# Patient Record
Sex: MALE | Race: WHITE | NOT HISPANIC OR LATINO | Employment: OTHER | URBAN - METROPOLITAN AREA
[De-identification: names, ages, dates, MRNs, and addresses within clinical notes are randomized per-mention and may not be internally consistent; named-entity substitution may affect disease eponyms.]

---

## 2019-09-22 ENCOUNTER — APPOINTMENT (OUTPATIENT)
Dept: RADIOLOGY | Facility: MEDICAL CENTER | Age: 79
DRG: 070 | End: 2019-09-22
Attending: EMERGENCY MEDICINE
Payer: COMMERCIAL

## 2019-09-22 ENCOUNTER — HOSPITAL ENCOUNTER (INPATIENT)
Facility: MEDICAL CENTER | Age: 79
LOS: 36 days | DRG: 070 | End: 2019-10-29
Attending: EMERGENCY MEDICINE | Admitting: STUDENT IN AN ORGANIZED HEALTH CARE EDUCATION/TRAINING PROGRAM
Payer: COMMERCIAL

## 2019-09-22 DIAGNOSIS — J81.0 ACUTE PULMONARY EDEMA (HCC): ICD-10-CM

## 2019-09-22 DIAGNOSIS — E87.20 LACTIC ACIDOSIS: ICD-10-CM

## 2019-09-22 DIAGNOSIS — F02.818 LATE ONSET ALZHEIMER'S DISEASE WITH BEHAVIORAL DISTURBANCE (HCC): ICD-10-CM

## 2019-09-22 DIAGNOSIS — G93.41 ACUTE METABOLIC ENCEPHALOPATHY: ICD-10-CM

## 2019-09-22 DIAGNOSIS — G30.1 LATE ONSET ALZHEIMER'S DISEASE WITH BEHAVIORAL DISTURBANCE (HCC): ICD-10-CM

## 2019-09-22 DIAGNOSIS — R41.82 ALTERED MENTAL STATUS, UNSPECIFIED ALTERED MENTAL STATUS TYPE: ICD-10-CM

## 2019-09-22 PROBLEM — E11.9 DIABETES (HCC): Status: ACTIVE | Noted: 2019-09-22

## 2019-09-22 PROBLEM — R79.89 ELEVATED LACTIC ACID LEVEL: Status: ACTIVE | Noted: 2019-09-22

## 2019-09-22 PROBLEM — E11.65 DIABETES MELLITUS WITH HYPERGLYCEMIA (HCC): Status: ACTIVE | Noted: 2019-09-22

## 2019-09-22 PROBLEM — I10 HYPERTENSION: Status: ACTIVE | Noted: 2019-09-22

## 2019-09-22 PROBLEM — E78.5 HYPERLIPEMIA: Status: ACTIVE | Noted: 2019-09-22

## 2019-09-22 PROBLEM — F32.A DEPRESSION: Status: ACTIVE | Noted: 2019-09-22

## 2019-09-22 LAB
ALBUMIN SERPL BCP-MCNC: 4.4 G/DL (ref 3.2–4.9)
ALBUMIN/GLOB SERPL: 1.5 G/DL
ALP SERPL-CCNC: 73 U/L (ref 30–99)
ALT SERPL-CCNC: 7 U/L (ref 2–50)
ANION GAP SERPL CALC-SCNC: 18 MMOL/L (ref 0–11.9)
APPEARANCE UR: CLEAR
APTT PPP: 27.1 SEC (ref 24.7–36)
AST SERPL-CCNC: 19 U/L (ref 12–45)
BACTERIA #/AREA URNS HPF: NEGATIVE /HPF
BASOPHILS # BLD AUTO: 0.6 % (ref 0–1.8)
BASOPHILS # BLD: 0.06 K/UL (ref 0–0.12)
BILIRUB SERPL-MCNC: 1.2 MG/DL (ref 0.1–1.5)
BILIRUB UR QL STRIP.AUTO: NEGATIVE
BUN SERPL-MCNC: 20 MG/DL (ref 8–22)
CALCIUM SERPL-MCNC: 9.9 MG/DL (ref 8.5–10.5)
CHLORIDE SERPL-SCNC: 103 MMOL/L (ref 96–112)
CO2 SERPL-SCNC: 21 MMOL/L (ref 20–33)
COLOR UR: YELLOW
CREAT SERPL-MCNC: 1.02 MG/DL (ref 0.5–1.4)
EKG IMPRESSION: NORMAL
EOSINOPHIL # BLD AUTO: 0.28 K/UL (ref 0–0.51)
EOSINOPHIL NFR BLD: 2.6 % (ref 0–6.9)
EPI CELLS #/AREA URNS HPF: NEGATIVE /HPF
ERYTHROCYTE [DISTWIDTH] IN BLOOD BY AUTOMATED COUNT: 42 FL (ref 35.9–50)
GLOBULIN SER CALC-MCNC: 2.9 G/DL (ref 1.9–3.5)
GLUCOSE BLD-MCNC: 144 MG/DL (ref 65–99)
GLUCOSE SERPL-MCNC: 121 MG/DL (ref 65–99)
GLUCOSE UR STRIP.AUTO-MCNC: NEGATIVE MG/DL
HCT VFR BLD AUTO: 48.1 % (ref 42–52)
HGB BLD-MCNC: 16.3 G/DL (ref 14–18)
HYALINE CASTS #/AREA URNS LPF: ABNORMAL /LPF
IMM GRANULOCYTES # BLD AUTO: 0.05 K/UL (ref 0–0.11)
IMM GRANULOCYTES NFR BLD AUTO: 0.5 % (ref 0–0.9)
INR PPP: 1.02 (ref 0.87–1.13)
KETONES UR STRIP.AUTO-MCNC: ABNORMAL MG/DL
LACTATE BLD-SCNC: 2.9 MMOL/L (ref 0.5–2)
LACTATE BLD-SCNC: 6.1 MMOL/L (ref 0.5–2)
LEUKOCYTE ESTERASE UR QL STRIP.AUTO: NEGATIVE
LYMPHOCYTES # BLD AUTO: 3.1 K/UL (ref 1–4.8)
LYMPHOCYTES NFR BLD: 28.9 % (ref 22–41)
MCH RBC QN AUTO: 29.6 PG (ref 27–33)
MCHC RBC AUTO-ENTMCNC: 33.9 G/DL (ref 33.7–35.3)
MCV RBC AUTO: 87.5 FL (ref 81.4–97.8)
MICRO URNS: ABNORMAL
MONOCYTES # BLD AUTO: 0.87 K/UL (ref 0–0.85)
MONOCYTES NFR BLD AUTO: 8.1 % (ref 0–13.4)
NEUTROPHILS # BLD AUTO: 6.35 K/UL (ref 1.82–7.42)
NEUTROPHILS NFR BLD: 59.3 % (ref 44–72)
NITRITE UR QL STRIP.AUTO: NEGATIVE
NRBC # BLD AUTO: 0 K/UL
NRBC BLD-RTO: 0 /100 WBC
NT-PROBNP SERPL IA-MCNC: 51 PG/ML (ref 0–125)
PH UR STRIP.AUTO: 5 [PH] (ref 5–8)
PLATELET # BLD AUTO: 253 K/UL (ref 164–446)
PMV BLD AUTO: 9.6 FL (ref 9–12.9)
POTASSIUM SERPL-SCNC: 3.9 MMOL/L (ref 3.6–5.5)
PROT SERPL-MCNC: 7.3 G/DL (ref 6–8.2)
PROT UR QL STRIP: 30 MG/DL
PROTHROMBIN TIME: 13.6 SEC (ref 12–14.6)
RBC # BLD AUTO: 5.5 M/UL (ref 4.7–6.1)
RBC # URNS HPF: ABNORMAL /HPF
RBC UR QL AUTO: NEGATIVE
SODIUM SERPL-SCNC: 142 MMOL/L (ref 135–145)
SP GR UR STRIP.AUTO: 1.02
TROPONIN T SERPL-MCNC: 16 NG/L (ref 6–19)
UROBILINOGEN UR STRIP.AUTO-MCNC: 0.2 MG/DL
WBC # BLD AUTO: 10.7 K/UL (ref 4.8–10.8)
WBC #/AREA URNS HPF: ABNORMAL /HPF

## 2019-09-22 PROCEDURE — 81001 URINALYSIS AUTO W/SCOPE: CPT

## 2019-09-22 PROCEDURE — 83880 ASSAY OF NATRIURETIC PEPTIDE: CPT

## 2019-09-22 PROCEDURE — 700111 HCHG RX REV CODE 636 W/ 250 OVERRIDE (IP): Performed by: EMERGENCY MEDICINE

## 2019-09-22 PROCEDURE — 80053 COMPREHEN METABOLIC PANEL: CPT

## 2019-09-22 PROCEDURE — 99497 ADVNCD CARE PLAN 30 MIN: CPT | Performed by: INTERNAL MEDICINE

## 2019-09-22 PROCEDURE — 99220 PR INITIAL OBSERVATION CARE,LEVL III: CPT | Mod: 25 | Performed by: INTERNAL MEDICINE

## 2019-09-22 PROCEDURE — 96376 TX/PRO/DX INJ SAME DRUG ADON: CPT

## 2019-09-22 PROCEDURE — 96375 TX/PRO/DX INJ NEW DRUG ADDON: CPT

## 2019-09-22 PROCEDURE — 82962 GLUCOSE BLOOD TEST: CPT

## 2019-09-22 PROCEDURE — 700105 HCHG RX REV CODE 258: Performed by: EMERGENCY MEDICINE

## 2019-09-22 PROCEDURE — G0378 HOSPITAL OBSERVATION PER HR: HCPCS

## 2019-09-22 PROCEDURE — 700111 HCHG RX REV CODE 636 W/ 250 OVERRIDE (IP): Performed by: INTERNAL MEDICINE

## 2019-09-22 PROCEDURE — 70450 CT HEAD/BRAIN W/O DYE: CPT

## 2019-09-22 PROCEDURE — 85610 PROTHROMBIN TIME: CPT

## 2019-09-22 PROCEDURE — 85025 COMPLETE CBC W/AUTO DIFF WBC: CPT

## 2019-09-22 PROCEDURE — 96365 THER/PROPH/DIAG IV INF INIT: CPT

## 2019-09-22 PROCEDURE — 83605 ASSAY OF LACTIC ACID: CPT | Mod: 91

## 2019-09-22 PROCEDURE — 94760 N-INVAS EAR/PLS OXIMETRY 1: CPT

## 2019-09-22 PROCEDURE — 96366 THER/PROPH/DIAG IV INF ADDON: CPT

## 2019-09-22 PROCEDURE — 99285 EMERGENCY DEPT VISIT HI MDM: CPT

## 2019-09-22 PROCEDURE — 84484 ASSAY OF TROPONIN QUANT: CPT

## 2019-09-22 PROCEDURE — 85730 THROMBOPLASTIN TIME PARTIAL: CPT

## 2019-09-22 PROCEDURE — 71045 X-RAY EXAM CHEST 1 VIEW: CPT

## 2019-09-22 PROCEDURE — 700101 HCHG RX REV CODE 250: Performed by: INTERNAL MEDICINE

## 2019-09-22 PROCEDURE — 93005 ELECTROCARDIOGRAM TRACING: CPT | Performed by: EMERGENCY MEDICINE

## 2019-09-22 RX ORDER — ATORVASTATIN CALCIUM 20 MG/1
40 TABLET, FILM COATED ORAL EVERY EVENING
Status: ON HOLD | COMMUNITY
End: 2022-07-25

## 2019-09-22 RX ORDER — BISACODYL 10 MG
10 SUPPOSITORY, RECTAL RECTAL
Status: DISCONTINUED | OUTPATIENT
Start: 2019-09-22 | End: 2019-10-29 | Stop reason: HOSPADM

## 2019-09-22 RX ORDER — LORAZEPAM 2 MG/ML
1 INJECTION INTRAMUSCULAR ONCE
Status: COMPLETED | OUTPATIENT
Start: 2019-09-22 | End: 2019-09-22

## 2019-09-22 RX ORDER — MAGNESIUM SULFATE HEPTAHYDRATE 40 MG/ML
2 INJECTION, SOLUTION INTRAVENOUS ONCE
Status: COMPLETED | OUTPATIENT
Start: 2019-09-22 | End: 2019-09-23

## 2019-09-22 RX ORDER — HYDROMORPHONE HYDROCHLORIDE 1 MG/ML
0.5 INJECTION, SOLUTION INTRAMUSCULAR; INTRAVENOUS; SUBCUTANEOUS
Status: DISCONTINUED | OUTPATIENT
Start: 2019-09-22 | End: 2019-09-23

## 2019-09-22 RX ORDER — HALOPERIDOL 5 MG/ML
2-5 INJECTION INTRAMUSCULAR
Status: DISCONTINUED | OUTPATIENT
Start: 2019-09-22 | End: 2019-10-22

## 2019-09-22 RX ORDER — ONDANSETRON 4 MG/1
4 TABLET, ORALLY DISINTEGRATING ORAL EVERY 4 HOURS PRN
Status: DISCONTINUED | OUTPATIENT
Start: 2019-09-22 | End: 2019-09-22

## 2019-09-22 RX ORDER — AMOXICILLIN 250 MG
2 CAPSULE ORAL 2 TIMES DAILY
Status: DISCONTINUED | OUTPATIENT
Start: 2019-09-23 | End: 2019-10-29 | Stop reason: HOSPADM

## 2019-09-22 RX ORDER — SODIUM CHLORIDE 9 MG/ML
INJECTION, SOLUTION INTRAVENOUS CONTINUOUS
Status: DISCONTINUED | OUTPATIENT
Start: 2019-09-22 | End: 2019-09-23

## 2019-09-22 RX ORDER — POTASSIUM CHLORIDE 20 MEQ/1
40 TABLET, EXTENDED RELEASE ORAL ONCE
Status: ACTIVE | OUTPATIENT
Start: 2019-09-22 | End: 2019-09-23

## 2019-09-22 RX ORDER — DULOXETIN HYDROCHLORIDE 20 MG/1
20 CAPSULE, DELAYED RELEASE ORAL EVERY MORNING
COMMUNITY
End: 2022-06-10

## 2019-09-22 RX ORDER — KETAMINE HYDROCHLORIDE 50 MG/ML
1 INJECTION, SOLUTION INTRAMUSCULAR; INTRAVENOUS ONCE
Status: DISCONTINUED | OUTPATIENT
Start: 2019-09-22 | End: 2019-09-22

## 2019-09-22 RX ORDER — POLYETHYLENE GLYCOL 3350 17 G/17G
1 POWDER, FOR SOLUTION ORAL
Status: DISCONTINUED | OUTPATIENT
Start: 2019-09-22 | End: 2019-10-29 | Stop reason: HOSPADM

## 2019-09-22 RX ORDER — ACETAMINOPHEN 325 MG/1
650 TABLET ORAL EVERY 6 HOURS PRN
Status: DISCONTINUED | OUTPATIENT
Start: 2019-09-22 | End: 2019-10-29 | Stop reason: HOSPADM

## 2019-09-22 RX ORDER — LISINOPRIL 5 MG/1
5 TABLET ORAL EVERY MORNING
COMMUNITY
End: 2022-06-10

## 2019-09-22 RX ORDER — SODIUM CHLORIDE 9 MG/ML
INJECTION, SOLUTION INTRAVENOUS CONTINUOUS
Status: DISCONTINUED | OUTPATIENT
Start: 2019-09-22 | End: 2019-09-22

## 2019-09-22 RX ORDER — LABETALOL HYDROCHLORIDE 5 MG/ML
10 INJECTION, SOLUTION INTRAVENOUS EVERY 4 HOURS PRN
Status: DISCONTINUED | OUTPATIENT
Start: 2019-09-22 | End: 2019-10-22

## 2019-09-22 RX ORDER — ERGOCALCIFEROL 1.25 MG/1
50000 CAPSULE ORAL
COMMUNITY
End: 2022-06-10

## 2019-09-22 RX ORDER — ONDANSETRON 2 MG/ML
4 INJECTION INTRAMUSCULAR; INTRAVENOUS EVERY 4 HOURS PRN
Status: DISCONTINUED | OUTPATIENT
Start: 2019-09-22 | End: 2019-09-22

## 2019-09-22 RX ADMIN — LORAZEPAM 1 MG: 2 INJECTION INTRAMUSCULAR; INTRAVENOUS at 15:50

## 2019-09-22 RX ADMIN — HYDROMORPHONE HYDROCHLORIDE 0.5 MG: 1 INJECTION, SOLUTION INTRAMUSCULAR; INTRAVENOUS; SUBCUTANEOUS at 23:13

## 2019-09-22 RX ADMIN — LORAZEPAM 1 MG: 2 INJECTION INTRAMUSCULAR; INTRAVENOUS at 16:17

## 2019-09-22 RX ADMIN — MAGNESIUM SULFATE IN WATER 2 G: 40 INJECTION, SOLUTION INTRAVENOUS at 22:11

## 2019-09-22 RX ADMIN — LORAZEPAM 1 MG: 2 INJECTION INTRAMUSCULAR; INTRAVENOUS at 17:56

## 2019-09-22 RX ADMIN — LORAZEPAM 1 MG: 2 INJECTION INTRAMUSCULAR; INTRAVENOUS at 18:23

## 2019-09-22 RX ADMIN — LABETALOL HYDROCHLORIDE 10 MG: 5 INJECTION, SOLUTION INTRAVENOUS at 21:15

## 2019-09-22 RX ADMIN — SODIUM CHLORIDE: 9 INJECTION, SOLUTION INTRAVENOUS at 16:00

## 2019-09-22 SDOH — HEALTH STABILITY: MENTAL HEALTH: HOW OFTEN DO YOU HAVE A DRINK CONTAINING ALCOHOL?: NEVER

## 2019-09-22 NOTE — ED NOTES
Pt BIBA from home with altered mental status. Per EMS and family- pt found down at home around noon. Pt was aphasic at that time and combative/flailing limbs. Pt now A&Ox1 to self. Continues to flail limbs. Per family, pt was baseline A&Ox4 this morning. No Hx of CVA. [facility] confusion [facility] months ago. CT scan at that time was negative

## 2019-09-22 NOTE — ED TRIAGE NOTES
"Pt BIBA from home- granddaughter found pt sitting on the floor around 1200 today- pt was aphasic. Per EMS, pt \"came too\" en route however is still confused. A&Ox1 to self. Pt follows commands but is combative  "

## 2019-09-22 NOTE — ED NOTES
Isamar from Lab called with critical result of Lactic Acid at 6.1. Critical lab result read back to Isamar.   Dr. Wislon notified of critical lab result at 1604.  Critical lab result read back by Dr. Wilson.

## 2019-09-22 NOTE — ED NOTES
Med Rec complete per Pt's family at bedside   Pt unable to participate in interview  Allergies Reviewed  No ABX in the last 14 days

## 2019-09-22 NOTE — ED PROVIDER NOTES
ED Provider Note    CHIEF COMPLAINT  Chief Complaint   Patient presents with   • Altered Mental Status       [facility]  Edharper Garay is a 79 y.o. male who presents for evaluation of altered mental status.  Patient was brought in by EMS from home.  The patient's daughter indicates that this morning he got up and seemed to be acting fine.  He then apparently fell.  When family members evaluate him he was nonverbal with a flat affect.  Patient was moving all extremities at that time.  EMS was called and the patient had improvement in his mental status upon their arrival.  Patient was brought here for evaluation.  Patient is agitated and oriented x1.  Patient is denying any symptoms.  He currently denies: Headache, chest pain, shortness of breath, abdominal pain, vomiting, hematemesis, melena hematochezia, hematuria, dysuria.    REVIEW OF SYSTEMS  See HPI for further details.  Reliable review of systems is unobtainable due to his altered mental status and confusion and agitation;    PAST MEDICAL HISTORY  Past Medical History:   Diagnosis Date   • Diabetes (HCC)    • Hyperlipemia    • Hypertension    • MI (myocardial infarction) (HCC)        FAMILY HISTORY  No family history on file.    SOCIAL HISTORY  Resides locally; former smoker; no history of alcohol abuse;    SURGICAL HISTORY  Past Surgical History:   Procedure Laterality Date   • APPENDECTOMY     • STENT PLACEMENT      cardiac       CURRENT MEDICATIONS  Home Medications     Reviewed by Bonifacio Espinoza (Pharmacy Tech) on 09/22/19 at 1538  Med List Status: Complete   Medication Last Dose Status   atorvastatin (LIPITOR) 20 MG Tab 9/22/2019 Active   DULoxetine (CYMBALTA) 20 MG Cap DR Particles 9/22/2019 Active   insulin 70/30 (HUMULIN,NOVOLIN) (70-30) 100 UNIT/ML Suspension 9/22/2019 Active   lisinopril (PRINIVIL) 5 MG Tab 9/22/2019 Active   metformin (GLUCOPHAGE) 1000 MG tablet 9/22/2019 Active   metoprolol (LOPRESSOR) 25 MG Tab 9/22/2019 Active   vitamin D,  "Ergocalciferol, (DRISDOL) 51032 units Cap capsule 9/16/2019 Active                ALLERGIES  No Known Allergies    PHYSICAL EXAM  VITAL SIGNS: /79   Pulse (!) 152   Temp 36.6 °C (97.8 °F) (Temporal)   Resp 20   Ht 1.778 m (5' 10\")   Wt 98 kg (216 lb)   SpO2 96%   BMI 30.99 kg/m²    Constitutional: 79-year-old male, agitated, awake, oriented x1  HENT: ,Atraumatic, Bilateral external ears normal, tympanic membranes clear, Oropharynx : Dry, No oral exudates, Nose normal.   Eyes: PERRL, EOMI, Conjunctiva normal, No discharge.   Neck: Normal range of motion, No tenderness, Supple, No stridor.   Lymphatic: No lymphadenopathy noted.   Cardiovascular: Normal heart rate, Normal rhythm, No murmurs, No rubs, No gallops.   Thorax & Lungs: Decreased breath sounds with bilateral rhonchi, No respiratory distress, No wheezing, no stridor, no rales. No chest tenderness.   Abdomen: Soft, nontender, nondistended, no organomegaly, positive bowel sounds normal in quality. No guarding or rebound.  Skin: Decreased skin turgor, pink, warm, dry. No rashes, petechiae, purpura. Normal capillary refill.   Back: No tenderness, No CVA tenderness.   Extremities: Intact distal pulses, No edema, No tenderness, No cyanosis, No clubbing. Vascular: Pulses are 2+, symmetric in the upper and lower extremities.  Musculoskeletal: Diffuse arthritic changes. No tenderness to palpation or major deformities noted.   Neurologic: Awake, agitated, oriented x 1, Normal motor function, Normal sensory function, No gross focal deficits noted.     EKG  I have interpreted: Rate 140, rhythm sinus tachycardia, left axis deviation, right bundle branch block pattern, diffuse nonspecific ST-T wave changes, twelve-lead EKG, no old tracing for comparison;    RADIOLOGY/PROCEDURES  CT-HEAD W/O   Final Result      No CT evidence of acute infarct, hemorrhage or mass.   Mild-to-moderate global parenchymal atrophy. Chronic small vessel ischemic changes.    "   DX-CHEST-PORTABLE (1 VIEW)   Final Result      Mild interstitial edema. No focal consolidation or pleural effusions.            COURSE & MEDICAL DECISION MAKING  Pertinent Labs & Imaging studies reviewed. (See chart for details)  1.  Monitor  2.  IV normal saline; IV fluids administered for clinical dehydration; patient requires n.p.o. Status; reevaluation revealed stable status;  3.  Ativan 1 mg IV, titrated    Laboratory studies: CBC shows white count of 10.7, 59% neutrophils, 20% lymphocytes, 8% monocytes, hemoglobin 16.3, crit 48.1; lactic acid 6.1; troponin 16; coags within normal; CMP shows an anion gap of 18, random glucose 121 otherwise within normal;    Discussion/consultation: At this time, the patient presents for evaluation of altered mental status.  Patient CBC and differential are normal.  The patient does have lactic acidosis.  No clear evidence of infection is identified at this time.  Patient is sedated with altered mental status now but he did receive a total of 4 mg of Ativan for his agitation to be able to obtain a CT scan.  At this time, I spoke with the hospitalist on-call.  The patient will be admitted for further monitoring, treatment, and care.    FINAL IMPRESSION  1. Altered mental status, unspecified altered mental status type    2. Lactic acidosis    3. Acute pulmonary edema (HCC)           PLAN  1.  Patient will be admitted for further monitoring, treatment, and care.    Electronically signed by: Guy G Gansert, 9/22/2019 3:39 PM

## 2019-09-23 LAB
ALBUMIN SERPL BCP-MCNC: 4.2 G/DL (ref 3.2–4.9)
ALBUMIN/GLOB SERPL: 1.8 G/DL
ALP SERPL-CCNC: 65 U/L (ref 30–99)
ALT SERPL-CCNC: 7 U/L (ref 2–50)
ANION GAP SERPL CALC-SCNC: 13 MMOL/L (ref 0–11.9)
AST SERPL-CCNC: 15 U/L (ref 12–45)
BASOPHILS # BLD AUTO: 0.4 % (ref 0–1.8)
BASOPHILS # BLD: 0.05 K/UL (ref 0–0.12)
BILIRUB SERPL-MCNC: 1.3 MG/DL (ref 0.1–1.5)
BUN SERPL-MCNC: 20 MG/DL (ref 8–22)
CALCIUM SERPL-MCNC: 9.2 MG/DL (ref 8.5–10.5)
CHLORIDE SERPL-SCNC: 105 MMOL/L (ref 96–112)
CO2 SERPL-SCNC: 22 MMOL/L (ref 20–33)
CREAT SERPL-MCNC: 0.88 MG/DL (ref 0.5–1.4)
EKG IMPRESSION: NORMAL
EOSINOPHIL # BLD AUTO: 0.02 K/UL (ref 0–0.51)
EOSINOPHIL NFR BLD: 0.2 % (ref 0–6.9)
ERYTHROCYTE [DISTWIDTH] IN BLOOD BY AUTOMATED COUNT: 42.4 FL (ref 35.9–50)
GLOBULIN SER CALC-MCNC: 2.4 G/DL (ref 1.9–3.5)
GLUCOSE BLD-MCNC: 178 MG/DL (ref 65–99)
GLUCOSE BLD-MCNC: 180 MG/DL (ref 65–99)
GLUCOSE SERPL-MCNC: 167 MG/DL (ref 65–99)
HCT VFR BLD AUTO: 45.4 % (ref 42–52)
HGB BLD-MCNC: 15.3 G/DL (ref 14–18)
IMM GRANULOCYTES # BLD AUTO: 0.06 K/UL (ref 0–0.11)
IMM GRANULOCYTES NFR BLD AUTO: 0.5 % (ref 0–0.9)
LACTATE BLD-SCNC: 2 MMOL/L (ref 0.5–2)
LYMPHOCYTES # BLD AUTO: 2.46 K/UL (ref 1–4.8)
LYMPHOCYTES NFR BLD: 19.2 % (ref 22–41)
MCH RBC QN AUTO: 29.7 PG (ref 27–33)
MCHC RBC AUTO-ENTMCNC: 33.7 G/DL (ref 33.7–35.3)
MCV RBC AUTO: 88 FL (ref 81.4–97.8)
MONOCYTES # BLD AUTO: 1.19 K/UL (ref 0–0.85)
MONOCYTES NFR BLD AUTO: 9.3 % (ref 0–13.4)
NEUTROPHILS # BLD AUTO: 9.03 K/UL (ref 1.82–7.42)
NEUTROPHILS NFR BLD: 70.4 % (ref 44–72)
NRBC # BLD AUTO: 0 K/UL
NRBC BLD-RTO: 0 /100 WBC
PLATELET # BLD AUTO: 212 K/UL (ref 164–446)
PMV BLD AUTO: 9.2 FL (ref 9–12.9)
POTASSIUM SERPL-SCNC: 3.9 MMOL/L (ref 3.6–5.5)
PROT SERPL-MCNC: 6.6 G/DL (ref 6–8.2)
RBC # BLD AUTO: 5.16 M/UL (ref 4.7–6.1)
SODIUM SERPL-SCNC: 140 MMOL/L (ref 135–145)
WBC # BLD AUTO: 12.8 K/UL (ref 4.8–10.8)

## 2019-09-23 PROCEDURE — 36415 COLL VENOUS BLD VENIPUNCTURE: CPT

## 2019-09-23 PROCEDURE — 700105 HCHG RX REV CODE 258: Performed by: INTERNAL MEDICINE

## 2019-09-23 PROCEDURE — 96372 THER/PROPH/DIAG INJ SC/IM: CPT

## 2019-09-23 PROCEDURE — 93005 ELECTROCARDIOGRAM TRACING: CPT | Performed by: INTERNAL MEDICINE

## 2019-09-23 PROCEDURE — 96375 TX/PRO/DX INJ NEW DRUG ADDON: CPT

## 2019-09-23 PROCEDURE — 700111 HCHG RX REV CODE 636 W/ 250 OVERRIDE (IP): Performed by: NURSE PRACTITIONER

## 2019-09-23 PROCEDURE — 83605 ASSAY OF LACTIC ACID: CPT

## 2019-09-23 PROCEDURE — 700111 HCHG RX REV CODE 636 W/ 250 OVERRIDE (IP): Performed by: INTERNAL MEDICINE

## 2019-09-23 PROCEDURE — 700111 HCHG RX REV CODE 636 W/ 250 OVERRIDE (IP): Performed by: HOSPITALIST

## 2019-09-23 PROCEDURE — 80053 COMPREHEN METABOLIC PANEL: CPT

## 2019-09-23 PROCEDURE — 96376 TX/PRO/DX INJ SAME DRUG ADON: CPT

## 2019-09-23 PROCEDURE — 85025 COMPLETE CBC W/AUTO DIFF WBC: CPT

## 2019-09-23 PROCEDURE — 93010 ELECTROCARDIOGRAM REPORT: CPT | Performed by: INTERNAL MEDICINE

## 2019-09-23 PROCEDURE — 770020 HCHG ROOM/CARE - TELE (206)

## 2019-09-23 PROCEDURE — 99233 SBSQ HOSP IP/OBS HIGH 50: CPT | Performed by: STUDENT IN AN ORGANIZED HEALTH CARE EDUCATION/TRAINING PROGRAM

## 2019-09-23 PROCEDURE — 82962 GLUCOSE BLOOD TEST: CPT | Mod: 91

## 2019-09-23 RX ORDER — ZIPRASIDONE MESYLATE 20 MG/ML
10 INJECTION, POWDER, LYOPHILIZED, FOR SOLUTION INTRAMUSCULAR ONCE
Status: ACTIVE | OUTPATIENT
Start: 2019-09-23 | End: 2019-09-24

## 2019-09-23 RX ADMIN — HALOPERIDOL LACTATE 5 MG: 5 INJECTION, SOLUTION INTRAMUSCULAR at 14:19

## 2019-09-23 RX ADMIN — SODIUM CHLORIDE: 9 INJECTION, SOLUTION INTRAVENOUS at 12:33

## 2019-09-23 RX ADMIN — HALOPERIDOL LACTATE 5 MG: 5 INJECTION, SOLUTION INTRAMUSCULAR at 09:25

## 2019-09-23 RX ADMIN — HALOPERIDOL LACTATE 5 MG: 5 INJECTION, SOLUTION INTRAMUSCULAR at 06:26

## 2019-09-23 RX ADMIN — SODIUM CHLORIDE: 9 INJECTION, SOLUTION INTRAVENOUS at 00:02

## 2019-09-23 RX ADMIN — HALOPERIDOL LACTATE 5 MG: 5 INJECTION, SOLUTION INTRAMUSCULAR at 18:17

## 2019-09-23 RX ADMIN — ENOXAPARIN SODIUM 40 MG: 100 INJECTION SUBCUTANEOUS at 05:09

## 2019-09-23 NOTE — ED NOTES
Report to T7 RN. Pt transported to T703-01 in good condition with all belongings in possession of family.

## 2019-09-23 NOTE — ASSESSMENT & PLAN NOTE
-SBP teens-130s. Continue lisinopril & metoprolol.   -Monitor BP per unit protocol  -Stable at this time

## 2019-09-23 NOTE — ED NOTES
Pt continues to pull off EKG leads, at IV, and pulse ox. Will continue to try and orient patient and reapply VS

## 2019-09-23 NOTE — THERAPY
PT orders received. Hold per RN, pt not appropriate for PT eval at this time. Will reattempt as able    Laura Hawkins, PT, DPT Pager: [phone]

## 2019-09-23 NOTE — ASSESSMENT & PLAN NOTE
6.1 on admission--> 2.3  Resolved with IVF   No signs of infection   No clear etiology. No seizure, no hypoglycemia, no infection, not likely arhythmia (discuss with cardiology 09/26)

## 2019-09-23 NOTE — PROGRESS NOTES
2 RN skin check complete with FLORES Espinal  Devices in place: telemetry box and nonviolent soft restraints.  Skin assessed under devices Yes, intact.  Confirmed pressure ulcers found on: NA.  New potential pressure ulcers noted on: NA. Wound consult placed: NA.  The following interventions in place: waffle mattress applied, moisturizer at bedside, A2acpql initiated.    Sacrum: red, blanches  Generalized dry, flaky skin  Bilateral wrists - pink, blanchable

## 2019-09-23 NOTE — ED NOTES
Pt given ativan prior to CT and while in CT (okayed by MD). Pt still unable to stay still for scan. MD made aware. VS stable

## 2019-09-23 NOTE — PROGRESS NOTES
Received Bedside report. Assumed care at 2335. Unable to assess orientation but responds to painful stimuli. Patient picked up from ER in soft non-violent restraints for pulling at lines/heart monitor, which will be continued. Patient not ambulatory at this time, condom catheter applied. No signs that patient is experiencing pain. Patient and RN discussed plan of care: questions answered. Labs noted, assessment complete, patient strict NPO. Tele box in place. Pt is on 1L of O2 via NC. Call light in place, fall precautions in place, patient educated on importance of calling for assistance. No additional needs at this time. VSS

## 2019-09-23 NOTE — PROGRESS NOTES
Morning report received at bedside. Care assumed. Pt asleep. No complaints of pain or discomfort. Patient confused and AOx4 and on RA. Tele monitor on. Bed in lowest position and locked. Call light and all belongings within reach. No further needs at this time. 2 point soft restraints in place. Skin is clean dry and intact.

## 2019-09-23 NOTE — ASSESSMENT & PLAN NOTE
-Was maintained on NPH 70/30 26 units BID at home, in addition to metformin.  -A1c 7.3 on admit   -Regimen changed to metformin, januvia, & glimepride. BS over 24h are better controlled, 149-175.  -Continue accuchecks ACHS with SSI as required.  -Hypoglycemia protocol in place if needed.   -Continue DM diet

## 2019-09-23 NOTE — ED NOTES
Took pt to CT with pts daughter. Pt became very agitated once we attempted to move him. Pt would not hold still. Pt returned to room with both rails up, padding provided for pts head and bed in lowest position. RN was immediately notified of pts current condition .
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
## 2019-09-23 NOTE — THERAPY
Occupational Therapy Contact Note  OT consult rec'd. Per RN, pt currently inappropriate as is not following commands and continues to be in restraints; unclear etiology for encephalopathy. Will re-attempt as appropriate/able.  Lois JACK, OTR/L    Pager #[phone]

## 2019-09-23 NOTE — H&P
Hospital Medicine History & Physical Note    Date of Service  9/22/2019    Primary Care Physician  None     Consultants  None    Code Status  DNR, I okay, I did have a long discussion with the family, explained all aspects of CODE STATUS, they did decide he would want to be DNR with intubation okay, daughter is power of .  Time spent on CODE STATUS was [id number].    Chief Complaint  Altered mental status    History of Presenting Illness  79 y.o. male who presented 9/22/2019 with altered mental status.  [facility], a lot of the information for him is not present at this time.  Family members however are present, patient is nonverbal, all information was obtained from the family.  Apparently he was fine this morning, had no complaints.  He was heard by a family member to have a fall, they went and found him more or less aphasic.  He did not improve and became somewhat verbal but he was still very confused.  He then became agitated.  He did continue to move all 4 extremities, had no focal changes.  Upon arrival, patient was noted to have profound increase in his lactic acid and was tachycardic.  Patient did get a CT head but it required 4 mg of Ativan to calm him down enough to get the imaging.  Family states he did have a similar episode approximately 2-1/2 months ago but they state that that episode was much shorter.  He did not receive a diagnosis as to what caused it at that time.  I did discuss the case including labs and imaging with the ER physician.    Review of Systems  Review of Systems   Unable to perform ROS: Acuity of condition       Past Medical History   has a past medical history of Diabetes (HCC), Hyperlipemia, Hypertension, and MI (myocardial infarction) ([facility]).    Surgical History   has a past surgical history that includes stent placement and appendectomy.     Family History  Reviewed, nonpertinent    Social History   reports that he quit smoking about 19  years ago. He has never used smokeless tobacco. He reports that he does not drink alcohol or use drugs.    Allergies  No Known Allergies    Medications  Prior to Admission Medications   Prescriptions Last Dose Informant Patient Reported? Taking?   DULoxetine (CYMBALTA) 20 MG Cap DR Particles 9/22/2019 at AM Family Member Yes Yes   Sig: Take 20 mg by mouth every morning.   atorvastatin (LIPITOR) 20 MG Tab 9/22/2019 at AM Family Member Yes Yes   Sig: Take 20 mg by mouth every morning.   insulin 70/30 (HUMULIN,NOVOLIN) (70-30) 100 UNIT/ML Suspension 9/22/2019 at AM Family Member Yes Yes   Sig: Inject 26 Units as instructed 2 Times a Day.   lisinopril (PRINIVIL) 5 MG Tab 9/22/2019 at AM Family Member Yes Yes   Sig: Take 5 mg by mouth every morning.   metformin (GLUCOPHAGE) 1000 MG tablet 9/22/2019 at AM Family Member Yes Yes   Sig: Take 1,000 mg by mouth 2 times a day, with meals.   metoprolol (LOPRESSOR) 25 MG Tab 9/22/2019 at AM Family Member Yes Yes   Sig: Take 25 mg by mouth 2 times a day.   vitamin D, Ergocalciferol, (DRISDOL) 33118 units Cap capsule 9/16/2019 at Q7D Family Member Yes Yes   Sig: Take 50,000 Units by mouth every Monday.      Facility-Administered Medications: None       Physical Exam  Temp:  [36.6 °C (97.8 °F)] 36.6 °C (97.8 °F)  Pulse:  [] 85  Resp:  [20] 20  BP: (102-152)/() 134/79  SpO2:  [94 %-96 %] 94 %    Physical Exam   Constitutional: He appears well-developed and well-nourished.  Non-toxic appearance. No distress.   HENT:   Head: Normocephalic and atraumatic. Not macrocephalic and not microcephalic. Head is without raccoon's eyes and without Douglas's sign.   Right Ear: External ear normal.   Left Ear: External ear normal.   Mouth/Throat: Mucous membranes are dry. No oropharyngeal exudate.   Eyes: Conjunctivae are normal. Right eye exhibits no discharge. Left eye exhibits no discharge. No scleral icterus.   Neck: Normal range of motion. Neck supple. No tracheal deviation, no  edema and no erythema present.   Cardiovascular: Regular rhythm, normal heart sounds and intact distal pulses. Tachycardia present. Exam reveals no gallop, no friction rub and no decreased pulses.   No murmur heard.  Pulmonary/Chest: Effort normal and breath sounds normal. No stridor. No respiratory distress. He has no decreased breath sounds. He has no wheezes. He has no rhonchi. He has no rales.   Abdominal: Soft. Bowel sounds are normal. He exhibits no distension. There is no splenomegaly or hepatomegaly.   Musculoskeletal: Normal range of motion. He exhibits no edema or deformity.   Lymphadenopathy:     He has no cervical adenopathy.   Neurological:   Moves all extremities but isn't verbal nor does he follow   Skin: Skin is warm and dry. No rash noted. He is not diaphoretic. No cyanosis or erythema. No pallor. Nails show no clubbing.   Psychiatric: He is noncommunicative.   Nursing note and vitals reviewed.      Laboratory:  Recent Labs     09/22/19  1500   WBC 10.7   RBC 5.50   HEMOGLOBIN 16.3   HEMATOCRIT 48.1   MCV 87.5   MCH 29.6   MCHC 33.9   RDW 42.0   PLATELETCT 253   MPV 9.6     Recent Labs     09/22/19  1500   SODIUM 142   POTASSIUM 3.9   CHLORIDE 103   CO2 21   GLUCOSE 121*   BUN 20   CREATININE 1.02   CALCIUM 9.9     Recent Labs     09/22/19  1500   ALTSGPT 7   ASTSGOT 19   ALKPHOSPHAT 73   TBILIRUBIN 1.2   GLUCOSE 121*     Recent Labs     09/22/19  1500   APTT 27.1   INR 1.02     Recent Labs     09/22/19  1500   NTPROBNP 51         Recent Labs     09/22/19  1500   TROPONINT 16       Urinalysis:    No results found     Imaging:  CT-HEAD W/O   Final Result      No CT evidence of acute infarct, hemorrhage or mass.   Mild-to-moderate global parenchymal atrophy. Chronic small vessel ischemic changes.      DX-CHEST-PORTABLE (1 VIEW)   Final Result      Mild interstitial edema. No focal consolidation or pleural effusions.            Assessment/Plan:  I anticipate this patient is appropriate for observation  status at this time.    * Acute metabolic encephalopathy  Assessment & Plan  -Possibly due to dehydration however it was a sudden onset  -Possible infection but urinalysis does not support UTI, I did personally review his chest x-ray, there is a possible right middle lobe consolidation, could be aspiration which could have happened after his mentation worsened, regardless we will start Unasyn  -Patient has become very agitated, Haldol as needed  -Patient is moving all 4 extremities during my exam, I do not feel he had a stroke but he would not be able to tolerate an MRI at this time regardless  -Patient will be n.p.o., will not be taken his antihypertensives  -Family states he is stoic and did not have any complaints prior to his mentation change  -I did personally review his CT head, noted no acute intracranial abnormalities    Elevated lactic acid level  Assessment & Plan  -I feel this is likely due to significant dehydration  -Family states patient is stoic and does not really complain of much nor do they know how much she has been eating or drinking  -Start IV fluids  -Trend lactic acid    Depression  Assessment & Plan  -Continue home Cymbalta when able    Hypertension  Assessment & Plan  -At this time I am going to hold his metoprolol and lisinopril  -Place PRN labetalol  -Adjust as needed    Hyperlipemia  Assessment & Plan  -Continue home statin when able    Diabetes mellitus with hyperglycemia (HCC)  Assessment & Plan  -Mild hyperglycemia, no need for coverage at this time  -Obtain Accu-Cheks  -I do not think his sugar is not causing his altered mentation  -Hold home metformin and 70/30      VTE prophylaxis: Lovenox

## 2019-09-23 NOTE — CARE PLAN
Problem: Safety  Goal: Will remain free from injury  Outcome: PROGRESSING AS EXPECTED  Note:   Patient confused and in 2 point soft restraints. No signs or injuries. Will continue to monitor.      Problem: Knowledge Deficit  Goal: Knowledge of disease process/condition, treatment plan, diagnostic tests, and medications will improve  Outcome: PROGRESSING AS EXPECTED  Note:   Patient's family educated on POC, reasoning behind restraints, and medications. Will wait for MD and educate as needed.

## 2019-09-24 ENCOUNTER — APPOINTMENT (OUTPATIENT)
Dept: RADIOLOGY | Facility: MEDICAL CENTER | Age: 79
DRG: 070 | End: 2019-09-24
Attending: INTERNAL MEDICINE
Payer: COMMERCIAL

## 2019-09-24 PROBLEM — F03.918 DEMENTIA WITH BEHAVIORAL DISTURBANCE (HCC): Status: ACTIVE | Noted: 2019-09-24

## 2019-09-24 LAB
EKG IMPRESSION: NORMAL
GLUCOSE BLD-MCNC: 218 MG/DL (ref 65–99)
GLUCOSE BLD-MCNC: 227 MG/DL (ref 65–99)

## 2019-09-24 PROCEDURE — 70551 MRI BRAIN STEM W/O DYE: CPT

## 2019-09-24 PROCEDURE — 700102 HCHG RX REV CODE 250 W/ 637 OVERRIDE(OP): Performed by: INTERNAL MEDICINE

## 2019-09-24 PROCEDURE — 93005 ELECTROCARDIOGRAM TRACING: CPT | Performed by: STUDENT IN AN ORGANIZED HEALTH CARE EDUCATION/TRAINING PROGRAM

## 2019-09-24 PROCEDURE — 99222 1ST HOSP IP/OBS MODERATE 55: CPT | Performed by: PSYCHIATRY & NEUROLOGY

## 2019-09-24 PROCEDURE — 700111 HCHG RX REV CODE 636 W/ 250 OVERRIDE (IP): Performed by: INTERNAL MEDICINE

## 2019-09-24 PROCEDURE — A9270 NON-COVERED ITEM OR SERVICE: HCPCS | Performed by: INTERNAL MEDICINE

## 2019-09-24 PROCEDURE — 82962 GLUCOSE BLOOD TEST: CPT

## 2019-09-24 PROCEDURE — 92610 EVALUATE SWALLOWING FUNCTION: CPT

## 2019-09-24 PROCEDURE — 93010 ELECTROCARDIOGRAM REPORT: CPT | Performed by: INTERNAL MEDICINE

## 2019-09-24 PROCEDURE — 770020 HCHG ROOM/CARE - TELE (206)

## 2019-09-24 PROCEDURE — 99233 SBSQ HOSP IP/OBS HIGH 50: CPT | Performed by: INTERNAL MEDICINE

## 2019-09-24 PROCEDURE — 93005 ELECTROCARDIOGRAM TRACING: CPT | Performed by: INTERNAL MEDICINE

## 2019-09-24 RX ORDER — LORAZEPAM 2 MG/ML
1 INJECTION INTRAMUSCULAR ONCE
Status: COMPLETED | OUTPATIENT
Start: 2019-09-24 | End: 2019-09-24

## 2019-09-24 RX ORDER — DULOXETIN HYDROCHLORIDE 20 MG/1
20 CAPSULE, DELAYED RELEASE ORAL EVERY MORNING
Status: DISCONTINUED | OUTPATIENT
Start: 2019-09-24 | End: 2019-10-29 | Stop reason: HOSPADM

## 2019-09-24 RX ORDER — ATORVASTATIN CALCIUM 20 MG/1
20 TABLET, FILM COATED ORAL EVERY MORNING
Status: DISCONTINUED | OUTPATIENT
Start: 2019-09-24 | End: 2019-10-29 | Stop reason: HOSPADM

## 2019-09-24 RX ORDER — LISINOPRIL 5 MG/1
5 TABLET ORAL EVERY MORNING
Status: DISCONTINUED | OUTPATIENT
Start: 2019-09-24 | End: 2019-10-29 | Stop reason: HOSPADM

## 2019-09-24 RX ORDER — HALOPERIDOL 1 MG/1
1 TABLET ORAL EVERY 6 HOURS PRN
Status: DISCONTINUED | OUTPATIENT
Start: 2019-09-24 | End: 2019-10-29 | Stop reason: HOSPADM

## 2019-09-24 RX ADMIN — METOPROLOL TARTRATE 25 MG: 25 TABLET, FILM COATED ORAL at 16:47

## 2019-09-24 RX ADMIN — ACETAMINOPHEN 650 MG: 325 TABLET, FILM COATED ORAL at 23:06

## 2019-09-24 RX ADMIN — DULOXETINE HYDROCHLORIDE 20 MG: 20 CAPSULE, DELAYED RELEASE ORAL at 16:47

## 2019-09-24 RX ADMIN — INSULIN HUMAN 2 UNITS: 100 INJECTION, SOLUTION PARENTERAL at 20:51

## 2019-09-24 RX ADMIN — ENOXAPARIN SODIUM 40 MG: 100 INJECTION SUBCUTANEOUS at 05:51

## 2019-09-24 RX ADMIN — LISINOPRIL 5 MG: 5 TABLET ORAL at 16:47

## 2019-09-24 RX ADMIN — ATORVASTATIN CALCIUM 20 MG: 20 TABLET, FILM COATED ORAL at 16:47

## 2019-09-24 RX ADMIN — INSULIN HUMAN 2 UNITS: 100 INJECTION, SOLUTION PARENTERAL at 16:32

## 2019-09-24 ASSESSMENT — COGNITIVE AND FUNCTIONAL STATUS - GENERAL
TOILETING: A LITTLE
STANDING UP FROM CHAIR USING ARMS: A LITTLE
SUGGESTED CMS G CODE MODIFIER DAILY ACTIVITY: CJ
WALKING IN HOSPITAL ROOM: A LITTLE
DAILY ACTIVITIY SCORE: 22
MOBILITY SCORE: 20
CLIMB 3 TO 5 STEPS WITH RAILING: A LITTLE
SUGGESTED CMS G CODE MODIFIER MOBILITY: CJ
DRESSING REGULAR LOWER BODY CLOTHING: A LITTLE
MOVING FROM LYING ON BACK TO SITTING ON SIDE OF FLAT BED: A LITTLE

## 2019-09-24 ASSESSMENT — ENCOUNTER SYMPTOMS
HEADACHES: 0
DIZZINESS: 0
MYALGIAS: 0
SHORTNESS OF BREATH: 0
NAUSEA: 0
HEARTBURN: 0
BLURRED VISION: 0
FEVER: 0
DEPRESSION: 0
VOMITING: 0
COUGH: 0
DIARRHEA: 0
ABDOMINAL PAIN: 0
CHILLS: 0
MEMORY LOSS: 1
DOUBLE VISION: 0

## 2019-09-24 ASSESSMENT — PATIENT HEALTH QUESTIONNAIRE - PHQ9
2. FEELING DOWN, DEPRESSED, IRRITABLE, OR HOPELESS: NOT AT ALL
SUM OF ALL RESPONSES TO PHQ9 QUESTIONS 1 AND 2: 0
1. LITTLE INTEREST OR PLEASURE IN DOING THINGS: NOT AT ALL
1. LITTLE INTEREST OR PLEASURE IN DOING THINGS: NOT AT ALL
SUM OF ALL RESPONSES TO PHQ9 QUESTIONS 1 AND 2: 0
2. FEELING DOWN, DEPRESSED, IRRITABLE, OR HOPELESS: NOT AT ALL

## 2019-09-24 ASSESSMENT — LIFESTYLE VARIABLES
EVER HAD A DRINK FIRST THING IN THE MORNING TO STEADY YOUR NERVES TO GET RID OF A HANGOVER: NO
HAVE PEOPLE ANNOYED YOU BY CRITICIZING YOUR DRINKING: NO
ON A TYPICAL DAY WHEN YOU DRINK ALCOHOL HOW MANY DRINKS DO YOU HAVE: 0
HOW MANY TIMES IN THE PAST YEAR HAVE YOU HAD 5 OR MORE DRINKS IN A DAY: 0
DOES PATIENT WANT TO STOP DRINKING: NO
AVERAGE NUMBER OF DAYS PER WEEK YOU HAVE A DRINK CONTAINING ALCOHOL: 0
CONSUMPTION TOTAL: NEGATIVE
EVER_SMOKED: YES
TOTAL SCORE: 0
HAVE YOU EVER FELT YOU SHOULD CUT DOWN ON YOUR DRINKING: NO
EVER FELT BAD OR GUILTY ABOUT YOUR DRINKING: NO
TOTAL SCORE: 0
TOTAL SCORE: 0

## 2019-09-24 NOTE — THERAPY
"Speech Language Therapy Clinical Swallow Evaluation completed.  Functional Status: Patient awake and sitting at EOB. His speech is occasionally confused but not aphasic or dysarthric. He had three moments of forgetfulness during this session and required verbal cues. No facial asymmetry noted. Laryngeal elevation palpated as complete. He consumed thin liquids, puree, soft solid, dry solids without signs of aspiration. However, he was unable to masticate soft solids with fiber and it he had prolonged mastication with crackers. He reports he can chew anything but then he spit out a large mouthful of pineapple and stated \"I just don't have time for that.\"   Recommendations - Diet: Diet / Liquid Recommendation: Dysphagia II, Thin Liquid                          Strategies: Monitor during meals, Assistance needed for meal tray set-up and Head of Bed at 90 Degrees                          Medication Administration: Medication Administration : Whole with Liquid Wash  Plan of Care: Will benefit from Speech Therapy 3 times per week  Post-Acute Therapy: Anticipate that the patient will have no further speech therapy needs after discharge from the hospital.      See \"Rehab Therapy-Acute\" Patient Summary Report for complete documentation.   "

## 2019-09-24 NOTE — PROGRESS NOTES
Monitor Summary:   sr 72-92 (Up to 170)   .20/.12/.40  R PVC/PAC/couplet  Occasional 1.3 second pauses - asymptomatic      12 hr cc

## 2019-09-24 NOTE — PROGRESS NOTES
Hospital Medicine Daily Progress Note    Date of Service  9/24/2019    Chief Complaint  79 y.o. male admitted 9/22/2019 with alter mental status     Hospital Course    79-year-old male past medical history of some degree of dementia, type 2 diabetes, hyperlipidemia, hypertension, history of MI, [facility].  On arrival patient was nonverbal very agitated.  He did require 4 mg of Ativan to get head CT done.  He was placed on restraint.  Head CT negative for acute bleeding or mass-effect.  Lactic acid elevated, tachycardic on admission.  No signs of infection.  Glucose normal.  Blood pressure stable.  Electrolytes stable.  His mentation improved in 2 days.  Daughter reports that he has had similar episode in the past but did not last this long.       Interval Problem Update  Mentation improving. Still not able to remember date, even after telling him the date. Otherwise joking, eating well. No complains. Will try to take him restrain. RN will call if he needs restrain again     Consultants/Specialty  Psychiatry     Code Status  DNAR. DNI ok     Disposition  Inpatient     Review of Systems  Review of Systems   Constitutional: Negative for chills and fever.   Eyes: Negative for blurred vision and double vision.   Respiratory: Negative for cough and shortness of breath.    Cardiovascular: Negative for chest pain and leg swelling.   Gastrointestinal: Negative for abdominal pain, diarrhea, heartburn, nausea and vomiting.   Genitourinary: Negative for dysuria, frequency and urgency.   Musculoskeletal: Negative for myalgias.   Neurological: Negative for dizziness and headaches.   Psychiatric/Behavioral: Positive for memory loss. Negative for depression.        Physical Exam  Temp:  [36.1 °C (97 °F)-37.5 °C (99.5 °F)] 37.4 °C (99.3 °F)  Pulse:  [] 95  Resp:  [16-18] 17  BP: (131-165)/(71-89) 131/89  SpO2:  [93 %-100 %] 93 %    Physical Exam   Constitutional: He appears well-developed  and well-nourished. No distress.   HENT:   Head: Normocephalic and atraumatic.   Eyes: Pupils are equal, round, and reactive to light. Conjunctivae and EOM are normal. Right eye exhibits no discharge. Left eye exhibits no discharge.   Neck: Normal range of motion. No thyromegaly present.   Cardiovascular: Normal rate, regular rhythm and normal heart sounds.   No murmur heard.  Pulmonary/Chest: Effort normal and breath sounds normal. No respiratory distress.   Abdominal: Soft. Bowel sounds are normal. He exhibits no distension. There is no tenderness.   Musculoskeletal: Normal range of motion. He exhibits no edema.   Neurological: He is alert. No cranial nerve deficit. Coordination normal.   Alert and oriented x2   Skin: Skin is warm. No erythema.   Psychiatric: He has a normal mood and affect. His behavior is normal.   Nursing note and vitals reviewed.      Fluids    Intake/Output Summary (Last 24 hours) at 9/24/2019 1518  Last data filed at 9/24/2019 1400  Gross per 24 hour   Intake 240 ml   Output 1075 ml   Net -835 ml       Laboratory  Recent Labs     09/22/19  1500 09/23/19  0322   WBC 10.7 12.8*   RBC 5.50 5.16   HEMOGLOBIN 16.3 15.3   HEMATOCRIT 48.1 45.4   MCV 87.5 88.0   MCH 29.6 29.7   MCHC 33.9 33.7   RDW 42.0 42.4   PLATELETCT 253 212   MPV 9.6 9.2     Recent Labs     09/22/19  1500 09/23/19  0322   SODIUM 142 140   POTASSIUM 3.9 3.9   CHLORIDE 103 105   CO2 21 22   GLUCOSE 121* 167*   BUN 20 20   CREATININE 1.02 0.88   CALCIUM 9.9 9.2     Recent Labs     09/22/19  1500   APTT 27.1   INR 1.02               Imaging  CT-HEAD W/O   Final Result      No CT evidence of acute infarct, hemorrhage or mass.   Mild-to-moderate global parenchymal atrophy. Chronic small vessel ischemic changes.      DX-CHEST-PORTABLE (1 VIEW)   Final Result      Mild interstitial edema. No focal consolidation or pleural effusions.      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 However cannot live alone.   Daughter helping with medications   Pt still prep meals for himself. Plays with grandsons   Two episodes of flares while pt does not remember anything and very aggressive     Elevated lactic acid level- (present on admission)  Assessment & Plan  6.1 on admission--> 2.3  Resolved with IVF   No signs of infection   No clear etiology     Depression- (present on admission)  Assessment & Plan  Resume home meds   Continue to monitor   Doing well at home per his daughter     Hypertension- (present on admission)  Assessment & Plan  BP stable  He is at home lisinopril 5 mg and metoprolol 25 mg BID   Tachycardic sinus, sometimes.   Resume home meds at this time   Continue to monitor     Hyperlipemia- (present on admission)  Assessment & Plan  Resume statin   Follow up with PCP     Diabetes mellitus with hyperglycemia (HCC)- (present on admission)  Assessment & Plan  NPH 70/30 26 U BID at home and metformin   Off medications while he has been here  A1c to follow   Started ISSS   Need to be transition to oral. Some difficulties with insulin administration per his daughter              VTE prophylaxis: lovenox

## 2019-09-24 NOTE — PROGRESS NOTES
Monitor Summary:   Sinus Rhythm 72-87  Tach up to 167  Frequent PVCs, Rare Triplet, Occasional Couplets and Bigem  .18/.12/.36

## 2019-09-24 NOTE — PSYCHIATRY
"PSYCHIATRIC CONSULTATION:  Reason for admission:   AMS  Reason for consult: agitation  Requesting Physician: Miguel A    Legal status:  No hold     Chief Complaint:    HPI:   Andrei Garay is a 79 y.o. year old male with an PMH of DM, HTN, MI  who presented to [facility] after being found down after a fall by granddaughter. Brought to [facility] by EMS and became more alert en route. Baseline is A&oX4.  He was \"non verbal with a flat affect\". He was agitated and oriented only to himself. Notable that he had an anion gap and mildly elevated white count. Troponin wnl. Lactic acid elevated. He remained agitated and placed in restraints.   Family states he has had some memory issues over the last six months.  Due to agitation, MRI was delayed.     9/22 he received 1mg ativan at around 3:30pm, 4pm, 5m, and 6pm, received dilaudid in the evening. Yesterday he received total of 20mg of haldol in split doses at 6am, 9:30am, 2pm, 6pm.  Geodon wasn't dispensed, although ordered.   QTc elevated, but has improved since admission despite receiving meds that prolong QT.      He did very well with the haldol, actually. He is clear, oriented, with his family, smiling. Looks quite good today. He hasn't been confused or agitated today. Discussed with RN this is an incredible improvement. He was quite violent yesterday, and the nursing staff was concerned about being hurt.     The onset of this episode was acute. He had a previous episode that was similar to this, and it was also acute. That one resolved gradually. This one seems to be resolving more acutely, as he is improving quite quickly. However, the severity is concerning.     Review of Systems:  Psychiatric:  Depression:      Denies depressed mood, anhedonia. Energy okay. No si.   Kirsten:No signs or symptoms   Anxiety/Panic Attacks No signs or symptoms   PTSD symptom: No signs or symptoms   Psychosis: has been psychotic with delirium, resolving   Other:  Constitutional: AMS, " "resolving. No major weight gain or loss lately.   Neurologic:  AMS. No focal weakness. Denies HA.   ENT:  No nosebleed, no sore throat, no blurry vision   Skin:  No rash, no itchy   Musculoskeletal:  Psychomotor retardation.   CV:  No palpitations, no cp   Lungs:  No sob, no cough   GI:  Denies n/v. Denies abd pain   :    Retention.   All other systems reviewed and are negative.        Psychiatric Examination: observed phenomenon:  Vitals: /73   Pulse 77   Temp 37.5 °C (99.5 °F) (Temporal)   Resp 17   Ht 1.778 m (5' 10\")   Wt 100.6 kg (221 lb 12.5 oz)   SpO2 100%   BMI 31.82 kg/m²  Body mass index is 31.82 kg/m².      Appearance: grooming fair    Muscle Strength/Tone: psychomotor retardation. Smiling   Gait/Station: unable to evaluate gait as he is in bed, not currently walking   Speech: Nl tone, rate, and volume. Not pressured. Understandable.   Thought Process:  Logical and sequential, goal-directed   Associations:no loose associations   Abnormal/Psychotic Thoughts (ex): No a/vh, no evidence of delusions, no ideas of reference, no internal stimulation noted   Insight/Judgement: fair   Orientation:x4  Memory: improving  Attention/Concentration: much improved   Language:fluent   Fund of Knowledge:adequate   Mood:          \"good\"   Affect:         Full, not quite congruent. Slightly overly happy for situation   SI/HI:   Denies   Neurological Testing:( ie clock, cube drawing, MMSE, MOCA,etc.)       Past Psychiatric Hx:   No suicide attempts.   No hospitalizations.   Mild depression in past, not that notable.   No history of psychosis     Family Psychiatric Hx:  Denies     Social Hx:  Social History     Socioeconomic History   • Marital status: Unknown     Spouse name: Not on file   • Number of children: Not on file   • Years of education: Not on file   • Highest education level: Not on file   Occupational History   • Not on file   Social Needs   • Financial resource strain: Not on file   • Food " insecurity:     Worry: Not on file     Inability: Not on file   • Transportation needs:     Medical: Not on file     Non-medical: Not on file   Tobacco Use   • Smoking status: Former Smoker     Last attempt to quit: 2000     Years since quittin.0   • Smokeless tobacco: Never Used   Substance and Sexual Activity   • Alcohol use: Never     Frequency: Never   • Drug use: Never   • Sexual activity: Not on file   Lifestyle   • Physical activity:     Days per week: Not on file     Minutes per session: Not on file   • Stress: Not on file   Relationships   • Social connections:     Talks on phone: Not on file     Gets together: Not on file     Attends Gnosticism service: Not on file     Active member of club or organization: Not on file     Attends meetings of clubs or organizations: Not on file     Relationship status: Not on file   • Intimate partner violence:     Fear of current or ex partner: Not on file     Emotionally abused: Not on file     Physically abused: Not on file     Forced sexual activity: Not on file   Other Topics Concern   • Not on file   Social History Narrative   • Not on file     Daughter is power of .   Very supportive family.     Drug/Alcohol/Tobacco Hx:   Drugs:denies    Alcohol:denies   Tobacco: quit smoking 19 years ago.     Medical Hx: labs, MARS, medications, etc were reviewed. Only those findings of potential interest to psychiatry are noted below:    Past Medical History:   Diagnosis Date   • Diabetes (HCC)    • Hyperlipemia    • Hypertension    • MI (myocardial infarction) (HCC)      Past Surgical History:   Procedure Laterality Date   • APPENDECTOMY     • STENT PLACEMENT      cardiac       Allergies:   No Known Allergies    Medications:  Current Facility-Administered Medications   Medication Dose Route Frequency Provider Last Rate Last Dose   • LORazepam (ATIVAN) injection 1 mg  1 mg Intravenous Once Que Dooley M.D.       • senna-docusate (PERICOLACE or SENOKOT S) 8.6-50 MG  per tablet 2 Tab  2 Tab Oral BID Leodan Ahuja D.O.   Stopped at 09/23/19 0600    And   • polyethylene glycol/lytes (MIRALAX) PACKET 1 Packet  1 Packet Oral QDAY PRN Leodan Ahuja D.O.        And   • magnesium hydroxide (MILK OF MAGNESIA) suspension 30 mL  30 mL Oral QDAY PRN Leodan Ahuja D.O.        And   • bisacodyl (DULCOLAX) suppository 10 mg  10 mg Rectal QDAY PRN Leodan Ahuja D.O.       • enoxaparin (LOVENOX) inj 40 mg  40 mg Subcutaneous DAILY Leodan Ahuja D.O.   40 mg at 09/24/19 0551   • acetaminophen (TYLENOL) tablet 650 mg  650 mg Oral Q6HRS PRN Leodan Ahuja D.O.       • labetalol (NORMODYNE,TRANDATE) injection 10 mg  10 mg Intravenous Q4HRS PRN YOGI MilanOMariusz   10 mg at 09/22/19 2115   • haloperidol lactate (HALDOL) injection 2-5 mg  2-5 mg Intravenous Q3HRS PRN YOGI MilanO.   5 mg at 09/23/19 1817       Labs/ Testing:  Recent Results (from the past 48 hour(s))   CBC WITH DIFFERENTIAL    Collection Time: 09/22/19  3:00 PM   Result Value Ref Range    WBC 10.7 4.8 - 10.8 K/uL    RBC 5.50 4.70 - 6.10 M/uL    Hemoglobin 16.3 14.0 - 18.0 g/dL    Hematocrit 48.1 42.0 - 52.0 %    MCV 87.5 81.4 - 97.8 fL    MCH 29.6 27.0 - 33.0 pg    MCHC 33.9 33.7 - 35.3 g/dL    RDW 42.0 35.9 - 50.0 fL    Platelet Count 253 164 - 446 K/uL    MPV 9.6 9.0 - 12.9 fL    Neutrophils-Polys 59.30 44.00 - 72.00 %    Lymphocytes 28.90 22.00 - 41.00 %    Monocytes 8.10 0.00 - 13.40 %    Eosinophils 2.60 0.00 - 6.90 %    Basophils 0.60 0.00 - 1.80 %    Immature Granulocytes 0.50 0.00 - 0.90 %    Nucleated RBC 0.00 /100 WBC    Neutrophils (Absolute) 6.35 1.82 - 7.42 K/uL    Lymphs (Absolute) 3.10 1.00 - 4.80 K/uL    Monos (Absolute) 0.87 (H) 0.00 - 0.85 K/uL    Eos (Absolute) 0.28 0.00 - 0.51 K/uL    Baso (Absolute) 0.06 0.00 - 0.12 K/uL    Immature Granulocytes (abs) 0.05 0.00 - 0.11 K/uL    NRBC (Absolute) 0.00 K/uL   COMP METABOLIC PANEL    Collection Time: 09/22/19  3:00 PM   Result Value Ref Range     Sodium 142 135 - 145 mmol/L    Potassium 3.9 3.6 - 5.5 mmol/L    Chloride 103 96 - 112 mmol/L    Co2 21 20 - 33 mmol/L    Anion Gap 18.0 (H) 0.0 - 11.9    Glucose 121 (H) 65 - 99 mg/dL    Bun 20 8 - 22 mg/dL    Creatinine 1.02 0.50 - 1.40 mg/dL    Calcium 9.9 8.5 - 10.5 mg/dL    AST(SGOT) 19 12 - 45 U/L    ALT(SGPT) 7 2 - 50 U/L    Alkaline Phosphatase 73 30 - 99 U/L    Total Bilirubin 1.2 0.1 - 1.5 mg/dL    Albumin 4.4 3.2 - 4.9 g/dL    Total Protein 7.3 6.0 - 8.2 g/dL    Globulin 2.9 1.9 - 3.5 g/dL    A-G Ratio 1.5 g/dL   TROPONIN    Collection Time: 19  3:00 PM   Result Value Ref Range    Troponin T 16 6 - 19 ng/L   PROTHROMBIN TIME    Collection Time: 19  3:00 PM   Result Value Ref Range    PT 13.6 12.0 - 14.6 sec    INR 1.02 0.87 - 1.13   APTT    Collection Time: 19  3:00 PM   Result Value Ref Range    APTT 27.1 24.7 - 36.0 sec   LACTIC ACID    Collection Time: 19  3:00 PM   Result Value Ref Range    Lactic Acid 6.1 (HH) 0.5 - 2.0 mmol/L   ESTIMATED GFR    Collection Time: 19  3:00 PM   Result Value Ref Range    GFR If African American >60 >60 mL/min/1.73 m 2    GFR If Non African American >60 >60 mL/min/1.73 m 2   proBrain Natriuretic Peptide, NT    Collection Time: 19  3:00 PM   Result Value Ref Range    NT-proBNP 51 0 - 125 pg/mL   EKG (NOW)    Collection Time: 19  4:08 PM   Result Value Ref Range    Report       [facility] Emergency Dept.    Test Date:  2019  Pt Name:    COOKIE VELEZ                Department: ER  MRN:        [id number]                      Room:        12  Gender:     Male                         Technician: [id number]  :        1940                   Requested By:GUY G GANSERT  Order #:    [id number]                    Reading MD:    Measurements  Intervals                                Axis  Rate:       144                          P:          0  NV:                                      QRS:        -72  QRSD:        126                          T:          22  QT:         336  QTc:        520    Interpretive Statements  SINUS TACHYCARDIA  MULTIPLE VENTRICULAR PREMATURE COMPLEXES  RIGHT BUNDLE BRANCH BLOCK  CONSIDER INFERIOR INFARCT  BASELINE WANDER IN LEAD(S) III,aVL,aVF,V3  No previous ECG available for comparison     ACCU-CHEK GLUCOSE    Collection Time: 09/22/19  6:10 PM   Result Value Ref Range    Glucose - Accu-Ck 144 (H) 65 - 99 mg/dL   LACTIC ACID    Collection Time: 09/22/19  7:28 PM   Result Value Ref Range    Lactic Acid 2.9 (H) 0.5 - 2.0 mmol/L   URINALYSIS CULTURE, IF INDICATED    Collection Time: 09/22/19  7:50 PM   Result Value Ref Range    Color Yellow     Character Clear     Specific Gravity 1.016 <1.035    Ph 5.0 5.0 - 8.0    Glucose Negative Negative mg/dL    Ketones Trace (A) Negative mg/dL    Protein 30 (A) Negative mg/dL    Bilirubin Negative Negative    Urobilinogen, Urine 0.2 Negative    Nitrite Negative Negative    Leukocyte Esterase Negative Negative    Occult Blood Negative Negative    Micro Urine Req Microscopic    URINE MICROSCOPIC (W/UA)    Collection Time: 09/22/19  7:50 PM   Result Value Ref Range    WBC 0-2 (A) /hpf    RBC 2-5 (A) /hpf    Bacteria Negative None /hpf    Epithelial Cells Negative /hpf    Hyaline Cast 0-2 /lpf   ACCU-CHEK GLUCOSE    Collection Time: 09/23/19 12:03 AM   Result Value Ref Range    Glucose - Accu-Ck 178 (H) 65 - 99 mg/dL   LACTIC ACID    Collection Time: 09/23/19  3:22 AM   Result Value Ref Range    Lactic Acid 2.0 0.5 - 2.0 mmol/L   Comp Metabolic Panel (CMP)    Collection Time: 09/23/19  3:22 AM   Result Value Ref Range    Sodium 140 135 - 145 mmol/L    Potassium 3.9 3.6 - 5.5 mmol/L    Chloride 105 96 - 112 mmol/L    Co2 22 20 - 33 mmol/L    Anion Gap 13.0 (H) 0.0 - 11.9    Glucose 167 (H) 65 - 99 mg/dL    Bun 20 8 - 22 mg/dL    Creatinine 0.88 0.50 - 1.40 mg/dL    Calcium 9.2 8.5 - 10.5 mg/dL    AST(SGOT) 15 12 - 45 U/L    ALT(SGPT) 7 2 - 50 U/L    Alkaline  Phosphatase 65 30 - 99 U/L    Total Bilirubin 1.3 0.1 - 1.5 mg/dL    Albumin 4.2 3.2 - 4.9 g/dL    Total Protein 6.6 6.0 - 8.2 g/dL    Globulin 2.4 1.9 - 3.5 g/dL    A-G Ratio 1.8 g/dL   CBC with Differential    Collection Time: 19  3:22 AM   Result Value Ref Range    WBC 12.8 (H) 4.8 - 10.8 K/uL    RBC 5.16 4.70 - 6.10 M/uL    Hemoglobin 15.3 14.0 - 18.0 g/dL    Hematocrit 45.4 42.0 - 52.0 %    MCV 88.0 81.4 - 97.8 fL    MCH 29.7 27.0 - 33.0 pg    MCHC 33.7 33.7 - 35.3 g/dL    RDW 42.4 35.9 - 50.0 fL    Platelet Count 212 164 - 446 K/uL    MPV 9.2 9.0 - 12.9 fL    Neutrophils-Polys 70.40 44.00 - 72.00 %    Lymphocytes 19.20 (L) 22.00 - 41.00 %    Monocytes 9.30 0.00 - 13.40 %    Eosinophils 0.20 0.00 - 6.90 %    Basophils 0.40 0.00 - 1.80 %    Immature Granulocytes 0.50 0.00 - 0.90 %    Nucleated RBC 0.00 /100 WBC    Neutrophils (Absolute) 9.03 (H) 1.82 - 7.42 K/uL    Lymphs (Absolute) 2.46 1.00 - 4.80 K/uL    Monos (Absolute) 1.19 (H) 0.00 - 0.85 K/uL    Eos (Absolute) 0.02 0.00 - 0.51 K/uL    Baso (Absolute) 0.05 0.00 - 0.12 K/uL    Immature Granulocytes (abs) 0.06 0.00 - 0.11 K/uL    NRBC (Absolute) 0.00 K/uL   ESTIMATED GFR    Collection Time: 19  3:22 AM   Result Value Ref Range    GFR If African American >60 >60 mL/min/1.73 m 2    GFR If Non African American >60 >60 mL/min/1.73 m 2   EKG    Collection Time: 19  5:20 AM   Result Value Ref Range    Report       [facility]    Test Date:  2019  Pt Name:    COOKIE VELEZ                Department: 171  MRN:        [id number]                      Room:       T703  Gender:     Male                         Technician: MARICHUY  :        1940                   Requested By:ZEINAB RUFF  Order #:    [id number]                    Reading MD: Nj Vitale MD    Measurements  Intervals                                Axis  Rate:       87                           P:          15  OH:         222                          QRS:         -43  QRSD:       139                          T:          15  QT:         399  QTc:        480    Interpretive Statements  SINUS RHYTHM  PROLONGED [place] INTERVAL  RBBB AND LAFB  Compared to ECG 2019 16:08:06  First degree AV block now present  Left anterior fascicular block now present  Sinus tachycardia no longer present  Ventricular premature complex(es) no longer present      Electronically Signed On 2019 6:32:44 PDT by Nj Vitale MD     ACCU-CHEK GLUCOSE    Collection Time: 19  2:25 PM   Result Value Ref Range    Glucose - Accu-Ck 180 (H) 65 - 99 mg/dL   EKG    Collection Time: 19 12:47 AM   Result Value Ref Range    Report       [facility]    Test Date:  2019  Pt Name:    COOKIE VELEZ                Department: 171  MRN:        [id number]                      Room:       [facility]  Gender:     Male                         Technician: TIANA  :        1940                   Requested By:DENIS VELASCO  Order #:    [id number]                    Reading MD:    Measurements  Intervals                                Axis  Rate:       90                           P:          24  KY:         205                          QRS:        -50  QRSD:       125                          T:          -12  QT:         377  QTc:        462    Interpretive Statements  SINUS RHYTHM  ATRIAL PREMATURE COMPLEXES  RIGHT BUNDLE BRANCH BLOCK  INFERIOR INFARCT, AGE INDETERMINATE  Compared to ECG 2019 05:20:07  Atrial premature complex(es) now present  Myocardial infarct finding now present  First degree AV block no longer present  Left anterior fascicular block no longer present         CT-HEAD W/O   Final Result      No CT evidence of acute infarct, hemorrhage or mass.   Mild-to-moderate global parenchymal atrophy. Chronic small vessel ischemic changes.      DX-CHEST-PORTABLE (1 VIEW)   Final Result      Mild interstitial edema. No focal consolidation or pleural effusions.      MR-BRAIN-W/O     (Results Pending)       ECG: QTc      ASSESSMENT/PLAN:   Acute metabolic encephalopathy   HTN  DM with hyperglycemia   Elevated lactic acid level, resolved   Aspiration pneumonia.     There is a question of this being a rapidly progressing dementia. Neuro is typically the appropriate consult for this, but unlikely this is a rapidly progressing dementia as it was so episodic.     Recommend MRI with contrast  Recommend EEG      Currently tolerating haldol very welll, without s/e. Adding prn of po haldol at lower dose since the higher dose makes him sleep. Family declined scheduled med at this time     Signing off, thank you for the consult.   Please feel free to reconsult if needed if pt becomes agitated again.

## 2019-09-24 NOTE — ASSESSMENT & PLAN NOTE
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 Has positive family history  -No behavioral disturbances.   -Pursuing SNF placement, as family feels he is too much to care for   -Continue BID seroquel & daily cymbalta. Prn haldol available  -Has been calm & cooperative since 10/1/19. Family likely needs to reassess the situation given his improvement.

## 2019-09-24 NOTE — CARE PLAN
Patient's risk for injury and falls assessed. Appropriate safety precautions in place. Patient educated to utilize call light for needs. Patient verbalizes understanding.    Patient WBC within normal limits. No open wounds noted on patient. Patient does not complain of SOB. Patient vital signs are stable.

## 2019-09-24 NOTE — PSYCHIATRY
BRIEF PSYCHIATRIC CONSULT NOTE: patient seen, full note to follow.  -Legal Hold:not indicated     Improved significantly, talking fluently, calm, A&OX4 currently. Almost back to regular self per his daughter. This makes it unlikely there is a rapidly progressing dementia as this was so episodic.     Recommend MRI with contrast  Recommend EEG     Currently tolerating haldol very welll, without s/e. Adding prn of po haldol at lower dose since the higher dose makes him sleep. Family declined scheduled med at this time    Signing off, thank you for the consult.   Please feel free to reconsult if needed if pt becomes agitated again.

## 2019-09-24 NOTE — CARE PLAN
Problem: Safety  Goal: Will remain free from injury  Outcome: PROGRESSING AS EXPECTED  Note:   Restraints still in place. No signs of injury. Precautions in place. Will continue to monitor.      Problem: Infection  Goal: Will remain free from infection  Outcome: PROGRESSING AS EXPECTED  Note:   No signs or symptoms of infection. Will continue to monitor.

## 2019-09-24 NOTE — PROGRESS NOTES
Patient picked up by transport and taken to MRI. Nursing communication in, okay to be off tele for MRI. Monitor room notified.

## 2019-09-24 NOTE — DISCHARGE PLANNING
CM met with pt's dtr Marah([phone]) at bedside. She states that pt lives with her and her children in a 2 story home in [place], and he has a bedroom and bathroom on the first floor. Pt is home alone during the day while his dtr is at work.   Prior to admission[facility] but he had been discharged from HH services.   Pt has a cane and a walker.  Marah would like to see how pt is doing and does not want to rule out pt coming home at this time. CM discussed attendant care, HH, and SNF, and waiting to see what PT/OT says.   Care Transition Team Assessment    Information Source  Orientation : Disoriented to Time  Information Given By: Relative  Informant's Name: Marah  Who is responsible for making decisions for patient? : Adult child    Readmission Evaluation  Is this a readmission?: No    Elopement Risk  Legal Hold: No  Ambulatory or Self Mobile in Wheelchair: No-Not an Elopement Risk  Elopement Risk: Not at Risk for Elopement    Interdisciplinary Discharge Planning  [facility]  Lives with - Patient's Self Care Capacity: Adult Children  Patient or legal guardian wants to designate a caregiver (see row info): Yes  Caregiver name: (listed as emergency contact; daughter POA)  Caregiver relationship to patient: daughter  Caregiver contact info: (in chart)  Support Systems: Children, Family Member(s)  Housing / Facility: 2 Story House  Do You Take your Prescribed Medications Regularly: Yes  Able to Return to Previous ADL's: Other  Mobility Issues: Yes  Prior Services: Skilled Home Health Services  Patient Expects to be Discharged to:: Undetermined  Assistance Needed: Yes  Durable Medical Equipment: Walker, Other - Specify(Cane)    Discharge Preparedness  What is your plan after discharge?: Uncertain - pending medical team collaboration  What are your discharge supports?: Child  Prior Functional Level: Ambulatory,  Needs Assist with Activities of Daily Living, Needs Assist with Medication Management, Uses Cane, Uses Walker    Functional Assesment  Prior Functional Level: Ambulatory, Needs Assist with Activities of Daily Living, Needs Assist with Medication Management, Uses Cane, Uses Walker    Finances  Financial Barriers to Discharge: No  Prescription Coverage: Yes         Values / Beliefs / Concerns  Values / Beliefs Concerns : No         Domestic Abuse  Have you ever been the victim of abuse or violence?: No              Anticipated Discharge Information  Anticipated discharge disposition: HHC, Home, SNF

## 2019-09-24 NOTE — PROGRESS NOTES
Morning report received at bedside. Care assumed. Pt alert and awake sitting up in bed. No complaints of pain or discomfort. AOx3 and on RA. Tele monitor on. Bed in lowest position and locked. Call light and all belongings within reach. No further needs at this time. Restraints in place with active order. No skin breakdown or damage. Will continue to monitor.

## 2019-09-25 PROBLEM — I49.3 PVC (PREMATURE VENTRICULAR CONTRACTION): Status: ACTIVE | Noted: 2019-09-25

## 2019-09-25 LAB
ALBUMIN SERPL BCP-MCNC: 4.1 G/DL (ref 3.2–4.9)
ALBUMIN/GLOB SERPL: 1.5 G/DL
ALP SERPL-CCNC: 68 U/L (ref 30–99)
ALT SERPL-CCNC: 5 U/L (ref 2–50)
ANION GAP SERPL CALC-SCNC: 10 MMOL/L (ref 0–11.9)
AST SERPL-CCNC: 21 U/L (ref 12–45)
BASOPHILS # BLD AUTO: 0.5 % (ref 0–1.8)
BASOPHILS # BLD: 0.06 K/UL (ref 0–0.12)
BILIRUB SERPL-MCNC: 1.6 MG/DL (ref 0.1–1.5)
BUN SERPL-MCNC: 15 MG/DL (ref 8–22)
CALCIUM SERPL-MCNC: 9.5 MG/DL (ref 8.5–10.5)
CHLORIDE SERPL-SCNC: 103 MMOL/L (ref 96–112)
CO2 SERPL-SCNC: 22 MMOL/L (ref 20–33)
CREAT SERPL-MCNC: 0.82 MG/DL (ref 0.5–1.4)
EKG IMPRESSION: NORMAL
EOSINOPHIL # BLD AUTO: 0.29 K/UL (ref 0–0.51)
EOSINOPHIL NFR BLD: 2.5 % (ref 0–6.9)
ERYTHROCYTE [DISTWIDTH] IN BLOOD BY AUTOMATED COUNT: 39.8 FL (ref 35.9–50)
EST. AVERAGE GLUCOSE BLD GHB EST-MCNC: 163 MG/DL
FOLATE SERPL-MCNC: 8.9 NG/ML
GLOBULIN SER CALC-MCNC: 2.7 G/DL (ref 1.9–3.5)
GLUCOSE BLD-MCNC: 221 MG/DL (ref 65–99)
GLUCOSE BLD-MCNC: 277 MG/DL (ref 65–99)
GLUCOSE BLD-MCNC: 294 MG/DL (ref 65–99)
GLUCOSE SERPL-MCNC: 217 MG/DL (ref 65–99)
HBA1C MFR BLD: 7.3 % (ref 0–5.6)
HCT VFR BLD AUTO: 46 % (ref 42–52)
HGB BLD-MCNC: 16 G/DL (ref 14–18)
IMM GRANULOCYTES # BLD AUTO: 0.06 K/UL (ref 0–0.11)
IMM GRANULOCYTES NFR BLD AUTO: 0.5 % (ref 0–0.9)
LYMPHOCYTES # BLD AUTO: 3.16 K/UL (ref 1–4.8)
LYMPHOCYTES NFR BLD: 27.1 % (ref 22–41)
MAGNESIUM SERPL-MCNC: 1.9 MG/DL (ref 1.5–2.5)
MCH RBC QN AUTO: 29.7 PG (ref 27–33)
MCHC RBC AUTO-ENTMCNC: 34.8 G/DL (ref 33.7–35.3)
MCV RBC AUTO: 85.3 FL (ref 81.4–97.8)
MONOCYTES # BLD AUTO: 1.24 K/UL (ref 0–0.85)
MONOCYTES NFR BLD AUTO: 10.7 % (ref 0–13.4)
NEUTROPHILS # BLD AUTO: 6.83 K/UL (ref 1.82–7.42)
NEUTROPHILS NFR BLD: 58.7 % (ref 44–72)
NRBC # BLD AUTO: 0 K/UL
NRBC BLD-RTO: 0 /100 WBC
PLATELET # BLD AUTO: 221 K/UL (ref 164–446)
PMV BLD AUTO: 9.4 FL (ref 9–12.9)
POTASSIUM SERPL-SCNC: 3.5 MMOL/L (ref 3.6–5.5)
PROT SERPL-MCNC: 6.8 G/DL (ref 6–8.2)
RBC # BLD AUTO: 5.39 M/UL (ref 4.7–6.1)
SODIUM SERPL-SCNC: 135 MMOL/L (ref 135–145)
TSH SERPL DL<=0.005 MIU/L-ACNC: 2.9 UIU/ML (ref 0.38–5.33)
VIT B12 SERPL-MCNC: 530 PG/ML (ref 211–911)
WBC # BLD AUTO: 11.6 K/UL (ref 4.8–10.8)

## 2019-09-25 PROCEDURE — 36415 COLL VENOUS BLD VENIPUNCTURE: CPT

## 2019-09-25 PROCEDURE — 700102 HCHG RX REV CODE 250 W/ 637 OVERRIDE(OP): Performed by: INTERNAL MEDICINE

## 2019-09-25 PROCEDURE — 97162 PT EVAL MOD COMPLEX 30 MIN: CPT

## 2019-09-25 PROCEDURE — A9270 NON-COVERED ITEM OR SERVICE: HCPCS | Performed by: INTERNAL MEDICINE

## 2019-09-25 PROCEDURE — 83735 ASSAY OF MAGNESIUM: CPT

## 2019-09-25 PROCEDURE — 99232 SBSQ HOSP IP/OBS MODERATE 35: CPT | Performed by: INTERNAL MEDICINE

## 2019-09-25 PROCEDURE — 82962 GLUCOSE BLOOD TEST: CPT | Mod: 91

## 2019-09-25 PROCEDURE — 84443 ASSAY THYROID STIM HORMONE: CPT

## 2019-09-25 PROCEDURE — 700111 HCHG RX REV CODE 636 W/ 250 OVERRIDE (IP): Performed by: INTERNAL MEDICINE

## 2019-09-25 PROCEDURE — 82746 ASSAY OF FOLIC ACID SERUM: CPT

## 2019-09-25 PROCEDURE — 83036 HEMOGLOBIN GLYCOSYLATED A1C: CPT

## 2019-09-25 PROCEDURE — 80053 COMPREHEN METABOLIC PANEL: CPT

## 2019-09-25 PROCEDURE — 85025 COMPLETE CBC W/AUTO DIFF WBC: CPT

## 2019-09-25 PROCEDURE — 97166 OT EVAL MOD COMPLEX 45 MIN: CPT

## 2019-09-25 PROCEDURE — 82607 VITAMIN B-12: CPT

## 2019-09-25 PROCEDURE — 770020 HCHG ROOM/CARE - TELE (206)

## 2019-09-25 RX ORDER — METOPROLOL TARTRATE 50 MG/1
50 TABLET, FILM COATED ORAL 2 TIMES DAILY
Status: DISCONTINUED | OUTPATIENT
Start: 2019-09-25 | End: 2019-09-25

## 2019-09-25 RX ADMIN — ACETAMINOPHEN 650 MG: 325 TABLET, FILM COATED ORAL at 08:28

## 2019-09-25 RX ADMIN — LISINOPRIL 5 MG: 5 TABLET ORAL at 05:23

## 2019-09-25 RX ADMIN — SITAGLIPTIN 100 MG: 100 TABLET, FILM COATED ORAL at 17:59

## 2019-09-25 RX ADMIN — METOPROLOL TARTRATE 25 MG: 25 TABLET, FILM COATED ORAL at 05:23

## 2019-09-25 RX ADMIN — METFORMIN HYDROCHLORIDE 1000 MG: 500 TABLET, FILM COATED ORAL at 17:46

## 2019-09-25 RX ADMIN — INSULIN HUMAN 3 UNITS: 100 INJECTION, SOLUTION PARENTERAL at 12:48

## 2019-09-25 RX ADMIN — ENOXAPARIN SODIUM 40 MG: 100 INJECTION SUBCUTANEOUS at 05:23

## 2019-09-25 RX ADMIN — ATORVASTATIN CALCIUM 20 MG: 20 TABLET, FILM COATED ORAL at 05:23

## 2019-09-25 RX ADMIN — INSULIN HUMAN 2 UNITS: 100 INJECTION, SOLUTION PARENTERAL at 23:46

## 2019-09-25 RX ADMIN — INSULIN HUMAN 3 UNITS: 100 INJECTION, SOLUTION PARENTERAL at 17:47

## 2019-09-25 RX ADMIN — DULOXETINE HYDROCHLORIDE 20 MG: 20 CAPSULE, DELAYED RELEASE ORAL at 05:23

## 2019-09-25 RX ADMIN — INSULIN HUMAN 2 UNITS: 100 INJECTION, SOLUTION PARENTERAL at 08:23

## 2019-09-25 RX ADMIN — METOPROLOL TARTRATE 25 MG: 25 TABLET, FILM COATED ORAL at 17:28

## 2019-09-25 ASSESSMENT — COGNITIVE AND FUNCTIONAL STATUS - GENERAL
DAILY ACTIVITIY SCORE: 18
MOBILITY SCORE: 16
DRESSING REGULAR UPPER BODY CLOTHING: A LITTLE
SUGGESTED CMS G CODE MODIFIER DAILY ACTIVITY: CK
STANDING UP FROM CHAIR USING ARMS: A LITTLE
DRESSING REGULAR LOWER BODY CLOTHING: A LITTLE
CLIMB 3 TO 5 STEPS WITH RAILING: A LOT
HELP NEEDED FOR BATHING: A LITTLE
PERSONAL GROOMING: A LITTLE
TOILETING: A LITTLE
WALKING IN HOSPITAL ROOM: A LITTLE
EATING MEALS: A LITTLE
MOVING FROM LYING ON BACK TO SITTING ON SIDE OF FLAT BED: UNABLE
MOVING TO AND FROM BED TO CHAIR: A LITTLE
SUGGESTED CMS G CODE MODIFIER MOBILITY: CK

## 2019-09-25 ASSESSMENT — ENCOUNTER SYMPTOMS
NAUSEA: 0
FEVER: 0
HEADACHES: 0
CHILLS: 0
COUGH: 0
MYALGIAS: 0
VOMITING: 0
SHORTNESS OF BREATH: 0
HEARTBURN: 0
DIARRHEA: 0
MEMORY LOSS: 1
DOUBLE VISION: 0
DEPRESSION: 0
ABDOMINAL PAIN: 0
BLURRED VISION: 0
DIZZINESS: 0

## 2019-09-25 ASSESSMENT — GAIT ASSESSMENTS
DEVIATION: INCREASED BASE OF SUPPORT
DISTANCE (FEET): 80
GAIT LEVEL OF ASSIST: MINIMAL ASSIST
ASSISTIVE DEVICE: 4 WHEEL WALKER

## 2019-09-25 ASSESSMENT — ACTIVITIES OF DAILY LIVING (ADL): TOILETING: REQUIRES ASSIST

## 2019-09-25 NOTE — PROGRESS NOTES
Monitor Summary:  Sinus Rhythm-Sinus Tachycardia with a BBB   Tach into 160s   Frequent PVCs and Rare Couplet  .00/.12/.00

## 2019-09-25 NOTE — DISCHARGE PLANNING
Anticipated Discharge Disposition: SNF    Action: JIMMIE spoke with Dr. Dooley in rounds and she states that she feels pt may need SNF at d/c and per her conversation with pt's dtr Marah today, she is in agreement for SNF.   CM reviewed PT/OT notes which recommend post acute placement.   CM called pt's dtr Marah. She would like to make a referral to the [facility] first, and also to [facility] and [facility] as she lives in [facility]. She states that she will go and take tours of them soon and would like to speak with the reps from [facility] and [place]. KEENAN Cooper will make a note of this in the referral so that the reps know to contact her.   CM completed SNF choice form and sent to [facility].       Barriers to Discharge:     Plan: Monitor for accepting SNF, check for PASRR.

## 2019-09-25 NOTE — THERAPY
"Pt is a 78 y/o male admitted for AMS with hx of dementia, DM II, HTN and MI. Pt presents to acute physical therapy with impairments in functional mobility, balance, gait, strength, attention to task/safety and activity tolerance which impact his ability to DC home safely. Pt will benefit from acute PT interventions to address present impairments.    Physical Therapy Evaluation completed.   Bed Mobility:  Supine to Sit: Supervised(HOB elevated 30*)  Transfers: Sit to Stand: Minimal Assist  Gait: Level Of Assist: Minimal Assist with 4-Wheel Walker       Plan of Care: Will benefit from Physical Therapy 3 times per week  Discharge Recommendations: Equipment: Will Continue to Assess for Equipment Needs.   Post-acute therapy: Recommend post-acute placement for continued physical therapy services prior to discharge home. Patient can tolerate post-acute therapies at a 5x/week frequency.         See \"Rehab Therapy-Acute\" Patient Summary Report for complete documentation.     "

## 2019-09-25 NOTE — PROGRESS NOTES
Hospital Medicine Daily Progress Note    Date of Service  9/25/2019    Chief Complaint  79 y.o. male admitted 9/22/2019 with alter mental status     Hospital Course    79-year-old male past medical history of some degree of dementia, type 2 diabetes, hyperlipidemia, hypertension, history of [facility], [facility] with [facility] status.  On arrival patient was nonverbal very agitated.  He did require 4 mg of Ativan to get head CT done.  He was placed on restraint.  Head CT negative for acute bleeding or mass-effect.  Lactic acid elevated, tachycardic on admission.  No signs of infection.  Glucose normal.  Blood pressure stable.  Electrolytes stable.  His mentation improved in 2 days.  Daughter reports that he has had similar episode in the past but did not last this long. Brain unremarkable. pt was monitored on tele and noticed multiple PVC, pauses, RBBB. Not symptomatic at this time       Interval Problem Update  Mentation improving. Still not able to remember date, even after telling him the date. Otherwise joking, eating well. No complains. Will try to take him restrain. RN will call if he needs restrain again   09/25-off restraints.  Feeling well.  He has no complaints.  Some pauses, multiple PVCs from telemetry otherwise uneventful night    Consultants/Specialty  Psychiatry     Code Status  DNAR. DNI ok     Disposition  Inpatient     Review of Systems  Review of Systems   Constitutional: Negative for chills and fever.   Eyes: Negative for blurred vision and double vision.   Respiratory: Negative for cough and shortness of breath.    Cardiovascular: Negative for chest pain and leg swelling.   Gastrointestinal: Negative for abdominal pain, diarrhea, heartburn, nausea and vomiting.   Genitourinary: Negative for dysuria, frequency and urgency.   Musculoskeletal: Negative for myalgias.   Neurological: Negative for dizziness and headaches.   Psychiatric/Behavioral: Positive for memory loss. Negative for  depression.        Physical Exam  Temp:  [36.4 °C (97.6 °F)-36.9 °C (98.5 °F)] 36.8 °C (98.2 °F)  Pulse:  [] 92  Resp:  [16-18] 17  BP: (104-143)/(61-96) 138/96  SpO2:  [93 %-99 %] 93 %    Physical Exam   Constitutional: He is oriented to person, place, and time. He appears well-developed and well-nourished. No distress.   HENT:   Head: Normocephalic and atraumatic.   Eyes: Pupils are equal, round, and reactive to light. Conjunctivae and EOM are normal. Right eye exhibits no discharge. Left eye exhibits no discharge.   Neck: Normal range of motion. No thyromegaly present.   Cardiovascular: Normal rate, regular rhythm and normal heart sounds.   No murmur heard.  Pulmonary/Chest: Effort normal and breath sounds normal. No respiratory distress.   Abdominal: Soft. Bowel sounds are normal. He exhibits no distension. There is no tenderness.   Musculoskeletal: Normal range of motion. He exhibits no edema.   Neurological: He is alert and oriented to person, place, and time.   Skin: Skin is warm. No erythema.   Psychiatric: He has a normal mood and affect. His behavior is normal.   Nursing note and vitals reviewed.      Fluids    Intake/Output Summary (Last 24 hours) at 9/25/2019 1502  Last data filed at 9/25/2019 0300  Gross per 24 hour   Intake 240 ml   Output 560 ml   Net -320 ml       Laboratory  Recent Labs     09/23/19  0322 09/25/19  0302   WBC 12.8* 11.6*   RBC 5.16 5.39   HEMOGLOBIN 15.3 16.0   HEMATOCRIT 45.4 46.0   MCV 88.0 85.3   MCH 29.7 29.7   MCHC 33.7 34.8   RDW 42.4 39.8   PLATELETCT 212 221   MPV 9.2 9.4     Recent Labs     09/23/19  0322 09/25/19  0302   SODIUM 140 135   POTASSIUM 3.9 3.5*   CHLORIDE 105 103   CO2 22 22   GLUCOSE 167* 217*   BUN 20 15   CREATININE 0.88 0.82   CALCIUM 9.2 9.5                   Imaging  MR-BRAIN-W/O   Final Result      1.  Motion artifact degrades the examination and limits evaluation. No evidence of acute territorial infarct, intracranial hemorrhage or mass lesion.    2.  Moderate diffuse cerebral substance loss.   3.  Mild microangiopathic ischemic change versus demyelination or gliosis.      CT-HEAD W/O   Final Result      No CT evidence of acute infarct, hemorrhage or mass.   Mild-to-moderate global parenchymal atrophy. Chronic small vessel ischemic changes.      DX-CHEST-PORTABLE (1 VIEW)   Final Result      Mild interstitial edema. No focal consolidation or pleural effusions.           Assessment/Plan  * Acute metabolic encephalopathy- (present on admission)  Assessment & Plan  Very combative on admission, not following commands  He has had similar episode in the past   Head CT no acute abnormalities   Electrolytes wnl  UA negative for infection   CXR negative for pneumonia   Lactic acid elevated on admission resolved with IVF   Not hypoglycemic   No neuro deficits  Brain MRI negative for bleeding, CVA, mass-effect  At this point cannot rule out exacerbation of dementia behavior and lactic acidosis only objective findings which can be from hypoglycemia (sugars normal per ex wife though when he was confused, she did check them at home). Also arhythmia is another concern ?? Will continue tele monitoring at this time. Consult cardiology if needed     PVC (premature ventricular contraction)  Assessment & Plan  Tele showing sinus tachy/sinus rhythm, pauses 1.3 sec. 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 However cannot live alone.   Daughter helping with medications   Pt still prep meals for himself. Plays with grandsons   Two episodes of flares while pt does not remember anything and very aggressive   SNF placement at this time     Elevated lactic acid level- (present on  admission)  Assessment & Plan  6.1 on admission--> 2.3  Resolved with IVF   No signs of infection   No clear etiology. At this time cannot rule out hypoglycemia vs cardiac arhythmia. But no seizure, no CVA.   Lactic acidosis can be the cause of alter mental status      Depression- (present on admission)  Assessment & Plan  Resume home meds   Continue to monitor   Doing well at home per his daughter     Hypertension- (present on admission)  Assessment & Plan  BP stable  He is at home lisinopril 5 mg and metoprolol 25 mg BID   Resume home meds . Increase metoprolol 50 mg BID ( 09/25)   Continue to monitor     Hyperlipemia- (present on admission)  Assessment & Plan  Resume statin   Follow up with PCP     Diabetes mellitus with hyperglycemia (HCC)- (present on admission)  Assessment & Plan  NPH 70/30 26 U BID at home and metformin   A1c 7.3   Started ISSS   Need to be transition to oral. Some difficulties with insulin administration per his daughter   Restart metformin 1000 mg BID, and januvia.   Discuss with PCP to wean off insulin. Pt has poor compliance with meds            VTE prophylaxis: lovenox

## 2019-09-25 NOTE — THERAPY
"Occupational Therapy Evaluation completed.   Functional Status:  SPV supine>sit w/HOB elevated, Santosh sit>stand, Santosh functional mobility of household spaces, Santosh toilet txf, SPV toileting pericare, Santosh LB dress (socks, pants), limited by cognition with baseline dementia  Plan of Care: Will benefit from Occupational Therapy 3 times per week  Discharge Recommendations:  Equipment: Will Continue to Assess for Equipment Needs. Post-acute therapy Recommend post-acute placement for additional occupational therapy services prior to discharge home. Patient can tolerate post-acute therapies at a 5x/week frequency.    See \"Rehab Therapy-Acute\" Patient Summary Report for complete documentation.    Pt is 80yo male with pmhx that includes dementia, HTN, MI, and DMII, admitted with altered mental status from baseline, nonverbal and very agitated. Pt family reports pt has had a few \"episodes\" of aggression in the recent past, family has concerns for continuing to provide care as pt is becoming more unpredictable and is a high fall risk. Pt presents to OT eval pleasant and cooperative, demonstrated basic ADLs with Santosh-SPV however is limited by higher level cognitive processing that impacts problem solving, safety awareness, and critical thinking. Pts functional independence varies depending on his fluctuating cognitive status 2/2 dementia with intermittent behavioral distrubance. Pt will benefit from acute OT to progress activity tolerance and independence in basic ADLs. Recommend post-acute placement for continued therapies and 24hr supervision upon d/c from inpatient services.   "

## 2019-09-25 NOTE — ASSESSMENT & PLAN NOTE
-RRR on exam    -Cardiology reviewed his cardiac monitoring on 9/26/19 and recommended increasing the metoprolol dose to 50 mg BID. Tolerating well

## 2019-09-25 NOTE — DISCHARGE PLANNING
Received Choice form at 1430  Agency/Facility Name: [facility]  Referral sent per Choice form @ 1430     @[id number]  Agency/Facility Name: [facility]  Spoke To: Salvador  Outcome: Checking insurance.

## 2019-09-26 LAB
ALBUMIN SERPL BCP-MCNC: 4.5 G/DL (ref 3.2–4.9)
ALBUMIN/GLOB SERPL: 1.7 G/DL
ALP SERPL-CCNC: 81 U/L (ref 30–99)
ALT SERPL-CCNC: 7 U/L (ref 2–50)
ANION GAP SERPL CALC-SCNC: 13 MMOL/L (ref 0–11.9)
AST SERPL-CCNC: 21 U/L (ref 12–45)
BASOPHILS # BLD AUTO: 0.6 % (ref 0–1.8)
BASOPHILS # BLD: 0.08 K/UL (ref 0–0.12)
BILIRUB SERPL-MCNC: 1 MG/DL (ref 0.1–1.5)
BUN SERPL-MCNC: 27 MG/DL (ref 8–22)
CALCIUM SERPL-MCNC: 9.8 MG/DL (ref 8.5–10.5)
CHLORIDE SERPL-SCNC: 102 MMOL/L (ref 96–112)
CO2 SERPL-SCNC: 20 MMOL/L (ref 20–33)
CREAT SERPL-MCNC: 1.07 MG/DL (ref 0.5–1.4)
EOSINOPHIL # BLD AUTO: 0.3 K/UL (ref 0–0.51)
EOSINOPHIL NFR BLD: 2.2 % (ref 0–6.9)
ERYTHROCYTE [DISTWIDTH] IN BLOOD BY AUTOMATED COUNT: 41 FL (ref 35.9–50)
GLOBULIN SER CALC-MCNC: 2.6 G/DL (ref 1.9–3.5)
GLUCOSE BLD-MCNC: 170 MG/DL (ref 65–99)
GLUCOSE BLD-MCNC: 190 MG/DL (ref 65–99)
GLUCOSE BLD-MCNC: 216 MG/DL (ref 65–99)
GLUCOSE BLD-MCNC: 232 MG/DL (ref 65–99)
GLUCOSE SERPL-MCNC: 277 MG/DL (ref 65–99)
HCT VFR BLD AUTO: 46.1 % (ref 42–52)
HGB BLD-MCNC: 16 G/DL (ref 14–18)
IMM GRANULOCYTES # BLD AUTO: 0.08 K/UL (ref 0–0.11)
IMM GRANULOCYTES NFR BLD AUTO: 0.6 % (ref 0–0.9)
LYMPHOCYTES # BLD AUTO: 3.12 K/UL (ref 1–4.8)
LYMPHOCYTES NFR BLD: 22.7 % (ref 22–41)
MCH RBC QN AUTO: 29.7 PG (ref 27–33)
MCHC RBC AUTO-ENTMCNC: 34.7 G/DL (ref 33.7–35.3)
MCV RBC AUTO: 85.7 FL (ref 81.4–97.8)
MONOCYTES # BLD AUTO: 1.25 K/UL (ref 0–0.85)
MONOCYTES NFR BLD AUTO: 9.1 % (ref 0–13.4)
NEUTROPHILS # BLD AUTO: 8.9 K/UL (ref 1.82–7.42)
NEUTROPHILS NFR BLD: 64.8 % (ref 44–72)
NRBC # BLD AUTO: 0 K/UL
NRBC BLD-RTO: 0 /100 WBC
PLATELET # BLD AUTO: 246 K/UL (ref 164–446)
PMV BLD AUTO: 9.8 FL (ref 9–12.9)
POTASSIUM SERPL-SCNC: 3.8 MMOL/L (ref 3.6–5.5)
PROT SERPL-MCNC: 7.1 G/DL (ref 6–8.2)
RBC # BLD AUTO: 5.38 M/UL (ref 4.7–6.1)
SODIUM SERPL-SCNC: 135 MMOL/L (ref 135–145)
WBC # BLD AUTO: 13.7 K/UL (ref 4.8–10.8)

## 2019-09-26 PROCEDURE — A9270 NON-COVERED ITEM OR SERVICE: HCPCS | Performed by: INTERNAL MEDICINE

## 2019-09-26 PROCEDURE — 85025 COMPLETE CBC W/AUTO DIFF WBC: CPT

## 2019-09-26 PROCEDURE — 80053 COMPREHEN METABOLIC PANEL: CPT

## 2019-09-26 PROCEDURE — 82962 GLUCOSE BLOOD TEST: CPT | Mod: 91

## 2019-09-26 PROCEDURE — 700102 HCHG RX REV CODE 250 W/ 637 OVERRIDE(OP): Performed by: INTERNAL MEDICINE

## 2019-09-26 PROCEDURE — 36415 COLL VENOUS BLD VENIPUNCTURE: CPT

## 2019-09-26 PROCEDURE — 700111 HCHG RX REV CODE 636 W/ 250 OVERRIDE (IP): Performed by: INTERNAL MEDICINE

## 2019-09-26 PROCEDURE — 99233 SBSQ HOSP IP/OBS HIGH 50: CPT | Performed by: INTERNAL MEDICINE

## 2019-09-26 PROCEDURE — 770006 HCHG ROOM/CARE - MED/SURG/GYN SEMI*

## 2019-09-26 RX ORDER — METOPROLOL TARTRATE 50 MG/1
50 TABLET, FILM COATED ORAL 2 TIMES DAILY
Status: DISCONTINUED | OUTPATIENT
Start: 2019-09-26 | End: 2019-10-29 | Stop reason: HOSPADM

## 2019-09-26 RX ORDER — GLIMEPIRIDE 4 MG/1
4 TABLET ORAL
Status: DISCONTINUED | OUTPATIENT
Start: 2019-09-26 | End: 2019-09-28

## 2019-09-26 RX ADMIN — INSULIN HUMAN 1 UNITS: 100 INJECTION, SOLUTION PARENTERAL at 17:44

## 2019-09-26 RX ADMIN — GLIMEPIRIDE 4 MG: 4 TABLET ORAL at 08:28

## 2019-09-26 RX ADMIN — METOPROLOL TARTRATE 50 MG: 50 TABLET, FILM COATED ORAL at 17:46

## 2019-09-26 RX ADMIN — ENOXAPARIN SODIUM 40 MG: 100 INJECTION SUBCUTANEOUS at 05:02

## 2019-09-26 RX ADMIN — METOPROLOL TARTRATE 25 MG: 25 TABLET, FILM COATED ORAL at 05:00

## 2019-09-26 RX ADMIN — METFORMIN HYDROCHLORIDE 1000 MG: 500 TABLET, FILM COATED ORAL at 17:46

## 2019-09-26 RX ADMIN — ACETAMINOPHEN 650 MG: 325 TABLET, FILM COATED ORAL at 01:12

## 2019-09-26 RX ADMIN — DULOXETINE HYDROCHLORIDE 20 MG: 20 CAPSULE, DELAYED RELEASE ORAL at 05:01

## 2019-09-26 RX ADMIN — LISINOPRIL 5 MG: 5 TABLET ORAL at 05:00

## 2019-09-26 RX ADMIN — ATORVASTATIN CALCIUM 20 MG: 20 TABLET, FILM COATED ORAL at 05:01

## 2019-09-26 RX ADMIN — SITAGLIPTIN 100 MG: 100 TABLET, FILM COATED ORAL at 17:47

## 2019-09-26 RX ADMIN — METFORMIN HYDROCHLORIDE 1000 MG: 500 TABLET, FILM COATED ORAL at 08:28

## 2019-09-26 ASSESSMENT — ENCOUNTER SYMPTOMS
MEMORY LOSS: 1
ABDOMINAL PAIN: 0
SHORTNESS OF BREATH: 0
COUGH: 0

## 2019-09-26 NOTE — CARE PLAN
Problem: Safety  Goal: Will remain free from injury  Outcome: PROGRESSING AS EXPECTED  Goal: Will remain free from falls  Outcome: PROGRESSING AS EXPECTED     Problem: Knowledge Deficit  Goal: Knowledge of disease process/condition, treatment plan, diagnostic tests, and medications will improve  Outcome: PROGRESSING SLOWER THAN EXPECTED  Goal: Knowledge of the prescribed therapeutic regimen will improve  Outcome: PROGRESSING SLOWER THAN EXPECTED

## 2019-09-26 NOTE — PROGRESS NOTES
Dr. Dooley made aware of patient getting tachy in the 160s when getting back from bathroom. Mentioned patient VS were stable with a bp of 101/70s and patient was asymptomatic. Mentioned that pt HR is now back to normal. Dr. Dooley to consult cardiology. Will continue to monitor.

## 2019-09-26 NOTE — PROGRESS NOTES
Hospital Medicine Daily Progress Note    Date of Service  9/26/2019    Chief Complaint  79 y.o. male admitted 9/22/2019 with alter mental status     Hospital Course    79-year-old male past medical history of some degree of dementia, type 2 diabetes, hyperlipidemia, hypertension, history of [facility], [facility] with altered mental status.  On arrival patient was nonverbal very agitated.  He did require 4 mg of Ativan to get head CT done.  He was placed on restraint.  Head CT negative for acute bleeding or mass-effect.  Lactic acid elevated, tachycardic on admission.  No signs of infection.  Glucose normal.  Blood pressure stable.  Electrolytes stable.  His mentation improved in 2 days.  Daughter reports that he has had similar episode in the past but did not last this long. Brain unremarkable. pt was monitored on tele and noticed multiple PVC, pauses, RBBB. Not symptomatic at this time       Interval Problem Update  Mentation improving. Still not able to remember date, even after telling him the date. Otherwise joking, eating well. No complains. Will try to take him restrain. RN will call if he needs restrain again   09/25-off restraints.  Feeling well.  He has no complaints.  Some pauses, multiple PVCs from telemetry otherwise uneventful night  09/26-agitated overnight.  Wanted to leave the hospital.  He calmed down once family arrived., better today in the morning. Mild acidosis per lab work again. Glucose not well controled.     Consultants/Specialty  Psychiatry     Code Status  DNAR. DNI ok     Disposition  Inpatient     Review of Systems  Review of Systems   Unable to perform ROS: Dementia   Respiratory: Negative for cough and shortness of breath.    Cardiovascular: Negative for chest pain and leg swelling.   Gastrointestinal: Negative for abdominal pain.   Psychiatric/Behavioral: Positive for memory loss.   able to screen for pain, cough. See above     Physical Exam  Temp:  [35.9 °C (96.6 °F)-36.6 °C (97.9  °F)] 36.2 °C (97.1 °F)  Pulse:  [] 87  Resp:  [17-19] 17  BP: (101-110)/(61-74) 110/68  SpO2:  [94 %-96 %] 94 %    Physical Exam   Constitutional: He appears well-developed and well-nourished. No distress.   HENT:   Head: Normocephalic and atraumatic.   Eyes: Pupils are equal, round, and reactive to light. Conjunctivae and EOM are normal. Right eye exhibits no discharge. Left eye exhibits no discharge.   Neck: Normal range of motion. No thyromegaly present.   Cardiovascular: Normal rate, regular rhythm and normal heart sounds.   No murmur heard.  Pulmonary/Chest: Effort normal and breath sounds normal. No respiratory distress.   Abdominal: Soft. Bowel sounds are normal. He exhibits no distension. There is no tenderness.   Musculoskeletal: Normal range of motion. He exhibits no edema.   Neurological: No cranial nerve deficit.   A&O x2   Skin: Skin is warm. No erythema.   Psychiatric: He has a normal mood and affect. His behavior is normal.   Nursing note and vitals reviewed.      Fluids    Intake/Output Summary (Last 24 hours) at 9/26/2019 1311  Last data filed at 9/26/2019 0900  Gross per 24 hour   Intake 1200 ml   Output --   Net 1200 ml       Laboratory  Recent Labs     09/25/19  0302 09/26/19  0302   WBC 11.6* 13.7*   RBC 5.39 5.38   HEMOGLOBIN 16.0 16.0   HEMATOCRIT 46.0 46.1   MCV 85.3 85.7   MCH 29.7 29.7   MCHC 34.8 34.7   RDW 39.8 41.0   PLATELETCT 221 246   MPV 9.4 9.8     Recent Labs     09/25/19  0302 09/26/19  0302   SODIUM 135 135   POTASSIUM 3.5* 3.8   CHLORIDE 103 102   CO2 22 20   GLUCOSE 217* 277*   BUN 15 27*   CREATININE 0.82 1.07   CALCIUM 9.5 9.8                   Imaging  MR-BRAIN-W/O   Final Result      1.  Motion artifact degrades the examination and limits evaluation. No evidence of acute territorial infarct, intracranial hemorrhage or mass lesion.   2.  Moderate diffuse cerebral substance loss.   3.  Mild microangiopathic ischemic change versus demyelination or gliosis.      CT-HEAD  W/O   Final Result      No CT evidence of acute infarct, hemorrhage or mass.   Mild-to-moderate global parenchymal atrophy. Chronic small vessel ischemic changes.      DX-CHEST-PORTABLE (1 VIEW)   Final Result      Mild interstitial edema. No focal consolidation or pleural effusions.           Assessment/Plan  * Acute metabolic encephalopathy- (present on admission)  Assessment & Plan  Very combative on admission, not following commands  He has had similar episode in the past   Head CT no acute abnormalities   Electrolytes wnl  UA negative for infection   CXR negative for pneumonia   Lactic acid elevated on admission resolved with IVF   Not hypoglycemic   No neuro deficits  Brain MRI negative for bleeding, CVA, mass-effect  At this point cannot rule out exacerbation of dementia behavior and lactic acidosis only objective findings, with no good explanation, but did resolve with IVF      PVC (premature ventricular contraction)  Assessment & Plan  Tele showing sinus tachy/sinus rhythm, pauses 1.3 sec. Not symptomatic   I did discuss with cardiology, who did review tele and recommending increase metoprolol. No real pauses     increase metoprolol to 50 mg BID 09/26  Discontinue tele.       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 Family history positive   Cannot live alone. Daughter overwhelmed with care   Two episodes of flares while pt does not remember anything and very aggressive   Per family he will leave the house if not reoriented   SNF placement at this time. Family is trying long term option placement     Elevated lactic acid level- (present on admission)  Assessment & Plan  6.1 on admission--> 2.3  Resolved with IVF   No signs of infection   No clear etiology. No seizure, no hypoglycemia, no infection, not likely arhythmia (discuss with cardiology 09/26)        Depression- (present on admission)  Assessment & Plan  Resume home meds   Continue to monitor   Doing well  at home per his daughter     Hypertension- (present on admission)  Assessment & Plan  BP stable  He is at home lisinopril 5 mg and metoprolol 25 mg BID   Resume home meds . Increase metoprolol 50 mg BID ( 09/26)   Continue to monitor     Hyperlipemia- (present on admission)  Assessment & Plan  Resume statin   Follow up with PCP     Diabetes mellitus with hyperglycemia (HCC)- (present on admission)  Assessment & Plan  NPH 70/30 26 U BID at home and metformin   A1c 7.3   Started ISSS   Need to be transition to oral. Some difficulties with insulin administration per his daughter   Restart metformin 1000 mg BID, and januvia. Add glimepiride   Discuss with PCP to wean off insulin. Pt has poor compliance with meds            VTE prophylaxis: lovenox

## 2019-09-26 NOTE — DISCHARGE PLANNING
Agency[facility]Facility Name: [facility]  Spoke To: Admissions  Outcome: Reviewing referral, not service connected so will a form for financial need.

## 2019-09-26 NOTE — PROGRESS NOTES
Received Bedside report. Assumed care at 0700. This pt is AOx3, ambulatory with SBA, voiding appropriately, denies pain. Patient and RN discussed plan of care: questions answered. Labs noted, assessment complete, patient tolerating regular dysphagia 2 thin liquid diet. Tele box in place. Pt is on RA. Call light in place, fall precautions in place, patient educated on importance of calling for assistance. No additional needs at this time. VSS. Sitter in place.

## 2019-09-26 NOTE — CARE PLAN
Problem: Communication  Goal: The ability to communicate needs accurately and effectively will improve  Outcome: PROGRESSING AS EXPECTED   Patient educated to utilize call light. Patient and family oriented to hospital room. Patient encouraged to ask questions about plan of care. Patient effectively uses call light and is involved in POC.       Problem: Knowledge Deficit  Goal: Knowledge of disease process/condition, treatment plan, diagnostic tests, and medications will improve  Outcome: PROGRESSING SLOWER THAN EXPECTED   Patient is educated of disease process and condition. Treatment team has included patient in plan of care. All medications indications and side effects are explained. Patient is encouraged to ask questions. Patient verbalizes understanding but does not retain information.

## 2019-09-26 NOTE — PROGRESS NOTES
Patient very impulsive during start of shift. Patient up frequently, staff able to redirect patient at first but around 2200 patient attempting to leave hospital. Family called and to bedside. Family able to calm patient by frequent redirection and ambulated with patient around unit. Decision made to have sitter at bedside for safety for duration of night. Patient up all night with staff, sitting out in chair in hallway and frequently walking unit. Intermittently patient talks about leaving but staff able to redirect patient. Will continue sitter at bedside for safety.

## 2019-09-26 NOTE — PROGRESS NOTES
12 hour chart check and bedside report completed. Patient sitting at edge of bed with bed alarms on. Patient in no distress. Vital signs stable.

## 2019-09-26 NOTE — DISCHARGE PLANNING
Anticipated Discharge Disposition: Undetermined    Action: JIMMIE spoke with Barb from [facility]. She states that since pt is now requiring a sitter, they are unable to accept him. [facility] states that they are still looking in to insurance.   [facility] paperwork for pt and his family to complete as pt is not 100% service connected and will have a copay.   JIMMIE reviewed chart notes which indicate that overnight pt became agitated and wanted to leave the hospital and required constant monitoring.   JIMMIE spoke with pt's dtr Marah. She states that she toured SNFs and feels very concerned that none of them are locked units, and feels that pt would just leave and it would not be safe.    JIMMIE discussed what the [facility] said and Marah asked that this CM provide the paperwork to her brother Frankie who is at bedside and then they will complete it.   Marah states that pt receives $1766 per month in social security and does not have any other income. She states that they cannot afford a memory care unit and she does not feel safe with pt returning to her home at this time.     Barriers to Discharge: Placement     Plan: Pt may need a group home, however, he will most likely not be accepted with behaviors and they are not a locked unit either. Family will work on completing paperwork for the [facility]. If pts behaviors change he may qualify for SNF later.

## 2019-09-26 NOTE — PROGRESS NOTES
Monitor Summary:    SR/ST   Rare PVC  0.20/0.12/0.36    Patient has short bursts of tachycardia with agitation/ambulation up to 180, non sustained which has continued from day shift. Cardiology to consult today.

## 2019-09-26 NOTE — PROGRESS NOTES
Monitor Summary Report    SR- ST with BBB 96-80  With activity patient had 3 episodes of tachycardia ranging from 140s-160s.   Frequent PVCs noted, bigeminy and trigemny noted over course of shift.  From [id number] pt had pauses ranging from 1.3 sec-1.5 sec in length.  Dr. Dooley aware    .20/.14/.40

## 2019-09-27 LAB
ALBUMIN SERPL BCP-MCNC: 3.9 G/DL (ref 3.2–4.9)
ALBUMIN/GLOB SERPL: 1.4 G/DL
ALP SERPL-CCNC: 67 U/L (ref 30–99)
ALT SERPL-CCNC: 7 U/L (ref 2–50)
ANION GAP SERPL CALC-SCNC: 11 MMOL/L (ref 0–11.9)
AST SERPL-CCNC: 17 U/L (ref 12–45)
BASOPHILS # BLD AUTO: 0.5 % (ref 0–1.8)
BASOPHILS # BLD: 0.05 K/UL (ref 0–0.12)
BILIRUB SERPL-MCNC: 1 MG/DL (ref 0.1–1.5)
BUN SERPL-MCNC: 32 MG/DL (ref 8–22)
CALCIUM SERPL-MCNC: 9.8 MG/DL (ref 8.5–10.5)
CHLORIDE SERPL-SCNC: 104 MMOL/L (ref 96–112)
CO2 SERPL-SCNC: 21 MMOL/L (ref 20–33)
CREAT SERPL-MCNC: 0.94 MG/DL (ref 0.5–1.4)
EOSINOPHIL # BLD AUTO: 0.43 K/UL (ref 0–0.51)
EOSINOPHIL NFR BLD: 4.4 % (ref 0–6.9)
ERYTHROCYTE [DISTWIDTH] IN BLOOD BY AUTOMATED COUNT: 40.6 FL (ref 35.9–50)
GLOBULIN SER CALC-MCNC: 2.7 G/DL (ref 1.9–3.5)
GLUCOSE BLD-MCNC: 247 MG/DL (ref 65–99)
GLUCOSE SERPL-MCNC: 215 MG/DL (ref 65–99)
HCT VFR BLD AUTO: 42.7 % (ref 42–52)
HGB BLD-MCNC: 14.3 G/DL (ref 14–18)
IMM GRANULOCYTES # BLD AUTO: 0.04 K/UL (ref 0–0.11)
IMM GRANULOCYTES NFR BLD AUTO: 0.4 % (ref 0–0.9)
LYMPHOCYTES # BLD AUTO: 1.88 K/UL (ref 1–4.8)
LYMPHOCYTES NFR BLD: 19.4 % (ref 22–41)
MCH RBC QN AUTO: 28.9 PG (ref 27–33)
MCHC RBC AUTO-ENTMCNC: 33.5 G/DL (ref 33.7–35.3)
MCV RBC AUTO: 86.4 FL (ref 81.4–97.8)
MONOCYTES # BLD AUTO: 0.86 K/UL (ref 0–0.85)
MONOCYTES NFR BLD AUTO: 8.9 % (ref 0–13.4)
NEUTROPHILS # BLD AUTO: 6.44 K/UL (ref 1.82–7.42)
NEUTROPHILS NFR BLD: 66.4 % (ref 44–72)
NRBC # BLD AUTO: 0 K/UL
NRBC BLD-RTO: 0 /100 WBC
PLATELET # BLD AUTO: 245 K/UL (ref 164–446)
PMV BLD AUTO: 9.9 FL (ref 9–12.9)
POTASSIUM SERPL-SCNC: 3.8 MMOL/L (ref 3.6–5.5)
PROT SERPL-MCNC: 6.6 G/DL (ref 6–8.2)
RBC # BLD AUTO: 4.94 M/UL (ref 4.7–6.1)
SODIUM SERPL-SCNC: 136 MMOL/L (ref 135–145)
WBC # BLD AUTO: 9.7 K/UL (ref 4.8–10.8)

## 2019-09-27 PROCEDURE — 92526 ORAL FUNCTION THERAPY: CPT

## 2019-09-27 PROCEDURE — 700111 HCHG RX REV CODE 636 W/ 250 OVERRIDE (IP): Performed by: HOSPITALIST

## 2019-09-27 PROCEDURE — A9270 NON-COVERED ITEM OR SERVICE: HCPCS | Performed by: INTERNAL MEDICINE

## 2019-09-27 PROCEDURE — 700101 HCHG RX REV CODE 250: Performed by: INTERNAL MEDICINE

## 2019-09-27 PROCEDURE — 700102 HCHG RX REV CODE 250 W/ 637 OVERRIDE(OP): Performed by: INTERNAL MEDICINE

## 2019-09-27 PROCEDURE — 36415 COLL VENOUS BLD VENIPUNCTURE: CPT

## 2019-09-27 PROCEDURE — 99232 SBSQ HOSP IP/OBS MODERATE 35: CPT | Performed by: INTERNAL MEDICINE

## 2019-09-27 PROCEDURE — 85025 COMPLETE CBC W/AUTO DIFF WBC: CPT

## 2019-09-27 PROCEDURE — 770001 HCHG ROOM/CARE - MED/SURG/GYN PRIV*

## 2019-09-27 PROCEDURE — 80053 COMPREHEN METABOLIC PANEL: CPT

## 2019-09-27 PROCEDURE — 82962 GLUCOSE BLOOD TEST: CPT

## 2019-09-27 RX ORDER — ZIPRASIDONE MESYLATE 20 MG/ML
10 INJECTION, POWDER, LYOPHILIZED, FOR SOLUTION INTRAMUSCULAR ONCE
Status: COMPLETED | OUTPATIENT
Start: 2019-09-27 | End: 2019-09-27

## 2019-09-27 RX ORDER — HALOPERIDOL 5 MG/ML
5 INJECTION INTRAMUSCULAR ONCE
Status: COMPLETED | OUTPATIENT
Start: 2019-09-27 | End: 2019-09-27

## 2019-09-27 RX ORDER — QUETIAPINE FUMARATE 25 MG/1
25 TABLET, FILM COATED ORAL 2 TIMES DAILY
Status: DISCONTINUED | OUTPATIENT
Start: 2019-09-27 | End: 2019-10-29 | Stop reason: HOSPADM

## 2019-09-27 RX ADMIN — QUETIAPINE FUMARATE 25 MG: 25 TABLET ORAL at 12:03

## 2019-09-27 RX ADMIN — INSULIN HUMAN 2 UNITS: 100 INJECTION, SOLUTION PARENTERAL at 18:45

## 2019-09-27 RX ADMIN — QUETIAPINE FUMARATE 25 MG: 25 TABLET ORAL at 18:40

## 2019-09-27 RX ADMIN — LABETALOL HYDROCHLORIDE 10 MG: 5 INJECTION, SOLUTION INTRAVENOUS at 12:03

## 2019-09-27 RX ADMIN — METFORMIN HYDROCHLORIDE 1000 MG: 500 TABLET, FILM COATED ORAL at 18:40

## 2019-09-27 RX ADMIN — HALOPERIDOL LACTATE 5 MG: 5 INJECTION, SOLUTION INTRAMUSCULAR at 02:16

## 2019-09-27 RX ADMIN — METOPROLOL TARTRATE 50 MG: 50 TABLET, FILM COATED ORAL at 18:40

## 2019-09-27 RX ADMIN — INSULIN HUMAN 3 UNITS: 100 INJECTION, SOLUTION PARENTERAL at 20:59

## 2019-09-27 RX ADMIN — SITAGLIPTIN 100 MG: 100 TABLET, FILM COATED ORAL at 18:40

## 2019-09-27 RX ADMIN — ZIPRASIDONE MESYLATE 20 MG: 20 INJECTION, POWDER, LYOPHILIZED, FOR SOLUTION INTRAMUSCULAR at 02:53

## 2019-09-27 RX ADMIN — SENNOSIDES, DOCUSATE SODIUM 2 TABLET: 50; 8.6 TABLET, FILM COATED ORAL at 18:40

## 2019-09-27 ASSESSMENT — ENCOUNTER SYMPTOMS
ABDOMINAL PAIN: 0
SHORTNESS OF BREATH: 0
COUGH: 0
INSOMNIA: 1
MEMORY LOSS: 1

## 2019-09-27 NOTE — PROGRESS NOTES
Assumed care of PT A&O 2. Pt resting in bed with no signs of labored breathing. On RA. Medical pt. Call light within reach, bed in lowest position, upper bed rails up. Pt was updated on plan of care for the day. Will continue to monitor.

## 2019-09-27 NOTE — CARE PLAN
Problem: Psychosocial Needs:  Goal: Level of anxiety will decrease  Outcome: PROGRESSING SLOWER THAN EXPECTED  Note:   Patient is in 3 pt restraints, aggressive towards staff, very confused and aggitated. Will continue to monitor throughout the day     Problem: Skin Integrity  Goal: Risk for impaired skin integrity will decrease  Outcome: PROGRESSING AS EXPECTED   Q2 turns offered. Moisturizer in room. Pt offered urinal. Heel floated.

## 2019-09-27 NOTE — PROGRESS NOTES
Family has requested that all meds and interventions (labs, vitals, etc.) be held this morning because patient is finally asleep after roughly 4 days awake. MD made ware.

## 2019-09-27 NOTE — THERAPY
"Speech Language Therapy dysphagia treatment completed.   Functional Status:  Patient finally awake and wanting to eat. Lunch tray provided. He sat at the EOB and ate D2, thins without overt signs of aspiration. Family and friends at bedside as well as 1:1 sitter (patient became agitated and required 3 point restraints yesterday). He is doing quite well at the moment.     Recommendations: Dysphagia 2 solids are not baseline but consider keeping patient on D2 solids because of behavioral changes. Otherwise, there does not appear to be any neuromuscular dysfunction of the swallow.      Plan of Care: Will benefit from Speech Therapy 3 times per week  Post-Acute Therapy: Anticipate that the patient will have no further speech therapy needs after discharge from the hospital.      See \"Rehab Therapy-Acute\" Patient Summary Report for complete documentation.     "

## 2019-09-27 NOTE — PROGRESS NOTES
At 0130 pt stated he wanted to leave and attempted to walk off the floor. Pt encouraged to stay by RN and sitter. Pt started getting aggressive and threatened staff, security called. Soft restraints applied, order obtained. Orders for 5 mg IM haldol, 5 mg IV haldol given. Orders for 10 mg Geodon, 20 mg IM geodon given. MD and pharmacy notified. Pt resting in bed w/ unlabored breathing, sitter at bedside.

## 2019-09-27 NOTE — PROGRESS NOTES
Patient's restraints discontinued (charted). Patient is lucid, awake, talking normally with family at bedside, pleasant demeanor.     SLP worked with patient and is continuing dysphagia II diet through weekend.     Will continue to monitor.

## 2019-09-27 NOTE — PROGRESS NOTES
Assumed care of PT A&O 1-2, disoriented to situation and time. Pt finally sleeping with no signs of labored breathing, psychiatric episode overnight, threatened staff, now in 3pt restraints. On RA. Patient is medical. Sitter at bedside. Bed in lowest position, upper bed rails up. Pt's family was updated on plan of care for the night. Will continue to monitor.

## 2019-09-28 LAB
GLUCOSE BLD-MCNC: 177 MG/DL (ref 65–99)
GLUCOSE BLD-MCNC: 188 MG/DL (ref 65–99)
GLUCOSE BLD-MCNC: 216 MG/DL (ref 65–99)
GLUCOSE BLD-MCNC: 257 MG/DL (ref 65–99)
GLUCOSE BLD-MCNC: 294 MG/DL (ref 65–99)

## 2019-09-28 PROCEDURE — 700102 HCHG RX REV CODE 250 W/ 637 OVERRIDE(OP): Performed by: INTERNAL MEDICINE

## 2019-09-28 PROCEDURE — A9270 NON-COVERED ITEM OR SERVICE: HCPCS | Performed by: INTERNAL MEDICINE

## 2019-09-28 PROCEDURE — 99231 SBSQ HOSP IP/OBS SF/LOW 25: CPT | Performed by: INTERNAL MEDICINE

## 2019-09-28 PROCEDURE — 770001 HCHG ROOM/CARE - MED/SURG/GYN PRIV*

## 2019-09-28 PROCEDURE — 700111 HCHG RX REV CODE 636 W/ 250 OVERRIDE (IP): Performed by: INTERNAL MEDICINE

## 2019-09-28 PROCEDURE — 82962 GLUCOSE BLOOD TEST: CPT | Mod: 91

## 2019-09-28 RX ORDER — GLIMEPIRIDE 4 MG/1
4 TABLET ORAL 2 TIMES DAILY WITH MEALS
Status: DISCONTINUED | OUTPATIENT
Start: 2019-09-28 | End: 2019-09-30

## 2019-09-28 RX ADMIN — INSULIN HUMAN 3 UNITS: 100 INJECTION, SOLUTION PARENTERAL at 12:37

## 2019-09-28 RX ADMIN — SENNOSIDES, DOCUSATE SODIUM 2 TABLET: 50; 8.6 TABLET, FILM COATED ORAL at 05:50

## 2019-09-28 RX ADMIN — INSULIN HUMAN 2 UNITS: 100 INJECTION, SOLUTION PARENTERAL at 08:51

## 2019-09-28 RX ADMIN — DULOXETINE HYDROCHLORIDE 20 MG: 20 CAPSULE, DELAYED RELEASE ORAL at 05:50

## 2019-09-28 RX ADMIN — METOPROLOL TARTRATE 50 MG: 50 TABLET, FILM COATED ORAL at 17:56

## 2019-09-28 RX ADMIN — SITAGLIPTIN 100 MG: 100 TABLET, FILM COATED ORAL at 17:55

## 2019-09-28 RX ADMIN — METFORMIN HYDROCHLORIDE 1000 MG: 500 TABLET, FILM COATED ORAL at 10:07

## 2019-09-28 RX ADMIN — GLIMEPIRIDE 4 MG: 4 TABLET ORAL at 17:56

## 2019-09-28 RX ADMIN — LISINOPRIL 5 MG: 5 TABLET ORAL at 05:51

## 2019-09-28 RX ADMIN — ATORVASTATIN CALCIUM 20 MG: 20 TABLET, FILM COATED ORAL at 05:51

## 2019-09-28 RX ADMIN — QUETIAPINE FUMARATE 25 MG: 25 TABLET ORAL at 05:57

## 2019-09-28 RX ADMIN — QUETIAPINE FUMARATE 25 MG: 25 TABLET ORAL at 17:56

## 2019-09-28 RX ADMIN — ENOXAPARIN SODIUM 40 MG: 100 INJECTION SUBCUTANEOUS at 05:50

## 2019-09-28 RX ADMIN — INSULIN HUMAN 1 UNITS: 100 INJECTION, SOLUTION PARENTERAL at 17:54

## 2019-09-28 RX ADMIN — METOPROLOL TARTRATE 50 MG: 50 TABLET, FILM COATED ORAL at 05:51

## 2019-09-28 RX ADMIN — METFORMIN HYDROCHLORIDE 1000 MG: 500 TABLET, FILM COATED ORAL at 17:56

## 2019-09-28 RX ADMIN — INSULIN HUMAN 1 UNITS: 100 INJECTION, SOLUTION PARENTERAL at 20:59

## 2019-09-28 ASSESSMENT — ENCOUNTER SYMPTOMS
MEMORY LOSS: 1
INSOMNIA: 1
SHORTNESS OF BREATH: 0
COUGH: 0
ABDOMINAL PAIN: 0

## 2019-09-28 NOTE — PROGRESS NOTES
Hospital Medicine Daily Progress Note    Date of Service  9/27/2019    Chief Complaint  79 y.o. male admitted 9/22/2019 with alter mental status     Hospital Course    79-year-old male past medical history of some degree of dementia, type 2 diabetes, hyperlipidemia, hypertension, history of MI, [facility] with altered mental status.  On arrival patient was nonverbal very agitated.  He did require 4 mg of Ativan to get head CT done.  He was placed on restraint.  Head CT negative for acute bleeding or mass-effect.  Lactic acid elevated, tachycardic on admission.  No signs of infection.  Glucose normal.  Blood pressure stable.  Electrolytes stable.  His mentation improved in 2 days.  Daughter reports that he has had similar episode in the past but did not last this long. Brain unremarkable. pt was monitored on tele and noticed multiple PVC, pauses, RBBB. Not symptomatic at this time       Interval Problem Update  Mentation improving. Still not able to remember date, even after telling him the date. Otherwise joking, eating well. No complains. Will try to take him restrain. RN will call if he needs restrain again   09/25-off restraints.  Feeling well.  He has no complaints.  Some pauses, multiple PVCs from telemetry otherwise uneventful night  09/26-agitated overnight.  Wanted to leave the hospital.  He calmed down once family arrived., better today in the morning. Mild acidosis per lab work again. Glucose not well controled.   09/27- agitated overnight. Trying to leave, restrain required. Sitter present. No able to sleep     Consultants/Specialty  Psychiatry     Code Status  DNAR. DNI ok     Disposition  Inpatient     Review of Systems  Review of Systems   Unable to perform ROS: Dementia   Respiratory: Negative for cough and shortness of breath.    Cardiovascular: Negative for chest pain and leg swelling.   Gastrointestinal: Negative for abdominal pain.   Psychiatric/Behavioral: Positive for memory loss. The  patient has insomnia.    able to screen for pain, cough. See above     Physical Exam  Temp:  [36.4 °C (97.6 °F)-36.9 °C (98.5 °F)] 36.9 °C (98.5 °F)  Pulse:  [] 78  Resp:  [18-20] 20  BP: (128-172)/(61-97) 160/95  SpO2:  [93 %-94 %] 93 %    Physical Exam   Constitutional: He appears well-developed and well-nourished. No distress.   HENT:   Head: Normocephalic and atraumatic.   Eyes: Pupils are equal, round, and reactive to light. Conjunctivae and EOM are normal. Right eye exhibits no discharge. Left eye exhibits no discharge.   Neck: Normal range of motion. No thyromegaly present.   Cardiovascular: Normal rate, regular rhythm and normal heart sounds.   No murmur heard.  Pulmonary/Chest: Effort normal and breath sounds normal. No respiratory distress.   Abdominal: Soft. Bowel sounds are normal. He exhibits no distension. There is no tenderness.   Musculoskeletal: Normal range of motion. He exhibits no edema.   Neurological: No cranial nerve deficit.   A&O x2   Skin: Skin is warm. No erythema.   Psychiatric: He has a normal mood and affect. His behavior is normal.   Nursing note and vitals reviewed.      Fluids    Intake/Output Summary (Last 24 hours) at 9/27/2019 1836  Last data filed at 9/27/2019 1500  Gross per 24 hour   Intake 780 ml   Output 770 ml   Net 10 ml       Laboratory  Recent Labs     09/25/19 0302 09/26/19 0302 09/27/19  0351   WBC 11.6* 13.7* 9.7   RBC 5.39 5.38 4.94   HEMOGLOBIN 16.0 16.0 14.3   HEMATOCRIT 46.0 46.1 42.7   MCV 85.3 85.7 86.4   MCH 29.7 29.7 28.9   MCHC 34.8 34.7 33.5*   RDW 39.8 41.0 40.6   PLATELETCT 221 246 245   MPV 9.4 9.8 9.9     Recent Labs     09/25/19  0302 09/26/19  0302 09/27/19  0351   SODIUM 135 135 136   POTASSIUM 3.5* 3.8 3.8   CHLORIDE 103 102 104   CO2 22 20 21   GLUCOSE 217* 277* 215*   BUN 15 27* 32*   CREATININE 0.82 1.07 0.94   CALCIUM 9.5 9.8 9.8                   Imaging  MR-BRAIN-W/O   Final Result      1.  Motion artifact degrades the examination and  limits evaluation. No evidence of acute territorial infarct, intracranial hemorrhage or mass lesion.   2.  Moderate diffuse cerebral substance loss.   3.  Mild microangiopathic ischemic change versus demyelination or gliosis.      CT-HEAD W/O   Final Result      No CT evidence of acute infarct, hemorrhage or mass.   Mild-to-moderate global parenchymal atrophy. Chronic small vessel ischemic changes.      DX-CHEST-PORTABLE (1 VIEW)   Final Result      Mild interstitial edema. No focal consolidation or pleural effusions.           Assessment/Plan  * Acute metabolic encephalopathy- (present on admission)  Assessment & Plan  Very combative on admission, not following commands  He has had similar episode in the past   Head CT no acute abnormalities   Electrolytes wnl  UA negative for infection   CXR negative for pneumonia   Lactic acid elevated on admission resolved with IVF   Not hypoglycemic   No neuro deficits  Brain MRI negative for bleeding, CVA, mass-effect    PVC (premature ventricular contraction)  Assessment & Plan  Tele showing sinus tachy/sinus rhythm, pauses 1.3 sec. Not symptomatic   I did discuss with cardiology, who did review tele and recommending increase metoprolol. No real pauses     increase metoprolol to 50 mg BID 09/26  Discontinue tele.       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 Family history positive   Cannot live alone. Daughter overwhelmed with care   Two episodes of flares while pt does not remember anything and very aggressive   Per family he will leave the house if not reoriented   SNF placement at this time. However pt requires restrain   seroquel to help for behavioral problems     Elevated lactic acid level- (present on admission)  Assessment & Plan  6.1 on admission--> 2.3  Resolved with IVF   No signs of infection   No clear etiology. No seizure, no hypoglycemia, no infection, not likely arhythmia (discuss with  cardiology 09/26)        Depression- (present on admission)  Assessment & Plan  Resume home meds   Continue to monitor   Doing well at home per his daughter     Hypertension- (present on admission)  Assessment & Plan  BP stable  He is at home lisinopril 5 mg and metoprolol 25 mg BID   Resume home meds . Increase metoprolol 50 mg BID ( 09/26)   Continue to monitor     Hyperlipemia- (present on admission)  Assessment & Plan  Resume statin   Follow up with PCP     Diabetes mellitus with hyperglycemia (HCC)- (present on admission)  Assessment & Plan  NPH 70/30 26 U BID at home and metformin   A1c 7.3   Started ISSS   Need to be transition to oral. Some difficulties with insulin administration per his daughter   Restart metformin 1000 mg BID, and januvia. Add glimepiride   Weaning off insulin. Pt has poor compliance with meds            VTE prophylaxis: lovenox

## 2019-09-28 NOTE — CARE PLAN
Problem: Communication  Goal: The ability to communicate needs accurately and effectively will improve  Outcome: PROGRESSING AS EXPECTED  Intervention: [place] patient and significant other/support system to call light to alert staff of needs  Flowsheets (Taken 9/28/2019 0005)  Oriented to:: All of the Following : Location of Bathroom, Visiting Policy, Unit Routine, Call Light and Bedside Controls, Bedside Rail Policy, Smoking Policy, Rights and Responsibilities, Bedside Report, and Patient Education Notebook     Problem: Safety  Goal: Will remain free from falls  Outcome: PROGRESSING AS EXPECTED  Intervention: Implement fall precautions  Flowsheets (Taken 9/27/2019 2000)  Environmental Precautions: Treaded Slipper Socks on Patient;Personal Belongings, Wastebasket, Call Bell etc. in Easy Reach;Transferred to Stronger Side;Report Given to Other Health Care Providers Regarding Fall Risk;Bed in Low Position;Communication Sign for Patients & Families;Mobility Assessed & Appropriate Sign Placed

## 2019-09-28 NOTE — CARE PLAN
Problem: Safety  Goal: Will remain free from falls  Outcome: PROGRESSING AS EXPECTED  Intervention: Assess risk factors for falls  Note:   Patient reoriented and redirectable at this time. Pleasantly confused and cooperative with family at bedside.      Problem: Infection  Goal: Will remain free from infection  Outcome: PROGRESSING AS EXPECTED  Note:   Patient without any s/s infection.

## 2019-09-28 NOTE — PROGRESS NOTES
Received report on patient. Patient is sleeping in bed has no indications of pain or distress. Sitter at bedside, bed in the lowest position and call light and personal belongings within reach.

## 2019-09-29 LAB
GLUCOSE BLD-MCNC: 127 MG/DL (ref 65–99)
GLUCOSE BLD-MCNC: 136 MG/DL (ref 65–99)
GLUCOSE BLD-MCNC: 137 MG/DL (ref 65–99)
GLUCOSE BLD-MCNC: 154 MG/DL (ref 65–99)

## 2019-09-29 PROCEDURE — 770001 HCHG ROOM/CARE - MED/SURG/GYN PRIV*

## 2019-09-29 PROCEDURE — 82962 GLUCOSE BLOOD TEST: CPT | Mod: 91

## 2019-09-29 PROCEDURE — 700111 HCHG RX REV CODE 636 W/ 250 OVERRIDE (IP): Performed by: INTERNAL MEDICINE

## 2019-09-29 PROCEDURE — A9270 NON-COVERED ITEM OR SERVICE: HCPCS | Performed by: INTERNAL MEDICINE

## 2019-09-29 PROCEDURE — 700102 HCHG RX REV CODE 250 W/ 637 OVERRIDE(OP): Performed by: INTERNAL MEDICINE

## 2019-09-29 PROCEDURE — 99231 SBSQ HOSP IP/OBS SF/LOW 25: CPT | Performed by: INTERNAL MEDICINE

## 2019-09-29 PROCEDURE — 770006 HCHG ROOM/CARE - MED/SURG/GYN SEMI*

## 2019-09-29 RX ADMIN — ATORVASTATIN CALCIUM 20 MG: 20 TABLET, FILM COATED ORAL at 05:35

## 2019-09-29 RX ADMIN — DULOXETINE HYDROCHLORIDE 20 MG: 20 CAPSULE, DELAYED RELEASE ORAL at 05:35

## 2019-09-29 RX ADMIN — SITAGLIPTIN 100 MG: 100 TABLET, FILM COATED ORAL at 17:38

## 2019-09-29 RX ADMIN — QUETIAPINE FUMARATE 25 MG: 25 TABLET ORAL at 05:36

## 2019-09-29 RX ADMIN — ENOXAPARIN SODIUM 40 MG: 100 INJECTION SUBCUTANEOUS at 05:35

## 2019-09-29 RX ADMIN — QUETIAPINE FUMARATE 25 MG: 25 TABLET ORAL at 17:38

## 2019-09-29 RX ADMIN — GLIMEPIRIDE 4 MG: 4 TABLET ORAL at 17:38

## 2019-09-29 RX ADMIN — METFORMIN HYDROCHLORIDE 1000 MG: 500 TABLET, FILM COATED ORAL at 17:38

## 2019-09-29 RX ADMIN — METOPROLOL TARTRATE 50 MG: 50 TABLET, FILM COATED ORAL at 17:38

## 2019-09-29 RX ADMIN — METFORMIN HYDROCHLORIDE 1000 MG: 500 TABLET, FILM COATED ORAL at 09:02

## 2019-09-29 RX ADMIN — GLIMEPIRIDE 4 MG: 4 TABLET ORAL at 09:02

## 2019-09-29 RX ADMIN — LISINOPRIL 5 MG: 5 TABLET ORAL at 05:36

## 2019-09-29 RX ADMIN — METOPROLOL TARTRATE 50 MG: 50 TABLET, FILM COATED ORAL at 05:35

## 2019-09-29 ASSESSMENT — ENCOUNTER SYMPTOMS
SHORTNESS OF BREATH: 0
COUGH: 0
INSOMNIA: 1
ABDOMINAL PAIN: 0
MEMORY LOSS: 1

## 2019-09-29 NOTE — CARE PLAN
Problem: Communication  Goal: The ability to communicate needs accurately and effectively will improve  Outcome: PROGRESSING AS EXPECTED  RN educated Pt on use of call bell to express needs       Problem: Safety  Goal: Will remain free from injury  Outcome: PROGRESSING AS EXPECTED  Pt educated on fall precautions, indicated understanding. Bed locked, lowest position, side rails 2/4 up, non skid footwear on, and call light in reach. Hourly rounding performed, will continue to monitor and reassess.

## 2019-09-29 NOTE — PROGRESS NOTES
Hospital Medicine Daily Progress Note    Date of Service  9/28/2019    Chief Complaint  79 y.o. male admitted 9/22/2019 with alter mental status     Hospital Course    79-year-old male past medical history of some degree of dementia, type 2 diabetes, hyperlipidemia, hypertension, history of [facility], [facility] presented with altered mental status.  On arrival patient was nonverbal very agitated.  He did require 4 mg of Ativan to get head CT done.  He was placed on restraint.  Head CT negative for acute bleeding or mass-effect.  Lactic acid elevated, tachycardic on admission.  No signs of infection.  Glucose normal.  Blood pressure stable.  Electrolytes stable.  His mentation improved in 2 days.  Daughter reports that he has had similar episode in the past but did not last this long. Brain unremarkable. pt was monitored on tele and noticed multiple PVC, pauses, RBBB. Not symptomatic at this time       Interval Problem Update  Mentation improving. Still not able to remember date, even after telling him the date. Otherwise joking, eating well. No complains. Will try to take him restrain. RN will call if he needs restrain again   09/25-off restraints.  Feeling well.  He has no complaints.  Some pauses, multiple PVCs from telemetry otherwise uneventful night  09/26-agitated overnight.  Wanted to leave the hospital.  He calmed down once family arrived., better today in the morning. Mild acidosis per lab work again. Glucose not well controled.   09/27- agitated overnight. Trying to leave, restrain required. Sitter present. No able to sleep   09/28-no event overnight. Feeling better. Less agitated     Consultants/Specialty  Psychiatry     Code Status  DNAR. DNI ok     Disposition  Inpatient     Review of Systems  Review of Systems   Unable to perform ROS: Dementia   Respiratory: Negative for cough and shortness of breath.    Cardiovascular: Negative for chest pain and leg swelling.   Gastrointestinal: Negative for abdominal  pain.   Psychiatric/Behavioral: Positive for memory loss. The patient has insomnia.    able to screen for pain, cough. See above     Physical Exam  Temp:  [36.2 °C (97.1 °F)-36.7 °C (98.1 °F)] 36.2 °C (97.1 °F)  Pulse:  [60-93] 60  Resp:  [16-18] 16  BP: (107-151)/(63-83) 109/63  SpO2:  [92 %-99 %] 94 %    Physical Exam   Constitutional: He appears well-developed and well-nourished. No distress.   HENT:   Head: Normocephalic and atraumatic.   Eyes: Pupils are equal, round, and reactive to light. Conjunctivae and EOM are normal. Right eye exhibits no discharge. Left eye exhibits no discharge.   Neck: Normal range of motion. No thyromegaly present.   Cardiovascular: Normal rate, regular rhythm and normal heart sounds.   No murmur heard.  Pulmonary/Chest: Effort normal and breath sounds normal. No respiratory distress.   Abdominal: Soft. Bowel sounds are normal. He exhibits no distension. There is no tenderness.   Musculoskeletal: Normal range of motion. He exhibits no edema.   Neurological: No cranial nerve deficit.   A&O x2   Skin: Skin is warm. No erythema.   Psychiatric: He has a normal mood and affect. His behavior is normal.   Nursing note and vitals reviewed.      Fluids    Intake/Output Summary (Last 24 hours) at 9/28/2019 1918  Last data filed at 9/28/2019 1852  Gross per 24 hour   Intake 840 ml   Output --   Net 840 ml       Laboratory  Recent Labs     09/26/19  0302 09/27/19  0351   WBC 13.7* 9.7   RBC 5.38 4.94   HEMOGLOBIN 16.0 14.3   HEMATOCRIT 46.1 42.7   MCV 85.7 86.4   MCH 29.7 28.9   MCHC 34.7 33.5*   RDW 41.0 40.6   PLATELETCT 246 245   MPV 9.8 9.9     Recent Labs     09/26/19  0302 09/27/19  0351   SODIUM 135 136   POTASSIUM 3.8 3.8   CHLORIDE 102 104   CO2 20 21   GLUCOSE 277* 215*   BUN 27* 32*   CREATININE 1.07 0.94   CALCIUM 9.8 9.8                   Imaging  MR-BRAIN-W/O   Final Result      1.  Motion artifact degrades the examination and limits evaluation. No evidence of acute territorial  infarct, intracranial hemorrhage or mass lesion.   2.  Moderate diffuse cerebral substance loss.   3.  Mild microangiopathic ischemic change versus demyelination or gliosis.      CT-HEAD W/O   Final Result      No CT evidence of acute infarct, hemorrhage or mass.   Mild-to-moderate global parenchymal atrophy. Chronic small vessel ischemic changes.      DX-CHEST-PORTABLE (1 VIEW)   Final Result      Mild interstitial edema. No focal consolidation or pleural effusions.           Assessment/Plan  * Acute metabolic encephalopathy- (present on admission)  Assessment & Plan  Very combative on admission, not following commands  He has had similar episode in the past   Head CT no acute abnormalities   Electrolytes wnl  UA negative for infection   CXR negative for pneumonia   Lactic acid elevated on admission resolved with IVF   Not hypoglycemic   No neuro deficits  Brain MRI negative for bleeding, CVA, mass-effect    PVC (premature ventricular contraction)  Assessment & Plan  Tele showing sinus tachy/sinus rhythm, pauses 1.3 sec. Not symptomatic   I did discuss with cardiology, who did review tele and recommending increase metoprolol. No real pauses     increase metoprolol to 50 mg BID 09/26  Discontinue tele.       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 Family history positive   Cannot live alone. Daughter overwhelmed with care   Two episodes of flares while pt does not remember anything and very aggressive   Per family he will leave the house if not reoriented   SNF placement at this time. However pt requires restrain   seroquel to help for behavioral problems     Elevated lactic acid level- (present on admission)  Assessment & Plan  6.1 on admission--> 2.3  Resolved with IVF   No signs of infection   No clear etiology. No seizure, no hypoglycemia, no infection, not likely arhythmia (discuss with cardiology 09/26)        Depression- (present on  admission)  Assessment & Plan  Resume home meds   Continue to monitor   Doing well at home per his daughter     Hypertension- (present on admission)  Assessment & Plan  BP stable  He is at home lisinopril 5 mg and metoprolol 25 mg BID   Resume home meds . Increase metoprolol 50 mg BID ( 09/26)   Continue to monitor     Hyperlipemia- (present on admission)  Assessment & Plan  Resume statin   Follow up with PCP     Diabetes mellitus with hyperglycemia (HCC)- (present on admission)  Assessment & Plan  NPH 70/30 26 U BID at home and metformin   A1c 7.3   Started ISSS   Need to be transition to oral. Some difficulties with insulin administration per his daughter   Restart metformin 1000 mg BID, and januvia. Add glimepiride   Weaning off insulin. Pt has poor compliance with meds            VTE prophylaxis: lovenox     Plan: continue monitoring. No need for sitter today. Medical pt. Placement

## 2019-09-29 NOTE — CARE PLAN
Problem: Safety  Goal: Will remain free from injury  Outcome: PROGRESSING AS EXPECTED  Goal: Will remain free from falls  Outcome: PROGRESSING AS EXPECTED  Intervention: Implement fall precautions  Flowsheets (Taken 9/29/2019 0800)  Environmental Precautions: Treaded Slipper Socks on Patient;Personal Belongings, Wastebasket, Call Bell etc. in Easy Reach;Bed in Low Position  Note:   Pt remains free from falls at this time. Safety precautions in place. Pt educated on calling for assistance when needed.        Problem: Knowledge Deficit  Goal: Knowledge of disease process/condition, treatment plan, diagnostic tests, and medications will improve  Outcome: PROGRESSING AS EXPECTED  Note:   Pt updated on POC, tests, and medications. Pt verbalizes understanding and has no further questions at this time. Pt educated on calling for any more questions.        Problem: Psychosocial Needs:  Goal: Level of anxiety will decrease  Outcome: PROGRESSING AS EXPECTED

## 2019-09-29 NOTE — PROGRESS NOTES
Bedside report received from nurse. Assumed care of pt. Pt resting comfortably in bed. A/Ox2, VSS, s All needs met. POC reviewed and white board updated.  Call light in reach. Bed locked in lowest position with 2 upper bed rails up. No pain or complaints

## 2019-09-30 LAB
GLUCOSE BLD-MCNC: 137 MG/DL (ref 65–99)
GLUCOSE BLD-MCNC: 152 MG/DL (ref 65–99)
GLUCOSE BLD-MCNC: 152 MG/DL (ref 65–99)
GLUCOSE BLD-MCNC: 72 MG/DL (ref 65–99)

## 2019-09-30 PROCEDURE — 97535 SELF CARE MNGMENT TRAINING: CPT

## 2019-09-30 PROCEDURE — 770006 HCHG ROOM/CARE - MED/SURG/GYN SEMI*

## 2019-09-30 PROCEDURE — 82962 GLUCOSE BLOOD TEST: CPT

## 2019-09-30 PROCEDURE — 99232 SBSQ HOSP IP/OBS MODERATE 35: CPT | Performed by: HOSPITALIST

## 2019-09-30 PROCEDURE — 700111 HCHG RX REV CODE 636 W/ 250 OVERRIDE (IP): Performed by: INTERNAL MEDICINE

## 2019-09-30 PROCEDURE — 97530 THERAPEUTIC ACTIVITIES: CPT

## 2019-09-30 PROCEDURE — 97116 GAIT TRAINING THERAPY: CPT

## 2019-09-30 PROCEDURE — A9270 NON-COVERED ITEM OR SERVICE: HCPCS | Performed by: INTERNAL MEDICINE

## 2019-09-30 PROCEDURE — 700102 HCHG RX REV CODE 250 W/ 637 OVERRIDE(OP): Performed by: INTERNAL MEDICINE

## 2019-09-30 PROCEDURE — 92526 ORAL FUNCTION THERAPY: CPT

## 2019-09-30 RX ORDER — GLIMEPIRIDE 2 MG/1
2 TABLET ORAL
Status: DISCONTINUED | OUTPATIENT
Start: 2019-10-01 | End: 2019-10-01

## 2019-09-30 RX ORDER — GLIMEPIRIDE 4 MG/1
4 TABLET ORAL
Status: DISCONTINUED | OUTPATIENT
Start: 2019-10-01 | End: 2019-09-30

## 2019-09-30 RX ADMIN — METOPROLOL TARTRATE 50 MG: 50 TABLET, FILM COATED ORAL at 04:58

## 2019-09-30 RX ADMIN — ATORVASTATIN CALCIUM 20 MG: 20 TABLET, FILM COATED ORAL at 04:58

## 2019-09-30 RX ADMIN — DULOXETINE HYDROCHLORIDE 20 MG: 20 CAPSULE, DELAYED RELEASE ORAL at 04:58

## 2019-09-30 RX ADMIN — INSULIN HUMAN 1 UNITS: 100 INJECTION, SOLUTION PARENTERAL at 08:57

## 2019-09-30 RX ADMIN — QUETIAPINE FUMARATE 25 MG: 25 TABLET ORAL at 18:07

## 2019-09-30 RX ADMIN — SITAGLIPTIN 100 MG: 100 TABLET, FILM COATED ORAL at 20:35

## 2019-09-30 RX ADMIN — INSULIN HUMAN 1 UNITS: 100 INJECTION, SOLUTION PARENTERAL at 18:11

## 2019-09-30 RX ADMIN — ENOXAPARIN SODIUM 40 MG: 100 INJECTION SUBCUTANEOUS at 04:59

## 2019-09-30 RX ADMIN — GLIMEPIRIDE 4 MG: 4 TABLET ORAL at 08:57

## 2019-09-30 RX ADMIN — QUETIAPINE FUMARATE 25 MG: 25 TABLET ORAL at 04:58

## 2019-09-30 RX ADMIN — METFORMIN HYDROCHLORIDE 1000 MG: 500 TABLET, FILM COATED ORAL at 08:57

## 2019-09-30 RX ADMIN — METFORMIN HYDROCHLORIDE 1000 MG: 500 TABLET, FILM COATED ORAL at 18:07

## 2019-09-30 RX ADMIN — LISINOPRIL 5 MG: 5 TABLET ORAL at 04:58

## 2019-09-30 RX ADMIN — METOPROLOL TARTRATE 50 MG: 50 TABLET, FILM COATED ORAL at 18:07

## 2019-09-30 ASSESSMENT — ENCOUNTER SYMPTOMS
INSOMNIA: 1
COUGH: 0
MEMORY LOSS: 1
ABDOMINAL PAIN: 0
FEVER: 0
SHORTNESS OF BREATH: 0

## 2019-09-30 ASSESSMENT — COGNITIVE AND FUNCTIONAL STATUS - GENERAL
SUGGESTED CMS G CODE MODIFIER MOBILITY: CI
CLIMB 3 TO 5 STEPS WITH RAILING: A LITTLE
DAILY ACTIVITIY SCORE: 23
MOBILITY SCORE: 23
HELP NEEDED FOR BATHING: A LITTLE
SUGGESTED CMS G CODE MODIFIER DAILY ACTIVITY: CI

## 2019-09-30 ASSESSMENT — GAIT ASSESSMENTS
DISTANCE (FEET): 150
ASSISTIVE DEVICE: 4 WHEEL WALKER
DEVIATION: SHUFFLED GAIT
GAIT LEVEL OF ASSIST: SUPERVISED

## 2019-09-30 NOTE — THERAPY
"Speech Language Therapy dysphagia treatment completed.   Functional Status:  Pt seen this date for dysphagia intervention with trial tray of D3/thins. Pt was awake, alert, followed directions and participated fully in session, spouse at bedside. Pt seen sitting EOB on room air. Hyolaryngeal elevation palpated as complete, timely initiation of swallow appreciated and vocal quality remained clear throughout session. Pt demonstrated functional yet prolonged mastication 2/2 edentulous state, no oral residue appreciated. No s/sx of aspiration appreciated with any consistencies consumed.  SLP following.   Recommendations: Recommend diet upgrade to D3/thins with adherence to the following strategies: up to chair for meals, monitoring, straws okay, slow rate.   Plan of Care: Will benefit from Speech Therapy 3 times per week  Post-Acute Therapy: Recommend outpatient or home health transitional care services for continued speech therapy services      See \"Rehab Therapy-Acute\" Patient Summary Report for complete documentation.     "

## 2019-09-30 NOTE — CARE PLAN
Problem: Communication  Goal: The ability to communicate needs accurately and effectively will improve  Outcome: PROGRESSING AS EXPECTED     Problem: Safety  Goal: Will remain free from injury  Outcome: PROGRESSING AS EXPECTED     Problem: Respiratory:  Goal: Respiratory status will improve  Outcome: PROGRESSING AS EXPECTED

## 2019-09-30 NOTE — PROGRESS NOTES
Patient transported to [facility] by transport in wheelchair, accompanied by belongings.

## 2019-09-30 NOTE — PROGRESS NOTES
Bedside report received, assumed care of patient. Pt is A+O x 2-3. No acute distress noted. Rhythm is n/a (medical patient- no tele). Patient is not on oxygen. Informed of patient of safety and call bell system. Bed is low/locked, call bell within reach, bed alarm in place. Family at bedside

## 2019-09-30 NOTE — PROGRESS NOTES
Spoke with patient's daughter, Marah, and informed her of patient's transfer orders and new room number (S620-1).

## 2019-09-30 NOTE — THERAPY
"Physical Therapy Treatment completed.   Bed Mobility:  Supine to Sit: Supervised(HOB elevated )  Transfers: Sit to Stand: Supervised  Gait: Level Of Assist: Supervised with 4-Wheel Walker       Plan of Care: Patient with no further skilled PT needs in the acute care setting at this time  Discharge Recommendations: Equipment: No Equipment Needed.      See \"Rehab Therapy-Acute\" Patient Summary Report for complete documentation.     Pt currently mobilizing at what is most likely his baseline.  He continues to be a fall risk, but this is related to his impulsivity in connection with cog deficits.  pt with no further acute skilled PT needs, but will be available for DC needs only.   "

## 2019-09-30 NOTE — PROGRESS NOTES
Hospital Medicine Daily Progress Note    Date of Service  9/30/2019    Chief Complaint  79 y.o. male admitted 9/22/2019 with alter mental status     Hospital Course      79-year-old male past medical history of some degree of dementia, type 2 diabetes, hyperlipidemia, hypertension, history of MI, [facility] with [facility] mental status.  On arrival patient was nonverbal very agitated.  He did require 4 mg of Ativan to get head CT done.  He was placed on restraint.  Head CT negative for acute bleeding or mass-effect.  Lactic acid elevated, tachycardic on admission.  No signs of infection.  Glucose normal.  Blood pressure stable.  Electrolytes stable.  His mentation improved in 2 days.  Daughter reports that he has had similar episode in the past but did not last this long. Brain unremarkable. pt was monitored on tele and noticed multiple PVC, pauses, RBBB. Not symptomatic at this time.  Transferred to medical floor 9/30        Interval Problem Update  Mentation improving. Still not able to remember date, even after telling him the date. Otherwise joking, eating well. No complains. Will try to take him restrain. RN will call if he needs restrain again   09/25-off restraints.  Feeling well.  He has no complaints.  Some pauses, multiple PVCs from telemetry otherwise uneventful night  09/26-agitated overnight.  Wanted to leave the hospital.  He calmed down once family arrived., better today in the morning. Mild acidosis per lab work again. Glucose not well controled.   09/27- agitated overnight. Trying to leave, restrain required. Sitter present. No able to sleep   09/28-no event overnight. Feeling better. Less agitated   09/29- continues to improve his behavioral. However he has advance dementia. Pending placement   9/30-hemodynamically stable overnight, saturating well on room air this morning.  Alert, intermittently and variably oriented x1-x2, the patient has poor judgment, I question his capacity.  Daughter,  Marah, is working on choice with care coordination.  Pending placement. Blood sugar is low today 72, risk for hypoglycemia, I am reducing his Amaryl dose.. I discussed with patient's nurse and with multidisciplinary team during rounds including , and charge nurse.       Consultants/Specialty  Psychiatry     Code Status  DNAR. DNI ok     Disposition  Pending placement     Review of Systems  Review of Systems   Unable to perform ROS: Dementia (Limitted )   Constitutional: Negative for fever.   Respiratory: Negative for cough and shortness of breath.    Cardiovascular: Negative for chest pain and leg swelling.   Gastrointestinal: Negative for abdominal pain.   Psychiatric/Behavioral: Positive for memory loss. The patient has insomnia.      Physical Exam  Temp:  [36.3 °C (97.3 °F)-36.5 °C (97.7 °F)] 36.5 °C (97.7 °F)  Pulse:  [80-88] 84  Resp:  [16-18] 18  BP: (114-118)/(62-76) 117/67  SpO2:  [92 %-95 %] 92 %    Physical Exam   Constitutional: He appears well-developed and well-nourished. No distress.   HENT:   Head: Normocephalic and atraumatic.   Eyes: Pupils are equal, round, and reactive to light. Conjunctivae and EOM are normal. Right eye exhibits no discharge. Left eye exhibits no discharge.   Neck: Normal range of motion. No JVD present. No thyromegaly present.   Cardiovascular: Normal rate, regular rhythm and normal heart sounds. Exam reveals no friction rub.   No murmur heard.  Pulmonary/Chest: Effort normal and breath sounds normal. No stridor. No respiratory distress.   Abdominal: Soft. Bowel sounds are normal. He exhibits no distension. There is no tenderness.   Musculoskeletal: Normal range of motion. He exhibits no edema.   Neurological: He is alert. No cranial nerve deficit.   Alert, intermittently and variably oriented x1-x2.   Skin: Skin is warm. No erythema.   Psychiatric: He has a normal mood and affect.   Poor judgment   Nursing note and vitals reviewed.      Fluids    Intake/Output  Summary (Last 24 hours) at 9/30/2019 1529  Last data filed at 9/30/2019 1002  Gross per 24 hour   Intake 600 ml   Output 1160 ml   Net -560 ml       Laboratory                        Imaging  MR-BRAIN-W/O   Final Result      1.  Motion artifact degrades the examination and limits evaluation. No evidence of acute territorial infarct, intracranial hemorrhage or mass lesion.   2.  Moderate diffuse cerebral substance loss.   3.  Mild microangiopathic ischemic change versus demyelination or gliosis.      CT-HEAD W/O   Final Result      No CT evidence of acute infarct, hemorrhage or mass.   Mild-to-moderate global parenchymal atrophy. Chronic small vessel ischemic changes.      DX-CHEST-PORTABLE (1 VIEW)   Final Result      Mild interstitial edema. No focal consolidation or pleural effusions.           Assessment/Plan  * Acute metabolic encephalopathy- (present on admission)  Assessment & Plan  Very combative on admission, not following commands  He has had similar episode in the past   Head CT no acute abnormalities   Electrolytes wnl  UA negative for infection   CXR negative for pneumonia   Lactic acid elevated on admission resolved with IVF   Not hypoglycemic   No neuro deficits  Brain MRI negative for bleeding, CVA, mass-effect    PVC (premature ventricular contraction)  Assessment & Plan  Tele showing sinus tachy/sinus rhythm, pauses 1.3 sec. Not symptomatic   I did discuss with cardiology, who did review tele and recommending increase metoprolol. No real pauses     increase metoprolol to 50 mg BID 09/26  Discontinue tele.       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 Family history positive   Cannot live alone. Daughter overwhelmed with care   Two episodes of flares while pt does not remember anything and very aggressive   Per family he will leave the house if not reoriented   SNF placement at this time. However pt requires restrain   Seroquel to  help for behavioral issues   9/30/2019 calm and cooperative this morning.     Elevated lactic acid level- (present on admission)  Assessment & Plan  6.1 on admission--> 2.3  Resolved with IVF   No signs of infection   No clear etiology. No seizure, no hypoglycemia, no infection, not likely arhythmia (discuss with cardiology 09/26)        Depression- (present on admission)  Assessment & Plan  Resume home meds   Appears stable, no SI    Hypertension- (present on admission)  Assessment & Plan  BP stable  He is at home lisinopril 5 mg and metoprolol 25 mg BID   Resume home meds . Increase metoprolol 50 mg BID ( 09/26)   Continue to monitor     Hyperlipemia- (present on admission)  Assessment & Plan  Resume statin   Follow up with PCP     Diabetes mellitus with hyperglycemia (HCC)- (present on admission)  Assessment & Plan  NPH 70/30 26 U BID at home and metformin   A1c 7.3   Restart metformin 1000 mg BID, and januvia. Add glimepiride   Weaning off insulin. Pt has poor compliance with meds   09/29- diabetes perfectly controled on current regime   9/30 Blood sugar is a 72, risk for hypoglycemia, I am reducing his Amaryl dose.         VTE prophylaxis: lovenox

## 2019-09-30 NOTE — PROGRESS NOTES
Patient's belongings packed up, patient has his shoes, electric razor, nose hair jagdeep, eyeglasses x2, paper, notebook, box with pens, coloring pencils, blanket, cell phone, patient's walker, several pairs of underwear/change of clothes, and rechargable battery plug.

## 2019-09-30 NOTE — PROGRESS NOTES
Patient is disoriented to time and event, impulsive but redirectable. Bed alarm on, bed locked and in lowest position. VSS, no c/o pain. Family is at the bedside. Patient has been instructed to call before getting up and if he needs anything.

## 2019-09-30 NOTE — DISCHARGE PLANNING
Medical Social Work  Voice message forwarded to SW from patient's daughter Marah.  Wanting to have her father complete a POA paperwork. Requesting documentation that patient is competent to sign paperwork.     SW informed Dr. Matute, who stated the patient is not competent and will not sign the competency paperwork requested.

## 2019-09-30 NOTE — PROGRESS NOTES
Patient transferred from [address] [place] [address], [id number]. AAOx2. Ex-wife at bedside. Vital signs recorded by CNA. Provided with water. Patient denies further needs at this time.

## 2019-09-30 NOTE — DISCHARGE PLANNING
Agency/Facility Name: [facility]  Spoke To: Sherry  Outcome: Sherry was following up to see if patient filled out the [facility] 10-[facility]EC paperwork.  FLORES Peña provided the paperwork to patients son Frankie, who was at bedside.    This CCA called Sherry and left a voice message informing her that the [facility] 10-[facility]EC paperwork was given to patients son, Frankie.

## 2019-09-30 NOTE — PROGRESS NOTES
Hospital Medicine Daily Progress Note    Date of Service  9/29/2019    Chief Complaint  79 y.o. male admitted 9/22/2019 with alter mental status     Hospital Course    79-year-old male past medical history of some degree of dementia, type 2 diabetes, hyperlipidemia, hypertension, history of MI, [facility] presented with altered mental status.  On arrival patient was nonverbal very agitated.  He did require 4 mg of Ativan to get head CT done.  He was placed on restraint.  Head CT negative for acute bleeding or mass-effect.  Lactic acid elevated, tachycardic on admission.  No signs of infection.  Glucose normal.  Blood pressure stable.  Electrolytes stable.  His mentation improved in 2 days.  Daughter reports that he has had similar episode in the past but did not last this long. Brain unremarkable. pt was monitored on tele and noticed multiple PVC, pauses, RBBB. Not symptomatic at this time       Interval Problem Update  Mentation improving. Still not able to remember date, even after telling him the date. Otherwise joking, eating well. No complains. Will try to take him restrain. RN will call if he needs restrain again   09/25-off restraints.  Feeling well.  He has no complaints.  Some pauses, multiple PVCs from telemetry otherwise uneventful night  09/26-agitated overnight.  Wanted to leave the hospital.  He calmed down once family arrived., better today in the morning. Mild acidosis per lab work again. Glucose not well controled.   09/27- agitated overnight. Trying to leave, restrain required. Sitter present. No able to sleep   09/28-no event overnight. Feeling better. Less agitated   09/29- continues to improve his behavioral. However he has advance dementia. Pending placement     Consultants/Specialty  Psychiatry     Code Status  DNAR. DNI ok     Disposition  Inpatient     Review of Systems  Review of Systems   Unable to perform ROS: Dementia   Respiratory: Negative for cough and shortness of breath.     Cardiovascular: Negative for chest pain and leg swelling.   Gastrointestinal: Negative for abdominal pain.   Psychiatric/Behavioral: Positive for memory loss. The patient has insomnia.    able to screen for pain, cough. See above     Physical Exam  Temp:  [36.1 °C (96.9 °F)-36.4 °C (97.6 °F)] 36.1 °C (96.9 °F)  Pulse:  [70-86] 86  Resp:  [16-18] 16  BP: (111-120)/(65-75) 120/70  SpO2:  [93 %-98 %] 97 %    Physical Exam   Constitutional: He appears well-developed and well-nourished. No distress.   HENT:   Head: Normocephalic and atraumatic.   Eyes: Pupils are equal, round, and reactive to light. Conjunctivae and EOM are normal. Right eye exhibits no discharge. Left eye exhibits no discharge.   Neck: Normal range of motion. No thyromegaly present.   Cardiovascular: Normal rate, regular rhythm and normal heart sounds.   No murmur heard.  Pulmonary/Chest: Effort normal and breath sounds normal. No respiratory distress.   Abdominal: Soft. Bowel sounds are normal. He exhibits no distension. There is no tenderness.   Musculoskeletal: Normal range of motion. He exhibits no edema.   Neurological: No cranial nerve deficit.   A&O x2   Skin: Skin is warm. No erythema.   Psychiatric: He has a normal mood and affect. His behavior is normal.   Nursing note and vitals reviewed.      Fluids    Intake/Output Summary (Last 24 hours) at 9/29/2019 1906  Last data filed at 9/29/2019 1000  Gross per 24 hour   Intake 240 ml   Output --   Net 240 ml       Laboratory  Recent Labs     09/27/19  0351   WBC 9.7   RBC 4.94   HEMOGLOBIN 14.3   HEMATOCRIT 42.7   MCV 86.4   MCH 28.9   MCHC 33.5*   RDW 40.6   PLATELETCT 245   MPV 9.9     Recent Labs     09/27/19  0351   SODIUM 136   POTASSIUM 3.8   CHLORIDE 104   CO2 21   GLUCOSE 215*   BUN 32*   CREATININE 0.94   CALCIUM 9.8                   Imaging  MR-BRAIN-W/O   Final Result      1.  Motion artifact degrades the examination and limits evaluation. No evidence of acute territorial infarct,  intracranial hemorrhage or mass lesion.   2.  Moderate diffuse cerebral substance loss.   3.  Mild microangiopathic ischemic change versus demyelination or gliosis.      CT-HEAD W/O   Final Result      No CT evidence of acute infarct, hemorrhage or mass.   Mild-to-moderate global parenchymal atrophy. Chronic small vessel ischemic changes.      DX-CHEST-PORTABLE (1 VIEW)   Final Result      Mild interstitial edema. No focal consolidation or pleural effusions.           Assessment/Plan  * Acute metabolic encephalopathy- (present on admission)  Assessment & Plan  Very combative on admission, not following commands  He has had similar episode in the past   Head CT no acute abnormalities   Electrolytes wnl  UA negative for infection   CXR negative for pneumonia   Lactic acid elevated on admission resolved with IVF   Not hypoglycemic   No neuro deficits  Brain MRI negative for bleeding, CVA, mass-effect    PVC (premature ventricular contraction)  Assessment & Plan  Tele showing sinus tachy/sinus rhythm, pauses 1.3 sec. Not symptomatic   I did discuss with cardiology, who did review tele and recommending increase metoprolol. No real pauses     increase metoprolol to 50 mg BID 09/26  Discontinue tele.       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 Family history positive   Cannot live alone. Daughter overwhelmed with care   Two episodes of flares while pt does not remember anything and very aggressive   Per family he will leave the house if not reoriented   SNF placement at this time. However pt requires restrain   seroquel to help for behavioral problems     Elevated lactic acid level- (present on admission)  Assessment & Plan  6.1 on admission--> 2.3  Resolved with IVF   No signs of infection   No clear etiology. No seizure, no hypoglycemia, no infection, not likely arhythmia (discuss with cardiology 09/26)        Depression- (present on  admission)  Assessment & Plan  Resume home meds   Continue to monitor   Doing well at home per his daughter     Hypertension- (present on admission)  Assessment & Plan  BP stable  He is at home lisinopril 5 mg and metoprolol 25 mg BID   Resume home meds . Increase metoprolol 50 mg BID ( 09/26)   Continue to monitor     Hyperlipemia- (present on admission)  Assessment & Plan  Resume statin   Follow up with PCP     Diabetes mellitus with hyperglycemia (HCC)- (present on admission)  Assessment & Plan  NPH 70/30 26 U BID at home and metformin   A1c 7.3   Started ISSS   Need to be transition to oral. Some difficulties with insulin administration per his daughter   Restart metformin 1000 mg BID, and januvia. Add glimepiride   Weaning off insulin. Pt has poor compliance with meds   09/29- diabetes perfectly controled on current regime          VTE prophylaxis: lovenox     Plan: remain off sitter. Will continue same treatment, medical cleared for placement

## 2019-10-01 LAB
ANION GAP SERPL CALC-SCNC: 9 MMOL/L (ref 0–11.9)
BUN SERPL-MCNC: 20 MG/DL (ref 8–22)
CALCIUM SERPL-MCNC: 9.9 MG/DL (ref 8.5–10.5)
CHLORIDE SERPL-SCNC: 106 MMOL/L (ref 96–112)
CO2 SERPL-SCNC: 23 MMOL/L (ref 20–33)
CREAT SERPL-MCNC: 0.82 MG/DL (ref 0.5–1.4)
GLUCOSE BLD-MCNC: 105 MG/DL (ref 65–99)
GLUCOSE BLD-MCNC: 138 MG/DL (ref 65–99)
GLUCOSE BLD-MCNC: 140 MG/DL (ref 65–99)
GLUCOSE SERPL-MCNC: 126 MG/DL (ref 65–99)
POTASSIUM SERPL-SCNC: 4.1 MMOL/L (ref 3.6–5.5)
SODIUM SERPL-SCNC: 138 MMOL/L (ref 135–145)

## 2019-10-01 PROCEDURE — A9270 NON-COVERED ITEM OR SERVICE: HCPCS | Performed by: INTERNAL MEDICINE

## 2019-10-01 PROCEDURE — 700102 HCHG RX REV CODE 250 W/ 637 OVERRIDE(OP): Performed by: INTERNAL MEDICINE

## 2019-10-01 PROCEDURE — 36415 COLL VENOUS BLD VENIPUNCTURE: CPT

## 2019-10-01 PROCEDURE — 700102 HCHG RX REV CODE 250 W/ 637 OVERRIDE(OP): Performed by: HOSPITALIST

## 2019-10-01 PROCEDURE — A9270 NON-COVERED ITEM OR SERVICE: HCPCS | Performed by: HOSPITALIST

## 2019-10-01 PROCEDURE — 770006 HCHG ROOM/CARE - MED/SURG/GYN SEMI*

## 2019-10-01 PROCEDURE — 700111 HCHG RX REV CODE 636 W/ 250 OVERRIDE (IP): Performed by: INTERNAL MEDICINE

## 2019-10-01 PROCEDURE — 80048 BASIC METABOLIC PNL TOTAL CA: CPT

## 2019-10-01 PROCEDURE — 82962 GLUCOSE BLOOD TEST: CPT | Mod: 91

## 2019-10-01 RX ORDER — GLIMEPIRIDE 2 MG/1
1 TABLET ORAL
Status: DISCONTINUED | OUTPATIENT
Start: 2019-10-02 | End: 2019-10-29 | Stop reason: HOSPADM

## 2019-10-01 RX ADMIN — DULOXETINE HYDROCHLORIDE 20 MG: 20 CAPSULE, DELAYED RELEASE ORAL at 08:24

## 2019-10-01 RX ADMIN — QUETIAPINE FUMARATE 25 MG: 25 TABLET ORAL at 08:24

## 2019-10-01 RX ADMIN — LISINOPRIL 5 MG: 5 TABLET ORAL at 08:25

## 2019-10-01 RX ADMIN — ATORVASTATIN CALCIUM 20 MG: 20 TABLET, FILM COATED ORAL at 08:24

## 2019-10-01 RX ADMIN — SITAGLIPTIN 100 MG: 100 TABLET, FILM COATED ORAL at 16:50

## 2019-10-01 RX ADMIN — METFORMIN HYDROCHLORIDE 1000 MG: 500 TABLET, FILM COATED ORAL at 08:23

## 2019-10-01 RX ADMIN — METFORMIN HYDROCHLORIDE 850 MG: 850 TABLET ORAL at 16:49

## 2019-10-01 RX ADMIN — METOPROLOL TARTRATE 50 MG: 50 TABLET, FILM COATED ORAL at 21:40

## 2019-10-01 RX ADMIN — ENOXAPARIN SODIUM 40 MG: 100 INJECTION SUBCUTANEOUS at 08:25

## 2019-10-01 RX ADMIN — QUETIAPINE FUMARATE 25 MG: 25 TABLET ORAL at 21:40

## 2019-10-01 RX ADMIN — METOPROLOL TARTRATE 50 MG: 50 TABLET, FILM COATED ORAL at 08:24

## 2019-10-01 RX ADMIN — GLIMEPIRIDE 2 MG: 2 TABLET ORAL at 08:25

## 2019-10-01 ASSESSMENT — ENCOUNTER SYMPTOMS
DIARRHEA: 0
EYE REDNESS: 1
FEVER: 0
ABDOMINAL PAIN: 0
WEAKNESS: 0
DIZZINESS: 0
SORE THROAT: 0
COUGH: 0
SHORTNESS OF BREATH: 0
BACK PAIN: 0
EYE DISCHARGE: 0
CONSTIPATION: 0
MYALGIAS: 0
DEPRESSION: 0
NAUSEA: 0
BLURRED VISION: 0
HEARTBURN: 0
HEADACHES: 0
PHOTOPHOBIA: 0
EYE PAIN: 0
PALPITATIONS: 0
CHILLS: 0
NERVOUS/ANXIOUS: 0
SINUS PAIN: 0
NECK PAIN: 0
BLOOD IN STOOL: 0
FALLS: 1
VOMITING: 0
FLANK PAIN: 0
DIAPHORESIS: 0
SPUTUM PRODUCTION: 0
WHEEZING: 0

## 2019-10-01 NOTE — CARE PLAN
Problem: Safety  Goal: Will remain free from falls  Note:   Pt has a history of falls. Safety precautions in place. Bed in low position, treaded socks on, personal possessions in reach, call light in reach and bed alarm on.      Problem: Discharge Barriers/Planning  Goal: Patient's continuum of care needs will be met  Note:   Pt awaiting placement. SW involved.

## 2019-10-01 NOTE — CARE PLAN
Problem: Safety  Goal: Will remain free from injury  Outcome: PROGRESSING AS EXPECTED  Note:   Bed alarm in use. Pt educated on fall risk

## 2019-10-01 NOTE — PROGRESS NOTES
"Unable to assess orientation, pt refused to answer questions. Pt stated \"it's the same questions every time. I don't want to keep answering. It's always the same answer.\" Pt denies pain. Family at bedside. Needs met. Call light within reach, personal belongings available, bed in lowest position, treaded socks on, and bed alarm on. Hourly rounding in place.      "

## 2019-10-01 NOTE — PROGRESS NOTES
Assumed care of pt this am. Pt is A&O x3 (disoriented to date) pt agreeable for a shower today. Denies pain. Bed alarm in use.  Pt is on room air. encouraged to call for assistance. Pt verbalized understanding.

## 2019-10-01 NOTE — THERAPY
"Occupational Therapy Treatment completed with focus on ADLs and ADL transfers.   Functional Status:    Pt seen for OT treatment this afternoon, very pleasant and agreeable. He completed LB dressing with supv, STS supv, ambulates using 4WW. Patient has moments of confusion, though they seem more consistent with his baseline. He returned to his room and got all of his toiletries from his bag and organized them by the bathroom sink. Patient is likely at his functional baseline, no further acute OT needs at this time.     Plan of Care: DC needs only  Discharge Recommendations:  Equipment Will Continue to Assess for Equipment Needs. Post-acute therapy Patient will not be actively followed for occupational therapy services at this time, however may be seen if requested by physician for 1 more visit within 30 days to address any discharge or equipment needs.       See \"Rehab Therapy-Acute\" Patient Summary Report for complete documentation.   "

## 2019-10-02 LAB
GLUCOSE BLD-MCNC: 131 MG/DL (ref 65–99)
GLUCOSE BLD-MCNC: 136 MG/DL (ref 65–99)
GLUCOSE BLD-MCNC: 139 MG/DL (ref 65–99)
GLUCOSE BLD-MCNC: 153 MG/DL (ref 65–99)
GLUCOSE BLD-MCNC: 71 MG/DL (ref 65–99)

## 2019-10-02 PROCEDURE — 700102 HCHG RX REV CODE 250 W/ 637 OVERRIDE(OP): Performed by: INTERNAL MEDICINE

## 2019-10-02 PROCEDURE — A9270 NON-COVERED ITEM OR SERVICE: HCPCS | Performed by: INTERNAL MEDICINE

## 2019-10-02 PROCEDURE — 82962 GLUCOSE BLOOD TEST: CPT | Mod: 91

## 2019-10-02 PROCEDURE — 770006 HCHG ROOM/CARE - MED/SURG/GYN SEMI*

## 2019-10-02 PROCEDURE — 700111 HCHG RX REV CODE 636 W/ 250 OVERRIDE (IP): Performed by: INTERNAL MEDICINE

## 2019-10-02 RX ADMIN — GLIMEPIRIDE 1 MG: 2 TABLET ORAL at 08:38

## 2019-10-02 RX ADMIN — ENOXAPARIN SODIUM 40 MG: 100 INJECTION SUBCUTANEOUS at 08:37

## 2019-10-02 RX ADMIN — METFORMIN HYDROCHLORIDE 850 MG: 850 TABLET ORAL at 08:37

## 2019-10-02 RX ADMIN — METFORMIN HYDROCHLORIDE 850 MG: 850 TABLET ORAL at 16:55

## 2019-10-02 RX ADMIN — QUETIAPINE FUMARATE 25 MG: 25 TABLET ORAL at 08:38

## 2019-10-02 RX ADMIN — METOPROLOL TARTRATE 50 MG: 50 TABLET, FILM COATED ORAL at 22:07

## 2019-10-02 RX ADMIN — DULOXETINE HYDROCHLORIDE 20 MG: 20 CAPSULE, DELAYED RELEASE ORAL at 08:37

## 2019-10-02 RX ADMIN — METOPROLOL TARTRATE 50 MG: 50 TABLET, FILM COATED ORAL at 08:38

## 2019-10-02 RX ADMIN — LISINOPRIL 5 MG: 5 TABLET ORAL at 08:38

## 2019-10-02 RX ADMIN — INSULIN HUMAN 1 UNITS: 100 INJECTION, SOLUTION PARENTERAL at 11:49

## 2019-10-02 RX ADMIN — ATORVASTATIN CALCIUM 20 MG: 20 TABLET, FILM COATED ORAL at 08:38

## 2019-10-02 RX ADMIN — SITAGLIPTIN 100 MG: 100 TABLET, FILM COATED ORAL at 16:55

## 2019-10-02 RX ADMIN — QUETIAPINE FUMARATE 25 MG: 25 TABLET ORAL at 22:08

## 2019-10-02 NOTE — PROGRESS NOTES
Hospital Medicine Daily Progress Note    Date of Service  10/1/2019    Chief Complaint  Altered mental status    Hospital Course    79-year-old male with past medical history of unspecified dementia, type 2 diabetes, hyperlipidemia, hypertension, history of MI, [facility] presented with altered mental status.  According to chart review, patient was nonverbal and very agitated on arrival.  He required 4 mg of Ativan to complete a head CT.  He required restraints.  This CT was negative for acute bleeding or mass-effect.  Lactic acid was elevated, however patient was tachycardic on admission.  No overt signs of infection.  Blood glucose was within normal limits.  After 2 days his mentation improved.  His daughter reported the patient had a similar episode in the past, but it resolved more quickly.  On cardiac monitoring patient had PVCs, pauses, right bundle branch block but remained asymptomatic.  On 9/25 restraints were removed.  Blood glucose was labile.  Intermittent agitation and poor judgment.  Questionable capacity.  Daughter Marah coordinating with case management for placement.      Interval Problem Update  Patient is alone in room sitting upright and eating his meal, pleasant and cooperative.  He is oriented to month but not year or date.  He is oriented to situation and place.  He states he occasionally gets dizzy and sits down when this happens.  He denies any other problems today.    Consultants/Specialty  Psychiatry    Code Status  DNAR, intubation okay    Disposition  TBD    Review of Systems  Review of Systems   Unable to perform ROS: Dementia   Constitutional: Negative for chills, diaphoresis, fever and malaise/fatigue.   HENT: Negative for congestion, ear pain, hearing loss, sinus pain and sore throat.    Eyes: Positive for redness (Allergies). Negative for blurred vision, photophobia, pain and discharge.   Respiratory: Negative for cough, sputum production, shortness of breath and wheezing.     Cardiovascular: Negative for chest pain, palpitations and leg swelling.   Gastrointestinal: Negative for abdominal pain, blood in stool, constipation, diarrhea, heartburn, melena, nausea and vomiting.   Genitourinary: Negative for dysuria, flank pain, frequency, hematuria and urgency.   Musculoskeletal: Positive for falls. Negative for back pain, joint pain, myalgias and neck pain.   Skin: Negative for itching and rash.   Neurological: Negative for dizziness, weakness and headaches.   Psychiatric/Behavioral: Negative for depression. The patient is not nervous/anxious.         Physical Exam  Temp:  [36.3 °C (97.4 °F)-36.6 °C (97.8 °F)] 36.6 °C (97.8 °F)  Pulse:  [68-90] 68  Resp:  [16-20] 18  BP: (112-144)/(61-72) 144/72  SpO2:  [93 %-98 %] 93 %    Physical Exam   Constitutional: He is oriented to person, place, and time. Vital signs are normal. He appears well-developed and well-nourished. He is active and cooperative. No distress.   HENT:   Head: Normocephalic and atraumatic.   Right Ear: External ear normal.   Left Ear: External ear normal.   Nose: Nose normal.   Eyes: Pupils are equal, round, and reactive to light. EOM are normal. Right eye exhibits no discharge. Left eye exhibits no discharge. Right conjunctiva is injected. Left conjunctiva is injected. No scleral icterus.   Neck: Normal range of motion. Neck supple. No JVD present.   Cardiovascular: Normal rate, regular rhythm and normal heart sounds. Exam reveals no gallop and no friction rub.   No murmur heard.  Pulmonary/Chest: Effort normal and breath sounds normal. No respiratory distress. He has no wheezes. He has no rales. He exhibits no tenderness.   Abdominal: Soft. Normal appearance and bowel sounds are normal. He exhibits no distension and no mass. There is no tenderness. There is no rebound and no guarding.   Obese   Musculoskeletal: Normal range of motion. He exhibits no edema or tenderness.   Neurological: He is alert and oriented to person,  place, and time. No cranial nerve deficit. Coordination normal.   Skin: Skin is warm and dry. No rash noted. He is not diaphoretic. No erythema. No pallor.   Psychiatric: He has a normal mood and affect. His speech is normal and behavior is normal. Thought content normal. Cognition and memory are impaired. He exhibits abnormal recent memory.   Nursing note and vitals reviewed.      Fluids    Intake/Output Summary (Last 24 hours) at 10/1/2019 1739  Last data filed at 10/1/2019 1736  Gross per 24 hour   Intake 1460 ml   Output --   Net 1460 ml       Laboratory      Recent Labs     10/01/19  0804   SODIUM 138   POTASSIUM 4.1   CHLORIDE 106   CO2 23   GLUCOSE 126*   BUN 20   CREATININE 0.82   CALCIUM 9.9                   Imaging  MR-BRAIN-W/O   Final Result      1.  Motion artifact degrades the examination and limits evaluation. No evidence of acute territorial infarct, intracranial hemorrhage or mass lesion.   2.  Moderate diffuse cerebral substance loss.   3.  Mild microangiopathic ischemic change versus demyelination or gliosis.      CT-HEAD W/O   Final Result      No CT evidence of acute infarct, hemorrhage or mass.   Mild-to-moderate global parenchymal atrophy. Chronic small vessel ischemic changes.      DX-CHEST-PORTABLE (1 VIEW)   Final Result      Mild interstitial edema. No focal consolidation or pleural effusions.           Assessment/Plan  * Acute metabolic encephalopathy- (present on admission)  Assessment & Plan  Very combative on admission, not following commands  He has had similar episode in the past   Head CT no acute abnormalities   Electrolytes wnl  UA negative for infection   CXR negative for pneumonia   Lactic acid elevated on admission resolved with IVF   Not hypoglycemic   No neuro deficits  Brain MRI negative for bleeding, CVA, mass-effect    PVC (premature ventricular contraction)  Assessment & Plan  Tele showing sinus tachy/sinus rhythm, pauses 1.3 sec. Not symptomatic   Cardiology  reviewed  tele and recommended increase metoprolol. No real pauses     increase metoprolol to 50 mg BID 09/26  Discontinue tele.       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 Family history positive   Cannot live alone. Daughter overwhelmed with care   Two episodes of flares while pt does not remember anything and very aggressive   Per family he will leave the house if not reoriented   SNF placement at this time. However pt requires restrain   Seroquel to help for behavioral issues   10/1/2019 calm and cooperative.     Elevated lactic acid level- (present on admission)  Assessment & Plan  6.1 on admission--> 2.3  Resolved with IVF   No signs of infection   No clear etiology. No seizure, no hypoglycemia, no infection, not likely arhythmia (discuss with cardiology 09/26)        Depression- (present on admission)  Assessment & Plan  Resume home meds   Appears stable, no SI    Hypertension- (present on admission)  Assessment & Plan  BP stable  He is at home lisinopril 5 mg and metoprolol 25 mg BID   Resume home meds . Increase metoprolol 50 mg BID ( 09/26)   Continue to monitor     Hyperlipemia- (present on admission)  Assessment & Plan  Resume statin   Follow up with PCP     Diabetes mellitus with hyperglycemia (HCC)- (present on admission)  Assessment & Plan  NPH 70/30 26 U BID at home and metformin   A1c 7.3   Restart metformin 1000 mg BID, and januvia. Add glimepiride   Weaning off insulin. Pt has poor compliance with meds   09/29- diabetes perfectly controled on current regime   9/30 Blood sugar is a 72, risk for hypoglycemia, Amaryl dose reduced.    10/1 blood sugars within normal limits.       VTE prophylaxis: Lovenox

## 2019-10-02 NOTE — CARE PLAN
Problem: Safety  Goal: Will remain free from falls  Note:   Pt impulsive at times. Safety precautions in place. Bed in low position, treaded socks on, personal possessions in reach, call light in reach and bed alarm on.      Problem: Psychosocial Needs:  Goal: Level of anxiety will decrease  Note:   Pt coloring in his room with colored pencils. No behavior issues noted.

## 2019-10-02 NOTE — PROGRESS NOTES
"Pt not wanting to answer orientation questions this evening. Pt states \"I'm tired of all these questions.\" Family at bedside. No pain/discomfort reported at this time. all light within reach, personal belongings available, bed in lowest position, treaded socks on, and bed alarm on. Hourly rounding in place.      "

## 2019-10-02 NOTE — DISCHARGE PLANNING
Anticipated Discharge Disposition: Group Home/Skilled    Action: PC to patient's daughter Marah, [phone]:  Told SW that patient was living with her for several months. But due to his behaviors/agression he can no longer live with her and her children.  Marah stated she has an appointment with a [facility] SW Teresa, [phone] tomorrow to turn in forms to see if they can get financial assistance from the [facility].  SW asked if patient has any assists other than his SS income, Marah stated no.  Marah stated that last week, Thursday she went to the  Office to see if they can help out.  Marah was told that her father can apply for Medicare B from January 1 through March 31, and if eligible will have B by July 1.  Marah stated that her other sister applied for Medicaid on Thursday.   Marah expressed concern that patient is a flight risk, stating that he can not take care of himself.     PC to ROJAS, JARVIS requested an update on patient's Medicaid application.   PC from [facility], told that Medicaid does not have an application pending, this could be due to the application being pended pending supporting documentation.     JARVIS talked to patient's nurse about behaviors.  SW told that the they have not observed any behaviors. Will typically stay in his room watching tv.  Can dress himself, and uses a walker, with standby assist.  SW asked if there have been any indications of flight risk by patient.  SW told no, that he is in his room most days.     Barriers to Discharge: placement and cost of care    Plan: follow up with Marah on Medicaid application

## 2019-10-02 NOTE — PROGRESS NOTES
Assumed care of pt this am. Pt is A&O x3 (disoriented to date). Pt is laying in bed, stated he is sleepy and would like to sleep longer. Hourly rounding in place.

## 2019-10-03 LAB
GLUCOSE BLD-MCNC: 141 MG/DL (ref 65–99)
GLUCOSE BLD-MCNC: 147 MG/DL (ref 65–99)
GLUCOSE BLD-MCNC: 148 MG/DL (ref 65–99)
GLUCOSE BLD-MCNC: 153 MG/DL (ref 65–99)

## 2019-10-03 PROCEDURE — 82962 GLUCOSE BLOOD TEST: CPT | Mod: 91

## 2019-10-03 PROCEDURE — 770006 HCHG ROOM/CARE - MED/SURG/GYN SEMI*

## 2019-10-03 PROCEDURE — 700111 HCHG RX REV CODE 636 W/ 250 OVERRIDE (IP): Performed by: INTERNAL MEDICINE

## 2019-10-03 PROCEDURE — 700102 HCHG RX REV CODE 250 W/ 637 OVERRIDE(OP): Performed by: INTERNAL MEDICINE

## 2019-10-03 PROCEDURE — A9270 NON-COVERED ITEM OR SERVICE: HCPCS | Performed by: INTERNAL MEDICINE

## 2019-10-03 RX ADMIN — SENNOSIDES, DOCUSATE SODIUM 2 TABLET: 50; 8.6 TABLET, FILM COATED ORAL at 16:38

## 2019-10-03 RX ADMIN — METOPROLOL TARTRATE 50 MG: 50 TABLET, FILM COATED ORAL at 08:35

## 2019-10-03 RX ADMIN — ENOXAPARIN SODIUM 40 MG: 100 INJECTION SUBCUTANEOUS at 08:34

## 2019-10-03 RX ADMIN — QUETIAPINE FUMARATE 25 MG: 25 TABLET ORAL at 08:35

## 2019-10-03 RX ADMIN — SENNOSIDES, DOCUSATE SODIUM 2 TABLET: 50; 8.6 TABLET, FILM COATED ORAL at 08:37

## 2019-10-03 RX ADMIN — METFORMIN HYDROCHLORIDE 850 MG: 850 TABLET ORAL at 16:38

## 2019-10-03 RX ADMIN — ATORVASTATIN CALCIUM 20 MG: 20 TABLET, FILM COATED ORAL at 08:37

## 2019-10-03 RX ADMIN — GLIMEPIRIDE 1 MG: 2 TABLET ORAL at 08:35

## 2019-10-03 RX ADMIN — DULOXETINE HYDROCHLORIDE 20 MG: 20 CAPSULE, DELAYED RELEASE ORAL at 08:35

## 2019-10-03 RX ADMIN — METFORMIN HYDROCHLORIDE 850 MG: 850 TABLET ORAL at 08:35

## 2019-10-03 RX ADMIN — QUETIAPINE FUMARATE 25 MG: 25 TABLET ORAL at 16:38

## 2019-10-03 RX ADMIN — METOPROLOL TARTRATE 50 MG: 50 TABLET, FILM COATED ORAL at 16:38

## 2019-10-03 RX ADMIN — LISINOPRIL 5 MG: 5 TABLET ORAL at 08:34

## 2019-10-03 RX ADMIN — SITAGLIPTIN 100 MG: 100 TABLET, FILM COATED ORAL at 16:38

## 2019-10-03 RX ADMIN — INSULIN HUMAN 1 UNITS: 100 INJECTION, SOLUTION PARENTERAL at 06:35

## 2019-10-03 NOTE — PROGRESS NOTES
Pt offer a shower today , patient  Claimed not yet.maybe later,still cold .respect patient decision  Come back later. If he wants too have shower today.

## 2019-10-03 NOTE — PROGRESS NOTES
Pt AOx3 (disoriented to time), pleasant, denies pain. Blood sugar stable at this time, no insulin coverage given. Pt resting quietly in bed, needs met. Call light within reach, personal belongings available, bed in lowest position, treaded socks on, and bed alarm on. Hourly rounding in place.

## 2019-10-03 NOTE — CARE PLAN
Problem: Venous Thromboembolism (VTW)/Deep Vein Thrombosis (DVT) Prevention:  Goal: Patient will participate in Venous Thrombosis (VTE)/Deep Vein Thrombosis (DVT)Prevention Measures  Note:   Pt receiving subQ lovenox injection daily.     Problem: Discharge Barriers/Planning  Goal: Patient's continuum of care needs will be met  Note:   Pt awaiting placement pending insurance assistance.

## 2019-10-04 LAB
GLUCOSE BLD-MCNC: 105 MG/DL (ref 65–99)
GLUCOSE BLD-MCNC: 136 MG/DL (ref 65–99)
GLUCOSE BLD-MCNC: 176 MG/DL (ref 65–99)
GLUCOSE BLD-MCNC: 176 MG/DL (ref 65–99)

## 2019-10-04 PROCEDURE — A9270 NON-COVERED ITEM OR SERVICE: HCPCS | Performed by: INTERNAL MEDICINE

## 2019-10-04 PROCEDURE — 700102 HCHG RX REV CODE 250 W/ 637 OVERRIDE(OP): Performed by: INTERNAL MEDICINE

## 2019-10-04 PROCEDURE — 770006 HCHG ROOM/CARE - MED/SURG/GYN SEMI*

## 2019-10-04 PROCEDURE — 82962 GLUCOSE BLOOD TEST: CPT | Mod: 91

## 2019-10-04 PROCEDURE — 700111 HCHG RX REV CODE 636 W/ 250 OVERRIDE (IP): Performed by: INTERNAL MEDICINE

## 2019-10-04 RX ADMIN — METFORMIN HYDROCHLORIDE 850 MG: 850 TABLET ORAL at 08:43

## 2019-10-04 RX ADMIN — INSULIN HUMAN 1 UNITS: 100 INJECTION, SOLUTION PARENTERAL at 20:20

## 2019-10-04 RX ADMIN — METOPROLOL TARTRATE 50 MG: 50 TABLET, FILM COATED ORAL at 08:43

## 2019-10-04 RX ADMIN — QUETIAPINE FUMARATE 25 MG: 25 TABLET ORAL at 08:43

## 2019-10-04 RX ADMIN — METFORMIN HYDROCHLORIDE 850 MG: 850 TABLET ORAL at 16:33

## 2019-10-04 RX ADMIN — ENOXAPARIN SODIUM 40 MG: 100 INJECTION SUBCUTANEOUS at 08:42

## 2019-10-04 RX ADMIN — DULOXETINE HYDROCHLORIDE 20 MG: 20 CAPSULE, DELAYED RELEASE ORAL at 08:42

## 2019-10-04 RX ADMIN — SITAGLIPTIN 100 MG: 100 TABLET, FILM COATED ORAL at 16:33

## 2019-10-04 RX ADMIN — ATORVASTATIN CALCIUM 20 MG: 20 TABLET, FILM COATED ORAL at 08:43

## 2019-10-04 RX ADMIN — SENNOSIDES, DOCUSATE SODIUM 2 TABLET: 50; 8.6 TABLET, FILM COATED ORAL at 08:42

## 2019-10-04 RX ADMIN — SENNOSIDES, DOCUSATE SODIUM 2 TABLET: 50; 8.6 TABLET, FILM COATED ORAL at 16:33

## 2019-10-04 RX ADMIN — GLIMEPIRIDE 1 MG: 2 TABLET ORAL at 08:43

## 2019-10-04 RX ADMIN — QUETIAPINE FUMARATE 25 MG: 25 TABLET ORAL at 16:33

## 2019-10-04 RX ADMIN — METOPROLOL TARTRATE 50 MG: 50 TABLET, FILM COATED ORAL at 16:33

## 2019-10-04 RX ADMIN — INSULIN HUMAN 1 UNITS: 100 INJECTION, SOLUTION PARENTERAL at 12:05

## 2019-10-04 RX ADMIN — LISINOPRIL 5 MG: 5 TABLET ORAL at 08:43

## 2019-10-04 ASSESSMENT — ENCOUNTER SYMPTOMS
SINUS PAIN: 0
BLOOD IN STOOL: 0
EYE PAIN: 0
WHEEZING: 0
PALPITATIONS: 0
BACK PAIN: 0
SHORTNESS OF BREATH: 0
SORE THROAT: 0
DIARRHEA: 0
NECK PAIN: 0
NERVOUS/ANXIOUS: 0
COUGH: 0
FALLS: 1
VOMITING: 0
DIAPHORESIS: 0
PHOTOPHOBIA: 0
MYALGIAS: 0
BLURRED VISION: 0
EYE DISCHARGE: 0
SPUTUM PRODUCTION: 0
HEARTBURN: 0
NAUSEA: 0
CHILLS: 0
DEPRESSION: 0
CONSTIPATION: 0
EYE REDNESS: 1
DIZZINESS: 0
FEVER: 0
HEADACHES: 0
WEAKNESS: 0
FLANK PAIN: 0
ABDOMINAL PAIN: 0

## 2019-10-04 NOTE — PROGRESS NOTES
"Pt alert and confused. No c/o pain OMS. Pt states \"he had the best day ever\" because he was able to catch up on sleep. Dtr in to see pt early in the day. Pt mostly stays in room. See MAR for insulin coverage.   "

## 2019-10-04 NOTE — DISCHARGE PLANNING
Agency/Facility Name: [facility]  Spoke To: Sherry  Outcome: Requested  PT/OT notes, Progress notes, and medication list.    Faxed 10/4 at 0916 via manual fax.  Fax #[phone].

## 2019-10-04 NOTE — PROGRESS NOTES
Pt A&O x2. See MAR for insulin needs. Pt ringing call light appropriately for assistance. Pt accepting of safety teaching. Pt prefers to stay in room OMS. No c/o pain.

## 2019-10-04 NOTE — PROGRESS NOTES
Patient is AOX2. Reoriented patient. Patient calm and smiling. Blood sugars check and insulin notrequired per sliding scale. Snacks provided

## 2019-10-05 LAB
GLUCOSE BLD-MCNC: 139 MG/DL (ref 65–99)
GLUCOSE BLD-MCNC: 139 MG/DL (ref 65–99)
GLUCOSE BLD-MCNC: 140 MG/DL (ref 65–99)
GLUCOSE BLD-MCNC: 151 MG/DL (ref 65–99)

## 2019-10-05 PROCEDURE — A9270 NON-COVERED ITEM OR SERVICE: HCPCS | Performed by: INTERNAL MEDICINE

## 2019-10-05 PROCEDURE — 770006 HCHG ROOM/CARE - MED/SURG/GYN SEMI*

## 2019-10-05 PROCEDURE — 700102 HCHG RX REV CODE 250 W/ 637 OVERRIDE(OP): Performed by: INTERNAL MEDICINE

## 2019-10-05 PROCEDURE — 82962 GLUCOSE BLOOD TEST: CPT

## 2019-10-05 PROCEDURE — 700111 HCHG RX REV CODE 636 W/ 250 OVERRIDE (IP): Performed by: INTERNAL MEDICINE

## 2019-10-05 RX ADMIN — LISINOPRIL 5 MG: 5 TABLET ORAL at 06:15

## 2019-10-05 RX ADMIN — ENOXAPARIN SODIUM 40 MG: 100 INJECTION SUBCUTANEOUS at 06:14

## 2019-10-05 RX ADMIN — INSULIN HUMAN 1 UNITS: 100 INJECTION, SOLUTION PARENTERAL at 11:51

## 2019-10-05 RX ADMIN — SENNOSIDES, DOCUSATE SODIUM 2 TABLET: 50; 8.6 TABLET, FILM COATED ORAL at 17:26

## 2019-10-05 RX ADMIN — DULOXETINE HYDROCHLORIDE 20 MG: 20 CAPSULE, DELAYED RELEASE ORAL at 06:15

## 2019-10-05 RX ADMIN — QUETIAPINE FUMARATE 25 MG: 25 TABLET ORAL at 06:14

## 2019-10-05 RX ADMIN — SITAGLIPTIN 100 MG: 100 TABLET, FILM COATED ORAL at 17:26

## 2019-10-05 RX ADMIN — ATORVASTATIN CALCIUM 20 MG: 20 TABLET, FILM COATED ORAL at 06:15

## 2019-10-05 RX ADMIN — METOPROLOL TARTRATE 50 MG: 50 TABLET, FILM COATED ORAL at 17:26

## 2019-10-05 RX ADMIN — SENNOSIDES, DOCUSATE SODIUM 2 TABLET: 50; 8.6 TABLET, FILM COATED ORAL at 06:14

## 2019-10-05 RX ADMIN — METFORMIN HYDROCHLORIDE 850 MG: 850 TABLET ORAL at 17:26

## 2019-10-05 RX ADMIN — QUETIAPINE FUMARATE 25 MG: 25 TABLET ORAL at 17:26

## 2019-10-05 RX ADMIN — METFORMIN HYDROCHLORIDE 850 MG: 850 TABLET ORAL at 06:14

## 2019-10-05 RX ADMIN — METOPROLOL TARTRATE 50 MG: 50 TABLET, FILM COATED ORAL at 06:14

## 2019-10-05 RX ADMIN — GLIMEPIRIDE 1 MG: 2 TABLET ORAL at 06:14

## 2019-10-05 NOTE — CARE PLAN
Problem: Safety  Goal: Will remain free from injury  Outcome: PROGRESSING AS EXPECTED   Fall precautions in place. Treaded socks on pt. Bedrails up. Bed in lowest position and locked.  Call light and phone within reach. Patient educated on importance of calling nurses before getting out of bed, verbalizes understanding. Bed alarm on.    Problem: Knowledge Deficit  Goal: Knowledge of disease process/condition, treatment plan, diagnostic tests, and medications will improve  Outcome: PROGRESSING AS EXPECTED   Patient educated about POC.  All questions answered in regards to disease process, treatment, medications and diet.  Patient verbalized understanding and voiced no further questions at this time.

## 2019-10-05 NOTE — PROGRESS NOTES
Report received by ermias RN. Assumed care of pt. Assessment complete. Pt A&Ox3-4, disoriented to time, VSS. Pt in no apparent signs of distress, denies any pain at this time. FBS this evening was 176 and received one unit of insulin. Pt denies any other additional needs at this time, spent majority of time in room listening to radio talk show. Call light within reach, bed in lowest position. Hourly rounding in place.

## 2019-10-05 NOTE — PROGRESS NOTES
Hospital Medicine Daily Progress Note    Date of Service  10/4/2019    Chief Complaint  Altered mental status    Hospital Course    79-year-old male with past medical history of unspecified dementia, type 2 diabetes, hyperlipidemia, hypertension, history of MI, [facility] presented with altered mental status.  According to chart review, patient was nonverbal and very agitated on arrival.  He required 4 mg of Ativan to complete a head CT.  He required restraints.  This CT was negative for acute bleeding or mass-effect.  Lactic acid was elevated, however patient was tachycardic on admission.  No overt signs of infection.  Blood glucose was within normal limits.  After 2 days his mentation improved.  His daughter reported the patient had a similar episode in the past, but it resolved more quickly.  On cardiac monitoring patient had PVCs, pauses, right bundle branch block but remained asymptomatic.  On 9/25 restraints were removed.  Blood glucose was labile.  Intermittent agitation and poor judgment.  Questionable capacity.  Daughter Marah coordinating with case management for placement.  Blood pressure and blood glucose has since stabilized with medical management.      Interval Problem Update  Patient is alone in room sitting upright and eating his meal, pleasant and cooperative.  He is oriented to month and if the week but not year or situation.  He is oriented to place.  He denies any new problems today.  He states he is steady on his feet.  He verbalizes understanding that he cannot go home with his daughter.    Consultants/Specialty  Psychiatry    Code Status  DNAR, intubation okay    Disposition  TBD    Review of Systems  Review of Systems   Unable to perform ROS: Dementia   Constitutional: Negative for chills, diaphoresis, fever and malaise/fatigue.   HENT: Negative for congestion, ear pain, hearing loss, sinus pain and sore throat.    Eyes: Positive for redness (Allergies). Negative for blurred vision,  photophobia, pain and discharge.   Respiratory: Negative for cough, sputum production, shortness of breath and wheezing.    Cardiovascular: Negative for chest pain, palpitations and leg swelling.   Gastrointestinal: Negative for abdominal pain, blood in stool, constipation, diarrhea, heartburn, melena, nausea and vomiting.   Genitourinary: Negative for dysuria, flank pain, frequency, hematuria and urgency.   Musculoskeletal: Positive for falls. Negative for back pain, joint pain, myalgias and neck pain.   Skin: Negative for itching and rash.   Neurological: Negative for dizziness, weakness and headaches.   Psychiatric/Behavioral: Negative for depression. The patient is not nervous/anxious.         Physical Exam  Temp:  [36.5 °C (97.7 °F)-36.7 °C (98.1 °F)] 36.6 °C (97.8 °F)  Pulse:  [74-87] 74  Resp:  [18] 18  BP: (115-144)/(64-74) 117/67  SpO2:  [94 %-96 %] 95 %    Physical Exam   Constitutional: He is oriented to person, place, and time. Vital signs are normal. He appears well-developed and well-nourished. He is active and cooperative. No distress.   HENT:   Head: Normocephalic and atraumatic.   Right Ear: External ear normal.   Left Ear: External ear normal.   Nose: Nose normal.   Eyes: Pupils are equal, round, and reactive to light. EOM are normal. Right eye exhibits no discharge. Left eye exhibits no discharge. Right conjunctiva is not injected. Left conjunctiva is not injected. No scleral icterus.   Neck: Normal range of motion. Neck supple. No JVD present.   Cardiovascular: Normal rate, regular rhythm and normal heart sounds. Exam reveals no gallop and no friction rub.   No murmur heard.  Pulmonary/Chest: Effort normal and breath sounds normal. No respiratory distress. He has no wheezes. He has no rales. He exhibits no tenderness.   Abdominal: Soft. Normal appearance and bowel sounds are normal. He exhibits no distension and no mass. There is no tenderness. There is no rebound and no guarding.   Obese    Musculoskeletal: Normal range of motion. He exhibits no edema or tenderness.   Neurological: He is alert and oriented to person, place, and time. No cranial nerve deficit. Coordination normal.   Skin: Skin is warm and dry. No rash noted. He is not diaphoretic. No erythema. No pallor.   Psychiatric: He has a normal mood and affect. His speech is normal and behavior is normal. Thought content normal. Cognition and memory are impaired. He exhibits abnormal recent memory.   Nursing note and vitals reviewed.      Fluids    Intake/Output Summary (Last 24 hours) at 10/4/2019 1824  Last data filed at 10/4/2019 1400  Gross per 24 hour   Intake 980 ml   Output --   Net 980 ml       Laboratory                        Imaging  MR-BRAIN-W/O   Final Result      1.  Motion artifact degrades the examination and limits evaluation. No evidence of acute territorial infarct, intracranial hemorrhage or mass lesion.   2.  Moderate diffuse cerebral substance loss.   3.  Mild microangiopathic ischemic change versus demyelination or gliosis.      CT-HEAD W/O   Final Result      No CT evidence of acute infarct, hemorrhage or mass.   Mild-to-moderate global parenchymal atrophy. Chronic small vessel ischemic changes.      DX-CHEST-PORTABLE (1 VIEW)   Final Result      Mild interstitial edema. No focal consolidation or pleural effusions.           Assessment/Plan  * Acute metabolic encephalopathy- (present on admission)  Assessment & Plan  Very combative on admission, not following commands  He has had similar episode in the past   Head CT no acute abnormalities   Electrolytes wnl  UA negative for infection   CXR negative for pneumonia   Lactic acid elevated on admission resolved with IVF   Not hypoglycemic   No neuro deficits  Brain MRI negative for bleeding, CVA, mass-effect    PVC (premature ventricular contraction)  Assessment & Plan  Tele showing sinus tachy/sinus rhythm, pauses 1.3 sec. Not symptomatic   Cardiology  reviewed tele and  recommended increase metoprolol. No real pauses     increase metoprolol to 50 mg BID 09/26  Discontinue tele.       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 Family history positive   Cannot live alone. Daughter overwhelmed with care   Two episodes of flares while pt does not remember anything and very aggressive   Per family he will leave the house if not reoriented   SNF placement at this time.  Patient no longer requires restraints  Seroquel to help for behavioral issues   Remains calm and cooperative since 10/1    Elevated lactic acid level- (present on admission)  Assessment & Plan  6.1 on admission--> 2.3  Resolved with IVF   No signs of infection   No clear etiology. No seizure, no hypoglycemia, no infection, not likely arhythmia (discuss with cardiology 09/26)        Depression- (present on admission)  Assessment & Plan  Resume home meds   Appears stable, no SI    Hypertension- (present on admission)  Assessment & Plan  BP stable  He is at home lisinopril 5 mg and metoprolol 25 mg BID   Resume home meds . Increase metoprolol 50 mg BID ( 09/26)   Continue to monitor     Hyperlipemia- (present on admission)  Assessment & Plan  Resume statin   Follow up with PCP     Diabetes mellitus with hyperglycemia (HCC)- (present on admission)  Assessment & Plan  NPH 70/30 26 U BID at home and metformin   A1c 7.3   Restart metformin 1000 mg BID, and januvia. Add glimepiride   Weaning off insulin. Pt has poor compliance with meds   09/29- diabetes perfectly controled on current regime   9/30 Blood sugar is a 72, risk for hypoglycemia, Amaryl dose reduced.    10/1 blood sugars within normal limits.  10/4 blood sugars well controlled with metformin and glimepiride.  Consider discontinuing insulin if blood sugars remain stable.       VTE prophylaxis: Lovenox

## 2019-10-05 NOTE — PROGRESS NOTES
Pt A&Ox3. Amd with standby assist in room. Needs to be reoriented about using call bell. No c/o pain. Pt stays in room and is content doing so.

## 2019-10-06 LAB
GLUCOSE BLD-MCNC: 102 MG/DL (ref 65–99)
GLUCOSE BLD-MCNC: 115 MG/DL (ref 65–99)
GLUCOSE BLD-MCNC: 132 MG/DL (ref 65–99)
GLUCOSE BLD-MCNC: 185 MG/DL (ref 65–99)

## 2019-10-06 PROCEDURE — 82962 GLUCOSE BLOOD TEST: CPT | Mod: 91

## 2019-10-06 PROCEDURE — 700102 HCHG RX REV CODE 250 W/ 637 OVERRIDE(OP): Performed by: INTERNAL MEDICINE

## 2019-10-06 PROCEDURE — A9270 NON-COVERED ITEM OR SERVICE: HCPCS | Performed by: INTERNAL MEDICINE

## 2019-10-06 PROCEDURE — 700111 HCHG RX REV CODE 636 W/ 250 OVERRIDE (IP): Performed by: INTERNAL MEDICINE

## 2019-10-06 PROCEDURE — 770006 HCHG ROOM/CARE - MED/SURG/GYN SEMI*

## 2019-10-06 RX ADMIN — DULOXETINE HYDROCHLORIDE 20 MG: 20 CAPSULE, DELAYED RELEASE ORAL at 06:21

## 2019-10-06 RX ADMIN — SITAGLIPTIN 100 MG: 100 TABLET, FILM COATED ORAL at 17:17

## 2019-10-06 RX ADMIN — METOPROLOL TARTRATE 50 MG: 50 TABLET, FILM COATED ORAL at 17:17

## 2019-10-06 RX ADMIN — METFORMIN HYDROCHLORIDE 850 MG: 850 TABLET ORAL at 06:22

## 2019-10-06 RX ADMIN — QUETIAPINE FUMARATE 25 MG: 25 TABLET ORAL at 17:17

## 2019-10-06 RX ADMIN — ENOXAPARIN SODIUM 40 MG: 100 INJECTION SUBCUTANEOUS at 06:22

## 2019-10-06 RX ADMIN — ATORVASTATIN CALCIUM 20 MG: 20 TABLET, FILM COATED ORAL at 06:21

## 2019-10-06 RX ADMIN — METOPROLOL TARTRATE 50 MG: 50 TABLET, FILM COATED ORAL at 06:21

## 2019-10-06 RX ADMIN — GLIMEPIRIDE 1 MG: 2 TABLET ORAL at 06:21

## 2019-10-06 RX ADMIN — QUETIAPINE FUMARATE 25 MG: 25 TABLET ORAL at 06:21

## 2019-10-06 RX ADMIN — INSULIN HUMAN 1 UNITS: 100 INJECTION, SOLUTION PARENTERAL at 06:25

## 2019-10-06 RX ADMIN — LISINOPRIL 5 MG: 5 TABLET ORAL at 06:21

## 2019-10-06 RX ADMIN — METFORMIN HYDROCHLORIDE 850 MG: 850 TABLET ORAL at 17:17

## 2019-10-06 RX ADMIN — SENNOSIDES, DOCUSATE SODIUM 2 TABLET: 50; 8.6 TABLET, FILM COATED ORAL at 17:17

## 2019-10-06 NOTE — PROGRESS NOTES
1200  Patient dangle at the bedside, patient A&Ox3, bed alarm on and locked and in lowest position, patient has no complaints of pain, patient eating lunch and blood glucose 115 and did not need any coverage. Hourly rounding, all needs met.

## 2019-10-06 NOTE — CARE PLAN
Problem: Safety  Goal: Will remain free from injury  Outcome: PROGRESSING AS EXPECTED   Fall precautions in place. Treaded socks on pt. Bedrails up. Bed in lowest position and locked.  Call light and phone within reach. Patient educated on importance of calling nurses before getting out of bed, verbalizes understanding. Bed alarm on.     Problem: Venous Thromboembolism (VTW)/Deep Vein Thrombosis (DVT) Prevention:  Goal: Patient will participate in Venous Thrombosis (VTE)/Deep Vein Thrombosis (DVT)Prevention Measures  Outcome: PROGRESSING AS EXPECTED   Pt is on lovenox sub q for DVT prophylaxis.

## 2019-10-06 NOTE — PROGRESS NOTES
Pt's daughter and partner came to visit pt tonight and ambulated around the halls. MPA=[id number], no coverage needed. Pt denies any pain or discomfort at this time. Pt finished the night listening to his radio show.

## 2019-10-07 LAB
GLUCOSE BLD-MCNC: 113 MG/DL (ref 65–99)
GLUCOSE BLD-MCNC: 145 MG/DL (ref 65–99)
GLUCOSE BLD-MCNC: 176 MG/DL (ref 65–99)
GLUCOSE BLD-MCNC: 196 MG/DL (ref 65–99)

## 2019-10-07 PROCEDURE — A9270 NON-COVERED ITEM OR SERVICE: HCPCS | Performed by: INTERNAL MEDICINE

## 2019-10-07 PROCEDURE — 700102 HCHG RX REV CODE 250 W/ 637 OVERRIDE(OP): Performed by: INTERNAL MEDICINE

## 2019-10-07 PROCEDURE — 82962 GLUCOSE BLOOD TEST: CPT

## 2019-10-07 PROCEDURE — 700111 HCHG RX REV CODE 636 W/ 250 OVERRIDE (IP): Performed by: INTERNAL MEDICINE

## 2019-10-07 PROCEDURE — 770006 HCHG ROOM/CARE - MED/SURG/GYN SEMI*

## 2019-10-07 RX ADMIN — METFORMIN HYDROCHLORIDE 850 MG: 850 TABLET ORAL at 06:07

## 2019-10-07 RX ADMIN — QUETIAPINE FUMARATE 25 MG: 25 TABLET ORAL at 06:07

## 2019-10-07 RX ADMIN — METOPROLOL TARTRATE 50 MG: 50 TABLET, FILM COATED ORAL at 16:47

## 2019-10-07 RX ADMIN — SENNOSIDES, DOCUSATE SODIUM 2 TABLET: 50; 8.6 TABLET, FILM COATED ORAL at 16:47

## 2019-10-07 RX ADMIN — INSULIN HUMAN 1 UNITS: 100 INJECTION, SOLUTION PARENTERAL at 11:53

## 2019-10-07 RX ADMIN — ENOXAPARIN SODIUM 40 MG: 100 INJECTION SUBCUTANEOUS at 06:06

## 2019-10-07 RX ADMIN — INSULIN HUMAN 1 UNITS: 100 INJECTION, SOLUTION PARENTERAL at 20:16

## 2019-10-07 RX ADMIN — DULOXETINE HYDROCHLORIDE 20 MG: 20 CAPSULE, DELAYED RELEASE ORAL at 06:07

## 2019-10-07 RX ADMIN — METOPROLOL TARTRATE 50 MG: 50 TABLET, FILM COATED ORAL at 06:07

## 2019-10-07 RX ADMIN — GLIMEPIRIDE 1 MG: 2 TABLET ORAL at 06:07

## 2019-10-07 RX ADMIN — ATORVASTATIN CALCIUM 20 MG: 20 TABLET, FILM COATED ORAL at 06:07

## 2019-10-07 RX ADMIN — LISINOPRIL 5 MG: 5 TABLET ORAL at 06:07

## 2019-10-07 RX ADMIN — QUETIAPINE FUMARATE 25 MG: 25 TABLET ORAL at 16:47

## 2019-10-07 RX ADMIN — SITAGLIPTIN 100 MG: 100 TABLET, FILM COATED ORAL at 16:47

## 2019-10-07 RX ADMIN — METFORMIN HYDROCHLORIDE 850 MG: 850 TABLET ORAL at 16:47

## 2019-10-07 NOTE — PROGRESS NOTES
1150  Patient resting in bed, wife at the bedside. Blood glucose was 176, I unit of insulin given. No complaints of pain. Bed alarm on and locked and in lowest position, hourly rounding. No there needs at the time

## 2019-10-07 NOTE — CARE PLAN
Problem: Safety  Goal: Will remain free from injury  Outcome: PROGRESSING AS EXPECTED   Fall precautions in place. Pt ambulates stand-by FWW. Treaded socks on pt. Bedrails up. Bed in lowest position and locked.  Call light and phone within reach. Patient educated on importance of calling nurses before getting out of bed, verbalizes understanding. Bed alarm on.     Problem: Venous Thromboembolism (VTW)/Deep Vein Thrombosis (DVT) Prevention:  Goal: Patient will participate in Venous Thrombosis (VTE)/Deep Vein Thrombosis (DVT)Prevention Measures  Outcome: PROGRESSING AS EXPECTED   Pt is on lovenox sub Q for DVT prophylaxis.

## 2019-10-07 NOTE — DISCHARGE PLANNING
Anticipated Discharge Disposition: Skilled    Action: [facility] [place] called to say that she did not receive requested p/w.  JARVIS went over PT/OT notes and recommendations.  Both Sherry and JARVIS stated agreed that patient does not qualify for skilled.    PC from patient's daughter, Marah. Gave her an update on patient going to [facility]. Marah frustrated that she is not getting assistance in finding placement for her father.  JARVIS asked if Medicaid was applied for last week.  JARVIS told yes, applied for Community Based Waiver.  Marah stated she received a letter from them stating that a worker will be out to assess patient within 15 days.  Marah is aware that group homes/assisted living can not administer insulin, only remind patient.   JARVIS stated that if patient can't then we are looking at a skilled.  JARVIS stated that if Marah feels her father is a wonder risk then the only locked/secure facility is [facility] in [place].    Marah requested SW see if her fathers physician feels he is competent to allow her to be hiis medical power of .    PC to Aging and Disability, JARVIS spoke to Mari who verified that an application was submitted for the group home waiver.  SW went over patient's needs with her.  Mari stated that when an application is submitted to them it is for the waiver, not for a patient going to skilled.  As they would submit the application to Aging and if the patient is approved would go on a waiting list. Since patient has not been at [facility] for 30 days would not apply for Institutional, as patient makes too much to qualify for Medicaid.     Barriers to Discharge: placement/medicaid    Plan: follow up with physician to see if patient is competent to sign necessary p/w for POA

## 2019-10-07 NOTE — DISCHARGE PLANNING
Medical Social Work  Sw requested TAWANA Flores to assess patient to see if he is competent to complete POA paperwork

## 2019-10-07 NOTE — PROGRESS NOTES
Pt had a visit with his daughter and son in law. Daughter gave him a haircut in his room. FBS this evening was 132, no coverage needed. Ambulated around the halls with FWW.

## 2019-10-08 LAB
GLUCOSE BLD-MCNC: 112 MG/DL (ref 65–99)
GLUCOSE BLD-MCNC: 119 MG/DL (ref 65–99)
GLUCOSE BLD-MCNC: 144 MG/DL (ref 65–99)
GLUCOSE BLD-MCNC: 145 MG/DL (ref 65–99)

## 2019-10-08 PROCEDURE — 700102 HCHG RX REV CODE 250 W/ 637 OVERRIDE(OP): Performed by: INTERNAL MEDICINE

## 2019-10-08 PROCEDURE — 700111 HCHG RX REV CODE 636 W/ 250 OVERRIDE (IP): Performed by: INTERNAL MEDICINE

## 2019-10-08 PROCEDURE — 82962 GLUCOSE BLOOD TEST: CPT | Mod: 91

## 2019-10-08 PROCEDURE — 770006 HCHG ROOM/CARE - MED/SURG/GYN SEMI*

## 2019-10-08 PROCEDURE — A9270 NON-COVERED ITEM OR SERVICE: HCPCS | Performed by: INTERNAL MEDICINE

## 2019-10-08 RX ADMIN — METOPROLOL TARTRATE 50 MG: 50 TABLET, FILM COATED ORAL at 17:31

## 2019-10-08 RX ADMIN — SITAGLIPTIN 100 MG: 100 TABLET, FILM COATED ORAL at 17:31

## 2019-10-08 RX ADMIN — DULOXETINE HYDROCHLORIDE 20 MG: 20 CAPSULE, DELAYED RELEASE ORAL at 06:04

## 2019-10-08 RX ADMIN — GLIMEPIRIDE 1 MG: 2 TABLET ORAL at 06:04

## 2019-10-08 RX ADMIN — SENNOSIDES, DOCUSATE SODIUM 2 TABLET: 50; 8.6 TABLET, FILM COATED ORAL at 06:04

## 2019-10-08 RX ADMIN — METFORMIN HYDROCHLORIDE 850 MG: 850 TABLET ORAL at 17:31

## 2019-10-08 RX ADMIN — METOPROLOL TARTRATE 50 MG: 50 TABLET, FILM COATED ORAL at 06:04

## 2019-10-08 RX ADMIN — LISINOPRIL 5 MG: 5 TABLET ORAL at 06:04

## 2019-10-08 RX ADMIN — ATORVASTATIN CALCIUM 20 MG: 20 TABLET, FILM COATED ORAL at 06:04

## 2019-10-08 RX ADMIN — METFORMIN HYDROCHLORIDE 850 MG: 850 TABLET ORAL at 06:03

## 2019-10-08 RX ADMIN — QUETIAPINE FUMARATE 25 MG: 25 TABLET ORAL at 17:31

## 2019-10-08 RX ADMIN — QUETIAPINE FUMARATE 25 MG: 25 TABLET ORAL at 06:04

## 2019-10-08 RX ADMIN — ENOXAPARIN SODIUM 40 MG: 100 INJECTION SUBCUTANEOUS at 06:03

## 2019-10-08 NOTE — DISCHARGE PLANNING
Medical Social Work  JARVIS spoke to the Chicho Grullon,  director at [facility].  Chicho was here to assess the patient for their facility.  Chicho stated that he has an email to [facility] counter part at the [facility] to see what the patient's service connection is.  If the patient is 70 % or better then the [facility] will cover cost of care.  JARVIS asked what if patient is not 70%, JARVIS told that there would be a cost which is typically $280.00 per day.  JARVIS asked if the facility will accept the patient on institutional medicaid, JARVIS told yes.  Not pending though.  Chicho stated he will call JARVIS when he hears on patient's service connection

## 2019-10-08 NOTE — PROGRESS NOTES
Pt up at nurses station socializing with the other residents and reading the paper. FBS this evening was 196, 1 unit given. Pt is not in any distress and denies any pain at this time. Hourly rounding in place.

## 2019-10-09 LAB
GLUCOSE BLD-MCNC: 103 MG/DL (ref 65–99)
GLUCOSE BLD-MCNC: 175 MG/DL (ref 65–99)

## 2019-10-09 PROCEDURE — A9270 NON-COVERED ITEM OR SERVICE: HCPCS | Performed by: INTERNAL MEDICINE

## 2019-10-09 PROCEDURE — 770006 HCHG ROOM/CARE - MED/SURG/GYN SEMI*

## 2019-10-09 PROCEDURE — A9270 NON-COVERED ITEM OR SERVICE: HCPCS | Performed by: NURSE PRACTITIONER

## 2019-10-09 PROCEDURE — 700102 HCHG RX REV CODE 250 W/ 637 OVERRIDE(OP): Performed by: INTERNAL MEDICINE

## 2019-10-09 PROCEDURE — 82962 GLUCOSE BLOOD TEST: CPT

## 2019-10-09 PROCEDURE — 700102 HCHG RX REV CODE 250 W/ 637 OVERRIDE(OP): Performed by: NURSE PRACTITIONER

## 2019-10-09 RX ORDER — MIRTAZAPINE 15 MG/1
7.5 TABLET, FILM COATED ORAL
Status: DISCONTINUED | OUTPATIENT
Start: 2019-10-09 | End: 2019-10-29 | Stop reason: HOSPADM

## 2019-10-09 RX ADMIN — METOPROLOL TARTRATE 50 MG: 50 TABLET, FILM COATED ORAL at 09:42

## 2019-10-09 RX ADMIN — MIRTAZAPINE 7.5 MG: 15 TABLET, FILM COATED ORAL at 23:04

## 2019-10-09 RX ADMIN — ATORVASTATIN CALCIUM 20 MG: 20 TABLET, FILM COATED ORAL at 09:44

## 2019-10-09 RX ADMIN — METOPROLOL TARTRATE 50 MG: 50 TABLET, FILM COATED ORAL at 17:22

## 2019-10-09 RX ADMIN — DULOXETINE HYDROCHLORIDE 20 MG: 20 CAPSULE, DELAYED RELEASE ORAL at 09:43

## 2019-10-09 RX ADMIN — LISINOPRIL 5 MG: 5 TABLET ORAL at 09:43

## 2019-10-09 RX ADMIN — SITAGLIPTIN 100 MG: 100 TABLET, FILM COATED ORAL at 17:22

## 2019-10-09 RX ADMIN — QUETIAPINE FUMARATE 25 MG: 25 TABLET ORAL at 09:42

## 2019-10-09 RX ADMIN — METFORMIN HYDROCHLORIDE 850 MG: 850 TABLET ORAL at 09:39

## 2019-10-09 RX ADMIN — GLIMEPIRIDE 1 MG: 2 TABLET ORAL at 09:43

## 2019-10-09 RX ADMIN — QUETIAPINE FUMARATE 25 MG: 25 TABLET ORAL at 17:22

## 2019-10-09 RX ADMIN — METFORMIN HYDROCHLORIDE 850 MG: 850 TABLET ORAL at 17:22

## 2019-10-09 NOTE — PROGRESS NOTES
Assumed care of pt at 1900, no family at bedside, no c/o pain or other discomforts at that time. Meds given per MAR. Pt up with walker to common area to socialize with other pts for an hour then returned to room to sleep. Pt resting in bed now, unable to fall asleep, call light in reach, no further needs at this time

## 2019-10-09 NOTE — CARE PLAN
Problem: Bowel/Gastric:  Goal: Normal bowel function is maintained or improved  Outcome: PROGRESSING AS EXPECTED  Note:   Pt states bowel function is normal, with no irregularities noted. Continue to assess for irregularities in bowel function and apply bowel protocol as needed.       Problem: Pain Management  Goal: Pain level will decrease to patient's comfort goal  Outcome: PROGRESSING AS EXPECTED  Flowsheets (Taken 10/8/2019 3775)  Comfort Goal: Comfort at Rest;Comfort with Movement;Sleep Comfortably  Pain Rating Scale (NPRS): 0  Note:   Pt's pain level remains within tolerable levels with no pain meds administered. Continue to educate pt on non-pharmacologic methods for relieving pain and medicate as needed

## 2019-10-09 NOTE — PROGRESS NOTES
Assumed care of patient at change of shift.  During change of shift report, patient (pt) resting in bed, on room air.  During assessment, pt A&Ox4, and daughter bedside visiting with pt and having pt fill out some paperwork.  Pt pleasant and relaxed entire shift, and only infrequently came out of room.

## 2019-10-10 LAB
GLUCOSE BLD-MCNC: 135 MG/DL (ref 65–99)
GLUCOSE BLD-MCNC: 149 MG/DL (ref 65–99)
GLUCOSE BLD-MCNC: 149 MG/DL (ref 65–99)

## 2019-10-10 PROCEDURE — A9270 NON-COVERED ITEM OR SERVICE: HCPCS | Performed by: INTERNAL MEDICINE

## 2019-10-10 PROCEDURE — 92526 ORAL FUNCTION THERAPY: CPT

## 2019-10-10 PROCEDURE — 700102 HCHG RX REV CODE 250 W/ 637 OVERRIDE(OP): Performed by: INTERNAL MEDICINE

## 2019-10-10 PROCEDURE — 770006 HCHG ROOM/CARE - MED/SURG/GYN SEMI*

## 2019-10-10 PROCEDURE — 82962 GLUCOSE BLOOD TEST: CPT | Mod: 91

## 2019-10-10 PROCEDURE — 99231 SBSQ HOSP IP/OBS SF/LOW 25: CPT | Performed by: NURSE PRACTITIONER

## 2019-10-10 RX ADMIN — DULOXETINE HYDROCHLORIDE 20 MG: 20 CAPSULE, DELAYED RELEASE ORAL at 08:02

## 2019-10-10 RX ADMIN — ATORVASTATIN CALCIUM 20 MG: 20 TABLET, FILM COATED ORAL at 08:02

## 2019-10-10 RX ADMIN — METFORMIN HYDROCHLORIDE 850 MG: 850 TABLET ORAL at 17:33

## 2019-10-10 RX ADMIN — QUETIAPINE FUMARATE 25 MG: 25 TABLET ORAL at 20:49

## 2019-10-10 RX ADMIN — METOPROLOL TARTRATE 50 MG: 50 TABLET, FILM COATED ORAL at 08:02

## 2019-10-10 RX ADMIN — GLIMEPIRIDE 1 MG: 2 TABLET ORAL at 08:02

## 2019-10-10 RX ADMIN — METOPROLOL TARTRATE 50 MG: 50 TABLET, FILM COATED ORAL at 17:33

## 2019-10-10 RX ADMIN — SITAGLIPTIN 100 MG: 100 TABLET, FILM COATED ORAL at 20:49

## 2019-10-10 RX ADMIN — METFORMIN HYDROCHLORIDE 850 MG: 850 TABLET ORAL at 08:03

## 2019-10-10 RX ADMIN — QUETIAPINE FUMARATE 25 MG: 25 TABLET ORAL at 08:03

## 2019-10-10 RX ADMIN — LISINOPRIL 5 MG: 5 TABLET ORAL at 08:03

## 2019-10-10 NOTE — CARE PLAN
Problem: Communication  Goal: The ability to communicate needs accurately and effectively will improve  Outcome: PROGRESSING AS EXPECTED  Note:   Pt able to effectively communicate needs to staff members. Continue to encourage communication with staff as well as anticipate possible future needs       Problem: Safety  Goal: Will remain free from falls  Outcome: PROGRESSING AS EXPECTED  Note:   Pt remains free from injury/falls this shift. Continue to assess for risks for injury/fall and implement prevention measures as needed

## 2019-10-10 NOTE — THERAPY
"Speech Language Therapy dysphagia treatment completed.   Functional Status:  Pt seen for dysphagia f/u; Pt is AAO x 4, sitting on walker and visiting with roommate and is tolerating a slightly modified diet due to edentulous state. Voice is clear, strategies at HOB.     Recommendations: Continue D3/thin liquid diet; ok for snacks btwn meals if hungry btwn meals; supplements per RD.    Plan of Care: Decrease frequency to 1x/wk  Post-Acute Therapy: Currently anticipate no further skilled therapy needs once patient is discharged from the inpatient setting: of note, pt seen only for dysphagia only during this admit.    See \"Rehab Therapy-Acute\" Patient Summary Report for complete documentation.     "

## 2019-10-10 NOTE — PROGRESS NOTES
Assumed care of pt at 1900, family at bedside, no c/o pain or other discomforts at that time. Meds given per MAR. Pt resting in bed now, call light in reach, no further needs at this time

## 2019-10-10 NOTE — PROGRESS NOTES
Assumed care of patient at change of shift.  During change of shift report, patient (pt) dozing in bed, on room air.  During assessment, pt A&Ox4, no complaints of pain.  Daughter visited this PM, and both daughter and pt excitedly talked about [facility] being [facility].  Pt appropriately engaged in care and can recognize blood sugar reading results appropriately.

## 2019-10-11 PROBLEM — G93.41 ACUTE METABOLIC ENCEPHALOPATHY: Status: RESOLVED | Noted: 2019-09-22 | Resolved: 2019-10-11

## 2019-10-11 PROBLEM — R13.10 DYSPHAGIA: Status: ACTIVE | Noted: 2019-10-11

## 2019-10-11 PROBLEM — G47.00 INSOMNIA: Status: ACTIVE | Noted: 2019-10-11

## 2019-10-11 PROBLEM — R79.89 ELEVATED LACTIC ACID LEVEL: Status: RESOLVED | Noted: 2019-09-22 | Resolved: 2019-10-11

## 2019-10-11 LAB
GLUCOSE BLD-MCNC: 132 MG/DL (ref 65–99)
GLUCOSE BLD-MCNC: 132 MG/DL (ref 65–99)
GLUCOSE BLD-MCNC: 154 MG/DL (ref 65–99)
GLUCOSE BLD-MCNC: 178 MG/DL (ref 65–99)

## 2019-10-11 PROCEDURE — A9270 NON-COVERED ITEM OR SERVICE: HCPCS | Performed by: INTERNAL MEDICINE

## 2019-10-11 PROCEDURE — 700111 HCHG RX REV CODE 636 W/ 250 OVERRIDE (IP): Performed by: INTERNAL MEDICINE

## 2019-10-11 PROCEDURE — 770006 HCHG ROOM/CARE - MED/SURG/GYN SEMI*

## 2019-10-11 PROCEDURE — 82962 GLUCOSE BLOOD TEST: CPT | Mod: 91

## 2019-10-11 PROCEDURE — 700102 HCHG RX REV CODE 250 W/ 637 OVERRIDE(OP): Performed by: INTERNAL MEDICINE

## 2019-10-11 RX ADMIN — SITAGLIPTIN 100 MG: 100 TABLET, FILM COATED ORAL at 21:50

## 2019-10-11 RX ADMIN — ENOXAPARIN SODIUM 40 MG: 100 INJECTION SUBCUTANEOUS at 06:29

## 2019-10-11 RX ADMIN — ATORVASTATIN CALCIUM 20 MG: 20 TABLET, FILM COATED ORAL at 08:54

## 2019-10-11 RX ADMIN — DULOXETINE HYDROCHLORIDE 20 MG: 20 CAPSULE, DELAYED RELEASE ORAL at 08:54

## 2019-10-11 RX ADMIN — LISINOPRIL 5 MG: 5 TABLET ORAL at 08:54

## 2019-10-11 RX ADMIN — GLIMEPIRIDE 1 MG: 2 TABLET ORAL at 07:57

## 2019-10-11 RX ADMIN — METFORMIN HYDROCHLORIDE 850 MG: 850 TABLET ORAL at 07:57

## 2019-10-11 RX ADMIN — QUETIAPINE FUMARATE 25 MG: 25 TABLET ORAL at 08:54

## 2019-10-11 RX ADMIN — METOPROLOL TARTRATE 50 MG: 50 TABLET, FILM COATED ORAL at 07:57

## 2019-10-11 RX ADMIN — METFORMIN HYDROCHLORIDE 850 MG: 850 TABLET ORAL at 18:38

## 2019-10-11 RX ADMIN — QUETIAPINE FUMARATE 25 MG: 25 TABLET ORAL at 21:07

## 2019-10-11 RX ADMIN — METOPROLOL TARTRATE 50 MG: 50 TABLET, FILM COATED ORAL at 18:38

## 2019-10-11 RX ADMIN — INSULIN HUMAN 1 UNITS: 100 INJECTION, SOLUTION PARENTERAL at 06:28

## 2019-10-11 ASSESSMENT — ENCOUNTER SYMPTOMS
CONSTIPATION: 0
NAUSEA: 0
NERVOUS/ANXIOUS: 0
FEVER: 0
ABDOMINAL PAIN: 0
WHEEZING: 0
COUGH: 0
VOMITING: 0
WEAKNESS: 0
DIZZINESS: 0
CHILLS: 0
ORTHOPNEA: 0
SHORTNESS OF BREATH: 0
SENSORY CHANGE: 0
DIARRHEA: 0
BRUISES/BLEEDS EASILY: 0
CLAUDICATION: 0
DEPRESSION: 0
INSOMNIA: 1
SPEECH CHANGE: 0
PND: 0
HEADACHES: 0
PALPITATIONS: 0
FOCAL WEAKNESS: 0

## 2019-10-11 NOTE — ASSESSMENT & PLAN NOTE
-Mental status much improved since admit.   -Bedside RN to call SLP to see if she can re-evaluate him.   -Continue dysphagia III thin liquid diet in the meantime  -Tolerating diet

## 2019-10-11 NOTE — PROGRESS NOTES
Up ad kandi  denies no c/o pain  Stable status  A/ox4  Pleasant et cooperative resting quietly in bed  dozing at intervals vss

## 2019-10-11 NOTE — PROGRESS NOTES
Hospital Medicine Daily Progress Note    Date of Service  10/10/19    Chief Complaint  Fall at home, aphasia, confusion    Hospital Course   Mr. Garay is a 79-year-old male who was brought to the emergency department via EMS after he was found aphasic at home by his granddaughter following a fall.  At that time he was noted to be moving all extremities but was nonverbal and had a flat affect.  On arrival he was confused, tachycardic, and agitated.  He was found to have lactic acidosis and acute pulmonary edema.  There was no obvious evidence of infection. A CT of his head was also done and was negative for acute abnormalities.  Psychiatry was consulted for his agitation and recommended both an MRI brain and EEG.  The MRI showed no evidence of infarct or hemorrhage.  There were chronic changes present. Urinalysis and chest xray were normal.  He was initially admitted to telemetry where they noted on cardiac monitoring that he had multiple PVCs, pauses, and a right bundle branch block.  He was asymptomatic so cardiology was not consulted.  He eventually stabilized and was transferred to the medical/nephrology floor on 9/29/2019 pending further disposition planning.  On 9/30/2019 he was transferred to the long-term side.      Interval Problem Update  Feeling good. Denies pain. Didn't sleep well last night, will trial remeron. A&O to self, place, and date. Currently in the communial activity room watching movies with the other patients.     24h -175. Last CBC done 9/27 was stable. 10/1 BMP unremarkable.     Afebrile, HR 60s-80s, SBP teens-130s, O2 sats WNL on room air.     Consultants/Specialty  Psychiatry    Code Status  DNAR. Need to clarify DNI status since there are conflicting reports within the EMR.     Disposition  Lived with his daughter and her 2 kids PTA. Now looking for placement at [facility].   Medicaid pending?    Review of Systems  Review of Systems   Constitutional: Negative for chills,  fever and malaise/fatigue.   Respiratory: Negative for cough, shortness of breath and wheezing.    Cardiovascular: Negative for chest pain, palpitations, orthopnea, claudication, leg swelling and PND.   Gastrointestinal: Negative for abdominal pain, constipation, diarrhea, nausea and vomiting.   Genitourinary: Negative for dysuria, frequency and urgency.   Neurological: Negative for dizziness, sensory change, speech change, focal weakness, weakness and headaches.   Endo/Heme/Allergies: Does not bruise/bleed easily.   Psychiatric/Behavioral: Negative for depression. The patient has insomnia. The patient is not nervous/anxious.    All other systems reviewed and are negative.     Physical Exam  Temp:  [35.9 °C (96.6 °F)-37.2 °C (99 °F)] 37.2 °C (99 °F)  Pulse:  [60-76] 60  Resp:  [16-18] 16  BP: (100-131)/(57-73) 131/73  SpO2:  [93 %-96 %] 93 %    Physical Exam   Constitutional: He is oriented to person, place, and time. He appears well-developed and well-nourished. He is active and cooperative. He does not appear ill. No distress.   HENT:   Head: Normocephalic and atraumatic.   Mouth/Throat: Oropharynx is clear and moist.   Eyes: Pupils are equal, round, and reactive to light. Conjunctivae are normal. Right eye exhibits no discharge. Left eye exhibits no discharge. No scleral icterus.   Neck: Normal range of motion and phonation normal. Neck supple.   Cardiovascular: Normal rate, regular rhythm, normal heart sounds and intact distal pulses. Exam reveals no gallop and no friction rub.   No murmur heard.  Pulmonary/Chest: Effort normal and breath sounds normal. No accessory muscle usage or stridor. No respiratory distress. He has no decreased breath sounds. He has no wheezes. He has no rhonchi. He has no rales.   Abdominal: Soft. Bowel sounds are normal. He exhibits no distension and no abdominal bruit.   Musculoskeletal: Normal range of motion. He exhibits no edema.   Neurological: He is alert and oriented to person,  place, and time. He has normal strength. No cranial nerve deficit or sensory deficit. Coordination and gait normal. GCS eye subscore is 4. GCS verbal subscore is 5. GCS motor subscore is 6.   Skin: Skin is warm and dry. No rash noted. He is not diaphoretic. No erythema. No pallor.   Psychiatric: He has a normal mood and affect. His speech is normal and behavior is normal. Judgment and thought content normal. Cognition and memory are normal.   A&O to self, place, & date.    Nursing note and vitals reviewed.    Fluids    Intake/Output Summary (Last 24 hours) at 10/11/2019 1339  Last data filed at 10/11/2019 0750  Gross per 24 hour   Intake 740 ml   Output no documentation   Net 740 ml     Laboratory    Imaging  MR-BRAIN-W/O   Final Result      1.  Motion artifact degrades the examination and limits evaluation. No evidence of acute territorial infarct, intracranial hemorrhage or mass lesion.   2.  Moderate diffuse cerebral substance loss.   3.  Mild microangiopathic ischemic change versus demyelination or gliosis.      CT-HEAD W/O   Final Result      No CT evidence of acute infarct, hemorrhage or mass.   Mild-to-moderate global parenchymal atrophy. Chronic small vessel ischemic changes.      DX-CHEST-PORTABLE (1 VIEW)   Final Result      Mild interstitial edema. No focal consolidation or pleural effusions.         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 Has positive family history.   -No behavioral disturbances.   -Pursuing SNF placement, as family feels he is too much to care for.   -Continue BID seroquel & daily cymbalta. Prn haldol available.   -Has been calm & cooperative since 10/1/19. Family likely needs to reassess the situation given his improvement.     Dysphagia- (present on admission)  Assessment & Plan  -Mental status much improved since admit.   -Bedside RN to call SLP to see if she can re-evaluate him.    -Continue dysphagia III thin liquid diet in the meantime.     Diabetes mellitus with hyperglycemia (HCC)- (present on admission)  Assessment & Plan  -Was maintained on NPH 70/30 26 units BID at home, in addition to metformin.  -A1c 7.3 on admit.   -Regimen changed to metformin, januvia, & glimepride. BS over 24h are better controlled, 149-175.  -Continue accuchecks ACHS with SSI as required.  -Hypoglycemia protocol in place if needed.   -Continue DM diet.     Insomnia- (present on admission)  Assessment & Plan  -Trial remeron & monitor.     PVC (premature ventricular contraction)- (present on admission)  Assessment & Plan  -RRR on exam.    -Cardiology reviewed his cardiac monitoring on 9/26/19 and recommended increasing the metoprolol dose to 50 mg BID. Tolerating well.     Depression- (present on admission)  Assessment & Plan  -Chronic & stable. Continue cymbalta.     Hypertension- (present on admission)  Assessment & Plan  -SBP teens-130s. Continue lisinopril & metoprolol.   -Monitor BP per unit protocol.     Hyperlipidemia- (present on admission)  Assessment & Plan  -Continue atorvastatin. Recheck lipid profile with next lab work.      VTE prophylaxis: Lovenox    -----------------------------------------------------------------------------------------------------------------------------------------------------  Electronically signed by:  Terri Gill, MSN, RN, APRN, ACNPC-AG, CCRN  Nurse Practitioner  [facility]  10/11/2019    1:39 PM

## 2019-10-11 NOTE — PROGRESS NOTES
Received pt. In bed awake, alert and orientedx4, family present in the room on shift change. POC discussed with the pt. Answered pt. Questions regarding plan of care. Denies any complaint of pain at the time of assessment. Ambulates with his cane steadily. Due meds given and tolerated. Reinforced diet. Needs attended.

## 2019-10-11 NOTE — PROGRESS NOTES
Assumed care of patient at change of shift.  During change of shift report, patient (pt) dozing in bed, on room air.  During assessment, pt A&Ox4, no complaints of pain.  Daughter and 2 other family members visited this evening.

## 2019-10-11 NOTE — CARE PLAN
Problem: Discharge Barriers/Planning  Goal: Patient's continuum of care needs will be met  Outcome: PROGRESSING AS EXPECTED   [facility], JARVIS working on safe dc plans    Problem: Knowledge Deficit  Goal: Knowledge of disease process/condition, treatment plan, diagnostic tests, and medications will improve  Outcome: PROGRESSING AS EXPECTED  POC discussed for tonight, monitoring sugars and reinforced diabetic diet.      Problem: Safety  Goal: Will remain free from falls  Outcome: PROGRESSING AS EXPECTED   Educated about fall risks, pt. Uses his cane to ambulate. Pt. Ambulates steadily.

## 2019-10-12 LAB
GLUCOSE BLD-MCNC: 113 MG/DL (ref 65–99)
GLUCOSE BLD-MCNC: 142 MG/DL (ref 65–99)
GLUCOSE BLD-MCNC: 155 MG/DL (ref 65–99)
GLUCOSE BLD-MCNC: 162 MG/DL (ref 65–99)

## 2019-10-12 PROCEDURE — A9270 NON-COVERED ITEM OR SERVICE: HCPCS | Performed by: INTERNAL MEDICINE

## 2019-10-12 PROCEDURE — 82962 GLUCOSE BLOOD TEST: CPT

## 2019-10-12 PROCEDURE — 770006 HCHG ROOM/CARE - MED/SURG/GYN SEMI*

## 2019-10-12 PROCEDURE — 700102 HCHG RX REV CODE 250 W/ 637 OVERRIDE(OP): Performed by: INTERNAL MEDICINE

## 2019-10-12 PROCEDURE — 700111 HCHG RX REV CODE 636 W/ 250 OVERRIDE (IP): Performed by: INTERNAL MEDICINE

## 2019-10-12 RX ADMIN — ENOXAPARIN SODIUM 40 MG: 100 INJECTION SUBCUTANEOUS at 06:05

## 2019-10-12 RX ADMIN — LISINOPRIL 5 MG: 5 TABLET ORAL at 08:53

## 2019-10-12 RX ADMIN — ATORVASTATIN CALCIUM 20 MG: 20 TABLET, FILM COATED ORAL at 08:53

## 2019-10-12 RX ADMIN — METFORMIN HYDROCHLORIDE 850 MG: 850 TABLET ORAL at 06:09

## 2019-10-12 RX ADMIN — DULOXETINE HYDROCHLORIDE 20 MG: 20 CAPSULE, DELAYED RELEASE ORAL at 08:53

## 2019-10-12 RX ADMIN — QUETIAPINE FUMARATE 25 MG: 25 TABLET ORAL at 08:53

## 2019-10-12 RX ADMIN — INSULIN HUMAN 1 UNITS: 100 INJECTION, SOLUTION PARENTERAL at 20:01

## 2019-10-12 RX ADMIN — METOPROLOL TARTRATE 50 MG: 50 TABLET, FILM COATED ORAL at 19:56

## 2019-10-12 RX ADMIN — INSULIN HUMAN 1 UNITS: 100 INJECTION, SOLUTION PARENTERAL at 17:14

## 2019-10-12 RX ADMIN — QUETIAPINE FUMARATE 25 MG: 25 TABLET ORAL at 19:56

## 2019-10-12 RX ADMIN — METFORMIN HYDROCHLORIDE 850 MG: 850 TABLET ORAL at 17:17

## 2019-10-12 RX ADMIN — GLIMEPIRIDE 1 MG: 2 TABLET ORAL at 06:09

## 2019-10-12 RX ADMIN — SITAGLIPTIN 100 MG: 100 TABLET, FILM COATED ORAL at 21:00

## 2019-10-12 RX ADMIN — METOPROLOL TARTRATE 50 MG: 50 TABLET, FILM COATED ORAL at 08:53

## 2019-10-12 NOTE — CARE PLAN
Problem: Knowledge Deficit  Goal: Knowledge of disease process/condition, treatment plan, diagnostic tests, and medications will improve  Outcome: PROGRESSING AS EXPECTED  Reinforced diabetic diet to the pt.      Problem: Venous Thromboembolism (VTW)/Deep Vein Thrombosis (DVT) Prevention:  Goal: Patient will participate in Venous Thrombosis (VTE)/Deep Vein Thrombosis (DVT)Prevention Measures  Outcome: PROGRESSING AS EXPECTED  Lovenox as dvt prophylaxis    Problem: Safety  Goal: Will remain free from injury  Outcome: PROGRESSING AS EXPECTED  Goal: Will remain free from falls  Outcome: PROGRESSING AS EXPECTED   Pt. Steady with his cane, no bed alarm at this time pt. Refused.

## 2019-10-12 NOTE — PROGRESS NOTES
Assumed care of pt at shift change; Pt is AOx4 and very pleasant; He says [facility] is [facility]; He does not complain of pain or discomfort; He interacts with other patients very well and spent some time doing a puzzle with two other patients at the community table;

## 2019-10-12 NOTE — DISCHARGE PLANNING
"Medical Social Work    Discussed pt's case with APN who indicated pt has shown a lot of improvement and inquired if family would be able to take him home.     Called pt's daughter Marah to discuss. She acknowledged pt has shown significant improvement, however she states that she was notified by a previous [facility] MD that \"under no circumstance\" should they take pt back home. Marah states pt has become aggressive twice since his stay at [facility], and that if this were to happen while pt is home, pt would be a danger to himself and her family. She states pt is doing well now due to the consistent routine with medications as well as maintaining proper diabetic diet. She states she works during the day and is unable to provide 24/7 supervision. She's concerned pt will not be able to adhere to diet restrictions which will lead to uncontrolled blood sugars again. She states they can't afford in home caretakers. She has looked into potential group homes, but they have all turned her away due to insulin. She states she spoke with her ADSD worker today and requested GH waiver be cancelled, as none of the GH's are able to manage his diabetes. She states pt is also a flight risk and will leave a GH as soon as he can.   Marah states she would like to continue with current plan, which is apply for institutional medicaid and have pt go to the [facility]. She's expressed that if she were to take pt home pt would likely be readmitted and they would be in the same situation.   "

## 2019-10-12 NOTE — PROGRESS NOTES
Received pt. In bed sitting awake, alert and orientedx4. Ambulates with cane, educated about fall risks. Plan of care discussed with the pt. Due meds given, reinforced diabetic diet. Pt. Gets up on his own and comes out to the communal table at times.  Needs attended.

## 2019-10-13 LAB
GLUCOSE BLD-MCNC: 104 MG/DL (ref 65–99)
GLUCOSE BLD-MCNC: 122 MG/DL (ref 65–99)
GLUCOSE BLD-MCNC: 125 MG/DL (ref 65–99)
GLUCOSE BLD-MCNC: 152 MG/DL (ref 65–99)

## 2019-10-13 PROCEDURE — 700111 HCHG RX REV CODE 636 W/ 250 OVERRIDE (IP): Performed by: INTERNAL MEDICINE

## 2019-10-13 PROCEDURE — A9270 NON-COVERED ITEM OR SERVICE: HCPCS | Performed by: INTERNAL MEDICINE

## 2019-10-13 PROCEDURE — 700102 HCHG RX REV CODE 250 W/ 637 OVERRIDE(OP): Performed by: INTERNAL MEDICINE

## 2019-10-13 PROCEDURE — 770006 HCHG ROOM/CARE - MED/SURG/GYN SEMI*

## 2019-10-13 PROCEDURE — 82962 GLUCOSE BLOOD TEST: CPT

## 2019-10-13 RX ADMIN — METFORMIN HYDROCHLORIDE 850 MG: 850 TABLET ORAL at 06:16

## 2019-10-13 RX ADMIN — GLIMEPIRIDE 1 MG: 2 TABLET ORAL at 06:16

## 2019-10-13 RX ADMIN — METOPROLOL TARTRATE 50 MG: 50 TABLET, FILM COATED ORAL at 20:13

## 2019-10-13 RX ADMIN — DULOXETINE HYDROCHLORIDE 20 MG: 20 CAPSULE, DELAYED RELEASE ORAL at 08:41

## 2019-10-13 RX ADMIN — SENNOSIDES, DOCUSATE SODIUM 1 TABLET: 50; 8.6 TABLET, FILM COATED ORAL at 08:41

## 2019-10-13 RX ADMIN — QUETIAPINE FUMARATE 25 MG: 25 TABLET ORAL at 20:13

## 2019-10-13 RX ADMIN — SITAGLIPTIN 100 MG: 100 TABLET, FILM COATED ORAL at 20:13

## 2019-10-13 RX ADMIN — QUETIAPINE FUMARATE 25 MG: 25 TABLET ORAL at 08:40

## 2019-10-13 RX ADMIN — ENOXAPARIN SODIUM 40 MG: 100 INJECTION SUBCUTANEOUS at 08:40

## 2019-10-13 RX ADMIN — ATORVASTATIN CALCIUM 20 MG: 20 TABLET, FILM COATED ORAL at 08:40

## 2019-10-13 RX ADMIN — METFORMIN HYDROCHLORIDE 850 MG: 850 TABLET ORAL at 17:09

## 2019-10-13 NOTE — PROGRESS NOTES
Report received by ermias RN. Assumed care of pt. Assessment complete. Daughter at bedside. Pt A&Ox4, VSS. Pt has no complaints of pain. FBS this evening was 155, 1 unit given. Pt spent the rest of the night listening to evening radio station. Call light within reach, bed in lowest position, and pt has no further questions at this time. Hourly rounding in place.

## 2019-10-14 LAB
GLUCOSE BLD-MCNC: 130 MG/DL (ref 65–99)
GLUCOSE BLD-MCNC: 131 MG/DL (ref 65–99)
GLUCOSE BLD-MCNC: 142 MG/DL (ref 65–99)
GLUCOSE BLD-MCNC: 151 MG/DL (ref 65–99)

## 2019-10-14 PROCEDURE — A9270 NON-COVERED ITEM OR SERVICE: HCPCS | Performed by: INTERNAL MEDICINE

## 2019-10-14 PROCEDURE — 700111 HCHG RX REV CODE 636 W/ 250 OVERRIDE (IP): Performed by: INTERNAL MEDICINE

## 2019-10-14 PROCEDURE — 82962 GLUCOSE BLOOD TEST: CPT

## 2019-10-14 PROCEDURE — 770006 HCHG ROOM/CARE - MED/SURG/GYN SEMI*

## 2019-10-14 PROCEDURE — 700102 HCHG RX REV CODE 250 W/ 637 OVERRIDE(OP): Performed by: INTERNAL MEDICINE

## 2019-10-14 PROCEDURE — A9270 NON-COVERED ITEM OR SERVICE: HCPCS | Performed by: NURSE PRACTITIONER

## 2019-10-14 PROCEDURE — 700102 HCHG RX REV CODE 250 W/ 637 OVERRIDE(OP): Performed by: NURSE PRACTITIONER

## 2019-10-14 RX ADMIN — SENNOSIDES, DOCUSATE SODIUM 2 TABLET: 50; 8.6 TABLET, FILM COATED ORAL at 08:40

## 2019-10-14 RX ADMIN — QUETIAPINE FUMARATE 25 MG: 25 TABLET ORAL at 21:42

## 2019-10-14 RX ADMIN — METOPROLOL TARTRATE 50 MG: 50 TABLET, FILM COATED ORAL at 21:42

## 2019-10-14 RX ADMIN — QUETIAPINE FUMARATE 25 MG: 25 TABLET ORAL at 08:40

## 2019-10-14 RX ADMIN — SITAGLIPTIN 100 MG: 100 TABLET, FILM COATED ORAL at 21:41

## 2019-10-14 RX ADMIN — LISINOPRIL 5 MG: 5 TABLET ORAL at 08:40

## 2019-10-14 RX ADMIN — METFORMIN HYDROCHLORIDE 850 MG: 850 TABLET ORAL at 08:40

## 2019-10-14 RX ADMIN — DULOXETINE HYDROCHLORIDE 20 MG: 20 CAPSULE, DELAYED RELEASE ORAL at 08:40

## 2019-10-14 RX ADMIN — METOPROLOL TARTRATE 50 MG: 50 TABLET, FILM COATED ORAL at 08:40

## 2019-10-14 RX ADMIN — ENOXAPARIN SODIUM 40 MG: 100 INJECTION SUBCUTANEOUS at 08:41

## 2019-10-14 RX ADMIN — ATORVASTATIN CALCIUM 20 MG: 20 TABLET, FILM COATED ORAL at 08:40

## 2019-10-14 RX ADMIN — GLIMEPIRIDE 1 MG: 2 TABLET ORAL at 08:41

## 2019-10-14 RX ADMIN — MIRTAZAPINE 7.5 MG: 15 TABLET, FILM COATED ORAL at 21:42

## 2019-10-14 RX ADMIN — METFORMIN HYDROCHLORIDE 850 MG: 850 TABLET ORAL at 19:46

## 2019-10-14 NOTE — PROGRESS NOTES
Assumed care of pt at shift change.  discussed plan of care. Pt on room air.  Blood glucose was 131 before lunch. No coverage needed. Pt spends time with other patients at the nursing station.       All needs met at this time. Bed in lowest position, treaded socks on, personal belongings and call light within reach, instructed to call for any assistance, hourly rounding in place.

## 2019-10-14 NOTE — DISCHARGE PLANNING
Medical Social Work  PC to [facility] Grullon, Admissions Director [facility], [phone]; message left requesting an update.

## 2019-10-14 NOTE — PROGRESS NOTES
Pt received visit from daughter and son in law tonight. Pt was in good spirits to see them. FBS this evening was 152, pt refused the 1 unit of coverage tonight as blood sugars today has been WNL. Pt ambulated around the halls with family and then ended the night listening to radio talk show.

## 2019-10-15 LAB
GLUCOSE BLD-MCNC: 126 MG/DL (ref 65–99)
GLUCOSE BLD-MCNC: 133 MG/DL (ref 65–99)
GLUCOSE BLD-MCNC: 142 MG/DL (ref 65–99)
GLUCOSE BLD-MCNC: 142 MG/DL (ref 65–99)

## 2019-10-15 PROCEDURE — 700102 HCHG RX REV CODE 250 W/ 637 OVERRIDE(OP): Performed by: NURSE PRACTITIONER

## 2019-10-15 PROCEDURE — A9270 NON-COVERED ITEM OR SERVICE: HCPCS | Performed by: INTERNAL MEDICINE

## 2019-10-15 PROCEDURE — A9270 NON-COVERED ITEM OR SERVICE: HCPCS | Performed by: NURSE PRACTITIONER

## 2019-10-15 PROCEDURE — 700102 HCHG RX REV CODE 250 W/ 637 OVERRIDE(OP): Performed by: INTERNAL MEDICINE

## 2019-10-15 PROCEDURE — 82962 GLUCOSE BLOOD TEST: CPT | Mod: 91

## 2019-10-15 PROCEDURE — 700111 HCHG RX REV CODE 636 W/ 250 OVERRIDE (IP): Performed by: INTERNAL MEDICINE

## 2019-10-15 PROCEDURE — 770006 HCHG ROOM/CARE - MED/SURG/GYN SEMI*

## 2019-10-15 RX ADMIN — DULOXETINE HYDROCHLORIDE 20 MG: 20 CAPSULE, DELAYED RELEASE ORAL at 08:11

## 2019-10-15 RX ADMIN — METOPROLOL TARTRATE 50 MG: 50 TABLET, FILM COATED ORAL at 20:18

## 2019-10-15 RX ADMIN — ENOXAPARIN SODIUM 40 MG: 100 INJECTION SUBCUTANEOUS at 08:12

## 2019-10-15 RX ADMIN — GLIMEPIRIDE 1 MG: 2 TABLET ORAL at 08:13

## 2019-10-15 RX ADMIN — METFORMIN HYDROCHLORIDE 850 MG: 850 TABLET ORAL at 08:12

## 2019-10-15 RX ADMIN — METOPROLOL TARTRATE 50 MG: 50 TABLET, FILM COATED ORAL at 08:12

## 2019-10-15 RX ADMIN — QUETIAPINE FUMARATE 25 MG: 25 TABLET ORAL at 20:19

## 2019-10-15 RX ADMIN — MIRTAZAPINE 7.5 MG: 15 TABLET, FILM COATED ORAL at 20:19

## 2019-10-15 RX ADMIN — ATORVASTATIN CALCIUM 20 MG: 20 TABLET, FILM COATED ORAL at 08:11

## 2019-10-15 RX ADMIN — METFORMIN HYDROCHLORIDE 850 MG: 850 TABLET ORAL at 17:09

## 2019-10-15 RX ADMIN — LISINOPRIL 5 MG: 5 TABLET ORAL at 08:11

## 2019-10-15 RX ADMIN — QUETIAPINE FUMARATE 25 MG: 25 TABLET ORAL at 08:11

## 2019-10-15 RX ADMIN — SENNOSIDES, DOCUSATE SODIUM 2 TABLET: 50; 8.6 TABLET, FILM COATED ORAL at 08:12

## 2019-10-15 RX ADMIN — SITAGLIPTIN 100 MG: 100 TABLET, FILM COATED ORAL at 20:19

## 2019-10-15 NOTE — PROGRESS NOTES
Received report and assumed pt care at 1900 change of shift.  Pt pleasantly interacts in conversations at communal table.  Pt A&Ox4, on room air and no complaints of pain.  Pt self ambulates with single point cane for a steady gait.  Pt's daughter visited after dinner and asked questions regarding plan of care and medications; all questions answered with pt present.    Safety precautions in place; bed in lowest and locked position, pt wearing non skid socks and call light and personal items within reach.

## 2019-10-15 NOTE — PROGRESS NOTES
Assumed care of pt at shift change.  discussed plan of care. Pt on room air. Pt spends time with other patients at the nursing station. Daughter came to visit. Pt talked with daughter at the nursing station.       All needs met at this time. Bed in lowest position, treaded socks on, personal belongings and call light within reach, instructed to call for any assistance, hourly rounding in place.

## 2019-10-15 NOTE — CARE PLAN
Problem: Safety  Goal: Will remain free from falls  Intervention: Assess risk factors for falls  Note:   Reminded pt to ambulate with single point cane.  Pt self ambulates and was seen each time when ambulating in hallway that cane was being utilized.     Problem: Knowledge Deficit  Goal: Knowledge of disease process/condition, treatment plan, diagnostic tests, and medications will improve  Intervention: Explain information regarding disease process/condition, treatment plan, diagnostic tests, and medications and document in education  Note:   Discussed diabetic medications; metformin, Amaryl and Januvia.  Pt knowledgeable of insulin and declined 1 unit of insulin for evening dosage as blood sugar was 1 mg/dL over sliding scale parameters.  Pt stated that if his blood sugar was 155 then he would have the 1 unit of insulin administered.

## 2019-10-15 NOTE — PROGRESS NOTES
Patient seen and examined.  Frequently participates in group activities, including movies.  Has a particular interest in another patient, but remains appropriate.  Has no complaints, denies pain.  There have been no changes to his review of systems or physical exam.  Please see my last note on 10/10/2019 for details.    -----------------------------------------------------------------------------------------------------------------------------------------------------  Electronically signed by:  Terri Gill, WILLA, RN, APRN, ACNPC-AG, CCRN  Nurse Practitioner  [facility]  10/14/2019    8:54 PM

## 2019-10-15 NOTE — DISCHARGE PLANNING
"Medical Social Work  PC from [facility], returning SW call.  [facility] stated they can take the patient after their survey on 10/25.  SW asked if the patient is service connected, SW told no.  SW asked what the cost of care would be for the patient/family, Chicho stated $5990.00.  Chicho stated that they can not take the patient if they are pending Medicaid, would have to pay private pay.  JARVIS stated that while [facility] can apply for the patient, after 10/22 it can take up to 100 days for institutional medicaid to be approved.     SW spoke to patient and his ex wife, after SW explained above to them.  Patient's ex wife stated that they can pay the difference until the patient is approved for Medicaid.  SW explained that this process could take up to 100 days.  Telling JARVIS that between 4 family members it shouldn't be a problem. \"We want him to go to [facility] as it is such a nice place.\"  Jarvis stated he will need to talk to patients daughter to discuss this with her.   "

## 2019-10-16 LAB
GLUCOSE BLD-MCNC: 106 MG/DL (ref 65–99)
GLUCOSE BLD-MCNC: 140 MG/DL (ref 65–99)
GLUCOSE BLD-MCNC: 141 MG/DL (ref 65–99)
GLUCOSE BLD-MCNC: 168 MG/DL (ref 65–99)

## 2019-10-16 PROCEDURE — A9270 NON-COVERED ITEM OR SERVICE: HCPCS | Performed by: INTERNAL MEDICINE

## 2019-10-16 PROCEDURE — 770006 HCHG ROOM/CARE - MED/SURG/GYN SEMI*

## 2019-10-16 PROCEDURE — 82962 GLUCOSE BLOOD TEST: CPT

## 2019-10-16 PROCEDURE — 700102 HCHG RX REV CODE 250 W/ 637 OVERRIDE(OP): Performed by: INTERNAL MEDICINE

## 2019-10-16 PROCEDURE — 700111 HCHG RX REV CODE 636 W/ 250 OVERRIDE (IP): Performed by: INTERNAL MEDICINE

## 2019-10-16 PROCEDURE — 92526 ORAL FUNCTION THERAPY: CPT

## 2019-10-16 RX ADMIN — INSULIN HUMAN 1 UNITS: 100 INJECTION, SOLUTION PARENTERAL at 11:52

## 2019-10-16 RX ADMIN — GLIMEPIRIDE 1 MG: 2 TABLET ORAL at 08:04

## 2019-10-16 RX ADMIN — METOPROLOL TARTRATE 50 MG: 50 TABLET, FILM COATED ORAL at 20:55

## 2019-10-16 RX ADMIN — ENOXAPARIN SODIUM 40 MG: 100 INJECTION SUBCUTANEOUS at 08:04

## 2019-10-16 RX ADMIN — QUETIAPINE FUMARATE 25 MG: 25 TABLET ORAL at 08:04

## 2019-10-16 RX ADMIN — ATORVASTATIN CALCIUM 20 MG: 20 TABLET, FILM COATED ORAL at 08:03

## 2019-10-16 RX ADMIN — METFORMIN HYDROCHLORIDE 850 MG: 850 TABLET ORAL at 08:03

## 2019-10-16 RX ADMIN — LISINOPRIL 5 MG: 5 TABLET ORAL at 08:03

## 2019-10-16 RX ADMIN — QUETIAPINE FUMARATE 25 MG: 25 TABLET ORAL at 20:55

## 2019-10-16 RX ADMIN — DULOXETINE HYDROCHLORIDE 20 MG: 20 CAPSULE, DELAYED RELEASE ORAL at 08:04

## 2019-10-16 RX ADMIN — SITAGLIPTIN 100 MG: 100 TABLET, FILM COATED ORAL at 20:55

## 2019-10-16 RX ADMIN — METOPROLOL TARTRATE 50 MG: 50 TABLET, FILM COATED ORAL at 08:04

## 2019-10-16 RX ADMIN — METFORMIN HYDROCHLORIDE 850 MG: 850 TABLET ORAL at 17:14

## 2019-10-16 NOTE — THERAPY
"Speech Language Therapy dysphagia treatment completed.   Functional Status:  Pt tolerating diet, slightly modified due to edentulous state; no s/s of dysphagia and pt verbalizes common sense strategies for safety.  No addtl SLP required as dysphagia goals have been met.  Pt does have reported hx of dementia with behavioral disturbances, although behavior has been cooperative during therapy interactions.  Recommendations: Continue current diet, SLP to no longer actively follow secondary to dysphagia goals being met.    Plan of Care: Patient with no further skilled SLP needs in the acute care setting at this time  Post-Acute Therapy: Currently anticipate no further skilled therapy needs once patient is discharged from the inpatient setting.    See \"Rehab Therapy-Acute\" Patient Summary Report for complete documentation.     "

## 2019-10-16 NOTE — CARE PLAN
Problem: Bowel/Gastric:  Goal: Normal bowel function is maintained or improved  Note:   Assess pt's bowel movement frequency and characteristics by asking self ambulating pt daily during assessment.     Problem: Psychosocial Needs:  Goal: Level of anxiety will decrease  10/16/2019 0218 by Clarissa Beckwith R.N.  Note:   Allow pt to express frustration regarding lengthy hospitalization.  Pt stated that he wants to be discharged but gave thanks for controlling his blood sugar.  10/16/2019 0212 by Clarissa Beckwith R.N.  Note:   Allow pt to express frustration due to lengthy hospitalization.  Pt stated that he wants to be released from the hospital.

## 2019-10-16 NOTE — PROGRESS NOTES
Self ambulating pt with either single point cane or platform walker that pt started using tonight that was brought in from home.  Pt ambulates with steady gait and ambulates often in hallway back and forth from room to communal table in which the pt pleasantly interacts in conversation with other patients.  Pt stated that he is happy with his blood sugar control since being in the hospital and admitted to only checking his blood sugar at home every third day.  Education provided regarding regularly scheduled daily blood sugar checks and diet.

## 2019-10-16 NOTE — PROGRESS NOTES
Assumed care of pt at shift change.  discussed plan of care. Pt on room air. Pt spends time with other patients at the nursing station. Daughter came to visit. Pt talked with daughter at the nursing station. Pt BP was 92/48. Notified APRN. Educated pt to drink more liquid. Pt verbalized understanding.       All needs met at this time. Bed in lowest position, treaded socks on, personal belongings and call light within reach, instructed to call for any assistance, hourly rounding in place.

## 2019-10-16 NOTE — PROGRESS NOTES
Applied waffle cushion on bed. Education about waffle cushion provided. Pt verbalized understanding.

## 2019-10-17 LAB
GLUCOSE BLD-MCNC: 121 MG/DL (ref 65–99)
GLUCOSE BLD-MCNC: 123 MG/DL (ref 65–99)
GLUCOSE BLD-MCNC: 156 MG/DL (ref 65–99)
GLUCOSE BLD-MCNC: 158 MG/DL (ref 65–99)

## 2019-10-17 PROCEDURE — 700102 HCHG RX REV CODE 250 W/ 637 OVERRIDE(OP): Performed by: INTERNAL MEDICINE

## 2019-10-17 PROCEDURE — 770006 HCHG ROOM/CARE - MED/SURG/GYN SEMI*

## 2019-10-17 PROCEDURE — A9270 NON-COVERED ITEM OR SERVICE: HCPCS | Performed by: INTERNAL MEDICINE

## 2019-10-17 PROCEDURE — 700111 HCHG RX REV CODE 636 W/ 250 OVERRIDE (IP): Performed by: INTERNAL MEDICINE

## 2019-10-17 PROCEDURE — 82962 GLUCOSE BLOOD TEST: CPT

## 2019-10-17 RX ADMIN — INSULIN HUMAN 1 UNITS: 100 INJECTION, SOLUTION PARENTERAL at 16:50

## 2019-10-17 RX ADMIN — GLIMEPIRIDE 1 MG: 2 TABLET ORAL at 08:40

## 2019-10-17 RX ADMIN — DULOXETINE HYDROCHLORIDE 20 MG: 20 CAPSULE, DELAYED RELEASE ORAL at 08:40

## 2019-10-17 RX ADMIN — SITAGLIPTIN 100 MG: 100 TABLET, FILM COATED ORAL at 21:05

## 2019-10-17 RX ADMIN — SENNOSIDES, DOCUSATE SODIUM 2 TABLET: 50; 8.6 TABLET, FILM COATED ORAL at 08:40

## 2019-10-17 RX ADMIN — INSULIN HUMAN 1 UNITS: 100 INJECTION, SOLUTION PARENTERAL at 05:52

## 2019-10-17 RX ADMIN — METOPROLOL TARTRATE 50 MG: 50 TABLET, FILM COATED ORAL at 21:05

## 2019-10-17 RX ADMIN — METFORMIN HYDROCHLORIDE 850 MG: 850 TABLET ORAL at 08:40

## 2019-10-17 RX ADMIN — QUETIAPINE FUMARATE 25 MG: 25 TABLET ORAL at 08:44

## 2019-10-17 RX ADMIN — ATORVASTATIN CALCIUM 20 MG: 20 TABLET, FILM COATED ORAL at 08:40

## 2019-10-17 RX ADMIN — LISINOPRIL 5 MG: 5 TABLET ORAL at 08:40

## 2019-10-17 RX ADMIN — METFORMIN HYDROCHLORIDE 850 MG: 850 TABLET ORAL at 16:47

## 2019-10-17 RX ADMIN — ENOXAPARIN SODIUM 40 MG: 100 INJECTION SUBCUTANEOUS at 08:41

## 2019-10-17 RX ADMIN — METOPROLOL TARTRATE 50 MG: 50 TABLET, FILM COATED ORAL at 08:40

## 2019-10-17 RX ADMIN — QUETIAPINE FUMARATE 25 MG: 25 TABLET ORAL at 21:05

## 2019-10-17 ASSESSMENT — ENCOUNTER SYMPTOMS
CONSTITUTIONAL NEGATIVE: 1
MUSCULOSKELETAL NEGATIVE: 1
PSYCHIATRIC NEGATIVE: 1
CARDIOVASCULAR NEGATIVE: 1
NEUROLOGICAL NEGATIVE: 1
EYES NEGATIVE: 1
RESPIRATORY NEGATIVE: 1
GASTROINTESTINAL NEGATIVE: 1

## 2019-10-17 NOTE — PROGRESS NOTES
[facility] Medicine Daily Progress Note    Date of Service  10/17/2019    Chief Complaint  79 y.o. male admitted 9/22/2019 with aphasia and confusion    Hospital Course    Mr. Garay is a 80 yo male with a PMH of DM, hyperlipidemia, HTN, heart attack with stent placement, who presented to the ER via EMS after being found down and a phasic by granddaughter following a fall.  At that time patient was moving all extremities but was nonverbal with flat affect.  Upon arrival in the ER, patient was confused, tachycardic, and agitated.  Initial work-up showed patient and lactic acidosis and in acute pulmonary edema.  There are no obvious evidence of infection.  CT of head was obtained and negative for acute abnormalities.  Psychiatry was consulted due to agitation and recommended for an MRI brain and EEG.  MRI showed no evidence of infarct or hemorrhage.  No chronic changes present.  UA and chest x-ray were unremarkable.  Patient admitted into telemetry where they noted on cardiac monitoring multiple PVCs, pauses, and right BBB.  However, patient was asymptomatic so cardiology was not consulted.  Patient eventually stabilized and transferred to the medical floor on 9/29/2019 pending further disposition for planning.  Then transferred to the long-term side on 9/30/2019.  Pending placement at [facility].      Interval Problem Update  Patient seen and examined today, no overnight episodes.  Patient has no complaints at this time.  Patient's daughter at bedside.  Patient in communal area and interacting with staff and other patients.  Patient alert and oriented x3.  Pending family and staff conference at 14:30 placement placement at [facility].    Last blood work obtained on 9/27/2019 and are unremarkable.  Blood glucose controlled.    Afebrile, HR range 70s within 24 hours, SBP range 90-1 20s within 24 hours.      Consultants/Specialty  Psychiatry    Code Status  DNR MI: Need DNI status  clarification    Disposition  Review she lived with daughter and 2 kids  Pending placement at [facility]    Review of Systems  Review of Systems   Constitutional: Negative.    HENT: Negative.    Eyes: Negative.    Respiratory: Negative.    Cardiovascular: Negative.    Gastrointestinal: Negative.    Genitourinary: Negative.    Musculoskeletal: Negative.    Skin: Negative.    Neurological: Negative.    Endo/Heme/Allergies: Negative.    Psychiatric/Behavioral: Negative.         Physical Exam  Temp:  [36.1 °C (97 °F)-36.2 °C (97.1 °F)] 36.2 °C (97.1 °F)  Pulse:  [71-74] 71  Resp:  [16-17] 16  BP: (119-123)/(63-71) 119/71  SpO2:  [94 %-98 %] 94 %    Physical Exam   Constitutional: He is oriented to person, place, and time. He appears well-developed and well-nourished.   HENT:   Head: Normocephalic.   Eyes: Pupils are equal, round, and reactive to light.   Neck: Normal range of motion.   Cardiovascular: Normal rate, regular rhythm and normal heart sounds.   Pulmonary/Chest: Effort normal and breath sounds normal.   Abdominal: Soft. Bowel sounds are normal.   Musculoskeletal: Normal range of motion.   Neurological: He is alert and oriented to person, place, and time.   Skin: Skin is warm and dry.   Psychiatric: He has a normal mood and affect.       Fluids    Intake/Output Summary (Last 24 hours) at 10/17/2019 1456  Last data filed at 10/17/2019 1205  Gross per 24 hour   Intake 680 ml   Output --   Net 680 ml       Laboratory                        Imaging  MR-BRAIN-W/O   Final Result      1.  Motion artifact degrades the examination and limits evaluation. No evidence of acute territorial infarct, intracranial hemorrhage or mass lesion.   2.  Moderate diffuse cerebral substance loss.   3.  Mild microangiopathic ischemic change versus demyelination or gliosis.      CT-HEAD W/O   Final Result      No CT evidence of acute infarct, hemorrhage or mass.   Mild-to-moderate global parenchymal atrophy. Chronic small  vessel ischemic changes.      DX-CHEST-PORTABLE (1 VIEW)   Final Result      Mild interstitial edema. No focal consolidation or pleural effusions.           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 Has positive family history  -No behavioral disturbances.   -Pursuing SNF placement, as family feels he is too much to care for   -Continue BID seroquel & daily cymbalta. Prn haldol available  -Has been calm & cooperative since 10/1/19. Family likely needs to reassess the situation given his improvement.     Dysphagia- (present on admission)  Assessment & Plan  -Mental status much improved since admit.   -Bedside RN to call SLP to see if she can re-evaluate him.   -Continue dysphagia III thin liquid diet in the meantime  -Tolerating diet     Diabetes mellitus with hyperglycemia (HCC)- (present on admission)  Assessment & Plan  -Was maintained on NPH 70/30 26 units BID at home, in addition to metformin.  -A1c 7.3 on admit   -Regimen changed to metformin, januvia, & glimepride. BS over 24h are better controlled, 149-175.  -Continue accuchecks ACHS with SSI as required.  -Hypoglycemia protocol in place if needed.   -Continue DM diet     Insomnia- (present on admission)  Assessment & Plan  -Trial remeron & monitor  -Patient reports feeling well     PVC (premature ventricular contraction)- (present on admission)  Assessment & Plan  -RRR on exam    -Cardiology reviewed his cardiac monitoring on 9/26/19 and recommended increasing the metoprolol dose to 50 mg BID. Tolerating well       Depression- (present on admission)  Assessment & Plan  -Chronic & stable. Continue cymbalta  -patient social with other patients     Hypertension- (present on admission)  Assessment & Plan  -SBP teens-130s. Continue lisinopril & metoprolol.   -Monitor BP per unit protocol  -Stable at this time     Hyperlipidemia- (present on admission)  Assessment &  Plan  -Continue atorvastatin. Recheck lipid profile with next lab work       VTE prophylaxis: ambulatory  ----------------------------------------------------------------------------------------------------------------------------------------------------------------------    Electronically signed by:  BRITTANY Ellis, MSN, APRN, FNP-C  Hospitalist Services  [facility]  10/17/19    14:53

## 2019-10-17 NOTE — CARE PLAN
Problem: Bowel/Gastric:  Goal: Normal bowel function is maintained or improved  Note:   Assess bowel movement frequency and characteristics by asking self ambulating pt of bowel movements and description.     Problem: Pain Management  Goal: Pain level will decrease to patient's comfort goal  Note:   Offered pain relief interventions but pt declined.

## 2019-10-17 NOTE — DISCHARGE PLANNING
Anticipated Discharge Disposition: Skilled    Action: PC to [facility] at [facility], [phone], with patient's daughter Marah, patient and his ex wife, Shruthi.  [facility]o stated that they could take the patient after the 24th as the new wing is being stated certified on the 22 nd and 23rd.  Marah and Shruthi will be paying the difference of patient for cost of care, $5190 per month until Institutional Medicaid is approved.  JARVIS stated it can take up to 100 days to be approved.  Marah stated she has received notification that the patient qualified for the Community based waiver.  Chicho stated he will call JARVIS when patient is accepted.    Barriers to Discharge: acceptance    Plan: SW to follow up with [facility] to requested they contact Marah to do a Institutional Medicaid Application.

## 2019-10-17 NOTE — PROGRESS NOTES
"Pt alert and oriented,VSS, no complaints of pain, pt states \"nothing I need to worry about.\" Ambulating with personal platform walker or personal single point cane. Cooperative with care, safety maintained. Interacting with staff and other patients at table.  "

## 2019-10-17 NOTE — PROGRESS NOTES
During assessment, pt stated no pain.  Shortly after assessment, pt stated that he has chronic left knee pain for the past 15 years as he has bone rubbing on bone inside.  Pt declined all offers of interventions and declined medication available on MAR as it does not work.  Pt also stating tonight that he does have bilateral numbness in feet.  Pt self ambulates with either personal platform walker or personal single point cane.  2100 insulin dose not necessary as FSBG 142.

## 2019-10-18 LAB
GLUCOSE BLD-MCNC: 122 MG/DL (ref 65–99)
GLUCOSE BLD-MCNC: 180 MG/DL (ref 65–99)
GLUCOSE BLD-MCNC: 185 MG/DL (ref 65–99)
GLUCOSE BLD-MCNC: 197 MG/DL (ref 65–99)

## 2019-10-18 PROCEDURE — 700102 HCHG RX REV CODE 250 W/ 637 OVERRIDE(OP): Performed by: INTERNAL MEDICINE

## 2019-10-18 PROCEDURE — 82962 GLUCOSE BLOOD TEST: CPT

## 2019-10-18 PROCEDURE — 700111 HCHG RX REV CODE 636 W/ 250 OVERRIDE (IP): Performed by: INTERNAL MEDICINE

## 2019-10-18 PROCEDURE — A9270 NON-COVERED ITEM OR SERVICE: HCPCS | Performed by: INTERNAL MEDICINE

## 2019-10-18 PROCEDURE — 770006 HCHG ROOM/CARE - MED/SURG/GYN SEMI*

## 2019-10-18 RX ADMIN — SITAGLIPTIN 100 MG: 100 TABLET, FILM COATED ORAL at 20:27

## 2019-10-18 RX ADMIN — METFORMIN HYDROCHLORIDE 850 MG: 850 TABLET ORAL at 09:42

## 2019-10-18 RX ADMIN — INSULIN HUMAN 1 UNITS: 100 INJECTION, SOLUTION PARENTERAL at 20:16

## 2019-10-18 RX ADMIN — ATORVASTATIN CALCIUM 20 MG: 20 TABLET, FILM COATED ORAL at 09:42

## 2019-10-18 RX ADMIN — LISINOPRIL 5 MG: 5 TABLET ORAL at 09:42

## 2019-10-18 RX ADMIN — INSULIN HUMAN 1 UNITS: 100 INJECTION, SOLUTION PARENTERAL at 06:17

## 2019-10-18 RX ADMIN — GLIMEPIRIDE 1 MG: 2 TABLET ORAL at 09:41

## 2019-10-18 RX ADMIN — DULOXETINE HYDROCHLORIDE 20 MG: 20 CAPSULE, DELAYED RELEASE ORAL at 09:42

## 2019-10-18 RX ADMIN — SENNOSIDES, DOCUSATE SODIUM 2 TABLET: 50; 8.6 TABLET, FILM COATED ORAL at 09:41

## 2019-10-18 RX ADMIN — METFORMIN HYDROCHLORIDE 850 MG: 850 TABLET ORAL at 20:19

## 2019-10-18 RX ADMIN — QUETIAPINE FUMARATE 25 MG: 25 TABLET ORAL at 20:19

## 2019-10-18 RX ADMIN — METOPROLOL TARTRATE 50 MG: 50 TABLET, FILM COATED ORAL at 20:19

## 2019-10-18 RX ADMIN — ENOXAPARIN SODIUM 40 MG: 100 INJECTION SUBCUTANEOUS at 09:41

## 2019-10-18 RX ADMIN — INSULIN HUMAN 1 UNITS: 100 INJECTION, SOLUTION PARENTERAL at 12:01

## 2019-10-18 RX ADMIN — QUETIAPINE FUMARATE 25 MG: 25 TABLET ORAL at 09:41

## 2019-10-18 RX ADMIN — METOPROLOL TARTRATE 50 MG: 50 TABLET, FILM COATED ORAL at 09:42

## 2019-10-18 NOTE — CARE PLAN
Problem: Safety  Goal: Will remain free from injury  Outcome: PROGRESSING AS EXPECTED     Problem: Psychosocial Needs:  Goal: Level of anxiety will decrease  Outcome: PROGRESSING AS EXPECTED

## 2019-10-19 LAB
GLUCOSE BLD-MCNC: 138 MG/DL (ref 65–99)
GLUCOSE BLD-MCNC: 141 MG/DL (ref 65–99)
GLUCOSE BLD-MCNC: 180 MG/DL (ref 65–99)
GLUCOSE BLD-MCNC: 182 MG/DL (ref 65–99)

## 2019-10-19 PROCEDURE — A9270 NON-COVERED ITEM OR SERVICE: HCPCS | Performed by: INTERNAL MEDICINE

## 2019-10-19 PROCEDURE — 770006 HCHG ROOM/CARE - MED/SURG/GYN SEMI*

## 2019-10-19 PROCEDURE — 700102 HCHG RX REV CODE 250 W/ 637 OVERRIDE(OP): Performed by: INTERNAL MEDICINE

## 2019-10-19 PROCEDURE — 82962 GLUCOSE BLOOD TEST: CPT | Mod: 91

## 2019-10-19 PROCEDURE — 700111 HCHG RX REV CODE 636 W/ 250 OVERRIDE (IP): Performed by: INTERNAL MEDICINE

## 2019-10-19 RX ADMIN — QUETIAPINE FUMARATE 25 MG: 25 TABLET ORAL at 20:28

## 2019-10-19 RX ADMIN — QUETIAPINE FUMARATE 25 MG: 25 TABLET ORAL at 08:16

## 2019-10-19 RX ADMIN — METFORMIN HYDROCHLORIDE 850 MG: 850 TABLET ORAL at 17:13

## 2019-10-19 RX ADMIN — DULOXETINE HYDROCHLORIDE 20 MG: 20 CAPSULE, DELAYED RELEASE ORAL at 08:16

## 2019-10-19 RX ADMIN — SENNOSIDES, DOCUSATE SODIUM 2 TABLET: 50; 8.6 TABLET, FILM COATED ORAL at 08:16

## 2019-10-19 RX ADMIN — LISINOPRIL 5 MG: 5 TABLET ORAL at 08:16

## 2019-10-19 RX ADMIN — ATORVASTATIN CALCIUM 20 MG: 20 TABLET, FILM COATED ORAL at 08:15

## 2019-10-19 RX ADMIN — METFORMIN HYDROCHLORIDE 850 MG: 850 TABLET ORAL at 08:16

## 2019-10-19 RX ADMIN — METOPROLOL TARTRATE 50 MG: 50 TABLET, FILM COATED ORAL at 08:15

## 2019-10-19 RX ADMIN — INSULIN HUMAN 1 UNITS: 100 INJECTION, SOLUTION PARENTERAL at 12:39

## 2019-10-19 RX ADMIN — SITAGLIPTIN 100 MG: 100 TABLET, FILM COATED ORAL at 20:28

## 2019-10-19 RX ADMIN — ENOXAPARIN SODIUM 40 MG: 100 INJECTION SUBCUTANEOUS at 08:14

## 2019-10-19 RX ADMIN — METOPROLOL TARTRATE 50 MG: 50 TABLET, FILM COATED ORAL at 20:28

## 2019-10-19 RX ADMIN — INSULIN HUMAN 1 UNITS: 100 INJECTION, SOLUTION PARENTERAL at 06:08

## 2019-10-19 RX ADMIN — GLIMEPIRIDE 1 MG: 2 TABLET ORAL at 08:15

## 2019-10-19 NOTE — CARE PLAN
Problem: Safety  Goal: Will remain free from falls  Outcome: PROGRESSING AS EXPECTED     Problem: Infection  Goal: Will remain free from infection  Outcome: PROGRESSING AS EXPECTED

## 2019-10-20 LAB
GLUCOSE BLD-MCNC: 124 MG/DL (ref 65–99)
GLUCOSE BLD-MCNC: 131 MG/DL (ref 65–99)
GLUCOSE BLD-MCNC: 137 MG/DL (ref 65–99)
GLUCOSE BLD-MCNC: 178 MG/DL (ref 65–99)

## 2019-10-20 PROCEDURE — 700102 HCHG RX REV CODE 250 W/ 637 OVERRIDE(OP): Performed by: INTERNAL MEDICINE

## 2019-10-20 PROCEDURE — 700102 HCHG RX REV CODE 250 W/ 637 OVERRIDE(OP): Performed by: NURSE PRACTITIONER

## 2019-10-20 PROCEDURE — 770006 HCHG ROOM/CARE - MED/SURG/GYN SEMI*

## 2019-10-20 PROCEDURE — A9270 NON-COVERED ITEM OR SERVICE: HCPCS | Performed by: INTERNAL MEDICINE

## 2019-10-20 PROCEDURE — A9270 NON-COVERED ITEM OR SERVICE: HCPCS | Performed by: NURSE PRACTITIONER

## 2019-10-20 PROCEDURE — 82962 GLUCOSE BLOOD TEST: CPT | Mod: 91

## 2019-10-20 PROCEDURE — 700111 HCHG RX REV CODE 636 W/ 250 OVERRIDE (IP): Performed by: INTERNAL MEDICINE

## 2019-10-20 RX ADMIN — INSULIN HUMAN 1 UNITS: 100 INJECTION, SOLUTION PARENTERAL at 05:46

## 2019-10-20 RX ADMIN — QUETIAPINE FUMARATE 25 MG: 25 TABLET ORAL at 20:42

## 2019-10-20 RX ADMIN — METOPROLOL TARTRATE 50 MG: 50 TABLET, FILM COATED ORAL at 08:06

## 2019-10-20 RX ADMIN — DULOXETINE HYDROCHLORIDE 20 MG: 20 CAPSULE, DELAYED RELEASE ORAL at 08:06

## 2019-10-20 RX ADMIN — MIRTAZAPINE 7.5 MG: 15 TABLET, FILM COATED ORAL at 22:03

## 2019-10-20 RX ADMIN — QUETIAPINE FUMARATE 25 MG: 25 TABLET ORAL at 08:06

## 2019-10-20 RX ADMIN — ATORVASTATIN CALCIUM 20 MG: 20 TABLET, FILM COATED ORAL at 08:06

## 2019-10-20 RX ADMIN — ENOXAPARIN SODIUM 40 MG: 100 INJECTION SUBCUTANEOUS at 08:06

## 2019-10-20 RX ADMIN — METFORMIN HYDROCHLORIDE 850 MG: 850 TABLET ORAL at 08:06

## 2019-10-20 RX ADMIN — LISINOPRIL 5 MG: 5 TABLET ORAL at 08:06

## 2019-10-20 RX ADMIN — SITAGLIPTIN 100 MG: 100 TABLET, FILM COATED ORAL at 20:42

## 2019-10-20 RX ADMIN — METOPROLOL TARTRATE 50 MG: 50 TABLET, FILM COATED ORAL at 20:42

## 2019-10-20 RX ADMIN — GLIMEPIRIDE 1 MG: 2 TABLET ORAL at 08:06

## 2019-10-20 RX ADMIN — METFORMIN HYDROCHLORIDE 850 MG: 850 TABLET ORAL at 16:38

## 2019-10-20 NOTE — CARE PLAN
Problem: Safety  Goal: Will remain free from falls  Outcome: PROGRESSING AS EXPECTED     Problem: Psychosocial Needs:  Goal: Level of anxiety will decrease  Outcome: PROGRESSING AS EXPECTED

## 2019-10-20 NOTE — PROGRESS NOTES
Pt A&Ox4, pleasant and socializing with fellow residents. Denies pain or n/v and good appetite. Ambulating throughout day and participating in activities. VSS and no change in clinical status.

## 2019-10-21 LAB
GLUCOSE BLD-MCNC: 103 MG/DL (ref 65–99)
GLUCOSE BLD-MCNC: 132 MG/DL (ref 65–99)
GLUCOSE BLD-MCNC: 135 MG/DL (ref 65–99)
GLUCOSE BLD-MCNC: 148 MG/DL (ref 65–99)

## 2019-10-21 PROCEDURE — 82962 GLUCOSE BLOOD TEST: CPT | Mod: 91

## 2019-10-21 PROCEDURE — 700102 HCHG RX REV CODE 250 W/ 637 OVERRIDE(OP): Performed by: INTERNAL MEDICINE

## 2019-10-21 PROCEDURE — A9270 NON-COVERED ITEM OR SERVICE: HCPCS | Performed by: INTERNAL MEDICINE

## 2019-10-21 PROCEDURE — 770006 HCHG ROOM/CARE - MED/SURG/GYN SEMI*

## 2019-10-21 RX ADMIN — GLIMEPIRIDE 1 MG: 2 TABLET ORAL at 08:02

## 2019-10-21 RX ADMIN — SITAGLIPTIN 100 MG: 100 TABLET, FILM COATED ORAL at 20:18

## 2019-10-21 RX ADMIN — ATORVASTATIN CALCIUM 20 MG: 20 TABLET, FILM COATED ORAL at 08:01

## 2019-10-21 RX ADMIN — QUETIAPINE FUMARATE 25 MG: 25 TABLET ORAL at 20:18

## 2019-10-21 RX ADMIN — DULOXETINE HYDROCHLORIDE 20 MG: 20 CAPSULE, DELAYED RELEASE ORAL at 08:01

## 2019-10-21 RX ADMIN — SENNOSIDES, DOCUSATE SODIUM 2 TABLET: 50; 8.6 TABLET, FILM COATED ORAL at 08:02

## 2019-10-21 RX ADMIN — METOPROLOL TARTRATE 50 MG: 50 TABLET, FILM COATED ORAL at 20:18

## 2019-10-21 RX ADMIN — METFORMIN HYDROCHLORIDE 850 MG: 850 TABLET ORAL at 08:02

## 2019-10-21 RX ADMIN — METFORMIN HYDROCHLORIDE 850 MG: 850 TABLET ORAL at 16:53

## 2019-10-21 RX ADMIN — QUETIAPINE FUMARATE 25 MG: 25 TABLET ORAL at 08:03

## 2019-10-21 NOTE — PROGRESS NOTES
Patient seen and examined.  Frequently participates in group activities, including movies.  Has a particular interest in another patient, but remains appropriate.  Denies any pain or discomfort.  Patient excited in regards [facility] which will likely happen sometime this week. There have been no changes to his review of systems or physical exam.  Please see my last note on 10/17/2019 for details.    -------------------------------------------------------------------------------------------------------------------------------------------------------------------------------------------------------------------------    Electronically signed by:  BRITTANY Ellis, MSN, APRN, FNP-C  Hospitalist Services  [facility]  10/21/19                09:55

## 2019-10-21 NOTE — PROGRESS NOTES
0800  Patient up in chair at table eating breakfast, daughter next to him. New armband applied, no complaints of pain. Will continue to hourly round.

## 2019-10-22 LAB
GLUCOSE BLD-MCNC: 113 MG/DL (ref 65–99)
GLUCOSE BLD-MCNC: 123 MG/DL (ref 65–99)
GLUCOSE BLD-MCNC: 131 MG/DL (ref 65–99)
GLUCOSE BLD-MCNC: 148 MG/DL (ref 65–99)

## 2019-10-22 PROCEDURE — 700102 HCHG RX REV CODE 250 W/ 637 OVERRIDE(OP): Performed by: NURSE PRACTITIONER

## 2019-10-22 PROCEDURE — 700102 HCHG RX REV CODE 250 W/ 637 OVERRIDE(OP): Performed by: INTERNAL MEDICINE

## 2019-10-22 PROCEDURE — A9270 NON-COVERED ITEM OR SERVICE: HCPCS | Performed by: NURSE PRACTITIONER

## 2019-10-22 PROCEDURE — A9270 NON-COVERED ITEM OR SERVICE: HCPCS | Performed by: INTERNAL MEDICINE

## 2019-10-22 PROCEDURE — 82962 GLUCOSE BLOOD TEST: CPT

## 2019-10-22 PROCEDURE — 770006 HCHG ROOM/CARE - MED/SURG/GYN SEMI*

## 2019-10-22 PROCEDURE — 700111 HCHG RX REV CODE 636 W/ 250 OVERRIDE (IP): Performed by: INTERNAL MEDICINE

## 2019-10-22 RX ADMIN — SITAGLIPTIN 100 MG: 100 TABLET, FILM COATED ORAL at 20:55

## 2019-10-22 RX ADMIN — METFORMIN HYDROCHLORIDE 850 MG: 850 TABLET ORAL at 07:50

## 2019-10-22 RX ADMIN — ENOXAPARIN SODIUM 40 MG: 100 INJECTION SUBCUTANEOUS at 07:52

## 2019-10-22 RX ADMIN — GLIMEPIRIDE 1 MG: 2 TABLET ORAL at 07:49

## 2019-10-22 RX ADMIN — METOPROLOL TARTRATE 50 MG: 50 TABLET, FILM COATED ORAL at 20:51

## 2019-10-22 RX ADMIN — QUETIAPINE FUMARATE 25 MG: 25 TABLET ORAL at 20:51

## 2019-10-22 RX ADMIN — ATORVASTATIN CALCIUM 20 MG: 20 TABLET, FILM COATED ORAL at 07:50

## 2019-10-22 RX ADMIN — LISINOPRIL 5 MG: 5 TABLET ORAL at 07:50

## 2019-10-22 RX ADMIN — METOPROLOL TARTRATE 50 MG: 50 TABLET, FILM COATED ORAL at 07:50

## 2019-10-22 RX ADMIN — QUETIAPINE FUMARATE 25 MG: 25 TABLET ORAL at 07:50

## 2019-10-22 RX ADMIN — METFORMIN HYDROCHLORIDE 850 MG: 850 TABLET ORAL at 16:43

## 2019-10-22 RX ADMIN — DULOXETINE HYDROCHLORIDE 20 MG: 20 CAPSULE, DELAYED RELEASE ORAL at 07:50

## 2019-10-22 NOTE — PROGRESS NOTES
A&Ox4, no reports of pain, blood sugar 148 - no coverage needed.  Night time medications taken without issue.  Pt is up self.  All needs met at this time.     Bed is locked and in lowest position, call light and bedside table are within reach, treaded slipper socks are on, and hourly rounding is in place.

## 2019-10-22 NOTE — PROGRESS NOTES
7:56 AM   Patient A&O x4, eating breakfast at the table, no complaints of pain. Refused his stool softeners this morning cause he had a big BM yesterday. Will continue to hourly round

## 2019-10-22 NOTE — PROGRESS NOTES
Pharmacy Pharmacotherapy Consult for LOS >30 days    Admit Date: 9/22/2019      Medications were reviewed for appropriateness and ongoing need.     Current Facility-Administered Medications   Medication Dose Route Frequency Provider Last Rate Last Dose   • mirtazapine (REMERON) tablet 7.5 mg  7.5 mg Oral QHS PRN MERY King   7.5 mg at 10/20/19 2203   • glimepiride (AMARYL) tablet 1 mg  1 mg Oral QDAY with Breakfast Lo Montalvo M.D.   1 mg at 10/22/19 0749   • metFORMIN (GLUCOPHAGE) tablet 850 mg  850 mg Oral BID WITH MEALS Lo Montalvo M.D.   850 mg at 10/22/19 0750   • QUEtiapine (SEROQUEL) tablet 25 mg  25 mg Oral BID Que Dooley M.D.   25 mg at 10/22/19 0750   • metoprolol (LOPRESSOR) tablet 50 mg  50 mg Oral BID Que Dooley M.D.   50 mg at 10/22/19 0750   • SITagliptin (JANUVIA) tablet 100 mg  100 mg Oral DAILY Que Dooley M.D.   100 mg at 10/21/19 2018   • haloperidol (HALDOL) tablet 1 mg  1 mg Oral Q6HRS PRN Marie Meyers M.D.       • insulin regular (HUMULIN R) injection 1-6 Units  1-6 Units Subcutaneous 4X/DAY LIGIA Dooley M.D.   Stopped at 10/20/19 1100    And   • glucose 4 g chewable tablet 16 g  16 g Oral Q15 MIN PRN Que Dooley M.D.        And   • DEXTROSE 10% BOLUS 250 mL  250 mL Intravenous Q15 MIN PRN Que Dooley M.D.       • atorvastatin (LIPITOR) tablet 20 mg  20 mg Oral TOMMY oDoley M.D.   20 mg at 10/22/19 0750   • DULoxetine (CYMBALTA) capsule 20 mg  20 mg Oral TOMMY Dooley M.D.   20 mg at 10/22/19 0750   • lisinopril (PRINIVIL) tablet 5 mg  5 mg Oral TOMMY Dooley M.D.   5 mg at 10/22/19 0750   • senna-docusate (PERICOLACE or SENOKOT S) 8.6-50 MG per tablet 2 Tab  2 Tab Oral BID YOGI MilanO.   2 Tab at 10/21/19 0802    And   • polyethylene glycol/lytes (MIRALAX) PACKET 1 Packet  1 Packet Oral QDAY PRN Leodan Ahuja D.O.        And   • magnesium hydroxide (MILK OF MAGNESIA) suspension 30 mL  30 mL Oral QDAY PRN Leodan Ahuja D.O.        And   •  bisacodyl (DULCOLAX) suppository 10 mg  10 mg Rectal QDAY PRN CHAITANYA Milan.O.       • enoxaparin (LOVENOX) inj 40 mg  40 mg Subcutaneous DAILY CHAITANYA Milan.O.   40 mg at 10/22/19 0752   • acetaminophen (TYLENOL) tablet 650 mg  650 mg Oral Q6HRS PRN CHAITANYA Milan.O.   650 mg at 09/26/19 0112   • labetalol (NORMODYNE,TRANDATE) injection 10 mg  10 mg Intravenous Q4HRS PRN CHAITANYA Milan.O.   10 mg at 09/27/19 1203   • haloperidol lactate (HALDOL) injection 2-5 mg  2-5 mg Intravenous Q3HRS PRN YOGI MilanO.   5 mg at 09/23/19 1817       Recommendations:  1. Patient has not utilized haldol PO (since ordered), haldol IV (9/23), and labetalol (9/27). Consider d/c medication orders.   2. Patient is eligible for influenza vaccination. Consider administering vaccine.     Nadine Cardoso, Pharmacy Intern     D/w above pharmacy intern, as well as, King VERDUGO. Patient has no PIV, stopped Haldol IV and Labetolol IV. Flu vaccine entered.    Mark Matias, PharmD, BCPS

## 2019-10-22 NOTE — CARE PLAN
Problem: Communication  Goal: The ability to communicate needs accurately and effectively will improve  Outcome: PROGRESSING AS EXPECTED     Problem: Safety  Goal: Will remain free from falls  Outcome: PROGRESSING AS EXPECTED    Bed is locked and in lowest position, call light and bedside table are within reach, treaded slipper socks are on, and hourly rounding is in place.       Problem: Infection  Goal: Will remain free from infection  Outcome: PROGRESSING AS EXPECTED    Performing hand hygiene every time patient care is provided.

## 2019-10-23 LAB
GLUCOSE BLD-MCNC: 117 MG/DL (ref 65–99)
GLUCOSE BLD-MCNC: 156 MG/DL (ref 65–99)
GLUCOSE BLD-MCNC: 174 MG/DL (ref 65–99)
GLUCOSE BLD-MCNC: 182 MG/DL (ref 65–99)

## 2019-10-23 PROCEDURE — 770006 HCHG ROOM/CARE - MED/SURG/GYN SEMI*

## 2019-10-23 PROCEDURE — 700111 HCHG RX REV CODE 636 W/ 250 OVERRIDE (IP): Performed by: NURSE PRACTITIONER

## 2019-10-23 PROCEDURE — 82962 GLUCOSE BLOOD TEST: CPT | Mod: 91

## 2019-10-23 PROCEDURE — A9270 NON-COVERED ITEM OR SERVICE: HCPCS | Performed by: NURSE PRACTITIONER

## 2019-10-23 PROCEDURE — 700102 HCHG RX REV CODE 250 W/ 637 OVERRIDE(OP): Performed by: NURSE PRACTITIONER

## 2019-10-23 RX ADMIN — METFORMIN HYDROCHLORIDE 850 MG: 850 TABLET ORAL at 08:04

## 2019-10-23 RX ADMIN — GLIMEPIRIDE 1 MG: 2 TABLET ORAL at 08:03

## 2019-10-23 RX ADMIN — QUETIAPINE FUMARATE 25 MG: 25 TABLET ORAL at 20:30

## 2019-10-23 RX ADMIN — ENOXAPARIN SODIUM 40 MG: 100 INJECTION SUBCUTANEOUS at 08:08

## 2019-10-23 RX ADMIN — METOPROLOL TARTRATE 50 MG: 50 TABLET, FILM COATED ORAL at 08:06

## 2019-10-23 RX ADMIN — LISINOPRIL 5 MG: 5 TABLET ORAL at 08:04

## 2019-10-23 RX ADMIN — SITAGLIPTIN 100 MG: 100 TABLET, FILM COATED ORAL at 20:30

## 2019-10-23 RX ADMIN — INSULIN HUMAN 1 UNITS: 100 INJECTION, SOLUTION PARENTERAL at 11:38

## 2019-10-23 RX ADMIN — METFORMIN HYDROCHLORIDE 850 MG: 850 TABLET ORAL at 16:24

## 2019-10-23 RX ADMIN — DULOXETINE HYDROCHLORIDE 20 MG: 20 CAPSULE, DELAYED RELEASE ORAL at 08:04

## 2019-10-23 RX ADMIN — INSULIN HUMAN 1 UNITS: 100 INJECTION, SOLUTION PARENTERAL at 20:29

## 2019-10-23 RX ADMIN — INSULIN HUMAN 1 UNITS: 100 INJECTION, SOLUTION PARENTERAL at 06:28

## 2019-10-23 RX ADMIN — ATORVASTATIN CALCIUM 20 MG: 20 TABLET, FILM COATED ORAL at 08:07

## 2019-10-23 RX ADMIN — QUETIAPINE FUMARATE 25 MG: 25 TABLET ORAL at 08:07

## 2019-10-23 RX ADMIN — METOPROLOL TARTRATE 50 MG: 50 TABLET, FILM COATED ORAL at 20:30

## 2019-10-23 NOTE — PROGRESS NOTES
0800  Patient up in chair eating breakfast. Pt A&O x 4, no complaints of pain, refused stool softener today he said he doesn't need it. Bed locked and in lowest position. Patient ambulates with cane. Will continue to hourly round.

## 2019-10-23 NOTE — CARE PLAN
Problem: Communication  Goal: The ability to communicate needs accurately and effectively will improve  Outcome: PROGRESSING AS EXPECTED     Problem: Safety  Goal: Will remain free from falls  Outcome: PROGRESSING AS EXPECTED    Bed alarm is on, bed is locked and in lowest position, call light and bedside table are within reach, treaded slipper socks are on, and hourly rounding is in place.

## 2019-10-23 NOTE — PROGRESS NOTES
Family at bedside. A&Ox4, no reports of pain, blood sugar 123 - no coverage needed.  Night time medications taken without issue.  Pt is up self.  All needs met at this time.      Bed is locked and in lowest position, call light and bedside table are within reach, treaded slipper socks are on, and hourly rounding is in place.

## 2019-10-23 NOTE — DISCHARGE PLANNING
Medical Social Work  PC to [facility], JARVIS requested that they contact the patient's daughter to screen for Institutional Medicaid.

## 2019-10-24 LAB
GLUCOSE BLD-MCNC: 129 MG/DL (ref 65–99)
GLUCOSE BLD-MCNC: 151 MG/DL (ref 65–99)
GLUCOSE BLD-MCNC: 180 MG/DL (ref 65–99)
GLUCOSE BLD-MCNC: 210 MG/DL (ref 65–99)

## 2019-10-24 PROCEDURE — 700102 HCHG RX REV CODE 250 W/ 637 OVERRIDE(OP): Performed by: NURSE PRACTITIONER

## 2019-10-24 PROCEDURE — 770006 HCHG ROOM/CARE - MED/SURG/GYN SEMI*

## 2019-10-24 PROCEDURE — A9270 NON-COVERED ITEM OR SERVICE: HCPCS | Performed by: NURSE PRACTITIONER

## 2019-10-24 PROCEDURE — 82962 GLUCOSE BLOOD TEST: CPT | Mod: 91

## 2019-10-24 PROCEDURE — 700111 HCHG RX REV CODE 636 W/ 250 OVERRIDE (IP): Performed by: NURSE PRACTITIONER

## 2019-10-24 RX ADMIN — QUETIAPINE FUMARATE 25 MG: 25 TABLET ORAL at 07:42

## 2019-10-24 RX ADMIN — ATORVASTATIN CALCIUM 20 MG: 20 TABLET, FILM COATED ORAL at 07:42

## 2019-10-24 RX ADMIN — INSULIN HUMAN 2 UNITS: 100 INJECTION, SOLUTION PARENTERAL at 11:37

## 2019-10-24 RX ADMIN — QUETIAPINE FUMARATE 25 MG: 25 TABLET ORAL at 20:36

## 2019-10-24 RX ADMIN — LISINOPRIL 5 MG: 5 TABLET ORAL at 07:42

## 2019-10-24 RX ADMIN — METOPROLOL TARTRATE 50 MG: 50 TABLET, FILM COATED ORAL at 07:42

## 2019-10-24 RX ADMIN — SITAGLIPTIN 100 MG: 100 TABLET, FILM COATED ORAL at 20:36

## 2019-10-24 RX ADMIN — METOPROLOL TARTRATE 50 MG: 50 TABLET, FILM COATED ORAL at 20:35

## 2019-10-24 RX ADMIN — DULOXETINE HYDROCHLORIDE 20 MG: 20 CAPSULE, DELAYED RELEASE ORAL at 09:00

## 2019-10-24 RX ADMIN — INSULIN HUMAN 1 UNITS: 100 INJECTION, SOLUTION PARENTERAL at 20:35

## 2019-10-24 RX ADMIN — ENOXAPARIN SODIUM 40 MG: 100 INJECTION SUBCUTANEOUS at 07:45

## 2019-10-24 RX ADMIN — METFORMIN HYDROCHLORIDE 850 MG: 850 TABLET ORAL at 17:36

## 2019-10-24 RX ADMIN — GLIMEPIRIDE 1 MG: 2 TABLET ORAL at 08:45

## 2019-10-24 RX ADMIN — METFORMIN HYDROCHLORIDE 850 MG: 850 TABLET ORAL at 07:45

## 2019-10-24 NOTE — CARE PLAN
Problem: Safety  Goal: Will remain free from falls  Outcome: PROGRESSING AS EXPECTED    Bed is locked and in lowest position, call light and bedside table are within reach, treaded slipper socks are on, and hourly rounding is in place.       Problem: Infection  Goal: Will remain free from infection  Outcome: PROGRESSING AS EXPECTED

## 2019-10-24 NOTE — PROGRESS NOTES
A&Ox4, no reports of pain,  - 1 unit provided.  Pt in good spirits. All needs met at this time.    Bed is locked and in lowest position, call light and bedside table are within reach, treaded slipper socks are on, and hourly rounding is in place.

## 2019-10-24 NOTE — DISCHARGE PLANNING
Medical Social Work  PC [facility] at [facility].  Called to say that they have passed state and can start taking patients.  Asked if the Medicaid application has been submitted.  JARVIS stated that the patients daughter is going in on the 28th.  Chicho stated that he will need updated clinical notes faxed to him and will have them reviewed again.  Chicho stated there shouldn't be any problems and they may take the patient on Tuesday the 29th.  Chicho will contact JARVIS either Friday or Monday with a definitive answer and with a time of .    Faxed requested information to Chicho

## 2019-10-24 NOTE — DISCHARGE PLANNING
Medical Social Work  Patient's daughter Marah in to see him.  Marah stated she has an appointment with hospitals on the 28th to complete the Medicaid application.

## 2019-10-24 NOTE — PROGRESS NOTES
Patient is a pleasant 79-year-old male with history of dementia with behavioral disturbance with self-care deficit and inability to make medical decision making/lacks capacity.  He is currently hemodynamically stable without active medical problem.    I certify that the patient requires continued medically necessary hospital services for the treatment of dementia with behavioral disturbance,/lack capacity and self-care neglect. The patient will remain in the hospital for the foreseeable future.  Discharge may or may not occur in the next 20 days due to ongoing discharge delays due to no medically acceptable discharge option.

## 2019-10-25 LAB
GLUCOSE BLD-MCNC: 121 MG/DL (ref 65–99)
GLUCOSE BLD-MCNC: 168 MG/DL (ref 65–99)
GLUCOSE BLD-MCNC: 180 MG/DL (ref 65–99)
GLUCOSE BLD-MCNC: 199 MG/DL (ref 65–99)

## 2019-10-25 PROCEDURE — 770006 HCHG ROOM/CARE - MED/SURG/GYN SEMI*

## 2019-10-25 PROCEDURE — 700102 HCHG RX REV CODE 250 W/ 637 OVERRIDE(OP): Performed by: NURSE PRACTITIONER

## 2019-10-25 PROCEDURE — A9270 NON-COVERED ITEM OR SERVICE: HCPCS | Performed by: NURSE PRACTITIONER

## 2019-10-25 PROCEDURE — 82962 GLUCOSE BLOOD TEST: CPT | Mod: 91

## 2019-10-25 RX ADMIN — LISINOPRIL 5 MG: 5 TABLET ORAL at 09:00

## 2019-10-25 RX ADMIN — ATORVASTATIN CALCIUM 20 MG: 20 TABLET, FILM COATED ORAL at 09:11

## 2019-10-25 RX ADMIN — SENNOSIDES, DOCUSATE SODIUM 2 TABLET: 50; 8.6 TABLET, FILM COATED ORAL at 09:00

## 2019-10-25 RX ADMIN — METFORMIN HYDROCHLORIDE 850 MG: 850 TABLET ORAL at 09:21

## 2019-10-25 RX ADMIN — METOPROLOL TARTRATE 50 MG: 50 TABLET, FILM COATED ORAL at 21:15

## 2019-10-25 RX ADMIN — QUETIAPINE FUMARATE 25 MG: 25 TABLET ORAL at 21:15

## 2019-10-25 RX ADMIN — QUETIAPINE FUMARATE 25 MG: 25 TABLET ORAL at 09:00

## 2019-10-25 RX ADMIN — METOPROLOL TARTRATE 50 MG: 50 TABLET, FILM COATED ORAL at 09:09

## 2019-10-25 RX ADMIN — INSULIN HUMAN 1 UNITS: 100 INJECTION, SOLUTION PARENTERAL at 21:19

## 2019-10-25 RX ADMIN — INSULIN HUMAN 1 UNITS: 100 INJECTION, SOLUTION PARENTERAL at 06:32

## 2019-10-25 RX ADMIN — GLIMEPIRIDE 1 MG: 2 TABLET ORAL at 09:17

## 2019-10-25 RX ADMIN — METFORMIN HYDROCHLORIDE 850 MG: 850 TABLET ORAL at 21:16

## 2019-10-25 RX ADMIN — SITAGLIPTIN 100 MG: 100 TABLET, FILM COATED ORAL at 21:15

## 2019-10-25 RX ADMIN — DULOXETINE HYDROCHLORIDE 20 MG: 20 CAPSULE, DELAYED RELEASE ORAL at 09:18

## 2019-10-25 NOTE — PROGRESS NOTES
DENIES ANY C/O PAIN . SITTING UP AT COMMUNITY TABLE CONVERSING WITH RESIDENTS.   BLOOD SUGAR AT 1600 124  HAVING SNACK HAS HAD A GOOD DAY

## 2019-10-25 NOTE — PROGRESS NOTES
0705  Patient dangle on the side of the bed eating breakfast, daughter at the bedside. No complaints of pain, patient A&O x 4. Patients bed locked and in lowest position. All needs met at the time

## 2019-10-25 NOTE — PROGRESS NOTES
A&Ox4, no reports of pain,  - 1 unit provided.  Pt is excited that he will hopefully be leaving for his new home soon.  All needs met at this time.    Bed is locked and in lowest position, call light and bedside table are within reach, treaded slipper socks are on, and hourly rounding is in place.

## 2019-10-25 NOTE — CARE PLAN
Progressing towards goals  [place] [facility] NEXT WEEK. VERY EXCITED ABOUT THIS.FCO A/O X4  STAYS IN ROOM

## 2019-10-25 NOTE — CARE PLAN
Problem: Communication  Goal: The ability to communicate needs accurately and effectively will improve  Outcome: PROGRESSING AS EXPECTED     Problem: Safety  Goal: Will remain free from injury  Outcome: PROGRESSING AS EXPECTED    Bed is locked and in lowest position, call light and bedside table are within reach, treaded slipper socks are on, and hourly rounding is in place.

## 2019-10-26 LAB
GLUCOSE BLD-MCNC: 161 MG/DL (ref 65–99)
GLUCOSE BLD-MCNC: 166 MG/DL (ref 65–99)
GLUCOSE BLD-MCNC: 170 MG/DL (ref 65–99)
GLUCOSE BLD-MCNC: 197 MG/DL (ref 65–99)

## 2019-10-26 PROCEDURE — 770006 HCHG ROOM/CARE - MED/SURG/GYN SEMI*

## 2019-10-26 PROCEDURE — A9270 NON-COVERED ITEM OR SERVICE: HCPCS | Performed by: NURSE PRACTITIONER

## 2019-10-26 PROCEDURE — 700102 HCHG RX REV CODE 250 W/ 637 OVERRIDE(OP): Performed by: NURSE PRACTITIONER

## 2019-10-26 PROCEDURE — 700111 HCHG RX REV CODE 636 W/ 250 OVERRIDE (IP): Performed by: NURSE PRACTITIONER

## 2019-10-26 PROCEDURE — 82962 GLUCOSE BLOOD TEST: CPT

## 2019-10-26 RX ADMIN — SITAGLIPTIN 100 MG: 100 TABLET, FILM COATED ORAL at 20:48

## 2019-10-26 RX ADMIN — INSULIN HUMAN 1 UNITS: 100 INJECTION, SOLUTION PARENTERAL at 06:12

## 2019-10-26 RX ADMIN — METFORMIN HYDROCHLORIDE 850 MG: 850 TABLET ORAL at 16:53

## 2019-10-26 RX ADMIN — INSULIN HUMAN 1 UNITS: 100 INJECTION, SOLUTION PARENTERAL at 16:54

## 2019-10-26 RX ADMIN — ENOXAPARIN SODIUM 40 MG: 100 INJECTION SUBCUTANEOUS at 08:15

## 2019-10-26 RX ADMIN — ATORVASTATIN CALCIUM 20 MG: 20 TABLET, FILM COATED ORAL at 08:10

## 2019-10-26 RX ADMIN — GLIMEPIRIDE 1 MG: 2 TABLET ORAL at 06:11

## 2019-10-26 RX ADMIN — INSULIN HUMAN 1 UNITS: 100 INJECTION, SOLUTION PARENTERAL at 11:46

## 2019-10-26 RX ADMIN — INSULIN HUMAN 1 UNITS: 100 INJECTION, SOLUTION PARENTERAL at 20:43

## 2019-10-26 RX ADMIN — METOPROLOL TARTRATE 50 MG: 50 TABLET, FILM COATED ORAL at 08:11

## 2019-10-26 RX ADMIN — METFORMIN HYDROCHLORIDE 850 MG: 850 TABLET ORAL at 06:11

## 2019-10-26 RX ADMIN — METOPROLOL TARTRATE 50 MG: 50 TABLET, FILM COATED ORAL at 20:47

## 2019-10-26 RX ADMIN — QUETIAPINE FUMARATE 25 MG: 25 TABLET ORAL at 20:47

## 2019-10-26 RX ADMIN — DULOXETINE HYDROCHLORIDE 20 MG: 20 CAPSULE, DELAYED RELEASE ORAL at 08:11

## 2019-10-26 RX ADMIN — LISINOPRIL 5 MG: 5 TABLET ORAL at 08:11

## 2019-10-26 RX ADMIN — QUETIAPINE FUMARATE 25 MG: 25 TABLET ORAL at 08:11

## 2019-10-26 NOTE — CARE PLAN
Problem: Safety  Goal: Will remain free from injury  Outcome: PROGRESSING AS EXPECTED   Fall precautions in place. Treaded socks on pt. Bed in lowest position and locked.  Call light and phone within reach. Pt able to make needs known.     Problem: Knowledge Deficit  Goal: Knowledge of disease process/condition, treatment plan, diagnostic tests, and medications will improve  Outcome: PROGRESSING AS EXPECTED   Patient educated about POC.  All questions answered in regards to disease process, treatment, medications and diet.  Patient verbalized understanding and voiced no further questions at this time.

## 2019-10-26 NOTE — PROGRESS NOTES
Report received by ermias RN. Assumed care of pt. Assessment complete. Family at bedside. Pt A&Ox4, VSS. Pt in no apparent signs of distress, denies any pain. FBS= 199, 1 unit given. Pt is upself with single point cane and has steady gait. No other needs at this time. Call light within reach, bed in lowest position, hourly rounding in place.

## 2019-10-27 LAB
GLUCOSE BLD-MCNC: 132 MG/DL (ref 65–99)
GLUCOSE BLD-MCNC: 133 MG/DL (ref 65–99)
GLUCOSE BLD-MCNC: 147 MG/DL (ref 65–99)
GLUCOSE BLD-MCNC: 149 MG/DL (ref 65–99)

## 2019-10-27 PROCEDURE — 700111 HCHG RX REV CODE 636 W/ 250 OVERRIDE (IP): Performed by: NURSE PRACTITIONER

## 2019-10-27 PROCEDURE — A9270 NON-COVERED ITEM OR SERVICE: HCPCS | Performed by: NURSE PRACTITIONER

## 2019-10-27 PROCEDURE — 700102 HCHG RX REV CODE 250 W/ 637 OVERRIDE(OP): Performed by: NURSE PRACTITIONER

## 2019-10-27 PROCEDURE — 770006 HCHG ROOM/CARE - MED/SURG/GYN SEMI*

## 2019-10-27 PROCEDURE — 82962 GLUCOSE BLOOD TEST: CPT | Mod: 91

## 2019-10-27 RX ADMIN — DULOXETINE HYDROCHLORIDE 20 MG: 20 CAPSULE, DELAYED RELEASE ORAL at 07:40

## 2019-10-27 RX ADMIN — GLIMEPIRIDE 1 MG: 2 TABLET ORAL at 07:40

## 2019-10-27 RX ADMIN — SENNOSIDES, DOCUSATE SODIUM 2 TABLET: 50; 8.6 TABLET, FILM COATED ORAL at 07:40

## 2019-10-27 RX ADMIN — ENOXAPARIN SODIUM 40 MG: 100 INJECTION SUBCUTANEOUS at 07:45

## 2019-10-27 RX ADMIN — SITAGLIPTIN 100 MG: 100 TABLET, FILM COATED ORAL at 20:51

## 2019-10-27 RX ADMIN — METOPROLOL TARTRATE 50 MG: 50 TABLET, FILM COATED ORAL at 07:40

## 2019-10-27 RX ADMIN — QUETIAPINE FUMARATE 25 MG: 25 TABLET ORAL at 07:40

## 2019-10-27 RX ADMIN — QUETIAPINE FUMARATE 25 MG: 25 TABLET ORAL at 20:51

## 2019-10-27 RX ADMIN — METFORMIN HYDROCHLORIDE 850 MG: 850 TABLET ORAL at 17:06

## 2019-10-27 RX ADMIN — LISINOPRIL 5 MG: 5 TABLET ORAL at 07:40

## 2019-10-27 RX ADMIN — ATORVASTATIN CALCIUM 20 MG: 20 TABLET, FILM COATED ORAL at 07:40

## 2019-10-27 RX ADMIN — METFORMIN HYDROCHLORIDE 850 MG: 850 TABLET ORAL at 07:39

## 2019-10-27 RX ADMIN — METOPROLOL TARTRATE 50 MG: 50 TABLET, FILM COATED ORAL at 20:51

## 2019-10-27 NOTE — PROGRESS NOTES
Patient alert and oriented, up self using four wheeled walker. Evening BG was 161, 1 unit given. No complaints of pain nor SOB. Patient is pleasant and cooperative with staff. No negative behavior issues noted. Patient has difficulty sleeping at night. He has 7.5 mg of mirtazapine available PRN but refuses it stating that it does nothing anyway. Hourly rounding done. Bed in low and locked position. No signs of distress.

## 2019-10-27 NOTE — PROGRESS NOTES
No new complaints from patient, states he feels great. Family has been in and out throughout the day. Spent majority of his day at the communal table interacting with other patients.

## 2019-10-27 NOTE — PROGRESS NOTES
"Patient is alert and oriented. VSS on RA, no c/o pain. Up self with the front wheel walker. Patient napped intermittently throughout the day states \"I havent slept this well in years\".   "

## 2019-10-27 NOTE — CARE PLAN
Problem: Communication  Goal: The ability to communicate needs accurately and effectively will improve  Outcome: PROGRESSING AS EXPECTED  Intervention: Educate patient and significant other/support system about the plan of care, procedures, treatments, medications and allow for questions  Note:   Discussed plan of care for duration of shift, pt acknowledges understanding. All pt questions answered satisfactorily.     Problem: Bowel/Gastric:  Goal: Normal bowel function is maintained or improved  Outcome: PROGRESSING AS EXPECTED  Intervention: Educate patient and significant other/support system about diet, fluid intake, medications and activity to promote bowel function  Note:   Patient refused evening stool softeners citing normal bowel movements. Pt educated on maintaining good elimination.

## 2019-10-28 LAB
GLUCOSE BLD-MCNC: 130 MG/DL (ref 65–99)
GLUCOSE BLD-MCNC: 152 MG/DL (ref 65–99)
GLUCOSE BLD-MCNC: 164 MG/DL (ref 65–99)
GLUCOSE BLD-MCNC: 213 MG/DL (ref 65–99)

## 2019-10-28 PROCEDURE — 700111 HCHG RX REV CODE 636 W/ 250 OVERRIDE (IP): Performed by: NURSE PRACTITIONER

## 2019-10-28 PROCEDURE — 770006 HCHG ROOM/CARE - MED/SURG/GYN SEMI*

## 2019-10-28 PROCEDURE — 82962 GLUCOSE BLOOD TEST: CPT | Mod: 91

## 2019-10-28 PROCEDURE — A9270 NON-COVERED ITEM OR SERVICE: HCPCS | Performed by: NURSE PRACTITIONER

## 2019-10-28 PROCEDURE — 700102 HCHG RX REV CODE 250 W/ 637 OVERRIDE(OP): Performed by: NURSE PRACTITIONER

## 2019-10-28 RX ADMIN — ATORVASTATIN CALCIUM 20 MG: 20 TABLET, FILM COATED ORAL at 08:31

## 2019-10-28 RX ADMIN — QUETIAPINE FUMARATE 25 MG: 25 TABLET ORAL at 20:18

## 2019-10-28 RX ADMIN — INSULIN HUMAN 2 UNITS: 100 INJECTION, SOLUTION PARENTERAL at 06:14

## 2019-10-28 RX ADMIN — METFORMIN HYDROCHLORIDE 850 MG: 850 TABLET ORAL at 08:29

## 2019-10-28 RX ADMIN — DULOXETINE HYDROCHLORIDE 20 MG: 20 CAPSULE, DELAYED RELEASE ORAL at 08:30

## 2019-10-28 RX ADMIN — SITAGLIPTIN 100 MG: 100 TABLET, FILM COATED ORAL at 20:18

## 2019-10-28 RX ADMIN — QUETIAPINE FUMARATE 25 MG: 25 TABLET ORAL at 08:30

## 2019-10-28 RX ADMIN — INSULIN HUMAN 1 UNITS: 100 INJECTION, SOLUTION PARENTERAL at 16:33

## 2019-10-28 RX ADMIN — METFORMIN HYDROCHLORIDE 850 MG: 850 TABLET ORAL at 16:34

## 2019-10-28 RX ADMIN — ENOXAPARIN SODIUM 40 MG: 100 INJECTION SUBCUTANEOUS at 08:30

## 2019-10-28 RX ADMIN — METOPROLOL TARTRATE 50 MG: 50 TABLET, FILM COATED ORAL at 20:18

## 2019-10-28 RX ADMIN — LISINOPRIL 5 MG: 5 TABLET ORAL at 08:30

## 2019-10-28 RX ADMIN — METOPROLOL TARTRATE 50 MG: 50 TABLET, FILM COATED ORAL at 08:30

## 2019-10-28 RX ADMIN — GLIMEPIRIDE 1 MG: 2 TABLET ORAL at 08:30

## 2019-10-28 NOTE — DISCHARGE PLANNING
Received Transport Form @ 8304  Spoke to Anderson @ [facility]    Transport is scheduled for 10/29 @1400 going to [facility].    JOSH Wong notified.

## 2019-10-28 NOTE — PROGRESS NOTES
Patient A/Ox4, up using four wheeled walker. Family came to visit pt for a while. Pt is in good spirits. Evening BG was 133, no action needed. Pt has needed no coverage all day. No complaints at this time. Hourly rounding done. Bed in low and locked position. No signs of acute distress.

## 2019-10-28 NOTE — PROGRESS NOTES
Patient is a pleasant 79-year-old male with history of dementia with behavioral disturbance with self-care deficit and inability to make medical decision making/lacks capacity.  He is currently hemodynamically stable without active medical problem.     I have performed a physical exam and reviewed and updated ROS and Plan today (10/28/2019). In review of the previous note there are no changes.    I certify that the patient requires continued medically necessary hospital services for the treatment of dementia with behavioral disturbance,/lack capacity and self-care neglect. The patient will remain in the hospital for the foreseeable future.  Discharge may or may not occur in the next 20 days due to ongoing discharge delays due to no medically acceptable discharge option.

## 2019-10-28 NOTE — PROGRESS NOTES
"Received report from night shift and assumed care. Pt is A&OX4. Denies pain or discomfort. States he is \"trying to get his blood sugars under control.\" Medications given per MAR. Pt is ambulatory with cane, occasionally comes out of room to nurse's station. All needs met. Safety precautions and hourly rounding in place.   "

## 2019-10-28 NOTE — DISCHARGE PLANNING
Medical Social Work  PC to Darren, [phone], SW called to get an update on possible d/c to them tomorrow.  Darren stated everything is good to accept.  Requested Darren call me with a  time when arranged

## 2019-10-28 NOTE — DISCHARGE PLANNING
Medical Social Work  PC from [place] at [facility], they can accept patient.  Requested Sw set up transport between 12 and 3:00    Faxed transport communication form to [facility] for a tentative transport time of 2:00.    PC to patients daughter, Marah [phone]; SW gave Marah an update on d/c of patient to facility and the time.    Patient/nurse informed

## 2019-10-28 NOTE — CARE PLAN
Problem: Communication  Goal: The ability to communicate needs accurately and effectively will improve  Outcome: PROGRESSING AS EXPECTED  Intervention: Educate patient and significant other/support system about the plan of care, procedures, treatments, medications and allow for questions  Note:   Discussed plan of care for duration of shift, pt acknowledges understanding. Patient is A/Ox4.     Problem: Safety  Goal: Will remain free from falls  Outcome: PROGRESSING AS EXPECTED  Note:   Patient will remain free from falls during shift. Patient is able to ambulate by himself using four wheel walker.

## 2019-10-29 VITALS
HEIGHT: 70 IN | DIASTOLIC BLOOD PRESSURE: 67 MMHG | BODY MASS INDEX: 28.75 KG/M2 | RESPIRATION RATE: 20 BRPM | SYSTOLIC BLOOD PRESSURE: 112 MMHG | HEART RATE: 78 BPM | OXYGEN SATURATION: 93 % | WEIGHT: 200.84 LBS | TEMPERATURE: 97.1 F

## 2019-10-29 PROBLEM — R13.10 DYSPHAGIA: Status: RESOLVED | Noted: 2019-10-11 | Resolved: 2019-10-29

## 2019-10-29 LAB
GLUCOSE BLD-MCNC: 138 MG/DL (ref 65–99)
GLUCOSE BLD-MCNC: 173 MG/DL (ref 65–99)

## 2019-10-29 PROCEDURE — 700102 HCHG RX REV CODE 250 W/ 637 OVERRIDE(OP): Performed by: NURSE PRACTITIONER

## 2019-10-29 PROCEDURE — A9270 NON-COVERED ITEM OR SERVICE: HCPCS | Performed by: NURSE PRACTITIONER

## 2019-10-29 PROCEDURE — 82962 GLUCOSE BLOOD TEST: CPT | Mod: 91

## 2019-10-29 PROCEDURE — 99239 HOSP IP/OBS DSCHRG MGMT >30: CPT | Performed by: INTERNAL MEDICINE

## 2019-10-29 RX ORDER — MIRTAZAPINE 7.5 MG/1
7.5 TABLET, FILM COATED ORAL
Qty: 30 TAB | Refills: 0 | Status: SHIPPED | OUTPATIENT
Start: 2019-10-29 | End: 2022-06-10

## 2019-10-29 RX ORDER — QUETIAPINE FUMARATE 25 MG/1
25 TABLET, FILM COATED ORAL 2 TIMES DAILY
Qty: 60 TAB | Refills: 3 | Status: SHIPPED | OUTPATIENT
Start: 2019-10-29 | End: 2022-06-10

## 2019-10-29 RX ORDER — POLYETHYLENE GLYCOL 3350 17 G/17G
17 POWDER, FOR SOLUTION ORAL DAILY
Qty: 15 EACH | Refills: 0 | Status: SHIPPED | OUTPATIENT
Start: 2019-10-29 | End: 2022-06-10

## 2019-10-29 RX ORDER — AMOXICILLIN 250 MG
2 CAPSULE ORAL 2 TIMES DAILY
Qty: 30 TAB | Refills: 0 | Status: SHIPPED | OUTPATIENT
Start: 2019-10-29 | End: 2022-06-10

## 2019-10-29 RX ADMIN — DULOXETINE HYDROCHLORIDE 20 MG: 20 CAPSULE, DELAYED RELEASE ORAL at 07:57

## 2019-10-29 RX ADMIN — METOPROLOL TARTRATE 50 MG: 50 TABLET, FILM COATED ORAL at 07:56

## 2019-10-29 RX ADMIN — ATORVASTATIN CALCIUM 20 MG: 20 TABLET, FILM COATED ORAL at 07:57

## 2019-10-29 RX ADMIN — LISINOPRIL 5 MG: 5 TABLET ORAL at 07:57

## 2019-10-29 RX ADMIN — INSULIN HUMAN 1 UNITS: 100 INJECTION, SOLUTION PARENTERAL at 05:49

## 2019-10-29 RX ADMIN — QUETIAPINE FUMARATE 25 MG: 25 TABLET ORAL at 07:56

## 2019-10-29 RX ADMIN — GLIMEPIRIDE 1 MG: 2 TABLET ORAL at 07:57

## 2019-10-29 RX ADMIN — METFORMIN HYDROCHLORIDE 850 MG: 850 TABLET ORAL at 07:57

## 2019-10-29 NOTE — PROGRESS NOTES
Patient A/Ox4, up using four wheeled walker unable to sleep. Pt expresses excitement about discharge in afternoon. Patient discharging to [facility] 10/29/30. Transportation already set-up with [facility] for 1400 hrs. Evening BG was 152, pt refused 1 unit of coverage, education provided. No complaints at this time. Hourly rounding done. Bed in low and locked position. No signs of acute distress.

## 2019-10-29 NOTE — DISCHARGE INSTRUCTIONS
Discharge Instructions    Discharged to other by medical transportation with escort. Discharged via wheelchair, hospital escort: Yes.  Special equipment needed: Cane    Be sure to schedule a follow-up appointment with your primary care doctor or any specialists as instructed.     Discharge Plan:   Diet Plan: Discussed  Activity Level: Discussed  Confirmed Follow up Appointment: Appointment Scheduled  Confirmed Symptoms Management: Discussed  Medication Reconciliation Updated: Yes  Influenza Vaccine Indication: Patient Refuses    I understand that a diet low in cholesterol, fat, and sodium is recommended for good health. Unless I have been given specific instructions below for another diet, I accept this instruction as my diet prescription.   Other diet: diabetic dysphagia 3 thin liquids    Special Instructions:   To [facility]     Please call [phone] to schedule PCP appointment for patient.          Discharge Instructions per MERY Villar        DIET: Diabetic, cardiac diet     ACTIVITY: As tolerated     DIAGNOSIS: Aphasia, confusion     Return to ER if symptoms worsens, chest pain, palpitations, shortness of breath, weakness, numbness and tingling.    · Is patient discharged on Warfarin / Coumadin?   No     Altered Mental Status  Altered mental status most often refers to an abnormal change in your responsiveness and awareness. It can affect your speech, thought, mobility, memory, attention span, or alertness. It can range from slight confusion to complete unresponsiveness (coma). Altered mental status can be a sign of a serious underlying medical condition. Rapid evaluation and medical treatment is necessary for patients having an altered mental status.  CAUSES   · Low blood sugar (hypoglycemia) or diabetes.  · Severe loss of body fluids (dehydration) or a body salt (electrolyte) imbalance.  · A stroke or other neurologic problem, such as dementia or delirium.  · A  head injury or tumor.  · A drug or alcohol overdose.  · Exposure to toxins or poisons.  · Depression, anxiety, and stress.  · A low oxygen level (hypoxia).  · An infection.  · Blood loss.  · Twitching or shaking (seizure).  · Heart problems, such as heart attack or heart rhythm problems (arrhythmias).  · A body temperature that is too low or too high (hypothermia or hyperthermia).  DIAGNOSIS   A diagnosis is based on your history, symptoms, physical and neurologic examinations, and diagnostic tests. Diagnostic tests may include:  · Measurement of your blood pressure, pulse, breathing, and oxygen levels (vital signs).  · Blood tests.  · Urine tests.  · X-ray exams.  · A computerized magnetic scan (magnetic resonance imaging, MRI).  · A computerized X-ray scan (computed tomography, CT scan).  TREATMENT   Treatment will depend on the cause. Treatment may include:  · Management of an underlying medical or mental health condition.  · Critical care or support in the hospital.  HOME CARE INSTRUCTIONS   · Only take over-the-counter or prescription medicines for pain, discomfort, or fever as directed by your caregiver.  · Manage underlying conditions as directed by your caregiver.  · Eat a healthy, well-balanced diet to maintain strength.  · Join a support group or prevention program to cope with the condition or trauma that caused the altered mental status. Ask your caregiver to help choose a program that works for you.  · Follow up with your caregiver for further examination, therapy, or testing as directed.  SEEK MEDICAL CARE IF:   · You feel unwell or have chills.  · You or your family notice a change in your behavior or your alertness.  · You have trouble following your caregiver's treatment plan.  · You have questions or concerns.  SEEK IMMEDIATE MEDICAL CARE IF:   · You have a rapid heartbeat or have chest pain.  · You have difficulty breathing.  · You have a fever.  · You have a headache with a stiff neck.  · You  cough up blood.  · You have blood in your urine or stool.  · You have severe agitation or confusion.  MAKE SURE YOU:   · Understand these instructions.  · Will watch your condition.  · Will get help right away if you are not doing well or get worse.     This information is not intended to replace advice given to you by your health care provider. Make sure you discuss any questions you have with your health care provider.     Document Released: 06/07/2011 Document Revised: 03/11/2013 Document Reviewed: 02/11/2016  [employer] Patient Education ©2016 [employer]        Depression / Suicide Risk    As you are discharged from this [facility] facility, it is important to learn how to keep safe from harming yourself.    Recognize the warning signs:  · Abrupt changes in personality, positive or negative- including increase in energy   · Giving away possessions  · Change in eating patterns- significant weight changes-  positive or negative  · Change in sleeping patterns- unable to sleep or sleeping all the time   · Unwillingness or inability to communicate  · Depression  · Unusual sadness, discouragement and loneliness  · Talk of wanting to die  · Neglect of personal appearance   · Rebelliousness- reckless behavior  · Withdrawal from people/activities they love  · Confusion- inability to concentrate     If you or a loved one observes any of these behaviors or has concerns about self-harm, here's what you can do:  · Talk about it- your feelings and reasons for harming yourself  · Remove any means that you might use to hurt yourself (examples: pills, rope, extension cords, firearm)  · Get professional help from the community (Mental Health, Substance Abuse, psychological counseling)  · Do not be alone:Call your Safe Contact- someone whom you trust who will be there for you.  · Call your local CRISIS HOTLINE 916-1595 or 100-107-1295  · Call your local Children's Mobile Crisis Response Team [facility] (562)  383-8819 or www.[employer] Language.BuyerCurious  · Call the toll free National Suicide Prevention Hotlines   · National Suicide Prevention Lifeline 769-820-SAJC (0326)  · National [employer] Line Network 800-SUICIDE (370-9551)

## 2019-10-29 NOTE — PROGRESS NOTES
Pt discharged off unit via wheelchair with medical transport. Discharge instructions provided. All questions answered.

## 2019-10-29 NOTE — DISCHARGE SUMMARY
Discharge Summary    CHIEF COMPLAINT ON ADMISSION  Chief Complaint   Patient presents with   • Altered Mental Status       Reason for Admission  EMS R-12     CODE STATUS  DNAR/DNI    HPI & HOSPITAL COURSE  Mr. Garay is a 79 y.o. male with a PMH of DM, hyperlipidemia, HTN, heart attack with stent placement, who presented to the ER via EMS on 9/22/2019 after being found down and aphasic by granddaughter following a fall.  In ER patient was moving all extremity but was not verbal with flat affect.  While in ER, patient was confused, tachycardic, and agitated.  Initial work-up showed patient and in lactic acidosis, and in acute pulmonary edema.  There were no obvious evidence of infection.  CT of the head was obtained and negative for any acute abnormalities.  Psychiatry was also consulted due to agitation and recommendation for an MRI brain and EEG.  MRI showed no evidence of infarct or hemorrhage.  No chronic changes were present.  UA and chest x-ray were unremarkable during initial work-up.  Patient admitted into telemetry unit where they noted on cardiac monitor multiple PVCs, pauses, and right BBB.  Patient was asymptomatic so cardiology was not consulted.  Patient was stabilized and transferred to the medical floor on 9/29/2019 pending further disposition for planning.  Patient transferred to the long-term side on 9/30/2019 due to placement with the [facility].    Patient seen and examined today with no overnight episodes.  Patient have no complaints at this time.  Patient know of being discharged to [facility] today.  Patient seemed very excited and anxious.  Patient has no complaints at this time.  Patient aware of transport time at 1400.  Last lab work obtained on 9/27/2019 and were unremarkable.  Afebrile, HR range in the 70s within 24 hours, SBP range low 100s within 24 hours, patient on RA and O2 saturation in the high 90s.         Therefore, he is discharged in good  and stable condition to home with organized home healthcare and close outpatient follow-up.    The patient met 2-midnight criteria for an inpatient stay at the time of discharge.      FOLLOW UP ITEMS POST DISCHARGE  To [facility]    Please call [phone] to schedule PCP appointment for patient.        Discharge Instructions per MERY Villar      DIET: Diabetic, cardiac diet    ACTIVITY: As tolerated    DIAGNOSIS: Aphasia, confusion    Return to ER if symptoms worsens, chest pain, palpitations, shortness of breath, weakness, numbness and tingling.      DISCHARGE DIAGNOSES  Principal Problem (Resolved):    Acute metabolic encephalopathy POA: Yes  Active Problems:    Dementia with behavioral disturbance (HCC) POA: Yes    Diabetes mellitus with hyperglycemia (HCC) POA: Yes    Dysphagia POA: Yes    Hyperlipidemia POA: Yes    Hypertension POA: Yes    Depression POA: Yes    PVC (premature ventricular contraction) POA: Yes    Insomnia POA: Yes  Resolved Problems:    Elevated lactic acid level POA: Yes      FOLLOW UP  No future appointments.  No follow-up provider specified.    MEDICATIONS ON DISCHARGE     Medication List      ASK your doctor about these medications      Instructions   atorvastatin 20 MG Tabs  Commonly known as:  LIPITOR   Take 20 mg by mouth every morning.  Dose:  20 mg     DULoxetine 20 MG Cpep  Commonly known as:  CYMBALTA   Take 20 mg by mouth every morning.  Dose:  20 mg     insulin 70/30 (70-30) 100 UNIT/ML Susp  Commonly known as:  HUMULIN,NOVOLIN   Inject 26 Units as instructed 2 Times a Day.  Dose:  26 Units     lisinopril 5 MG Tabs  Commonly known as:  PRINIVIL   Take 5 mg by mouth every morning.  Dose:  5 mg     metformin 1000 MG tablet  Commonly known as:  GLUCOPHAGE   Take 1,000 mg by mouth 2 times a day, with meals.  Dose:  1,000 mg     metoprolol 25 MG Tabs  Commonly known as:  LOPRESSOR   Take 25 mg by mouth 2 times a day.  Dose:  25 mg      vitamin D (Ergocalciferol) 49204 units Caps capsule  Commonly known as:  DRISDOL   Take 50,000 Units by mouth every Monday.  Dose:  50,000 Units            Allergies  No Known Allergies    DIET  Orders Placed This Encounter   Procedures   • Diet Order Diabetic     Standing Status:   Standing     Number of Occurrences:   1     Order Specific Question:   Diet:     Answer:   Diabetic [3]     Order Specific Question:   Texture/Fiber modifications:     Answer:   Dysphagia 3(Mechanical Soft)specify fluid consistency(question 6) [3]     Comments:   no pears per pt preference pls     Order Specific Question:   Consistency/Fluid modifications:     Answer:   Thin Liquids [3]     Comments:   straws okay     Order Specific Question:   Miscellaneous modifications:     Answer:   SLP - Deliver to Nursing Station [22]     Comments:   pt needs assistance with HOB and meal tray set-up       ACTIVITY  As tolerated.  Weight bearing as tolerated    LINES, DRAINS, AND WOUNDS  This is an automated list. Peripheral IVs will be removed prior to discharge.                     MENTAL STATUS ON TRANSFER  Level of Consciousness: Alert  Orientation : Oriented x 4  Speech: Speech Clear      LABORATORY  Lab Results   Component Value Date    SODIUM 138 10/01/2019    POTASSIUM 4.1 10/01/2019    CHLORIDE 106 10/01/2019    CO2 23 10/01/2019    GLUCOSE 126 (H) 10/01/2019    BUN 20 10/01/2019    CREATININE 0.82 10/01/2019        Lab Results   Component Value Date    WBC 9.7 09/27/2019    HEMOGLOBIN 14.3 09/27/2019    HEMATOCRIT 42.7 09/27/2019    PLATELETCT 245 09/27/2019        Electronically signed by:  BRITTANY Ellis, MSN, APRN, FNP-C  Hospitalist Services  [facility]  10/29/19      12:17

## 2019-10-29 NOTE — PROGRESS NOTES
Received report from night shift and assumed care. Pt is A&OX4. Denies pain or discomfort. [facility]. Medications given per MAR. All needs met. Safety precautions and hourly rounding in place.

## 2019-11-12 NOTE — DOCUMENTATION QUERY
[facility]                                                                       Query Response Note      PATIENT:               COOKIE VELEZ  ACCT #:                  [id number]  MRN:                     [id number]  :                      1940  ADMIT DATE:       2019 2:49 PM  DISCH DATE:        10/29/2019 1:46 PM  RESPONDING  PROVIDER #:        [id number]           QUERY TEXT:    Please clarify in documentation the relationship, if any, between metabolic encephalopathy and dementia    The patient's Clinical Indicators include:  Patient is a 79 year old male admitted for metabolic encephalopathy.  Per family patient had a previous episode but it resolved quickly.  Patient combative, aggressive.  Significantly acidoic on admission but determined due to dehydration and not infection.  Neurological work-up was negative for acute stroke; head CT and MRI normal. Patient has a history of dementia currently cared for by daughter and ex-wife.  There was a question of whether the patient's encephalopathy was due to rapidly progressive dementia.    Patient was treated with Haldol, Geodon, ativan and Seroquel  Options provided:   -- Metabolic encephalopathy is due to or associated with dementia   -- Unrelated to each other   -- Unable to determine      Query created by: Alee Liu on 2019 7:11 PM    RESPONSE TEXT:    Unable to determine          Electronically signed by:  ELIZABETH ARMANDO 2019 3:05 PM

## 2021-01-14 DIAGNOSIS — Z23 NEED FOR VACCINATION: ICD-10-CM

## 2022-04-12 ENCOUNTER — HOSPITAL ENCOUNTER (INPATIENT)
Facility: MEDICAL CENTER | Age: 82
LOS: 2 days | DRG: 872 | End: 2022-04-14
Attending: EMERGENCY MEDICINE | Admitting: STUDENT IN AN ORGANIZED HEALTH CARE EDUCATION/TRAINING PROGRAM
Payer: COMMERCIAL

## 2022-04-12 ENCOUNTER — APPOINTMENT (OUTPATIENT)
Dept: RADIOLOGY | Facility: MEDICAL CENTER | Age: 82
DRG: 872 | End: 2022-04-12
Attending: EMERGENCY MEDICINE
Payer: COMMERCIAL

## 2022-04-12 ENCOUNTER — HOSPITAL ENCOUNTER (EMERGENCY)
Facility: MEDICAL CENTER | Age: 82
End: 2022-04-12
Payer: COMMERCIAL

## 2022-04-12 DIAGNOSIS — L03.116 CELLULITIS OF LEFT LOWER EXTREMITY: ICD-10-CM

## 2022-04-12 DIAGNOSIS — L03.90 CELLULITIS, UNSPECIFIED CELLULITIS SITE: ICD-10-CM

## 2022-04-12 DIAGNOSIS — L97.309 DIABETIC ULCER OF ANKLE (HCC): ICD-10-CM

## 2022-04-12 DIAGNOSIS — E11.622 DIABETIC ULCER OF ANKLE (HCC): ICD-10-CM

## 2022-04-12 DIAGNOSIS — A41.9 SEPSIS, DUE TO UNSPECIFIED ORGANISM, UNSPECIFIED WHETHER ACUTE ORGAN DYSFUNCTION PRESENT (HCC): ICD-10-CM

## 2022-04-12 PROBLEM — I10 HTN (HYPERTENSION): Status: ACTIVE | Noted: 2022-04-12

## 2022-04-12 PROBLEM — N17.9 ACUTE KIDNEY INJURY (HCC): Status: ACTIVE | Noted: 2022-04-12

## 2022-04-12 PROBLEM — Z71.89 ADVANCE CARE PLANNING: Status: ACTIVE | Noted: 2022-04-12

## 2022-04-12 PROBLEM — E11.9 TYPE 2 DIABETES MELLITUS (HCC): Status: ACTIVE | Noted: 2022-04-12

## 2022-04-12 PROBLEM — E66.9 OBESITY: Status: ACTIVE | Noted: 2022-04-12

## 2022-04-12 LAB
ALBUMIN SERPL BCP-MCNC: 3.9 G/DL (ref 3.2–4.9)
ALBUMIN/GLOB SERPL: 1.1 G/DL
ALP SERPL-CCNC: 61 U/L (ref 30–99)
ALT SERPL-CCNC: 8 U/L (ref 2–50)
ANION GAP SERPL CALC-SCNC: 14 MMOL/L (ref 7–16)
AST SERPL-CCNC: 17 U/L (ref 12–45)
BASOPHILS # BLD AUTO: 0.7 % (ref 0–1.8)
BASOPHILS # BLD: 0.13 K/UL (ref 0–0.12)
BILIRUB SERPL-MCNC: 1.4 MG/DL (ref 0.1–1.5)
BUN SERPL-MCNC: 27 MG/DL (ref 8–22)
CALCIUM SERPL-MCNC: 8.9 MG/DL (ref 8.5–10.5)
CHLORIDE SERPL-SCNC: 99 MMOL/L (ref 96–112)
CO2 SERPL-SCNC: 20 MMOL/L (ref 20–33)
CREAT SERPL-MCNC: 1.48 MG/DL (ref 0.5–1.4)
CRP SERPL HS-MCNC: 22.27 MG/DL (ref 0–0.75)
EOSINOPHIL # BLD AUTO: 0.14 K/UL (ref 0–0.51)
EOSINOPHIL NFR BLD: 0.7 % (ref 0–6.9)
ERYTHROCYTE [DISTWIDTH] IN BLOOD BY AUTOMATED COUNT: 44.3 FL (ref 35.9–50)
EST. AVERAGE GLUCOSE BLD GHB EST-MCNC: 146 MG/DL
GFR SERPLBLD CREATININE-BSD FMLA CKD-EPI: 47 ML/MIN/1.73 M 2
GLOBULIN SER CALC-MCNC: 3.4 G/DL (ref 1.9–3.5)
GLUCOSE BLD STRIP.AUTO-MCNC: 171 MG/DL (ref 65–99)
GLUCOSE SERPL-MCNC: 138 MG/DL (ref 65–99)
GRAM STN SPEC: NORMAL
HBA1C MFR BLD: 6.7 % (ref 4–5.6)
HCT VFR BLD AUTO: 43.2 % (ref 42–52)
HGB BLD-MCNC: 14.5 G/DL (ref 14–18)
IMM GRANULOCYTES # BLD AUTO: 0.2 K/UL (ref 0–0.11)
IMM GRANULOCYTES NFR BLD AUTO: 1.1 % (ref 0–0.9)
LACTATE BLD-SCNC: 2 MMOL/L (ref 0.5–2)
LACTATE BLD-SCNC: 2.6 MMOL/L (ref 0.5–2)
LACTATE BLD-SCNC: 3.4 MMOL/L (ref 0.5–2)
LYMPHOCYTES # BLD AUTO: 2.33 K/UL (ref 1–4.8)
LYMPHOCYTES NFR BLD: 12.3 % (ref 22–41)
MCH RBC QN AUTO: 29.7 PG (ref 27–33)
MCHC RBC AUTO-ENTMCNC: 33.6 G/DL (ref 33.7–35.3)
MCV RBC AUTO: 88.5 FL (ref 81.4–97.8)
MONOCYTES # BLD AUTO: 1.77 K/UL (ref 0–0.85)
MONOCYTES NFR BLD AUTO: 9.4 % (ref 0–13.4)
NEUTROPHILS # BLD AUTO: 14.34 K/UL (ref 1.82–7.42)
NEUTROPHILS NFR BLD: 75.8 % (ref 44–72)
NRBC # BLD AUTO: 0 K/UL
NRBC BLD-RTO: 0 /100 WBC
PLATELET # BLD AUTO: 231 K/UL (ref 164–446)
PMV BLD AUTO: 9.8 FL (ref 9–12.9)
POTASSIUM SERPL-SCNC: 4 MMOL/L (ref 3.6–5.5)
PROT SERPL-MCNC: 7.3 G/DL (ref 6–8.2)
RBC # BLD AUTO: 4.88 M/UL (ref 4.7–6.1)
SIGNIFICANT IND 70042: NORMAL
SITE SITE: NORMAL
SODIUM SERPL-SCNC: 133 MMOL/L (ref 135–145)
SOURCE SOURCE: NORMAL
WBC # BLD AUTO: 18.9 K/UL (ref 4.8–10.8)

## 2022-04-12 PROCEDURE — 83605 ASSAY OF LACTIC ACID: CPT

## 2022-04-12 PROCEDURE — A9270 NON-COVERED ITEM OR SERVICE: HCPCS | Performed by: HOSPITALIST

## 2022-04-12 PROCEDURE — 96365 THER/PROPH/DIAG IV INF INIT: CPT

## 2022-04-12 PROCEDURE — 80053 COMPREHEN METABOLIC PANEL: CPT

## 2022-04-12 PROCEDURE — 700105 HCHG RX REV CODE 258: Performed by: STUDENT IN AN ORGANIZED HEALTH CARE EDUCATION/TRAINING PROGRAM

## 2022-04-12 PROCEDURE — 86140 C-REACTIVE PROTEIN: CPT

## 2022-04-12 PROCEDURE — 700105 HCHG RX REV CODE 258: Performed by: EMERGENCY MEDICINE

## 2022-04-12 PROCEDURE — 83036 HEMOGLOBIN GLYCOSYLATED A1C: CPT

## 2022-04-12 PROCEDURE — 36415 COLL VENOUS BLD VENIPUNCTURE: CPT

## 2022-04-12 PROCEDURE — 770001 HCHG ROOM/CARE - MED/SURG/GYN PRIV*

## 2022-04-12 PROCEDURE — 51798 US URINE CAPACITY MEASURE: CPT

## 2022-04-12 PROCEDURE — 700105 HCHG RX REV CODE 258: Performed by: HOSPITALIST

## 2022-04-12 PROCEDURE — 87147 CULTURE TYPE IMMUNOLOGIC: CPT

## 2022-04-12 PROCEDURE — 96366 THER/PROPH/DIAG IV INF ADDON: CPT

## 2022-04-12 PROCEDURE — 700111 HCHG RX REV CODE 636 W/ 250 OVERRIDE (IP): Performed by: EMERGENCY MEDICINE

## 2022-04-12 PROCEDURE — 87070 CULTURE OTHR SPECIMN AEROBIC: CPT

## 2022-04-12 PROCEDURE — 73610 X-RAY EXAM OF ANKLE: CPT | Mod: LT

## 2022-04-12 PROCEDURE — 85025 COMPLETE CBC W/AUTO DIFF WBC: CPT

## 2022-04-12 PROCEDURE — 87205 SMEAR GRAM STAIN: CPT

## 2022-04-12 PROCEDURE — A9270 NON-COVERED ITEM OR SERVICE: HCPCS | Performed by: STUDENT IN AN ORGANIZED HEALTH CARE EDUCATION/TRAINING PROGRAM

## 2022-04-12 PROCEDURE — 99223 1ST HOSP IP/OBS HIGH 75: CPT | Performed by: STUDENT IN AN ORGANIZED HEALTH CARE EDUCATION/TRAINING PROGRAM

## 2022-04-12 PROCEDURE — 87040 BLOOD CULTURE FOR BACTERIA: CPT

## 2022-04-12 PROCEDURE — 700102 HCHG RX REV CODE 250 W/ 637 OVERRIDE(OP): Performed by: STUDENT IN AN ORGANIZED HEALTH CARE EDUCATION/TRAINING PROGRAM

## 2022-04-12 PROCEDURE — 87186 SC STD MICRODIL/AGAR DIL: CPT

## 2022-04-12 PROCEDURE — 99285 EMERGENCY DEPT VISIT HI MDM: CPT

## 2022-04-12 PROCEDURE — 700102 HCHG RX REV CODE 250 W/ 637 OVERRIDE(OP): Performed by: HOSPITALIST

## 2022-04-12 PROCEDURE — 700111 HCHG RX REV CODE 636 W/ 250 OVERRIDE (IP): Performed by: STUDENT IN AN ORGANIZED HEALTH CARE EDUCATION/TRAINING PROGRAM

## 2022-04-12 PROCEDURE — 82962 GLUCOSE BLOOD TEST: CPT | Mod: 91

## 2022-04-12 PROCEDURE — 87077 CULTURE AEROBIC IDENTIFY: CPT

## 2022-04-12 PROCEDURE — 96367 TX/PROPH/DG ADDL SEQ IV INF: CPT

## 2022-04-12 RX ORDER — ASCORBIC ACID 500 MG
1000 TABLET ORAL DAILY
Status: ON HOLD | COMMUNITY
End: 2022-07-25

## 2022-04-12 RX ORDER — ERGOCALCIFEROL 1.25 MG/1
50000 CAPSULE ORAL
Status: DISCONTINUED | OUTPATIENT
Start: 2022-04-12 | End: 2022-04-12

## 2022-04-12 RX ORDER — HEPARIN SODIUM 5000 [USP'U]/ML
5000 INJECTION, SOLUTION INTRAVENOUS; SUBCUTANEOUS EVERY 8 HOURS
Status: DISCONTINUED | OUTPATIENT
Start: 2022-04-13 | End: 2022-04-13

## 2022-04-12 RX ORDER — AMOXICILLIN AND CLAVULANATE POTASSIUM 875; 125 MG/1; MG/1
1 TABLET, FILM COATED ORAL 2 TIMES DAILY
Status: ON HOLD | COMMUNITY
Start: 2022-04-11 | End: 2022-04-14

## 2022-04-12 RX ORDER — NEOMYCIN SULFATE, POLYMYXIN B SULFATE AND BACITRACIN ZINC 3.5; 10000; 4 MG/G; [USP'U]/G; [USP'U]/G
1 OINTMENT OPHTHALMIC 2 TIMES DAILY
COMMUNITY
End: 2022-06-10

## 2022-04-12 RX ORDER — LISINOPRIL 5 MG/1
2.5 TABLET ORAL DAILY
Status: DISCONTINUED | OUTPATIENT
Start: 2022-04-12 | End: 2022-04-14 | Stop reason: HOSPADM

## 2022-04-12 RX ORDER — DEXTROSE MONOHYDRATE 25 G/50ML
25 INJECTION, SOLUTION INTRAVENOUS
Status: DISCONTINUED | OUTPATIENT
Start: 2022-04-12 | End: 2022-04-14 | Stop reason: HOSPADM

## 2022-04-12 RX ORDER — GABAPENTIN 300 MG/1
300 CAPSULE ORAL 2 TIMES DAILY
Status: DISCONTINUED | OUTPATIENT
Start: 2022-04-12 | End: 2022-04-14 | Stop reason: HOSPADM

## 2022-04-12 RX ORDER — NYSTATIN 100000 [USP'U]/G
POWDER TOPICAL 2 TIMES DAILY
Status: DISCONTINUED | OUTPATIENT
Start: 2022-04-12 | End: 2022-04-12

## 2022-04-12 RX ORDER — LABETALOL HYDROCHLORIDE 5 MG/ML
10 INJECTION, SOLUTION INTRAVENOUS EVERY 4 HOURS PRN
Status: DISCONTINUED | OUTPATIENT
Start: 2022-04-12 | End: 2022-04-14 | Stop reason: HOSPADM

## 2022-04-12 RX ORDER — SODIUM CHLORIDE 9 MG/ML
INJECTION, SOLUTION INTRAVENOUS CONTINUOUS
Status: DISCONTINUED | OUTPATIENT
Start: 2022-04-12 | End: 2022-04-13

## 2022-04-12 RX ORDER — LISINOPRIL 10 MG/1
10 TABLET ORAL DAILY
Status: ON HOLD | COMMUNITY
End: 2022-07-25

## 2022-04-12 RX ORDER — ERGOCALCIFEROL 1.25 MG/1
50000 CAPSULE ORAL
Status: ON HOLD | COMMUNITY
End: 2022-07-25

## 2022-04-12 RX ORDER — ATORVASTATIN CALCIUM 20 MG/1
20 TABLET, FILM COATED ORAL NIGHTLY
Status: DISCONTINUED | OUTPATIENT
Start: 2022-04-12 | End: 2022-04-14 | Stop reason: HOSPADM

## 2022-04-12 RX ORDER — ASCORBIC ACID 500 MG
1000 TABLET ORAL DAILY
Status: DISCONTINUED | OUTPATIENT
Start: 2022-04-13 | End: 2022-04-14 | Stop reason: HOSPADM

## 2022-04-12 RX ORDER — NYSTATIN 100000 [USP'U]/G
POWDER TOPICAL 2 TIMES DAILY
Status: DISCONTINUED | OUTPATIENT
Start: 2022-04-12 | End: 2022-04-14 | Stop reason: HOSPADM

## 2022-04-12 RX ORDER — POLYETHYLENE GLYCOL 3350 17 G/17G
1 POWDER, FOR SOLUTION ORAL
Status: DISCONTINUED | OUTPATIENT
Start: 2022-04-12 | End: 2022-04-14

## 2022-04-12 RX ORDER — HYDROCODONE BITARTRATE AND ACETAMINOPHEN 5; 325 MG/1; MG/1
1 TABLET ORAL EVERY 8 HOURS PRN
Status: DISCONTINUED | OUTPATIENT
Start: 2022-04-12 | End: 2022-04-14 | Stop reason: HOSPADM

## 2022-04-12 RX ORDER — AMOXICILLIN 250 MG
2 CAPSULE ORAL 2 TIMES DAILY
Status: DISCONTINUED | OUTPATIENT
Start: 2022-04-12 | End: 2022-04-14

## 2022-04-12 RX ORDER — ONDANSETRON 2 MG/ML
4 INJECTION INTRAMUSCULAR; INTRAVENOUS EVERY 4 HOURS PRN
Status: DISCONTINUED | OUTPATIENT
Start: 2022-04-12 | End: 2022-04-14 | Stop reason: HOSPADM

## 2022-04-12 RX ORDER — CHOLECALCIFEROL (VITAMIN D3) 125 MCG
10 CAPSULE ORAL
Status: DISCONTINUED | OUTPATIENT
Start: 2022-04-12 | End: 2022-04-14 | Stop reason: HOSPADM

## 2022-04-12 RX ORDER — DOXYCYCLINE 100 MG/1
100 TABLET ORAL EVERY 12 HOURS
Status: DISCONTINUED | OUTPATIENT
Start: 2022-04-13 | End: 2022-04-14

## 2022-04-12 RX ORDER — ONDANSETRON 4 MG/1
4 TABLET, ORALLY DISINTEGRATING ORAL EVERY 4 HOURS PRN
Status: DISCONTINUED | OUTPATIENT
Start: 2022-04-12 | End: 2022-04-14 | Stop reason: HOSPADM

## 2022-04-12 RX ORDER — PHENOL 1.4 %
10 AEROSOL, SPRAY (ML) MUCOUS MEMBRANE
Status: ON HOLD | COMMUNITY
End: 2022-07-25 | Stop reason: SDUPTHER

## 2022-04-12 RX ORDER — SODIUM CHLORIDE, SODIUM LACTATE, POTASSIUM CHLORIDE, AND CALCIUM CHLORIDE .6; .31; .03; .02 G/100ML; G/100ML; G/100ML; G/100ML
1000 INJECTION, SOLUTION INTRAVENOUS ONCE
Status: COMPLETED | OUTPATIENT
Start: 2022-04-12 | End: 2022-04-12

## 2022-04-12 RX ORDER — MIRTAZAPINE 15 MG/1
15 TABLET, FILM COATED ORAL NIGHTLY
Status: DISCONTINUED | OUTPATIENT
Start: 2022-04-12 | End: 2022-04-14 | Stop reason: HOSPADM

## 2022-04-12 RX ORDER — MIRTAZAPINE 15 MG/1
15 TABLET, FILM COATED ORAL
Status: ON HOLD | COMMUNITY
End: 2022-07-25 | Stop reason: SDUPTHER

## 2022-04-12 RX ORDER — SODIUM CHLORIDE, SODIUM LACTATE, POTASSIUM CHLORIDE, CALCIUM CHLORIDE 600; 310; 30; 20 MG/100ML; MG/100ML; MG/100ML; MG/100ML
INJECTION, SOLUTION INTRAVENOUS CONTINUOUS
Status: DISCONTINUED | OUTPATIENT
Start: 2022-04-12 | End: 2022-04-12 | Stop reason: ALTCHOICE

## 2022-04-12 RX ORDER — HYDROCODONE BITARTRATE AND ACETAMINOPHEN 5; 325 MG/1; MG/1
1 TABLET ORAL 2 TIMES DAILY
Status: ON HOLD | COMMUNITY
End: 2022-06-20 | Stop reason: SDUPTHER

## 2022-04-12 RX ORDER — BISACODYL 10 MG
10 SUPPOSITORY, RECTAL RECTAL
Status: DISCONTINUED | OUTPATIENT
Start: 2022-04-12 | End: 2022-04-14

## 2022-04-12 RX ORDER — ATORVASTATIN CALCIUM 20 MG/1
20 TABLET, FILM COATED ORAL NIGHTLY
COMMUNITY
End: 2022-06-10

## 2022-04-12 RX ORDER — ACETAMINOPHEN 325 MG/1
650 TABLET ORAL EVERY 6 HOURS PRN
Status: DISCONTINUED | OUTPATIENT
Start: 2022-04-12 | End: 2022-04-14 | Stop reason: HOSPADM

## 2022-04-12 RX ORDER — GABAPENTIN 300 MG/1
300 CAPSULE ORAL 2 TIMES DAILY
Status: ON HOLD | COMMUNITY
End: 2022-07-25

## 2022-04-12 RX ADMIN — PIPERACILLIN AND TAZOBACTAM 3.38 G: 3; .375 INJECTION, POWDER, LYOPHILIZED, FOR SOLUTION INTRAVENOUS; PARENTERAL at 21:08

## 2022-04-12 RX ADMIN — SERTRALINE 50 MG: 50 TABLET, FILM COATED ORAL at 17:09

## 2022-04-12 RX ADMIN — METOPROLOL TARTRATE 25 MG: 25 TABLET, FILM COATED ORAL at 17:10

## 2022-04-12 RX ADMIN — SODIUM CHLORIDE, POTASSIUM CHLORIDE, SODIUM LACTATE AND CALCIUM CHLORIDE: 600; 310; 30; 20 INJECTION, SOLUTION INTRAVENOUS at 14:57

## 2022-04-12 RX ADMIN — GABAPENTIN 300 MG: 300 CAPSULE ORAL at 17:09

## 2022-04-12 RX ADMIN — ATORVASTATIN CALCIUM 20 MG: 20 TABLET, FILM COATED ORAL at 21:08

## 2022-04-12 RX ADMIN — SODIUM CHLORIDE, POTASSIUM CHLORIDE, SODIUM LACTATE AND CALCIUM CHLORIDE 1000 ML: 600; 310; 30; 20 INJECTION, SOLUTION INTRAVENOUS at 17:12

## 2022-04-12 RX ADMIN — NYSTATIN: 100000 POWDER TOPICAL at 23:09

## 2022-04-12 RX ADMIN — INSULIN HUMAN 1 UNITS: 100 INJECTION, SOLUTION PARENTERAL at 18:46

## 2022-04-12 RX ADMIN — SODIUM CHLORIDE 3 G: 900 INJECTION INTRAVENOUS at 13:08

## 2022-04-12 RX ADMIN — MIRTAZAPINE 15 MG: 15 TABLET, FILM COATED ORAL at 21:08

## 2022-04-12 RX ADMIN — SODIUM CHLORIDE: 9 INJECTION, SOLUTION INTRAVENOUS at 23:09

## 2022-04-12 RX ADMIN — HYDROCODONE BITARTRATE AND ACETAMINOPHEN 1 TABLET: 5; 325 TABLET ORAL at 17:09

## 2022-04-12 RX ADMIN — PIPERACILLIN AND TAZOBACTAM 3.38 G: 3; .375 INJECTION, POWDER, LYOPHILIZED, FOR SOLUTION INTRAVENOUS; PARENTERAL at 18:43

## 2022-04-12 RX ADMIN — VANCOMYCIN HYDROCHLORIDE 2500 MG: 500 INJECTION, POWDER, LYOPHILIZED, FOR SOLUTION INTRAVENOUS at 13:37

## 2022-04-12 ASSESSMENT — COGNITIVE AND FUNCTIONAL STATUS - GENERAL
SUGGESTED CMS G CODE MODIFIER MOBILITY: CL
DAILY ACTIVITIY SCORE: 14
MOBILITY SCORE: 14
CLIMB 3 TO 5 STEPS WITH RAILING: A LOT
TOILETING: A LITTLE
PERSONAL GROOMING: A LOT
DRESSING REGULAR UPPER BODY CLOTHING: A LOT
DRESSING REGULAR LOWER BODY CLOTHING: A LOT
MOVING TO AND FROM BED TO CHAIR: A LITTLE
SUGGESTED CMS G CODE MODIFIER DAILY ACTIVITY: CK
MOVING FROM LYING ON BACK TO SITTING ON SIDE OF FLAT BED: A LITTLE
WALKING IN HOSPITAL ROOM: A LOT
EATING MEALS: A LITTLE
HELP NEEDED FOR BATHING: A LOT
TURNING FROM BACK TO SIDE WHILE IN FLAT BAD: A LOT
STANDING UP FROM CHAIR USING ARMS: A LOT

## 2022-04-12 ASSESSMENT — ENCOUNTER SYMPTOMS
CHILLS: 1
VOMITING: 0
NAUSEA: 0
ABDOMINAL PAIN: 0
PALPITATIONS: 0
FEVER: 0

## 2022-04-12 ASSESSMENT — LIFESTYLE VARIABLES
HAVE PEOPLE ANNOYED YOU BY CRITICIZING YOUR DRINKING: NO
TOTAL SCORE: 0
AVERAGE NUMBER OF DAYS PER WEEK YOU HAVE A DRINK CONTAINING ALCOHOL: 0
TOTAL SCORE: 0
ON A TYPICAL DAY WHEN YOU DRINK ALCOHOL HOW MANY DRINKS DO YOU HAVE: 0
EVER HAD A DRINK FIRST THING IN THE MORNING TO STEADY YOUR NERVES TO GET RID OF A HANGOVER: NO
EVER FELT BAD OR GUILTY ABOUT YOUR DRINKING: NO
HAVE YOU EVER FELT YOU SHOULD CUT DOWN ON YOUR DRINKING: NO
TOTAL SCORE: 0
CONSUMPTION TOTAL: NEGATIVE
DO YOU DRINK ALCOHOL: NO
ALCOHOL_USE: NO
HOW MANY TIMES IN THE PAST YEAR HAVE YOU HAD 5 OR MORE DRINKS IN A DAY: 0

## 2022-04-12 ASSESSMENT — PATIENT HEALTH QUESTIONNAIRE - PHQ9
SUM OF ALL RESPONSES TO PHQ9 QUESTIONS 1 AND 2: 0
1. LITTLE INTEREST OR PLEASURE IN DOING THINGS: NOT AT ALL
2. FEELING DOWN, DEPRESSED, IRRITABLE, OR HOPELESS: NOT AT ALL

## 2022-04-12 NOTE — H&P
Hospital Medicine History & Physical Note    Date of Service  4/12/2022    Primary Care Physician  Stephanie Diaz M.D.    Consultants  LPS    Code Status  Full Code    Chief Complaint  Chief Complaint   Patient presents with   • Wound Check     Left ankle   • Sent by MD     For elevated wbc=16       History of Presenting Illness  Andrei Garay is a 82 y.o. male with history of DM2, HTN, depression, who presented 4/12/2022 with swelling and redness of L lateral ankle wound. The redness has been extending to the L foot and mild calf region. Onset a few weeks ago. Associated with chills and drainage. Patient lives at [facility] and was noted elevated wbc of 18 and sent patient for further evaluation.   Here labs remarkable for wbc 18.9, Na 133, Cr 1.48, lactic acid 3.4 and .   L ankle x ray shows Lateral ankle soft tissue defect with associated soft tissue swelling. No definite soft tissue gas or radiographic evidence of osteomyelitis.   Patient was started with IVF and iv abx and was admitted for further evaluation.   Of note, patient has POLST indicating DNR. Per daughter it was made a few weeks ago, patient would like to be full code today. Will keep code.     I discussed the plan of care with patient, family and ER md.    Review of Systems  Review of Systems   Constitutional: Positive for chills. Negative for fever.   Cardiovascular: Negative for chest pain and palpitations.   Gastrointestinal: Negative for abdominal pain, nausea and vomiting.   Genitourinary: Negative for dysuria.   Musculoskeletal:        L ankle wound with drainage   All other systems reviewed and are negative.      Past Medical History  DM2, HTN, depression    Surgical History  none    Family History  family history is not on file.   Family history reviewed with patient. There is no family history that is pertinent to the chief complaint.     Social History   Denies smoking, drinking alcohol or active rerecreational  drug use     Allergies  No Known Allergies    Medications  Prior to Admission Medications   Prescriptions Last Dose Informant Patient Reported? Taking?   HYDROcodone-acetaminophen (NORCO) 5-325 MG Tab per tablet 4/12/2022 at AM MAR from Other Facility Yes Yes   Sig: Take 1 Tablet by mouth 2 times a day.   Melatonin 10 MG Tab 4/11/2022 at PM MAR from Other Facility Yes Yes   Sig: Take 10 mg by mouth at bedtime.   amoxicillin-clavulanate (AUGMENTIN) 875-125 MG Tab 4/12/2022 at AM MAR from Other Facility Yes Yes   Sig: Take 1 Tablet by mouth 2 times a day. 10 day course   ascorbic acid (VITAMIN C) 500 MG tablet 4/12/2022 at AM MAR from Other Facility Yes Yes   Sig: Take 1,000 mg by mouth every day.   atorvastatin (LIPITOR) 20 MG Tab 4/11/2022 at PM MAR from Other Facility Yes Yes   Sig: Take 20 mg by mouth every evening.   diclofenac sodium (VOLTAREN) 1 % Gel 4/11/2022 at PM MAR from Other Facility Yes Yes   Sig: Apply 4 g topically every day.   gabapentin (NEURONTIN) 300 MG Cap 4/12/2022 at AM MAR from Other Facility Yes Yes   Sig: Take 300 mg by mouth 2 times a day.   lisinopril (PRINIVIL) 2.5 MG Tab 4/12/2022 at AM MAR from Other Facility Yes Yes   Sig: Take 2.5 mg by mouth every day.   metformin (GLUCOPHAGE) 1000 MG tablet 4/12/2022 at AM MAR from Other Facility Yes Yes   Sig: Take 1,000 mg by mouth 2 times a day with meals.   metoprolol tartrate (LOPRESSOR) 25 MG Tab 4/12/2022 at AM MAR from Other Facility Yes Yes   Sig: Take 25 mg by mouth 2 times a day.   mirtazapine (REMERON) 15 MG Tab 4/11/2022 at PM MAR from Other Facility Yes Yes   Sig: Take 15 mg by mouth every evening.   neomycin-bacitracin-polymixin (NEOSPORIN) [id number] Ointment 4/12/2022 at AM MAR from Other Facility Yes Yes   Sig: Apply 1 Application to both eyes 2 times a day. Apply to right middle finger   sertraline (ZOLOFT) 50 MG Tab 4/11/2022 at PM MAR from Other Facility Yes Yes   Sig: Take 50 mg by mouth every day.   vitamin D2,  Ergocalciferol, (DRISDOL) 1.25 MG (57096 UT) Cap capsule UNK at UNK MAR from Other Facility Yes Yes   Sig: Take 50,000 Units by mouth every 30 days.      Facility-Administered Medications: None       Physical Exam  Temp:  [36.6 °C (97.9 °F)] 36.6 °C (97.9 °F)  Pulse:  [] 76  Resp:  [18-21] 18  BP: (110-130)/(56-72) 130/72  SpO2:  [91 %-93 %] 91 %  Blood Pressure : 130/72   Temperature: 36.6 °C (97.9 °F)   Pulse: 76   Respiration: 18   Pulse Oximetry: 91 %       Physical Exam  Vitals and nursing note reviewed.   Constitutional:       General: He is not in acute distress.     Appearance: Normal appearance. He is obese.   HENT:      Head: Normocephalic and atraumatic.      Mouth/Throat:      Mouth: Mucous membranes are dry.      Pharynx: Oropharynx is clear.   Eyes:      Pupils: Pupils are equal, round, and reactive to light.   Neck:      Vascular: No carotid bruit.   Cardiovascular:      Rate and Rhythm: Normal rate and regular rhythm.   Pulmonary:      Effort: Pulmonary effort is normal. No respiratory distress.      Breath sounds: Normal breath sounds. No wheezing.   Abdominal:      General: Abdomen is flat. Bowel sounds are normal.      Palpations: Abdomen is soft. There is no mass.      Tenderness: There is no abdominal tenderness.   Musculoskeletal:         General: Swelling present. Normal range of motion.      Cervical back: Neck supple.      Comments: RLE: wound seen on the L lateral ankle side, with active draining. Redness and swelling extending to L foot and up to middle of calf. Warm to touch   Skin:     General: Skin is warm and dry.   Neurological:      General: No focal deficit present.      Mental Status: He is alert and oriented to person, place, and time.   Psychiatric:         Mood and Affect: Mood normal.         Behavior: Behavior normal.         Laboratory:  Recent Labs     04/12/22  1155   WBC 18.9*   RBC 4.88   HEMOGLOBIN 14.5   HEMATOCRIT 43.2   MCV 88.5   MCH 29.7   MCHC 33.6*   RDW  44.3   PLATELETCT 231   MPV 9.8     Recent Labs     04/12/22  1155   SODIUM 133*   POTASSIUM 4.0   CHLORIDE 99   CO2 20   GLUCOSE 138*   BUN 27*   CREATININE 1.48*   CALCIUM 8.9     Recent Labs     04/12/22  1155   ALTSGPT 8   ASTSGOT 17   ALKPHOSPHAT 61   TBILIRUBIN 1.4   GLUCOSE 138*         No results for input(s): NTPROBNP in the last 72 hours.      No results for input(s): TROPONINT in the last 72 hours.    Imaging:  DX-ANKLE 3+ VIEWS LEFT   Final Result      Lateral ankle soft tissue defect with associated soft tissue swelling. No definite soft tissue gas or radiographic evidence of osteomyelitis.          X-Ray:  I have personally reviewed the images and compared with prior images.    Assessment/Plan:  I anticipate this patient will require at least two midnights for appropriate medical management, necessitating inpatient admission.    * Cellulitis- (present on admission)  Assessment & Plan  L ankle x ray shows Lateral ankle soft tissue defect with associated soft tissue swelling. No definite soft tissue gas or radiographic evidence of osteomyelitis.     Will check ESR, CRP  Blood culture, wound culture  Zosyn and doxycycline. MRSA swab pending  Follow up lactic acid  LPS consulted    Sepsis (HCC)  Assessment & Plan  This is Sepsis Present on admission  SIRS criteria identified on my evaluation include: Tachypnea, with respirations greater than 20 per minute and Leukocytosis, with WBC greater than 12,000  Source is cellulitis  Sepsis protocol initiated  Fluid resuscitation ordered per protocol  Crystalloid Fluid Administration: Fluid resuscitation ordered per standard protocol - 30 mL/kg per current or ideal body weight  IV antibiotics as appropriate for source of sepsis  Reassessment: I have reassessed the patient's hemodynamic status        Advance care planning  Assessment & Plan  Patient has POLST indicating DNR. Per daughter this was made a few weeks ago. Patient would like to be full code today. Will  keep full code now.   Continue to discuss GOC with patient and daughter.     Obesity  Assessment & Plan  BMI 31  Life style modifications including healthy plant based diet and regular exercise recommended       Acute kidney injury (HCC)  Assessment & Plan  Unknown baseline Cr, unknown it is SADIE or underlying CKD  IVF  Avoid nephrotoxins and renal dosing meds. On hold home med lisinopril  Cont monitoring    HTN (hypertension)  Assessment & Plan  Will resume metoprolol and lisinopril  HTN prn meds    Type 2 diabetes mellitus (HCC)  Assessment & Plan  Check A1c  Hold home med Metformin  SSI with hypoglycemic protocol      VTE prophylaxis: heparin ppx

## 2022-04-12 NOTE — ED TRIAGE NOTES
Chief Complaint   Patient presents with   • Wound Check     Left ankle   • Sent by MD     For elevated wbc=16     Pt bib ems from [facility] for further evaluation of pt's left ankle wound , noted increase redness and swelling

## 2022-04-12 NOTE — ED NOTES
Med rec completed per pt's MAR   Allergies reviewed    Pt started a 10 day course of Augmentin on 4/11/2022

## 2022-04-12 NOTE — DISCHARGE PLANNING
note:  Received a call from Lindy, nursing supervisor at [facility] who confirmed that pt is their permanent resident.     Lindy said she would like to be contacted once pt is ready to return. Her contact number is [phone].

## 2022-04-12 NOTE — ASSESSMENT & PLAN NOTE
BMI 31  Life style modifications including healthy plant based diet and regular exercise recommended

## 2022-04-12 NOTE — ASSESSMENT & PLAN NOTE
Left lower extremity swelling improving  Blood culture and wound culture with no growth to date  Continue IV antibiotics  LPS consulted

## 2022-04-12 NOTE — ED PROVIDER NOTES
ED Provider Note    CHIEF COMPLAINT  Chief Complaint   Patient presents with   • Wound Check     Left ankle   • Sent by MD     For elevated wbc=16       HPI  Andrei Garay is a 82 y.o. male who presents with swelling and redness to the left lateral ankle extending to the foot and mid calf region.  Onset of redness and swelling over the past week.  Blood testing at his care home yesterday showed white blood cell count of 16.  Patient has had some chills.  No measured fever.  He is diabetic.  His daughter states there is been a small scab or wound on this area for at least 6 months, she states some days better than others.  With progressive redness and swelling, history of diabetes, who presents emerged from today for evaluation of infection.  These were started on likely Augmentin yesterday according to the daughter, unable to confirm, he is worsening despite this.  No abdominal pain, no vomiting.  No chest pain, no shortness of breath.  No cough, he denies sore throat.  With history of dementia, much of the history is given to me by his family member at bedside.    REVIEW OF SYSTEMS    Constitutional: Subjective fever  Respiratory: No cough  Cardiac: No chest pain or syncope  Gastrointestinal: No vomiting or diarrhea  Musculoskeletal: No flank pain, denies ankle pain  Neurologic: Poor sensation in the lower extremity secondary to diabetic neuropathy  Endocrine: Diabetes  Skin: Redness, wound left ankle       All other systems are negative.       PAST MEDICAL HISTORY  No past medical history on file.    FAMILY HISTORY  No family history on file.    SOCIAL HISTORY  Social History     Socioeconomic History   • Marital status: Legally        SURGICAL HISTORY  No past surgical history on file.    CURRENT MEDICATIONS  Home Medications    **Home medications have not yet been reviewed for this encounter**         ALLERGIES  No Known Allergies    PHYSICAL EXAM  VITAL SIGNS: /56   Pulse 80   Temp 36.6 °C  (97.9 °F) (Temporal)   Resp 18   Ht 1.829 m (6')   Wt 104 kg (230 lb)   SpO2 93%   BMI 31.19 kg/m²   Constitutional:  Non-toxic appearance.   HENT: No facial swelling  Eyes: Anicteric, no conjunctivitis.     Cardiovascular: Normal heart rate, Normal rhythm  Pulmonary:  No wheezing, No rales.   Gastrointestinal: Soft, No tenderness, No masses  Skin: Warm, Dry, No cyanosis.  Nickel sized wound left lateral ankle, no subcutaneous emphysema.  There is surrounding cellulitic change to the mid shin and left midfoot.  No purulent discharge  Neurologic: Speech is clear, follows commands, facial expression is symmetrical.  Diminished sensation in both feet  Psychiatric: Affect normal,  Mood normal.  Patient is calm and cooperative  Musculoskeletal: Minimal tenderness left lateral ankle.  Range of motion of the ankle is normal, no increased pain    EKG/Labs  Results for orders placed or performed during the hospital encounter of 04/12/22   Comp Metabolic Panel   Result Value Ref Range    Sodium 133 (L) 135 - 145 mmol/L    Potassium 4.0 3.6 - 5.5 mmol/L    Chloride 99 96 - 112 mmol/L    Co2 20 20 - 33 mmol/L    Anion Gap 14.0 7.0 - 16.0    Glucose 138 (H) 65 - 99 mg/dL    Bun 27 (H) 8 - 22 mg/dL    Creatinine 1.48 (H) 0.50 - 1.40 mg/dL    Calcium 8.9 8.5 - 10.5 mg/dL    AST(SGOT) 17 12 - 45 U/L    ALT(SGPT) 8 2 - 50 U/L    Alkaline Phosphatase 61 30 - 99 U/L    Total Bilirubin 1.4 0.1 - 1.5 mg/dL    Albumin 3.9 3.2 - 4.9 g/dL    Total Protein 7.3 6.0 - 8.2 g/dL    Globulin 3.4 1.9 - 3.5 g/dL    A-G Ratio 1.1 g/dL   Lactic Acid   Result Value Ref Range    Lactic Acid 3.4 (H) 0.5 - 2.0 mmol/L   Lactic Acid   Result Value Ref Range    Lactic Acid 2.6 (H) 0.5 - 2.0 mmol/L   ESTIMATED GFR   Result Value Ref Range    GFR (CKD-EPI) 47 (A) >60 mL/min/1.73 m 2         RADIOLOGY/PROCEDURES  DX-ANKLE 3+ VIEWS LEFT   Final Result      Lateral ankle soft tissue defect with associated soft tissue swelling. No definite soft tissue gas  or radiographic evidence of osteomyelitis.            COURSE & MEDICAL DECISION MAKING  Pertinent Labs & Imaging studies reviewed. (See chart for details)  Patient started on Augmentin yesterday, not improving.  Concern over worsening cellulitis in the setting of diabetes, he is sent to the emergency department.  Here vital signs look well however elevated lactic acid is concerning for sepsis.  He did have reported white blood cell count of 16 yesterday.  X-ray negative for evidence of soft tissue gas or osteomyelitis.  He is hospitalized for IV antibiotics and ongoing treatment.    FINAL IMPRESSION     1. Sepsis, due to unspecified organism, unspecified whether acute organ dysfunction present (HCC)     2. Cellulitis of left lower extremity     3. Diabetic ulcer of ankle (HCC)                     Electronically signed by: Otis Bean M.D., 4/12/2022 11:56 AM

## 2022-04-13 LAB
ANION GAP SERPL CALC-SCNC: 14 MMOL/L (ref 7–16)
BASOPHILS # BLD AUTO: 0.3 % (ref 0–1.8)
BASOPHILS # BLD: 0.04 K/UL (ref 0–0.12)
BUN SERPL-MCNC: 24 MG/DL (ref 8–22)
CALCIUM SERPL-MCNC: 8.6 MG/DL (ref 8.5–10.5)
CHLORIDE SERPL-SCNC: 100 MMOL/L (ref 96–112)
CO2 SERPL-SCNC: 21 MMOL/L (ref 20–33)
CREAT SERPL-MCNC: 0.92 MG/DL (ref 0.5–1.4)
EOSINOPHIL # BLD AUTO: 0.31 K/UL (ref 0–0.51)
EOSINOPHIL NFR BLD: 2.4 % (ref 0–6.9)
ERYTHROCYTE [DISTWIDTH] IN BLOOD BY AUTOMATED COUNT: 44.3 FL (ref 35.9–50)
ERYTHROCYTE [SEDIMENTATION RATE] IN BLOOD BY WESTERGREN METHOD: 40 MM/HOUR (ref 0–20)
GFR SERPLBLD CREATININE-BSD FMLA CKD-EPI: 83 ML/MIN/1.73 M 2
GLUCOSE BLD STRIP.AUTO-MCNC: 148 MG/DL (ref 65–99)
GLUCOSE BLD STRIP.AUTO-MCNC: 192 MG/DL (ref 65–99)
GLUCOSE BLD STRIP.AUTO-MCNC: 208 MG/DL (ref 65–99)
GLUCOSE BLD STRIP.AUTO-MCNC: 216 MG/DL (ref 65–99)
GLUCOSE BLD STRIP.AUTO-MCNC: 243 MG/DL (ref 65–99)
GLUCOSE SERPL-MCNC: 162 MG/DL (ref 65–99)
HCT VFR BLD AUTO: 38.4 % (ref 42–52)
HGB BLD-MCNC: 13.1 G/DL (ref 14–18)
IMM GRANULOCYTES # BLD AUTO: 0.06 K/UL (ref 0–0.11)
IMM GRANULOCYTES NFR BLD AUTO: 0.5 % (ref 0–0.9)
LYMPHOCYTES # BLD AUTO: 1.52 K/UL (ref 1–4.8)
LYMPHOCYTES NFR BLD: 11.6 % (ref 22–41)
MCH RBC QN AUTO: 29.8 PG (ref 27–33)
MCHC RBC AUTO-ENTMCNC: 34.1 G/DL (ref 33.7–35.3)
MCV RBC AUTO: 87.3 FL (ref 81.4–97.8)
MONOCYTES # BLD AUTO: 1.17 K/UL (ref 0–0.85)
MONOCYTES NFR BLD AUTO: 8.9 % (ref 0–13.4)
NEUTROPHILS # BLD AUTO: 10.02 K/UL (ref 1.82–7.42)
NEUTROPHILS NFR BLD: 76.3 % (ref 44–72)
NRBC # BLD AUTO: 0 K/UL
NRBC BLD-RTO: 0 /100 WBC
PLATELET # BLD AUTO: 214 K/UL (ref 164–446)
PMV BLD AUTO: 9.6 FL (ref 9–12.9)
POTASSIUM SERPL-SCNC: 3.8 MMOL/L (ref 3.6–5.5)
RBC # BLD AUTO: 4.4 M/UL (ref 4.7–6.1)
SCCMEC + MECA PNL NOSE NAA+PROBE: NEGATIVE
SODIUM SERPL-SCNC: 135 MMOL/L (ref 135–145)
WBC # BLD AUTO: 13.1 K/UL (ref 4.8–10.8)

## 2022-04-13 PROCEDURE — 97602 WOUND(S) CARE NON-SELECTIVE: CPT

## 2022-04-13 PROCEDURE — A9270 NON-COVERED ITEM OR SERVICE: HCPCS | Performed by: STUDENT IN AN ORGANIZED HEALTH CARE EDUCATION/TRAINING PROGRAM

## 2022-04-13 PROCEDURE — 770001 HCHG ROOM/CARE - MED/SURG/GYN PRIV*

## 2022-04-13 PROCEDURE — 87641 MR-STAPH DNA AMP PROBE: CPT

## 2022-04-13 PROCEDURE — 700105 HCHG RX REV CODE 258: Performed by: STUDENT IN AN ORGANIZED HEALTH CARE EDUCATION/TRAINING PROGRAM

## 2022-04-13 PROCEDURE — 700102 HCHG RX REV CODE 250 W/ 637 OVERRIDE(OP): Performed by: STUDENT IN AN ORGANIZED HEALTH CARE EDUCATION/TRAINING PROGRAM

## 2022-04-13 PROCEDURE — 80048 BASIC METABOLIC PNL TOTAL CA: CPT

## 2022-04-13 PROCEDURE — 700111 HCHG RX REV CODE 636 W/ 250 OVERRIDE (IP): Performed by: STUDENT IN AN ORGANIZED HEALTH CARE EDUCATION/TRAINING PROGRAM

## 2022-04-13 PROCEDURE — 82962 GLUCOSE BLOOD TEST: CPT | Mod: 91

## 2022-04-13 PROCEDURE — 36415 COLL VENOUS BLD VENIPUNCTURE: CPT

## 2022-04-13 PROCEDURE — 700105 HCHG RX REV CODE 258: Performed by: HOSPITALIST

## 2022-04-13 PROCEDURE — 99221 1ST HOSP IP/OBS SF/LOW 40: CPT

## 2022-04-13 PROCEDURE — 99232 SBSQ HOSP IP/OBS MODERATE 35: CPT | Performed by: STUDENT IN AN ORGANIZED HEALTH CARE EDUCATION/TRAINING PROGRAM

## 2022-04-13 PROCEDURE — 85025 COMPLETE CBC W/AUTO DIFF WBC: CPT

## 2022-04-13 PROCEDURE — 85652 RBC SED RATE AUTOMATED: CPT

## 2022-04-13 RX ADMIN — SODIUM CHLORIDE: 9 INJECTION, SOLUTION INTRAVENOUS at 11:03

## 2022-04-13 RX ADMIN — PIPERACILLIN AND TAZOBACTAM 3.38 G: 3; .375 INJECTION, POWDER, LYOPHILIZED, FOR SOLUTION INTRAVENOUS; PARENTERAL at 12:38

## 2022-04-13 RX ADMIN — HYDROCODONE BITARTRATE AND ACETAMINOPHEN 1 TABLET: 5; 325 TABLET ORAL at 03:39

## 2022-04-13 RX ADMIN — PIPERACILLIN AND TAZOBACTAM 3.38 G: 3; .375 INJECTION, POWDER, LYOPHILIZED, FOR SOLUTION INTRAVENOUS; PARENTERAL at 06:36

## 2022-04-13 RX ADMIN — Medication 10 MG: at 20:53

## 2022-04-13 RX ADMIN — GABAPENTIN 300 MG: 300 CAPSULE ORAL at 18:01

## 2022-04-13 RX ADMIN — NYSTATIN: 100000 POWDER TOPICAL at 06:32

## 2022-04-13 RX ADMIN — RIVAROXABAN 10 MG: 10 TABLET, FILM COATED ORAL at 18:01

## 2022-04-13 RX ADMIN — ATORVASTATIN CALCIUM 20 MG: 20 TABLET, FILM COATED ORAL at 20:53

## 2022-04-13 RX ADMIN — OXYCODONE HYDROCHLORIDE AND ACETAMINOPHEN 1000 MG: 500 TABLET ORAL at 06:31

## 2022-04-13 RX ADMIN — DOXYCYCLINE 100 MG: 100 TABLET, FILM COATED ORAL at 18:01

## 2022-04-13 RX ADMIN — INSULIN HUMAN 2 UNITS: 100 INJECTION, SOLUTION PARENTERAL at 20:53

## 2022-04-13 RX ADMIN — METOPROLOL TARTRATE 25 MG: 25 TABLET, FILM COATED ORAL at 06:31

## 2022-04-13 RX ADMIN — SERTRALINE 50 MG: 50 TABLET, FILM COATED ORAL at 07:19

## 2022-04-13 RX ADMIN — METOPROLOL TARTRATE 25 MG: 25 TABLET, FILM COATED ORAL at 18:01

## 2022-04-13 RX ADMIN — INSULIN HUMAN 1 UNITS: 100 INJECTION, SOLUTION PARENTERAL at 06:33

## 2022-04-13 RX ADMIN — HEPARIN SODIUM 5000 UNITS: 5000 INJECTION, SOLUTION INTRAVENOUS; SUBCUTANEOUS at 06:32

## 2022-04-13 RX ADMIN — INSULIN HUMAN 2 UNITS: 100 INJECTION, SOLUTION PARENTERAL at 18:00

## 2022-04-13 RX ADMIN — MIRTAZAPINE 15 MG: 15 TABLET, FILM COATED ORAL at 20:53

## 2022-04-13 RX ADMIN — DOXYCYCLINE 100 MG: 100 TABLET, FILM COATED ORAL at 06:32

## 2022-04-13 RX ADMIN — GABAPENTIN 300 MG: 300 CAPSULE ORAL at 06:31

## 2022-04-13 RX ADMIN — INSULIN HUMAN 2 UNITS: 100 INJECTION, SOLUTION PARENTERAL at 11:02

## 2022-04-13 ASSESSMENT — ENCOUNTER SYMPTOMS
NEUROLOGICAL NEGATIVE: 1
PSYCHIATRIC NEGATIVE: 1
MUSCULOSKELETAL NEGATIVE: 1
CARDIOVASCULAR NEGATIVE: 1
GASTROINTESTINAL NEGATIVE: 1
EYES NEGATIVE: 1
RESPIRATORY NEGATIVE: 1
CONSTITUTIONAL NEGATIVE: 1

## 2022-04-13 NOTE — CONSULTS
"LIMB PRESERVATION SERVICE CONSULT      REFERRED BY: Dr Sylvester     DATE OF CONSULTATION: 4/13/2022    REASON FOR CONSULT: left lateral ankle wound      HISTORY OF PRESENT ILLNESS: Andrei Garay is a 82 y.o.  with a past medical history that includes HTN, depression, and type 2 diabetes. Admitted 4/12/2022 for Cellulitis [L03.90].     LPS has been consulted for left lateral ankle wound. The ulcer started about 6 months ago, in the fall of 2021.    Patients ex-wife at the bedside and assisted with history as patient does have some mild dementia. He says that he used to have a FWW that he continued to keep \"hitting\" his ankle on. He says it would heal and then open up again when he would hit it on the FFW walker.  He states that it recently began to be more red and painful to ambulate for the past week.   He lives at the [facility] and has been receiving wound care there for this.   Patient denied fevers, chills, nausea, vomiting.      IV antibiotics were started on this admission.  Infectious diseases has not been consulted.  Xray completed and is negative for osteomyelitis. Ortho has not been consulted yet.    Diagnosed with diabetes 10 years ago years ago, and is currently managing with insulin.  His blood sugars are checked 2 times per day at his facility and he says that he is unsure what they run.  Does have numbness to feet.  Checks feet routinely. Usually wears OTC . Does not have diabetic shoes and inserts. Has not had previous foot surgeries.  Current occupation: retired       Smoking:   reports that he has quit smoking. He does not have any smokeless tobacco history on file.    Alcohol:   has no history on file for alcohol use.    Drug:   has no history on file for drug use.      PAST MEDICAL HISTORY: No past medical history on file.     PAST SURGICAL HISTORY: No past surgical history on file.    MEDICATIONS:   Current Facility-Administered Medications   Medication Dose   • rivaroxaban " (XARELTO) tablet 10 mg  10 mg   • ascorbic acid (Vitamin C) tablet 1,000 mg  1,000 mg   • atorvastatin (LIPITOR) tablet 20 mg  20 mg   • [Held by provider] lisinopril (PRINIVIL) tablet 2.5 mg  2.5 mg   • gabapentin (NEURONTIN) capsule 300 mg  300 mg   • melatonin tablet 10 mg  10 mg   • metoprolol tartrate (LOPRESSOR) tablet 25 mg  25 mg   • mirtazapine (Remeron) tablet 15 mg  15 mg   • sertraline (Zoloft) tablet 50 mg  50 mg   • senna-docusate (PERICOLACE or SENOKOT S) 8.6-50 MG per tablet 2 Tablet  2 Tablet    And   • polyethylene glycol/lytes (MIRALAX) PACKET 1 Packet  1 Packet    And   • magnesium hydroxide (MILK OF MAGNESIA) suspension 30 mL  30 mL    And   • bisacodyl (DULCOLAX) suppository 10 mg  10 mg   • acetaminophen (Tylenol) tablet 650 mg  650 mg   • labetalol (NORMODYNE/TRANDATE) injection 10 mg  10 mg   • ondansetron (ZOFRAN) syringe/vial injection 4 mg  4 mg   • ondansetron (ZOFRAN ODT) dispertab 4 mg  4 mg   • insulin regular (HumuLIN R,NovoLIN R) injection  1-6 Units    And   • dextrose 50% (D50W) injection 25 g  25 g   • HYDROcodone-acetaminophen (NORCO) 5-325 MG per tablet 1 Tablet  1 Tablet   • doxycycline monohydrate (ADOXA) tablet 100 mg  100 mg   • nystatin (MYCOSTATIN) powder         ALLERGIES:  No Known Allergies     FAMILY HISTORY: No family history on file.      REVIEW OF SYSTEMS:   Constitutional: Negative for chills, fever   Respiratory: Negative for cough and shortness of breath.    Cardiovascular:Negative for chest pain, and claudication.   Gastrointestinal: Negative for constipation, diarrhea, nausea and vomiting.   Lower extremities: positive for swelling and redness to left LE  Neurological: positive for numbness to feet and lower legs  All other systems reviewed and are negative     RESULTS:     Recent Labs     04/12/22  1155 04/13/22  0219   WBC 18.9* 13.1*   RBC 4.88 4.40*   HEMOGLOBIN 14.5 13.1*   HEMATOCRIT 43.2 38.4*   MCV 88.5 87.3   MCH 29.7 29.8   MCHC 33.6* 34.1   RDW 44.3  44.3   PLATELETCT 231 214   MPV 9.8 9.6     Recent Labs     22  1155 22  0219   SODIUM 133* 135   POTASSIUM 4.0 3.8   CHLORIDE 99 100   CO2 20 21   GLUCOSE 138* 162*   BUN 27* 24*         ESR:     Results from last 7 days   Lab Units 22  0219   SED RATE WESTERGREN 1526 mm/hour 40*       CRP:       Results from last 7 days   Lab Units 22  1503   C REACTIVE PROTEIN 4596 mg/dL 22.27*         COVID-19: Not completed this admission      Imagin22  DX-ANKLE 3+ VIEWS LEFT   Final Result      Lateral ankle soft tissue defect with associated soft tissue swelling. No definite soft tissue gas or radiographic evidence of osteomyelitis.          CT/MRI: none     Arterial studies: none    A1c:  Lab Results   Component Value Date/Time    HBA1C 6.7 (H) 2022 05:59 PM            Microbiology:  Results     Procedure Component Value Units Date/Time    MRSA By PCR (Amp) [[id number]] Collected: 22 0640    Order Status: Completed Specimen: Respirate from Nares Updated: 22 1605     MRSA by PCR Negative    CULTURE WOUND W/ GRAM STAIN [[id number]]  (Abnormal) Collected: 22 115    Order Status: Completed Specimen: Wound from Left Foot Updated: 22 1220     Significant Indicator POS     Source WND     Site LEFT FOOT     Culture Result -     Gram Stain Result Rare WBCs.  Rare Gram positive cocci.       Culture Result Staphylococcus aureus  Light growth  Methicillin resistant be screening method.      Blood Culture [[id number]] Collected: 22 115    Order Status: Completed Specimen: Blood from Peripheral Updated: 22 0826     Significant Indicator NEG     Source BLD     Site PERIPHERAL     Culture Result No Growth  Note: Blood cultures are incubated for 5 days and  are monitored continuously.Positive blood cultures  are called to the RN and reported as soon as  they are identified.      Narrative:      2 of 2 blood culture x2  Sites order. Per Hospital Policy:  Only change  "Specimen Src: to \"Line\" if specified by physician  order.  Right AC    Blood Culture [[id number]] Collected: 04/12/22 1223    Order Status: Completed Specimen: Blood from Peripheral Updated: 04/13/22 0826     Significant Indicator NEG     Source BLD     Site PERIPHERAL     Culture Result No Growth  Note: Blood cultures are incubated for 5 days and  are monitored continuously.Positive blood cultures  are called to the RN and reported as soon as  they are identified.      Narrative:      1 of 2 for Blood Culture x 2 sites order. Per Hospital  Policy: Only change Specimen Src: to \"Line\" if specified by  physician order.  Right AC    GRAM STAIN [[id number]] Collected: 04/12/22 1155    Order Status: Completed Specimen: Wound Updated: 04/12/22 1435     Significant Indicator .     Source WND     Site LEFT FOOT     Gram Stain Result Rare WBCs.  Rare Gram positive cocci.             PHYSICAL EXAMINATION:     VITAL SIGNS: /55   Pulse 77   Temp 36.8 °C (98.2 °F) (Temporal)   Resp 18   Ht 1.829 m (6')   Wt 104 kg (230 lb)   SpO2 93%   BMI 31.19 kg/m²       General Appearance:  Well developed, well nourished, in no acute distress. Sitting up in the chair visiting with his ex-wife.     Lower Extremity Assessment:    Edema:   Moderate pitting edema     ROM dorsi/plantarflexion:  Dorsiflexion limited    Structural /mechanical changes:   mycotic toenails    Sensory Assessment:  Monofilament testing with a 10 gram force: sensation: decreased bilaterally  Visual Inspection: Feet without maceration, ulcers, fissures.  Pedal pulses: decreased bilaterally    Feet Insensate to light touch    R foot: sensed 02/10 points  L foot: sensed 01/10 points    Pulses:  R foot: diminished but palpable DP, diminished but palpable PT  L foot: diminished but palpable DP, diminished but palpable PT    R foot:  With doppler brisk tones noted to PT/DP  L foot:  With doppler dimished tones noted to PT/DP      Wound Assessment:    Wound(s) "   location: left lateral ankle   Full thickness, does not probe to bone  Wound characteristics:  red tissue with macerated periwound   Erythema: mild   Drainage: serosanguinous   Callus: none   Odor: none     Wound care completed by wound care RN/PT. See note and flowsheets.     Wound photo:                 ASSESSMENT AND PLAN:   82 y.o. admitted for Cellulitis [L03.90]. Presents with lateral ankle wound. No purulence noted, erythema decreased. Continue with IV abx and wound care at this time.     Wound care:   -Wound care orders placed for nursing by  wound team   -Left lateral ankle: hydrofera blue ready over open wounds and secure in place with adhesive foam or heel mepilex. Tubigrip over bilateral legs to assist with LE edema     Imaging/Labs:  -COVID-19: not completed this admission.   Vascular status:   -  Palpable pedal pulses bilaterally    Surgery:   -  no plans for surgery at this time    Antibiotics:   -currently on antibiotics managed by hospitalist   - recommend ID consult     Weight Bearing Status:   -Left foot: Weight bearing as tolerated    Offloading:   -None;   -Orthotic company: none     PT/OT:   -no consult placed at this time       Diabetes Education:   -no consult placed for CDE and RD .   -   Lab Results   Component Value Date/Time    HBA1C 6.7 (H) 04/12/2022 05:59 PM        - Implications of loss of protective sensation (LOPS) discussed with patient- including increased risk for amputation.  Advised to check feet at least daily, moisturize feet, and to always wear protective foot wear.   -avoid trimming own nails. See podiatrist or certified foot and nail RN  -keep blood sugars <150 for improved wound healing        Discharge Plan:  TBD, pending response to IV abx.   Patient lives at [facility].     Follow up: no wound care clinic referral placed at this time.   Follow up: no LPS rounds at [facility] wound care clinic scheduled       D/W: pt, RN, Dr. Newton     Please note  that this dictation was created using voice recognition software. I have  worked with technical experts from [employer] to optimize the interface.  I have made every reasonable attempt to correct obvious errors, but there may be errors of grammar and possibly content that I did not discover before finalizing the note.    Please contact LPS through Voalte.

## 2022-04-13 NOTE — PROGRESS NOTES
This RN discussed code status with patient and his daughter, Marah at the bedside. The patient wants to remain full code; however, he has a POLST that states DNR. Pt currently wants to be full code and daughter Marah supports patient's decision. Dr. Sylvester updated.

## 2022-04-13 NOTE — DISCHARGE PLANNING
@[id number]  Agency/Facility Name: [facility]  Outcome: Left message, awaiting call back.    Received Choice form at 1230  Agency/Facility Name: [facility]  Referral sent per Choice form @ 123

## 2022-04-13 NOTE — PROGRESS NOTES
Hospital Medicine Daily Progress Note    Date of Service  4/13/2022    Chief Complaint  Andrei Garay is a 82 y.o. male admitted 4/12/2022 with left lower extremity swelling    Hospital Course  82 y.o. male with history of DM2, HTN, depression, who presented 4/12/2022 with swelling and redness of L lateral ankle wound. The redness has been extending to the L foot and mild calf region. Onset a few weeks ago. Associated with chills and drainage. Patient lives at [facility] and was noted elevated wbc of 18 and sent patient for further evaluation.     Here labs remarkable for wbc 18.9, Na 133, Cr 1.48, lactic acid 3.4 and .   L ankle x ray shows Lateral ankle soft tissue defect with associated soft tissue swelling. No definite soft tissue gas or radiographic evidence of osteomyelitis.   Patient was started with IVF and iv abx and was admitted for further evaluation.   Of note, patient has POLST indicating DNR. Per daughter it was made a few weeks ago, patient would like to be full code today. Will keep code.     Interval Problem Update  Patient was evaluated at bedside  Left lower extremity feeling better and the splint today  Less erythema around margins this AM  Stable vitals  Leukocytosis trending down  SADIE resolved  Blood cultures with no growth to date  1/2 wound culture growing MSSA  Discontinue Zosyn  Continue doxycycline  Labs on a.m.    I have personally seen and examined the patient at bedside. I discussed the plan of care with patient, family, bedside RN, charge RN,  and pharmacy.    Consultants/Specialty  None    Code Status  Full Code    Disposition  Patient is not medically cleared for discharge.   Anticipate discharge to to skilled nursing facility.  I have placed the appropriate orders for post-discharge needs.    Review of Systems  Review of Systems   Constitutional: Negative.    HENT: Negative.    Eyes: Negative.    Respiratory: Negative.    Cardiovascular:  Negative.    Gastrointestinal: Negative.    Genitourinary: Negative.    Musculoskeletal: Negative.    Skin: Negative.    Neurological: Negative.    Endo/Heme/Allergies: Negative.    Psychiatric/Behavioral: Negative.         Physical Exam  Temp:  [36.3 °C (97.3 °F)-37.2 °C (98.9 °F)] 36.8 °C (98.2 °F)  Pulse:  [] 77  Resp:  [18-19] 18  BP: (100-159)/(53-98) 116/55  SpO2:  [92 %-95 %] 93 %    Physical Exam  Constitutional:       General: He is not in acute distress.     Appearance: Normal appearance. He is obese. He is not ill-appearing.   HENT:      Head: Normocephalic and atraumatic.      Nose: Nose normal. No congestion.      Mouth/Throat:      Mouth: Mucous membranes are moist.   Eyes:      Extraocular Movements: Extraocular movements intact.      Pupils: Pupils are equal, round, and reactive to light.   Cardiovascular:      Rate and Rhythm: Normal rate and regular rhythm.      Pulses: Normal pulses.      Heart sounds: Normal heart sounds.   Pulmonary:      Effort: Pulmonary effort is normal.      Breath sounds: Normal breath sounds.   Abdominal:      General: Bowel sounds are normal.      Palpations: Abdomen is soft.      Tenderness: There is no abdominal tenderness.   Musculoskeletal:         General: Swelling and tenderness present. Normal range of motion.      Cervical back: Normal range of motion and neck supple.      Left lower leg: Edema present.   Skin:     General: Skin is warm.      Coloration: Skin is not jaundiced.   Neurological:      General: No focal deficit present.      Mental Status: He is alert and oriented to person, place, and time. Mental status is at baseline.      Cranial Nerves: No cranial nerve deficit.   Psychiatric:         Mood and Affect: Mood normal.         Behavior: Behavior normal.         Thought Content: Thought content normal.         Judgment: Judgment normal.         Fluids    Intake/Output Summary (Last 24 hours) at 4/13/2022 1524  Last data filed at 4/13/2022  1000  Gross per 24 hour   Intake 340 ml   Output --   Net 340 ml       Laboratory  Recent Labs     04/12/22  1155 04/13/22  0219   WBC 18.9* 13.1*   RBC 4.88 4.40*   HEMOGLOBIN 14.5 13.1*   HEMATOCRIT 43.2 38.4*   MCV 88.5 87.3   MCH 29.7 29.8   MCHC 33.6* 34.1   RDW 44.3 44.3   PLATELETCT 231 214   MPV 9.8 9.6     Recent Labs     04/12/22  1155 04/13/22  0219   SODIUM 133* 135   POTASSIUM 4.0 3.8   CHLORIDE 99 100   CO2 20 21   GLUCOSE 138* 162*   BUN 27* 24*   CREATININE 1.48* 0.92   CALCIUM 8.9 8.6                   Imaging  DX-ANKLE 3+ VIEWS LEFT   Final Result      Lateral ankle soft tissue defect with associated soft tissue swelling. No definite soft tissue gas or radiographic evidence of osteomyelitis.           Assessment/Plan  * Cellulitis- (present on admission)  Assessment & Plan  Left lower extremity swelling improving  Blood culture and wound culture with no growth to date  Continue IV antibiotics  LPS consulted    Advance care planning- (present on admission)  Assessment & Plan  Patient has POLST indicating DNR. Per daughter this was made a few weeks ago. Patient would like to be full code today. Will keep full code now.   Continue to discuss GOC with patient and daughter.     Acute kidney injury (HCC)- (present on admission)  Assessment & Plan  Resolved    HTN (hypertension)- (present on admission)  Assessment & Plan  Will resume metoprolol and lisinopril  HTN prn meds    Type 2 diabetes mellitus (HCC)- (present on admission)  Assessment & Plan  Check A1c  Hold home med Metformin  SSI with hypoglycemic protocol    Obesity- (present on admission)  Assessment & Plan  BMI 31  Life style modifications including healthy plant based diet and regular exercise recommended       Sepsis (HCC)- (present on admission)  Assessment & Plan  Resolved       VTE prophylaxis: Xarelto prophylaxis    I have performed a physical exam and reviewed and updated ROS and Plan today (4/13/2022). In review of yesterday's note  (4/12/2022), there are no changes except as documented above.

## 2022-04-13 NOTE — DOCUMENTATION QUERY
"                                                                         [facility]                                                                       Query Response Note      PATIENT:               COOKIE VELEZ  ACCT #:                  [id number]  MRN:                     [id number]  :                      1940  ADMIT DATE:       2022 11:10 AM  DISCH DATE:          RESPONDING  PROVIDER #:        [id number]           QUERY TEXT:    The diagnosis of sepsis has been documented in the ED and H&P.  (Customize how many SIRS criteria are present)  Please provide additional clinical indicators/SIRS criteria supportive of the documented diagnosis of sepsis.     Note: If an appropriate response is not listed below, please respond with a new note.    Sepsis - real or suspected infection plus 2 or more SIRS criteria  Temp <96.8 or >101  HR >90  RR >20  WBC <4,000 or > 12,000  >10% bandemia    If you have any questions please contact:  Batool Mckeon RN CDI [facility]  [email or web address]  Batool Mckeon Via Voal[employer]           The patient's Clinical Indicators include:  82 year old male with DM admitted with cellulitis .     ED note Bean  \"Sepsis, due to unspecified organism\"  \" Non-toxic appearance.\"  /56   Pulse 80   Temp 36.6 °C (97.9 °F) (Temporal)   Resp 18       H&P Sylvester \"This is Sepsis Present on admission. SIRS criteria identified on my evaluation include: Tachypnea, with respirations greater than 20 per minute and Leukocytosis, with WBC greater than 12,000\"  \"He is not in acute distress.\"    WBC 18.9, 13.1  Lac 3.4    Risk factors: cellulitis'  Treatment: labs, ABX  Options provided:   -- Sepsis exists, (please document additional + SIRS criteria and clinical indicators)   -- Sepsis does not exist - amended documentation in the medical record and updated problem list   -- Unable to provide additional clarity regarding the diagnosis   -- Unable to determine      Query created " by: Batool Mckeon on 4/13/2022 8:45 AM    RESPONSE TEXT:    sepsis exists - cellulitis, tachypenic with RR>20 and leukocytosis with wbc 18.9 on 4/12/2022          Electronically signed by:  JOSETTE ARMENDARIZ MD 4/13/2022 9:14 AM

## 2022-04-13 NOTE — DISCHARGE PLANNING
Anticipated Discharge Disposition: Return to [facility]        Action: CM was contacted by Chicho @ [facility] liaison, to inform CM that patient is a long term resident and to provide his contact information. Phone #  [phone], Fax # [phone]. Chicho also requested referral/clincials be sent if possible today once new notes are in, so they may follow his progression.      Update:9822: CM spoke with daughter, Marah to update her on her fathers case, in which she was grateful for the call and update. JIMMIE also spoke with [facility] liaison, Chicho to confirm they have a wound care nurse on staff who will be able to do the wound care when the patient returns, in which Chicho confirmed they do and it was the wound care nurse who alerted them of the patients wound progression. Chicho also stated that he would notify transport of patient potential return so they may transport on alert for the patient return to [facility].     Barriers to Discharge: medical clearance        Plan: Hospital Care Management will continue to follow and assist with discharge planning needs.

## 2022-04-13 NOTE — PROGRESS NOTES
Dr. Sylvester notified that pt voided and pt's . Will continue to monitor and straight cath if >350 ml PVR.

## 2022-04-13 NOTE — ASSESSMENT & PLAN NOTE
Patient has POLST indicating DNR. Per daughter this was made a few weeks ago. Patient would like to be full code today. Will keep full code now.   Continue to discuss GOC with patient and daughter.

## 2022-04-13 NOTE — PROGRESS NOTES
4 Eyes Skin Assessment Completed by FLORES Mehta and FLORES Woo.    Head WDL  Ears WDL  Nose WDL  Mouth Blood blister to lower lip  Neck WDL  Breast/Chest WDL  Shoulder Blades WDL  Spine WDL  (R) Arm/Elbow/Hand scab to middle finger  (L) Arm/Elbow/Hand WDL  Abdomen WDL  Groin Redness- R groin- nystatin ordered and applied  Scrotum/Coccyx/Buttocks WDL  (R) Leg WDL  (L) Leg Redness and wound to ankle  (R) Heel/Foot/Toe WDL  (L) Heel/Foot/Toe WDL          Devices In Places Pulse Ox      Interventions In Place N/A    Possible Skin Injury No    Pictures Uploaded Into Epic yes  Wound Consult Placed yes  RN Wound Prevention Protocol Ordered No

## 2022-04-14 VITALS
RESPIRATION RATE: 18 BRPM | OXYGEN SATURATION: 91 % | BODY MASS INDEX: 31.15 KG/M2 | TEMPERATURE: 97.6 F | WEIGHT: 230 LBS | HEART RATE: 70 BPM | HEIGHT: 72 IN | DIASTOLIC BLOOD PRESSURE: 64 MMHG | SYSTOLIC BLOOD PRESSURE: 152 MMHG

## 2022-04-14 LAB
BASOPHILS # BLD AUTO: 0.5 % (ref 0–1.8)
BASOPHILS # BLD: 0.05 K/UL (ref 0–0.12)
EOSINOPHIL # BLD AUTO: 0.3 K/UL (ref 0–0.51)
EOSINOPHIL NFR BLD: 2.7 % (ref 0–6.9)
ERYTHROCYTE [DISTWIDTH] IN BLOOD BY AUTOMATED COUNT: 41.8 FL (ref 35.9–50)
GLUCOSE BLD STRIP.AUTO-MCNC: 169 MG/DL (ref 65–99)
HCT VFR BLD AUTO: 37.8 % (ref 42–52)
HGB BLD-MCNC: 12.6 G/DL (ref 14–18)
IMM GRANULOCYTES # BLD AUTO: 0.08 K/UL (ref 0–0.11)
IMM GRANULOCYTES NFR BLD AUTO: 0.7 % (ref 0–0.9)
LYMPHOCYTES # BLD AUTO: 2.09 K/UL (ref 1–4.8)
LYMPHOCYTES NFR BLD: 18.9 % (ref 22–41)
MCH RBC QN AUTO: 29.2 PG (ref 27–33)
MCHC RBC AUTO-ENTMCNC: 33.3 G/DL (ref 33.7–35.3)
MCV RBC AUTO: 87.7 FL (ref 81.4–97.8)
MONOCYTES # BLD AUTO: 0.96 K/UL (ref 0–0.85)
MONOCYTES NFR BLD AUTO: 8.7 % (ref 0–13.4)
NEUTROPHILS # BLD AUTO: 7.59 K/UL (ref 1.82–7.42)
NEUTROPHILS NFR BLD: 68.5 % (ref 44–72)
NRBC # BLD AUTO: 0 K/UL
NRBC BLD-RTO: 0 /100 WBC
PLATELET # BLD AUTO: 251 K/UL (ref 164–446)
PMV BLD AUTO: 9.8 FL (ref 9–12.9)
RBC # BLD AUTO: 4.31 M/UL (ref 4.7–6.1)
WBC # BLD AUTO: 11.1 K/UL (ref 4.8–10.8)

## 2022-04-14 PROCEDURE — A9270 NON-COVERED ITEM OR SERVICE: HCPCS | Performed by: STUDENT IN AN ORGANIZED HEALTH CARE EDUCATION/TRAINING PROGRAM

## 2022-04-14 PROCEDURE — 700102 HCHG RX REV CODE 250 W/ 637 OVERRIDE(OP): Performed by: STUDENT IN AN ORGANIZED HEALTH CARE EDUCATION/TRAINING PROGRAM

## 2022-04-14 PROCEDURE — 99239 HOSP IP/OBS DSCHRG MGMT >30: CPT | Performed by: STUDENT IN AN ORGANIZED HEALTH CARE EDUCATION/TRAINING PROGRAM

## 2022-04-14 PROCEDURE — 85025 COMPLETE CBC W/AUTO DIFF WBC: CPT

## 2022-04-14 PROCEDURE — 82962 GLUCOSE BLOOD TEST: CPT

## 2022-04-14 PROCEDURE — 36415 COLL VENOUS BLD VENIPUNCTURE: CPT

## 2022-04-14 RX ORDER — AMOXICILLIN 250 MG
2 CAPSULE ORAL 2 TIMES DAILY
Status: DISCONTINUED | OUTPATIENT
Start: 2022-04-14 | End: 2022-04-14 | Stop reason: HOSPADM

## 2022-04-14 RX ORDER — POLYETHYLENE GLYCOL 3350 17 G/17G
1 POWDER, FOR SOLUTION ORAL
Status: DISCONTINUED | OUTPATIENT
Start: 2022-04-14 | End: 2022-04-14 | Stop reason: HOSPADM

## 2022-04-14 RX ORDER — DOXYCYCLINE 100 MG/1
100 TABLET ORAL EVERY 12 HOURS
Status: DISCONTINUED | OUTPATIENT
Start: 2022-04-14 | End: 2022-04-14 | Stop reason: HOSPADM

## 2022-04-14 RX ORDER — ACETAMINOPHEN 325 MG/1
650 TABLET ORAL EVERY 6 HOURS PRN
Qty: 30 TABLET | Refills: 0 | Status: SHIPPED | OUTPATIENT
Start: 2022-04-14 | End: 2022-06-29

## 2022-04-14 RX ORDER — DOXYCYCLINE 100 MG/1
100 TABLET ORAL EVERY 12 HOURS
Qty: 14 TABLET | Refills: 0 | Status: SHIPPED | OUTPATIENT
Start: 2022-04-14 | End: 2022-04-21

## 2022-04-14 RX ORDER — BISACODYL 10 MG
10 SUPPOSITORY, RECTAL RECTAL
Status: DISCONTINUED | OUTPATIENT
Start: 2022-04-14 | End: 2022-04-14 | Stop reason: HOSPADM

## 2022-04-14 RX ORDER — DOXYCYCLINE 100 MG/1
100 TABLET ORAL EVERY 12 HOURS
Status: DISCONTINUED | OUTPATIENT
Start: 2022-04-21 | End: 2022-04-14

## 2022-04-14 RX ADMIN — DOXYCYCLINE 100 MG: 100 TABLET, FILM COATED ORAL at 05:54

## 2022-04-14 RX ADMIN — SERTRALINE 50 MG: 50 TABLET, FILM COATED ORAL at 05:54

## 2022-04-14 RX ADMIN — METOPROLOL TARTRATE 25 MG: 25 TABLET, FILM COATED ORAL at 05:54

## 2022-04-14 RX ADMIN — OXYCODONE HYDROCHLORIDE AND ACETAMINOPHEN 1000 MG: 500 TABLET ORAL at 05:53

## 2022-04-14 RX ADMIN — SENNOSIDES AND DOCUSATE SODIUM 2 TABLET: 50; 8.6 TABLET ORAL at 05:54

## 2022-04-14 RX ADMIN — GABAPENTIN 300 MG: 300 CAPSULE ORAL at 05:54

## 2022-04-14 RX ADMIN — NYSTATIN: 100000 POWDER TOPICAL at 05:54

## 2022-04-14 RX ADMIN — INSULIN HUMAN 1 UNITS: 100 INJECTION, SOLUTION PARENTERAL at 05:54

## 2022-04-14 RX ADMIN — DOXYCYCLINE 100 MG: 100 TABLET, FILM COATED ORAL at 09:03

## 2022-04-14 NOTE — DISCHARGE PLANNING
DC Transport Scheduled    Received request at: 0959    Transport Company Scheduled:  [employer]  Spoke with Chalino at [facility]  to schedule transport. No transport benefits with [facility] being used.      Scheduled Date: 04/14/2022  Scheduled Time: 1300    Destination: [address] [place]      Notified care team of scheduled transport via Voalte.     If there are any changes needed to the DC transportation scheduled, please contact [facility] at ext. [phone] between the hours of 0280-6926 Mon-Fri. If outside those hours, contact the ED Case Manager at ext. [phone].

## 2022-04-14 NOTE — DISCHARGE INSTRUCTIONS
Discharge Instructions    Discharged to [facility] home by medical transportation with escort. Discharged via ambulance, hospital escort: Yes.  Special equipment needed:    Be sure to schedule a follow-up appointment with your primary care doctor or any specialists as instructed.     Discharge Plan:   Diet Plan: Discussed  Activity Level: Discussed  Confirmed Follow up Appointment: Patient to Call and Schedule Appointment  Confirmed Symptoms Management: Discussed  Medication Reconciliation Updated: Yes    I understand that a diet low in cholesterol, fat, and sodium is recommended for good health. Unless I have been given specific instructions below for another diet, I accept this instruction as my diet prescription.   Other diet:     Special Instructions: None    · Is patient discharged on Warfarin / Coumadin?   No     Depression / Suicide Risk    As you are discharged from this [facility] facility, it is important to learn how to keep safe from harming yourself.    Recognize the warning signs:  · Abrupt changes in personality, positive or negative- including increase in energy   · Giving away possessions  · Change in eating patterns- significant weight changes-  positive or negative  · Change in sleeping patterns- unable to sleep or sleeping all the time   · Unwillingness or inability to communicate  · Depression  · Unusual sadness, discouragement and loneliness  · Talk of wanting to die  · Neglect of personal appearance   · Rebelliousness- reckless behavior  · Withdrawal from people/activities they love  · Confusion- inability to concentrate     If you or a loved one observes any of these behaviors or has concerns about self-harm, here's what you can do:  · Talk about it- your feelings and reasons for harming yourself  · Remove any means that you might use to hurt yourself (examples: pills, rope, extension cords, firearm)  · Get professional help from the community (Mental Health, Substance Abuse, psychological  counseling)  · Do not be alone:Call your Safe Contact- someone whom you trust who will be there for you.  · Call your local CRISIS HOTLINE 882-0890 or 501-372-7065  · Call your local Children's Mobile Crisis Response Team [place] (054) 780-6705 or www.Dovo  · Call the toll free National Suicide Prevention Hotlines   · National Suicide Prevention Lifeline 427-127-ELMN (5363)  · National Hope Line Network 800-SUICIDE (172-8629)

## 2022-04-14 NOTE — DISCHARGE PLANNING
Anticipated Discharge Disposition: Return to [facility]        Action: CM was informed by Chicho at [facility] that patient is accepted and has a bed today. CM was asked to arranged transport for patient to SNF if medically cleared, in which CM confirmed with provider that patient is medically cleared. CM arranged transport for 1300 via wheelchair with [facility]. CM informed patient/fmaily, nurse and provider of update.CM also informed patients nurse to call report to [phone] prior to transport.        Barriers to Discharge: Pending transport pickup        Plan: Hospital Care Management will continue to follow and assist with discharge planning needs.

## 2022-04-14 NOTE — PROGRESS NOTES
[facility] by [facility]. IV removed and discharge paperwork reviewed with daughter and pt, signed and put copy in chart.

## 2022-04-14 NOTE — DISCHARGE SUMMARY
Discharge Summary    CHIEF COMPLAINT ON ADMISSION  Chief Complaint   Patient presents with   • Wound Check     Left ankle   • Sent by MD     For elevated wbc=16       Reason for Admission  Cellulitis     CODE STATUS  Full Code    HPI & HOSPITAL COURSE  82-year-old male with a past medical history of type 2 diabetes, hypertension and depression was admitted on 4/12/2022 for left lateral ankle cellulitis.  Patient started on a Zosyn/doxycycline antibiotic regimen.  Leukocytosis resolved with IV antibiotics and wound care following.  SADIE resolved with IV hydration blood cultures with no growth to date but 1 out of 2 wound cultures growing MRSA for which patient was transitioned to a doxycycline antibiotic regimen which she is to complete as an outpatient.  Stable patient was in chronic condition was discharged back to skilled nursing facility for continuity of care.    Therefore, he is discharged in good and stable condition to home with close outpatient follow-up.    The patient met 2-midnight criteria for an inpatient stay at the time of discharge.    FOLLOW UP ITEMS POST DISCHARGE  Skilled nursing facility to follow antibiotic completion and therapy regimen    DISCHARGE DIAGNOSES  Principal Problem:    Cellulitis POA: Yes  Active Problems:    Type 2 diabetes mellitus (HCC) POA: Yes    HTN (hypertension) POA: Yes    Acute kidney injury (HCC) POA: Yes    Advance care planning POA: Yes    Sepsis (HCC) POA: Yes    Obesity POA: Yes  Resolved Problems:    * No resolved hospital problems. *      FOLLOW UP  No future appointments.  No follow-up provider specified.    MEDICATIONS ON DISCHARGE     Medication List      START taking these medications      Instructions   acetaminophen 325 MG Tabs  Commonly known as: Tylenol   Take 2 Tablets by mouth every 6 hours as needed.  Dose: 650 mg     doxycycline monohydrate 100 MG tablet  Commonly known as: ADOXA   Take 1 Tablet by mouth every 12 hours for 7 days.  Dose: 100 mg         CONTINUE taking these medications      Instructions   ascorbic acid 500 MG tablet  Commonly known as: Vitamin C   Take 1,000 mg by mouth every day.  Dose: 1,000 mg     atorvastatin 20 MG Tabs  Commonly known as: LIPITOR   Take 20 mg by mouth every evening.  Dose: 20 mg     diclofenac sodium 1 % Gel  Commonly known as: Voltaren   Apply 4 g topically every day.  Dose: 4 g     gabapentin 300 MG Caps  Commonly known as: NEURONTIN   Take 300 mg by mouth 2 times a day.  Dose: 300 mg     HYDROcodone-acetaminophen 5-325 MG Tabs per tablet  Commonly known as: NORCO   Take 1 Tablet by mouth 2 times a day.  Dose: 1 Tablet     lisinopril 2.5 MG Tabs  Commonly known as: PRINIVIL   Take 2.5 mg by mouth every day.  Dose: 2.5 mg     Melatonin 10 MG Tabs   Take 10 mg by mouth at bedtime.  Dose: 10 mg     metformin 1000 MG tablet  Commonly known as: GLUCOPHAGE   Take 1,000 mg by mouth 2 times a day with meals.  Dose: 1,000 mg     metoprolol tartrate 25 MG Tabs  Commonly known as: LOPRESSOR   Take 25 mg by mouth 2 times a day.  Dose: 25 mg     mirtazapine 15 MG Tabs  Commonly known as: Remeron   Take 15 mg by mouth every evening.  Dose: 15 mg     neomycin-bacitracin-polymixin 5-400-23165 Oint  Commonly known as: NEOSPORIN   Apply 1 Application to both eyes 2 times a day. Apply to right middle finger  Dose: 1 Application     sertraline 50 MG Tabs  Commonly known as: Zoloft   Take 50 mg by mouth every day.  Dose: 50 mg     vitamin D2 (Ergocalciferol) 1.25 MG (34959 UT) Caps capsule  Commonly known as: Drisdol   Take 50,000 Units by mouth every 30 days.  Dose: 50,000 Units        STOP taking these medications    amoxicillin-clavulanate 875-125 MG Tabs  Commonly known as: AUGMENTIN            Allergies  No Known Allergies    DIET  Orders Placed This Encounter   Procedures   • Diet Order Diet: Consistent CHO (Diabetic)     Standing Status:   Standing     Number of Occurrences:   1     Order Specific Question:   Diet:     Answer:    Consistent CHO (Diabetic) [4]       ACTIVITY  As tolerated.  Weight bearing as tolerated    LINES, DRAINS, AND WOUNDS  This is an automated list. Peripheral IVs will be removed prior to discharge.       Wound 04/12/22 Ankle Lateral Left (Active)   Wound Image    04/13/22 1200   Site Assessment MONAE 04/13/22 2100   Periwound Assessment Maceration 04/13/22 2100   Margins Defined edges;Unattached edges 04/13/22 1200   Closure Secondary intention 04/13/22 1200   Drainage Amount Moderate 04/13/22 1200   Drainage Description Serous 04/13/22 1200   Treatments Cleansed;Site care;Offloading;Compression 04/13/22 1200   Wound Cleansing Normal Saline Irrigation 04/13/22 1200   Periwound Protectant Skin Protectant Wipes to Periwound 04/13/22 1200   Dressing Cleansing/Solutions Not Applicable 04/13/22 1200   Dressing Options Hydrofera Blue Ready;Hypafix Tape;Mepilex Heel;Tubigrip 04/13/22 2100   Dressing Changed New 04/13/22 1200   Dressing Status Clean;Dry;Intact 04/13/22 2100   Dressing Change/Treatment Frequency Every 48 hrs, and As Needed 04/13/22 2100   NEXT Dressing Change/Treatment Date 04/15/22 04/13/22 1200   NEXT Weekly Photo (Inpatient Only) 04/20/22 04/13/22 1200   Non-staged Wound Description Full thickness 04/13/22 1200   Wound Length (cm) 0.5 cm 04/13/22 1200   Wound Width (cm) 0.5 cm 04/13/22 1200   Wound Depth (cm) 0.3 cm 04/13/22 1200   Wound Surface Area (cm^2) 0.25 cm^2 04/13/22 1200   Wound Volume (cm^3) 0.075 cm^3 04/13/22 1200   Shape [place] 04/13/22 1200   Wound Odor None 04/13/22 1200   Pulses 1+;Left;DP;PT 04/13/22 1200   Exposed Structures MONAE 04/13/22 1200   WOUND NURSE ONLY - Time Spent with Patient (mins) 60 04/13/22 1200                  MENTAL STATUS ON TRANSFER  At baseline     CONSULTATIONS  None    PROCEDURES  None    LABORATORY  Lab Results   Component Value Date    SODIUM 135 04/13/2022    POTASSIUM 3.8 04/13/2022    CHLORIDE 100 04/13/2022    CO2 21 04/13/2022    GLUCOSE 162 (H) 04/13/2022     BUN 24 (H) 04/13/2022    CREATININE 0.92 04/13/2022        Lab Results   Component Value Date    WBC 11.1 (H) 04/14/2022    HEMOGLOBIN 12.6 (L) 04/14/2022    HEMATOCRIT 37.8 (L) 04/14/2022    PLATELETCT 251 04/14/2022        Total time of the discharge process exceeds 36 minutes.

## 2022-04-14 NOTE — CARE PLAN
The patient is Stable - Low risk of patient condition declining or worsening    Shift Goals  Clinical Goals: vitals signs wnl  Patient Goals: rest    Progress made toward(s) clinical / shift goals:  patient's vital signs remained stable overnight and rested     Problem: Knowledge Deficit - Standard  Goal: Patient and family/care givers will demonstrate understanding of plan of care, disease process/condition, diagnostic tests and medications  Outcome: Progressing     Problem: Skin Integrity  Goal: Skin integrity is maintained or improved  Outcome: Progressing     Problem: Fall Risk  Goal: Patient will remain free from falls  Outcome: Progressing     Problem: Pain - Standard  Goal: Alleviation of pain or a reduction in pain to the patient’s comfort goal  Outcome: Progressing

## 2022-04-15 LAB
BACTERIA WND AEROBE CULT: ABNORMAL
BACTERIA WND AEROBE CULT: ABNORMAL
GRAM STN SPEC: ABNORMAL
SIGNIFICANT IND 70042: ABNORMAL
SITE SITE: ABNORMAL
SOURCE SOURCE: ABNORMAL

## 2022-04-17 LAB
BACTERIA BLD CULT: NORMAL
BACTERIA BLD CULT: NORMAL
SIGNIFICANT IND 70042: NORMAL
SIGNIFICANT IND 70042: NORMAL
SITE SITE: NORMAL
SITE SITE: NORMAL
SOURCE SOURCE: NORMAL
SOURCE SOURCE: NORMAL

## 2022-05-11 ENCOUNTER — HOSPITAL ENCOUNTER (EMERGENCY)
Facility: MEDICAL CENTER | Age: 82
End: 2022-05-11
Attending: STUDENT IN AN ORGANIZED HEALTH CARE EDUCATION/TRAINING PROGRAM
Payer: COMMERCIAL

## 2022-05-11 ENCOUNTER — APPOINTMENT (OUTPATIENT)
Dept: RADIOLOGY | Facility: MEDICAL CENTER | Age: 82
End: 2022-05-11
Attending: STUDENT IN AN ORGANIZED HEALTH CARE EDUCATION/TRAINING PROGRAM
Payer: COMMERCIAL

## 2022-05-11 VITALS
BODY MASS INDEX: 26.95 KG/M2 | HEIGHT: 74 IN | SYSTOLIC BLOOD PRESSURE: 112 MMHG | WEIGHT: 210 LBS | DIASTOLIC BLOOD PRESSURE: 62 MMHG | RESPIRATION RATE: 17 BRPM | OXYGEN SATURATION: 92 % | TEMPERATURE: 97.5 F | HEART RATE: 74 BPM

## 2022-05-11 DIAGNOSIS — S09.90XA CLOSED HEAD INJURY, INITIAL ENCOUNTER: ICD-10-CM
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
## 2022-05-11 PROCEDURE — 700101 HCHG RX REV CODE 250

## 2022-05-11 PROCEDURE — 99285 EMERGENCY DEPT VISIT HI MDM: CPT

## 2022-05-11 PROCEDURE — 303747 HCHG EXTRA SUTURE

## 2022-05-11 PROCEDURE — 70450 CT HEAD/BRAIN W/O DYE: CPT | Mod: MG

## 2022-05-11 PROCEDURE — 304999 HCHG REPAIR-SIMPLE/INTERMED LEVEL 1

## 2022-05-11 PROCEDURE — 304217 HCHG IRRIGATION SYSTEM

## 2022-05-11 RX ORDER — LIDOCAINE HCL/EPINEPHRINE/PF 2%-1:200K
10 VIAL (ML) INJECTION ONCE
Status: COMPLETED | OUTPATIENT
Start: 2022-05-11 | End: 2022-05-11

## 2022-05-11 RX ADMIN — LIDOCAINE HYDROCHLORIDE AND EPINEPHRINE 10 ML: 20; 5 INJECTION, SOLUTION EPIDURAL; INFILTRATION; INTRACAUDAL; PERINEURAL at 20:00

## 2022-05-11 ASSESSMENT — FIBROSIS 4 INDEX: FIB4 SCORE: 1.94

## 2022-05-12 NOTE — DISCHARGE INSTRUCTIONS
Return in 5 to 7 days to have the sutures removed keep the wound clean and dry do not scrub the Steri-Strips they will fall off on their own if they happen to fall off before the wounds are closed covered with a Band-Aid otherwise keep the wounds clean and dry

## 2022-05-12 NOTE — ED TRIAGE NOTES
"Chief Complaint   Patient presents with   • GLF     [facility]. Pt slipped and fell forward hitting his face on a table. Approx 1.5 inch lac to pt's chin [facility]. -LOC, -thinners. Pt A&Ox4, GCS 15.       Ht 1.88 m (6' 2\")   Wt 95.3 kg (210 lb)   BMI 26.96 kg/m²     "

## 2022-05-12 NOTE — ED NOTES
(Break RN) Pt's daughter came to  pt - no cab voucher required. Dermabond applied to a small area that was still slightly open on pt's chin per family request by Dr. Jamil prior to discharge. Discharged out of the ED by wheelchair with daughter. Discharge teaching and paperwork provided regarding laceration and all questions/concerns answered. VSS, neuro assessment stable. Given information regarding home care and reasons to follow up with ED or primary MD.

## 2022-05-12 NOTE — ED PROVIDER NOTES
"CHIEF COMPLAINT  Chief Complaint   Patient presents with   • GLF     Pt BIBA from [facility] s/p MGLF. Pt slipped and fell forward hitting his face on a table. Approx 1.5 inch lac to pt's chin covered with steri strips from [employer]. -LOC, -thinners. Pt A&Ox4, GCS 15.       [facility]  Andrei Garay is a 82 y.o. male who presents for evaluation after a mechanical fall.  Patient was walking when he tripped and fell striking his face on the ground.  No LOC, not on any blood thinners or antiplatelets.  Is complaining of pain around the laceration on his left chin.  No midline neck back pain numbness tingling weakness in extremities.  There was no antecedent dizziness lightheadedness or syncopal episodes.  Last tetanus was 1 year ago.  REVIEW OF SYSTEMS  See HPI for further details. All other systems are negative.     PAST MEDICAL HISTORY   has a past medical history of Diabetes (HCC), Hyperlipemia, Hypertension, and MI (myocardial infarction) ([facility]).    SOCIAL HISTORY  Social History     Tobacco Use   • Smoking status: Former Smoker     Quit date: 2000     Years since quittin.6   • Smokeless tobacco: Never Used   Substance and Sexual Activity   • Alcohol use: Never   • Drug use: Never   • Sexual activity: Not on file       SURGICAL HISTORY   has a past surgical history that includes stent placement and appendectomy.    CURRENT MEDICATIONS  Home Medications    **Home medications have not yet been reviewed for this encounter**         ALLERGIES  No Known Allergies    FAMILY HISTORY  No pertinent family history    PHYSICAL EXAM  VITAL SIGNS: BP (!) 147/77   Pulse 82   Temp 36.1 °C (97 °F) (Temporal)   Resp 18   Ht 1.88 m (6' 2\")   Wt 95.3 kg (210 lb)   SpO2 92%   BMI 26.96 kg/m²  @TA[[id number]::@   Pulse ox interpretation: I interpret this pulse ox as normal.  VITALS - vital signs documented prior to this note have been reviewed and noted,  GENERAL - awake, alert, oriented, GCS 15, no apparent distress, " non-toxic  appearing  HEENT - normocephalic, he has a 3 cm laceration underneath his left chin, pupils equal, sclera anicteric, mucus  membranes moist, no moon sign, raccoon eyes, or hemotympanum  NECK- trachea midline, no bruising, or abrasions  CHEST- normal rise and fall, non tender, no flail chest, no bruising, or abrasions  CARDIOVASCULAR - regular rate/rhythm, no murmurs/gallops/rubs  PULMONARY - no respiratory distress, speaking in full sentences, clear to  auscultation bilaterally, no wheezing/ronchi/rales, no accessory muscle use  GASTROINTESTINAL - soft, non-tender, non-distended, no rebound, guarding,  or peritonitis, no bruising or abrasions  PELVIS non tender, stable to anterior posterior rocking,  GENITOURINARY - Deferred  BACK - C/T/L Spine demonstrate normal curvature; No external signs of trauma  on exam, no crepitus, Spinous process non tender to palpation, no step offs or  obvious deformities  NEUROLOGIC - Awake alert, GSC 15 normal mental status, speech fluid,  cognition normal, moves all extremities  MUSCULOSKELETAL - no obvious asymmetry or deformities present  EXTREMITIES - small 1 cm laceration on his left knuckle approximated with Steri-Strips warm, well-perfused, no cyanosis or significant edema  DERMATOLOGIC - warm, dry, no rashes, no jaundice  PSYCHIATRIC - normal affect, normal insight, normal concentration            LABS  Labs Reviewed - No data to display  Pertinent Labs & Imaging studies reviewed. (See chart for details)  RADIOLOGY  CT-HEAD W/O   Final Result         No acute intracranial abnormality.               ED COURSE/          Medications   lidocaine-EPINEPHrine 2%-1:937638 injection 10 mL (has no administration in time range)     PERFORMED BY - Rk Romero DO  PROCEDURE - Laceration repair    CONSENT - The procedure risks, benefits and alternatives were explained to the  patient in detail. Risks included those of pain, injury to adjacent structures  including  nerve/vessel/tendon, anesthesia, allergic reaction, scarring, infection and  need for additional procedures. Alternatives included not performing the  procedure. The patient gave permission to proceed.  PROCEDURE IN DETAIL - The skin was prepped and draped.  5 mL of 1%  lidocaine with epinephrine was used in local infiltration with good  anesthetic result. The wound was copiously irrigated with normal saline under  pressure. The wound was inspected for foreign body and for injury to deep  structures. No foreign body and no additional injuries were noted. The wound was 3 cm  closed with one 5-0 running suture with good hemostasis and good approximation.  COMPLICATIONS - There were no complications with the procedure. The  procedure was well-tolerated. A clean dressing was applied to the wound.  ESTIMATED BLOOD LOSS -5 mL  PLAN - The patient was instructed to return immediately for any evidence of  bleeding or wound infection. The patient was instructed to return for removal of  the sutures/staples.    Additional 1 cm laceration on his left index finger approximated with a Steri-Strip initial less than 1 cm laceration and he has left chin it approximated with Steri-Strip            MEDICAL DECISION MAKING          Presented for evaluation of mechanical fall.  CT head was obtained due to extreme of age.  CT head was negative for acute fracture.  Patient has a no other complaints he is otherwise awake alert acting appropriately.  He has a laceration was repaired with a running suture see separate procedure note for complete details instructed to return in 5 to 7 days for suture removal was also instructed on wound care.  All pertinent return precautions were discussed with the patient, and they expressed understanding.  Patient was discharged in a stable condition                FINAL IMPRESSION  1.  Fall  2. Closed Head injury  3.  laceration         Electronically signed by: Rk Jamil D.O., 5/11/2022 7:37  PM      Dictation Disclaimer  Please note this report has been produced using speech recognition software and  may contain errors related to that system, including errors seen in grammar,  punctuation and spelling, as well as words and phrases that may be inappropriate.  If there are any questions or concerns, please feel free to contact the dictating  physician for clarification.

## 2022-06-10 ENCOUNTER — APPOINTMENT (OUTPATIENT)
Dept: RADIOLOGY | Facility: MEDICAL CENTER | Age: 82
DRG: 854 | End: 2022-06-10
Attending: EMERGENCY MEDICINE
Payer: COMMERCIAL

## 2022-06-10 ENCOUNTER — HOSPITAL ENCOUNTER (INPATIENT)
Facility: MEDICAL CENTER | Age: 82
LOS: 10 days | DRG: 854 | End: 2022-06-20
Attending: EMERGENCY MEDICINE | Admitting: STUDENT IN AN ORGANIZED HEALTH CARE EDUCATION/TRAINING PROGRAM
Payer: COMMERCIAL

## 2022-06-10 DIAGNOSIS — L08.9 WOUND INFECTION: ICD-10-CM

## 2022-06-10 DIAGNOSIS — T14.8XXA WOUND INFECTION: ICD-10-CM

## 2022-06-10 DIAGNOSIS — L08.9 DIABETIC FOOT INFECTION (HCC): ICD-10-CM

## 2022-06-10 DIAGNOSIS — L03.116 CELLULITIS OF LEFT LOWER EXTREMITY: ICD-10-CM

## 2022-06-10 DIAGNOSIS — B95.62 MRSA BACTEREMIA: ICD-10-CM

## 2022-06-10 DIAGNOSIS — N39.0 URINARY TRACT INFECTION WITHOUT HEMATURIA, SITE UNSPECIFIED: ICD-10-CM

## 2022-06-10 DIAGNOSIS — R41.0 CONFUSION: ICD-10-CM

## 2022-06-10 DIAGNOSIS — E11.628 DIABETIC FOOT INFECTION (HCC): ICD-10-CM

## 2022-06-10 DIAGNOSIS — S91.002S ANKLE WOUND, LEFT, SEQUELA: ICD-10-CM

## 2022-06-10 DIAGNOSIS — A41.9 SEPSIS, DUE TO UNSPECIFIED ORGANISM, UNSPECIFIED WHETHER ACUTE ORGAN DYSFUNCTION PRESENT (HCC): ICD-10-CM

## 2022-06-10 DIAGNOSIS — R78.81 MRSA BACTEREMIA: ICD-10-CM

## 2022-06-10 LAB
ALBUMIN SERPL BCP-MCNC: 4 G/DL (ref 3.2–4.9)
ALBUMIN/GLOB SERPL: 1.1 G/DL
ALP SERPL-CCNC: 62 U/L (ref 30–99)
ALT SERPL-CCNC: 8 U/L (ref 2–50)
AMORPH CRY #/AREA URNS HPF: PRESENT /HPF
ANION GAP SERPL CALC-SCNC: 15 MMOL/L (ref 7–16)
APPEARANCE UR: CLEAR
AST SERPL-CCNC: 18 U/L (ref 12–45)
BACTERIA #/AREA URNS HPF: ABNORMAL /HPF
BASOPHILS # BLD AUTO: 0.4 % (ref 0–1.8)
BASOPHILS # BLD: 0.07 K/UL (ref 0–0.12)
BILIRUB SERPL-MCNC: 1.2 MG/DL (ref 0.1–1.5)
BILIRUB UR QL STRIP.AUTO: NEGATIVE
BUN SERPL-MCNC: 20 MG/DL (ref 8–22)
CALCIUM SERPL-MCNC: 9 MG/DL (ref 8.5–10.5)
CHLORIDE SERPL-SCNC: 100 MMOL/L (ref 96–112)
CO2 SERPL-SCNC: 21 MMOL/L (ref 20–33)
COLOR UR: YELLOW
CREAT SERPL-MCNC: 1.03 MG/DL (ref 0.5–1.4)
EOSINOPHIL # BLD AUTO: 0.01 K/UL (ref 0–0.51)
EOSINOPHIL NFR BLD: 0.1 % (ref 0–6.9)
EPI CELLS #/AREA URNS HPF: NEGATIVE /HPF
ERYTHROCYTE [DISTWIDTH] IN BLOOD BY AUTOMATED COUNT: 40 FL (ref 35.9–50)
GFR SERPLBLD CREATININE-BSD FMLA CKD-EPI: 73 ML/MIN/1.73 M 2
GLOBULIN SER CALC-MCNC: 3.6 G/DL (ref 1.9–3.5)
GLUCOSE BLD STRIP.AUTO-MCNC: 205 MG/DL (ref 65–99)
GLUCOSE BLD STRIP.AUTO-MCNC: 216 MG/DL (ref 65–99)
GLUCOSE SERPL-MCNC: 131 MG/DL (ref 65–99)
GLUCOSE UR STRIP.AUTO-MCNC: NEGATIVE MG/DL
HCT VFR BLD AUTO: 37.8 % (ref 42–52)
HGB BLD-MCNC: 13 G/DL (ref 14–18)
HYALINE CASTS #/AREA URNS LPF: ABNORMAL /LPF
IMM GRANULOCYTES # BLD AUTO: 0.16 K/UL (ref 0–0.11)
IMM GRANULOCYTES NFR BLD AUTO: 0.9 % (ref 0–0.9)
KETONES UR STRIP.AUTO-MCNC: ABNORMAL MG/DL
LACTATE BLD-SCNC: 3 MMOL/L (ref 0.5–2)
LACTATE BLD-SCNC: 3.5 MMOL/L (ref 0.5–2)
LEUKOCYTE ESTERASE UR QL STRIP.AUTO: ABNORMAL
LYMPHOCYTES # BLD AUTO: 0.78 K/UL (ref 1–4.8)
LYMPHOCYTES NFR BLD: 4.2 % (ref 22–41)
MCH RBC QN AUTO: 29.5 PG (ref 27–33)
MCHC RBC AUTO-ENTMCNC: 34.4 G/DL (ref 33.7–35.3)
MCV RBC AUTO: 85.7 FL (ref 81.4–97.8)
MICRO URNS: ABNORMAL
MONOCYTES # BLD AUTO: 1.83 K/UL (ref 0–0.85)
MONOCYTES NFR BLD AUTO: 9.9 % (ref 0–13.4)
NEUTROPHILS # BLD AUTO: 15.62 K/UL (ref 1.82–7.42)
NEUTROPHILS NFR BLD: 84.5 % (ref 44–72)
NITRITE UR QL STRIP.AUTO: POSITIVE
NRBC # BLD AUTO: 0 K/UL
NRBC BLD-RTO: 0 /100 WBC
PH UR STRIP.AUTO: 6.5 [PH] (ref 5–8)
PLATELET # BLD AUTO: 244 K/UL (ref 164–446)
PMV BLD AUTO: 8.9 FL (ref 9–12.9)
POTASSIUM SERPL-SCNC: 4.1 MMOL/L (ref 3.6–5.5)
PROT SERPL-MCNC: 7.6 G/DL (ref 6–8.2)
PROT UR QL STRIP: 100 MG/DL
RBC # BLD AUTO: 4.41 M/UL (ref 4.7–6.1)
RBC # URNS HPF: ABNORMAL /HPF
RBC UR QL AUTO: NEGATIVE
SODIUM SERPL-SCNC: 136 MMOL/L (ref 135–145)
SP GR UR STRIP.AUTO: 1.02
UROBILINOGEN UR STRIP.AUTO-MCNC: 0.2 MG/DL
WBC # BLD AUTO: 18.5 K/UL (ref 4.8–10.8)
WBC #/AREA URNS HPF: ABNORMAL /HPF

## 2022-06-10 PROCEDURE — 96366 THER/PROPH/DIAG IV INF ADDON: CPT

## 2022-06-10 PROCEDURE — 87150 DNA/RNA AMPLIFIED PROBE: CPT

## 2022-06-10 PROCEDURE — 87181 SC STD AGAR DILUTION PER AGT: CPT | Mod: 91

## 2022-06-10 PROCEDURE — 87086 URINE CULTURE/COLONY COUNT: CPT

## 2022-06-10 PROCEDURE — 700111 HCHG RX REV CODE 636 W/ 250 OVERRIDE (IP): Performed by: EMERGENCY MEDICINE

## 2022-06-10 PROCEDURE — 36415 COLL VENOUS BLD VENIPUNCTURE: CPT

## 2022-06-10 PROCEDURE — 700102 HCHG RX REV CODE 250 W/ 637 OVERRIDE(OP): Performed by: STUDENT IN AN ORGANIZED HEALTH CARE EDUCATION/TRAINING PROGRAM

## 2022-06-10 PROCEDURE — 99285 EMERGENCY DEPT VISIT HI MDM: CPT

## 2022-06-10 PROCEDURE — 700105 HCHG RX REV CODE 258: Performed by: STUDENT IN AN ORGANIZED HEALTH CARE EDUCATION/TRAINING PROGRAM

## 2022-06-10 PROCEDURE — 85025 COMPLETE CBC W/AUTO DIFF WBC: CPT

## 2022-06-10 PROCEDURE — 96365 THER/PROPH/DIAG IV INF INIT: CPT

## 2022-06-10 PROCEDURE — 81001 URINALYSIS AUTO W/SCOPE: CPT

## 2022-06-10 PROCEDURE — 700105 HCHG RX REV CODE 258: Performed by: EMERGENCY MEDICINE

## 2022-06-10 PROCEDURE — 87186 SC STD MICRODIL/AGAR DIL: CPT | Mod: 91

## 2022-06-10 PROCEDURE — 71045 X-RAY EXAM CHEST 1 VIEW: CPT

## 2022-06-10 PROCEDURE — A9270 NON-COVERED ITEM OR SERVICE: HCPCS | Performed by: STUDENT IN AN ORGANIZED HEALTH CARE EDUCATION/TRAINING PROGRAM

## 2022-06-10 PROCEDURE — 87077 CULTURE AEROBIC IDENTIFY: CPT

## 2022-06-10 PROCEDURE — 99223 1ST HOSP IP/OBS HIGH 75: CPT | Mod: AI | Performed by: STUDENT IN AN ORGANIZED HEALTH CARE EDUCATION/TRAINING PROGRAM

## 2022-06-10 PROCEDURE — 87040 BLOOD CULTURE FOR BACTERIA: CPT

## 2022-06-10 PROCEDURE — 99221 1ST HOSP IP/OBS SF/LOW 40: CPT

## 2022-06-10 PROCEDURE — 700111 HCHG RX REV CODE 636 W/ 250 OVERRIDE (IP): Performed by: STUDENT IN AN ORGANIZED HEALTH CARE EDUCATION/TRAINING PROGRAM

## 2022-06-10 PROCEDURE — 96367 TX/PROPH/DG ADDL SEQ IV INF: CPT

## 2022-06-10 PROCEDURE — 80053 COMPREHEN METABOLIC PANEL: CPT

## 2022-06-10 PROCEDURE — 82962 GLUCOSE BLOOD TEST: CPT | Mod: 91

## 2022-06-10 PROCEDURE — 700111 HCHG RX REV CODE 636 W/ 250 OVERRIDE (IP)

## 2022-06-10 PROCEDURE — 83605 ASSAY OF LACTIC ACID: CPT | Mod: 91

## 2022-06-10 PROCEDURE — 770001 HCHG ROOM/CARE - MED/SURG/GYN PRIV*

## 2022-06-10 PROCEDURE — 73610 X-RAY EXAM OF ANKLE: CPT | Mod: LT

## 2022-06-10 RX ORDER — MIRTAZAPINE 15 MG/1
15 TABLET, FILM COATED ORAL NIGHTLY
Status: DISCONTINUED | OUTPATIENT
Start: 2022-06-10 | End: 2022-06-20 | Stop reason: HOSPADM

## 2022-06-10 RX ORDER — HYDROCODONE BITARTRATE AND ACETAMINOPHEN 5; 325 MG/1; MG/1
1 TABLET ORAL 2 TIMES DAILY
Status: DISCONTINUED | OUTPATIENT
Start: 2022-06-10 | End: 2022-06-20 | Stop reason: HOSPADM

## 2022-06-10 RX ORDER — ENOXAPARIN SODIUM 100 MG/ML
40 INJECTION SUBCUTANEOUS DAILY
Status: DISCONTINUED | OUTPATIENT
Start: 2022-06-10 | End: 2022-06-20 | Stop reason: HOSPADM

## 2022-06-10 RX ORDER — ATORVASTATIN CALCIUM 40 MG/1
40 TABLET, FILM COATED ORAL EVERY EVENING
Status: DISCONTINUED | OUTPATIENT
Start: 2022-06-10 | End: 2022-06-20 | Stop reason: HOSPADM

## 2022-06-10 RX ORDER — DEXTROSE MONOHYDRATE 25 G/50ML
25 INJECTION, SOLUTION INTRAVENOUS
Status: DISCONTINUED | OUTPATIENT
Start: 2022-06-10 | End: 2022-06-16

## 2022-06-10 RX ORDER — SODIUM CHLORIDE 9 MG/ML
INJECTION, SOLUTION INTRAVENOUS CONTINUOUS
Status: DISCONTINUED | OUTPATIENT
Start: 2022-06-10 | End: 2022-06-13

## 2022-06-10 RX ORDER — GABAPENTIN 300 MG/1
300 CAPSULE ORAL 2 TIMES DAILY
Status: DISCONTINUED | OUTPATIENT
Start: 2022-06-10 | End: 2022-06-20 | Stop reason: HOSPADM

## 2022-06-10 RX ORDER — SODIUM CHLORIDE, SODIUM LACTATE, POTASSIUM CHLORIDE, AND CALCIUM CHLORIDE .6; .31; .03; .02 G/100ML; G/100ML; G/100ML; G/100ML
1000 INJECTION, SOLUTION INTRAVENOUS ONCE
Status: COMPLETED | OUTPATIENT
Start: 2022-06-10 | End: 2022-06-10

## 2022-06-10 RX ORDER — HYDROCODONE BITARTRATE AND ACETAMINOPHEN 5; 325 MG/1; MG/1
1 TABLET ORAL 2 TIMES DAILY
Status: DISCONTINUED | OUTPATIENT
Start: 2022-06-10 | End: 2022-06-10

## 2022-06-10 RX ADMIN — MIRTAZAPINE 15 MG: 15 TABLET, FILM COATED ORAL at 21:51

## 2022-06-10 RX ADMIN — ATORVASTATIN CALCIUM 40 MG: 40 TABLET, FILM COATED ORAL at 18:07

## 2022-06-10 RX ADMIN — GABAPENTIN 300 MG: 300 CAPSULE ORAL at 18:07

## 2022-06-10 RX ADMIN — ENOXAPARIN SODIUM 40 MG: 40 INJECTION SUBCUTANEOUS at 18:07

## 2022-06-10 RX ADMIN — SERTRALINE 50 MG: 50 TABLET, FILM COATED ORAL at 18:07

## 2022-06-10 RX ADMIN — INSULIN HUMAN 2 UNITS: 100 INJECTION, SOLUTION PARENTERAL at 21:50

## 2022-06-10 RX ADMIN — SODIUM CHLORIDE, POTASSIUM CHLORIDE, SODIUM LACTATE AND CALCIUM CHLORIDE 1000 ML: 600; 310; 30; 20 INJECTION, SOLUTION INTRAVENOUS at 11:01

## 2022-06-10 RX ADMIN — SODIUM CHLORIDE 3 G: 900 INJECTION INTRAVENOUS at 10:26

## 2022-06-10 RX ADMIN — SODIUM CHLORIDE: 9 INJECTION, SOLUTION INTRAVENOUS at 15:28

## 2022-06-10 RX ADMIN — INSULIN HUMAN 2 UNITS: 100 INJECTION, SOLUTION PARENTERAL at 18:05

## 2022-06-10 RX ADMIN — VANCOMYCIN HYDROCHLORIDE 2500 MG: 500 INJECTION, POWDER, LYOPHILIZED, FOR SOLUTION INTRAVENOUS at 10:56

## 2022-06-10 RX ADMIN — HYDROCODONE BITARTRATE AND ACETAMINOPHEN 1 TABLET: 5; 325 TABLET ORAL at 14:30

## 2022-06-10 ASSESSMENT — ENCOUNTER SYMPTOMS
NECK PAIN: 0
FEVER: 0
BRUISES/BLEEDS EASILY: 0
HEADACHES: 0
DOUBLE VISION: 0
HEMOPTYSIS: 0
WEAKNESS: 1
BLURRED VISION: 0
CHILLS: 1
PALPITATIONS: 0
DIZZINESS: 0
NAUSEA: 0
HEARTBURN: 0
COUGH: 0
MYALGIAS: 0
DEPRESSION: 0

## 2022-06-10 ASSESSMENT — LIFESTYLE VARIABLES
EVER FELT BAD OR GUILTY ABOUT YOUR DRINKING: NO
HOW MANY TIMES IN THE PAST YEAR HAVE YOU HAD 5 OR MORE DRINKS IN A DAY: 0
CONSUMPTION TOTAL: NEGATIVE
TOTAL SCORE: 0
TOTAL SCORE: 0
AVERAGE NUMBER OF DAYS PER WEEK YOU HAVE A DRINK CONTAINING ALCOHOL: 0
DOES PATIENT WANT TO STOP DRINKING: NO
HAVE YOU EVER FELT YOU SHOULD CUT DOWN ON YOUR DRINKING: NO
ALCOHOL_USE: NO
HAVE PEOPLE ANNOYED YOU BY CRITICIZING YOUR DRINKING: NO
EVER HAD A DRINK FIRST THING IN THE MORNING TO STEADY YOUR NERVES TO GET RID OF A HANGOVER: NO
TOTAL SCORE: 0
ON A TYPICAL DAY WHEN YOU DRINK ALCOHOL HOW MANY DRINKS DO YOU HAVE: 0

## 2022-06-10 ASSESSMENT — FIBROSIS 4 INDEX
FIB4 SCORE: 2.11
FIB4 SCORE: 1.94

## 2022-06-10 ASSESSMENT — PATIENT HEALTH QUESTIONNAIRE - PHQ9
3. TROUBLE FALLING OR STAYING ASLEEP OR SLEEPING TOO MUCH: NEARLY EVERY DAY
9. THOUGHTS THAT YOU WOULD BE BETTER OFF DEAD, OR OF HURTING YOURSELF: NOT AT ALL
1. LITTLE INTEREST OR PLEASURE IN DOING THINGS: NEARLY EVERY DAY
7. TROUBLE CONCENTRATING ON THINGS, SUCH AS READING THE NEWSPAPER OR WATCHING TELEVISION: SEVERAL DAYS
SUM OF ALL RESPONSES TO PHQ9 QUESTIONS 1 AND 2: 6
SUM OF ALL RESPONSES TO PHQ QUESTIONS 1-9: 16
2. FEELING DOWN, DEPRESSED, IRRITABLE, OR HOPELESS: NEARLY EVERY DAY
6. FEELING BAD ABOUT YOURSELF - OR THAT YOU ARE A FAILURE OR HAVE LET YOURSELF OR YOUR FAMILY DOWN: NOT AL ALL
5. POOR APPETITE OR OVEREATING: NEARLY EVERY DAY
8. MOVING OR SPEAKING SO SLOWLY THAT OTHER PEOPLE COULD HAVE NOTICED. OR THE OPPOSITE, BEING SO FIGETY OR RESTLESS THAT YOU HAVE BEEN MOVING AROUND A LOT MORE THAN USUAL: NOT AT ALL
4. FEELING TIRED OR HAVING LITTLE ENERGY: NEARLY EVERY DAY

## 2022-06-10 ASSESSMENT — COGNITIVE AND FUNCTIONAL STATUS - GENERAL
DRESSING REGULAR LOWER BODY CLOTHING: A LITTLE
SUGGESTED CMS G CODE MODIFIER MOBILITY: CJ
DAILY ACTIVITIY SCORE: 20
HELP NEEDED FOR BATHING: A LOT
MOBILITY SCORE: 22
CLIMB 3 TO 5 STEPS WITH RAILING: A LOT
SUGGESTED CMS G CODE MODIFIER DAILY ACTIVITY: CJ
TOILETING: A LITTLE

## 2022-06-10 NOTE — ED NOTES
Pt complaining of pain. Pt daughter states pt takes pain medication regularly at his facility. Dr. Garner notified.

## 2022-06-10 NOTE — ASSESSMENT & PLAN NOTE
On metformin at home  Continue with sliding scale and Accu-Cheks with hypoglycemia protocol   Persistent mild hyperglycemia with BS's>200  Start lantus 7 units nightly  Adjust PRN

## 2022-06-10 NOTE — ED PROVIDER NOTES
ED Provider Note    Scribed for Ariel Amaro M.D. by Yousif Harding. 6/10/2022  9:50 AM    Primary care provider: Stephanie Diaz M.D.  Means of arrival: EMS   History obtained from: Patient   History limited by: None     CHIEF COMPLAINT  Chief Complaint   Patient presents with   • Wound Check   • Altered Mental Status       [facility]  Andrei Garay is a 82 y.o. male who presents to the Emergency Department by EMS for evaluation of increased confusion onset 3 days ago. Per EMS, [facility]. Family member states that patient also has an infection to his left ankle and has not taken any antibiotics. Patient has been admitted to the ED before for infection of the left ankle.  He presents associated symptoms of subjective fever. Denies dysuria. No alleviating factors noted at this time. Patient is vaccinated for COVID x2. Patient has additional history of Diabetes, Hypertension, and MI.        REVIEW OF SYSTEMS  Pertinent positives include confusion, fever, left ankle pain.   Pertinent negatives include no dysuria.    All other systems reviewed and negative. See HPI for further details.       PAST MEDICAL HISTORY   has a past medical history of Diabetes (HCC), Hyperlipemia, Hypertension, and MI (myocardial infarction) (HCC).    SURGICAL HISTORY   has a past surgical history that includes stent placement and appendectomy.    SOCIAL HISTORY  Social History     Tobacco Use   • Smoking status: Former Smoker     Quit date: 2000     Years since quittin.7   • Smokeless tobacco: Never Used   Substance Use Topics   • Alcohol use: Never   • Drug use: Never      Social History     Substance and Sexual Activity   Drug Use Never       FAMILY HISTORY  None reported     CURRENT MEDICATIONS  Current Outpatient Medications   Medication Instructions   • acetaminophen (TYLENOL) 650 mg, Oral, EVERY 6 HOURS PRN   • ascorbic acid (VITAMIN C) 1,000 mg, Oral, DAILY   •  atorvastatin (LIPITOR) 20 mg, Oral, EVERY MORNING   • atorvastatin (LIPITOR) 20 mg, Oral, NIGHTLY   • diclofenac sodium (VOLTAREN) 4 g, Topical, DAILY   • DULoxetine (CYMBALTA) 20 mg, Oral, EVERY MORNING   • gabapentin (NEURONTIN) 300 mg, Oral, 2 TIMES DAILY   • HYDROcodone-acetaminophen (NORCO) 5-325 MG Tab per tablet 1 Tablet, Oral, 2 TIMES DAILY   • insulin 70/30 (HUMULIN,NOVOLIN) (70-30) 100 UNIT/ML Suspension 26 Units, Subcutaneous, 2 TIMES DAILY   • lisinopril (PRINIVIL) 5 mg, Oral, EVERY MORNING   • lisinopril (PRINIVIL) 2.5 mg, Oral, DAILY   • Melatonin 10 mg, Oral, EVERY BEDTIME   • metformin (GLUCOPHAGE) 1,000 mg, Oral, 2 TIMES DAILY WITH MEALS   • metformin (GLUCOPHAGE) 1,000 mg, Oral, 2 TIMES DAILY WITH MEALS   • metoprolol tartrate (LOPRESSOR) 25 mg, Oral, 2 TIMES DAILY   • metoprolol tartrate (LOPRESSOR) 25 mg, Oral, 2 TIMES DAILY   • mirtazapine (REMERON) 7.5 mg, Oral, EVERY BEDTIME PRN   • mirtazapine (REMERON) 15 mg, Oral, NIGHTLY   • neomycin-bacitracin-polymixin (NEOSPORIN) [id number] Ointment 1 Application, Both Eyes, 2 TIMES DAILY, Apply to right middle finger   • polyethylene glycol/lytes (MIRALAX) Pack 1 Packet, Oral, DAILY   • QUEtiapine (SEROQUEL) 25 mg, Oral, 2 TIMES DAILY   • senna-docusate (PERICOLACE OR SENOKOT S) 8.6-50 MG Tab 2 Tablets, Oral, 2 TIMES DAILY   • sertraline (ZOLOFT) 50 mg, Oral, DAILY   • SITagliptin (JANUVIA) 100 mg, Oral, DAILY   • vitamin D2 (Ergocalciferol) (DRISDOL) 50,000 Units, Oral, EVERY MON   • vitamin D2 (Ergocalciferol) (DRISDOL) 50,000 Units, Oral, EVERY 30 DAYS        ALLERGIES  No Known Allergies    PHYSICAL EXAM  VITAL SIGNS: /52   Pulse 87   Temp 37.6 °C (99.6 °F) (Temporal)   Resp 14   Ht 1.829 m (6')   Wt 101 kg (222 lb)   SpO2 91%   BMI 30.11 kg/m²     Nursing note and vitals reviewed.  Constitutional: Well-developed and well-nourished. No distress.   HENT: Head is normocephalic and atraumatic. Oropharynx is clear and moist without  exudate or erythema.   Eyes: Pupils are equal, round, and reactive to light. Conjunctiva are normal.   Cardiovascular: Normal rate and regular rhythm. No murmur heard. Normal radial pulses.  Pulmonary/Chest: Breath sounds normal. No wheezes or rales.   Abdominal: Soft and non-tender. No distention    Musculoskeletal: Extremities exhibit normal range of motion without edema or tenderness.   Neurological: Awake, alert and oriented to person, place, and time. No focal deficits noted.  Skin: Skin is warm and dry. No rash.   Psychiatric: Normal mood and affect. Appropriate for clinical situation.    DIAGNOSTIC STUDIES / PROCEDURES    EKG Interpretation  Interpreted by me as below    LABS  Results for orders placed or performed during the hospital encounter of 06/10/22   CBC WITH DIFFERENTIAL   Result Value Ref Range    WBC 18.5 (H) 4.8 - 10.8 K/uL    RBC 4.41 (L) 4.70 - 6.10 M/uL    Hemoglobin 13.0 (L) 14.0 - 18.0 g/dL    Hematocrit 37.8 (L) 42.0 - 52.0 %    MCV 85.7 81.4 - 97.8 fL    MCH 29.5 27.0 - 33.0 pg    MCHC 34.4 33.7 - 35.3 g/dL    RDW 40.0 35.9 - 50.0 fL    Platelet Count 244 164 - 446 K/uL    MPV 8.9 (L) 9.0 - 12.9 fL    Neutrophils-Polys 84.50 (H) 44.00 - 72.00 %    Lymphocytes 4.20 (L) 22.00 - 41.00 %    Monocytes 9.90 0.00 - 13.40 %    Eosinophils 0.10 0.00 - 6.90 %    Basophils 0.40 0.00 - 1.80 %    Immature Granulocytes 0.90 0.00 - 0.90 %    Nucleated RBC 0.00 /100 WBC    Neutrophils (Absolute) 15.62 (H) 1.82 - 7.42 K/uL    Lymphs (Absolute) 0.78 (L) 1.00 - 4.80 K/uL    Monos (Absolute) 1.83 (H) 0.00 - 0.85 K/uL    Eos (Absolute) 0.01 0.00 - 0.51 K/uL    Baso (Absolute) 0.07 0.00 - 0.12 K/uL    Immature Granulocytes (abs) 0.16 (H) 0.00 - 0.11 K/uL    NRBC (Absolute) 0.00 K/uL      All labs reviewed by me.    RADIOLOGY  DX-ANKLE 3+ VIEWS LEFT   Final Result      1. Lateral left ankle soft tissue swelling with soft tissue gas extending from the skin surface to the lateral aspect of the distal left fibula,  with cortical disruption concerning for osteomyelitis. MRI of the left ankle without and with IV contrast is    recommended.   2. Decreased bone mineralization.   3. The remainder of the left ankle radiography is within normal limits.      DX-CHEST-PORTABLE (1 VIEW)   Final Result      No acute cardiopulmonary abnormality.        The radiologist's interpretation of all radiological studies have been reviewed by me.    COURSE & MEDICAL DECISION MAKING  Nursing notes, VS, PMSFHx reviewed in chart.     Review of past medical records shows the patient was discharged on April 14th 2022 after being admitted for left ankle wound infection     9:50 AM - Patient evaluated at bedside. Ordered DX-ankle, DX-chest, Lactic acid, CBC with diff., CMP, UA, Urine Culture, and Blood culture for further evaluation. Patient will be treated with Unasyn and Vanco. Differential diagnoses include but not limited to: UTI, Sepsis. Discussed with patient about administering basic labs and imaging for further evaluation. Informed patient about medication administration for pain control. Patient verbalizes understanding and agreement to this plan of care.     HYDRATION: Based on the patient's presentation of Sepsis the patient was given IV fluids. IV Hydration was used because oral hydration was not adequate alone. Upon recheck following hydration, the patient was stable.    X-ray demonstrates findings likely consistent with osteomyelitis.  Laboratory studies show an elevated lactic acid level.  Elevated white blood cell count.  Findings consistent with urinary tract infection.  Patient has been treated with antibiotics that are appropriate for sepsis related to UTI and possible osteomyelitis/wound infection.  He will be admitted to the hospital for further evaluation and treatment.        FINAL IMPRESSION  1. Confusion    2. Sepsis, due to unspecified organism, unspecified whether acute organ dysfunction present (HCC)    3. Ankle wound, left,  sequela    4. Wound infection    5. Cellulitis of left lower extremity    6. Urinary tract infection without hematuria, site unspecified          IYousif (Scribe), am scribing for, and in the presence of, Ariel Aamro M.D..    Electronically signed by: Yousif Harding (Scribe), 6/10/2022    IAriel M.D. personally performed the services described in this documentation, as scribed by Yousif Harding in my presence, and it is both accurate and complete.    C    The note accurately reflects work and decisions made by me.  Ariel Amaro M.D.  6/10/2022  11:18 AM

## 2022-06-10 NOTE — ED NOTES
Pharmacy Medication Reconciliation    ~Med rec updated and complete per [facility]  ~Allergies have been verified per [facility]  ~No oral ABX within the last 30 days

## 2022-06-10 NOTE — H&P
Hospital Medicine History & Physical Note    Date of Service  6/10/2022    Primary Care Physician  Stephanie Diaz M.D.    Consultants  none    Specialist Names: none     Code Status  DNAR/DNI    Chief Complaint  Chief Complaint   Patient presents with   • Wound Check   • Altered Mental Status       History of Presenting Illness  Andrei Garay is a 82 y.o. male past medical history of diabetes mellitus, hypertension who presented 6/10/2022 with altered mental status for last 3 days and left ankle pain.  History is primarily provided by daughter.  No relieving or exacerbating factors were noted.  He denies any fever does report some chills.  In the ER, x-ray of the ankle showed signs concerning for osteomyelitis.  UA grossly positive for urinary tract infection.  Patient met criteria for sepsis started on IV broad-spectrum antibiotics and will be admitted for further work-up and limb preservation consult.    I discussed the plan of care with patient.    Review of Systems  Review of Systems   Constitutional: Positive for chills. Negative for fever.   HENT: Negative for hearing loss and tinnitus.    Eyes: Negative for blurred vision and double vision.   Respiratory: Negative for cough and hemoptysis.    Cardiovascular: Negative for chest pain and palpitations.   Gastrointestinal: Negative for heartburn and nausea.   Genitourinary: Negative for dysuria and urgency.   Musculoskeletal: Negative for myalgias and neck pain.   Skin: Negative for itching and rash.   Neurological: Positive for weakness. Negative for dizziness and headaches.   Endo/Heme/Allergies: Does not bruise/bleed easily.   Psychiatric/Behavioral: Negative for depression and suicidal ideas.       Past Medical History   has a past medical history of Diabetes ([facility]), Hyperlipemia, Hypertension, and MI (myocardial infarction) ([facility]).    Surgical History   has a past surgical history that includes stent placement and appendectomy.     Family  History  family history is not on file.   Family history reviewed with patient. There is no family history that is pertinent to the chief complaint.     Social History   reports that he quit smoking about 21 years ago. He has never used smokeless tobacco. He reports that he does not drink alcohol and does not use drugs.    Allergies  No Known Allergies    Medications  Prior to Admission Medications   Prescriptions Last Dose Informant Patient Reported? Taking?   HYDROcodone-acetaminophen (NORCO) 5-325 MG Tab per tablet 6/10/2022 at 0512 MAR from Other Facility Yes No   Sig: Take 1 Tablet by mouth 2 times a day.   Melatonin 10 MG Tab 6/9/2022 at HS MAR from Other Facility Yes No   Sig: Take 10 mg by mouth at bedtime.   Probiotic Product (PROBIOTIC PO) 6/9/2022 at AM MAR from Other Facility Yes Yes   Sig: Take 1 Capsule by mouth every morning.   acetaminophen (TYLENOL) 325 MG Tab PRN at PRN MAR from Other Facility No No   Sig: Take 2 Tablets by mouth every 6 hours as needed.   ascorbic acid (VITAMIN C) 500 MG tablet 6/9/2022 at AM MAR from Other Facility Yes No   Sig: Take 1,000 mg by mouth every day.   atorvastatin (LIPITOR) 20 MG Tab 6/9/2022 at PM MAR from Other Facility Yes No   Sig: Take 40 mg by mouth every evening.   cadexomer iodine (IODOFLEX) 0.9 % pad 6/9/2022 at AM MAR from Other Facility Yes Yes   Sig: Apply 1 Application topically every morning. APPLY TO L LATERAL MALEOLUS WOUND CARE   diclofenac sodium (VOLTAREN) 1 % Gel 6/9/2022 at PM MAR from Other Facility Yes No   Sig: Apply 4 g topically every evening.   gabapentin (NEURONTIN) 300 MG Cap 6/9/2022 at AM MAR from Other Facility Yes No   Sig: Take 300 mg by mouth 2 times a day.   lisinopril (PRINIVIL) 2.5 MG Tab 6/9/2022 at AM MAR from Other Facility Yes No   Sig: Take 2.5 mg by mouth every day.   metformin (GLUCOPHAGE) 1000 MG tablet 6/10/2022 at AM MAR from Other Facility Yes No   Sig: Take 1,000 mg by mouth 2 times a day, with meals.   metoprolol  (LOPRESSOR) 25 MG Tab 6/10/2022 at AM MAR from Other Facility Yes No   Sig: Take 25 mg by mouth 2 times a day.   mirtazapine (REMERON) 15 MG Tab 6/9/2022 at PM MAR from Other Facility Yes No   Sig: Take 15 mg by mouth every evening.   sertraline (ZOLOFT) 50 MG Tab 6/9/2022 at PM MAR from Other Facility Yes No   Sig: Take 50 mg by mouth every evening.   vitamin D2, Ergocalciferol, (DRISDOL) 1.25 MG (69823 UT) Cap capsule 5/12/2022 at UNK MAR from Other Facility Yes No   Sig: Take 50,000 Units by mouth every 30 days.      Facility-Administered Medications: None       Physical Exam  Temp:  [37.6 °C (99.6 °F)] 37.6 °C (99.6 °F)  Pulse:  [80-93] 83  Resp:  [14-21] 21  BP: (102-111)/(52-56) 104/56  SpO2:  [88 %-93 %] 93 %  Blood Pressure : 104/56   Temperature: 37.6 °C (99.6 °F)   Pulse: 83   Respiration: (!) 21   Pulse Oximetry: 93 %       Physical Exam  Vitals and nursing note reviewed.   Constitutional:       Appearance: He is obese.   HENT:      Head: Normocephalic and atraumatic.      Right Ear: Tympanic membrane normal.      Left Ear: Tympanic membrane normal.      Nose: Nose normal.      Mouth/Throat:      Mouth: Mucous membranes are moist.      Pharynx: Oropharynx is clear.   Eyes:      Extraocular Movements: Extraocular movements intact.      Pupils: Pupils are equal, round, and reactive to light.   Cardiovascular:      Rate and Rhythm: Normal rate and regular rhythm.      Pulses: Normal pulses.      Heart sounds: Normal heart sounds.   Pulmonary:      Effort: Pulmonary effort is normal.      Breath sounds: Normal breath sounds.   Abdominal:      General: Bowel sounds are normal. There is no distension.      Palpations: Abdomen is soft. There is no mass.   Musculoskeletal:      Cervical back: Neck supple.      Comments: Left ankle warm, tender and red   Skin:     General: Skin is warm.      Capillary Refill: Capillary refill takes less than 2 seconds.   Neurological:      General: No focal deficit present.       Mental Status: He is alert. Mental status is at baseline.   Psychiatric:         Mood and Affect: Mood normal.         Behavior: Behavior normal.         Laboratory:  Recent Labs     06/10/22  0937   WBC 18.5*   RBC 4.41*   HEMOGLOBIN 13.0*   HEMATOCRIT 37.8*   MCV 85.7   MCH 29.5   MCHC 34.4   RDW 40.0   PLATELETCT 244   MPV 8.9*     Recent Labs     06/10/22  0937   SODIUM 136   POTASSIUM 4.1   CHLORIDE 100   CO2 21   GLUCOSE 131*   BUN 20   CREATININE 1.03   CALCIUM 9.0     Recent Labs     06/10/22  0937   ALTSGPT 8   ASTSGOT 18   ALKPHOSPHAT 62   TBILIRUBIN 1.2   GLUCOSE 131*         No results for input(s): NTPROBNP in the last 72 hours.      No results for input(s): TROPONINT in the last 72 hours.    Imaging:  DX-ANKLE 3+ VIEWS LEFT   Final Result      1. Lateral left ankle soft tissue swelling with soft tissue gas extending from the skin surface to the lateral aspect of the distal left fibula, with cortical disruption concerning for osteomyelitis. MRI of the left ankle without and with IV contrast is    recommended.   2. Decreased bone mineralization.   3. The remainder of the left ankle radiography is within normal limits.      DX-CHEST-PORTABLE (1 VIEW)   Final Result      No acute cardiopulmonary abnormality.          X-Ray:  I have personally reviewed the images and compared with prior images.    Assessment/Plan:  Justification for Admission Status  I anticipate this patient will require at least two midnights for appropriate medical management, necessitating inpatient admission because Sepsis secondary to UTI.  Patient also has signs of osteomyelitis on left ankle x-ray will require limb preservation work-up and further care.    * Sepsis (HCC)- (present on admission)  Assessment & Plan  This is Sepsis Present on admission  SIRS criteria identified on my evaluation include: Tachycardia, with heart rate greater than 90 BPM and Leukocytosis, with WBC greater than 12,000  Source is UTI/Left ankle  infection  Sepsis protocol initiated  IV antibiotics as appropriate for source of sepsis  Reassessment: I have reassessed the patient's hemodynamic status    Broad-spectrum IV vancomycin/rocephin  Follow cultures  LPS      UTI (urinary tract infection)  Assessment & Plan  Rocephin  F/u cultures    Advance care planning- (present on admission)  Assessment & Plan  Patient has POLST form stating DNR/DNI.  Confirmed with daughter bedside face-to-face.  Time spent 8 minutes.    Obesity- (present on admission)  Assessment & Plan  Counseled on dietary modification once stable    Type 2 diabetes mellitus (HCC)- (present on admission)  Assessment & Plan  On metformin at home  Continue with sliding scale and Accu-Cheks with hypoglycemia protocol     Depression- (present on admission)  Assessment & Plan  Continue with Zoloft and Remeron    Hypertension- (present on admission)  Assessment & Plan  Hold antihypertensive patient is normotensive and have sepsis.    Hyperlipidemia- (present on admission)  Assessment & Plan  Continue statin      VTE prophylaxis: enoxaparin ppx

## 2022-06-10 NOTE — PROGRESS NOTES
Pharmacy Vancomycin Kinetics Note for 6/10/2022     82 y.o. male on Vancomycin day # 1     Vancomycin Indication (AUC Dosing): Skin/skin structure infection    Provider specified end date: 06/20/22    Active Antibiotics (From admission, onward)    Ordered     Ordering Provider       Fri Raz 10, 2022 12:54 PM    06/10/22 1254  cefTRIAXone (Rocephin) syringe 1 g  DAILY AT 1400         Pato Garner M.D.       Fri Raz 10, 2022 12:28 PM    06/10/22 1228  vancomycin (VANCOCIN) 1,500 mg in  mL IVPB  (vancomycin (VANCOCIN) IV (LD + Maintenance))  EVERY 24 HOURS         Pato Garner M.D.       Fri Raz 10, 2022 12:12 PM    06/10/22 1212  MD Alert...Vancomycin per Pharmacy  (Abscess/Cellulitis )  PHARMACY TO DOSE        Question:  Indication(s) for vancomycin?  Answer:  Skin and soft tissue infection    Pato Garner M.D.       Fri Raz 10, 2022 10:21 AM    06/10/22 1021  vancomycin (VANCOCIN) 2,500 mg in  mL IVPB  (vancomycin (VANCOCIN) IV (LD + Maintenance))  ONCE         Ariel Amaro M.D.          Dosing Weight: 101 kg (222 lb 10.6 oz)      Admission History: Admitted on 6/10/2022 for Sepsis (HCC) [A41.9]  Pertinent history: [facility] and AMS. PMH of T2DM. Imaging of ankle concerning for osteomyelitis. Wound culture from wound on left foot on 4/12/22 did grow MRSA. UA also showing UTI.    Allergies:     Patient has no known allergies.     Pertinent cultures to date:     Results     Procedure Component Value Units Date/Time    URINALYSIS [[id number]]  (Abnormal) Collected: 06/10/22 0957    Order Status: Completed Specimen: Urine Updated: 06/10/22 1059     Color Yellow     Character Clear     Specific Gravity 1.021     Ph 6.5     Glucose Negative mg/dL      Ketones Trace mg/dL      Protein 100 mg/dL      Bilirubin Negative     Urobilinogen, Urine 0.2     Nitrite Positive     Leukocyte Esterase Moderate     Occult Blood Negative     Micro Urine Req Microscopic     "Narrative:      Indication for culture:->Evaluation for sepsis without a  clear source of infection    BLOOD CULTURE [[id number]] Collected: 06/10/22 1003    Order Status: Sent Specimen: Blood from Peripheral Updated: 06/10/22 1059    Narrative:      Per Hospital Policy: Only change Specimen Src: to \"Line\" if  specified by physician order.    URINE CULTURE(NEW) [[id number]] Collected: 06/10/22 0957    Order Status: Sent Specimen: Urine Updated: 06/10/22 1031    Narrative:      Indication for culture:->Evaluation for sepsis without a  clear source of infection    BLOOD CULTURE [[id number]] Collected: 06/10/22 0937    Order Status: Sent Specimen: Blood from Peripheral Updated: 06/10/22 1026    Narrative:      Per Hospital Policy: Only change Specimen Src: to \"Line\" if  specified by physician order.    MRSA By PCR (Amp) [[id number]]     Order Status: Sent Specimen: Respirate from Nares           Labs:     Estimated Creatinine Clearance: 69.2 mL/min (by C-G formula based on SCr of 1.03 mg/dL).  Recent Labs     06/10/22  0937   WBC 18.5*   NEUTSPOLYS 84.50*     Recent Labs     06/10/22  0937   BUN 20   CREATININE 1.03   ALBUMIN 4.0     No intake or output data in the 24 hours ending 06/10/22 1327   /55   Pulse 77   Temp 37.6 °C (99.6 °F) (Temporal)   Resp (!) 22   Ht 1.829 m (6')   Wt 101 kg (222 lb)   SpO2 91%  Temp (24hrs), Av.6 °C (99.6 °F), Min:37.6 °C (99.6 °F), Max:37.6 °C (99.6 °F)      List concerns for Vancomycin clearance:     Obesity;Age    Pharmacokinetics:     AUC kinetics:   Ke (hr ^-1): 0.0618 hr^-1  Half life: 11.22 hr  Clearance: 3.633  Estimated TDD: 1816.5  Estimated Dose: 999  Estimated interval: 13.2      A/P:     -  Vancomycin dose: 1500 mg q24h (1100)    -  Next vancomycin level(s):    - Will be ordered by floor pharmacist if necessary    -  Predicted vancomycin AUC from initial AUC test calculator: 413 mg·hr/L      -  Comments: vancomycin and ceftriaxone initiated for left ankle wound " concerning for OM, pt also with UTI. Dosing conservatively with q24h interval given body habitus. MRSA PCR pending, de-escalation recommended as indicated. Pharmacy will continue to follow.    Kurt Perez, PharmD

## 2022-06-10 NOTE — ED TRIAGE NOTES
Chief Complaint   Patient presents with   • Wound Check   • Altered Mental Status     [facility] staff reports increased confusion low grade fever for the last few days. Pt has wound to L ankle denies pain     /52   Pulse 87   Temp 37.6 °C (99.6 °F) (Temporal)   Resp 14   Ht 1.829 m (6')   Wt 101 kg (222 lb)   SpO2 91%   BMI 30.11 kg/m²

## 2022-06-10 NOTE — ASSESSMENT & PLAN NOTE
Patient has POLST form stating DNR/DNI.  Confirmed with daughter bedside face-to-face.  Time spent 8 minutes.

## 2022-06-11 PROBLEM — E11.628 DIABETIC FOOT INFECTION (HCC): Status: ACTIVE | Noted: 2022-06-11

## 2022-06-11 PROBLEM — R78.81 MRSA BACTEREMIA: Status: ACTIVE | Noted: 2022-06-11

## 2022-06-11 PROBLEM — L08.9 DIABETIC FOOT INFECTION (HCC): Status: ACTIVE | Noted: 2022-06-11

## 2022-06-11 PROBLEM — B95.62 MRSA BACTEREMIA: Status: ACTIVE | Noted: 2022-06-11

## 2022-06-11 LAB
ERYTHROCYTE [DISTWIDTH] IN BLOOD BY AUTOMATED COUNT: 41 FL (ref 35.9–50)
EST. AVERAGE GLUCOSE BLD GHB EST-MCNC: 134 MG/DL
GLUCOSE BLD STRIP.AUTO-MCNC: 202 MG/DL (ref 65–99)
GLUCOSE BLD STRIP.AUTO-MCNC: 203 MG/DL (ref 65–99)
GLUCOSE BLD STRIP.AUTO-MCNC: 220 MG/DL (ref 65–99)
GLUCOSE BLD STRIP.AUTO-MCNC: 231 MG/DL (ref 65–99)
HBA1C MFR BLD: 6.3 % (ref 4–5.6)
HCT VFR BLD AUTO: 36.9 % (ref 42–52)
HGB BLD-MCNC: 12.6 G/DL (ref 14–18)
LACTATE BLD-SCNC: 1.6 MMOL/L (ref 0.5–2)
LACTATE BLD-SCNC: 1.7 MMOL/L (ref 0.5–2)
LACTATE BLD-SCNC: 2.1 MMOL/L (ref 0.5–2)
LACTATE BLD-SCNC: 2.3 MMOL/L (ref 0.5–2)
MCH RBC QN AUTO: 29.8 PG (ref 27–33)
MCHC RBC AUTO-ENTMCNC: 34.1 G/DL (ref 33.7–35.3)
MCV RBC AUTO: 87.2 FL (ref 81.4–97.8)
PLATELET # BLD AUTO: 203 K/UL (ref 164–446)
PMV BLD AUTO: 8.8 FL (ref 9–12.9)
RBC # BLD AUTO: 4.23 M/UL (ref 4.7–6.1)
WBC # BLD AUTO: 12.5 K/UL (ref 4.8–10.8)

## 2022-06-11 PROCEDURE — 700105 HCHG RX REV CODE 258: Performed by: STUDENT IN AN ORGANIZED HEALTH CARE EDUCATION/TRAINING PROGRAM

## 2022-06-11 PROCEDURE — 36415 COLL VENOUS BLD VENIPUNCTURE: CPT

## 2022-06-11 PROCEDURE — 700111 HCHG RX REV CODE 636 W/ 250 OVERRIDE (IP): Performed by: STUDENT IN AN ORGANIZED HEALTH CARE EDUCATION/TRAINING PROGRAM

## 2022-06-11 PROCEDURE — 82962 GLUCOSE BLOOD TEST: CPT | Mod: 91

## 2022-06-11 PROCEDURE — 99233 SBSQ HOSP IP/OBS HIGH 50: CPT | Performed by: STUDENT IN AN ORGANIZED HEALTH CARE EDUCATION/TRAINING PROGRAM

## 2022-06-11 PROCEDURE — 83605 ASSAY OF LACTIC ACID: CPT | Mod: 91

## 2022-06-11 PROCEDURE — 700102 HCHG RX REV CODE 250 W/ 637 OVERRIDE(OP): Performed by: STUDENT IN AN ORGANIZED HEALTH CARE EDUCATION/TRAINING PROGRAM

## 2022-06-11 PROCEDURE — 770001 HCHG ROOM/CARE - MED/SURG/GYN PRIV*

## 2022-06-11 PROCEDURE — 99222 1ST HOSP IP/OBS MODERATE 55: CPT | Mod: 57 | Performed by: ORTHOPAEDIC SURGERY

## 2022-06-11 PROCEDURE — A9270 NON-COVERED ITEM OR SERVICE: HCPCS | Performed by: STUDENT IN AN ORGANIZED HEALTH CARE EDUCATION/TRAINING PROGRAM

## 2022-06-11 PROCEDURE — 85027 COMPLETE CBC AUTOMATED: CPT

## 2022-06-11 PROCEDURE — 83036 HEMOGLOBIN GLYCOSYLATED A1C: CPT

## 2022-06-11 PROCEDURE — 99223 1ST HOSP IP/OBS HIGH 75: CPT | Performed by: INTERNAL MEDICINE

## 2022-06-11 RX ORDER — DIPHENHYDRAMINE HYDROCHLORIDE 50 MG/ML
25 INJECTION INTRAMUSCULAR; INTRAVENOUS
Status: DISCONTINUED | OUTPATIENT
Start: 2022-06-11 | End: 2022-06-15

## 2022-06-11 RX ORDER — HALOPERIDOL 5 MG/ML
2 INJECTION INTRAMUSCULAR
Status: DISCONTINUED | OUTPATIENT
Start: 2022-06-11 | End: 2022-06-11

## 2022-06-11 RX ADMIN — GABAPENTIN 300 MG: 300 CAPSULE ORAL at 05:26

## 2022-06-11 RX ADMIN — ENOXAPARIN SODIUM 40 MG: 40 INJECTION SUBCUTANEOUS at 17:51

## 2022-06-11 RX ADMIN — INSULIN HUMAN 2 UNITS: 100 INJECTION, SOLUTION PARENTERAL at 08:26

## 2022-06-11 RX ADMIN — SODIUM CHLORIDE: 9 INJECTION, SOLUTION INTRAVENOUS at 20:57

## 2022-06-11 RX ADMIN — HYDROCODONE BITARTRATE AND ACETAMINOPHEN 1 TABLET: 5; 325 TABLET ORAL at 05:25

## 2022-06-11 RX ADMIN — ATORVASTATIN CALCIUM 40 MG: 40 TABLET, FILM COATED ORAL at 17:51

## 2022-06-11 RX ADMIN — INSULIN HUMAN 2 UNITS: 100 INJECTION, SOLUTION PARENTERAL at 12:59

## 2022-06-11 RX ADMIN — HYDROCODONE BITARTRATE AND ACETAMINOPHEN 1 TABLET: 5; 325 TABLET ORAL at 17:50

## 2022-06-11 RX ADMIN — MIRTAZAPINE 15 MG: 15 TABLET, FILM COATED ORAL at 20:55

## 2022-06-11 RX ADMIN — VANCOMYCIN HYDROCHLORIDE 1500 MG: 500 INJECTION, POWDER, LYOPHILIZED, FOR SOLUTION INTRAVENOUS at 12:06

## 2022-06-11 RX ADMIN — GABAPENTIN 300 MG: 300 CAPSULE ORAL at 17:51

## 2022-06-11 RX ADMIN — PIPERACILLIN AND TAZOBACTAM 3.38 G: 3; .375 INJECTION, POWDER, LYOPHILIZED, FOR SOLUTION INTRAVENOUS; PARENTERAL at 11:02

## 2022-06-11 RX ADMIN — SODIUM CHLORIDE: 9 INJECTION, SOLUTION INTRAVENOUS at 00:39

## 2022-06-11 RX ADMIN — INSULIN HUMAN 2 UNITS: 100 INJECTION, SOLUTION PARENTERAL at 17:48

## 2022-06-11 RX ADMIN — SERTRALINE 50 MG: 50 TABLET, FILM COATED ORAL at 17:50

## 2022-06-11 RX ADMIN — INSULIN HUMAN 2 UNITS: 100 INJECTION, SOLUTION PARENTERAL at 20:54

## 2022-06-11 ASSESSMENT — ENCOUNTER SYMPTOMS
FEVER: 0
BLURRED VISION: 0
BACK PAIN: 0
FALLS: 0
HEADACHES: 0
COUGH: 0
INSOMNIA: 0
PALPITATIONS: 0
EYE PAIN: 0
ABDOMINAL PAIN: 0
VOMITING: 0
SHORTNESS OF BREATH: 0
CHILLS: 0
DIZZINESS: 0
SENSORY CHANGE: 0
NAUSEA: 0
FOCAL WEAKNESS: 0

## 2022-06-11 ASSESSMENT — FIBROSIS 4 INDEX: FIB4 SCORE: 2.11

## 2022-06-11 ASSESSMENT — LIFESTYLE VARIABLES: SUBSTANCE_ABUSE: 0

## 2022-06-11 NOTE — ASSESSMENT & PLAN NOTE
Now status post left lateral ankle incision, draining and excisional debridement with wound VAC placement by orthopedic surgery on 6/14/2022  6/10 BCX w/MRSA  6/12 BCX w/NGTD   OR/TISS Clx w/NGTD  Continue IV vancomycin while inpatient, switched to daptomycin on discharge as per ID  Wound care following for wound VAC management  [facility]

## 2022-06-11 NOTE — CONSULTS
LIMB PRESERVATION SERVICE CONSULT      REFERRED BY: Dr Garner     DATE OF CONSULTATION: 6/10/2022    REASON FOR CONSULT: Left lateral ankle wound     HISTORY OF PRESENT ILLNESS: Andrei Garay is a 82 y.o.  with a past medical history that includes type 2 diabetes, hyperlipidemia, hypertension, MI.  Admitted 6/10/2022 for Sepsis (HCC) [A41.9].     LPS has been consulted for left lateral ankle wound.  Patient has dementia, patient's ex-wife and daughter at the bedside to provide history.  The ulcer started fall 2021.  Patient had a protuberance on his FWW that he was hitting his ankle on and compulsively rubbing his ankle on.  Per family it would heal an open and open up again repeatedly.  Patient lives at [facility] and has been receiving wound care there.  Patient was seen by LPS for this wound 4/13/2022, at that time wound was 0.5 x 0.5 x 0.3 and did not probe to bone.  Family states that the wound appeared to be healing but then suddenly became very erythematous over the last 2 days.  Patient's daughter states he was initiated on a course of antibiotics at the [facility] and cultures were taken.  She states 3 days into this course of antibiotics antibiotics were switched due to culture results, she is unsure when these antibiotic should be completed or what they are at this time.  Patient denied fevers, chills, nausea, vomiting.      IV antibiotics were started on this admission.  Infectious diseases has not been consulted.  Xray completed and is concerning for osteomyelitis. Ortho has not been consulted yet.    Diagnosed with diabetes 10 years ago years ago, and is currently managing with insulin.  His blood sugars are checked 2 times per day at his facility and he says that he is unsure what they run.  Does have numbness to feet.  Checks feet routinely. Usually wears OTC . Does not have diabetic shoes and inserts. Has not had previous foot surgeries.  Current  occupation: retired          Smoking:   reports that he quit smoking about 21 years ago. He has never used smokeless tobacco.    Alcohol:   reports no history of alcohol use.    Drug:   reports no history of drug use.      PAST MEDICAL HISTORY:   Past Medical History:   Diagnosis Date   • Diabetes (HCC)    • Hyperlipemia    • Hypertension    • MI (myocardial infarction) (HCC)         PAST SURGICAL HISTORY:   Past Surgical History:   Procedure Laterality Date   • APPENDECTOMY     • STENT PLACEMENT      cardiac       MEDICATIONS:   Current Facility-Administered Medications   Medication Dose   • NS infusion     • enoxaparin (Lovenox) inj 40 mg  40 mg   • MD Alert...Vancomycin per Pharmacy     • insulin regular (HumuLIN R,NovoLIN R) injection  1-6 Units    And   • dextrose 50% (D50W) injection 25 g  25 g   • [START ON 6/11/2022] vancomycin (VANCOCIN) 1,500 mg in  mL IVPB  1,500 mg   • cefTRIAXone (Rocephin) syringe 1 g  1 g   • atorvastatin (LIPITOR) tablet 40 mg  40 mg   • gabapentin (NEURONTIN) capsule 300 mg  300 mg   • mirtazapine (Remeron) tablet 15 mg  15 mg   • sertraline (Zoloft) tablet 50 mg  50 mg   • HYDROcodone-acetaminophen (NORCO) 5-325 MG per tablet 1 Tablet  1 Tablet       ALLERGIES:  No Known Allergies     FAMILY HISTORY: No family history on file.      REVIEW OF SYSTEMS:   Constitutional: Negative for chills, fever   Respiratory: Negative for cough and shortness of breath.    Cardiovascular:Negative for chest pain, and claudication.   Gastrointestinal: Negative for constipation, diarrhea, nausea and vomiting.   Lower extremities: positive for swelling and redness  Neurological: positive for numbness to feet and lower legs  All other systems reviewed and are negative     RESULTS:     Recent Labs     06/10/22  0937   WBC 18.5*   RBC 4.41*   HEMOGLOBIN 13.0*   HEMATOCRIT 37.8*   MCV 85.7   MCH 29.5   MCHC 34.4   RDW 40.0   PLATELETCT 244   MPV 8.9*     Recent Labs     06/10/22  0937   SODIUM 136  "  POTASSIUM 4.1   CHLORIDE 100   CO2 21   GLUCOSE 131*   BUN 20         ESR:     None    CRP:       None    COVID-19: Not completed this admission .       Imaging: 3/10/2022  DX-ANKLE 3+ VIEWS LEFT   Final Result      1. Lateral left ankle soft tissue swelling with soft tissue gas extending from the skin surface to the lateral aspect of the distal left fibula, with cortical disruption concerning for osteomyelitis. MRI of the left ankle without and with IV contrast is    recommended.   2. Decreased bone mineralization.   3. The remainder of the left ankle radiography is within normal limits.      DX-CHEST-PORTABLE (1 VIEW)   Final Result      No acute cardiopulmonary abnormality.            CT/MRI: None    Arterial studies: None    A1c:  Lab Results   Component Value Date/Time    HBA1C 6.7 (H) 04/12/2022 05:59 PM            Microbiology:  Results     Procedure Component Value Units Date/Time    URINALYSIS [[id number]]  (Abnormal) Collected: 06/10/22 0957    Order Status: Completed Specimen: Urine Updated: 06/10/22 1059     Color Yellow     Character Clear     Specific Gravity 1.021     Ph 6.5     Glucose Negative mg/dL      Ketones Trace mg/dL      Protein 100 mg/dL      Bilirubin Negative     Urobilinogen, Urine 0.2     Nitrite Positive     Leukocyte Esterase Moderate     Occult Blood Negative     Micro Urine Req Microscopic    Narrative:      Indication for culture:->Evaluation for sepsis without a  clear source of infection    BLOOD CULTURE [[id number]] Collected: 06/10/22 1003    Order Status: Sent Specimen: Blood from Peripheral Updated: 06/10/22 1059    Narrative:      Per Hospital Policy: Only change Specimen Src: to \"Line\" if  specified by physician order.    URINE CULTURE(NEW) [[id number]] Collected: 06/10/22 0957    Order Status: Sent Specimen: Urine Updated: 06/10/22 1031    Narrative:      Indication for culture:->Evaluation for sepsis without a  clear source of infection    BLOOD CULTURE [[id number]] " "Collected: 06/10/22 0937    Order Status: Sent Specimen: Blood from Peripheral Updated: 06/10/22 1026    Narrative:      Per Hospital Policy: Only change Specimen Src: to \"Line\" if  specified by physician order.    MRSA By PCR (Amp) [[id number]]     Order Status: Sent Specimen: Respirate from Nares            PHYSICAL EXAMINATION:     VITAL SIGNS: BP (!) 146/66 Comment: nurse aware  Pulse 90   Temp 37.3 °C (99.2 °F) (Temporal)   Resp 19   Ht 1.829 m (6')   Wt 102 kg (224 lb 3.3 oz)   SpO2 93%   BMI 30.41 kg/m²       General Appearance:  Well developed, well nourished, in no acute distress    Lower Extremity Assessment:    Edema:   Severe    ROM dorsi/plantarflexion:   Dorsiflexion limited    Structural /mechanical changes:    mycotic toenails    Sensory Assessment:  Monofilament testing completed on 4/13/2022  Visual Inspection: Feet without maceration, ulcers, fissures.  Pedal pulses: intact bilaterally    Feet Insensate to light touch    Pulses:  R foot: Diminished but palpable DP, palpable PT  L foot: Diminished but palpable DP, palpable PT    R foot:  With doppler brisk tones noted to PT/DP  L foot:  With doppler dimished tones noted to PT/DP    Wound Assessment:    Wound(s)   location: Left lateral ankle  Full thickness, does  probe to bone  Wound characteristics: red tissue,  slough  Erythema: Severe  Edema: Severe  Drainage: Small, sanguinous  Callus: None  Odor: None      Wound care completed by LPS APRN.  Wound irrigated with NS, patted dry.  Wound packed with iodoform strip packing, Aquacel Ag cut to fit over wound, secured with gauze, roll gauze, tape.  RN to apply sock over dressing in order to prevent removal of dressing as patient habitually rubs his leg against the bed.    Wound photo:           ASSESSMENT AND PLAN:   82 y.o. admitted for Sepsis (HCC) [A41.9]. Presents with left lateral ankle wound.  Wound probes to bone, x-ray concerning for osteomyelitis.   Discussion had with patient's " daughter Marah Rowland at the bedside ([phone] for consents) who is in agreement for n.p.o. tonight at midnight with potential for I&D of left lateral ankle tomorrow.  -Will consult orthopedic surgeon, Dr. Wagner, in the morning  Dressing orders placed for nursing.      Wound care:   -Wound care orders placed for nursing by LPS   -Left lateral ankle: Irrigate wound with NS, vivi dry.  Pack wound with iodoform strip packing, Aquacel Ag cut to fit over wound, secured with gauze, roll gauze, tape.  Apply sock over dressing, in order to prevent dislodgment.    Imaging/Labs:  -COVID-19: not completed this admission    Vascular status:   -  Palpable pedal pulses bilaterally    Surgery:   -We will consult Dr. Wagner in the morning for left lateral ankle I&D    Antibiotics:   -currently on antibiotics managed by hospitalist   -Consult to ID placed        Weight Bearing Status:   -Left foot: Weight bearing as tolerated    Offloading:   -None;    -Orthotic company: None    PT/OT:   -No consult placed at this time      Diabetes Education:   -No consult placed at this time due to dementia  -Patient lives at [facility] who provide his diabetes management  -   Lab Results   Component Value Date/Time    HBA1C 6.7 (H) 04/12/2022 05:59 PM          - Implications of loss of protective sensation (LOPS) discussed with patient- including increased risk for amputation.  Advised to check feet at least daily, moisturize feet, and to always wear protective foot wear.   -avoid trimming own nails. See podiatrist or certified foot and nail RN  -keep blood sugars <150 for improved wound healing          Discharge Plan:  TBD      D/W: pt, RN, Dr. Osborn    Please note that this dictation was created using voice recognition software. I have  worked with technical experts from [facility] to optimize the interface.  I have made every reasonable attempt to correct obvious errors, but there may be errors of grammar and  possibly content that I did not discover before finalizing the note.    Please contact LPS through [employer].

## 2022-06-11 NOTE — PROGRESS NOTES
Pt is confused.  Daughter at bedside. Positive blood cultures noted.  MD aware.  Call light within reach.

## 2022-06-11 NOTE — CONSULTS
INFECTIOUS DISEASES INPATIENT CONSULT NOTE     Date of Service: 6/11/2022    Consult Requested By: Leodan Osborn M.D.    Reason for Consultation: MRSA bacteremia, left foot osteomyelitis    History of Present Illness:   Andrei Garay is a 82 y.o. elderly man with a history of underlying dementia, type 2 diabetes mellitus, coronary artery disease, hyperlipidemia and hypertension admitted 6/10/2022 secondary to worsening altered mental status from baseline and increased left ankle pain.  History is obtained from patient's daughter at bedside as the patient is a very poor historian given his underlying dementia.  He is however alert to name and year at this time.  Daughter states that he resides at the [facility] and was recently hospitalized back in April secondary to left ankle cellulitis.  Wound culture at that time was positive for MRSA.  He was discharged on see cyclin for 7 days.  Daughter states that he has had a diabetic foot ulcer for approximately 2 months and has also been seen in the wound care at the [facility].  Recently, the patient has been experiencing worsening left ankle and heel pain in addition to increased urinary frequency but no dysuria.  There is been no report of fevers nor chills.  No cough, shortness of breath, nausea, nor vomiting.  He was however been having some diarrhea prior to admission.  Given his persistent symptoms, he presented to the ED for further evaluation and management.  On presentation, he was afebrile with a leukocytosis of 18.5.  Lactic acid was elevated.  Urinalysis was positive for nitrites.  Stray of the left ankle reveals lateral left ankle soft tissue swelling with soft tissue gas extending from the surface to the lateral aspect of the distal left fibula concerning for osteomyelitis.  An MRI is pending.  1 out of 2 blood culture sets is growing MRSA.  He is currently on an Comycin and Zosyn.  Infectious disease service consulted for  antibiotic recommendations.      Unable to obtain a full review of systems given underlying dementia    PMH:   Past Medical History:   Diagnosis Date   • Diabetes (HCC)    • Hyperlipemia    • Hypertension    • MI (myocardial infarction) (HCC)        PSH:  Past Surgical History:   Procedure Laterality Date   • APPENDECTOMY     • STENT PLACEMENT      cardiac       FAMILY HX:  Reviewed and not pertinent to the patient's clinical presentation    SOCIAL HX:  Social History     Socioeconomic History   • Marital status: Legally      Spouse name: Not on file   • Number of children: Not on file   • Years of education: Not on file   • Highest education level: Not on file   Occupational History   • Not on file   Tobacco Use   • Smoking status: Former Smoker     Quit date: 2000     Years since quittin.7   • Smokeless tobacco: Never Used   Substance and Sexual Activity   • Alcohol use: Never   • Drug use: Never   • Sexual activity: Not on file   Other Topics Concern   • Not on file   Social History Narrative    ** Merged History Encounter **          Social Determinants of Health     Financial Resource Strain: Not on file   Food Insecurity: Not on file   Transportation Needs: Not on file   Physical Activity: Not on file   Stress: Not on file   Social Connections: Not on file   Intimate Partner Violence: Not on file   Housing Stability: Not on file     Social History     Tobacco Use   Smoking Status Former Smoker   • Quit date: 2000   • Years since quittin.7   Smokeless Tobacco Never Used     Social History     Substance and Sexual Activity   Alcohol Use Never       Allergies/Intolerances:  No Known Allergies    History reviewed with the patient's daughter at bedside in the medical records  Other Current Medications:    Current Facility-Administered Medications:   •  piperacillin-tazobactam (ZOSYN) 3.375 g in  mL IVPB, 3.375 g, Intravenous, Once **AND** piperacillin-tazobactam (ZOSYN) 3.375 g in   mL IVPB, 3.375 g, Intravenous, Q8HRS, Leodan Osborn M.D.  •  NS infusion, , Intravenous, Continuous, Pato Garner M.D., Last Rate: 83 mL/hr at 22 0039, New Bag at 22 003  •  enoxaparin (Lovenox) inj 40 mg, 40 mg, Subcutaneous, DAILY AT 1800, Pato Garner M.D., 40 mg at 06/10/22 1807  •  MD Alert...Vancomycin per Pharmacy, , Other, PHARMACY TO DOSE, Pato Garner M.D.  •  insulin regular (HumuLIN R,NovoLIN R) injection, 1-6 Units, Subcutaneous, 4X/DAY ACHS, 2 Units at 22 08 **AND** POC blood glucose manual result, , , Q AC AND BEDTIME(S) **AND** NOTIFY MD and PharmD, , , Once **AND** Administer 20 grams of glucose (approximately 8 ounces of fruit juice) every 15 minutes PRN FSBG less than 70 mg/dL, , , PRN **AND** dextrose 50% (D50W) injection 25 g, 25 g, Intravenous, Q15 MIN PRN, Pato Garner M.D.  •  vancomycin (VANCOCIN) 1,500 mg in  mL IVPB, 1,500 mg, Intravenous, Q24HR, Pato Garner M.D.  •  atorvastatin (LIPITOR) tablet 40 mg, 40 mg, Oral, Q EVENING, Pato Garner M.D., 40 mg at 06/10/22 1807  •  gabapentin (NEURONTIN) capsule 300 mg, 300 mg, Oral, BID, Pato Garner M.D., 300 mg at 22 05  •  mirtazapine (Remeron) tablet 15 mg, 15 mg, Oral, Nightly, Pato Garner M.D., 15 mg at 06/10/22 2151  •  sertraline (Zoloft) tablet 50 mg, 50 mg, Oral, Q EVENING, Pato Garner M.D., 50 mg at 06/10/22 1807  •  HYDROcodone-acetaminophen (NORCO) 5-325 MG per tablet 1 Tablet, 1 Tablet, Oral, BID, Pato Garner M.D., 1 Tablet at 22 0525  [unfilled]    Most Recent Vital Signs:  /60   Pulse 99   Temp 37.3 °C (99.2 °F) (Temporal)   Resp 18   Ht 1.829 m (6')   Wt 97.3 kg (214 lb 8.1 oz)   SpO2 95%   BMI 29.09 kg/m²   Temp  Av.9 °C (98.5 °F)  Min: 36.3 °C (97.3 °F)  Max: 37.6 °C (99.6 °F)    Physical Exam:  General: well nourished, no diaphoresis, well-appearing, no acute distress  HEENT: sclera anicteric, PERRL, extraocular muscles  "intact, mucous membranes moist, oropharynx clear and moist, no oral lesions or exudate  Neck: supple, no lymphadenopathy  Chest: CTAB, no rales, rhonchi or wheezes, normal work of breathing.  Cardiac: regular rate and rhythm, normal S1 S2, no murmurs, rubs or gallops  Abdomen: + bowel sounds, soft, non-tender, non-distended, no hepatosplenomegaly  Extremities: WWP, left ankle dressed.  Photos of the left ankle reveal a open lesion on the lateral malleolus region with surrounding edema and erythema  Skin: warm and dry, see above  Neuro: Alert to name and place, non-focal exam, speech fluent, full range of motion to bilateral upper and lower extremities  Psych: normal mood and behavior, pleasant    Pertinent Lab Results:  Recent Labs     06/10/22  0937   WBC 18.5*      Recent Labs     06/10/22  0937   HEMOGLOBIN 13.0*   HEMATOCRIT 37.8*   MCV 85.7   MCH 29.5   PLATELETCT 244         Recent Labs     06/10/22  0937   SODIUM 136   POTASSIUM 4.1   CHLORIDE 100   CO2 21   CREATININE 1.03        Recent Labs     06/10/22  0937   ALBUMIN 4.0        Pertinent Micro:  Results     Procedure Component Value Units Date/Time    BLOOD CULTURE [[id number]]  (Abnormal) Collected: 06/10/22 0937    Order Status: Completed Specimen: Blood from Peripheral Updated: 06/11/22 0915     Significant Indicator POS     Source BLD     Site PERIPHERAL     Culture Result Growth detected by Bactec instrument. 06/11/2022  09:11  Gram Stain: Gram positive cocci: Possible Staphylococcus sp.  Methicillin Resistant Staphylococcus aureus (MRSA)  detected by PCR.  Susceptibility to follow.      Narrative:      CALL  [facility]  61 tel. [phone],  CALLED  61 tel. [phone] 06/11/2022, 09:15, RB PERF. RESULTS CALLED TO:  [id number] RN + Cornelio RN  Per Hospital Policy: Only change Specimen Src: to \"Line\" if  specified by physician order.  No site indicated    BLOOD CULTURE [[id number]] Collected: 06/10/22 1003    Order Status: Completed Specimen: Blood from Peripheral " "Updated: 06/11/22 0732     Significant Indicator NEG     Source BLD     Site PERIPHERAL     Culture Result No Growth  Note: Blood cultures are incubated for 5 days and  are monitored continuously.Positive blood cultures  are called to the RN and reported as soon as  they are identified.      Narrative:      Per Hospital Policy: Only change Specimen Src: to \"Line\" if  specified by physician order.  Right AC    URINALYSIS [[id number]]  (Abnormal) Collected: 06/10/22 0957    Order Status: Completed Specimen: Urine Updated: 06/10/22 1059     Color Yellow     Character Clear     Specific Gravity 1.021     Ph 6.5     Glucose Negative mg/dL      Ketones Trace mg/dL      Protein 100 mg/dL      Bilirubin Negative     Urobilinogen, Urine 0.2     Nitrite Positive     Leukocyte Esterase Moderate     Occult Blood Negative     Micro Urine Req Microscopic    Narrative:      Indication for culture:->Evaluation for sepsis without a  clear source of infection    URINE CULTURE(NEW) [[id number]] Collected: 06/10/22 0957    Order Status: Sent Specimen: Urine Updated: 06/10/22 1031    Narrative:      Indication for culture:->Evaluation for sepsis without a  clear source of infection    MRSA By PCR (Amp) [[id number]]     Order Status: Sent Specimen: Respirate from Nares         No results found for: BLOODCULTU, BLDCULT, BCHOLD     Studies:  DX-ANKLE 3+ VIEWS LEFT    Result Date: 6/10/2022  6/10/2022 9:57 AM HISTORY/REASON FOR EXAM:  Atraumatic Pain/Swelling/Deformity. Lateral left ankle wound TECHNIQUE/EXAM DESCRIPTION AND NUMBER OF VIEWS:  3 views of the LEFT ankle. COMPARISON: Left ankle radiography, 4/12/2022. FINDINGS: Bones: Normal bone mineralization. There is cortical irregularity involving the inferior lateral aspect of the lateral malleolus, at the location of distention of soft tissue gas from the skin surface to the surface of the bone. Findings are concerning for osteomyelitis. No acute fracture. No other focal bone lesion. " Joints: Intact. No subluxation. Ankle mortise is preserved. Soft tissues: Circumferential left ankle soft tissue swelling, greatest laterally, with soft tissue gas.     1. Lateral left ankle soft tissue swelling with soft tissue gas extending from the skin surface to the lateral aspect of the distal left fibula, with cortical disruption concerning for osteomyelitis. MRI of the left ankle without and with IV contrast is recommended. 2. Decreased bone mineralization. 3. The remainder of the left ankle radiography is within normal limits.    DX-CHEST-PORTABLE (1 VIEW)    Result Date: 6/10/2022  6/10/2022 9:57 AM HISTORY/REASON FOR EXAM:  possible sepsis.. TECHNIQUE/EXAM DESCRIPTION AND NUMBER OF VIEWS: Single portable view of the chest. COMPARISON: 9/22/2019 FINDINGS: Cardiac silhouette is enlarged. Aortic calcified atherosclerotic plaque. No focal consolidation, pleural effusion, pulmonary edema or pneumothorax. Generalized osteopenia.     No acute cardiopulmonary abnormality.      IMPRESSION:   1.  MRSA bacteremia   2.  Left ankle osteomyelitis  3.  Leukocytosis  4.  Type 2 diabetes mellitus  5.  Dementia    PLAN:   Andrei Garay is a 82 y.o. man with a history of underlying  Dementia, type 2 diabetes mellitus and recent hospitalization in April secondary to left ankle cellulitis with MRSA admitted on 6/10/2022 secondary to cute encephalopathy and increasing left ankle pain and swelling.  Patient found to have findings highly concerning for osteomyelitis on radiograph and MRSA bacteremia.  Source of his bacteremia is from his left foot infection.    -Discontinue Zosyn  -Continue IV vancomycin  -Monitor renal function and Vanco trough in this elderly patient  -Follow MRI  -Follow echo  -Follow arterial studies  -Repeat blood cultures x2 ordered for tomorrow morning  -Continue to monitor for any new joint and/or back pain-patient currently denies  -Blood sugar control  -Continue wound care  -Ortho  following      Plan of care discussed with IM Leodan Osborn M.D. and daughter at bedside. Will continue to follow    Kassandra Penny M.D.      Please note that this dictation was created using voice recognition software. I have worked with technical experts from [facility] to optimize the interface.  I have made every reasonable attempt to correct obvious errors, but there may be errors of grammar and possibly content that I did not discover before finalizing the note.

## 2022-06-11 NOTE — CONSULTS
DATE:  6/11/2022    REQUESTING PHYSICIAN:     1.  LUCINA Parekh (LPS service)  2.  Hospitalist service    CHIEF COMPLAINT:  Left ankle wound    HISTORY OF PRESENT ILLNESS: Andrei is 82 years old.  He has a wound on his left lateral ankle.  [facility]  He was admitted to [facility] yesterday.  Orthopedic consultation was requested for possible osteomyelitis.    ALLERGIES: None    MEDICATIONS: Acetaminophen, vitamin C, Lipitor, Voltaren gel, gabapentin, Norco, lisinopril, melatonin, metformin, metoprolol, mirtazapine, probiotic, sertraline, vitamin D    PAST MEDICAL HISTORY: Diabetes mellitus, hyperlipidemia, hypertension, coronary artery disease    PAST SURGICAL HISTORY: Appendectomy, stent placement    SOCIAL HISTORY: He does not currently smoke, use drugs or drink alcohol    FAMILY HISTORY: Negative    REVIEW OF SYSTEMS:  No headaches, nausea, vomiting, diarrhea, constipation, polyuria, dysuria, fevers, chills, weight loss, weight gain, abdominal pain, chest pain, shortness of breath.    EXAMINATION:    General: No acute distress, sleeping on his hospital bed  Vitals: Blood pressure 117/60, heart rate 99, respirations 18, temperature 99.2  HEENT: Normocephalic  Neck: Nontender  Chest: Nontender  Lungs: No labored breathing  Heart: Regular  Extremities: There is swelling at the left ankle, mostly at the lateral side overlying the lateral malleolus.  There is a 8 mm diameter wound that tracks about 1-1/2 cm deep.  No purulence, just some serous drainage.  There are some surrounding erythema.  Vascular: Toes are warm but I do not feel dorsalis pedis pulse  Neurological: Seems to have decreased sensation in the left foot    LAB: From yesterday, white blood cell count 18,500, hematocrit 37.8%, platelet count 244,000.  Sodium 136, potassium 4.1, creatinine 1.03, AST and ALT are normal, albumin 4.0.    IMAGING: Radiographs of the left ankle show soft tissue swelling along the lateral  side.    ASSESSMENT:     1.  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  Diabetes mellitus    PLAN: Recommended arterial duplex to evaluate perfusion to the foot due to chronic nonhealing wound.  He has MRI scheduled which will give additional information.  Treatment for osteomyelitis would be antibiotics and wound care.  Aggressive debridement will distally additional bone to be infected.  I do not think he needs an amputation.  Continue with LPS monitoring and will review with imaging when completed.

## 2022-06-11 NOTE — PROGRESS NOTES
[facility] Medicine Daily Progress Note    Date of Service  6/11/2022    Chief Complaint  Andrei Garay is a 82 y.o. male admitted 6/10/2022 with ankle wound.    Hospital Course  Andrei Garay is a 82 y.o. male past medical history of diabetes mellitus, hypertension who presented 6/10/2022 with altered mental status for last 3 days and left ankle pain.  History is primarily provided by daughter.  No relieving or exacerbating factors were noted.  He denies any fever does report some chills.  In the ER, x-ray of the ankle showed signs concerning for osteomyelitis.  UA grossly positive for urinary tract infection.  Patient met criteria for sepsis started on IV broad-spectrum antibiotics and will be admitted for further work-up and limb preservation consult.       Interval Problem Update  Admitted yesterday for worsening ankle wound.  Blood culture positive for MRSA.  On vancomycin, ID consulted.  MRI ankle ordered to evaluate for osteomyelitis, as x ray shows bone involvement.  US arterial LE ordered to assess vascular supply.  Echocardiogram ordered given MRSA bacteremia.  Ortho and ID following.  Patient with history dementia per daughter who is actively engaged in patient's health problems. Discussed plan of care with patient and daughter.  Supposed to be on insulin per daughter, [facility].  Continue ISS, hold metformin. Consider adding long acting insulin as needed.    I have personally seen and examined the patient at bedside. I discussed the plan of care with patient.    Consultants/Specialty  infectious disease and orthopedics    Code Status  DNAR/DNI    Disposition  Patient is not medically cleared for discharge.   Anticipate discharge to to skilled nursing facility.  I have placed the appropriate orders for post-discharge needs.    Review of Systems  Review of Systems   Constitutional: Negative for chills and fever.   Eyes: Negative for blurred vision and pain.   Respiratory:  Negative for cough and shortness of breath.    Cardiovascular: Negative for chest pain, palpitations and leg swelling.   Gastrointestinal: Negative for abdominal pain, nausea and vomiting.   Genitourinary: Negative for dysuria and urgency.   Musculoskeletal: Positive for joint pain. Negative for back pain and falls.   Skin: Negative for itching and rash.   Neurological: Negative for dizziness, sensory change, focal weakness and headaches.   Psychiatric/Behavioral: Negative for substance abuse. The patient does not have insomnia.         Physical Exam  Temp:  [37.3 °C (99.2 °F)-37.5 °C (99.5 °F)] 37.3 °C (99.2 °F)  Pulse:  [] 99  Resp:  [18-20] 18  BP: (117-146)/(60-79) 117/60  SpO2:  [92 %-95 %] 95 %    Physical Exam  Constitutional:       General: He is not in acute distress.     Appearance: He is not ill-appearing.   HENT:      Head: Normocephalic and atraumatic.      Right Ear: External ear normal.      Left Ear: External ear normal.      Mouth/Throat:      Pharynx: No oropharyngeal exudate or posterior oropharyngeal erythema.   Eyes:      Extraocular Movements: Extraocular movements intact.      Pupils: Pupils are equal, round, and reactive to light.   Cardiovascular:      Rate and Rhythm: Normal rate and regular rhythm.      Pulses: Normal pulses.      Heart sounds: Normal heart sounds. No murmur heard.  Pulmonary:      Effort: Pulmonary effort is normal. No respiratory distress.      Breath sounds: Normal breath sounds. No wheezing or rales.   Abdominal:      General: Bowel sounds are normal. There is no distension.      Palpations: Abdomen is soft.      Tenderness: There is no abdominal tenderness. There is no guarding.   Musculoskeletal:         General: No swelling or tenderness.      Cervical back: Normal range of motion and neck supple.      Right lower leg: No edema.      Left lower leg: No edema.   Skin:     General: Skin is warm and dry.      Findings: Erythema and lesion present.      Comments:  Left lateral ankle wound with ulcer, packed   Neurological:      General: No focal deficit present.      Mental Status: He is disoriented.      Sensory: No sensory deficit.      Motor: No weakness.   Psychiatric:         Mood and Affect: Mood normal.         Behavior: Behavior normal.         Fluids    Intake/Output Summary (Last 24 hours) at 6/11/2022 1317  Last data filed at 6/11/2022 0525  Gross per 24 hour   Intake 240 ml   Output 650 ml   Net -410 ml       Laboratory  Recent Labs     06/10/22  0937 06/11/22  1138   WBC 18.5* 12.5*   RBC 4.41* 4.23*   HEMOGLOBIN 13.0* 12.6*   HEMATOCRIT 37.8* 36.9*   MCV 85.7 87.2   MCH 29.5 29.8   MCHC 34.4 34.1   RDW 40.0 41.0   PLATELETCT 244 203   MPV 8.9* 8.8*     Recent Labs     06/10/22  0937   SODIUM 136   POTASSIUM 4.1   CHLORIDE 100   CO2 21   GLUCOSE 131*   BUN 20   CREATININE 1.03   CALCIUM 9.0                   Imaging  DX-ANKLE 3+ VIEWS LEFT   Final Result      1. Lateral left ankle soft tissue swelling with soft tissue gas extending from the skin surface to the lateral aspect of the distal left fibula, with cortical disruption concerning for osteomyelitis. MRI of the left ankle without and with IV contrast is    recommended.   2. Decreased bone mineralization.   3. The remainder of the left ankle radiography is within normal limits.      DX-CHEST-PORTABLE (1 VIEW)   Final Result      No acute cardiopulmonary abnormality.      MR-ANKLE W/O LEFT    (Results Pending)   US-EXTREMITY ARTERY LOWER UNILAT W/HONEY (COMBO) LEFT    (Results Pending)   EC-ECHOCARDIOGRAM COMPLETE W/O CONT    (Results Pending)        Assessment/Plan  * Sepsis (HCC)- (present on admission)  Assessment & Plan  This is Sepsis Present on admission  SIRS criteria identified on my evaluation include: Tachycardia, with heart rate greater than 90 BPM and Leukocytosis, with WBC greater than 12,000  Source is UTI/Left ankle infection  Sepsis protocol initiated  IV antibiotics as appropriate for source of  sepsis  Reassessment: I have reassessed the patient's hemodynamic status    MRSA bacteremia  Assessment & Plan  Blood culture positive for MRSA  On vancomycin  Echo ordered  Repeat blood cultures tomorrow 6/12  ID following    Diabetic foot infection (HCC)  Assessment & Plan  History of healing on and off left lateral ankle wound  Worsening last 2 weeks, completed oral antibiotics per daughter, acutely worse last few days prior to presentation  On vancomycin for MRSA positive blood culture  X ray shows bone involvement distal fibula, check MRI  Arterial US ordered  No plan for surgical intervention at this time, follow up MRI. Ortho following  ID following  Repeat blood cultures tomorrow.    UTI (urinary tract infection)  Assessment & Plan  Unclear if having symptoms, hold antibiotics for UTI  On vancomycin for MRSA ankle infection    Advance care planning- (present on admission)  Assessment & Plan  Patient has POLST form stating DNR/DNI.  Confirmed with daughter bedside face-to-face.  Time spent 8 minutes.    Obesity- (present on admission)  Assessment & Plan  Counseled on dietary modification once stable    Type 2 diabetes mellitus (HCC)- (present on admission)  Assessment & Plan  On metformin at home  Supposed to be on long acting insulin per daughter? Not on med rec  Continue with sliding scale and Accu-Cheks with hypoglycemia protocol   Consider starting long acting if needed  Check A1c    Depression- (present on admission)  Assessment & Plan  Continue with Zoloft and Remeron    Hypertension- (present on admission)  Assessment & Plan  Hold antihypertensive patient is normotensive and have sepsis.    Hyperlipidemia- (present on admission)  Assessment & Plan  Continue statin       VTE prophylaxis: enoxaparin ppx    I have performed a physical exam and reviewed and updated ROS and Plan today (6/11/2022). In review of yesterday's note (6/10/2022), there are no changes except as documented above.

## 2022-06-11 NOTE — ASSESSMENT & PLAN NOTE
Blood culture positive for MRSA  On vancomycin  Transthoracic echo without endocarditis  MRI shows osteomyelitis distal fibula and lateral malleolus  Repeat blood cultures 6/12  ID following, possible transition to IV dapto when going to [facility]  PICC line placed 6/18  Monitor  6 weeks total of therapy

## 2022-06-11 NOTE — CARE PLAN
Problem: Hemodynamics  Goal: Patient's hemodynamics, fluid balance and neurologic status will be stable or improve  Outcome: Not Progressing     Problem: Fall Risk  Goal: Patient will remain free from falls  Outcome: Progressing   The patient is Watcher - Medium risk of patient condition declining or worsening    Shift Goals  Clinical Goals: antibiotics  Patient Goals: safety  Family Goals: Wound care    Progress made toward(s) clinical / shift goals:  pt remains free from falls      Patient is not progressing towards the following goals:      Problem: Hemodynamics  Goal: Patient's hemodynamics, fluid balance and neurologic status will be stable or improve  Outcome: Not Progressing

## 2022-06-11 NOTE — CARE PLAN
The patient is Stable - Low risk of patient condition declining or worsening    Shift Goals  Clinical Goals: Free from falls  Family Goals: Wound care    Progress made toward(s) clinical / shift goals:    Problem: Knowledge Deficit - Standard  Goal: Patient and family/care givers will demonstrate understanding of plan of care, disease process/condition, diagnostic tests and medications  Outcome: Progressing     Problem: Hemodynamics  Goal: Patient's hemodynamics, fluid balance and neurologic status will be stable or improve  Outcome: Progressing     Problem: Fluid Volume  Goal: Fluid volume balance will be maintained  Outcome: Progressing       Patient is not progressing towards the following goals:

## 2022-06-11 NOTE — CARE PLAN
The patient is Stable - Low risk of patient condition declining or worsening    Shift Goals  Clinical Goals: Free from falls  Family Goals: Wound care    Progress made toward(s) clinical / shift goals: Bed alarm on, call light in reach, room near nurses station, family at bedside, reorient patient, bed locked and in lowest position    Patient is not progressing towards the following goals:      Problem: Fall Risk  Goal: Patient will remain free from falls  Outcome: Progressing

## 2022-06-11 NOTE — PROGRESS NOTES
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

## 2022-06-12 ENCOUNTER — APPOINTMENT (OUTPATIENT)
Dept: RADIOLOGY | Facility: MEDICAL CENTER | Age: 82
DRG: 854 | End: 2022-06-12
Attending: ORTHOPAEDIC SURGERY
Payer: COMMERCIAL

## 2022-06-12 ENCOUNTER — APPOINTMENT (OUTPATIENT)
Dept: CARDIOLOGY | Facility: MEDICAL CENTER | Age: 82
DRG: 854 | End: 2022-06-12
Attending: STUDENT IN AN ORGANIZED HEALTH CARE EDUCATION/TRAINING PROGRAM
Payer: COMMERCIAL

## 2022-06-12 LAB
ANION GAP SERPL CALC-SCNC: 11 MMOL/L (ref 7–16)
BACTERIA UR CULT: ABNORMAL
BACTERIA UR CULT: ABNORMAL
BUN SERPL-MCNC: 24 MG/DL (ref 8–22)
CALCIUM SERPL-MCNC: 8.4 MG/DL (ref 8.5–10.5)
CHLORIDE SERPL-SCNC: 106 MMOL/L (ref 96–112)
CO2 SERPL-SCNC: 23 MMOL/L (ref 20–33)
CREAT SERPL-MCNC: 1.12 MG/DL (ref 0.5–1.4)
ERYTHROCYTE [DISTWIDTH] IN BLOOD BY AUTOMATED COUNT: 42.5 FL (ref 35.9–50)
GFR SERPLBLD CREATININE-BSD FMLA CKD-EPI: 66 ML/MIN/1.73 M 2
GLUCOSE BLD STRIP.AUTO-MCNC: 149 MG/DL (ref 65–99)
GLUCOSE BLD STRIP.AUTO-MCNC: 170 MG/DL (ref 65–99)
GLUCOSE BLD STRIP.AUTO-MCNC: 202 MG/DL (ref 65–99)
GLUCOSE BLD STRIP.AUTO-MCNC: 209 MG/DL (ref 65–99)
GLUCOSE SERPL-MCNC: 165 MG/DL (ref 65–99)
HCT VFR BLD AUTO: 35.2 % (ref 42–52)
HGB BLD-MCNC: 11.8 G/DL (ref 14–18)
LV EJECT FRACT  99904: 55
LV EJECT FRACT MOD 2C 99903: 63.11
LV EJECT FRACT MOD 4C 99902: 68.06
LV EJECT FRACT MOD BP 99901: 64.32
MCH RBC QN AUTO: 29.9 PG (ref 27–33)
MCHC RBC AUTO-ENTMCNC: 33.5 G/DL (ref 33.7–35.3)
MCV RBC AUTO: 89.1 FL (ref 81.4–97.8)
PLATELET # BLD AUTO: 200 K/UL (ref 164–446)
PMV BLD AUTO: 8.9 FL (ref 9–12.9)
POTASSIUM SERPL-SCNC: 3.6 MMOL/L (ref 3.6–5.5)
RBC # BLD AUTO: 3.95 M/UL (ref 4.7–6.1)
SIGNIFICANT IND 70042: ABNORMAL
SITE SITE: ABNORMAL
SODIUM SERPL-SCNC: 140 MMOL/L (ref 135–145)
SOURCE SOURCE: ABNORMAL
WBC # BLD AUTO: 10.7 K/UL (ref 4.8–10.8)

## 2022-06-12 PROCEDURE — 36415 COLL VENOUS BLD VENIPUNCTURE: CPT

## 2022-06-12 PROCEDURE — 93922 UPR/L XTREMITY ART 2 LEVELS: CPT

## 2022-06-12 PROCEDURE — 700105 HCHG RX REV CODE 258: Performed by: STUDENT IN AN ORGANIZED HEALTH CARE EDUCATION/TRAINING PROGRAM

## 2022-06-12 PROCEDURE — 85027 COMPLETE CBC AUTOMATED: CPT

## 2022-06-12 PROCEDURE — 99233 SBSQ HOSP IP/OBS HIGH 50: CPT | Performed by: INTERNAL MEDICINE

## 2022-06-12 PROCEDURE — 82962 GLUCOSE BLOOD TEST: CPT | Mod: 91

## 2022-06-12 PROCEDURE — 93306 TTE W/DOPPLER COMPLETE: CPT

## 2022-06-12 PROCEDURE — 99233 SBSQ HOSP IP/OBS HIGH 50: CPT | Performed by: STUDENT IN AN ORGANIZED HEALTH CARE EDUCATION/TRAINING PROGRAM

## 2022-06-12 PROCEDURE — A9270 NON-COVERED ITEM OR SERVICE: HCPCS | Performed by: STUDENT IN AN ORGANIZED HEALTH CARE EDUCATION/TRAINING PROGRAM

## 2022-06-12 PROCEDURE — 87040 BLOOD CULTURE FOR BACTERIA: CPT

## 2022-06-12 PROCEDURE — 700102 HCHG RX REV CODE 250 W/ 637 OVERRIDE(OP): Performed by: STUDENT IN AN ORGANIZED HEALTH CARE EDUCATION/TRAINING PROGRAM

## 2022-06-12 PROCEDURE — 700111 HCHG RX REV CODE 636 W/ 250 OVERRIDE (IP): Performed by: STUDENT IN AN ORGANIZED HEALTH CARE EDUCATION/TRAINING PROGRAM

## 2022-06-12 PROCEDURE — 93306 TTE W/DOPPLER COMPLETE: CPT | Mod: 26 | Performed by: INTERNAL MEDICINE

## 2022-06-12 PROCEDURE — 80048 BASIC METABOLIC PNL TOTAL CA: CPT

## 2022-06-12 PROCEDURE — 770001 HCHG ROOM/CARE - MED/SURG/GYN PRIV*

## 2022-06-12 RX ORDER — ACETAMINOPHEN 325 MG/1
650 TABLET ORAL EVERY 4 HOURS PRN
Status: DISCONTINUED | OUTPATIENT
Start: 2022-06-12 | End: 2022-06-20 | Stop reason: HOSPADM

## 2022-06-12 RX ORDER — LACTOBACILLUS RHAMNOSUS GG 10B CELL
1 CAPSULE ORAL EVERY MORNING
Status: DISCONTINUED | OUTPATIENT
Start: 2022-06-12 | End: 2022-06-15

## 2022-06-12 RX ADMIN — GABAPENTIN 300 MG: 300 CAPSULE ORAL at 05:54

## 2022-06-12 RX ADMIN — VANCOMYCIN HYDROCHLORIDE 1500 MG: 500 INJECTION, POWDER, LYOPHILIZED, FOR SOLUTION INTRAVENOUS at 11:56

## 2022-06-12 RX ADMIN — SODIUM CHLORIDE: 9 INJECTION, SOLUTION INTRAVENOUS at 20:37

## 2022-06-12 RX ADMIN — ATORVASTATIN CALCIUM 40 MG: 40 TABLET, FILM COATED ORAL at 17:08

## 2022-06-12 RX ADMIN — Medication 1 CAPSULE: at 09:02

## 2022-06-12 RX ADMIN — ENOXAPARIN SODIUM 40 MG: 40 INJECTION SUBCUTANEOUS at 17:07

## 2022-06-12 RX ADMIN — GABAPENTIN 300 MG: 300 CAPSULE ORAL at 17:07

## 2022-06-12 RX ADMIN — INSULIN HUMAN 2 UNITS: 100 INJECTION, SOLUTION PARENTERAL at 13:28

## 2022-06-12 RX ADMIN — INSULIN HUMAN 2 UNITS: 100 INJECTION, SOLUTION PARENTERAL at 20:37

## 2022-06-12 RX ADMIN — INSULIN HUMAN 1 UNITS: 100 INJECTION, SOLUTION PARENTERAL at 17:31

## 2022-06-12 RX ADMIN — SERTRALINE 50 MG: 50 TABLET, FILM COATED ORAL at 17:08

## 2022-06-12 RX ADMIN — MIRTAZAPINE 15 MG: 15 TABLET, FILM COATED ORAL at 20:37

## 2022-06-12 RX ADMIN — HYDROCODONE BITARTRATE AND ACETAMINOPHEN 1 TABLET: 5; 325 TABLET ORAL at 05:54

## 2022-06-12 RX ADMIN — SODIUM CHLORIDE: 9 INJECTION, SOLUTION INTRAVENOUS at 09:01

## 2022-06-12 RX ADMIN — HYDROCODONE BITARTRATE AND ACETAMINOPHEN 1 TABLET: 5; 325 TABLET ORAL at 17:07

## 2022-06-12 ASSESSMENT — ENCOUNTER SYMPTOMS
NAUSEA: 0
ABDOMINAL PAIN: 0
COUGH: 0
BLURRED VISION: 0
FALLS: 0
MYALGIAS: 1
SENSORY CHANGE: 0
FOCAL WEAKNESS: 0
INSOMNIA: 0
SHORTNESS OF BREATH: 0
DIZZINESS: 0
FEVER: 0
VOMITING: 0
CHILLS: 0
HEADACHES: 0
BACK PAIN: 0
EYE PAIN: 0
PALPITATIONS: 0

## 2022-06-12 ASSESSMENT — LIFESTYLE VARIABLES: SUBSTANCE_ABUSE: 0

## 2022-06-12 NOTE — PROGRESS NOTES
Infectious Disease Progress Note    Author: Kassandra Penny M.D. Date & Time of service: 2022  8:34 AM    Chief Complaint:  Follow-up for MRSA bacteremia, left foot infection    Interval History:   afebrile, WBC 10.7.  Patient is much more alert and awake today.  He is awaiting MRI and arterial studies, urine culture with Staph epidermidis      Labs Reviewed, Medications Reviewed and Wound Reviewed.    Review of Systems:  Review of Systems   Constitutional: Negative for chills and fever.   Gastrointestinal: Negative for nausea and vomiting.   Musculoskeletal: Positive for myalgias.   Limited secondary to underlying dementia    Hemodynamics:  Temp (24hrs), Av.9 °C (98.5 °F), Min:36.5 °C (97.7 °F), Max:37.4 °C (99.3 °F)  Temperature: 36.6 °C (97.8 °F)  Pulse  Av.1  Min: 61  Max: 143   Blood Pressure : 114/62       Physical Exam:  Physical Exam  Vitals and nursing note reviewed.   Constitutional:       Appearance: Normal appearance.   HENT:      Mouth/Throat:      Mouth: Mucous membranes are moist.   Eyes:      Extraocular Movements: Extraocular movements intact.      Pupils: Pupils are equal, round, and reactive to light.   Cardiovascular:      Rate and Rhythm: Normal rate.   Pulmonary:      Effort: Pulmonary effort is normal. No respiratory distress.      Breath sounds: No wheezing.   Abdominal:      Palpations: Abdomen is soft.   Musculoskeletal:      Comments: Left foot dressed   Neurological:      Mental Status: He is alert.      Comments: Much more alert today         Meds:    Current Facility-Administered Medications:   •  acetaminophen  •  lactobacillus rhamnosus  •  diphenhydrAMINE  •  NS  •  enoxaparin (LOVENOX) injection  •  MD Alert...Vancomycin per Pharmacy  •  insulin regular **AND** POC blood glucose manual result **AND** NOTIFY MD and PharmD **AND** Administer 20 grams of glucose (approximately 8 ounces of fruit juice) every 15 minutes PRN FSBG less than 70 mg/dL **AND** dextrose  bolus  •  vancomycin  •  atorvastatin  •  gabapentin  •  mirtazapine  •  sertraline  •  HYDROcodone-acetaminophen    Labs:  Recent Labs     06/10/22  0937 06/11/22  1138 06/12/22  0230   WBC 18.5* 12.5* 10.7   RBC 4.41* 4.23* 3.95*   HEMOGLOBIN 13.0* 12.6* 11.8*   HEMATOCRIT 37.8* 36.9* 35.2*   MCV 85.7 87.2 89.1   MCH 29.5 29.8 29.9   RDW 40.0 41.0 42.5   PLATELETCT 244 203 200   MPV 8.9* 8.8* 8.9*   NEUTSPOLYS 84.50*  --   --    LYMPHOCYTES 4.20*  --   --    MONOCYTES 9.90  --   --    EOSINOPHILS 0.10  --   --    BASOPHILS 0.40  --   --      Recent Labs     06/10/22  0937 06/12/22  0230   SODIUM 136 140   POTASSIUM 4.1 3.6   CHLORIDE 100 106   CO2 21 23   GLUCOSE 131* 165*   BUN 20 24*     Recent Labs     06/10/22  0937 06/12/22  0230   ALBUMIN 4.0  --    TBILIRUBIN 1.2  --    ALKPHOSPHAT 62  --    TOTPROTEIN 7.6  --    ALTSGPT 8  --    ASTSGOT 18  --    CREATININE 1.03 1.12       Imaging:  DX-ANKLE 3+ VIEWS LEFT    Result Date: 6/10/2022  6/10/2022 9:57 AM HISTORY/REASON FOR EXAM:  Atraumatic Pain/Swelling/Deformity. Lateral left ankle wound TECHNIQUE/EXAM DESCRIPTION AND NUMBER OF VIEWS:  3 views of the LEFT ankle. COMPARISON: Left ankle radiography, 4/12/2022. FINDINGS: Bones: Normal bone mineralization. There is cortical irregularity involving the inferior lateral aspect of the lateral malleolus, at the location of distention of soft tissue gas from the skin surface to the surface of the bone. Findings are concerning for osteomyelitis. No acute fracture. No other focal bone lesion. Joints: Intact. No subluxation. Ankle mortise is preserved. Soft tissues: Circumferential left ankle soft tissue swelling, greatest laterally, with soft tissue gas.     1. Lateral left ankle soft tissue swelling with soft tissue gas extending from the skin surface to the lateral aspect of the distal left fibula, with cortical disruption concerning for osteomyelitis. MRI of the left ankle without and with IV contrast is recommended.  "2. Decreased bone mineralization. 3. The remainder of the left ankle radiography is within normal limits.    DX-CHEST-PORTABLE (1 VIEW)    Result Date: 6/10/2022  6/10/2022 9:57 AM HISTORY/REASON FOR EXAM:  possible sepsis.. TECHNIQUE/EXAM DESCRIPTION AND NUMBER OF VIEWS: Single portable view of the chest. COMPARISON: 9/22/2019 FINDINGS: Cardiac silhouette is enlarged. Aortic calcified atherosclerotic plaque. No focal consolidation, pleural effusion, pulmonary edema or pneumothorax. Generalized osteopenia.     No acute cardiopulmonary abnormality.      Micro:  Results     Procedure Component Value Units Date/Time    BLOOD CULTURE [[id number]] Collected: 06/12/22 0230    Order Status: Sent Specimen: Blood from Peripheral Updated: 06/12/22 0351    Narrative:      Contact  Per Hospital Policy: Only change Specimen Src: to \"Line\" if  specified by physician order.    BLOOD CULTURE [[id number]] Collected: 06/12/22 0230    Order Status: Sent Specimen: Blood from Peripheral Updated: 06/12/22 0351    Narrative:      Contact  Per Hospital Policy: Only change Specimen Src: to \"Line\" if  specified by physician order.    URINE CULTURE(NEW) [[id number]]  (Abnormal) Collected: 06/10/22 0957    Order Status: Completed Specimen: Urine Updated: 06/11/22 1436     Significant Indicator POS     Source UR     Site -     Culture Result -      Staphylococcus species  >100,000 cfu/mL      Narrative:      Indication for culture:->Evaluation for sepsis without a  clear source of infection  Indication for culture:->Evaluation for sepsis without a    BLOOD CULTURE [[id number]]  (Abnormal) Collected: 06/10/22 1003    Order Status: Completed Specimen: Blood from Peripheral Updated: 06/11/22 1346     Significant Indicator POS     Source BLD     Site PERIPHERAL     Culture Result Growth detected by Bactec instrument. 06/11/2022  13:45  Gram Stain: Gram positive cocci: Possible Staphylococcus sp.      Narrative:      Per Hospital Policy: Only change " "Specimen Src: to \"Line\" if  specified by physician order.  Right AC    BLOOD CULTURE [[id number]]  (Abnormal) Collected: 06/10/22 0937    Order Status: Completed Specimen: Blood from Peripheral Updated: 06/11/22 0915     Significant Indicator POS     Source BLD     Site PERIPHERAL     Culture Result Growth detected by Bactec instrument. 06/11/2022  09:11  Gram Stain: Gram positive cocci: Possible Staphylococcus sp.  Methicillin Resistant Staphylococcus aureus (MRSA)  detected by PCR.  Susceptibility to follow.      Narrative:      CALL  [facility]  61 tel. [phone],  CALLED  61 tel. [phone] 06/11/2022, 09:15, RB PERF. RESULTS CALLED TO:  [id number] RN + Cornelio RN  Per Hospital Policy: Only change Specimen Src: to \"Line\" if  specified by physician order.  No site indicated    URINALYSIS [[id number]]  (Abnormal) Collected: 06/10/22 0957    Order Status: Completed Specimen: Urine Updated: 06/10/22 1059     Color Yellow     Character Clear     Specific Gravity 1.021     Ph 6.5     Glucose Negative mg/dL      Ketones Trace mg/dL      Protein 100 mg/dL      Bilirubin Negative     Urobilinogen, Urine 0.2     Nitrite Positive     Leukocyte Esterase Moderate     Occult Blood Negative     Micro Urine Req Microscopic    Narrative:      Indication for culture:->Evaluation for sepsis without a  clear source of infection    MRSA By PCR (Amp) [[id number]]     Order Status: Canceled Specimen: Respirate from Nares           Assessment:  82 y.o. man with a history of underlying dementia, type 2 diabetes mellitus and recent hospitalization in April secondary to left ankle cellulitis with MRSA admitted on 6/10/2022 secondary to cute encephalopathy and increasing left ankle pain and swelling.  Patient found to have findings highly concerning for osteomyelitis on radiograph and MRSA bacteremia.  Source of his bacteremia is from his left foot infection.    Pertinent diagnoses:  MRSA bacteremia  Left ankle osteomyelitis  Bacteriuria, urine cx " +staph epi  Leukocytosis, resolved  Type 2 diabetes mellitus, hemoglobin A1c 6.3  Dementia    Plan:  -Continue IV vancomycin  -Monitor renal function and Vanco trough especially in this elderly patient  -Awaiting MRI of left ankle  - Follow echo  -Follow-up arterial studies  -Blood cultures on 6/10- MRSA.  Follow susceptibilities  - Repeat blood cultures x2 today-pending  -Continue to monitor for any new joint pain and/or back pain-patient currently denies  -Continue wound care  -Blood sugar control  -LPS and Ortho following      Discussed with internal medicine Dr. Osborn and daughter at bedside.  We will continue to follow

## 2022-06-12 NOTE — PROGRESS NOTES
End of shift summary:    Aox1-2. VSS on RA. Denied pain, pt is receiving mack norco. Still receiving IV vanc and IVF NS @ 83. BG . x2 assist to BSC, pt doesn't call, bed alarm on. Still waiting for US, MRI, and ECHO to be completed. L ankle dressing chagned twice d/t patient removing. Call light within reach.

## 2022-06-12 NOTE — CARE PLAN
The patient is Stable - Low risk of patient condition declining or worsening    Shift Goals  Clinical Goals: IV abx, dressing change  Patient Goals: Rest  Family Goals: Wound care    Progress made toward(s) clinical / shift goals:  VSS on RA. Denies pain at this time. Denied pain with dressing change. Bed & chair alarm on. Call light within reach.    Problem: Respiratory  Goal: Patient will achieve/maintain optimum respiratory ventilation and gas exchange  Outcome: Progressing     Problem: Fall Risk  Goal: Patient will remain free from falls  Outcome: Progressing     Problem: Pain - Standard  Goal: Alleviation of pain or a reduction in pain to the patient’s comfort goal  Outcome: Progressing       Patient is not progressing towards the following goals:

## 2022-06-12 NOTE — CARE PLAN
The patient is Stable - Low risk of patient condition declining or worsening    Shift Goals  Clinical Goals: IV abx  Patient Goals: rest  Family Goals: Wound care    Progress made toward(s) clinical / shift goals:  abx administered. Dressing changed today.     Patient is not progressing towards the following goals:      Problem: Knowledge Deficit - Standard  Goal: Patient and family/care givers will demonstrate understanding of plan of care, disease process/condition, diagnostic tests and medications  Outcome: Progressing     Problem: Hemodynamics  Goal: Patient's hemodynamics, fluid balance and neurologic status will be stable or improve  Outcome: Progressing     Problem: Respiratory  Goal: Patient will achieve/maintain optimum respiratory ventilation and gas exchange  Outcome: Progressing     Problem: Physical Regulation  Goal: Diagnostic test results will improve  Outcome: Progressing     Problem: Fall Risk  Goal: Patient will remain free from falls  Outcome: Progressing     Problem: Pain - Standard  Goal: Alleviation of pain or a reduction in pain to the patient’s comfort goal  Outcome: Progressing

## 2022-06-12 NOTE — PROGRESS NOTES
[facility] Medicine Daily Progress Note    Date of Service  6/12/2022    Chief Complaint  Andrei Garay is a 82 y.o. male admitted 6/10/2022 with ankle wound.    Hospital Course  Andrei Garay is a 82 y.o. male past medical history of diabetes mellitus, hypertension who presented 6/10/2022 with altered mental status for last 3 days and left ankle pain.  History is primarily provided by daughter.  No relieving or exacerbating factors were noted.  He denies any fever does report some chills.  In the ER, x-ray of the ankle showed signs concerning for osteomyelitis.  UA grossly positive for urinary tract infection.  Patient met criteria for sepsis started on IV broad-spectrum antibiotics and will be admitted for further work-up and limb preservation consult.       Interval Problem Update  No acute events overnight.  Patient feeling well, has no complaints.  MRI foot pending, echo pending, US arterial pending.  On vancomycin.  Repeat blood cultures today.  Ortho and ID following.  A1c 6.3. Has been needing minimal sliding scale insulin. Consider acting long acting insulin if insulin requirements increase.  Daughter reports patient was taking 70/30 insulin 26U AM and 28U PM prior.    I have personally seen and examined the patient at bedside. I discussed the plan of care with patient.    Consultants/Specialty  infectious disease and orthopedics    Code Status  DNAR/DNI    Disposition  Patient is not medically cleared for discharge.   Anticipate discharge to to skilled nursing facility.  I have placed the appropriate orders for post-discharge needs.    Review of Systems  Review of Systems   Constitutional: Negative for chills and fever.   Eyes: Negative for blurred vision and pain.   Respiratory: Negative for cough and shortness of breath.    Cardiovascular: Negative for chest pain, palpitations and leg swelling.   Gastrointestinal: Negative for abdominal pain, nausea and vomiting.   Genitourinary: Negative for  dysuria and urgency.   Musculoskeletal: Positive for joint pain. Negative for back pain and falls.   Skin: Negative for itching and rash.   Neurological: Negative for dizziness, sensory change, focal weakness and headaches.   Psychiatric/Behavioral: Negative for substance abuse. The patient does not have insomnia.         Physical Exam  Temp:  [36.5 °C (97.7 °F)-37.4 °C (99.3 °F)] 36.6 °C (97.8 °F)  Pulse:  [61-95] 78  Resp:  [18-20] 18  BP: (114-152)/(62-75) 114/62  SpO2:  [92 %-93 %] 92 %    Physical Exam  Constitutional:       General: He is not in acute distress.     Appearance: He is not ill-appearing.   HENT:      Head: Normocephalic and atraumatic.      Right Ear: External ear normal.      Left Ear: External ear normal.      Mouth/Throat:      Pharynx: No oropharyngeal exudate or posterior oropharyngeal erythema.   Eyes:      Extraocular Movements: Extraocular movements intact.      Pupils: Pupils are equal, round, and reactive to light.   Cardiovascular:      Rate and Rhythm: Normal rate and regular rhythm.      Pulses: Normal pulses.      Heart sounds: Normal heart sounds. No murmur heard.  Pulmonary:      Effort: Pulmonary effort is normal. No respiratory distress.      Breath sounds: Normal breath sounds. No wheezing or rales.   Abdominal:      General: Bowel sounds are normal. There is no distension.      Palpations: Abdomen is soft.      Tenderness: There is no abdominal tenderness. There is no guarding.   Musculoskeletal:         General: No swelling or tenderness.      Cervical back: Normal range of motion and neck supple.      Right lower leg: No edema.      Left lower leg: No edema.   Skin:     General: Skin is warm and dry.      Findings: Erythema and lesion present.      Comments: Left lateral ankle wound with ulcer, packed   Neurological:      General: No focal deficit present.      Mental Status: He is disoriented.      Sensory: No sensory deficit.      Motor: No weakness.   Psychiatric:          Mood and Affect: Mood normal.         Behavior: Behavior normal.         Fluids    Intake/Output Summary (Last 24 hours) at 6/12/2022 1113  Last data filed at 6/12/2022 1000  Gross per 24 hour   Intake 600 ml   Output --   Net 600 ml       Laboratory  Recent Labs     06/10/22  0937 06/11/22  1138 06/12/22  0230   WBC 18.5* 12.5* 10.7   RBC 4.41* 4.23* 3.95*   HEMOGLOBIN 13.0* 12.6* 11.8*   HEMATOCRIT 37.8* 36.9* 35.2*   MCV 85.7 87.2 89.1   MCH 29.5 29.8 29.9   MCHC 34.4 34.1 33.5*   RDW 40.0 41.0 42.5   PLATELETCT 244 203 200   MPV 8.9* 8.8* 8.9*     Recent Labs     06/10/22  0937 06/12/22  0230   SODIUM 136 140   POTASSIUM 4.1 3.6   CHLORIDE 100 106   CO2 21 23   GLUCOSE 131* 165*   BUN 20 24*   CREATININE 1.03 1.12   CALCIUM 9.0 8.4*                   Imaging  DX-ANKLE 3+ VIEWS LEFT   Final Result      1. Lateral left ankle soft tissue swelling with soft tissue gas extending from the skin surface to the lateral aspect of the distal left fibula, with cortical disruption concerning for osteomyelitis. MRI of the left ankle without and with IV contrast is    recommended.   2. Decreased bone mineralization.   3. The remainder of the left ankle radiography is within normal limits.      DX-CHEST-PORTABLE (1 VIEW)   Final Result      No acute cardiopulmonary abnormality.      MR-ANKLE W/O LEFT    (Results Pending)   US-EXTREMITY ARTERY LOWER UNILAT W/HONEY (COMBO) LEFT    (Results Pending)   EC-ECHOCARDIOGRAM COMPLETE W/O CONT    (Results Pending)        Assessment/Plan  * Sepsis (HCC)- (present on admission)  Assessment & Plan  This is Sepsis Present on admission  SIRS criteria identified on my evaluation include: Tachycardia, with heart rate greater than 90 BPM and Leukocytosis, with WBC greater than 12,000  Source is UTI/Left ankle infection  Sepsis protocol initiated  IV antibiotics as appropriate for source of sepsis  Reassessment: I have reassessed the patient's hemodynamic status    MRSA bacteremia  Assessment &  Plan  Blood culture positive for MRSA  On vancomycin  Echo ordered  Repeat blood cultures 6/12  ID following    Diabetic foot infection (HCC)  Assessment & Plan  History of healing on and off left lateral ankle wound  Worsening last 2 weeks, completed oral antibiotics per daughter, acutely worse last few days prior to presentation  On vancomycin for MRSA positive blood culture  X ray shows bone involvement distal fibula, check MRI  Arterial US ordered  No plan for surgical intervention at this time, follow up MRI. Ortho following  ID following  Repeat blood cultures today 6/12    UTI (urinary tract infection)  Assessment & Plan  Unclear if having symptoms, hold antibiotics for UTI  On vancomycin for MRSA ankle infection    Advance care planning- (present on admission)  Assessment & Plan  Patient has POLST form stating DNR/DNI.  Confirmed with daughter bedside face-to-face.  Time spent 8 minutes.    Obesity- (present on admission)  Assessment & Plan  Counseled on dietary modification once stable    Type 2 diabetes mellitus (HCC)- (present on admission)  Assessment & Plan  On metformin at home  Continue with sliding scale and Accu-Cheks with hypoglycemia protocol   A1c 6.3, patient needing minimal ISS, monitor need to start long acting  Daughter reports 70/30 insulin 26U AM 28 U PM prior? He is not needing this at this time    Depression- (present on admission)  Assessment & Plan  Continue with Zoloft and Remeron    Hypertension- (present on admission)  Assessment & Plan  Hold antihypertensive patient is normotensive and have sepsis.    Hyperlipidemia- (present on admission)  Assessment & Plan  Continue statin       VTE prophylaxis: enoxaparin ppx    I have performed a physical exam and reviewed and updated ROS and Plan today (6/12/2022). In review of yesterday's note (6/11/2022), there are no changes except as documented above.

## 2022-06-13 ENCOUNTER — APPOINTMENT (OUTPATIENT)
Dept: RADIOLOGY | Facility: MEDICAL CENTER | Age: 82
DRG: 854 | End: 2022-06-13
Attending: STUDENT IN AN ORGANIZED HEALTH CARE EDUCATION/TRAINING PROGRAM
Payer: COMMERCIAL

## 2022-06-13 ENCOUNTER — ANESTHESIA EVENT (OUTPATIENT)
Dept: SURGERY | Facility: MEDICAL CENTER | Age: 82
DRG: 854 | End: 2022-06-13
Payer: COMMERCIAL

## 2022-06-13 LAB
ANION GAP SERPL CALC-SCNC: 12 MMOL/L (ref 7–16)
BACTERIA BLD CULT: ABNORMAL
BACTERIA BLD CULT: ABNORMAL
BUN SERPL-MCNC: 18 MG/DL (ref 8–22)
CALCIUM SERPL-MCNC: 8.4 MG/DL (ref 8.5–10.5)
CHLORIDE SERPL-SCNC: 106 MMOL/L (ref 96–112)
CO2 SERPL-SCNC: 20 MMOL/L (ref 20–33)
CREAT SERPL-MCNC: 0.79 MG/DL (ref 0.5–1.4)
ERYTHROCYTE [DISTWIDTH] IN BLOOD BY AUTOMATED COUNT: 41.1 FL (ref 35.9–50)
GFR SERPLBLD CREATININE-BSD FMLA CKD-EPI: 89 ML/MIN/1.73 M 2
GLUCOSE BLD STRIP.AUTO-MCNC: 219 MG/DL (ref 65–99)
GLUCOSE BLD STRIP.AUTO-MCNC: 221 MG/DL (ref 65–99)
GLUCOSE BLD STRIP.AUTO-MCNC: 248 MG/DL (ref 65–99)
GLUCOSE SERPL-MCNC: 174 MG/DL (ref 65–99)
HCT VFR BLD AUTO: 34.7 % (ref 42–52)
HGB BLD-MCNC: 11.6 G/DL (ref 14–18)
MCH RBC QN AUTO: 29 PG (ref 27–33)
MCHC RBC AUTO-ENTMCNC: 33.4 G/DL (ref 33.7–35.3)
MCV RBC AUTO: 86.8 FL (ref 81.4–97.8)
PLATELET # BLD AUTO: 249 K/UL (ref 164–446)
PMV BLD AUTO: 9.4 FL (ref 9–12.9)
POTASSIUM SERPL-SCNC: 3.7 MMOL/L (ref 3.6–5.5)
RBC # BLD AUTO: 4 M/UL (ref 4.7–6.1)
SIGNIFICANT IND 70042: ABNORMAL
SITE SITE: ABNORMAL
SODIUM SERPL-SCNC: 138 MMOL/L (ref 135–145)
SOURCE SOURCE: ABNORMAL
WBC # BLD AUTO: 8.7 K/UL (ref 4.8–10.8)

## 2022-06-13 PROCEDURE — 700111 HCHG RX REV CODE 636 W/ 250 OVERRIDE (IP): Performed by: STUDENT IN AN ORGANIZED HEALTH CARE EDUCATION/TRAINING PROGRAM

## 2022-06-13 PROCEDURE — 80048 BASIC METABOLIC PNL TOTAL CA: CPT

## 2022-06-13 PROCEDURE — 700105 HCHG RX REV CODE 258: Performed by: STUDENT IN AN ORGANIZED HEALTH CARE EDUCATION/TRAINING PROGRAM

## 2022-06-13 PROCEDURE — 99233 SBSQ HOSP IP/OBS HIGH 50: CPT | Performed by: INTERNAL MEDICINE

## 2022-06-13 PROCEDURE — A9270 NON-COVERED ITEM OR SERVICE: HCPCS | Performed by: STUDENT IN AN ORGANIZED HEALTH CARE EDUCATION/TRAINING PROGRAM

## 2022-06-13 PROCEDURE — 73721 MRI JNT OF LWR EXTRE W/O DYE: CPT | Mod: LT,ME

## 2022-06-13 PROCEDURE — 82962 GLUCOSE BLOOD TEST: CPT | Mod: 91

## 2022-06-13 PROCEDURE — 306637 HCHG MISC ORTHO ITEM RC 0274

## 2022-06-13 PROCEDURE — 99233 SBSQ HOSP IP/OBS HIGH 50: CPT | Performed by: STUDENT IN AN ORGANIZED HEALTH CARE EDUCATION/TRAINING PROGRAM

## 2022-06-13 PROCEDURE — 85027 COMPLETE CBC AUTOMATED: CPT

## 2022-06-13 PROCEDURE — 700102 HCHG RX REV CODE 250 W/ 637 OVERRIDE(OP): Performed by: STUDENT IN AN ORGANIZED HEALTH CARE EDUCATION/TRAINING PROGRAM

## 2022-06-13 PROCEDURE — L4386 NON-PNEUM WALK BOOT PRE CST: HCPCS

## 2022-06-13 PROCEDURE — 36415 COLL VENOUS BLD VENIPUNCTURE: CPT

## 2022-06-13 PROCEDURE — 99232 SBSQ HOSP IP/OBS MODERATE 35: CPT | Performed by: NURSE PRACTITIONER

## 2022-06-13 PROCEDURE — 770001 HCHG ROOM/CARE - MED/SURG/GYN PRIV*

## 2022-06-13 RX ORDER — ACETAMINOPHEN 500 MG
1000 TABLET ORAL ONCE
Status: DISCONTINUED | OUTPATIENT
Start: 2022-06-13 | End: 2022-06-14 | Stop reason: HOSPADM

## 2022-06-13 RX ORDER — OXYCODONE HYDROCHLORIDE 5 MG/1
5 TABLET ORAL EVERY 6 HOURS PRN
Status: DISCONTINUED | OUTPATIENT
Start: 2022-06-13 | End: 2022-06-17

## 2022-06-13 RX ADMIN — VANCOMYCIN HYDROCHLORIDE 1500 MG: 500 INJECTION, POWDER, LYOPHILIZED, FOR SOLUTION INTRAVENOUS at 11:44

## 2022-06-13 RX ADMIN — MIRTAZAPINE 15 MG: 15 TABLET, FILM COATED ORAL at 20:23

## 2022-06-13 RX ADMIN — INSULIN HUMAN 2 UNITS: 100 INJECTION, SOLUTION PARENTERAL at 17:21

## 2022-06-13 RX ADMIN — GABAPENTIN 300 MG: 300 CAPSULE ORAL at 17:24

## 2022-06-13 RX ADMIN — ATORVASTATIN CALCIUM 40 MG: 40 TABLET, FILM COATED ORAL at 17:24

## 2022-06-13 RX ADMIN — HYDROCODONE BITARTRATE AND ACETAMINOPHEN 1 TABLET: 5; 325 TABLET ORAL at 05:48

## 2022-06-13 RX ADMIN — INSULIN HUMAN 2 UNITS: 100 INJECTION, SOLUTION PARENTERAL at 12:06

## 2022-06-13 RX ADMIN — Medication 1 CAPSULE: at 05:49

## 2022-06-13 RX ADMIN — OXYCODONE 5 MG: 5 TABLET ORAL at 15:39

## 2022-06-13 RX ADMIN — GABAPENTIN 300 MG: 300 CAPSULE ORAL at 05:49

## 2022-06-13 RX ADMIN — INSULIN HUMAN 2 UNITS: 100 INJECTION, SOLUTION PARENTERAL at 20:23

## 2022-06-13 RX ADMIN — SERTRALINE 50 MG: 50 TABLET, FILM COATED ORAL at 17:24

## 2022-06-13 RX ADMIN — INSULIN HUMAN 1 UNITS: 100 INJECTION, SOLUTION PARENTERAL at 08:15

## 2022-06-13 RX ADMIN — HYDROCODONE BITARTRATE AND ACETAMINOPHEN 1 TABLET: 5; 325 TABLET ORAL at 17:24

## 2022-06-13 RX ADMIN — SODIUM CHLORIDE: 9 INJECTION, SOLUTION INTRAVENOUS at 08:13

## 2022-06-13 ASSESSMENT — ENCOUNTER SYMPTOMS
INSOMNIA: 0
BLURRED VISION: 0
DIZZINESS: 0
FALLS: 0
EYE PAIN: 0
COUGH: 0
FEVER: 0
MYALGIAS: 1
VOMITING: 0
SENSORY CHANGE: 0
PALPITATIONS: 0
FOCAL WEAKNESS: 0
NAUSEA: 0
HEADACHES: 0
BACK PAIN: 0
CHILLS: 0
SHORTNESS OF BREATH: 0
ABDOMINAL PAIN: 0

## 2022-06-13 ASSESSMENT — PAIN SCALES - WONG BAKER: WONGBAKER_NUMERICALRESPONSE: HURTS A LITTLE MORE

## 2022-06-13 ASSESSMENT — LIFESTYLE VARIABLES: SUBSTANCE_ABUSE: 0

## 2022-06-13 NOTE — PROGRESS NOTES
Order for diabetic foot boot has been submitted to [facility]. If any questions [facility] can be reached at ext # [phone].

## 2022-06-13 NOTE — PROGRESS NOTES
MRI/vascular duplex reviewed  Treatment still is ABX/wound care, but may benefit from I&D to open wound so that it can be treated with VAC to obliterate dead space rather than ongoing packing of sinus  Possible OR tomorrow

## 2022-06-13 NOTE — PROGRESS NOTES
Infectious Disease Progress Note    Author: Jacobo Chavez M.D. Date & Time of service: 2022  8:44 AM    Chief Complaint:  Follow-up for MRSA bacteremia, left foot infection    Interval History:   afebrile, WBC 10.7.  Patient is much more alert and awake today.  He is awaiting MRI and arterial studies, urine culture with Staph epidermidis   patient remains afebrile, leukocytosis resolved, white count of 3.7 today, tolerating vancomycin.  Repeat blood cultures as below      Labs Reviewed, Medications Reviewed and Wound Reviewed.    Review of Systems:  Review of Systems   Constitutional: Negative for chills and fever.   Gastrointestinal: Negative for nausea and vomiting.   Musculoskeletal: Positive for myalgias.   Limited secondary to underlying dementia    Hemodynamics:  Temp (24hrs), Av.6 °C (97.8 °F), Min:36.4 °C (97.6 °F), Max:36.7 °C (98 °F)  Temperature: 36.6 °C (97.9 °F)  Pulse  Av  Min: 61  Max: 143   Blood Pressure : (!) 152/74       Physical Exam:  Physical Exam  Vitals and nursing note reviewed.   Constitutional:       Appearance: Normal appearance.   HENT:      Mouth/Throat:      Mouth: Mucous membranes are moist.   Eyes:      General: No scleral icterus.        Right eye: No discharge.         Left eye: No discharge.      Conjunctiva/sclera: Conjunctivae normal.   Cardiovascular:      Rate and Rhythm: Normal rate.   Pulmonary:      Effort: Pulmonary effort is normal. No respiratory distress.      Breath sounds: No wheezing.   Abdominal:      Palpations: Abdomen is soft.   Musculoskeletal:      Cervical back: No rigidity.      Comments: Left foot dressed   Skin:     Findings: No rash.   Neurological:      General: No focal deficit present.      Mental Status: He is alert. He is disoriented.         Meds:    Current Facility-Administered Medications:   •  acetaminophen  •  lactobacillus rhamnosus  •  diphenhydrAMINE  •  NS  •  enoxaparin (LOVENOX) injection  •  MD Alert...Vancomycin  per Pharmacy  •  insulin regular **AND** POC blood glucose manual result **AND** NOTIFY MD and PharmD **AND** Administer 20 grams of glucose (approximately 8 ounces of fruit juice) every 15 minutes PRN FSBG less than 70 mg/dL **AND** dextrose bolus  •  vancomycin  •  atorvastatin  •  gabapentin  •  mirtazapine  •  sertraline  •  HYDROcodone-acetaminophen    Labs:  Recent Labs     06/10/22  0937 06/11/22  1138 06/12/22  0230 06/13/22  0524   WBC 18.5* 12.5* 10.7 8.7   RBC 4.41* 4.23* 3.95* 4.00*   HEMOGLOBIN 13.0* 12.6* 11.8* 11.6*   HEMATOCRIT 37.8* 36.9* 35.2* 34.7*   MCV 85.7 87.2 89.1 86.8   MCH 29.5 29.8 29.9 29.0   RDW 40.0 41.0 42.5 41.1   PLATELETCT 244 203 200 249   MPV 8.9* 8.8* 8.9* 9.4   NEUTSPOLYS 84.50*  --   --   --    LYMPHOCYTES 4.20*  --   --   --    MONOCYTES 9.90  --   --   --    EOSINOPHILS 0.10  --   --   --    BASOPHILS 0.40  --   --   --      Recent Labs     06/10/22  0937 06/12/22  0230 06/13/22  0524   SODIUM 136 140 138   POTASSIUM 4.1 3.6 3.7   CHLORIDE 100 106 106   CO2 21 23 20   GLUCOSE 131* 165* 174*   BUN 20 24* 18     Recent Labs     06/10/22  0937 06/12/22  0230 06/13/22  0524   ALBUMIN 4.0  --   --    TBILIRUBIN 1.2  --   --    ALKPHOSPHAT 62  --   --    TOTPROTEIN 7.6  --   --    ALTSGPT 8  --   --    ASTSGOT 18  --   --    CREATININE 1.03 1.12 0.79       Imaging:  DX-ANKLE 3+ VIEWS LEFT    Result Date: 6/10/2022  6/10/2022 9:57 AM HISTORY/REASON FOR EXAM:  Atraumatic Pain/Swelling/Deformity. Lateral left ankle wound TECHNIQUE/EXAM DESCRIPTION AND NUMBER OF VIEWS:  3 views of the LEFT ankle. COMPARISON: Left ankle radiography, 4/12/2022. FINDINGS: Bones: Normal bone mineralization. There is cortical irregularity involving the inferior lateral aspect of the lateral malleolus, at the location of distention of soft tissue gas from the skin surface to the surface of the bone. Findings are concerning for osteomyelitis. No acute fracture. No other focal bone lesion. Joints: Intact. No  "subluxation. Ankle mortise is preserved. Soft tissues: Circumferential left ankle soft tissue swelling, greatest laterally, with soft tissue gas.     1. Lateral left ankle soft tissue swelling with soft tissue gas extending from the skin surface to the lateral aspect of the distal left fibula, with cortical disruption concerning for osteomyelitis. MRI of the left ankle without and with IV contrast is recommended. 2. Decreased bone mineralization. 3. The remainder of the left ankle radiography is within normal limits.    DX-CHEST-PORTABLE (1 VIEW)    Result Date: 6/10/2022  6/10/2022 9:57 AM HISTORY/REASON FOR EXAM:  possible sepsis.. TECHNIQUE/EXAM DESCRIPTION AND NUMBER OF VIEWS: Single portable view of the chest. COMPARISON: 9/22/2019 FINDINGS: Cardiac silhouette is enlarged. Aortic calcified atherosclerotic plaque. No focal consolidation, pleural effusion, pulmonary edema or pneumothorax. Generalized osteopenia.     No acute cardiopulmonary abnormality.      Micro:  Results     Procedure Component Value Units Date/Time    BLOOD CULTURE [[id number]] Collected: 06/12/22 0230    Order Status: Completed Specimen: Blood from Peripheral Updated: 06/13/22 0739     Significant Indicator NEG     Source BLD     Site PERIPHERAL     Culture Result No Growth  Note: Blood cultures are incubated for 5 days and  are monitored continuously.Positive blood cultures  are called to the RN and reported as soon as  they are identified.      Narrative:      Contact  Per Hospital Policy: Only change Specimen Src: to \"Line\" if  specified by physician order.  Left Hand    BLOOD CULTURE [[id number]] Collected: 06/12/22 0230    Order Status: Completed Specimen: Blood from Peripheral Updated: 06/13/22 0739     Significant Indicator NEG     Source BLD     Site PERIPHERAL     Culture Result No Growth  Note: Blood cultures are incubated for 5 days and  are monitored continuously.Positive blood cultures  are called to the RN and reported as soon " "as  they are identified.      Narrative:      Contact  Per Hospital Policy: Only change Specimen Src: to \"Line\" if  specified by physician order.  Right AC    BLOOD CULTURE [[id number]]  (Abnormal) Collected: 06/10/22 1003    Order Status: Completed Specimen: Blood from Peripheral Updated: 06/13/22 0235     Significant Indicator POS     Source BLD     Site PERIPHERAL     Culture Result Growth detected by Bactec instrument. 06/11/2022  13:45  Gram Stain: Gram positive cocci: Possible Staphylococcus sp.  Gram Stain: Gram positive cocci: Possible Streptococcus sp.  aerobic bottle only        Methicillin Resistant Staphylococcus aureus    Narrative:      CALL  [facility]  61 tel. [phone],  CALLED  61 tel. [phone] 06/13/2022, 02:35, RB PERF. RESULTS CALLED TO: RN  [phone]  Per Hospital Policy: Only change Specimen Src: to \"Line\" if  specified by physician order.  Right AC    BLOOD CULTURE [[id number]]  (Abnormal) Collected: 06/10/22 0937    Order Status: Completed Specimen: Blood from Peripheral Updated: 06/12/22 1143     Significant Indicator POS     Source BLD     Site PERIPHERAL     Culture Result Growth detected by Bactec instrument. 06/11/2022  09:11  Gram Stain: Gram positive cocci: Possible Staphylococcus sp.  Methicillin Resistant Staphylococcus aureus (MRSA)  detected by PCR.  Susceptibility to follow.        Methicillin Resistant Staphylococcus aureus  Susceptibilities in progress      Narrative:      CALL  [facility]  61 tel. [phone],  CALLED  61 tel. [phone] 06/11/2022, 09:15, RB PERF. RESULTS CALLED TO:  [phone] RN + Cornelio RN  Per Hospital Policy: Only change Specimen Src: to \"Line\" if  specified by physician order.  No site indicated    URINE CULTURE(NEW) [[id number]]  (Abnormal)  (Susceptibility) Collected: 06/10/22 0957    Order Status: Completed Specimen: Urine Updated: 06/12/22 0921     Significant Indicator POS     Source UR     Site -     Culture Result -      Staphylococcus epidermidis  >100,000 cfu/mL   "    Narrative:      Indication for culture:->Evaluation for sepsis without a  clear source of infection  Indication for culture:->Evaluation for sepsis without a    Susceptibility     Staphylococcus epidermidis (1)     Antibiotic Interpretation Microscan   Method Status    Cefazolin Resistant <=8 mcg/mL ED Final    Cefepime Resistant 8 mcg/mL ED Final    Daptomycin Sensitive <=0.5 mcg/mL ED Final    Nitrofurantoin Sensitive <=32 mcg/mL ED Final    Trimeth/Sulfa Sensitive <=0.5/9.5 mcg/mL ED Final    Tetracycline Sensitive <=4 mcg/mL ED Final                   URINALYSIS [[id number]]  (Abnormal) Collected: 06/10/22 0957    Order Status: Completed Specimen: Urine Updated: 06/10/22 1059     Color Yellow     Character Clear     Specific Gravity 1.021     Ph 6.5     Glucose Negative mg/dL      Ketones Trace mg/dL      Protein 100 mg/dL      Bilirubin Negative     Urobilinogen, Urine 0.2     Nitrite Positive     Leukocyte Esterase Moderate     Occult Blood Negative     Micro Urine Req Microscopic    Narrative:      Indication for culture:->Evaluation for sepsis without a  clear source of infection    MRSA By PCR (Amp) [[id number]]     Order Status: Canceled Specimen: Respirate from Nares           Assessment:  82 y.o. man with underlying dementia, type 2 diabetes mellitus and recent hospitalization in April secondary to left ankle cellulitis with MRSA admitted on 6/10/2022 secondary to acute encephalopathy and increasing left ankle pain and swelling.  Patient found to have findings highly concerning for osteomyelitis on radiograph and MRSA bacteremia.  Source of his bacteremia is from his left foot infection.    Pertinent diagnoses:  MRSA bacteremia  Left ankle osteomyelitis  Bacteriuria, urine cx +staph epi  Leukocytosis, resolved  Type 2 diabetes mellitus, hemoglobin A1c 6.3  Dementia    Plan:  -Blood cultures x2 from 6/10 positive for MRSA.  Repeat blood cultures x2 from 6/12 pending  -Continue IV vancomycin for  now.  Monitor levels and renal function  -Awaiting MRI of left ankle  - TTE with no obvious valvular vegetations  -Continue to monitor for any new joint pain and/or back pain-patient currently denies  -Continue wound care  -LPS and Ortho following    Discussed with internal medicine Dr. Osborn.  We will continue to follow

## 2022-06-13 NOTE — PROGRESS NOTES
LIMB PRESERVATION SERVICE  PROGRESS NOTE    HPI: 82 y.o.  with a past medical history that includes type 2 diabetes, hyperlipidemia, hypertension, MI, dementia.  Admitted 6/10/2022 for Sepsis (HCC) [A41.9].     LPS has been consulted for left lateral ankle wound that started in autumn 2021.    Patient lives at [facility] and has been receiving wound care there. Patient was seen by LPS for this wound 4/13/2022, at that time wound was 0.5 x 0.5 x 0.3 and did not probe to bone.  Family states that the wound appeared to be healing but then suddenly became very erythematous over the last 2 days and he was started on antibiotics before being transferred to [facility] for further care.      INTERVAL HISTORY:  6/13/2022: Patient denies fevers, chills, nausea, vomiting.  Pain controlled.  Seen by Dr. Wagner 6/11/2022.  No plans for surgery.  Arterial studies completed, MRI ankle completed.  ID involved.    PERTINENT LPS RESULTS:   COVID-19:  not completed this admission    Arterial studies:  RIGHT      Waveform            Systolic BPs (mmHg)                              125           Brachial   Triphasic                                Common Femoral   Triphasic                                Popliteal   Triphasic                  200           Posterior Tibial   Triphasic                  179           Dorsalis Pedis                                            Digit                              1.54          HONEY                                            TBI                           LEFT   Waveform        Systolic BPs (mmHg)                              130           Brachial   Triphasic                                Common Femoral   Triphasic                                Popliteal   Triphasic                  187           Posterior Tibial   Triphasic                  180           Dorsalis Pedis                                            Digit                              1.44          HONEY                                             TBI         Findings   Bilateral-   The ankle pressures are not accurately measured due to calcification and    noncompressibility of the tibial vessels.   Doppler waveforms of the common femoral artery and popliteal artery are of    high amplitude and triphasic.    Doppler waveforms at the ankle are brisk and triphasic.    TBI-   Right: 1.29   Left:    1.09      Additional testing was not performed in accordance with lower extremity    arterial evaluation protocol.        MRI left ankle  1.  Osteomyelitis of the lateral malleolus/distal fibula     2.  Negative for abscess     3.  Moderate-large tibiotalar joint effusion. This could be reactive versus septic arthritis.     4.  Longitudinal splitting of the peroneus brevis tendon and there is associated fluid within the peroneal tendon sheath      EXAM:      BP (!) 152/74   Pulse 74   Temp 36.6 °C (97.9 °F) (Temporal)   Resp 18   Ht 1.829 m (6')   Wt 97.3 kg (214 lb 8.1 oz)   SpO2 94%   BMI 29.09 kg/m²     Pedal Pulses:   R foot: palpable DP, palpable PT  L foot: palpable PT, unable to palpate DP      Sensation:   Monofilament testing completed on 4/13/2022  Feet Insensate to light touch      Wound(s) :    Wound location: Left lateral ankle  Full thickness, does probe to bone, significant tunneling  Wound characteristics: 100% slough  Erythema: Decreased but remains  Drainage: Moderate serosanguineous  Callus: None  Odor: None        Wound care completed by TYLOR VERDUGO. Applied Aquacel Ag. Secured with nonadhesive foam Hypafix tape.     Wound photo:     6/10/2022                        DIABETES MANAGEMENT:    A1c:   Lab Results   Component Value Date/Time    HBA1C 6.3 (H) 06/11/2022 03:57 PM            INFECTION MANAGEMENT:    Results from last 7 days   Lab Units 06/13/22  0524 06/12/22  0230 06/11/22  1138 06/10/22  0937   WBC 1501 K/uL 8.7 10.7 12.5* 18.5*   PLATELET COUNT 1518 K/uL 249 200 203 244     Wound culture results:  "  Results     Procedure Component Value Units Date/Time    BLOOD CULTURE [[id number]]  (Abnormal) Collected: 06/10/22 1003    Order Status: Completed Specimen: Blood from Peripheral Updated: 06/13/22 0905     Significant Indicator POS     Source BLD     Site PERIPHERAL     Culture Result Growth detected by Bactec instrument. 06/11/2022  13:45      Methicillin Resistant Staphylococcus aureus  See previous culture for sensitivity report.      Narrative:      CALL  [facility]  61 tel. [phone],  CALLED  61 tel. [phone] 06/13/2022, 02:35, RB PERF. RESULTS CALLED TO: RN  [id number]  Per Hospital Policy: Only change Specimen Src: to \"Line\" if  specified by physician order.  Right AC    BLOOD CULTURE [[id number]]  (Abnormal)  (Susceptibility) Collected: 06/10/22 0937    Order Status: Completed Specimen: Blood from Peripheral Updated: 06/13/22 0904     Significant Indicator POS     Source BLD     Site PERIPHERAL     Culture Result Growth detected by Bactec instrument. 06/11/2022  09:11  Methicillin Resistant Staphylococcus aureus (MRSA)  detected by PCR.        Methicillin Resistant Staphylococcus aureus  This isolate is presumed to be clindamycin resistant based on  detection of inducible resistance.      Narrative:      CALL  [facility]  61 tel. [phone],  CALLED  61 tel. [phone] 06/11/2022, 09:15, RB PERF. RESULTS CALLED TO:  [id number] RN + Cornelio TANNER  Per Hospital Policy: Only change Specimen Src: to \"Line\" if  specified by physician order.  No site indicated    Susceptibility     Methicillin resistant staphylococcus aureus (1)     Antibiotic Interpretation Microscan   Method Status    Azithromycin Resistant >4 mcg/mL ED Final    Clindamycin Resistant <=0.25 mcg/mL ED Final    Cefazolin Resistant <=8 mcg/mL ED Final    Cefepime Resistant 8 mcg/mL ED Final    Ceftaroline Sensitive <=0.5 mcg/mL ED Final    Daptomycin Sensitive <=0.5 mcg/mL ED Final    Ampicillin/sulbactam Resistant <=8/4 mcg/mL ED Final    Erythromycin Resistant " ">4 mcg/mL ED Final    Vancomycin Sensitive 1 mcg/mL ED Final    Oxacillin Resistant >2 mcg/mL ED Final    Trimeth/Sulfa Sensitive <=0.5/9.5 mcg/mL ED Final    Tetracycline Resistant >8 mcg/mL ED Final                   BLOOD CULTURE [[id number]] Collected: 06/12/22 0230    Order Status: Completed Specimen: Blood from Peripheral Updated: 06/13/22 0739     Significant Indicator NEG     Source BLD     Site PERIPHERAL     Culture Result No Growth  Note: Blood cultures are incubated for 5 days and  are monitored continuously.Positive blood cultures  are called to the RN and reported as soon as  they are identified.      Narrative:      Contact  Per Hospital Policy: Only change Specimen Src: to \"Line\" if  specified by physician order.  Left Hand    BLOOD CULTURE [[id number]] Collected: 06/12/22 0230    Order Status: Completed Specimen: Blood from Peripheral Updated: 06/13/22 0739     Significant Indicator NEG     Source BLD     Site PERIPHERAL     Culture Result No Growth  Note: Blood cultures are incubated for 5 days and  are monitored continuously.Positive blood cultures  are called to the RN and reported as soon as  they are identified.      Narrative:      Contact  Per Hospital Policy: Only change Specimen Src: to \"Line\" if  specified by physician order.  Right AC    URINE CULTURE(NEW) [[id number]]  (Abnormal)  (Susceptibility) Collected: 06/10/22 0957    Order Status: Completed Specimen: Urine Updated: 06/12/22 0921     Significant Indicator POS     Source UR     Site -     Culture Result -      Staphylococcus epidermidis  >100,000 cfu/mL      Narrative:      Indication for culture:->Evaluation for sepsis without a  clear source of infection  Indication for culture:->Evaluation for sepsis without a    Susceptibility     Staphylococcus epidermidis (1)     Antibiotic Interpretation Microscan   Method Status    Cefazolin Resistant <=8 mcg/mL ED Final    Cefepime Resistant 8 mcg/mL ED Final    Daptomycin Sensitive " <=0.5 mcg/mL ED Final    Nitrofurantoin Sensitive <=32 mcg/mL ED Final    Trimeth/Sulfa Sensitive <=0.5/9.5 mcg/mL ED Final    Tetracycline Sensitive <=4 mcg/mL ED Final                   URINALYSIS [[id number]]  (Abnormal) Collected: 06/10/22 0983    Order Status: Completed Specimen: Urine Updated: 06/10/22 1059     Color Yellow     Character Clear     Specific Gravity 1.021     Ph 6.5     Glucose Negative mg/dL      Ketones Trace mg/dL      Protein 100 mg/dL      Bilirubin Negative     Urobilinogen, Urine 0.2     Nitrite Positive     Leukocyte Esterase Moderate     Occult Blood Negative     Micro Urine Req Microscopic    Narrative:      Indication for culture:->Evaluation for sepsis without a  clear source of infection    MRSA By PCR (Amp) [[id number]]     Order Status: Canceled Specimen: Respirate from Nares                      ASSESSMENT/PLAN:   82 y.o. admitted for Sepsis ([facility]) [A41.9]. Presents with chronic nonhealing left lateral ankle wound.    -Arterial studies negative.  Probes to bone.  X-ray concerning for OM.  MRI positive for osteomyelitis, negative for abscess, positive for joint effusion.  Discussed results with Dr. Wagner.  Planning left ankle I&D with VAC placement.  -Discussed with patient and daughter who are both agreeable to proceed with surgery.  Updated  Dr. Wagner who will coordinate surgery.  - N.p.o. at midnight  -Reviewed with daughter importance of offloading and blood glucose control to aid in wound healing.        Wound care:    current wound care orders updated    - Left: Ankle: Aquacel Ag, nonadhesive foam, roll gauze, Hypafix tape.  Change daily    Labs/Imaging:  -COVID-19: not completed this admission.   -no further labs or imaging at this time        Vascular status:   -  palpable pedal pulses bilaterally  -TBI is normal.  Arterial studies negative.      Surgery:   Dr. Wagner involved  N.p.o. at midnight for left ankle I&D with VAC placement    Antibiotics:   MRSA bacteremia.   On Vanco.  -ID: involved.     Weight Bearing Status:     -Left foot: Weight bearing as tolerated    Offloading:   Heel float boot while in bed to offload ankle ulcer with heel Mepilex  -Offloading boot  at all times; postop offloading device ordered.  -Orthotic company: None      PT/OT :   Ordered    Diabetes Education:   not involved at this time.  Lives at [facility] who provides his diabetes care.  - Implications of loss of protective sensation (LOPS) discussed with patient- including increased risk for amputation.  Advised to check feet at least daily, moisturize feet, and to always wear protective foot wear.   -avoid trimming own nails. See podiatrist or certified foot and nail RN  -keep blood sugars <150 for improved wound healing        DISCHARGE PLAN:    Disposition:    recommend patient discharges to [facility]       Follow-up with Dr. Wagner          Discussed with: pt, Marah-jd, RN, Dr. Wagner, Lindy Davis RN wound care at [facility]    Please note that this dictation was created using voice recognition software. I have  worked with technical experts from [employer] to optimize the interface.  I have made every reasonable attempt to correct obvious errors, but there may be errors of grammar and possibly content that I did not discover before finalizing the note.    Ailyn Aguero, A.P.R.N.    If any questions or concerns, please contact LPS through voalte.

## 2022-06-13 NOTE — DISCHARGE PLANNING
Case Management Discharge Planning    Admission Date: 6/10/2022  GMLOS: 3.5  ALOS: 3    6-Clicks ADL Score: 20  6-Clicks Mobility Score: 22      Anticipated Discharge Dispo:  Skilled    DME Needed: No    Action(s) Taken: Updated Provider/Nurse on Discharge Plan    Escalations Completed: None    Medically Clear: No    Next Steps: continue to monitor for discharge barriers    Barriers to Discharge: Medical clearance    Is the patient up for discharge tomorrow: No    Staffed patients needs during IDT rounds.  Pending Surgery recommendations.  Will need 4 weeks of IV ABX, ID following.

## 2022-06-13 NOTE — CARE PLAN
The patient is Stable - Low risk of patient condition declining or worsening    Shift Goals  Clinical Goals: Safety  Patient Goals: Rest  Family Goals: Wound care    Progress made toward(s) clinical / shift goals:  VSS on RA. Denies pain at this time. Bed alarm on. Call light within reach.    Problem: Respiratory  Goal: Patient will achieve/maintain optimum respiratory ventilation and gas exchange  Outcome: Progressing     Problem: Fall Risk  Goal: Patient will remain free from falls  Outcome: Progressing     Problem: Pain - Standard  Goal: Alleviation of pain or a reduction in pain to the patient’s comfort goal  Outcome: Progressing       Patient is not progressing towards the following goals:

## 2022-06-13 NOTE — PROGRESS NOTES
End of shift summary:    Aox2. VSS on RA. Denied pain. Still receiving IV Vanc and IVF NS @ 83. BG . Still need MRI done. Dressing on ankle CDI. AM labs pending. Bed alarm on. Call light within reach.

## 2022-06-13 NOTE — CARE PLAN
The patient is Stable - Low risk of patient condition declining or worsening    Shift Goals  Clinical Goals: safety  Patient Goals: rest  Family Goals: Wound care    Progress made toward(s) clinical / shift goals:  yes      Problem: Knowledge Deficit - Standard  Goal: Patient and family/care givers will demonstrate understanding of plan of care, disease process/condition, diagnostic tests and medications  Outcome: Progressing     Problem: Hemodynamics  Goal: Patient's hemodynamics, fluid balance and neurologic status will be stable or improve  Outcome: Progressing     Problem: Fluid Volume  Goal: Fluid volume balance will be maintained  Outcome: Progressing     Problem: Urinary - Renal Perfusion  Goal: Ability to achieve and maintain adequate renal perfusion and functioning will improve  Outcome: Progressing     Problem: Respiratory  Goal: Patient will achieve/maintain optimum respiratory ventilation and gas exchange  Outcome: Progressing     Problem: Physical Regulation  Goal: Diagnostic test results will improve  Outcome: Progressing  Goal: Signs and symptoms of infection will decrease  Outcome: Progressing     Problem: Depression  Goal: Patient and family/caregiver will verbalize accurate information about at least two of the possible causes of depression, three-four of the signs and symptoms of depression  Outcome: Progressing     Problem: Fall Risk  Goal: Patient will remain free from falls  Outcome: Progressing     Problem: Pain - Standard  Goal: Alleviation of pain or a reduction in pain to the patient’s comfort goal  Outcome: Progressing       Patient is not progressing towards the following goals:

## 2022-06-13 NOTE — PROGRESS NOTES
[facility] Medicine Daily Progress Note    Date of Service  6/13/2022    Chief Complaint  Andrei Garay is a 82 y.o. male admitted 6/10/2022 with ankle wound.    Hospital Course  Andrei Garay is a 82 y.o. male past medical history of diabetes mellitus, hypertension who presented 6/10/2022 with altered mental status for last 3 days and left ankle pain.  History is primarily provided by daughter.  No relieving or exacerbating factors were noted.  He denies any fever does report some chills.  In the ER, x-ray of the ankle showed signs concerning for osteomyelitis.  UA grossly positive for urinary tract infection.  Patient met criteria for sepsis started on IV broad-spectrum antibiotics and will be admitted for further work-up and limb preservation consult.       Interval Problem Update  No acute events overnight.  Patient feeling well, has no complaints. Eating more.  MRI foot shows left lateral malleolus osteomyelitis as well as osteo by distal fibula.  Echo without signs of endocarditis, normal EF.  Arterial US with good flow.  Ortho surgery following, appreciate their recommendations regarding surgery.  ID following, appreciate antibiotic recommendations after surgery's input.  On vancomycin.  Repeat blood cultures 6/12 NGTD.      I have personally seen and examined the patient at bedside. I discussed the plan of care with patient.    Consultants/Specialty  infectious disease and orthopedics    Code Status  DNAR/DNI    Disposition  Patient is not medically cleared for discharge.   Anticipate discharge back to [facility] home/SNF.  I have placed the appropriate orders for post-discharge needs.    Review of Systems  Review of Systems   Constitutional: Negative for chills and fever.   Eyes: Negative for blurred vision and pain.   Respiratory: Negative for cough and shortness of breath.    Cardiovascular: Negative for chest pain, palpitations and leg swelling.   Gastrointestinal: Negative for abdominal  pain, nausea and vomiting.   Genitourinary: Negative for dysuria and urgency.   Musculoskeletal: Positive for joint pain. Negative for back pain and falls.   Skin: Negative for itching and rash.   Neurological: Negative for dizziness, sensory change, focal weakness and headaches.   Psychiatric/Behavioral: Negative for substance abuse. The patient does not have insomnia.         Physical Exam  Temp:  [36.4 °C (97.6 °F)-36.7 °C (98 °F)] 36.6 °C (97.9 °F)  Pulse:  [74-85] 74  Resp:  [18] 18  BP: (141-169)/(67-89) 152/74  SpO2:  [93 %-94 %] 94 %    Physical Exam  Constitutional:       General: He is not in acute distress.     Appearance: He is not ill-appearing.   HENT:      Head: Normocephalic and atraumatic.      Right Ear: External ear normal.      Left Ear: External ear normal.      Mouth/Throat:      Pharynx: No oropharyngeal exudate or posterior oropharyngeal erythema.   Eyes:      Extraocular Movements: Extraocular movements intact.      Pupils: Pupils are equal, round, and reactive to light.   Cardiovascular:      Rate and Rhythm: Normal rate and regular rhythm.      Pulses: Normal pulses.      Heart sounds: Normal heart sounds. No murmur heard.  Pulmonary:      Effort: Pulmonary effort is normal. No respiratory distress.      Breath sounds: Normal breath sounds. No wheezing or rales.   Abdominal:      General: Bowel sounds are normal. There is no distension.      Palpations: Abdomen is soft.      Tenderness: There is no abdominal tenderness. There is no guarding.   Musculoskeletal:         General: No swelling or tenderness.      Cervical back: Normal range of motion and neck supple.      Right lower leg: No edema.      Left lower leg: No edema.   Skin:     General: Skin is warm and dry.      Findings: Erythema and lesion present.      Comments: Left lateral ankle wound with ulcer, packed   Neurological:      General: No focal deficit present.      Mental Status: He is disoriented.      Sensory: No sensory  deficit.      Motor: No weakness.   Psychiatric:         Mood and Affect: Mood normal.         Behavior: Behavior normal.         Fluids    Intake/Output Summary (Last 24 hours) at 6/13/2022 1343  Last data filed at 6/13/2022 0954  Gross per 24 hour   Intake 1040 ml   Output 2250 ml   Net -1210 ml       Laboratory  Recent Labs     06/11/22  1138 06/12/22  0230 06/13/22  0524   WBC 12.5* 10.7 8.7   RBC 4.23* 3.95* 4.00*   HEMOGLOBIN 12.6* 11.8* 11.6*   HEMATOCRIT 36.9* 35.2* 34.7*   MCV 87.2 89.1 86.8   MCH 29.8 29.9 29.0   MCHC 34.1 33.5* 33.4*   RDW 41.0 42.5 41.1   PLATELETCT 203 200 249   MPV 8.8* 8.9* 9.4     Recent Labs     06/12/22  0230 06/13/22  0524   SODIUM 140 138   POTASSIUM 3.6 3.7   CHLORIDE 106 106   CO2 23 20   GLUCOSE 165* 174*   BUN 24* 18   CREATININE 1.12 0.79   CALCIUM 8.4* 8.4*                   Imaging  MR-ANKLE W/O LEFT   Final Result      1.  Osteomyelitis of the lateral malleolus/distal fibula      2.  Negative for abscess      3.  Moderate-large tibiotalar joint effusion. This could be reactive versus septic arthritis.      4.  Longitudinal splitting of the peroneus brevis tendon and there is associated fluid within the peroneal tendon sheath      EC-ECHOCARDIOGRAM COMPLETE W/O CONT   Final Result      US-HONEY SINGLE LEVEL BILAT   Final Result      DX-ANKLE 3+ VIEWS LEFT   Final Result      1. Lateral left ankle soft tissue swelling with soft tissue gas extending from the skin surface to the lateral aspect of the distal left fibula, with cortical disruption concerning for osteomyelitis. MRI of the left ankle without and with IV contrast is    recommended.   2. Decreased bone mineralization.   3. The remainder of the left ankle radiography is within normal limits.      DX-CHEST-PORTABLE (1 VIEW)   Final Result      No acute cardiopulmonary abnormality.           Assessment/Plan  * Sepsis (HCC)- (present on admission)  Assessment & Plan  This is Sepsis Present on admission  SIRS criteria  identified on my evaluation include: Tachycardia, with heart rate greater than 90 BPM and Leukocytosis, with WBC greater than 12,000  Source is UTI/Left ankle infection  Sepsis protocol initiated  IV antibiotics as appropriate for source of sepsis  Reassessment: I have reassessed the patient's hemodynamic status    MRSA bacteremia  Assessment & Plan  Blood culture positive for MRSA  On vancomycin  Transthoracic echo without endocarditis  MRI shows osteomyelitis distal fibula and lateral malleolus  Repeat blood cultures 6/12  ID following    Diabetic foot infection (HCC)  Assessment & Plan  History of healing on and off left lateral ankle wound  Worsening last 2 weeks, completed oral antibiotics per daughter, acutely worse last few days prior to presentation  On vancomycin for MRSA positive blood culture; repeat blood cultures 6/12 NGTD  X ray shows bone involvement distal fibula, MRI confirms lateral malleolus and distal fibula osteomyelitis  Arterial US with good triphasic flow  Transthoracic Echo with no endocarditis  Appreciate ortho and ID recommendations for osteomyelitis treatment    UTI (urinary tract infection)  Assessment & Plan  Unclear if having symptoms, hold antibiotics for UTI  On vancomycin for MRSA ankle infection    Advance care planning- (present on admission)  Assessment & Plan  Patient has POLST form stating DNR/DNI.  Confirmed with daughter bedside face-to-face.  Time spent 8 minutes.    Obesity- (present on admission)  Assessment & Plan  Counseled on dietary modification once stable    Type 2 diabetes mellitus (HCC)- (present on admission)  Assessment & Plan  On metformin at home  Continue with sliding scale and Accu-Cheks with hypoglycemia protocol   A1c 6.3, patient needing minimal ISS, monitor need to start long acting  Daughter reports 70/30 insulin 26U AM 28 U PM prior? He is not needing this at this time    Depression- (present on admission)  Assessment & Plan  Continue with Zoloft and  Remeron    Hypertension- (present on admission)  Assessment & Plan  Hold antihypertensive patient is normotensive and have sepsis.    Hyperlipidemia- (present on admission)  Assessment & Plan  Continue statin       VTE prophylaxis: enoxaparin ppx    I have performed a physical exam and reviewed and updated ROS and Plan today (6/13/2022). In review of yesterday's note (6/12/2022), there are no changes except as documented above.

## 2022-06-13 NOTE — PROGRESS NOTES
Positive blood cultures reported to Hospitalist Guille. Aerobic culture from the 10th growing gram + cocci with chains. No new orders. Pt already on Vanc.

## 2022-06-13 NOTE — PROGRESS NOTES
Received bedside report from night shift RN.   Assumed care of patient at change of shift.   Assessment complete and POC discussed.   STATUS: Patient is A&Ox2, VSS, on RA.   PAIN: Patient denies pain, no apparent signs of distress or discomfort.   Patient is sitting up in bed for breakfast.   Bed alarm set, call light in reach.  Bed is in lowest/locked position.   Call light and belongings are within reach.   No further needs at this time.  Will continue to monitor.

## 2022-06-13 NOTE — PROGRESS NOTES
ISOLATION PRECAUTIONS EDUCATION    Educated PATIENT, FAMILY, S.O: patient on isolation for MRSA.    Educated on reason for isolation, how the infection may be transmitted, and how to help prevent transmission to others. Educated precautions involves staff and visitors wearing PPE, following Standard Precautions and performing meticulous hand hygiene in order to prevent transmission of infection.     Contact Precautions: Educated that Contact Precautions involves staff and visitors wearing gowns and gloves when in the patient room.     In addition, educated that the patient may leave the room, but prior to exiting the patient room each time, the patient needs to have on a fresh patient gown, ensure the potentially infectious area is covered, and perform hand hygiene with soap and water or alcohol-based hand rub, immediately prior to exiting the room.       Patient transport and mobilization on unit  Educated that they may leave their room, but prior to exiting, the patient needs to have on a fresh patient gown, ensure the potentially infectious area is covered, performing appropriate hand hygiene immediately prior to exiting the room.

## 2022-06-14 ENCOUNTER — ANESTHESIA (OUTPATIENT)
Dept: SURGERY | Facility: MEDICAL CENTER | Age: 82
DRG: 854 | End: 2022-06-14
Payer: COMMERCIAL

## 2022-06-14 LAB
ANION GAP SERPL CALC-SCNC: 12 MMOL/L (ref 7–16)
BACTERIA BLD CULT: ABNORMAL
BUN SERPL-MCNC: 14 MG/DL (ref 8–22)
CALCIUM SERPL-MCNC: 8.5 MG/DL (ref 8.5–10.5)
CHLORIDE SERPL-SCNC: 105 MMOL/L (ref 96–112)
CO2 SERPL-SCNC: 21 MMOL/L (ref 20–33)
CREAT SERPL-MCNC: 0.67 MG/DL (ref 0.5–1.4)
EKG IMPRESSION: NORMAL
ERYTHROCYTE [DISTWIDTH] IN BLOOD BY AUTOMATED COUNT: 41 FL (ref 35.9–50)
ETEST SENSITIVITY ETEST: NORMAL
GFR SERPLBLD CREATININE-BSD FMLA CKD-EPI: 94 ML/MIN/1.73 M 2
GLUCOSE BLD STRIP.AUTO-MCNC: 160 MG/DL (ref 65–99)
GLUCOSE BLD STRIP.AUTO-MCNC: 188 MG/DL (ref 65–99)
GLUCOSE BLD STRIP.AUTO-MCNC: 190 MG/DL (ref 65–99)
GLUCOSE BLD STRIP.AUTO-MCNC: 202 MG/DL (ref 65–99)
GLUCOSE SERPL-MCNC: 175 MG/DL (ref 65–99)
GRAM STN SPEC: NORMAL
GRAM STN SPEC: NORMAL
HCT VFR BLD AUTO: 35.1 % (ref 42–52)
HGB BLD-MCNC: 11.8 G/DL (ref 14–18)
MCH RBC QN AUTO: 29.4 PG (ref 27–33)
MCHC RBC AUTO-ENTMCNC: 33.6 G/DL (ref 33.7–35.3)
MCV RBC AUTO: 87.3 FL (ref 81.4–97.8)
PLATELET # BLD AUTO: 240 K/UL (ref 164–446)
PMV BLD AUTO: 9.2 FL (ref 9–12.9)
POTASSIUM SERPL-SCNC: 3.8 MMOL/L (ref 3.6–5.5)
RBC # BLD AUTO: 4.02 M/UL (ref 4.7–6.1)
SIGNIFICANT IND 70042: ABNORMAL
SIGNIFICANT IND 70042: NORMAL
SIGNIFICANT IND 70042: NORMAL
SITE SITE: ABNORMAL
SITE SITE: NORMAL
SITE SITE: NORMAL
SODIUM SERPL-SCNC: 138 MMOL/L (ref 135–145)
SOURCE SOURCE: ABNORMAL
SOURCE SOURCE: NORMAL
SOURCE SOURCE: NORMAL
VANCOMYCIN PEAK SERPL-MCNC: 21.9 UG/ML (ref 20–40)
WBC # BLD AUTO: 7.8 K/UL (ref 4.8–10.8)

## 2022-06-14 PROCEDURE — 700111 HCHG RX REV CODE 636 W/ 250 OVERRIDE (IP): Performed by: STUDENT IN AN ORGANIZED HEALTH CARE EDUCATION/TRAINING PROGRAM

## 2022-06-14 PROCEDURE — 80048 BASIC METABOLIC PNL TOTAL CA: CPT

## 2022-06-14 PROCEDURE — 27603 DRAIN LOWER LEG LESION: CPT | Mod: LT | Performed by: ORTHOPAEDIC SURGERY

## 2022-06-14 PROCEDURE — 80202 ASSAY OF VANCOMYCIN: CPT

## 2022-06-14 PROCEDURE — 36415 COLL VENOUS BLD VENIPUNCTURE: CPT

## 2022-06-14 PROCEDURE — 700105 HCHG RX REV CODE 258: Performed by: ANESTHESIOLOGY

## 2022-06-14 PROCEDURE — 87015 SPECIMEN INFECT AGNT CONCNTJ: CPT

## 2022-06-14 PROCEDURE — 87075 CULTR BACTERIA EXCEPT BLOOD: CPT

## 2022-06-14 PROCEDURE — 93005 ELECTROCARDIOGRAM TRACING: CPT | Performed by: ANESTHESIOLOGY

## 2022-06-14 PROCEDURE — 700102 HCHG RX REV CODE 250 W/ 637 OVERRIDE(OP): Performed by: STUDENT IN AN ORGANIZED HEALTH CARE EDUCATION/TRAINING PROGRAM

## 2022-06-14 PROCEDURE — 93010 ELECTROCARDIOGRAM REPORT: CPT | Performed by: INTERNAL MEDICINE

## 2022-06-14 PROCEDURE — 87070 CULTURE OTHR SPECIMN AEROBIC: CPT | Mod: 91

## 2022-06-14 PROCEDURE — 160035 HCHG PACU - 1ST 60 MINS PHASE I: Performed by: ORTHOPAEDIC SURGERY

## 2022-06-14 PROCEDURE — 87205 SMEAR GRAM STAIN: CPT

## 2022-06-14 PROCEDURE — 99233 SBSQ HOSP IP/OBS HIGH 50: CPT | Performed by: STUDENT IN AN ORGANIZED HEALTH CARE EDUCATION/TRAINING PROGRAM

## 2022-06-14 PROCEDURE — 160027 HCHG SURGERY MINUTES - 1ST 30 MINS LEVEL 2: Performed by: ORTHOPAEDIC SURGERY

## 2022-06-14 PROCEDURE — 700111 HCHG RX REV CODE 636 W/ 250 OVERRIDE (IP): Performed by: ANESTHESIOLOGY

## 2022-06-14 PROCEDURE — 00400 ANES INTEGUMENTARY SYS NOS: CPT | Performed by: ANESTHESIOLOGY

## 2022-06-14 PROCEDURE — 770001 HCHG ROOM/CARE - MED/SURG/GYN PRIV*

## 2022-06-14 PROCEDURE — 99100 ANES PT EXTEME AGE<1 YR&>70: CPT | Performed by: ANESTHESIOLOGY

## 2022-06-14 PROCEDURE — 160002 HCHG RECOVERY MINUTES (STAT): Performed by: ORTHOPAEDIC SURGERY

## 2022-06-14 PROCEDURE — 85027 COMPLETE CBC AUTOMATED: CPT

## 2022-06-14 PROCEDURE — 160009 HCHG ANES TIME/MIN: Performed by: ORTHOPAEDIC SURGERY

## 2022-06-14 PROCEDURE — 110371 HCHG SHELL REV 272: Performed by: ORTHOPAEDIC SURGERY

## 2022-06-14 PROCEDURE — A9270 NON-COVERED ITEM OR SERVICE: HCPCS | Performed by: STUDENT IN AN ORGANIZED HEALTH CARE EDUCATION/TRAINING PROGRAM

## 2022-06-14 PROCEDURE — 99233 SBSQ HOSP IP/OBS HIGH 50: CPT | Performed by: INTERNAL MEDICINE

## 2022-06-14 PROCEDURE — 700105 HCHG RX REV CODE 258: Performed by: STUDENT IN AN ORGANIZED HEALTH CARE EDUCATION/TRAINING PROGRAM

## 2022-06-14 PROCEDURE — 160048 HCHG OR STATISTICAL LEVEL 1-5: Performed by: ORTHOPAEDIC SURGERY

## 2022-06-14 PROCEDURE — 700101 HCHG RX REV CODE 250: Performed by: ANESTHESIOLOGY

## 2022-06-14 PROCEDURE — 82962 GLUCOSE BLOOD TEST: CPT | Mod: 91

## 2022-06-14 PROCEDURE — 0QBK0ZZ EXCISION OF LEFT FIBULA, OPEN APPROACH: ICD-10-PCS | Performed by: ORTHOPAEDIC SURGERY

## 2022-06-14 RX ORDER — LIDOCAINE HYDROCHLORIDE 20 MG/ML
INJECTION, SOLUTION EPIDURAL; INFILTRATION; INTRACAUDAL; PERINEURAL PRN
Status: DISCONTINUED | OUTPATIENT
Start: 2022-06-14 | End: 2022-06-14 | Stop reason: SURG

## 2022-06-14 RX ORDER — OXYCODONE HCL 5 MG/5 ML
5 SOLUTION, ORAL ORAL
Status: DISCONTINUED | OUTPATIENT
Start: 2022-06-14 | End: 2022-06-14 | Stop reason: HOSPADM

## 2022-06-14 RX ORDER — SODIUM CHLORIDE, SODIUM LACTATE, POTASSIUM CHLORIDE, CALCIUM CHLORIDE 600; 310; 30; 20 MG/100ML; MG/100ML; MG/100ML; MG/100ML
INJECTION, SOLUTION INTRAVENOUS
Status: DISCONTINUED | OUTPATIENT
Start: 2022-06-14 | End: 2022-06-14 | Stop reason: SURG

## 2022-06-14 RX ORDER — HYDROMORPHONE HYDROCHLORIDE 1 MG/ML
0.1 INJECTION, SOLUTION INTRAMUSCULAR; INTRAVENOUS; SUBCUTANEOUS
Status: DISCONTINUED | OUTPATIENT
Start: 2022-06-14 | End: 2022-06-14 | Stop reason: HOSPADM

## 2022-06-14 RX ORDER — HYDROMORPHONE HYDROCHLORIDE 1 MG/ML
0.4 INJECTION, SOLUTION INTRAMUSCULAR; INTRAVENOUS; SUBCUTANEOUS
Status: DISCONTINUED | OUTPATIENT
Start: 2022-06-14 | End: 2022-06-14 | Stop reason: HOSPADM

## 2022-06-14 RX ORDER — OXYCODONE HCL 5 MG/5 ML
10 SOLUTION, ORAL ORAL
Status: DISCONTINUED | OUTPATIENT
Start: 2022-06-14 | End: 2022-06-14 | Stop reason: HOSPADM

## 2022-06-14 RX ORDER — ONDANSETRON 2 MG/ML
4 INJECTION INTRAMUSCULAR; INTRAVENOUS
Status: DISCONTINUED | OUTPATIENT
Start: 2022-06-14 | End: 2022-06-14 | Stop reason: HOSPADM

## 2022-06-14 RX ORDER — ONDANSETRON 2 MG/ML
INJECTION INTRAMUSCULAR; INTRAVENOUS PRN
Status: DISCONTINUED | OUTPATIENT
Start: 2022-06-14 | End: 2022-06-14 | Stop reason: SURG

## 2022-06-14 RX ORDER — HYDROMORPHONE HYDROCHLORIDE 1 MG/ML
0.2 INJECTION, SOLUTION INTRAMUSCULAR; INTRAVENOUS; SUBCUTANEOUS
Status: DISCONTINUED | OUTPATIENT
Start: 2022-06-14 | End: 2022-06-14 | Stop reason: HOSPADM

## 2022-06-14 RX ORDER — HALOPERIDOL 5 MG/ML
1 INJECTION INTRAMUSCULAR
Status: DISCONTINUED | OUTPATIENT
Start: 2022-06-14 | End: 2022-06-14 | Stop reason: HOSPADM

## 2022-06-14 RX ORDER — CEFAZOLIN SODIUM 1 G/3ML
INJECTION, POWDER, FOR SOLUTION INTRAMUSCULAR; INTRAVENOUS PRN
Status: DISCONTINUED | OUTPATIENT
Start: 2022-06-14 | End: 2022-06-14 | Stop reason: SURG

## 2022-06-14 RX ORDER — SODIUM CHLORIDE, SODIUM LACTATE, POTASSIUM CHLORIDE, CALCIUM CHLORIDE 600; 310; 30; 20 MG/100ML; MG/100ML; MG/100ML; MG/100ML
INJECTION, SOLUTION INTRAVENOUS CONTINUOUS
Status: DISCONTINUED | OUTPATIENT
Start: 2022-06-14 | End: 2022-06-14 | Stop reason: HOSPADM

## 2022-06-14 RX ADMIN — ENOXAPARIN SODIUM 40 MG: 40 INJECTION SUBCUTANEOUS at 17:04

## 2022-06-14 RX ADMIN — FENTANYL CITRATE 50 MCG: 50 INJECTION, SOLUTION INTRAMUSCULAR; INTRAVENOUS at 09:28

## 2022-06-14 RX ADMIN — GABAPENTIN 300 MG: 300 CAPSULE ORAL at 05:51

## 2022-06-14 RX ADMIN — CEFAZOLIN 2 G: 330 INJECTION, POWDER, FOR SOLUTION INTRAMUSCULAR; INTRAVENOUS at 09:12

## 2022-06-14 RX ADMIN — ONDANSETRON 4 MG: 2 INJECTION INTRAMUSCULAR; INTRAVENOUS at 09:26

## 2022-06-14 RX ADMIN — PROPOFOL 160 MG: 10 INJECTION, EMULSION INTRAVENOUS at 09:12

## 2022-06-14 RX ADMIN — ATORVASTATIN CALCIUM 40 MG: 40 TABLET, FILM COATED ORAL at 17:04

## 2022-06-14 RX ADMIN — LIDOCAINE HYDROCHLORIDE 60 MG: 20 INJECTION, SOLUTION EPIDURAL; INFILTRATION; INTRACAUDAL at 09:12

## 2022-06-14 RX ADMIN — Medication 1 CAPSULE: at 05:51

## 2022-06-14 RX ADMIN — MIRTAZAPINE 15 MG: 15 TABLET, FILM COATED ORAL at 20:45

## 2022-06-14 RX ADMIN — FENTANYL CITRATE 50 MCG: 50 INJECTION, SOLUTION INTRAMUSCULAR; INTRAVENOUS at 09:12

## 2022-06-14 RX ADMIN — OXYCODONE 5 MG: 5 TABLET ORAL at 11:26

## 2022-06-14 RX ADMIN — SERTRALINE 50 MG: 50 TABLET, FILM COATED ORAL at 17:04

## 2022-06-14 RX ADMIN — SODIUM CHLORIDE, POTASSIUM CHLORIDE, SODIUM LACTATE AND CALCIUM CHLORIDE: 600; 310; 30; 20 INJECTION, SOLUTION INTRAVENOUS at 09:17

## 2022-06-14 RX ADMIN — VANCOMYCIN HYDROCHLORIDE 1500 MG: 500 INJECTION, POWDER, LYOPHILIZED, FOR SOLUTION INTRAVENOUS at 11:26

## 2022-06-14 RX ADMIN — GABAPENTIN 300 MG: 300 CAPSULE ORAL at 18:25

## 2022-06-14 RX ADMIN — INSULIN HUMAN 2 UNITS: 100 INJECTION, SOLUTION PARENTERAL at 18:31

## 2022-06-14 RX ADMIN — HYDROCODONE BITARTRATE AND ACETAMINOPHEN 1 TABLET: 5; 325 TABLET ORAL at 18:25

## 2022-06-14 RX ADMIN — INSULIN HUMAN 1 UNITS: 100 INJECTION, SOLUTION PARENTERAL at 13:58

## 2022-06-14 RX ADMIN — INSULIN HUMAN 1 UNITS: 100 INJECTION, SOLUTION PARENTERAL at 20:46

## 2022-06-14 ASSESSMENT — COGNITIVE AND FUNCTIONAL STATUS - GENERAL
CLIMB 3 TO 5 STEPS WITH RAILING: A LITTLE
SUGGESTED CMS G CODE MODIFIER MOBILITY: CJ
DAILY ACTIVITIY SCORE: 20
HELP NEEDED FOR BATHING: A LITTLE
DRESSING REGULAR LOWER BODY CLOTHING: A LITTLE
PERSONAL GROOMING: A LITTLE
WALKING IN HOSPITAL ROOM: A LITTLE
SUGGESTED CMS G CODE MODIFIER DAILY ACTIVITY: CJ
TOILETING: A LITTLE
MOBILITY SCORE: 21
STANDING UP FROM CHAIR USING ARMS: A LITTLE

## 2022-06-14 ASSESSMENT — ENCOUNTER SYMPTOMS
FEVER: 0
CHILLS: 0
ABDOMINAL PAIN: 0
BACK PAIN: 0
HEADACHES: 0
EYE PAIN: 0
SENSORY CHANGE: 0
BLURRED VISION: 0
SHORTNESS OF BREATH: 0
FOCAL WEAKNESS: 0
PALPITATIONS: 0
FALLS: 0
VOMITING: 0
INSOMNIA: 0
NAUSEA: 0
COUGH: 0
DIZZINESS: 0

## 2022-06-14 ASSESSMENT — PAIN DESCRIPTION - PAIN TYPE
TYPE: SURGICAL PAIN
TYPE: SURGICAL PAIN
TYPE: ACUTE PAIN
TYPE: SURGICAL PAIN

## 2022-06-14 ASSESSMENT — PAIN SCALES - GENERAL: PAIN_LEVEL: 0

## 2022-06-14 ASSESSMENT — LIFESTYLE VARIABLES: SUBSTANCE_ABUSE: 0

## 2022-06-14 NOTE — PROGRESS NOTES
Infectious Disease Progress Note    Author: Jacobo Chavez M.D. Date & Time of service: 2022  4:12 PM    Chief Complaint:  Follow-up for MRSA bacteremia, left foot infection    Interval History:   afebrile, WBC 10.7.  Patient is much more alert and awake today.  He is awaiting MRI and arterial studies, urine culture with Staph epidermidis   patient remains afebrile, leukocytosis resolved, white count of 3.7 today, tolerating vancomycin.  Repeat blood cultures as below   patient remains afebrile, white count down to 7.8, just returned from I&D, sleeping      Labs Reviewed, Medications Reviewed and Wound Reviewed.    Review of Systems:  Review of Systems   Unable to perform ROS: Medical condition   Limited secondary to underlying dementia    Hemodynamics:  Temp (24hrs), Av.5 °C (97.7 °F), Min:35.8 °C (96.5 °F), Max:36.8 °C (98.3 °F)  Temperature: (P) 36.4 °C (97.6 °F)  Pulse  Av.4  Min: 61  Max: 143   Blood Pressure : (P) 133/70       Physical Exam:  Physical Exam  Vitals and nursing note reviewed.   Constitutional:       Appearance: Normal appearance.   HENT:      Mouth/Throat:      Mouth: Mucous membranes are moist.      Comments: Facemask oxygen in place  Eyes:      General: No scleral icterus.        Right eye: No discharge.         Left eye: No discharge.      Conjunctiva/sclera: Conjunctivae normal.   Cardiovascular:      Rate and Rhythm: Normal rate.   Pulmonary:      Effort: Pulmonary effort is normal. No respiratory distress.      Breath sounds: No wheezing.   Abdominal:      Palpations: Abdomen is soft.   Musculoskeletal:      Cervical back: No rigidity.      Comments: Left foot with surgical dressing   Skin:     Findings: No rash.   Neurological:      General: No focal deficit present.      Mental Status: He is alert. He is disoriented.         Meds:    Current Facility-Administered Medications:   •  oxyCODONE immediate-release  •  acetaminophen  •  lactobacillus rhamnosus  •   diphenhydrAMINE  •  enoxaparin (LOVENOX) injection  •  MD Alert...Vancomycin per Pharmacy  •  insulin regular **AND** POC blood glucose manual result **AND** NOTIFY MD and PharmD **AND** Administer 20 grams of glucose (approximately 8 ounces of fruit juice) every 15 minutes PRN FSBG less than 70 mg/dL **AND** dextrose bolus  •  vancomycin  •  atorvastatin  •  gabapentin  •  mirtazapine  •  sertraline  •  HYDROcodone-acetaminophen    Labs:  Recent Labs     06/12/22  0230 06/13/22  0524 06/14/22  1418   WBC 10.7 8.7 7.8   RBC 3.95* 4.00* 4.02*   HEMOGLOBIN 11.8* 11.6* 11.8*   HEMATOCRIT 35.2* 34.7* 35.1*   MCV 89.1 86.8 87.3   MCH 29.9 29.0 29.4   RDW 42.5 41.1 41.0   PLATELETCT 200 249 240   MPV 8.9* 9.4 9.2     Recent Labs     06/12/22  0230 06/13/22  0524 06/14/22  1418   SODIUM 140 138 138   POTASSIUM 3.6 3.7 3.8   CHLORIDE 106 106 105   CO2 23 20 21   GLUCOSE 165* 174* 175*   BUN 24* 18 14     Recent Labs     06/12/22 0230 06/13/22  0524 06/14/22  1418   CREATININE 1.12 0.79 0.67       Imaging:  DX-ANKLE 3+ VIEWS LEFT    Result Date: 6/10/2022  6/10/2022 9:57 AM HISTORY/REASON FOR EXAM:  Atraumatic Pain/Swelling/Deformity. Lateral left ankle wound TECHNIQUE/EXAM DESCRIPTION AND NUMBER OF VIEWS:  3 views of the LEFT ankle. COMPARISON: Left ankle radiography, 4/12/2022. FINDINGS: Bones: Normal bone mineralization. There is cortical irregularity involving the inferior lateral aspect of the lateral malleolus, at the location of distention of soft tissue gas from the skin surface to the surface of the bone. Findings are concerning for osteomyelitis. No acute fracture. No other focal bone lesion. Joints: Intact. No subluxation. Ankle mortise is preserved. Soft tissues: Circumferential left ankle soft tissue swelling, greatest laterally, with soft tissue gas.     1. Lateral left ankle soft tissue swelling with soft tissue gas extending from the skin surface to the lateral aspect of the distal left fibula, with cortical  disruption concerning for osteomyelitis. MRI of the left ankle without and with IV contrast is recommended. 2. Decreased bone mineralization. 3. The remainder of the left ankle radiography is within normal limits.    DX-CHEST-PORTABLE (1 VIEW)    Result Date: 6/10/2022  6/10/2022 9:57 AM HISTORY/REASON FOR EXAM:  possible sepsis.. TECHNIQUE/EXAM DESCRIPTION AND NUMBER OF VIEWS: Single portable view of the chest. COMPARISON: 9/22/2019 FINDINGS: Cardiac silhouette is enlarged. Aortic calcified atherosclerotic plaque. No focal consolidation, pleural effusion, pulmonary edema or pneumothorax. Generalized osteopenia.     No acute cardiopulmonary abnormality.      Micro:  Results     Procedure Component Value Units Date/Time    MRSA By PCR (Amp) [[id number]]     Order Status: Canceled Specimen: Respirate from Nares     Anaerobic Culture [[id number]] Collected: 06/14/22 0930    Order Status: No result Specimen: Tissue Updated: 06/14/22 1237     Significant Indicator NEG     Source TISS     Site Left Lateral Ankle     Culture Result -    Narrative:      Surgery Specimen    CULTURE TISSUE W/ GRM STAIN [[id number]] Collected: 06/14/22 0930    Order Status: No result Specimen: Tissue Updated: 06/14/22 1237     Significant Indicator NEG     Source TISS     Site Left Lateral Ankle     Culture Result -     Gram Stain Result -    Narrative:      Surgery Specimen    CULTURE WOUND W/ GRAM STAIN [[id number]] Collected: 06/14/22 0929    Order Status: No result Specimen: Wound Updated: 06/14/22 1235     Significant Indicator NEG     Source WND     Site Left Lateral Ankle     Culture Result -     Gram Stain Result -    Narrative:      Surgery - swabs received    Anaerobic Culture [[id number]] Collected: 06/14/22 0929    Order Status: No result Specimen: Wound Updated: 06/14/22 1235     Significant Indicator NEG     Source WND     Site Left Lateral Ankle     Culture Result -    Narrative:      Surgery - swabs received    BLOOD CULTURE  "[[id number]]  (Abnormal) Collected: 06/10/22 1003    Order Status: Completed Specimen: Blood from Peripheral Updated: 06/13/22 1710     Significant Indicator POS     Source BLD     Site PERIPHERAL     Culture Result Growth detected by Bactec instrument. 06/11/2022  13:45  Gram Stain: Gram positive cocci: Possible Staphylococcus sp.  Gram Stain: Gram positive cocci: Possible Streptococcus sp.  aerobic bottle only        Methicillin Resistant Staphylococcus aureus  See previous culture for sensitivity report.      Narrative:      CALL  [facility]  61 tel. [phone],  CALLED  61 tel. [phone] 06/13/2022, 02:35, RB PERF. RESULTS CALLED TO: RN  [phone]  Per Hospital Policy: Only change Specimen Src: to \"Line\" if  specified by physician order.  Right AC    BLOOD CULTURE [[id number]]  (Abnormal)  (Susceptibility) Collected: 06/10/22 0937    Order Status: Completed Specimen: Blood from Peripheral Updated: 06/13/22 0904     Significant Indicator POS     Source BLD     Site PERIPHERAL     Culture Result Growth detected by Bactec instrument. 06/11/2022  09:11  Methicillin Resistant Staphylococcus aureus (MRSA)  detected by PCR.        Methicillin Resistant Staphylococcus aureus  This isolate is presumed to be clindamycin resistant based on  detection of inducible resistance.      Narrative:      CALL  [facility]  61 tel. [phone],  CALLED  61 tel. [phone] 06/11/2022, 09:15, RB PERF. RESULTS CALLED TO:  Ludin RN + Cornelio RN  Per Hospital Policy: Only change Specimen Src: to \"Line\" if  specified by physician order.  No site indicated    Susceptibility     Methicillin resistant staphylococcus aureus (1)     Antibiotic Interpretation Microscan   Method Status    Azithromycin Resistant >4 mcg/mL ED Final    Clindamycin Resistant <=0.25 mcg/mL ED Final    Cefazolin Resistant <=8 mcg/mL ED Final    Cefepime Resistant 8 mcg/mL ED Final    Ceftaroline Sensitive <=0.5 mcg/mL ED Final    Daptomycin Sensitive <=0.5 mcg/mL ED Final    " "Ampicillin/sulbactam Resistant <=8/4 mcg/mL ED Final    Erythromycin Resistant >4 mcg/mL ED Final    Vancomycin Sensitive 1 mcg/mL ED Final    Oxacillin Resistant >2 mcg/mL ED Final    Trimeth/Sulfa Sensitive <=0.5/9.5 mcg/mL ED Final    Tetracycline Resistant >8 mcg/mL ED Final                   BLOOD CULTURE [[id number]] Collected: 06/12/22 0230    Order Status: Completed Specimen: Blood from Peripheral Updated: 06/13/22 0739     Significant Indicator NEG     Source BLD     Site PERIPHERAL     Culture Result No Growth  Note: Blood cultures are incubated for 5 days and  are monitored continuously.Positive blood cultures  are called to the RN and reported as soon as  they are identified.      Narrative:      Contact  Per Hospital Policy: Only change Specimen Src: to \"Line\" if  specified by physician order.  Left Hand    BLOOD CULTURE [[id number]] Collected: 06/12/22 0230    Order Status: Completed Specimen: Blood from Peripheral Updated: 06/13/22 0739     Significant Indicator NEG     Source BLD     Site PERIPHERAL     Culture Result No Growth  Note: Blood cultures are incubated for 5 days and  are monitored continuously.Positive blood cultures  are called to the RN and reported as soon as  they are identified.      Narrative:      Contact  Per Hospital Policy: Only change Specimen Src: to \"Line\" if  specified by physician order.  Right AC    URINE CULTURE(NEW) [[id number]]  (Abnormal)  (Susceptibility) Collected: 06/10/22 0957    Order Status: Completed Specimen: Urine Updated: 06/12/22 0921     Significant Indicator POS     Source UR     Site -     Culture Result -      Staphylococcus epidermidis  >100,000 cfu/mL      Narrative:      Indication for culture:->Evaluation for sepsis without a  clear source of infection  Indication for culture:->Evaluation for sepsis without a    Susceptibility     Staphylococcus epidermidis (1)     Antibiotic Interpretation Microscan   Method Status    Cefazolin Resistant <=8 " mcg/mL ED Final    Cefepime Resistant 8 mcg/mL ED Final    Daptomycin Sensitive <=0.5 mcg/mL ED Final    Nitrofurantoin Sensitive <=32 mcg/mL ED Final    Trimeth/Sulfa Sensitive <=0.5/9.5 mcg/mL ED Final    Tetracycline Sensitive <=4 mcg/mL ED Final                   URINALYSIS [[id number]]  (Abnormal) Collected: 06/10/22 0947    Order Status: Completed Specimen: Urine Updated: 06/10/22 1059     Color Yellow     Character Clear     Specific Gravity 1.021     Ph 6.5     Glucose Negative mg/dL      Ketones Trace mg/dL      Protein 100 mg/dL      Bilirubin Negative     Urobilinogen, Urine 0.2     Nitrite Positive     Leukocyte Esterase Moderate     Occult Blood Negative     Micro Urine Req Microscopic    Narrative:      Indication for culture:->Evaluation for sepsis without a  clear source of infection    MRSA By PCR (Amp) [[id number]]     Order Status: Canceled Specimen: Respirate from Nares           Assessment:  82 y.o. man with underlying dementia, type 2 diabetes mellitus and recent hospitalization in April secondary to left ankle cellulitis with MRSA admitted on 6/10/2022 secondary to acute encephalopathy and increasing left ankle pain and swelling.  Patient found to have findings highly concerning for osteomyelitis on radiograph and MRSA bacteremia.  Source of his bacteremia is from his left foot infection.    Pertinent diagnoses:  MRSA bacteremia  Left ankle osteomyelitis  Bacteriuria, urine cx +staph epi  Leukocytosis, resolved  Type 2 diabetes mellitus, hemoglobin A1c 6.3  Dementia    Plan:  -Blood cultures x2 from 6/10 positive for MRSA.  Repeat blood cultures x2 from 6/12 pending  -Continue IV vancomycin for now.  Monitor levels and renal function  -MRI of the left ankle suggestive of underlying osteomyelitis  -Patient was taken to the OR today 6/14 for excisional debridement with application of wound VAC.  Will follow OR cultures  - TTE with no obvious valvular vegetations    Disposition: Anticipate  placement and 6 weeks of IV vancomycin, stop date to be determined, awaiting clearance of blood cultures.  Patient is a .  [facility] may need Dr. Holley to write these orders  Need for PICC line: Yes, holding off ordering at this time while awaiting clearance of blood cultures    Discussed with internal medicine Dr. Newton.  We will continue to follow

## 2022-06-14 NOTE — OR NURSING
0940: Pt arrives to PACU asleep and calm. VSS. Left ankle is ace bandaged with wound vac in place. Dressing CDI.    0950: Pts daughter called and updated.    1000: Pt orientated to self and place. Denies pain to his left ankle and denies nausea. Tolerating sips of water.    1015: Pt continues to deny pain or nausea. Dressing CDI. Report called to Lady TANNER. Pt going to his room with transport.

## 2022-06-14 NOTE — DISCHARGE PLANNING
Medical Social Work  PC from [facility], [phone], called to let SW know patient is from their facility.  SW asked if they can take patient with VAC and IV ABX.  [facility] stated this shouldn't be a problem, requested SW contact him with what patient will need prior to discharge.

## 2022-06-14 NOTE — PROGRESS NOTES
End of shift summary:    AOx2. VSS on RA. Denied pain. Refused pain medicine. NPO since midnight for I&D today. EKG done.  last night and 188 this morning. Report given to pre-op. Transport should be here around 0830. Still needs CHG, will let day CNA know. L ankle dressing changed last night, pt keeps removing. Put mepilex over aquacel ag and foam instead of roll gauze so pt wouldn't be able to remove right away. AM labs pending. Post op boot in room. Bed alarm on. Call light within reach.

## 2022-06-14 NOTE — PROGRESS NOTES
1003-Received report from Mamie TANNER from PACU.   1031-Patient arrived back on unit. 2L oxymask on continuous and  in place and noted patient desats in the mid 80s, educated patient to keep oxygen on. Patient appears sleepy but c/o LLE pain 5/10, medicated patient with prn meds. Noted LLE ankle with wound vac and ace wrap running at 125mmhg continuous with scant serosang drainage. Daughter is at bedside and supportive. Call light within reach. Bed alarms in place. Will continue to monitor.

## 2022-06-14 NOTE — ANESTHESIA TIME REPORT
Anesthesia Start and Stop Event Times     Date Time Event    6/14/2022 0900 Ready for Procedure     0906 Anesthesia Start     0941 Anesthesia Stop        Responsible Staff  06/14/22    Name Role Begin End    Ko Lion M.D. Anesth 0906 0941        Overtime Reason:  no overtime (within assigned shift)    Comments:

## 2022-06-14 NOTE — CARE PLAN
The patient is Stable - Low risk of patient condition declining or worsening    Shift Goals  Clinical Goals: Safety  Patient Goals: Rest  Family Goals: Wound care    Progress made toward(s) clinical / shift goals:  VSS on RA. Denied pain. Bed alarm on. Call light within reach.    Problem: Respiratory  Goal: Patient will achieve/maintain optimum respiratory ventilation and gas exchange  Outcome: Progressing     Problem: Fall Risk  Goal: Patient will remain free from falls  Outcome: Progressing     Problem: Pain - Standard  Goal: Alleviation of pain or a reduction in pain to the patient’s comfort goal  Outcome: Progressing       Patient is not progressing towards the following goals:

## 2022-06-14 NOTE — ANESTHESIA PROCEDURE NOTES
Airway    Date/Time: 6/14/2022 9:13 AM  Performed by: Ko Lion M.D.  Authorized by: Ko Lion M.D.     Location:  OR  Urgency:  Elective  Indications for Airway Management:  Anesthesia      Spontaneous Ventilation: absent    Sedation Level:  Deep  Preoxygenated: Yes    Mask Difficulty Assessment:  1 - vent by mask  Final Airway Type:  Supraglottic airway  Final Supraglottic Airway:  Standard LMA    SGA Size:  4  Number of Attempts at Approach:  1

## 2022-06-14 NOTE — ANESTHESIA PREPROCEDURE EVALUATION
Case: [id number] Date/Time: 06/14/22 0830    Procedure: IRRIGATION AND DEBRIDEMENT LEFT ANKLE    Location: [facility] OR 14 / SURGERY [facility]    Surgeons: Panfilo Wagner M.D.          Relevant Problems   CARDIAC   (positive) HTN (hypertension)   (positive) Hypertension   (positive) PVC (premature ventricular contraction)         (positive) Acute kidney injury (HCC)      ENDO   (positive) Type 2 diabetes mellitus (HCC)      Other   (positive) Dementia with behavioral disturbance (HCC)   (positive) Diabetic foot infection (HCC)   CAD s/p stent    Physical Exam    Airway   Mallampati: II  TM distance: >3 FB  Neck ROM: full       Cardiovascular - normal exam  Rhythm: regular  Rate: normal  (-) murmur     Dental   Comments: No teeth         Pulmonary - normal exam  Breath sounds clear to auscultation     Abdominal    Neurological - normal exam               Anesthesia Plan    ASA 3   ASA physical status 3 criteria: CAD/stents (> 3 months)    Plan - general       Airway plan will be LMA          Induction: intravenous    Postoperative Plan: Postoperative administration of opioids is intended.    Pertinent diagnostic labs and testing reviewed    Informed Consent:    Anesthetic plan and risks discussed with patient and healthcare power of .

## 2022-06-14 NOTE — OP REPORT
DATE OF PROCEDURE: 6/14/2022    PREOPERATIVE DIAGNOSIS:     1.  Deep infection and possible osteomyelitis, left ankle  2.  Chronic draining sinus, left ankle  3.  Diabetes mellitus    POSTOPERATIVE DIAGNOSIS:     1.  Deep infection and possible osteomyelitis, left ankle  2.  Chronic draining sinus, left ankle  3.  Diabetes mellitus    PROCEDURE: Incision, drainage and excisional debridement of  left lateral ankle with application of wound VAC dressing    SURGEON: Panfilo Wagner MD    ASSISTANT: None    ANESTHESIA: Ko Lion MD    ANESTHETIC: General    EBL: 10 cc    INDICATIONS: Andrei is 82 years old.  He has diabetes.  He has had a chronic draining sinus from the lateral aspect of his left ankle.  There is underlying dead space.  He had this packed and wound has not healed.  MRI suggest osteomyelitis in the distal fibula.  I recommended debridement of wound with deep cultures and then obliteration of dead space using VAC dressing for secondary wound closure.  I discussed this with the patient's daughter who wished to proceed.    PROCEDURE DESCRIPTION: The patient was identified in the preoperative holding area.  His left ankle was marked.  He was taken to the operating room where he was left on his hospital bed.  General anesthesia was administered.  He is already on intravenous antibiotics and no additional doses were given.  The left lower extremity was then prepped and draped in sterile fashion.  A timeout was held to confirm the patient's identity and correct surgical site.    I elliptically excised the chronic sinus.  This extended down to soft tissue over the anterior aspect of the distal fibula.  There was a deeper extension down to the fibula itself.  No pus was encountered.  Some deep tissue and bone was removed for tissue culture.  The deep wound adjacent to the fibula was swabbed and sent for culture.  The wound was irrigated.  I then placed a VAC dressing sponge into the ellipse and sealed this  with Ioban.  The suction was set to 125 mmHg and the seal was maintained.  Ace wrap was applied.  The patient was extubated and taken to the recovery room in stable condition.    POSTOPERATIVE PLAN:     1.  Intravenous antibiotics and follow cultures  2.  Open wound management, initially with VAC until wound is small enough to treat with standard dressing changes

## 2022-06-14 NOTE — PROGRESS NOTES
[facility] Medicine Daily Progress Note    Date of Service  6/14/2022    Chief Complaint  Andrei Garay is a 82 y.o. male admitted 6/10/2022 with ankle wound.    Hospital Course  Andrei Garay is a 82 y.o. male past medical history of diabetes mellitus, hypertension who presented 6/10/2022 with altered mental status for last 3 days and left ankle pain.  History is primarily provided by daughter.  No relieving or exacerbating factors were noted.  He denies any fever does report some chills.  In the ER, x-ray of the ankle showed signs concerning for osteomyelitis.  UA grossly positive for urinary tract infection.  Patient met criteria for sepsis started on IV broad-spectrum antibiotics and will be admitted for further work-up and limb preservation consult.     Interval Problem Update  Is  No acute events overnight.  Resting comfortably this morning, no acute distress  6/10 BCX w/MRSA  Repeat blood cultures 6/12 NGTD   Follow OR/TISS Clx  Continue IV vancomycin  Antibiotic mgmt per ID, appreciate recommendations  Ortho surgery following, appreciate recommendations  PT/OT    I have personally seen and examined the patient at bedside. I discussed the plan of care with patient.    Consultants/Specialty  infectious disease and orthopedics    Code Status  DNAR/DNI    Disposition  Patient is not medically cleared for discharge.   Anticipate discharge back to [facility] home/SNF.  I have placed the appropriate orders for post-discharge needs.    Review of Systems  Review of Systems   Constitutional: Negative for chills and fever.   Eyes: Negative for blurred vision and pain.   Respiratory: Negative for cough and shortness of breath.    Cardiovascular: Negative for chest pain, palpitations and leg swelling.   Gastrointestinal: Negative for abdominal pain, nausea and vomiting.   Genitourinary: Negative for dysuria and urgency.   Musculoskeletal: Positive for joint pain. Negative for back pain and falls.   Skin:  Negative for itching and rash.   Neurological: Negative for dizziness, sensory change, focal weakness and headaches.   Psychiatric/Behavioral: Negative for substance abuse. The patient does not have insomnia.         Physical Exam  Temp:  [35.8 °C (96.5 °F)-36.8 °C (98.3 °F)] 36.7 °C (98.1 °F)  Pulse:  [67-78] 69  Resp:  [14-18] 16  BP: (117-164)/(60-81) 117/60  SpO2:  [92 %-97 %] 97 %    Physical Exam  Constitutional:       General: He is not in acute distress.     Appearance: He is not ill-appearing.   HENT:      Head: Normocephalic and atraumatic.      Right Ear: External ear normal.      Left Ear: External ear normal.      Mouth/Throat:      Pharynx: No oropharyngeal exudate or posterior oropharyngeal erythema.   Eyes:      Extraocular Movements: Extraocular movements intact.      Pupils: Pupils are equal, round, and reactive to light.   Cardiovascular:      Rate and Rhythm: Normal rate and regular rhythm.      Pulses: Normal pulses.      Heart sounds: Normal heart sounds. No murmur heard.  Pulmonary:      Effort: Pulmonary effort is normal. No respiratory distress.      Breath sounds: Normal breath sounds. No wheezing or rales.   Abdominal:      General: Bowel sounds are normal. There is no distension.      Palpations: Abdomen is soft.      Tenderness: There is no abdominal tenderness. There is no guarding.   Musculoskeletal:         General: No swelling or tenderness.      Cervical back: Normal range of motion and neck supple.      Right lower leg: No edema.      Left lower leg: No edema.   Skin:     General: Skin is warm and dry.      Findings: Erythema and lesion present.      Comments: Left lateral ankle wound with ulcer, packed   Neurological:      General: No focal deficit present.      Mental Status: He is disoriented.      Sensory: No sensory deficit.      Motor: No weakness.   Psychiatric:         Mood and Affect: Mood normal.         Behavior: Behavior normal.         Fluids    Intake/Output Summary  (Last 24 hours) at 6/14/2022 1423  Last data filed at 6/14/2022 0940  Gross per 24 hour   Intake 620 ml   Output 1460 ml   Net -840 ml       Laboratory  Recent Labs     06/12/22  0230 06/13/22  0524   WBC 10.7 8.7   RBC 3.95* 4.00*   HEMOGLOBIN 11.8* 11.6*   HEMATOCRIT 35.2* 34.7*   MCV 89.1 86.8   MCH 29.9 29.0   MCHC 33.5* 33.4*   RDW 42.5 41.1   PLATELETCT 200 249   MPV 8.9* 9.4     Recent Labs     06/12/22  0230 06/13/22  0524   SODIUM 140 138   POTASSIUM 3.6 3.7   CHLORIDE 106 106   CO2 23 20   GLUCOSE 165* 174*   BUN 24* 18   CREATININE 1.12 0.79   CALCIUM 8.4* 8.4*                   Imaging  MR-ANKLE W/O LEFT   Final Result      1.  Osteomyelitis of the lateral malleolus/distal fibula      2.  Negative for abscess      3.  Moderate-large tibiotalar joint effusion. This could be reactive versus septic arthritis.      4.  Longitudinal splitting of the peroneus brevis tendon and there is associated fluid within the peroneal tendon sheath      EC-ECHOCARDIOGRAM COMPLETE W/O CONT   Final Result      US-HONEY SINGLE LEVEL BILAT   Final Result      DX-ANKLE 3+ VIEWS LEFT   Final Result      1. Lateral left ankle soft tissue swelling with soft tissue gas extending from the skin surface to the lateral aspect of the distal left fibula, with cortical disruption concerning for osteomyelitis. MRI of the left ankle without and with IV contrast is    recommended.   2. Decreased bone mineralization.   3. The remainder of the left ankle radiography is within normal limits.      DX-CHEST-PORTABLE (1 VIEW)   Final Result      No acute cardiopulmonary abnormality.           Assessment/Plan  * MRSA bacteremia  Assessment & Plan  Blood culture positive for MRSA  On vancomycin  Transthoracic echo without endocarditis  MRI shows osteomyelitis distal fibula and lateral malleolus  Repeat blood cultures 6/12  ID following    Diabetic foot infection (HCC)  Assessment & Plan  Status post left lateral ankle incision, draining and excisional  debridement with wound VAC placement by orthopedic surgery on 6/14/2022  6/10 BCX w/MRSA  Repeat blood cultures 6/12 NGTD   Follow OR/TISS Clx  Continue IV vancomycin  Antibiotic mgmt per ID, appreciate recommendations  Ortho surgery following, appreciate recommendations  PT/OT    UTI (urinary tract infection)  Assessment & Plan  Unclear if having symptoms, hold antibiotics for UTI  On vancomycin for MRSA ankle infection    Type 2 diabetes mellitus (HCC)- (present on admission)  Assessment & Plan  On metformin at home  Continue with sliding scale and Accu-Cheks with hypoglycemia protocol   A1c 6.3, patient needing minimal ISS, monitor need to start long acting  Daughter reports 70/30 insulin 26U AM 28 U PM prior? He is not needing this at this time    Advance care planning- (present on admission)  Assessment & Plan  Patient has POLST form stating DNR/DNI.  Confirmed with daughter bedside face-to-face.  Time spent 8 minutes.    Obesity- (present on admission)  Assessment & Plan  Counseled on dietary modification once stable    Sepsis (HCC)- (present on admission)  Assessment & Plan  This is Sepsis Present on admission  SIRS criteria identified on my evaluation include: Tachycardia, with heart rate greater than 90 BPM and Leukocytosis, with WBC greater than 12,000  Source is UTI/Left ankle infection  Sepsis protocol initiated  IV antibiotics as appropriate for source of sepsis  Reassessment: I have reassessed the patient's hemodynamic status    Depression- (present on admission)  Assessment & Plan  Continue with Zoloft and Remeron    Hypertension- (present on admission)  Assessment & Plan  Hold antihypertensive patient is normotensive and have sepsis.    Hyperlipidemia- (present on admission)  Assessment & Plan  Continue statin       VTE prophylaxis: enoxaparin ppx    I have performed a physical exam and reviewed and updated ROS and Plan today (6/14/2022). In review of yesterday's note (6/13/2022), there are no  changes except as documented above.

## 2022-06-15 ENCOUNTER — APPOINTMENT (OUTPATIENT)
Dept: RADIOLOGY | Facility: MEDICAL CENTER | Age: 82
DRG: 854 | End: 2022-06-15
Attending: STUDENT IN AN ORGANIZED HEALTH CARE EDUCATION/TRAINING PROGRAM
Payer: COMMERCIAL

## 2022-06-15 LAB
GLUCOSE BLD STRIP.AUTO-MCNC: 218 MG/DL (ref 65–99)
GLUCOSE BLD STRIP.AUTO-MCNC: 220 MG/DL (ref 65–99)
GLUCOSE BLD STRIP.AUTO-MCNC: 235 MG/DL (ref 65–99)
SCCMEC + MECA PNL NOSE NAA+PROBE: NEGATIVE
VANCOMYCIN TROUGH SERPL-MCNC: 6.3 UG/ML (ref 10–20)

## 2022-06-15 PROCEDURE — A9270 NON-COVERED ITEM OR SERVICE: HCPCS | Performed by: STUDENT IN AN ORGANIZED HEALTH CARE EDUCATION/TRAINING PROGRAM

## 2022-06-15 PROCEDURE — 700102 HCHG RX REV CODE 250 W/ 637 OVERRIDE(OP): Performed by: STUDENT IN AN ORGANIZED HEALTH CARE EDUCATION/TRAINING PROGRAM

## 2022-06-15 PROCEDURE — 36415 COLL VENOUS BLD VENIPUNCTURE: CPT

## 2022-06-15 PROCEDURE — 700111 HCHG RX REV CODE 636 W/ 250 OVERRIDE (IP): Performed by: STUDENT IN AN ORGANIZED HEALTH CARE EDUCATION/TRAINING PROGRAM

## 2022-06-15 PROCEDURE — 770001 HCHG ROOM/CARE - MED/SURG/GYN PRIV*

## 2022-06-15 PROCEDURE — 82962 GLUCOSE BLOOD TEST: CPT | Mod: 91

## 2022-06-15 PROCEDURE — 80202 ASSAY OF VANCOMYCIN: CPT

## 2022-06-15 PROCEDURE — 87641 MR-STAPH DNA AMP PROBE: CPT

## 2022-06-15 PROCEDURE — C1751 CATH, INF, PER/CENT/MIDLINE: HCPCS

## 2022-06-15 PROCEDURE — 99233 SBSQ HOSP IP/OBS HIGH 50: CPT | Performed by: INTERNAL MEDICINE

## 2022-06-15 PROCEDURE — 99233 SBSQ HOSP IP/OBS HIGH 50: CPT | Performed by: STUDENT IN AN ORGANIZED HEALTH CARE EDUCATION/TRAINING PROGRAM

## 2022-06-15 PROCEDURE — 700105 HCHG RX REV CODE 258: Performed by: STUDENT IN AN ORGANIZED HEALTH CARE EDUCATION/TRAINING PROGRAM

## 2022-06-15 PROCEDURE — 97166 OT EVAL MOD COMPLEX 45 MIN: CPT

## 2022-06-15 PROCEDURE — 02HV33Z INSERTION OF INFUSION DEVICE INTO SUPERIOR VENA CAVA, PERCUTANEOUS APPROACH: ICD-10-PCS | Performed by: STUDENT IN AN ORGANIZED HEALTH CARE EDUCATION/TRAINING PROGRAM

## 2022-06-15 RX ADMIN — OXYCODONE 5 MG: 5 TABLET ORAL at 15:05

## 2022-06-15 RX ADMIN — Medication 1 CAPSULE: at 05:35

## 2022-06-15 RX ADMIN — HYDROCODONE BITARTRATE AND ACETAMINOPHEN 1 TABLET: 5; 325 TABLET ORAL at 05:35

## 2022-06-15 RX ADMIN — INSULIN HUMAN 3 UNITS: 100 INJECTION, SOLUTION PARENTERAL at 13:12

## 2022-06-15 RX ADMIN — ENOXAPARIN SODIUM 40 MG: 40 INJECTION SUBCUTANEOUS at 17:31

## 2022-06-15 RX ADMIN — INSULIN HUMAN 2 UNITS: 100 INJECTION, SOLUTION PARENTERAL at 21:01

## 2022-06-15 RX ADMIN — INSULIN HUMAN 2 UNITS: 100 INJECTION, SOLUTION PARENTERAL at 09:27

## 2022-06-15 RX ADMIN — MIRTAZAPINE 15 MG: 15 TABLET, FILM COATED ORAL at 21:04

## 2022-06-15 RX ADMIN — INSULIN HUMAN 2 UNITS: 100 INJECTION, SOLUTION PARENTERAL at 17:36

## 2022-06-15 RX ADMIN — ATORVASTATIN CALCIUM 40 MG: 40 TABLET, FILM COATED ORAL at 17:31

## 2022-06-15 RX ADMIN — GABAPENTIN 300 MG: 300 CAPSULE ORAL at 17:30

## 2022-06-15 RX ADMIN — SERTRALINE 50 MG: 50 TABLET, FILM COATED ORAL at 17:30

## 2022-06-15 RX ADMIN — HYDROCODONE BITARTRATE AND ACETAMINOPHEN 1 TABLET: 5; 325 TABLET ORAL at 17:43

## 2022-06-15 RX ADMIN — GABAPENTIN 300 MG: 300 CAPSULE ORAL at 05:35

## 2022-06-15 RX ADMIN — VANCOMYCIN HYDROCHLORIDE 1500 MG: 500 INJECTION, POWDER, LYOPHILIZED, FOR SOLUTION INTRAVENOUS at 11:41

## 2022-06-15 ASSESSMENT — ENCOUNTER SYMPTOMS
INSOMNIA: 0
SHORTNESS OF BREATH: 0
DIZZINESS: 0
ABDOMINAL PAIN: 0
COUGH: 0
CHILLS: 0
PALPITATIONS: 0
BLURRED VISION: 0
FALLS: 0
SENSORY CHANGE: 0
BACK PAIN: 0
FOCAL WEAKNESS: 0
HEADACHES: 0
FEVER: 0
NAUSEA: 0
VOMITING: 0
EYE PAIN: 0

## 2022-06-15 ASSESSMENT — COGNITIVE AND FUNCTIONAL STATUS - GENERAL
PERSONAL GROOMING: A LITTLE
TOILETING: A LOT
HELP NEEDED FOR BATHING: A LOT
DRESSING REGULAR UPPER BODY CLOTHING: A LITTLE
DAILY ACTIVITIY SCORE: 15
SUGGESTED CMS G CODE MODIFIER DAILY ACTIVITY: CK
EATING MEALS: A LITTLE
DRESSING REGULAR LOWER BODY CLOTHING: A LOT

## 2022-06-15 ASSESSMENT — LIFESTYLE VARIABLES: SUBSTANCE_ABUSE: 0

## 2022-06-15 NOTE — PROGRESS NOTES
4 Eyes Skin Assessment Completed by FLORES Narayanan and FLORES Reynolds.    Head WDL  Ears Blanching  Nose WDL  Mouth lower lip blister  Neck WDL  Breast/Chest WDL  Shoulder Blades WDL  Spine WDL  (R) Arm/Elbow/Hand WDL  (L) Arm/Elbow/Hand WDL  Abdomen WDL  Groin WDL  Scrotum/Coccyx/Buttocks Blanching  (R) Leg WDL  (L) WDL  (R) Heel/Foot/Toe WDL  (L) Heel/Foot/Toe Wound vac to left ankle          Devices In Places Nasal Cannula, Wound vac      Interventions In Place Pressure Redistribution Mattress    Possible Skin Injury Yes    Pictures Uploaded Into Epic N/A  Wound Consult Placed N/A  RN Wound Prevention Protocol Ordered No

## 2022-06-15 NOTE — DISCHARGE PLANNING
Medical Social Work  PC to [facility] at [facility] [phone]: message left to call SW

## 2022-06-15 NOTE — PROGRESS NOTES
Infectious Disease Progress Note    Author: Jacobo Chavez M.D. Date & Time of service: 6/15/2022  8:31 AM    Chief Complaint:  Follow-up for MRSA bacteremia, left foot infection    Interval History:   afebrile, WBC 10.7.  Patient is much more alert and awake today.  He is awaiting MRI and arterial studies, urine culture with Staph epidermidis   patient remains afebrile, leukocytosis resolved, white count of 3.7 today, tolerating vancomycin.  Repeat blood cultures as below   patient remains afebrile, white count down to 7.8, just returned from I&D, sleeping  6/15 patient remains afebrile, no white count today, no issues with antimicrobials thus far    Labs Reviewed, Medications Reviewed and Wound Reviewed.    Review of Systems:  Review of Systems   Unable to perform ROS: Medical condition   Limited secondary to underlying dementia    Hemodynamics:  Temp (24hrs), Av.5 °C (97.7 °F), Min:35.8 °C (96.5 °F), Max:36.8 °C (98.3 °F)  Temperature: 36.4 °C (97.6 °F)  Pulse  Av.2  Min: 61  Max: 143   Blood Pressure : (!) 143/69 (Rn notified)       Physical Exam:  Physical Exam  Vitals and nursing note reviewed.   Constitutional:       Appearance: Normal appearance.   HENT:      Mouth/Throat:      Mouth: Mucous membranes are moist.      Comments: Facemask oxygen in place  Eyes:      General: No scleral icterus.        Right eye: No discharge.         Left eye: No discharge.      Conjunctiva/sclera: Conjunctivae normal.   Cardiovascular:      Rate and Rhythm: Normal rate.   Pulmonary:      Effort: Pulmonary effort is normal. No respiratory distress.      Breath sounds: No wheezing.   Abdominal:      Palpations: Abdomen is soft.   Musculoskeletal:      Cervical back: No rigidity.      Comments: Left foot with surgical dressing   Skin:     Findings: No rash.   Neurological:      General: No focal deficit present.      Mental Status: He is alert. He is disoriented.         Meds:    Current  Facility-Administered Medications:   •  oxyCODONE immediate-release  •  acetaminophen  •  enoxaparin (LOVENOX) injection  •  MD Alert...Vancomycin per Pharmacy  •  insulin regular **AND** POC blood glucose manual result **AND** NOTIFY MD and PharmD **AND** Administer 20 grams of glucose (approximately 8 ounces of fruit juice) every 15 minutes PRN FSBG less than 70 mg/dL **AND** dextrose bolus  •  vancomycin  •  atorvastatin  •  gabapentin  •  mirtazapine  •  sertraline  •  HYDROcodone-acetaminophen    Labs:  Recent Labs     06/13/22  0524 06/14/22  1418   WBC 8.7 7.8   RBC 4.00* 4.02*   HEMOGLOBIN 11.6* 11.8*   HEMATOCRIT 34.7* 35.1*   MCV 86.8 87.3   MCH 29.0 29.4   RDW 41.1 41.0   PLATELETCT 249 240   MPV 9.4 9.2     Recent Labs     06/13/22  0524 06/14/22  1418   SODIUM 138 138   POTASSIUM 3.7 3.8   CHLORIDE 106 105   CO2 20 21   GLUCOSE 174* 175*   BUN 18 14     Recent Labs     06/13/22  0524 06/14/22  1418   CREATININE 0.79 0.67       Imaging:  DX-ANKLE 3+ VIEWS LEFT    Result Date: 6/10/2022  6/10/2022 9:57 AM HISTORY/REASON FOR EXAM:  Atraumatic Pain/Swelling/Deformity. Lateral left ankle wound TECHNIQUE/EXAM DESCRIPTION AND NUMBER OF VIEWS:  3 views of the LEFT ankle. COMPARISON: Left ankle radiography, 4/12/2022. FINDINGS: Bones: Normal bone mineralization. There is cortical irregularity involving the inferior lateral aspect of the lateral malleolus, at the location of distention of soft tissue gas from the skin surface to the surface of the bone. Findings are concerning for osteomyelitis. No acute fracture. No other focal bone lesion. Joints: Intact. No subluxation. Ankle mortise is preserved. Soft tissues: Circumferential left ankle soft tissue swelling, greatest laterally, with soft tissue gas.     1. Lateral left ankle soft tissue swelling with soft tissue gas extending from the skin surface to the lateral aspect of the distal left fibula, with cortical disruption concerning for osteomyelitis. MRI of  the left ankle without and with IV contrast is recommended. 2. Decreased bone mineralization. 3. The remainder of the left ankle radiography is within normal limits.    DX-CHEST-PORTABLE (1 VIEW)    Result Date: 6/10/2022  6/10/2022 9:57 AM HISTORY/REASON FOR EXAM:  possible sepsis.. TECHNIQUE/EXAM DESCRIPTION AND NUMBER OF VIEWS: Single portable view of the chest. COMPARISON: 9/22/2019 FINDINGS: Cardiac silhouette is enlarged. Aortic calcified atherosclerotic plaque. No focal consolidation, pleural effusion, pulmonary edema or pneumothorax. Generalized osteopenia.     No acute cardiopulmonary abnormality.      Micro:  Results     Procedure Component Value Units Date/Time    MRSA By PCR (Amp) [[id number]] Collected: 06/15/22 0538    Order Status: Sent Specimen: Respirate from Nares Updated: 06/15/22 0618    Narrative:      Contact  Collected By: [id number] CELINA GAGE  Per P&T Lab Protocol    GRAM STAIN [[id number]] Collected: 06/14/22 0930    Order Status: Completed Specimen: Tissue Updated: 06/14/22 2022     Significant Indicator .     Source TISS     Site Left Lateral Ankle     Gram Stain Result Rare WBCs.  No organisms seen.      Narrative:      Surgery Specimen    CULTURE TISSUE W/ GRM STAIN [[id number]] Collected: 06/14/22 0930    Order Status: No result Specimen: Tissue Updated: 06/14/22 2021     Significant Indicator NEG     Source TISS     Site Left Lateral Ankle     Culture Result -     Gram Stain Result Rare WBCs.  No organisms seen.      Narrative:      Surgery Specimen    Anaerobic Culture [[id number]] Collected: 06/14/22 0930    Order Status: No result Specimen: Tissue Updated: 06/14/22 2021     Significant Indicator NEG     Source TISS     Site Left Lateral Ankle     Culture Result -    Narrative:      Surgery Specimen    GRAM STAIN [[id number]] Collected: 06/14/22 0929    Order Status: Completed Specimen: Wound Updated: 06/14/22 1650     Significant Indicator .     Source WND     Site Left Lateral  "Ankle     Gram Stain Result Rare WBCs.  No organisms seen.      Narrative:      Surgery - swabs received    CULTURE WOUND W/ GRAM STAIN [[id number]] Collected: 06/14/22 0929    Order Status: No result Specimen: Wound Updated: 06/14/22 1649     Significant Indicator NEG     Source WND     Site Left Lateral Ankle     Culture Result -     Gram Stain Result Rare WBCs.  No organisms seen.      Narrative:      Surgery - swabs received    Anaerobic Culture [[id number]] Collected: 06/14/22 0929    Order Status: No result Specimen: Wound Updated: 06/14/22 1649     Significant Indicator NEG     Source WND     Site Left Lateral Ankle     Culture Result -    Narrative:      Surgery - swabs received    E-Test [[id number]] Collected: 06/10/22 1003    Order Status: Completed Specimen: Other Updated: 06/14/22 1619     ETEST Sensitivity FINAL**2    Narrative:      61 tel. [phone] 06/13/2022, 17:46, RB PERF. RESULTS CALLED TO:[id number]  Per Hospital Policy: Only change Specimen Src: to \"Line\" if  specified by physician order.    BLOOD CULTURE [[id number]]  (Abnormal)  (Susceptibility) Collected: 06/10/22 1003    Order Status: Completed Specimen: Blood from Peripheral Updated: 06/14/22 1619     Significant Indicator POS     Source BLD     Site PERIPHERAL     Culture Result Growth detected by Bactec instrument. 06/11/2022  13:45      Methicillin Resistant Staphylococcus aureus  See previous culture for sensitivity report.        Streptococcus pyogenes (Group A)    Narrative:      CALL  [facility]  61 tel. [phone],  CALLED  61 tel. [phone] 06/13/2022, 17:46, RB PERF. RESULTS CALLED TO:[id number]  Per Hospital Policy: Only change Specimen Src: to \"Line\" if  specified by physician order.  Right AC    Susceptibility     Streptococcus pyogenes (group a) (2)     Antibiotic Interpretation Microscan   Method Status    Penicillin Sensitive 0.023 mcg/mL E-TEST Final    Cefotaxime Sensitive 0.047 mcg/mL E-TEST Final    Clindamycin Intermediate - mcg/mL " "E-TEST Final                   MRSA By PCR (Amp) [[id number]]     Order Status: Canceled Specimen: Respirate from Nares     BLOOD CULTURE [[id number]]  (Abnormal)  (Susceptibility) Collected: 06/10/22 0937    Order Status: Completed Specimen: Blood from Peripheral Updated: 06/13/22 0904     Significant Indicator POS     Source BLD     Site PERIPHERAL     Culture Result Growth detected by Bactec instrument. 06/11/2022  09:11  Methicillin Resistant Staphylococcus aureus (MRSA)  detected by PCR.        Methicillin Resistant Staphylococcus aureus  This isolate is presumed to be clindamycin resistant based on  detection of inducible resistance.      Narrative:      CALL  [facility]  61 tel. [phone],  CALLED  61 tel. [phone] 06/11/2022, 09:15, RB PERF. RESULTS CALLED TO:  Ludin RN + Cornelio RN  Per Hospital Policy: Only change Specimen Src: to \"Line\" if  specified by physician order.  No site indicated    Susceptibility     Methicillin resistant staphylococcus aureus (1)     Antibiotic Interpretation Microscan   Method Status    Azithromycin Resistant >4 mcg/mL ED Final    Clindamycin Resistant <=0.25 mcg/mL ED Final    Cefazolin Resistant <=8 mcg/mL ED Final    Cefepime Resistant 8 mcg/mL ED Final    Ceftaroline Sensitive <=0.5 mcg/mL ED Final    Daptomycin Sensitive <=0.5 mcg/mL ED Final    Ampicillin/sulbactam Resistant <=8/4 mcg/mL ED Final    Erythromycin Resistant >4 mcg/mL ED Final    Vancomycin Sensitive 1 mcg/mL ED Final    Oxacillin Resistant >2 mcg/mL ED Final    Trimeth/Sulfa Sensitive <=0.5/9.5 mcg/mL ED Final    Tetracycline Resistant >8 mcg/mL ED Final                   BLOOD CULTURE [[id number]] Collected: 06/12/22 0230    Order Status: Completed Specimen: Blood from Peripheral Updated: 06/13/22 0739     Significant Indicator NEG     Source BLD     Site PERIPHERAL     Culture Result No Growth  Note: Blood cultures are incubated for 5 days and  are monitored continuously.Positive blood cultures  are " "called to the RN and reported as soon as  they are identified.      Narrative:      Contact  Per Hospital Policy: Only change Specimen Src: to \"Line\" if  specified by physician order.  Left Hand    BLOOD CULTURE [[id number]] Collected: 06/12/22 0230    Order Status: Completed Specimen: Blood from Peripheral Updated: 06/13/22 0739     Significant Indicator NEG     Source BLD     Site PERIPHERAL     Culture Result No Growth  Note: Blood cultures are incubated for 5 days and  are monitored continuously.Positive blood cultures  are called to the RN and reported as soon as  they are identified.      Narrative:      Contact  Per Hospital Policy: Only change Specimen Src: to \"Line\" if  specified by physician order.  Right AC    URINE CULTURE(NEW) [[id number]]  (Abnormal)  (Susceptibility) Collected: 06/10/22 0957    Order Status: Completed Specimen: Urine Updated: 06/12/22 0921     Significant Indicator POS     Source UR     Site -     Culture Result -      Staphylococcus epidermidis  >100,000 cfu/mL      Narrative:      Indication for culture:->Evaluation for sepsis without a  clear source of infection  Indication for culture:->Evaluation for sepsis without a    Susceptibility     Staphylococcus epidermidis (1)     Antibiotic Interpretation Microscan   Method Status    Cefazolin Resistant <=8 mcg/mL ED Final    Cefepime Resistant 8 mcg/mL ED Final    Daptomycin Sensitive <=0.5 mcg/mL ED Final    Nitrofurantoin Sensitive <=32 mcg/mL ED Final    Trimeth/Sulfa Sensitive <=0.5/9.5 mcg/mL ED Final    Tetracycline Sensitive <=4 mcg/mL ED Final                   URINALYSIS [[id number]]  (Abnormal) Collected: 06/10/22 0957    Order Status: Completed Specimen: Urine Updated: 06/10/22 1059     Color Yellow     Character Clear     Specific Gravity 1.021     Ph 6.5     Glucose Negative mg/dL      Ketones Trace mg/dL      Protein 100 mg/dL      Bilirubin Negative     Urobilinogen, Urine 0.2     Nitrite Positive     Leukocyte " Esterase Moderate     Occult Blood Negative     Micro Urine Req Microscopic    Narrative:      Indication for culture:->Evaluation for sepsis without a  clear source of infection    MRSA By PCR (Amp) [[id number]]     Order Status: Canceled Specimen: Respirate from Nares           Assessment:  82 y.o. man with underlying dementia, type 2 diabetes mellitus and recent hospitalization in April secondary to left ankle cellulitis with MRSA admitted on 6/10/2022 secondary to acute encephalopathy and increasing left ankle pain and swelling.  Patient found to have findings highly concerning for osteomyelitis on radiograph and MRSA bacteremia.  Source of his bacteremia is from his left foot infection.    Pertinent diagnoses:  MRSA bacteremia  Left ankle osteomyelitis  Bacteriuria, urine cx +staph epi  Leukocytosis, resolved  Type 2 diabetes mellitus, hemoglobin A1c 6.3  Dementia    Plan:  -Blood cultures x2 from 6/10 positive for MRSA.  Repeat blood cultures x2 from 6/12 no growth till date  -Continue IV vancomycin for now.  Vancomycin ED of 21.9 on 6/14  -MRI of the left ankle suggestive of underlying osteomyelitis  -Patient was taken to the OR 6/14 for excisional debridement with application of wound VAC.  Will follow OR cultures  -TTE with no obvious valvular vegetations  -On discharge, anticipate IV daptomycin 750 mg every 24 hours for 6 weeks through 7/23/2022  -At least weekly CBC with differential, CMP, CPK while on IV antibiotics    Disposition: Anticipate placement and 6 weeks of IV daptomycin as above.  If facility refuses daptomycin due to cost, okay to use IV vancomycin instead, ONLY IF facility has access to pharmacists to dose and monitor vancomycin appropriately.  Patient is a .  If the [facility] will need orders from a [facility] clinician, may need Dr. Holley's assistance  Need for PICC line: Yes, ordered    Discussed with internal medicine Dr. Newton.  We will continue to follow

## 2022-06-15 NOTE — THERAPY
Physical Therapy Contact Note    Pt in PICC procedure upon attempt; will return when able as warranted as pt appears to [facility] and may not need an acute PT evaluation;     Jenny GAGE, PT,  [phone]

## 2022-06-15 NOTE — PROGRESS NOTES
"End of shift summary:    Aox2. VSS on RA. Denied pain overnight. LEORA norco given this morning for \"a little bit of pain\". BG . Still receiving IV Vanc. Dressing on L ankle and WV are CDI. MRSA swab sent to lab this morning. Bed alarm on. Call light within reach.  "

## 2022-06-15 NOTE — PROGRESS NOTES
Pharmacy Vancomycin Kinetics Note for 6/15/2022     82 y.o. male on Vancomycin day # 6     Vancomycin Indication (Two level/Trough based Dosing): Skin/skin structure infection (goal trough 10-15)    Provider specified end date:  (TBD by ID)    Active Antibiotics (From admission, onward)    Ordered     Ordering Provider       Wed Raz 15, 2022 12:02 PM    06/15/22 1202  vancomycin (VANCOCIN) 1,250 mg in  mL IVPB  (vancomycin (VANCOCIN) IV (LD + Maintenance))  EVERY 12 HOURS         Isaac Camejo M.D.       Fri Raz 10, 2022 12:12 PM    06/10/22 1212  MD Alert...Vancomycin per Pharmacy  (Abscess/Cellulitis )  PHARMACY TO DOSE        Question:  Indication(s) for vancomycin?  Answer:  Skin and soft tissue infection    Pato Garner M.D.          Dosing Weight: 97.3 kg (214 lb 8.1 oz)      Admission History: Admitted on 6/10/2022 for Sepsis ([facility]) [A41.9]  Pertinent history: [facility] and AMS. PMH of T2DM. Imaging of ankle concerning for osteomyelitis. Wound culture from wound on left foot on 4/12/22 did grow MRSA. UA also showing UTI. ID is consulting    Allergies:     Patient has no known allergies.     Pertinent cultures to date:     Results     Procedure Component Value Units Date/Time    CULTURE WOUND W/ GRAM STAIN [[id number]] Collected: 06/14/22 0929    Order Status: No result Specimen: Wound Updated: 06/15/22 1004     Significant Indicator NEG     Source WND     Site Left Lateral Ankle     Culture Result -     Gram Stain Result Rare WBCs.  No organisms seen.      Narrative:      Surgery - swabs received    Anaerobic Culture [[id number]] Collected: 06/14/22 0929    Order Status: Completed Specimen: Wound Updated: 06/15/22 1004     Significant Indicator NEG     Source WND     Site Left Lateral Ankle     Culture Result Culture in progress.    Narrative:      Surgery - swabs received    CULTURE TISSUE W/ GRM STAIN [[id number]] Collected: 06/14/22 0930    Order  "Status: No result Specimen: Tissue Updated: 06/15/22 1004     Significant Indicator NEG     Source TISS     Site Left Lateral Ankle     Culture Result -     Gram Stain Result Rare WBCs.  No organisms seen.      Narrative:      Surgery Specimen    Anaerobic Culture [[id number]] Collected: 06/14/22 0930    Order Status: Completed Specimen: Tissue Updated: 06/15/22 1004     Significant Indicator NEG     Source TISS     Site Left Lateral Ankle     Culture Result Culture in progress.    Narrative:      Surgery Specimen    MRSA By PCR (Amp) [[id number]] Collected: 06/15/22 0538    Order Status: Sent Specimen: Respirate from Nares Updated: 06/15/22 0618    Narrative:      Contact  Collected By: [id number] CELINA GAGE  Per P&T Lab Protocol    GRAM STAIN [[id number]] Collected: 06/14/22 0930    Order Status: Completed Specimen: Tissue Updated: 06/14/22 2022     Significant Indicator .     Source TISS     Site Left Lateral Ankle     Gram Stain Result Rare WBCs.  No organisms seen.      Narrative:      Surgery Specimen    GRAM STAIN [[id number]] Collected: 06/14/22 0929    Order Status: Completed Specimen: Wound Updated: 06/14/22 1650     Significant Indicator .     Source WND     Site Left Lateral Ankle     Gram Stain Result Rare WBCs.  No organisms seen.      Narrative:      Surgery - swabs received    E-Test [[id number]] Collected: 06/10/22 1003    Order Status: Completed Specimen: Other Updated: 06/14/22 1619     ETEST Sensitivity FINAL**2    Narrative:      61 tel. [phone] 06/13/2022, 17:46, RB PERF. RESULTS CALLED TO:[id number]  Per Hospital Policy: Only change Specimen Src: to \"Line\" if  specified by physician order.    BLOOD CULTURE [[id number]]  (Abnormal)  (Susceptibility) Collected: 06/10/22 1003    Order Status: Completed Specimen: Blood from Peripheral Updated: 06/14/22 1619     Significant Indicator POS     Source BLD     Site PERIPHERAL     Culture Result Growth detected by Bactec instrument. 06/11/2022  13:45     " " Methicillin Resistant Staphylococcus aureus  See previous culture for sensitivity report.        Streptococcus pyogenes (Group A)    Narrative:      CALL  [facility]  61 tel. [phone],  CALLED  61 tel. [phone] 06/13/2022, 17:46, RB PERF. RESULTS CALLED TO:[id number]  Per Hospital Policy: Only change Specimen Src: to \"Line\" if  specified by physician order.  Right AC    MRSA By PCR (Amp) [[id number]]     Order Status: Canceled Specimen: Respirate from Nares     BLOOD CULTURE [[id number]]  (Abnormal)  (Susceptibility) Collected: 06/10/22 0937    Order Status: Completed Specimen: Blood from Peripheral Updated: 06/13/22 0904     Significant Indicator POS     Source BLD     Site PERIPHERAL     Culture Result Growth detected by Bactec instrument. 06/11/2022  09:11  Methicillin Resistant Staphylococcus aureus (MRSA)  detected by PCR.        Methicillin Resistant Staphylococcus aureus  This isolate is presumed to be clindamycin resistant based on  detection of inducible resistance.      Narrative:      CALL  [facility]  61 tel. [phone],  CALLED  61 tel. [phone] 06/11/2022, 09:15, RB PERF. RESULTS CALLED TO:  [phone] RN + Cornelio RN  Per Hospital Policy: Only change Specimen Src: to \"Line\" if  specified by physician order.  No site indicated    BLOOD CULTURE [[id number]] Collected: 06/12/22 0230    Order Status: Completed Specimen: Blood from Peripheral Updated: 06/13/22 0739     Significant Indicator NEG     Source BLD     Site PERIPHERAL     Culture Result No Growth  Note: Blood cultures are incubated for 5 days and  are monitored continuously.Positive blood cultures  are called to the RN and reported as soon as  they are identified.      Narrative:      Contact  Per Hospital Policy: Only change Specimen Src: to \"Line\" if  specified by physician order.  Left Hand    BLOOD CULTURE [[id number]] Collected: 06/12/22 0230    Order Status: Completed Specimen: Blood from Peripheral Updated: 06/13/22 0739     Significant Indicator NEG     " "Source BLD     Site PERIPHERAL     Culture Result No Growth  Note: Blood cultures are incubated for 5 days and  are monitored continuously.Positive blood cultures  are called to the RN and reported as soon as  they are identified.      Narrative:      Contact  Per Hospital Policy: Only change Specimen Src: to \"Line\" if  specified by physician order.  Right AC    URINE CULTURE(NEW) [[id number]]  (Abnormal)  (Susceptibility) Collected: 06/10/22 0957    Order Status: Completed Specimen: Urine Updated: 06/12/22 0921     Significant Indicator POS     Source UR     Site -     Culture Result -      Staphylococcus epidermidis  >100,000 cfu/mL      Narrative:      Indication for culture:->Evaluation for sepsis without a  clear source of infection  Indication for culture:->Evaluation for sepsis without a    URINALYSIS [[id number]]  (Abnormal) Collected: 06/10/22 0957    Order Status: Completed Specimen: Urine Updated: 06/10/22 1059     Color Yellow     Character Clear     Specific Gravity 1.021     Ph 6.5     Glucose Negative mg/dL      Ketones Trace mg/dL      Protein 100 mg/dL      Bilirubin Negative     Urobilinogen, Urine 0.2     Nitrite Positive     Leukocyte Esterase Moderate     Occult Blood Negative     Micro Urine Req Microscopic    Narrative:      Indication for culture:->Evaluation for sepsis without a  clear source of infection    MRSA By PCR (Amp) [[id number]]     Order Status: Canceled Specimen: Respirate from Nares           Labs:     Estimated Creatinine Clearance: 104.6 mL/min (by C-G formula based on SCr of 0.67 mg/dL).  Recent Labs     06/13/22  0524 06/14/22  1418   WBC 8.7 7.8     Recent Labs     06/13/22  0524 06/14/22  1418   BUN 18 14   CREATININE 0.79 0.67       Intake/Output Summary (Last 24 hours) at 6/15/2022 1203  Last data filed at 6/15/2022 0548  Gross per 24 hour   Intake 240 ml   Output 1500 ml   Net -1260 ml      /58   Pulse 68   Temp 36.6 °C (97.8 °F) (Temporal)   Resp 17   Ht 1.829 m " (6')   Wt 97.3 kg (214 lb 8.1 oz)   SpO2 91%  Temp (24hrs), Av.6 °C (97.8 °F), Min:36.4 °C (97.6 °F), Max:36.7 °C (98.1 °F)      List concerns for Vancomycin clearance:     Age;BUN/Scr ratio greater than 20:1    Pharmacokinetics:     Two level kinetics:   Ke (hr ^-1): 0.0603  Half life: 11.5  Vd: Steady state Vd : 80.44  Calculated AUC: 309 mg·hr/L    Trough kinetics:   Recent Labs     22  1418 06/15/22  1053   VANCOTROUGH  --  6.3*   VANCOPEAK 21.9  --        A/P:     -  Vancomycin dose: changed to Vanco 1250 mg iv q 12 hrs    -  Next vancomycin level(s): PRN       -  Predicted vancomycin AUC from two level test calculator: 515 mg·hr/L      -  Comments: Calculated AUC based on Vanco peak and trough levels is 309. Goal AUC is 400-600. Vanco dosing increased to 1250 mg iv q 12 hrs for predicted AUC of 515. ID is following, plan is for outpt dapto ending 22    Luisa StapletonD

## 2022-06-15 NOTE — CARE PLAN
The patient is Stable - Low risk of patient condition declining or worsening    Shift Goals  Clinical Goals: Pain management, [place]  Patient Goals: Rest  Family Goals: Wound care    Progress made toward(s) clinical / shift goals:  VSS on 2L NC. Denied pain. Bed alarm on. Call light within reach.    Problem: Respiratory  Goal: Patient will achieve/maintain optimum respiratory ventilation and gas exchange  Outcome: Progressing     Problem: Fall Risk  Goal: Patient will remain free from falls  Outcome: Progressing     Problem: Pain - Standard  Goal: Alleviation of pain or a reduction in pain to the patient’s comfort goal  Outcome: Progressing       Patient is not progressing towards the following goals:

## 2022-06-15 NOTE — PROGRESS NOTES
Hospital Medicine Daily Progress Note    Date of Service  6/15/2022    Chief Complaint  Andrei Garay is a 82 y.o. male admitted 6/10/2022 with ankle wound.    Hospital Course  Andrei Garay is a 82 y.o. male past medical history of diabetes mellitus, hypertension who presented 6/10/2022 with altered mental status for last 3 days and left ankle pain.  History is primarily provided by daughter.  No relieving or exacerbating factors were noted.  He denies any fever does report some chills.  In the ER, x-ray of the ankle showed signs concerning for osteomyelitis.  UA grossly positive for urinary tract infection.  Patient met criteria for sepsis started on IV broad-spectrum antibiotics and will be admitted for further work-up and limb preservation consult.     Interval Problem Update  Patient evaluated bedside  No acute events overnight.  Up to chair, happy  Pain manageable with current pain regimen  Stable vitals  6/10 BCX w/MRSA  6/12 BCX w/NGTD   OR/TISS Clx w/NGTD  Continue IV vancomycin  Antibiotic mgmt per ID, appreciate recommendations  Ortho surgery following, appreciate recommendations  Pending PT/OT    I have personally seen and examined the patient at bedside. I discussed the plan of care with patient.    Consultants/Specialty  infectious disease and orthopedics    Code Status  DNAR/DNI    Disposition  Patient is not medically cleared for discharge.   Anticipate discharge back to [facility] home/SNF.  I have placed the appropriate orders for post-discharge needs.    Review of Systems  Review of Systems   Constitutional: Negative for chills and fever.   Eyes: Negative for blurred vision and pain.   Respiratory: Negative for cough and shortness of breath.    Cardiovascular: Negative for chest pain, palpitations and leg swelling.   Gastrointestinal: Negative for abdominal pain, nausea and vomiting.   Genitourinary: Negative for dysuria and urgency.   Musculoskeletal: Positive for joint pain.  Negative for back pain and falls.   Skin: Negative for itching and rash.   Neurological: Negative for dizziness, sensory change, focal weakness and headaches.   Psychiatric/Behavioral: Negative for substance abuse. The patient does not have insomnia.         Physical Exam  Temp:  [36.4 °C (97.6 °F)-36.8 °C (98.3 °F)] 36.6 °C (97.8 °F)  Pulse:  [68-80] 68  Resp:  [16-18] 17  BP: (117-152)/(58-76) 123/58  SpO2:  [90 %-97 %] 91 %    Physical Exam  Constitutional:       General: He is not in acute distress.     Appearance: He is not ill-appearing.   HENT:      Head: Normocephalic and atraumatic.      Right Ear: External ear normal.      Left Ear: External ear normal.      Mouth/Throat:      Pharynx: No oropharyngeal exudate or posterior oropharyngeal erythema.   Eyes:      Extraocular Movements: Extraocular movements intact.      Pupils: Pupils are equal, round, and reactive to light.   Cardiovascular:      Rate and Rhythm: Normal rate and regular rhythm.      Pulses: Normal pulses.      Heart sounds: Normal heart sounds. No murmur heard.  Pulmonary:      Effort: Pulmonary effort is normal. No respiratory distress.      Breath sounds: Normal breath sounds. No wheezing or rales.   Abdominal:      General: Bowel sounds are normal. There is no distension.      Palpations: Abdomen is soft.      Tenderness: There is no abdominal tenderness. There is no guarding.   Musculoskeletal:         General: No swelling or tenderness.      Cervical back: Normal range of motion and neck supple.      Right lower leg: No edema.      Left lower leg: No edema.   Skin:     General: Skin is warm and dry.      Findings: Erythema and lesion present.      Comments: Left lateral ankle wound with ulcer, packed   Neurological:      General: No focal deficit present.      Mental Status: He is disoriented.      Sensory: No sensory deficit.      Motor: No weakness.   Psychiatric:         Mood and Affect: Mood normal.         Behavior: Behavior normal.          Fluids    Intake/Output Summary (Last 24 hours) at 6/15/2022 1046  Last data filed at 6/15/2022 0548  Gross per 24 hour   Intake 240 ml   Output 1500 ml   Net -1260 ml       Laboratory  Recent Labs     06/13/22  0524 06/14/22  1418   WBC 8.7 7.8   RBC 4.00* 4.02*   HEMOGLOBIN 11.6* 11.8*   HEMATOCRIT 34.7* 35.1*   MCV 86.8 87.3   MCH 29.0 29.4   MCHC 33.4* 33.6*   RDW 41.1 41.0   PLATELETCT 249 240   MPV 9.4 9.2     Recent Labs     06/13/22  0524 06/14/22  1418   SODIUM 138 138   POTASSIUM 3.7 3.8   CHLORIDE 106 105   CO2 20 21   GLUCOSE 174* 175*   BUN 18 14   CREATININE 0.79 0.67   CALCIUM 8.4* 8.5                   Imaging  MR-ANKLE W/O LEFT   Final Result      1.  Osteomyelitis of the lateral malleolus/distal fibula      2.  Negative for abscess      3.  Moderate-large tibiotalar joint effusion. This could be reactive versus septic arthritis.      4.  Longitudinal splitting of the peroneus brevis tendon and there is associated fluid within the peroneal tendon sheath      EC-ECHOCARDIOGRAM COMPLETE W/O CONT   Final Result      US-HONEY SINGLE LEVEL BILAT   Final Result      DX-ANKLE 3+ VIEWS LEFT   Final Result      1. Lateral left ankle soft tissue swelling with soft tissue gas extending from the skin surface to the lateral aspect of the distal left fibula, with cortical disruption concerning for osteomyelitis. MRI of the left ankle without and with IV contrast is    recommended.   2. Decreased bone mineralization.   3. The remainder of the left ankle radiography is within normal limits.      DX-CHEST-PORTABLE (1 VIEW)   Final Result      No acute cardiopulmonary abnormality.           Assessment/Plan  * MRSA bacteremia  Assessment & Plan  Blood culture positive for MRSA  On vancomycin  Transthoracic echo without endocarditis  MRI shows osteomyelitis distal fibula and lateral malleolus  Repeat blood cultures 6/12  ID following    Diabetic foot infection (HCC)- (present on admission)  Assessment & Plan  Now  status post left lateral ankle incision, draining and excisional debridement with wound VAC placement by orthopedic surgery on 6/14/2022  6/10 BCX w/MRSA  6/12 BCX w/NGTD   OR/TISS Clx w/NGTD  Continue IV vancomycin  Antibiotic mgmt per ID, appreciate recommendations  Ortho surgery following, appreciate recommendations  Pending PT/OT    UTI (urinary tract infection)  Assessment & Plan  Unclear if having symptoms, hold antibiotics for UTI  On vancomycin for MRSA ankle infection    Type 2 diabetes mellitus (HCC)- (present on admission)  Assessment & Plan  On metformin at home  Continue with sliding scale and Accu-Cheks with hypoglycemia protocol   A1c 6.3, patient needing minimal ISS, monitor need to start long acting  Daughter reports 70/30 insulin 26U AM 28 U PM prior? He is not needing this at this time    Advance care planning- (present on admission)  Assessment & Plan  Patient has POLST form stating DNR/DNI.  Confirmed with daughter bedside face-to-face.  Time spent 8 minutes.    Obesity- (present on admission)  Assessment & Plan  Counseled on dietary modification once stable    Sepsis (HCC)- (present on admission)  Assessment & Plan  This is Sepsis Present on admission  SIRS criteria identified on my evaluation include: Tachycardia, with heart rate greater than 90 BPM and Leukocytosis, with WBC greater than 12,000  Source is UTI/Left ankle infection  Sepsis protocol initiated  IV antibiotics as appropriate for source of sepsis  Reassessment: I have reassessed the patient's hemodynamic status    Depression- (present on admission)  Assessment & Plan  Continue with Zoloft and Remeron    Hypertension- (present on admission)  Assessment & Plan  Hold antihypertensive patient is normotensive and have sepsis.    Hyperlipidemia- (present on admission)  Assessment & Plan  Continue statin       VTE prophylaxis: enoxaparin ppx    I have performed a physical exam and reviewed and updated ROS and Plan today (6/15/2022). In  review of yesterday's note (6/14/2022), there are no changes except as documented above.

## 2022-06-15 NOTE — THERAPY
"Occupational Therapy   Initial Evaluation     Patient Name: Andrei Garay  Age:  82 y.o., Sex:  male  Medical Record #: [id number]  Today's Date: 6/15/2022     Precautions  Precautions: Fall Risk, Weight Bearing As Tolerated Left Lower Extremity (CAM boot with no clear instructions for L LE wear schedule)  Comments: wound vac    Assessment  Patient seen for OT eval reporting I with ADLs and mobility at baseline. Patient, upon eval, presents with decreased ADLs, mobility, endurance, tolerance, balance strength necessitating Max A LB ADLS and Min A SPT with FWW.   Plan    Recommend Occupational Therapy 3 times per week until therapy goals are met for the following treatments:  Adaptive Equipment, Self Care/Activities of Daily Living, Therapeutic Activities, and Therapeutic Exercises.    DC Equipment Recommendations: Unable to determine at this time  Discharge Recommendations: Recommend post-acute placement for additional occupational therapy services prior to discharge home ([facility], will need [facility] and support at [facility] during recovery)        Objective       06/15/22 0935   Prior Living Situation   Comments seen with dtr in room. dtr reports lives a SNF for [facility] and can be mod I with ADLS and mobility with 4ww, except showering \"they do shower him\". 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\"   Balance Assessment   Comments FWW, first time OOB   Bed Mobility    Scooting Moderate Assist   ADL Assessment   Eating Supervision   Grooming Supervision;Seated   Bathing Moderate Assist   Upper Body Dressing Minimal Assist   Lower Body Dressing Maximal Assist  (including boot, patient participate as able < 25%)   Toileting Maximal Assist   Comments anticipate gains as increases mobility "   Functional Mobility   Bed, Chair, Wheelchair Transfer Minimal Assist   Mobility FWW   Patient / Family Goals   Patient / Family Goal #1 eat   Short Term Goals   Short Term Goal # 1 Min A toilet transfer   Short Term Goal # 2 Min A toileting   Short Term Goal # 3 Min A LB ADLs   Short Term Goal # 4 10 minute stand for self care

## 2022-06-15 NOTE — PROCEDURES
Vascular Access Team     Date of Insertion: 6/15/22  Arm Circumference: 33  Internal length: 44  External Length: 1  Vein Occupancy %: 22   Reason for PICC: Antibiotic Therapy  Labs: WBC 7.8, , BUN 14, Cr 0.67, GFR 94, INR N/A     Consents confirmed, vessel patency confirmed with ultrasound. Risks and benefits of procedure explained to family member and education regarding central line associated bloodstream infections provided. Questions answered.      PICC placed in RUE per licensed provider order with ultrasound guidance.  4 Fr, single lumen PICC placed in brachial vein after 1 attempt(s). 2 mL of 1% lidocaine injected intradermally at the insertion site. A 21 gauge microintroducer needle was visualized entering the vein and modified Seldinger technique was used to obtain access to the vein. 44 cm catheter inserted and brisk blood return was observed from each lumen upon aspiration. Line secured at the 0 cm marker. TCS stylet removed and observed to be fully intact. Each lumen flushed using pulsatile method without resistance with 10 mL 0.9% normal saline. PICC line secured with Biopatch and Tegaderm.     PICC tip placement location is confirmed by nurse to be in the Superior Vena Cava (SVC) utilizing 3CG technology. PICC line is appropriate for use at this time. Patient tolerated procedure well, without complications.  Patient condition relayed to primary RN or ordering physician via this post procedure note in the EMR.      Ultrasound images uploaded to PACS and viewable in the EMR - yes  Ultrasound imaged printed and placed in paper chart - no     BARD Power PICC ref # [id number], Lot # [id number], Expiration Date 3/31/23

## 2022-06-15 NOTE — CARE PLAN
The patient is Stable - Low risk of patient condition declining or worsening    Shift Goals  Clinical Goals: pain control, stable VS, monitor wound vac LLE  Patient Goals: rest  Family Goals: Wound care    Progress made toward(s) clinical / shift goals:  Wound vac in place and on 125mmhg continuous with dressng CDI. Pain well controlled.        Problem: Knowledge Deficit - Standard  Goal: Patient and family/care givers will demonstrate understanding of plan of care, disease process/condition, diagnostic tests and medications  Outcome: Progressing     Problem: Fluid Volume  Goal: Fluid volume balance will be maintained  Outcome: Progressing     Problem: Respiratory  Goal: Patient will achieve/maintain optimum respiratory ventilation and gas exchange  Outcome: Progressing

## 2022-06-15 NOTE — DOCUMENTATION QUERY
"                                                                         [facility]                                                                       Query Response Note      PATIENT:               COOKIE VELEZ  ACCT #:                  [id number]  MRN:                     [id number]  :                      1940  ADMIT DATE:       6/10/2022 9:32 AM  DISCH DATE:          RESPONDING  PROVIDER #:        [id number]           QUERY TEXT:    Acute Encephalopathy is documented in the Medical Record. Please specify type.  NOTE:  If an appropriate response is not listed below, please respond with a new note.    Contact me for questions.    Thank you,  MARLENI Bang, CDI  [email or web address]      The patient's Clinical Indicators include:  83yo with dx of Dementia, DM2, AMS, Sepsis, Osteomyelitis, UTI, Left ankle infection, MRSA bacteremia    06/10 ED note \"Altered Mental Status for last 3 days\" \"[facility]\"   Penny Consultation \"acute encephalopathy and increasing left ankle pain and swelling. \"    Risk factors: advanced age, Dementia, UTI, MRSA bacteremia, Left ankle Osteomyelitis  Treatments: surgical interventions, labwork, IVF, antibiotics, monitoring, specialty consultation, Wound Team consultation, imaging  Options provided:   -- Metabolic encephalopathy   -- Septic encephalopathy   -- Unable to determine      Query created by: Jose April on 6/15/2022 12:19 PM    RESPONSE TEXT:    Unable to determine          Electronically signed by:  ANTONIETTA PAGAN MD 6/15/2022 1:09 PM              "

## 2022-06-16 LAB
GLUCOSE BLD STRIP.AUTO-MCNC: 188 MG/DL (ref 65–99)
GLUCOSE BLD STRIP.AUTO-MCNC: 221 MG/DL (ref 65–99)
GLUCOSE BLD STRIP.AUTO-MCNC: 230 MG/DL (ref 65–99)
GLUCOSE BLD STRIP.AUTO-MCNC: 238 MG/DL (ref 65–99)
GLUCOSE BLD STRIP.AUTO-MCNC: 258 MG/DL (ref 65–99)

## 2022-06-16 PROCEDURE — 700105 HCHG RX REV CODE 258: Performed by: STUDENT IN AN ORGANIZED HEALTH CARE EDUCATION/TRAINING PROGRAM

## 2022-06-16 PROCEDURE — A9270 NON-COVERED ITEM OR SERVICE: HCPCS | Performed by: STUDENT IN AN ORGANIZED HEALTH CARE EDUCATION/TRAINING PROGRAM

## 2022-06-16 PROCEDURE — 700111 HCHG RX REV CODE 636 W/ 250 OVERRIDE (IP): Performed by: STUDENT IN AN ORGANIZED HEALTH CARE EDUCATION/TRAINING PROGRAM

## 2022-06-16 PROCEDURE — 99232 SBSQ HOSP IP/OBS MODERATE 35: CPT | Performed by: STUDENT IN AN ORGANIZED HEALTH CARE EDUCATION/TRAINING PROGRAM

## 2022-06-16 PROCEDURE — 700102 HCHG RX REV CODE 250 W/ 637 OVERRIDE(OP): Performed by: STUDENT IN AN ORGANIZED HEALTH CARE EDUCATION/TRAINING PROGRAM

## 2022-06-16 PROCEDURE — 99233 SBSQ HOSP IP/OBS HIGH 50: CPT | Performed by: INTERNAL MEDICINE

## 2022-06-16 PROCEDURE — 82962 GLUCOSE BLOOD TEST: CPT | Mod: 91

## 2022-06-16 PROCEDURE — 770001 HCHG ROOM/CARE - MED/SURG/GYN PRIV*

## 2022-06-16 RX ORDER — DEXTROSE MONOHYDRATE 25 G/50ML
25 INJECTION, SOLUTION INTRAVENOUS
Status: DISCONTINUED | OUTPATIENT
Start: 2022-06-16 | End: 2022-06-20 | Stop reason: HOSPADM

## 2022-06-16 RX ADMIN — VANCOMYCIN HYDROCHLORIDE 1250 MG: 500 INJECTION, POWDER, LYOPHILIZED, FOR SOLUTION INTRAVENOUS at 05:10

## 2022-06-16 RX ADMIN — GABAPENTIN 300 MG: 300 CAPSULE ORAL at 18:02

## 2022-06-16 RX ADMIN — OXYCODONE 5 MG: 5 TABLET ORAL at 16:00

## 2022-06-16 RX ADMIN — HYDROCODONE BITARTRATE AND ACETAMINOPHEN 1 TABLET: 5; 325 TABLET ORAL at 18:02

## 2022-06-16 RX ADMIN — VANCOMYCIN HYDROCHLORIDE 1250 MG: 500 INJECTION, POWDER, LYOPHILIZED, FOR SOLUTION INTRAVENOUS at 18:01

## 2022-06-16 RX ADMIN — SERTRALINE 50 MG: 50 TABLET, FILM COATED ORAL at 18:02

## 2022-06-16 RX ADMIN — INSULIN HUMAN 2 UNITS: 100 INJECTION, SOLUTION PARENTERAL at 20:06

## 2022-06-16 RX ADMIN — INSULIN HUMAN 1 UNITS: 100 INJECTION, SOLUTION PARENTERAL at 10:23

## 2022-06-16 RX ADMIN — HYDROCODONE BITARTRATE AND ACETAMINOPHEN 1 TABLET: 5; 325 TABLET ORAL at 05:00

## 2022-06-16 RX ADMIN — ATORVASTATIN CALCIUM 40 MG: 40 TABLET, FILM COATED ORAL at 18:02

## 2022-06-16 RX ADMIN — GABAPENTIN 300 MG: 300 CAPSULE ORAL at 05:00

## 2022-06-16 RX ADMIN — INSULIN HUMAN 2 UNITS: 100 INJECTION, SOLUTION PARENTERAL at 18:22

## 2022-06-16 RX ADMIN — MIRTAZAPINE 15 MG: 15 TABLET, FILM COATED ORAL at 20:02

## 2022-06-16 RX ADMIN — ENOXAPARIN SODIUM 40 MG: 40 INJECTION SUBCUTANEOUS at 18:01

## 2022-06-16 RX ADMIN — INSULIN HUMAN 2 UNITS: 100 INJECTION, SOLUTION PARENTERAL at 14:00

## 2022-06-16 ASSESSMENT — ENCOUNTER SYMPTOMS
VOMITING: 0
BACK PAIN: 0
NAUSEA: 0
COUGH: 0
SENSORY CHANGE: 0
EYE PAIN: 0
CHILLS: 0
INSOMNIA: 0
ABDOMINAL PAIN: 0
PALPITATIONS: 0
SHORTNESS OF BREATH: 0
HEADACHES: 0
BLURRED VISION: 0
FOCAL WEAKNESS: 0
DIZZINESS: 0
FEVER: 0
FALLS: 0

## 2022-06-16 ASSESSMENT — LIFESTYLE VARIABLES: SUBSTANCE_ABUSE: 0

## 2022-06-16 ASSESSMENT — PAIN DESCRIPTION - PAIN TYPE: TYPE: CHRONIC PAIN

## 2022-06-16 NOTE — CARE PLAN
The patient is Stable - Low risk of patient condition declining or worsening     Shift Goals  Clinical Goals: Pain management  Patient Goals: Rest  Family Goals: Wound care     Progress made toward(s) clinical / shift goals:  Pain medication prn, repositioning, heat packs, monitor pain

## 2022-06-16 NOTE — DISCHARGE PLANNING
Medical Social Work  SW informed by Dr Newton picc was placed and patient is medically clear to d/c    PC to Chicho at [facility] [phone]; JARVIS informed him patient is clear, IV ABX he is on and duration.  Chicho stated he will talk to his wound nurse and get back to SW.  Will more than likely take patient today.  Requested that a new Choice be sent to them, along with the D/C summary be placed      Volt to Dr Newton with above information .

## 2022-06-16 NOTE — PROGRESS NOTES
Infectious Disease Progress Note    Author: Jacobo Chavez M.D. Date & Time of service: 2022  9:47 AM    Chief Complaint:  Follow-up for MRSA bacteremia, left foot infection    Interval History:   afebrile, WBC 10.7.  Patient is much more alert and awake today.  He is awaiting MRI and arterial studies, urine culture with Staph epidermidis   patient remains afebrile, leukocytosis resolved, white count of 3.7 today, tolerating vancomycin.  Repeat blood cultures as below   patient remains afebrile, white count down to 7.8, just returned from I&D, sleeping  6/15 patient remains afebrile, no white count today, no issues with antimicrobials thus far   patient remains afebrile, no CBC this morning.  Plan as below    Labs Reviewed, Medications Reviewed and Wound Reviewed.    Review of Systems:  Review of Systems   Unable to perform ROS: Medical condition   Limited secondary to underlying dementia    Hemodynamics:  Temp (24hrs), Av.7 °C (98.1 °F), Min:36.3 °C (97.3 °F), Max:37.1 °C (98.8 °F)  Temperature: 36.8 °C (98.3 °F)  Pulse  Av.5  Min: 61  Max: 143   Blood Pressure : 118/61       Physical Exam:  Physical Exam  Vitals and nursing note reviewed.   Constitutional:       Appearance: Normal appearance.   HENT:      Mouth/Throat:      Mouth: Mucous membranes are moist.   Eyes:      General: No scleral icterus.        Right eye: No discharge.         Left eye: No discharge.      Conjunctiva/sclera: Conjunctivae normal.   Cardiovascular:      Rate and Rhythm: Normal rate.   Pulmonary:      Effort: Pulmonary effort is normal. No respiratory distress.      Breath sounds: No wheezing.   Abdominal:      Palpations: Abdomen is soft.   Musculoskeletal:      Cervical back: No rigidity.      Comments: Left foot with surgical dressing   Skin:     Findings: No rash.   Neurological:      General: No focal deficit present.      Mental Status: He is alert. He is disoriented.         Meds:    Current  Facility-Administered Medications:   •  vancomycin  •  oxyCODONE immediate-release  •  acetaminophen  •  enoxaparin (LOVENOX) injection  •  MD Alert...Vancomycin per Pharmacy  •  insulin regular **AND** POC blood glucose manual result **AND** NOTIFY MD and PharmD **AND** Administer 20 grams of glucose (approximately 8 ounces of fruit juice) every 15 minutes PRN FSBG less than 70 mg/dL **AND** dextrose bolus  •  atorvastatin  •  gabapentin  •  mirtazapine  •  sertraline  •  HYDROcodone-acetaminophen    Labs:  Recent Labs     06/14/22  1418   WBC 7.8   RBC 4.02*   HEMOGLOBIN 11.8*   HEMATOCRIT 35.1*   MCV 87.3   MCH 29.4   RDW 41.0   PLATELETCT 240   MPV 9.2     Recent Labs     06/14/22  1418   SODIUM 138   POTASSIUM 3.8   CHLORIDE 105   CO2 21   GLUCOSE 175*   BUN 14     Recent Labs     06/14/22  1418   CREATININE 0.67       Imaging:  DX-ANKLE 3+ VIEWS LEFT    Result Date: 6/10/2022  6/10/2022 9:57 AM HISTORY/REASON FOR EXAM:  Atraumatic Pain/Swelling/Deformity. Lateral left ankle wound TECHNIQUE/EXAM DESCRIPTION AND NUMBER OF VIEWS:  3 views of the LEFT ankle. COMPARISON: Left ankle radiography, 4/12/2022. FINDINGS: Bones: Normal bone mineralization. There is cortical irregularity involving the inferior lateral aspect of the lateral malleolus, at the location of distention of soft tissue gas from the skin surface to the surface of the bone. Findings are concerning for osteomyelitis. No acute fracture. No other focal bone lesion. Joints: Intact. No subluxation. Ankle mortise is preserved. Soft tissues: Circumferential left ankle soft tissue swelling, greatest laterally, with soft tissue gas.     1. Lateral left ankle soft tissue swelling with soft tissue gas extending from the skin surface to the lateral aspect of the distal left fibula, with cortical disruption concerning for osteomyelitis. MRI of the left ankle without and with IV contrast is recommended. 2. Decreased bone mineralization. 3. The remainder of the  left ankle radiography is within normal limits.    DX-CHEST-PORTABLE (1 VIEW)    Result Date: 6/10/2022  6/10/2022 9:57 AM HISTORY/REASON FOR EXAM:  possible sepsis.. TECHNIQUE/EXAM DESCRIPTION AND NUMBER OF VIEWS: Single portable view of the chest. COMPARISON: 9/22/2019 FINDINGS: Cardiac silhouette is enlarged. Aortic calcified atherosclerotic plaque. No focal consolidation, pleural effusion, pulmonary edema or pneumothorax. Generalized osteopenia.     No acute cardiopulmonary abnormality.      Micro:  Results     Procedure Component Value Units Date/Time    CULTURE TISSUE W/ GRM STAIN [[id number]] Collected: 06/14/22 0930    Order Status: Completed Specimen: Tissue Updated: 06/15/22 1320     Significant Indicator NEG     Source TISS     Site Left Lateral Ankle     Culture Result No growth at 24 hours.     Gram Stain Result Rare WBCs.  No organisms seen.      Narrative:      Surgery Specimen    Anaerobic Culture [[id number]] Collected: 06/14/22 0930    Order Status: Completed Specimen: Tissue Updated: 06/15/22 1320     Significant Indicator NEG     Source TISS     Site Left Lateral Ankle     Culture Result Culture in progress.    Narrative:      Surgery Specimen    CULTURE WOUND W/ GRAM STAIN [[id number]] Collected: 06/14/22 0929    Order Status: Completed Specimen: Wound Updated: 06/15/22 1319     Significant Indicator NEG     Source WND     Site Left Lateral Ankle     Culture Result No growth at 24 hours.     Gram Stain Result Rare WBCs.  No organisms seen.      Narrative:      Surgery - swabs received    Anaerobic Culture [[id number]] Collected: 06/14/22 0929    Order Status: Completed Specimen: Wound Updated: 06/15/22 1319     Significant Indicator NEG     Source WND     Site Left Lateral Ankle     Culture Result Culture in progress.    Narrative:      Surgery - swabs received    MRSA By PCR (Amp) [[id number]] Collected: 06/15/22 0538    Order Status: Completed Specimen: Respirate from Nares Updated: 06/15/22 8699  "    MRSA by PCR Negative    Narrative:      Contact  Collected By: [id number] CELINA GAGE  Per P&T Lab Protocol    GRAM STAIN [[id number]] Collected: 06/14/22 0930    Order Status: Completed Specimen: Tissue Updated: 06/14/22 2022     Significant Indicator .     Source TISS     Site Left Lateral Ankle     Gram Stain Result Rare WBCs.  No organisms seen.      Narrative:      Surgery Specimen    GRAM STAIN [[id number]] Collected: 06/14/22 0929    Order Status: Completed Specimen: Wound Updated: 06/14/22 1650     Significant Indicator .     Source WND     Site Left Lateral Ankle     Gram Stain Result Rare WBCs.  No organisms seen.      Narrative:      Surgery - swabs received    E-Test [[id number]] Collected: 06/10/22 1003    Order Status: Completed Specimen: Other Updated: 06/14/22 1619     ETEST Sensitivity FINAL**2    Narrative:      61 tel. [phone] 06/13/2022, 17:46, RB PERF. RESULTS CALLED TO:[phone]  Per Hospital Policy: Only change Specimen Src: to \"Line\" if  specified by physician order.    BLOOD CULTURE [[id number]]  (Abnormal)  (Susceptibility) Collected: 06/10/22 1003    Order Status: Completed Specimen: Blood from Peripheral Updated: 06/14/22 1619     Significant Indicator POS     Source BLD     Site PERIPHERAL     Culture Result Growth detected by Bactec instrument. 06/11/2022  13:45      Methicillin Resistant Staphylococcus aureus  See previous culture for sensitivity report.        Streptococcus pyogenes (Group A)    Narrative:      CALL  [facility]  61 tel. [phone],  CALLED  61 tel. [phone] 06/13/2022, 17:46, RB PERF. RESULTS CALLED TO:[phone]  Per Hospital Policy: Only change Specimen Src: to \"Line\" if  specified by physician order.  Right AC    Susceptibility     Streptococcus pyogenes (group a) (2)     Antibiotic Interpretation Microscan   Method Status    Penicillin Sensitive 0.023 mcg/mL E-TEST Final    Cefotaxime Sensitive 0.047 mcg/mL E-TEST Final    Clindamycin Intermediate - mcg/mL E-TEST " "Final                   MRSA By PCR (Amp) [[id number]]     Order Status: Canceled Specimen: Respirate from Nares     BLOOD CULTURE [[id number]]  (Abnormal)  (Susceptibility) Collected: 06/10/22 0937    Order Status: Completed Specimen: Blood from Peripheral Updated: 06/13/22 0904     Significant Indicator POS     Source BLD     Site PERIPHERAL     Culture Result Growth detected by Bactec instrument. 06/11/2022  09:11  Methicillin Resistant Staphylococcus aureus (MRSA)  detected by PCR.        Methicillin Resistant Staphylococcus aureus  This isolate is presumed to be clindamycin resistant based on  detection of inducible resistance.      Narrative:      CALL  [facility]  61 tel. [phone],  CALLED  61 tel. [phone] 06/11/2022, 09:15, RB PERF. RESULTS CALLED TO:  Ludin RN + Cornelio RN  Per Hospital Policy: Only change Specimen Src: to \"Line\" if  specified by physician order.  No site indicated    Susceptibility     Methicillin resistant staphylococcus aureus (1)     Antibiotic Interpretation Microscan   Method Status    Azithromycin Resistant >4 mcg/mL ED Final    Clindamycin Resistant <=0.25 mcg/mL ED Final    Cefazolin Resistant <=8 mcg/mL ED Final    Cefepime Resistant 8 mcg/mL ED Final    Ceftaroline Sensitive <=0.5 mcg/mL ED Final    Daptomycin Sensitive <=0.5 mcg/mL ED Final    Ampicillin/sulbactam Resistant <=8/4 mcg/mL ED Final    Erythromycin Resistant >4 mcg/mL ED Final    Vancomycin Sensitive 1 mcg/mL ED Final    Oxacillin Resistant >2 mcg/mL ED Final    Trimeth/Sulfa Sensitive <=0.5/9.5 mcg/mL ED Final    Tetracycline Resistant >8 mcg/mL ED Final                   BLOOD CULTURE [[id number]] Collected: 06/12/22 0230    Order Status: Completed Specimen: Blood from Peripheral Updated: 06/13/22 0739     Significant Indicator NEG     Source BLD     Site PERIPHERAL     Culture Result No Growth  Note: Blood cultures are incubated for 5 days and  are monitored continuously.Positive blood cultures  are called " "to the RN and reported as soon as  they are identified.      Narrative:      Contact  Per Hospital Policy: Only change Specimen Src: to \"Line\" if  specified by physician order.  Left Hand    BLOOD CULTURE [[id number]] Collected: 06/12/22 0230    Order Status: Completed Specimen: Blood from Peripheral Updated: 06/13/22 0739     Significant Indicator NEG     Source BLD     Site PERIPHERAL     Culture Result No Growth  Note: Blood cultures are incubated for 5 days and  are monitored continuously.Positive blood cultures  are called to the RN and reported as soon as  they are identified.      Narrative:      Contact  Per Hospital Policy: Only change Specimen Src: to \"Line\" if  specified by physician order.  Right AC    URINE CULTURE(NEW) [[id number]]  (Abnormal)  (Susceptibility) Collected: 06/10/22 0957    Order Status: Completed Specimen: Urine Updated: 06/12/22 0921     Significant Indicator POS     Source UR     Site -     Culture Result -      Staphylococcus epidermidis  >100,000 cfu/mL      Narrative:      Indication for culture:->Evaluation for sepsis without a  clear source of infection  Indication for culture:->Evaluation for sepsis without a    Susceptibility     Staphylococcus epidermidis (1)     Antibiotic Interpretation Microscan   Method Status    Cefazolin Resistant <=8 mcg/mL ED Final    Cefepime Resistant 8 mcg/mL ED Final    Daptomycin Sensitive <=0.5 mcg/mL ED Final    Nitrofurantoin Sensitive <=32 mcg/mL ED Final    Trimeth/Sulfa Sensitive <=0.5/9.5 mcg/mL ED Final    Tetracycline Sensitive <=4 mcg/mL ED Final                   URINALYSIS [[id number]]  (Abnormal) Collected: 06/10/22 0957    Order Status: Completed Specimen: Urine Updated: 06/10/22 1059     Color Yellow     Character Clear     Specific Gravity 1.021     Ph 6.5     Glucose Negative mg/dL      Ketones Trace mg/dL      Protein 100 mg/dL      Bilirubin Negative     Urobilinogen, Urine 0.2     Nitrite Positive     Leukocyte Esterase " Moderate     Occult Blood Negative     Micro Urine Req Microscopic    Narrative:      Indication for culture:->Evaluation for sepsis without a  clear source of infection    MRSA By PCR (Amp) [[id number]]     Order Status: Canceled Specimen: Respirate from Nares           Assessment:  82 y.o. man with underlying dementia, type 2 diabetes mellitus and recent hospitalization in April secondary to left ankle cellulitis with MRSA admitted on 6/10/2022 secondary to acute encephalopathy and increasing left ankle pain and swelling.  Patient found to have findings highly concerning for osteomyelitis on radiograph and MRSA bacteremia.  Source of his bacteremia is from his left foot infection.    Pertinent diagnoses:  MRSA bacteremia  Group A Strep bacteremia  Left ankle osteomyelitis  Bacteriuria, urine cx +staph epi  Leukocytosis, resolved  Type 2 diabetes mellitus, hemoglobin A1c 6.3  Dementia    Plan:  -Blood cultures x2 from 6/10 positive for MRSA and group A Strep.  Repeat blood cultures x2 from 6/12 no growth till date  -Continue IV vancomycin for now.  Vancomycin trough of 6.3 on 6/15  -MRI of the left ankle suggestive of underlying osteomyelitis  -Patient was taken to the OR 6/14 for excisional debridement with application of wound VAC.  Will follow OR cultures -no growth till date  -TTE with no obvious valvular vegetations  -On discharge, anticipate IV daptomycin 750 mg every 24 hours for 6 weeks through 7/23/2022  -At least weekly CBC with differential, CMP, CPK while on IV antibiotics    Disposition: Anticipate placement and 6 weeks of IV daptomycin as above.  If facility refuses daptomycin due to cost, okay to use IV vancomycin instead, ONLY IF facility has access to pharmacists to dose and monitor vancomycin appropriately.  Patient is a .  If the [facility] will need orders from a [facility] clinician, may need Dr. Holley's assistance.  Okay for discharge from an ID standpoint if the outpatient IV antibiotics are set  up  Need for PICC line: Yes, ordered    Discussed with internal medicine Dr. Newton.  We will continue to follow

## 2022-06-16 NOTE — DISCHARGE PLANNING
Medical Social Work  PC from [place] at [facility]   Stated he has two concerns  They are not contracted with Medicaid for Long Term patients to obtain a wound vac, working on this but no time frame.  Also their wound nurse is wanting to know when patient will be seen by wound.   SW stated chart notes indicate they will see him on Friday.      Volt to Dr Newton with the above information

## 2022-06-16 NOTE — DISCHARGE PLANNING
Agency/Facility Name: [facility]   Spoke To: Chicho   Outcome: Per Admissions Coordinator patient is able to be admitted into   facility after wound care has seen patient.

## 2022-06-16 NOTE — PROGRESS NOTES
[facility] Medicine Daily Progress Note    Date of Service  6/16/2022    Chief Complaint  Andrei Garay is a 82 y.o. male admitted 6/10/2022 with ankle wound.    Hospital Course  Andrei Garay is a 82 y.o. male past medical history of diabetes mellitus, hypertension who presented 6/10/2022 with altered mental status for last 3 days and left ankle pain.  History is primarily provided by daughter.  No relieving or exacerbating factors were noted.  He denies any fever does report some chills.  In the ER, x-ray of the ankle showed signs concerning for osteomyelitis.  UA grossly positive for urinary tract infection.  Patient met criteria for sepsis started on IV broad-spectrum antibiotics and will be admitted for further work-up and limb preservation consult.     Interval Problem Update  Patient evaluated bedside  No acute events overnight.  Up to chair, happy  Pain manageable with current pain regimen  Stable vitals  6/10 BCX w/MRSA  6/12 BCX w/NGTD   OR/TISS Clx w/NGTD  Continue IV vancomycin while inpatient, switched to daptomycin on discharge as per ID  Wound care following for wound VAC management  OT recommending postacute care  Pending PT evaluation  Referral for SNF placed    Patient is medically cleared  Pending placement    I have personally seen and examined the patient at bedside. I discussed the plan of care with patient.    Consultants/Specialty  infectious disease and orthopedics    Code Status  DNAR/DNI    Disposition  Patient is not medically cleared for discharge.   Anticipate discharge back to [facility] home/SNF.  I have placed the appropriate orders for post-discharge needs.    Review of Systems  Review of Systems   Constitutional: Negative for chills and fever.   Eyes: Negative for blurred vision and pain.   Respiratory: Negative for cough and shortness of breath.    Cardiovascular: Negative for chest pain, palpitations and leg swelling.   Gastrointestinal: Negative for abdominal pain,  nausea and vomiting.   Genitourinary: Negative for dysuria and urgency.   Musculoskeletal: Positive for joint pain. Negative for back pain and falls.   Skin: Negative for itching and rash.   Neurological: Negative for dizziness, sensory change, focal weakness and headaches.   Psychiatric/Behavioral: Negative for substance abuse. The patient does not have insomnia.         Physical Exam  Temp:  [36.3 °C (97.3 °F)-37.1 °C (98.8 °F)] 36.8 °C (98.3 °F)  Pulse:  [64-86] 64  Resp:  [18-19] 19  BP: (118-159)/(58-73) 118/61  SpO2:  [92 %-94 %] 93 %    Physical Exam  Constitutional:       General: He is not in acute distress.     Appearance: He is not ill-appearing.   HENT:      Head: Normocephalic and atraumatic.      Right Ear: External ear normal.      Left Ear: External ear normal.      Mouth/Throat:      Pharynx: No oropharyngeal exudate or posterior oropharyngeal erythema.   Eyes:      Extraocular Movements: Extraocular movements intact.      Pupils: Pupils are equal, round, and reactive to light.   Cardiovascular:      Rate and Rhythm: Normal rate and regular rhythm.      Pulses: Normal pulses.      Heart sounds: Normal heart sounds. No murmur heard.  Pulmonary:      Effort: Pulmonary effort is normal. No respiratory distress.      Breath sounds: Normal breath sounds. No wheezing or rales.   Abdominal:      General: Bowel sounds are normal. There is no distension.      Palpations: Abdomen is soft.      Tenderness: There is no abdominal tenderness. There is no guarding.   Musculoskeletal:         General: No swelling or tenderness.      Cervical back: Normal range of motion and neck supple.      Right lower leg: No edema.      Left lower leg: No edema.   Skin:     General: Skin is warm and dry.      Findings: Erythema and lesion present.      Comments: Left lateral ankle wound with ulcer, packed   Neurological:      General: No focal deficit present.      Mental Status: He is disoriented.      Sensory: No sensory  deficit.      Motor: No weakness.   Psychiatric:         Mood and Affect: Mood normal.         Behavior: Behavior normal.         Fluids    Intake/Output Summary (Last 24 hours) at 6/16/2022 1305  Last data filed at 6/16/2022 1000  Gross per 24 hour   Intake 598 ml   Output 675 ml   Net -77 ml       Laboratory  Recent Labs     06/14/22  1418   WBC 7.8   RBC 4.02*   HEMOGLOBIN 11.8*   HEMATOCRIT 35.1*   MCV 87.3   MCH 29.4   MCHC 33.6*   RDW 41.0   PLATELETCT 240   MPV 9.2     Recent Labs     06/14/22  1418   SODIUM 138   POTASSIUM 3.8   CHLORIDE 105   CO2 21   GLUCOSE 175*   BUN 14   CREATININE 0.67   CALCIUM 8.5                   Imaging  IR-PICC LINE PLACEMENT W/ GUIDANCE > AGE 5   Final Result                  Ultrasound-guided PICC placement performed by qualified nursing staff as    above.          MR-ANKLE W/O LEFT   Final Result      1.  Osteomyelitis of the lateral malleolus/distal fibula      2.  Negative for abscess      3.  Moderate-large tibiotalar joint effusion. This could be reactive versus septic arthritis.      4.  Longitudinal splitting of the peroneus brevis tendon and there is associated fluid within the peroneal tendon sheath      EC-ECHOCARDIOGRAM COMPLETE W/O CONT   Final Result      US-HONEY SINGLE LEVEL BILAT   Final Result      DX-ANKLE 3+ VIEWS LEFT   Final Result      1. Lateral left ankle soft tissue swelling with soft tissue gas extending from the skin surface to the lateral aspect of the distal left fibula, with cortical disruption concerning for osteomyelitis. MRI of the left ankle without and with IV contrast is    recommended.   2. Decreased bone mineralization.   3. The remainder of the left ankle radiography is within normal limits.      DX-CHEST-PORTABLE (1 VIEW)   Final Result      No acute cardiopulmonary abnormality.           Assessment/Plan  * MRSA bacteremia  Assessment & Plan  Blood culture positive for MRSA  On vancomycin  Transthoracic echo without endocarditis  MRI shows  osteomyelitis distal fibula and lateral malleolus  Repeat blood cultures 6/12  ID following    Diabetic foot infection (HCC)- (present on admission)  Assessment & Plan  Now status post left lateral ankle incision, draining and excisional debridement with wound VAC placement by orthopedic surgery on 6/14/2022  6/10 BCX w/MRSA  6/12 BCX w/NGTD   OR/TISS Clx w/NGTD  Continue IV vancomycin while inpatient, switched to daptomycin on discharge as per ID  Wound care following for wound VAC management  OT recommending postacute care  Pending PT evaluation  Referral for SNF placed    UTI (urinary tract infection)  Assessment & Plan  Unclear if having symptoms, hold antibiotics for UTI  On vancomycin for MRSA ankle infection    Type 2 diabetes mellitus (HCC)- (present on admission)  Assessment & Plan  On metformin at home  Continue with sliding scale and Accu-Cheks with hypoglycemia protocol   A1c 6.3, patient needing minimal ISS, monitor need to start long acting  Daughter reports 70/30 insulin 26U AM 28 U PM prior? He is not needing this at this time    Advance care planning- (present on admission)  Assessment & Plan  Patient has POLST form stating DNR/DNI.  Confirmed with daughter bedside face-to-face.  Time spent 8 minutes.    Obesity- (present on admission)  Assessment & Plan  Counseled on dietary modification once stable    Sepsis (HCC)- (present on admission)  Assessment & Plan  This is Sepsis Present on admission  SIRS criteria identified on my evaluation include: Tachycardia, with heart rate greater than 90 BPM and Leukocytosis, with WBC greater than 12,000  Source is UTI/Left ankle infection  Sepsis protocol initiated  IV antibiotics as appropriate for source of sepsis  Reassessment: I have reassessed the patient's hemodynamic status    Depression- (present on admission)  Assessment & Plan  Continue with Zoloft and Remeron    Hypertension- (present on admission)  Assessment & Plan  Hold antihypertensive patient is  normotensive and have sepsis.    Hyperlipidemia- (present on admission)  Assessment & Plan  Continue statin       VTE prophylaxis: enoxaparin ppx    I have performed a physical exam and reviewed and updated ROS and Plan today (6/16/2022). In review of yesterday's note (6/15/2022), there are no changes except as documented above.

## 2022-06-17 ENCOUNTER — PATIENT OUTREACH (OUTPATIENT)
Dept: SCHEDULING | Facility: IMAGING CENTER | Age: 82
End: 2022-06-17
Payer: COMMERCIAL

## 2022-06-17 LAB
BACTERIA BLD CULT: NORMAL
BACTERIA BLD CULT: NORMAL
GLUCOSE BLD STRIP.AUTO-MCNC: 159 MG/DL (ref 65–99)
GLUCOSE BLD STRIP.AUTO-MCNC: 218 MG/DL (ref 65–99)
GLUCOSE BLD STRIP.AUTO-MCNC: 226 MG/DL (ref 65–99)
GLUCOSE BLD STRIP.AUTO-MCNC: 245 MG/DL (ref 65–99)
SIGNIFICANT IND 70042: NORMAL
SIGNIFICANT IND 70042: NORMAL
SITE SITE: NORMAL
SITE SITE: NORMAL
SOURCE SOURCE: NORMAL
SOURCE SOURCE: NORMAL

## 2022-06-17 PROCEDURE — 97607 NEG PRS WND THR NDME<=50SQCM: CPT

## 2022-06-17 PROCEDURE — 700102 HCHG RX REV CODE 250 W/ 637 OVERRIDE(OP): Performed by: STUDENT IN AN ORGANIZED HEALTH CARE EDUCATION/TRAINING PROGRAM

## 2022-06-17 PROCEDURE — 770001 HCHG ROOM/CARE - MED/SURG/GYN PRIV*

## 2022-06-17 PROCEDURE — 82962 GLUCOSE BLOOD TEST: CPT

## 2022-06-17 PROCEDURE — A9270 NON-COVERED ITEM OR SERVICE: HCPCS | Performed by: STUDENT IN AN ORGANIZED HEALTH CARE EDUCATION/TRAINING PROGRAM

## 2022-06-17 PROCEDURE — 700111 HCHG RX REV CODE 636 W/ 250 OVERRIDE (IP): Performed by: STUDENT IN AN ORGANIZED HEALTH CARE EDUCATION/TRAINING PROGRAM

## 2022-06-17 PROCEDURE — 99232 SBSQ HOSP IP/OBS MODERATE 35: CPT | Performed by: STUDENT IN AN ORGANIZED HEALTH CARE EDUCATION/TRAINING PROGRAM

## 2022-06-17 PROCEDURE — 99233 SBSQ HOSP IP/OBS HIGH 50: CPT | Performed by: INTERNAL MEDICINE

## 2022-06-17 PROCEDURE — 700105 HCHG RX REV CODE 258: Performed by: STUDENT IN AN ORGANIZED HEALTH CARE EDUCATION/TRAINING PROGRAM

## 2022-06-17 RX ORDER — HYDROCODONE BITARTRATE AND ACETAMINOPHEN 5; 325 MG/1; MG/1
1 TABLET ORAL ONCE
Status: COMPLETED | OUTPATIENT
Start: 2022-06-17 | End: 2022-06-17

## 2022-06-17 RX ORDER — HYDRALAZINE HYDROCHLORIDE 20 MG/ML
10 INJECTION INTRAMUSCULAR; INTRAVENOUS EVERY 4 HOURS PRN
Status: DISCONTINUED | OUTPATIENT
Start: 2022-06-17 | End: 2022-06-20 | Stop reason: HOSPADM

## 2022-06-17 RX ORDER — BUTALBITAL, ACETAMINOPHEN AND CAFFEINE 50; 325; 40 MG/1; MG/1; MG/1
1 TABLET ORAL
Status: DISCONTINUED | OUTPATIENT
Start: 2022-06-17 | End: 2022-06-17

## 2022-06-17 RX ADMIN — HYDROCODONE BITARTRATE AND ACETAMINOPHEN 1 TABLET: 5; 325 TABLET ORAL at 12:06

## 2022-06-17 RX ADMIN — INSULIN HUMAN 2 UNITS: 100 INJECTION, SOLUTION PARENTERAL at 13:24

## 2022-06-17 RX ADMIN — VANCOMYCIN HYDROCHLORIDE 1250 MG: 500 INJECTION, POWDER, LYOPHILIZED, FOR SOLUTION INTRAVENOUS at 16:20

## 2022-06-17 RX ADMIN — SERTRALINE 50 MG: 50 TABLET, FILM COATED ORAL at 18:04

## 2022-06-17 RX ADMIN — ENOXAPARIN SODIUM 40 MG: 40 INJECTION SUBCUTANEOUS at 18:05

## 2022-06-17 RX ADMIN — VANCOMYCIN HYDROCHLORIDE 1250 MG: 500 INJECTION, POWDER, LYOPHILIZED, FOR SOLUTION INTRAVENOUS at 04:35

## 2022-06-17 RX ADMIN — INSULIN HUMAN 2 UNITS: 100 INJECTION, SOLUTION PARENTERAL at 18:02

## 2022-06-17 RX ADMIN — HYDROCODONE BITARTRATE AND ACETAMINOPHEN 1 TABLET: 5; 325 TABLET ORAL at 18:04

## 2022-06-17 RX ADMIN — INSULIN HUMAN 2 UNITS: 100 INJECTION, SOLUTION PARENTERAL at 22:11

## 2022-06-17 RX ADMIN — HYDROCODONE BITARTRATE AND ACETAMINOPHEN 1 TABLET: 5; 325 TABLET ORAL at 04:34

## 2022-06-17 RX ADMIN — MIRTAZAPINE 15 MG: 15 TABLET, FILM COATED ORAL at 21:59

## 2022-06-17 RX ADMIN — GABAPENTIN 300 MG: 300 CAPSULE ORAL at 18:05

## 2022-06-17 RX ADMIN — GABAPENTIN 300 MG: 300 CAPSULE ORAL at 04:34

## 2022-06-17 RX ADMIN — ATORVASTATIN CALCIUM 40 MG: 40 TABLET, FILM COATED ORAL at 18:05

## 2022-06-17 ASSESSMENT — PAIN DESCRIPTION - PAIN TYPE: TYPE: CHRONIC PAIN

## 2022-06-17 NOTE — DISCHARGE PLANNING
DC Transport Scheduled    Received request at: 1142    Transport Company Scheduled:  [employer]      Scheduled Date: 06/17/2022  Scheduled Time: 1830    Destination: [facility]     Notified care team of scheduled transport via Voalte.     If there are any changes needed to the DC transportation scheduled, please contact [facility] at ext. [phone] between the hours of 6937-9489 Mon-Fri. If outside those hours, contact the ED Case Manager at ext. [phone].

## 2022-06-17 NOTE — DISCHARGE PLANNING
Medical Social Work  PC from [facility], requesting JARVIS send over wound note from today, will review and get back to JARVIS with a time they can  patient.  JARVIS asked about the wound vac, [facility] stated he spoke to the Medical Director and they will order a vac.    JARVIS faxed wound note to [facility] at [phone].    JARVIS informed patient and his daughter

## 2022-06-17 NOTE — DISCHARGE SUMMARY
Discharge Summary    CHIEF COMPLAINT ON ADMISSION  Chief Complaint   Patient presents with   • Wound Check   • Altered Mental Status       Reason for Admission  Sent By EMS     CODE STATUS  DNAR/DNI    HPI & HOSPITAL COURSE  82 y.o. 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  He was started on broad-spectrum antibiotics. 6/10/2022 blood cultures positive for MRSA.  Infectious is following.  Echocardiogram normal and follow-up 6/12/2022 blood cultures with no growth to date. Orthopedic surgery following for which patient underwent Incision, drainage and excisional debridement of  left lateral ankle with application of wound VAC dressing by orthopedic surgery on 6/14/2022.  OR tissue cultures were obtained and with no growth to date.  As his blood cultures cleared, PICC line was placed.  He has been on vancomycin while inpatient but as per infectious disease recommendation he is to complete a 6-week IV antibiotic regimen which ends on 7/23/2022 and he is to be transition to Daptomycin at a skilled nursing facility.  Patient will need weekly CBCs, CMP and CPK while on IV daptomycin.  Patient will continue to require wound care and wound VAC management which we will continue at the skilled nursing facility.  Stable patient with in chronic condition was transferred to skilled nursing facility on daptomycin antibiotic regimen which he is to conclude on 7/23/2022.  Patient was transition to skilled nursing facility for continued care.    Therefore, he is discharged in good and stable condition to skilled nursing facility.    The patient met 2-midnight criteria for an inpatient stay at the time of discharge.      FOLLOW UP ITEMS POST DISCHARGE  Start IV daptomycin at skilled nursing facility which she is to conclude on 7/23/2022 as per infectious disease recommendations.  Wound care follow-up for wound  VAC management  Skilled nursing facility to follow post hospital discharge care    DISCHARGE DIAGNOSES  Principal Problem:    MRSA bacteremia POA: Unknown  Active Problems:    Type 2 diabetes mellitus (HCC) POA: Yes    UTI (urinary tract infection) POA: Unknown    Diabetic foot infection (HCC) POA: Yes    Hyperlipidemia POA: Yes    Hypertension POA: Yes    Depression POA: Yes    Sepsis (HCC) POA: Yes    Obesity POA: Yes    Advance care planning POA: Yes  Resolved Problems:    * No resolved hospital problems. *      FOLLOW UP  No future appointments.  [facility]  [address] [facility] [address]  [phone]          MEDICATIONS ON DISCHARGE     Medication List      ASK your doctor about these medications      Instructions   acetaminophen 325 MG Tabs  Commonly known as: Tylenol   Take 2 Tablets by mouth every 6 hours as needed.  Dose: 650 mg     ascorbic acid 500 MG tablet  Commonly known as: Vitamin C   Take 1,000 mg by mouth every day.  Dose: 1,000 mg     atorvastatin 20 MG Tabs  Commonly known as: LIPITOR  Ask about: Which instructions should I use?   Take 40 mg by mouth every evening.  Dose: 40 mg     cadexomer iodine 0.9 % pad  Commonly known as: IODOFLEX   Apply 1 Application topically every morning. APPLY TO L LATERAL MALEOLUS WOUND CARE  Dose: 1 Application     diclofenac sodium 1 % Gel  Commonly known as: Voltaren   Apply 4 g topically every evening.  Dose: 4 g     gabapentin 300 MG Caps  Commonly known as: NEURONTIN   Take 300 mg by mouth 2 times a day.  Dose: 300 mg     HYDROcodone-acetaminophen 5-325 MG Tabs per tablet  Commonly known as: NORCO   Take 1 Tablet by mouth 2 times a day.  Dose: 1 Tablet     lisinopril 2.5 MG Tabs  Commonly known as: PRINIVIL  Ask about: Which instructions should I use?   Take 2.5 mg by mouth every day.  Dose: 2.5 mg     Melatonin 10 MG Tabs   Take 10 mg by mouth at bedtime.  Dose: 10 mg     metformin 1000 MG tablet  Commonly known as:  GLUCOPHAGE  Ask about: Which instructions should I use?   Take 1,000 mg by mouth 2 times a day, with meals.  Dose: 1,000 mg     metoprolol tartrate 25 MG Tabs  Commonly known as: LOPRESSOR  Ask about: Which instructions should I use?   Take 25 mg by mouth 2 times a day.  Dose: 25 mg     mirtazapine 15 MG Tabs  Commonly known as: Remeron  Ask about: Which instructions should I use?   Take 15 mg by mouth every evening.  Dose: 15 mg     PROBIOTIC PO   Take 1 Capsule by mouth every morning.  Dose: 1 Capsule     sertraline 50 MG Tabs  Commonly known as: Zoloft   Take 50 mg by mouth every evening.  Dose: 50 mg     vitamin D2 (Ergocalciferol) 1.25 MG (98373 UT) Caps capsule  Commonly known as: Drisdol  Ask about: Which instructions should I use?   Take 50,000 Units by mouth every 30 days.  Dose: 50,000 Units            Allergies  No Known Allergies    DIET  Orders Placed This Encounter   Procedures   • Diet Order Diet: Consistent CHO (Diabetic)     Standing Status:   Standing     Number of Occurrences:   1     Order Specific Question:   Diet:     Answer:   Consistent CHO (Diabetic) [4]       ACTIVITY  As tolerated.  Weight bearing as tolerated    LINES, DRAINS, AND WOUNDS  This is an automated list. Peripheral IVs will be removed prior to discharge.  PICC Single Lumen 06/15/22 Right Brachial (Active)   Site Assessment Clean;Dry;Intact 06/17/22 0830   Line Status Infusing 06/17/22 0830   Line Secured at (cm) 0 cm 06/15/22 1637   Extremity Circumference (cm) 33 cm 06/15/22 1637   Dressing Type Biopatch;Securing device;Skin barrier;Transparent 06/17/22 0830   Dressing Status Intact;Dry;Clean 06/17/22 0830   Dressing Intervention N/A 06/16/22 2000   Dressing Change Due 06/18/22 06/16/22 1000   Date IV Connector(s) Changed 06/15/22 06/15/22 1637   NEXT IV Connector(s) Change 06/18/22 06/15/22 1637   Line Necessity Assessed Antibiotic Therapy Greater than 7 Days 06/16/22 2000   $ Single Lumen PICC Charge Single kit used 06/15/22  1637       Wound 06/14/22 Incision Ankle Left WOUND VAC, ACE WRAP (Active)   Wound Image    06/17/22 0900   Site Assessment Red;Pink 06/17/22 0900   Periwound Assessment Pink;Maceration 06/17/22 0900   Margins Defined edges;Unattached edges 06/17/22 0900   Closure Secondary intention 06/17/22 0900   Drainage Amount Small 06/17/22 0900   Drainage Description Sanguineous 06/17/22 0900   Treatments Cleansed;Site care;Offloading 06/17/22 0900   Wound Cleansing Normal Saline Irrigation 06/17/22 0900   Periwound Protectant Skin Protectant Wipes to Periwound;Drape 06/17/22 0900   Dressing Cleansing/Solutions Not Applicable 06/17/22 0900   Dressing Options Wound Vac;Mepilex Heel;Ace Wrap 06/17/22 0900   Dressing Changed Changed 06/17/22 0900   Dressing Status Clean;Dry;Intact 06/17/22 0900   Dressing Change/Treatment Frequency Monday, Wednesday, Friday, and As Needed 06/17/22 0900   NEXT Dressing Change/Treatment Date 06/20/22 06/17/22 0900   NEXT Weekly Photo (Inpatient Only) 06/24/22 06/17/22 0900   Non-staged Wound Description Full thickness 06/17/22 0900   Wound Length (cm) 3 cm 06/17/22 0900   Wound Width (cm) 1.7 cm 06/17/22 0900   Wound Depth (cm) 2.1 cm 06/17/22 0900   Wound Surface Area (cm^2) 5.1 cm^2 06/17/22 0900   Wound Volume (cm^3) 10.71 cm^3 06/17/22 0900   Shape nahid 06/17/22 0900   Wound Odor None 06/17/22 0900   Pulses 1+;Left 06/17/22 0900   Exposed Structures None 06/17/22 0900   WOUND NURSE ONLY - Time Spent with Patient (mins) 60 06/17/22 0900       Wound 06/16/22 Pressure Injury Mouth Lateral (Active)   Wound Image   06/16/22 2000   Site Assessment Purple 06/17/22 0830   Periwound Assessment Pink 06/17/22 0830   Dressing Status Open to Air 06/16/22 2000      PICC Single Lumen 06/15/22 Right Brachial (Active)   Site Assessment Clean;Dry;Intact 06/17/22 0830   Line Status Infusing 06/17/22 0830   Line Secured at (cm) 0 cm 06/15/22 1637   Extremity Circumference (cm) 33 cm 06/15/22 1637   Dressing  Type Biopatch;Securing device;Skin barrier;Transparent 06/17/22 0830   Dressing Status Intact;Dry;Clean 06/17/22 0830   Dressing Intervention N/A 06/16/22 2000   Dressing Change Due 06/18/22 06/16/22 1000   Date IV Connector(s) Changed 06/15/22 06/15/22 1637   NEXT IV Connector(s) Change 06/18/22 06/15/22 1637   Line Necessity Assessed Antibiotic Therapy Greater than 7 Days 06/16/22 2000   $ Single Lumen PICC Charge Single kit used 06/15/22 1637                MENTAL STATUS ON TRANSFER  At baseline    CONSULTATIONS  Infectious disease  Orthopedic surgery    PROCEDURES  Incision, drainage and excisional debridement of  left lateral ankle with application of wound VAC dressing    LABORATORY  Lab Results   Component Value Date    SODIUM 138 06/14/2022    POTASSIUM 3.8 06/14/2022    CHLORIDE 105 06/14/2022    CO2 21 06/14/2022    GLUCOSE 175 (H) 06/14/2022    BUN 14 06/14/2022    CREATININE 0.67 06/14/2022        Lab Results   Component Value Date    WBC 7.8 06/14/2022    HEMOGLOBIN 11.8 (L) 06/14/2022    HEMATOCRIT 35.1 (L) 06/14/2022    PLATELETCT 240 06/14/2022        Total time of the discharge process exceeds 39 minutes.

## 2022-06-17 NOTE — WOUND TEAM
[facility]  Inpatient Services  Initial Wound and Skin Care Evaluation    Admission Date: 6/10/2022     Last order of IP CONSULT TO WOUND CARE was found on 2022 from Hospital Encounter on 2022     HPI, PMH, SH: Reviewed    Past Surgical History:   Procedure Laterality Date   • IRRIGATION & DEBRIDEMENT GENERAL  2022    Procedure: IRRIGATION AND DEBRIDEMENT LEFT ANKLE;  Surgeon: Panfilo Wagner M.D.;  Location: SURGERY [facility];  Service: Orthopedics   • APPENDECTOMY     • STENT PLACEMENT      cardiac     Social History     Tobacco Use   • Smoking status: Former Smoker     Quit date: 2000     Years since quittin.7   • Smokeless tobacco: Never Used   Substance Use Topics   • Alcohol use: Never     Chief Complaint   Patient presents with   • Wound Check   • Altered Mental Status     Diagnosis: Sepsis (HCC) [A41.9]    Unit where seen by Wound Team: S6     WOUND CONSULT/FOLLOW UP RELATED TO:  L. Lateral ankle NPWT     WOUND HISTORY:  Andrei Garay is a 82 y.o. male past medical history of diabetes mellitus, hypertension who presented 6/10/2022 with altered mental status for last 3 days and left ankle pain.  History is primarily provided by daughter.  No relieving or exacerbating factors were noted.  He denies any fever does report some chills.  In the ER, x-ray of the ankle showed signs concerning for osteomyelitis.  UA grossly positive for urinary tract infection.  Patient met criteria for sepsis started on IV broad-spectrum antibiotics and will be admitted for further work-up and limb preservation consult.    SX with Dr. Wagner 22    WOUND ASSESSMENT/LDA          Negative Pressure Wound Therapy 22 Surgical Ankle Lateral Left (Active)   NPWT Pump Mode / Pressure Setting Continuous;125 mmHg 22 09   Dressing Type Small;Black Foam (Regular) 22 09   Number of Foam Pieces Used 2 22 09   Canister Changed No 22   Output (mL)  0 mL 06/17/22 0900   NEXT Dressing Change/Treatment Date 06/20/22 06/17/22 0900   VAC VeraFlo Pressure (mm/Hg) Continuous;125 mmHg 06/17/22 0900           Wound 06/14/22 Incision Ankle Left WOUND VAC, ACE WRAP (Active)   Wound Image    06/17/22 0900   Site Assessment Red;Pink 06/17/22 0900   Periwound Assessment Pink;Maceration 06/17/22 0900   Margins Defined edges;Unattached edges 06/17/22 0900   Closure Secondary intention 06/17/22 0900   Drainage Amount Small 06/17/22 0900   Drainage Description Sanguineous 06/17/22 0900   Treatments Cleansed;Site care;Offloading 06/17/22 0900   Wound Cleansing Normal Saline Irrigation 06/17/22 0900   Periwound Protectant Skin Protectant Wipes to Periwound;Drape 06/17/22 0900   Dressing Cleansing/Solutions Not Applicable 06/17/22 0900   Dressing Options Wound Vac;Mepilex Heel;Ace Wrap 06/17/22 0900   Dressing Changed Changed 06/17/22 0900   Dressing Status Clean;Dry;Intact 06/17/22 0900   Dressing Change/Treatment Frequency Monday, Wednesday, Friday, and As Needed 06/17/22 0900   NEXT Dressing Change/Treatment Date 06/20/22 06/17/22 0900   NEXT Weekly Photo (Inpatient Only) 06/24/22 06/17/22 0900   Non-staged Wound Description Full thickness 06/17/22 0900   Wound Length (cm) 3 cm 06/17/22 0900   Wound Width (cm) 1.7 cm 06/17/22 0900   Wound Depth (cm) 2.1 cm 06/17/22 0900   Wound Surface Area (cm^2) 5.1 cm^2 06/17/22 0900   Wound Volume (cm^3) 10.71 cm^3 06/17/22 0900   Shape nahid 06/17/22 0900   Wound Odor None 06/17/22 0900   Pulses 1+;Left 06/17/22 0900   Exposed Structures None 06/17/22 0900   WOUND NURSE ONLY - Time Spent with Patient (mins) 60 06/17/22 0900         Vascular:    HONEY:   HONEY Results, Last 30 Days US-HONEY SINGLE LEVEL BILAT    Result Date: 6/12/2022  Narrative  Vascular Laboratory  Conclusions  The ankle pressures are not accurately measured due to calcification and  noncompressibility of the tibial vessels.  There is no evidence of significant arterial  disease.  COOKIE VELEZ  Age:    82    Gender:     M  MRN:    [id number]  :    1940      BSA:  Exam Date:     2022 13:45  Room #:     Inpatient  Priority:     Routine  Ht (in):             Wt (lb):  Ordering Physician:        JIN GREGG  Referring Physician:       [id number]  Sonographer:               Aliyah Pandya RVDAISHA  Study Type:                Complete Bilateral  Technical Quality:         Adequate  Indications:     Ulceration of LE  CPT Codes:       49131  ICD Codes:       707.1  History:         Ulceration of the left lower extremity. No prior exams.  Limitations:                 RIGHT  Waveform            Systolic BPs (mmHg)                             125           Brachial  Triphasic                                Common Femoral  Triphasic                                Popliteal  Triphasic                  200           Posterior Tibial  Triphasic                  179           Dorsalis Pedis                                           Digit                             1.54          HONEY                                           TBI                       LEFT  Waveform        Systolic BPs (mmHg)                             130           Brachial  Triphasic                                Common Femoral  Triphasic                                Popliteal  Triphasic                  187           Posterior Tibial  Triphasic                  180           Dorsalis Pedis                                           Digit                             1.44          HONEY                                           TBI  Findings  Bilateral-  The ankle pressures are not accurately measured due to calcification and  noncompressibility of the tibial vessels.  Doppler waveforms of the common femoral artery and popliteal artery are of  high amplitude and triphasic.  Doppler waveforms at the ankle are brisk and triphasic.  TBI-  Right: 1.29  Left:    1.09  Additional  testing was not performed in accordance with lower extremity  arterial evaluation protocol.  James ledy Gonzalez  (Electronically Signed)  Final Date:      2022                   16:17      Lab Values:    Lab Results   Component Value Date/Time    WBC 7.8 2022 02:18 PM    RBC 4.02 (L) 2022 02:18 PM    HEMOGLOBIN 11.8 (L) 2022 02:18 PM    HEMATOCRIT 35.1 (L) 2022 02:18 PM    CREACTPROT 22.27 (H) 2022 03:03 PM    SEDRATEWES 40 (H) 2022 02:19 AM    HBA1C 6.3 (H) 2022 03:57 PM        Culture Results show:  Recent Results (from the past 720 hour(s))   CULTURE WOUND W/ GRAM STAIN    Collection Time: 22  9:29 AM    Specimen: Wound   Result Value Ref Range    Significant Indicator NEG     Source WND     Site Left Lateral Ankle     Culture Result No growth at 72 hours.     Gram Stain Result Rare WBCs.  No organisms seen.          Pain Level/Medicated:  Denied pain       INTERVENTIONS BY WOUND TEAM:  Chart and images reviewed. Discussed with bedside RN. All areas of concern (based on picture review, LDA review and discussion with bedside RN) have been thoroughly assessed. Documentation of areas based on significant findings. This RN in to assess patient. Performed standard wound care which includes appropriate positioning, dressing removal and non-selective debridement. Pictures and measurements obtained weekly if/when required.  Preparation for Dressing removal: Dressing soaked with normal saline  Non-selectively Debrided with:  normal saline and gauze.  Sharp debridement: n/a  Rosa M wound: Cleansed with normal saline, Prepped with no sting  Primary Dressin/2 thickness black foam placed into wound bed and secured with drape  Secondary (Outer) Dressing: a small window was cut and a button was applied to attach the tracpad.  No leak detected, seal intact. mepilex placed for offloading, and Ace wrap re-applied    Interdisciplinary consultation: Patient, Bedside RN ,      EVALUATION / RATIONALE FOR TREATMENT:  Most Recent Date:  6/17/22: Patient's ankle was a little macerated, but turned pink prior to re-application, wound bed appears nice and red for first dressing change post-op.  Continue NPWT     Goals: Steady decrease in wound area and depth weekly.    WOUND TEAM PLAN OF CARE ([X] for frequency of wound follow up,): X  Nursing to follow dressing orders written for wound care. Contact wound team if area fails to progress, deteriorates or with any questions/concerns if something comes up before next scheduled follow up (See below as to whether wound is following and frequency of wound follow up)  Dressing changes by wound team:                   Follow up 3 times weekly:                NPWT change 3 times weekly:  XMWF   Follow up 1-2 times weekly:      Follow up Bi-Monthly:                   Follow up as needed:     Other (explain):     NURSING PLAN OF CARE ORDERS (X):  Dressing changes: See Dressing Care orders: X  Skin care: See Skin Care orders: X  RN Prevention Protocol: X  Rectal tube care: See Rectal Tube Care orders:   Other orders:    RSKIN:   CURRENTLY IN PLACE (X), APPLIED THIS VISIT (A), ORDERED (O):   Q shift Montrell:  X  Q shift pressure point assessments:  X    Surface/Positioning X  Pressure redistribution mattressX            Low Airloss          Bariatric foam      Bariatric AKILA     Waffle cushion        Waffle Overlay          Reposition q 2 hours  X    TAPs Turning system     [place] Pillow     Offloading/Redistribution A  Sacral Mepilex (Silicone dressing)     Heel Mepilex (Silicone dressing)      A   Heel float boots (Prevalon boot)             Float Heels off Bed with Pillows           Respiratory Room air  Silicone O2 tubing         Gray Foam Ear protectors     Cannula fixation Device (Tender )          High flow offloading Clip    Elastic head band offloading device      Anchorfast                                                         Trach with  Optifoam split foam             Containment/Moisture Prevention continent    Rectal tube or BMS    Purwick/Condom Cath        Martinez Catheter    Barrier wipes           Barrier paste       Antifungal tx      Interdry        Mobilization offloading boot      Up to chair        Ambulate      PT/OT      Nutrition PO      Dietician        Diabetes Education      PO     TF     TPN     NPO   # days     Other        Anticipated discharge plans: MONAE  LTACH:        SNF/Rehab:                  Home Health Care:           Outpatient Wound Center:            Self/Family Care:        Other:                  Vac Discharge Needs: X  Not Applicable Pt not on a wound vac:       Regular Vac while inpatient, alternative dressing at DC:        Regular Vac in use and continued at DC:      X      Reg. Vac w/ Skin Sub/Biologic in use. Will need to be changed 2x wkly:      Veraflo Vac while inpatient, ok to transition to Regular Vac on Discharge:           Veraflo Vac while inpatient, will need to remain on Veraflo Vac upon discharge:

## 2022-06-17 NOTE — PROGRESS NOTES
Infectious Disease Progress Note    Author: Jacobo Chavez M.D. Date & Time of service: 2022  1:35 PM    Chief Complaint:  Follow-up for MRSA bacteremia, left foot infection    Interval History:   afebrile, WBC 10.7.  Patient is much more alert and awake today.  He is awaiting MRI and arterial studies, urine culture with Staph epidermidis   patient remains afebrile, leukocytosis resolved, white count of 3.7 today, tolerating vancomycin.  Repeat blood cultures as below   patient remains afebrile, white count down to 7.8, just returned from I&D, sleeping  6/15 patient remains afebrile, no white count today, no issues with antimicrobials thus far   patient remains afebrile, no CBC this morning.  Plan as below   patient remains afebrile, no CBC this morning, tolerating vancomycin.  Plan for discharge back to [facility] today    Labs Reviewed, Medications Reviewed and Wound Reviewed.    Review of Systems:  Review of Systems   Unable to perform ROS: Medical condition   Limited secondary to underlying dementia    Hemodynamics:  Temp (24hrs), Av.5 °C (97.7 °F), Min:36.1 °C (97 °F), Max:36.8 °C (98.3 °F)  Temperature: 36.1 °C (97 °F)  Pulse  Av  Min: 61  Max: 143   Blood Pressure : (!) 152/74 (Rn aware)       Physical Exam:  Physical Exam  Vitals and nursing note reviewed.   Constitutional:       Appearance: Normal appearance.   HENT:      Mouth/Throat:      Mouth: Mucous membranes are moist.   Eyes:      General: No scleral icterus.        Right eye: No discharge.         Left eye: No discharge.      Conjunctiva/sclera: Conjunctivae normal.   Cardiovascular:      Rate and Rhythm: Normal rate.   Pulmonary:      Effort: Pulmonary effort is normal. No respiratory distress.      Breath sounds: No wheezing.   Abdominal:      Palpations: Abdomen is soft.   Musculoskeletal:      Cervical back: No rigidity.      Comments: Left foot with surgical dressing   Skin:     Findings: No rash.    Neurological:      General: No focal deficit present.      Mental Status: He is alert. He is disoriented.         Meds:    Current Facility-Administered Medications:   •  hydrALAZINE  •  dextrose bolus  •  vancomycin  •  acetaminophen  •  enoxaparin (LOVENOX) injection  •  MD Alert...Vancomycin per Pharmacy  •  insulin regular **AND** POC blood glucose manual result **AND** NOTIFY MD and PharmD **AND** Administer 20 grams of glucose (approximately 8 ounces of fruit juice) every 15 minutes PRN FSBG less than 70 mg/dL **AND** [DISCONTINUED] dextrose bolus  •  atorvastatin  •  gabapentin  •  mirtazapine  •  sertraline  •  HYDROcodone-acetaminophen    Labs:  Recent Labs     06/14/22  1418   WBC 7.8   RBC 4.02*   HEMOGLOBIN 11.8*   HEMATOCRIT 35.1*   MCV 87.3   MCH 29.4   RDW 41.0   PLATELETCT 240   MPV 9.2     Recent Labs     06/14/22  1418   SODIUM 138   POTASSIUM 3.8   CHLORIDE 105   CO2 21   GLUCOSE 175*   BUN 14     Recent Labs     06/14/22  1418   CREATININE 0.67       Imaging:  DX-ANKLE 3+ VIEWS LEFT    Result Date: 6/10/2022  6/10/2022 9:57 AM HISTORY/REASON FOR EXAM:  Atraumatic Pain/Swelling/Deformity. Lateral left ankle wound TECHNIQUE/EXAM DESCRIPTION AND NUMBER OF VIEWS:  3 views of the LEFT ankle. COMPARISON: Left ankle radiography, 4/12/2022. FINDINGS: Bones: Normal bone mineralization. There is cortical irregularity involving the inferior lateral aspect of the lateral malleolus, at the location of distention of soft tissue gas from the skin surface to the surface of the bone. Findings are concerning for osteomyelitis. No acute fracture. No other focal bone lesion. Joints: Intact. No subluxation. Ankle mortise is preserved. Soft tissues: Circumferential left ankle soft tissue swelling, greatest laterally, with soft tissue gas.     1. Lateral left ankle soft tissue swelling with soft tissue gas extending from the skin surface to the lateral aspect of the distal left fibula, with cortical disruption  concerning for osteomyelitis. MRI of the left ankle without and with IV contrast is recommended. 2. Decreased bone mineralization. 3. The remainder of the left ankle radiography is within normal limits.    DX-CHEST-PORTABLE (1 VIEW)    Result Date: 6/10/2022  6/10/2022 9:57 AM HISTORY/REASON FOR EXAM:  possible sepsis.. TECHNIQUE/EXAM DESCRIPTION AND NUMBER OF VIEWS: Single portable view of the chest. COMPARISON: 9/22/2019 FINDINGS: Cardiac silhouette is enlarged. Aortic calcified atherosclerotic plaque. No focal consolidation, pleural effusion, pulmonary edema or pneumothorax. Generalized osteopenia.     No acute cardiopulmonary abnormality.      Micro:  Results     Procedure Component Value Units Date/Time    CULTURE TISSUE W/ GRM STAIN [[id number]] Collected: 06/14/22 0930    Order Status: Completed Specimen: Tissue Updated: 06/17/22 0906     Significant Indicator NEG     Source TISS     Site Left Lateral Ankle     Culture Result No growth at 72 hours.     Gram Stain Result Rare WBCs.  No organisms seen.      Narrative:      Surgery Specimen    Anaerobic Culture [[id number]] Collected: 06/14/22 0930    Order Status: Completed Specimen: Tissue Updated: 06/17/22 0906     Significant Indicator NEG     Source TISS     Site Left Lateral Ankle     Culture Result Culture in progress.    Narrative:      Surgery Specimen    CULTURE WOUND W/ GRAM STAIN [[id number]] Collected: 06/14/22 0929    Order Status: Completed Specimen: Wound Updated: 06/17/22 0905     Significant Indicator NEG     Source WND     Site Left Lateral Ankle     Culture Result No growth at 72 hours.     Gram Stain Result Rare WBCs.  No organisms seen.      Narrative:      Surgery - swabs received    Anaerobic Culture [[id number]] Collected: 06/14/22 0929    Order Status: Completed Specimen: Wound Updated: 06/17/22 0905     Significant Indicator NEG     Source WND     Site Left Lateral Ankle     Culture Result Culture in progress.    Narrative:      Surgery -  "swabs received    BLOOD CULTURE [[id number]] Collected: 06/12/22 0230    Order Status: Completed Specimen: Blood from Peripheral Updated: 06/17/22 0501     Significant Indicator NEG     Source BLD     Site PERIPHERAL     Culture Result No growth after 5 days of incubation.    Narrative:      Contact  Per Hospital Policy: Only change Specimen Src: to \"Line\" if  specified by physician order.  Right AC    BLOOD CULTURE [[id number]] Collected: 06/12/22 0230    Order Status: Completed Specimen: Blood from Peripheral Updated: 06/17/22 0501     Significant Indicator NEG     Source BLD     Site PERIPHERAL     Culture Result No growth after 5 days of incubation.    Narrative:      Contact  Per Hospital Policy: Only change Specimen Src: to \"Line\" if  specified by physician order.  Left Hand    MRSA By PCR (Amp) [[id number]] Collected: 06/15/22 0538    Order Status: Completed Specimen: Respirate from Nares Updated: 06/15/22 1318     MRSA by PCR Negative    Narrative:      Contact  Collected By: [id number] CELINA GAGE  Per [facility] Lab Protocol    GRAM STAIN [[id number]] Collected: 06/14/22 0930    Order Status: Completed Specimen: Tissue Updated: 06/14/22 2022     Significant Indicator .     Source TISS     Site Left Lateral Ankle     Gram Stain Result Rare WBCs.  No organisms seen.      Narrative:      Surgery Specimen    GRAM STAIN [[id number]] Collected: 06/14/22 0929    Order Status: Completed Specimen: Wound Updated: 06/14/22 1650     Significant Indicator .     Source WND     Site Left Lateral Ankle     Gram Stain Result Rare WBCs.  No organisms seen.      Narrative:      Surgery - swabs received    E-Test [[id number]] Collected: 06/10/22 1003    Order Status: Completed Specimen: Other Updated: 06/14/22 1619     ETEST Sensitivity FINAL**2    Narrative:      61 tel. [phone] 06/13/2022, 17:46, RB PERF. RESULTS CALLED TO:[phone]  Per Hospital Policy: Only change Specimen Src: to \"Line\" if  specified by physician order.    BLOOD " "CULTURE [[id number]]  (Abnormal)  (Susceptibility) Collected: 06/10/22 1003    Order Status: Completed Specimen: Blood from Peripheral Updated: 06/14/22 1619     Significant Indicator POS     Source BLD     Site PERIPHERAL     Culture Result Growth detected by Bactec instrument. 06/11/2022  13:45      Methicillin Resistant Staphylococcus aureus  See previous culture for sensitivity report.        Streptococcus pyogenes (Group A)    Narrative:      CALL  [facility]  61 tel. [phone],  CALLED  61 tel. [phone] 06/13/2022, 17:46, RB PERF. RESULTS CALLED TO:[phone]  Per Hospital Policy: Only change Specimen Src: to \"Line\" if  specified by physician order.  Right AC    Susceptibility     Streptococcus pyogenes (group a) (2)     Antibiotic Interpretation Microscan   Method Status    Penicillin Sensitive 0.023 mcg/mL E-TEST Final    Cefotaxime Sensitive 0.047 mcg/mL E-TEST Final    Clindamycin Intermediate - mcg/mL E-TEST Final                   MRSA By PCR (Amp) [[id number]]     Order Status: Canceled Specimen: Respirate from Nares     BLOOD CULTURE [[id number]]  (Abnormal)  (Susceptibility) Collected: 06/10/22 0937    Order Status: Completed Specimen: Blood from Peripheral Updated: 06/13/22 0904     Significant Indicator POS     Source BLD     Site PERIPHERAL     Culture Result Growth detected by Bactec instrument. 06/11/2022  09:11  Methicillin Resistant Staphylococcus aureus (MRSA)  detected by PCR.        Methicillin Resistant Staphylococcus aureus  This isolate is presumed to be clindamycin resistant based on  detection of inducible resistance.      Narrative:      CALL  [facility]  61 tel. [phone],  CALLED  61 tel. [phone] 06/11/2022, 09:15, RB PERF. RESULTS CALLED TO:  [phone] RN + Cornelio RN  Per Hospital Policy: Only change Specimen Src: to \"Line\" if  specified by physician order.  No site indicated    Susceptibility     Methicillin resistant staphylococcus aureus (1)     Antibiotic Interpretation Microscan   Method Status "    Azithromycin Resistant >4 mcg/mL ED Final    Clindamycin Resistant <=0.25 mcg/mL ED Final    Cefazolin Resistant <=8 mcg/mL ED Final    Cefepime Resistant 8 mcg/mL ED Final    Ceftaroline Sensitive <=0.5 mcg/mL ED Final    Daptomycin Sensitive <=0.5 mcg/mL ED Final    Ampicillin/sulbactam Resistant <=8/4 mcg/mL ED Final    Erythromycin Resistant >4 mcg/mL ED Final    Vancomycin Sensitive 1 mcg/mL ED Final    Oxacillin Resistant >2 mcg/mL ED Final    Trimeth/Sulfa Sensitive <=0.5/9.5 mcg/mL ED Final    Tetracycline Resistant >8 mcg/mL ED Final                   URINE CULTURE(NEW) [[id number]]  (Abnormal)  (Susceptibility) Collected: 06/10/22 0957    Order Status: Completed Specimen: Urine Updated: 06/12/22 0921     Significant Indicator POS     Source UR     Site -     Culture Result -      Staphylococcus epidermidis  >100,000 cfu/mL      Narrative:      Indication for culture:->Evaluation for sepsis without a  clear source of infection  Indication for culture:->Evaluation for sepsis without a    Susceptibility     Staphylococcus epidermidis (1)     Antibiotic Interpretation Microscan   Method Status    Cefazolin Resistant <=8 mcg/mL ED Final    Cefepime Resistant 8 mcg/mL ED Final    Daptomycin Sensitive <=0.5 mcg/mL ED Final    Nitrofurantoin Sensitive <=32 mcg/mL ED Final    Trimeth/Sulfa Sensitive <=0.5/9.5 mcg/mL ED Final    Tetracycline Sensitive <=4 mcg/mL ED Final                         Assessment:  82 y.o. man with underlying dementia, type 2 diabetes mellitus and recent hospitalization in April secondary to left ankle cellulitis with MRSA admitted on 6/10/2022 secondary to acute encephalopathy and increasing left ankle pain and swelling.  Patient found to have findings highly concerning for osteomyelitis on radiograph and MRSA bacteremia.  Source of his bacteremia is from his left foot infection.    Pertinent diagnoses:  MRSA bacteremia  Group A Strep bacteremia  Left ankle  osteomyelitis  Bacteriuria, urine cx +staph epi  Leukocytosis, resolved  Type 2 diabetes mellitus, hemoglobin A1c 6.3  Dementia    Plan:  -Blood cultures x2 from 6/10 positive for MRSA and group A Strep.  Repeat blood cultures x2 from 6/12 no growth till date  -Continue IV vancomycin for now.  Vancomycin trough of 6.3 on 6/15  -MRI of the left ankle suggestive of underlying osteomyelitis  -Patient was taken to the OR 6/14 for excisional debridement with application of wound VAC.  Will follow OR cultures -no growth till date  -TTE with no obvious valvular vegetations  -On discharge, anticipate IV daptomycin 750 mg every 24 hours for 6 weeks through 7/23/2022  -At least weekly CBC with differential, CMP, CPK while on IV antibiotics    Disposition: Anticipate placement and 6 weeks of IV daptomycin as above.  If facility refuses daptomycin due to cost, okay to use IV vancomycin instead, ONLY IF facility has access to pharmacists to dose and monitor vancomycin appropriately.  Patient is a .  If the [facility] will need [facility] from a [facility] clinician, may need Dr. Holley's assistance.  Okay for discharge from an ID standpoint if the outpatient IV antibiotics are set up  Need for PICC line: Yes, ordered    Discussed with internal medicine Dr. Newton.  We will continue to follow

## 2022-06-17 NOTE — CARE PLAN
The patient is Stable - Low risk of patient condition declining or worsening    Shift Goals  Clinical Goals: Remain free from falls  Patient Goals: Rest  Family Goals: Wound care    Progress made toward(s) clinical / shift goals:  Fall precautions in place    Patient is not progressing towards the following goals: Pt disoriented to situation.       Problem: Knowledge Deficit - Standard  Goal: Patient and family/care givers will demonstrate understanding of plan of care, disease process/condition, diagnostic tests and medications  Outcome: Not Progressing

## 2022-06-17 NOTE — PROGRESS NOTES
Received report from FLORES Thomas. Pt is A&O x2, disoriented to time and situation. Pt on RA, no signs of acute distress. Pt complains of 4/10 pain to left foot and ankle. Pt denied pain medication, rest and repositioning was encouraged. POC discussed with patient. Safety precautions in place, bed in lowest position, and call light and personal belongings within reach. Hourly rounding in place.     0450: Pt BP this AM was 166/80. Pt did not have any schedule or PRN BP medication. LUCINA Awad, notified at 0417 of BP. Mary ordered PRN hydralazine. No other needs at this time.

## 2022-06-17 NOTE — CARE PLAN
The patient is Stable - Low risk of patient condition declining or worsening    Shift Goals  Clinical Goals: Maintain pt safety  Patient Goals: Rest  Family Goals: Wound care    Progress made toward(s) clinical / shift goals:  Bed alarm/chair alarm on, call light in reach, room near nurses station, repositioning    Patient is not progressing towards the following goals:

## 2022-06-17 NOTE — DISCHARGE INSTRUCTIONS
Discharge Instructions    Discharged to other by medical transportation with escort. Discharged via wheelchair, hospital escort: Yes.  Special equipment needed: Wheelchair and Wound VAC    Be sure to schedule a follow-up appointment with your primary care doctor or any specialists as instructed.     Discharge Plan:   Diet Plan: Discussed  Activity Level: Discussed  Confirmed Follow up Appointment: No (Comments)  Confirmed Symptoms Management: Discussed  Medication Reconciliation Updated: Yes    I understand that a diet low in cholesterol, fat, and sodium is recommended for good health. Unless I have been given specific instructions below for another diet, I accept this instruction as my diet prescription.   Other diet: Diabetic    Special Instructions: None    Is patient discharged on Warfarin / Coumadin?   No     Depression / Suicide Risk    As [facility] are discharged from [facility], it is important to learn how to keep safe from harming yourself.    Recognize the warning signs:  Abrupt changes in personality, positive or negative- including increase in energy   Giving away possessions  Change in eating patterns- significant weight changes-  positive or negative  Change in sleeping patterns- unable to sleep or sleeping all the time   Unwillingness or inability to communicate  Depression  Unusual sadness, discouragement and loneliness  Talk of wanting to die  Neglect of personal appearance   Rebelliousness- reckless behavior  Withdrawal from people/activities they love  Confusion- inability to concentrate     If you or a loved one observes any of these behaviors or has concerns about self-harm, here's what you can do:  Talk about it- your feelings and reasons for harming yourself  Remove any means that you might use to hurt yourself (examples: pills, rope, extension cords, firearm)  Get professional help from the community (Mental Health, Substance Abuse, psychological counseling)  Do not be alone:Call  your Safe Contact- someone whom you trust who will be there for you.  Call your local CRISIS HOTLINE 510-2526 or 378-006-5330  Call your local Children's Mobile Crisis Response Team [place] (982) 140-2450 or www.[employer]  Call the toll free National Suicide Prevention Hotlines   National Suicide Prevention Lifeline 163-499-WVKH (7781)  [employer] 800-SUICIDE (867-6443)

## 2022-06-18 ENCOUNTER — APPOINTMENT (OUTPATIENT)
Dept: RADIOLOGY | Facility: MEDICAL CENTER | Age: 82
DRG: 854 | End: 2022-06-18
Payer: COMMERCIAL

## 2022-06-18 LAB
BACTERIA SPEC ANAEROBE CULT: NORMAL
BACTERIA WND AEROBE CULT: ABNORMAL
BACTERIA WND AEROBE CULT: ABNORMAL
GLUCOSE BLD STRIP.AUTO-MCNC: 207 MG/DL (ref 65–99)
GLUCOSE BLD STRIP.AUTO-MCNC: 217 MG/DL (ref 65–99)
GLUCOSE BLD STRIP.AUTO-MCNC: 224 MG/DL (ref 65–99)
GLUCOSE BLD STRIP.AUTO-MCNC: 251 MG/DL (ref 65–99)
GRAM STN SPEC: ABNORMAL
SIGNIFICANT IND 70042: ABNORMAL
SIGNIFICANT IND 70042: NORMAL
SITE SITE: ABNORMAL
SITE SITE: NORMAL
SOURCE SOURCE: ABNORMAL
SOURCE SOURCE: NORMAL

## 2022-06-18 PROCEDURE — A9270 NON-COVERED ITEM OR SERVICE: HCPCS | Performed by: STUDENT IN AN ORGANIZED HEALTH CARE EDUCATION/TRAINING PROGRAM

## 2022-06-18 PROCEDURE — 700105 HCHG RX REV CODE 258: Performed by: STUDENT IN AN ORGANIZED HEALTH CARE EDUCATION/TRAINING PROGRAM

## 2022-06-18 PROCEDURE — 700102 HCHG RX REV CODE 250 W/ 637 OVERRIDE(OP): Performed by: STUDENT IN AN ORGANIZED HEALTH CARE EDUCATION/TRAINING PROGRAM

## 2022-06-18 PROCEDURE — C1751 CATH, INF, PER/CENT/MIDLINE: HCPCS

## 2022-06-18 PROCEDURE — 82962 GLUCOSE BLOOD TEST: CPT | Mod: 91

## 2022-06-18 PROCEDURE — 700111 HCHG RX REV CODE 636 W/ 250 OVERRIDE (IP): Performed by: STUDENT IN AN ORGANIZED HEALTH CARE EDUCATION/TRAINING PROGRAM

## 2022-06-18 PROCEDURE — 99233 SBSQ HOSP IP/OBS HIGH 50: CPT | Performed by: INTERNAL MEDICINE

## 2022-06-18 PROCEDURE — 02HV33Z INSERTION OF INFUSION DEVICE INTO SUPERIOR VENA CAVA, PERCUTANEOUS APPROACH: ICD-10-PCS | Performed by: INTERNAL MEDICINE

## 2022-06-18 PROCEDURE — 770001 HCHG ROOM/CARE - MED/SURG/GYN PRIV*

## 2022-06-18 RX ADMIN — MIRTAZAPINE 15 MG: 15 TABLET, FILM COATED ORAL at 20:49

## 2022-06-18 RX ADMIN — ENOXAPARIN SODIUM 40 MG: 40 INJECTION SUBCUTANEOUS at 17:53

## 2022-06-18 RX ADMIN — SERTRALINE 50 MG: 50 TABLET, FILM COATED ORAL at 17:54

## 2022-06-18 RX ADMIN — INSULIN HUMAN 2 UNITS: 100 INJECTION, SOLUTION PARENTERAL at 13:07

## 2022-06-18 RX ADMIN — INSULIN HUMAN 2 UNITS: 100 INJECTION, SOLUTION PARENTERAL at 20:49

## 2022-06-18 RX ADMIN — INSULIN HUMAN 2 UNITS: 100 INJECTION, SOLUTION PARENTERAL at 08:36

## 2022-06-18 RX ADMIN — INSULIN HUMAN 3 UNITS: 100 INJECTION, SOLUTION PARENTERAL at 18:06

## 2022-06-18 RX ADMIN — VANCOMYCIN HYDROCHLORIDE 1250 MG: 500 INJECTION, POWDER, LYOPHILIZED, FOR SOLUTION INTRAVENOUS at 04:36

## 2022-06-18 RX ADMIN — GABAPENTIN 300 MG: 300 CAPSULE ORAL at 04:31

## 2022-06-18 RX ADMIN — HYDROCODONE BITARTRATE AND ACETAMINOPHEN 1 TABLET: 5; 325 TABLET ORAL at 17:53

## 2022-06-18 RX ADMIN — GABAPENTIN 300 MG: 300 CAPSULE ORAL at 17:53

## 2022-06-18 RX ADMIN — HYDROCODONE BITARTRATE AND ACETAMINOPHEN 1 TABLET: 5; 325 TABLET ORAL at 04:31

## 2022-06-18 RX ADMIN — VANCOMYCIN HYDROCHLORIDE 1250 MG: 500 INJECTION, POWDER, LYOPHILIZED, FOR SOLUTION INTRAVENOUS at 17:53

## 2022-06-18 RX ADMIN — ATORVASTATIN CALCIUM 40 MG: 40 TABLET, FILM COATED ORAL at 17:53

## 2022-06-18 ASSESSMENT — PAIN DESCRIPTION - PAIN TYPE
TYPE: CHRONIC PAIN
TYPE: CHRONIC PAIN

## 2022-06-18 ASSESSMENT — FIBROSIS 4 INDEX: FIB4 SCORE: 2.15

## 2022-06-18 NOTE — DISCHARGE PLANNING
1115: FL[facility]ES SAMUEL [facility] if [facility] were able [facility] today and provide transport. Was told to call back.    1230: RN CM called back and they indicated they did not have transportation, but would reach out to the DON and see what they could do.    1600: RN CM made follow-up call. No answer, left vm with tomorrow's scheduled CM's extension. RN CM notified bedside RNShi

## 2022-06-18 NOTE — CARE PLAN
The patient is Stable - Low risk of patient condition declining or worsening    Shift Goals  Clinical Goals: Patient will remain free from falls  Patient Goals: Rest  Family Goals: discharge    Progress made toward(s) clinical / shift goals:    Problem: Fall Risk  Goal: Patient will remain free from falls  Outcome: Progressing       Patient is not progressing towards the following goals:      Problem: Knowledge Deficit - Standard  Goal: Patient and family/care givers will demonstrate understanding of plan of care, disease process/condition, diagnostic tests and medications  Outcome: Not Progressing     Problem: Respiratory  Goal: Patient will achieve/maintain optimum respiratory ventilation and gas exchange  Outcome: Not Progressing

## 2022-06-18 NOTE — PROGRESS NOTES
Infectious Disease Progress Note    Author: Jacobo Chavez M.D. Date & Time of service: 2022  10:22 AM    Chief Complaint:  Follow-up for MRSA bacteremia, left foot infection    Interval History:   afebrile, WBC 10.7.  Patient is much more alert and awake today.  He is awaiting MRI and arterial studies, urine culture with Staph epidermidis   patient remains afebrile, leukocytosis resolved, white count of 3.7 today, tolerating vancomycin.  Repeat blood cultures as below   patient remains afebrile, white count down to 7.8, just returned from I&D, sleeping  6/15 patient remains afebrile, no white count today, no issues with antimicrobials thus far   patient remains afebrile, no CBC this morning.  Plan as below   patient remains afebrile, no CBC this morning, tolerating vancomycin.  Plan for discharge back to [facility] today   patient remains afebrile, no white count this morning, no new issues with the IV vancomycin, discharging today to [facility]    Labs Reviewed, Medications Reviewed and Wound Reviewed.    Review of Systems:  Review of Systems   Unable to perform ROS: Medical condition   Limited secondary to underlying dementia    Hemodynamics:  Temp (24hrs), Av.4 °C (97.6 °F), Min:36.2 °C (97.2 °F), Max:36.8 °C (98.3 °F)  Temperature: 36.2 °C (97.2 °F)  Pulse  Av.4  Min: 61  Max: 143   Blood Pressure : (!) 154/76 (Nurse notified )       Physical Exam:  Physical Exam  Vitals and nursing note reviewed.   Constitutional:       Appearance: Normal appearance.   HENT:      Mouth/Throat:      Mouth: Mucous membranes are moist.   Eyes:      General: No scleral icterus.        Right eye: No discharge.         Left eye: No discharge.      Conjunctiva/sclera: Conjunctivae normal.   Cardiovascular:      Rate and Rhythm: Normal rate.   Pulmonary:      Effort: Pulmonary effort is normal. No respiratory distress.      Breath sounds: No wheezing.   Abdominal:      Palpations:  Abdomen is soft.   Musculoskeletal:      Cervical back: No rigidity.      Comments: Left foot with surgical dressing   Skin:     Findings: No rash.   Neurological:      General: No focal deficit present.      Mental Status: He is alert. He is disoriented.         Meds:    Current Facility-Administered Medications:   •  hydrALAZINE  •  dextrose bolus  •  vancomycin  •  acetaminophen  •  enoxaparin (LOVENOX) injection  •  MD Alert...Vancomycin per Pharmacy  •  insulin regular **AND** POC blood glucose manual result **AND** NOTIFY MD and PharmD **AND** Administer 20 grams of glucose (approximately 8 ounces of fruit juice) every 15 minutes PRN FSBG less than 70 mg/dL **AND** [DISCONTINUED] dextrose bolus  •  atorvastatin  •  gabapentin  •  mirtazapine  •  sertraline  •  HYDROcodone-acetaminophen    Labs:  No results for input(s): WBC, RBC, HEMOGLOBIN, HEMATOCRIT, MCV, MCH, RDW, PLATELETCT, MPV, NEUTSPOLYS, LYMPHOCYTES, MONOCYTES, EOSINOPHILS, BASOPHILS, RBCMORPHOLO in the last 72 hours.  No results for input(s): SODIUM, POTASSIUM, CHLORIDE, CO2, GLUCOSE, BUN, CPKTOTAL in the last 72 hours.  No results for input(s): ALBUMIN, TBILIRUBIN, ALKPHOSPHAT, TOTPROTEIN, ALTSGPT, ASTSGOT, CREATININE in the last 72 hours.    Imaging:  DX-ANKLE 3+ VIEWS LEFT    Result Date: 6/10/2022  6/10/2022 9:57 AM HISTORY/REASON FOR EXAM:  Atraumatic Pain/Swelling/Deformity. Lateral left ankle wound TECHNIQUE/EXAM DESCRIPTION AND NUMBER OF VIEWS:  3 views of the LEFT ankle. COMPARISON: Left ankle radiography, 4/12/2022. FINDINGS: Bones: Normal bone mineralization. There is cortical irregularity involving the inferior lateral aspect of the lateral malleolus, at the location of distention of soft tissue gas from the skin surface to the surface of the bone. Findings are concerning for osteomyelitis. No acute fracture. No other focal bone lesion. Joints: Intact. No subluxation. Ankle mortise is preserved. Soft tissues: Circumferential left ankle  soft tissue swelling, greatest laterally, with soft tissue gas.     1. Lateral left ankle soft tissue swelling with soft tissue gas extending from the skin surface to the lateral aspect of the distal left fibula, with cortical disruption concerning for osteomyelitis. MRI of the left ankle without and with IV contrast is recommended. 2. Decreased bone mineralization. 3. The remainder of the left ankle radiography is within normal limits.    DX-CHEST-PORTABLE (1 VIEW)    Result Date: 6/10/2022  6/10/2022 9:57 AM HISTORY/REASON FOR EXAM:  possible sepsis.. TECHNIQUE/EXAM DESCRIPTION AND NUMBER OF VIEWS: Single portable view of the chest. COMPARISON: 9/22/2019 FINDINGS: Cardiac silhouette is enlarged. Aortic calcified atherosclerotic plaque. No focal consolidation, pleural effusion, pulmonary edema or pneumothorax. Generalized osteopenia.     No acute cardiopulmonary abnormality.      Micro:  Results     Procedure Component Value Units Date/Time    CULTURE TISSUE W/ GRM STAIN [[id number]] Collected: 06/14/22 0930    Order Status: Completed Specimen: Tissue Updated: 06/17/22 0906     Significant Indicator NEG     Source TISS     Site Left Lateral Ankle     Culture Result No growth at 72 hours.     Gram Stain Result Rare WBCs.  No organisms seen.      Narrative:      Surgery Specimen    Anaerobic Culture [[id number]] Collected: 06/14/22 0930    Order Status: Completed Specimen: Tissue Updated: 06/17/22 0906     Significant Indicator NEG     Source TISS     Site Left Lateral Ankle     Culture Result Culture in progress.    Narrative:      Surgery Specimen    CULTURE WOUND W/ GRAM STAIN [[id number]] Collected: 06/14/22 0929    Order Status: Completed Specimen: Wound Updated: 06/17/22 0905     Significant Indicator NEG     Source WND     Site Left Lateral Ankle     Culture Result No growth at 72 hours.     Gram Stain Result Rare WBCs.  No organisms seen.      Narrative:      Surgery - swabs received    Anaerobic Culture  "[[id number]] Collected: 06/14/22 0929    Order Status: Completed Specimen: Wound Updated: 06/17/22 0905     Significant Indicator NEG     Source WND     Site Left Lateral Ankle     Culture Result Culture in progress.    Narrative:      Surgery - swabs received    BLOOD CULTURE [[id number]] Collected: 06/12/22 0230    Order Status: Completed Specimen: Blood from Peripheral Updated: 06/17/22 0501     Significant Indicator NEG     Source BLD     Site PERIPHERAL     Culture Result No growth after 5 days of incubation.    Narrative:      Contact  Per Hospital Policy: Only change Specimen Src: to \"Line\" if  specified by physician order.  Right AC    BLOOD CULTURE [[id number]] Collected: 06/12/22 0230    Order Status: Completed Specimen: Blood from Peripheral Updated: 06/17/22 0501     Significant Indicator NEG     Source BLD     Site PERIPHERAL     Culture Result No growth after 5 days of incubation.    Narrative:      Contact  Per Hospital Policy: Only change Specimen Src: to \"Line\" if  specified by physician order.  Left Hand    MRSA By PCR (Amp) [[id number]] Collected: 06/15/22 0538    Order Status: Completed Specimen: Respirate from Nares Updated: 06/15/22 1318     MRSA by PCR Negative    Narrative:      Contact  Collected By: [id number] CELINA GAGE  Per P&T Lab Protocol    GRAM STAIN [[id number]] Collected: 06/14/22 0930    Order Status: Completed Specimen: Tissue Updated: 06/14/22 2022     Significant Indicator .     Source TISS     Site Left Lateral Ankle     Gram Stain Result Rare WBCs.  No organisms seen.      Narrative:      Surgery Specimen    GRAM STAIN [[id number]] Collected: 06/14/22 0929    Order Status: Completed Specimen: Wound Updated: 06/14/22 1650     Significant Indicator .     Source WND     Site Left Lateral Ankle     Gram Stain Result Rare WBCs.  No organisms seen.      Narrative:      Surgery - swabs received    E-Test [[id number]] Collected: 06/10/22 1003    Order Status: Completed Specimen: Other " "Updated: 06/14/22 1619     ETEST Sensitivity FINAL**2    Narrative:      61 tel. [phone] 06/13/2022, 17:46, RB PERF. RESULTS CALLED TO:[phone]  Per Hospital Policy: Only change Specimen Src: to \"Line\" if  specified by physician order.    BLOOD CULTURE [[id number]]  (Abnormal)  (Susceptibility) Collected: 06/10/22 1003    Order Status: Completed Specimen: Blood from Peripheral Updated: 06/14/22 1619     Significant Indicator POS     Source BLD     Site PERIPHERAL     Culture Result Growth detected by Bactec instrument. 06/11/2022  13:45      Methicillin Resistant Staphylococcus aureus  See previous culture for sensitivity report.        Streptococcus pyogenes (Group A)    Narrative:      CALL  [facility]  61 tel. [phone],  CALLED  61 tel. [phone] 06/13/2022, 17:46, RB PERF. RESULTS CALLED TO:[id number]  Per Hospital Policy: Only change Specimen Src: to \"Line\" if  specified by physician order.  Right AC    Susceptibility     Streptococcus pyogenes (group a) (2)     Antibiotic Interpretation Microscan   Method Status    Penicillin Sensitive 0.023 mcg/mL E-TEST Final    Cefotaxime Sensitive 0.047 mcg/mL E-TEST Final    Clindamycin Intermediate - mcg/mL E-TEST Final                   MRSA By PCR (Amp) [[id number]]     Order Status: Canceled Specimen: Respirate from Nares     BLOOD CULTURE [[id number]]  (Abnormal)  (Susceptibility) Collected: 06/10/22 0937    Order Status: Completed Specimen: Blood from Peripheral Updated: 06/13/22 0904     Significant Indicator POS     Source BLD     Site PERIPHERAL     Culture Result Growth detected by Bactec instrument. 06/11/2022  09:11  Methicillin Resistant Staphylococcus aureus (MRSA)  detected by PCR.        Methicillin Resistant Staphylococcus aureus  This isolate is presumed to be clindamycin resistant based on  detection of inducible resistance.      Narrative:      CALL  [facility]  61 tel. [phone],  CALLED  61 tel. [phone] 06/11/2022, 09:15, RB PERF. RESULTS CALLED TO:  [phone] RN + " "Cornelio TANNER  Per Hospital Policy: Only change Specimen Src: to \"Line\" if  specified by physician order.  No site indicated    Susceptibility     Methicillin resistant staphylococcus aureus (1)     Antibiotic Interpretation Microscan   Method Status    Azithromycin Resistant >4 mcg/mL ED Final    Clindamycin Resistant <=0.25 mcg/mL ED Final    Cefazolin Resistant <=8 mcg/mL ED Final    Cefepime Resistant 8 mcg/mL ED Final    Ceftaroline Sensitive <=0.5 mcg/mL ED Final    Daptomycin Sensitive <=0.5 mcg/mL ED Final    Ampicillin/sulbactam Resistant <=8/4 mcg/mL ED Final    Erythromycin Resistant >4 mcg/mL ED Final    Vancomycin Sensitive 1 mcg/mL ED Final    Oxacillin Resistant >2 mcg/mL ED Final    Trimeth/Sulfa Sensitive <=0.5/9.5 mcg/mL ED Final    Tetracycline Resistant >8 mcg/mL ED Final                   URINE CULTURE(NEW) [[id number]]  (Abnormal)  (Susceptibility) Collected: 06/10/22 0957    Order Status: Completed Specimen: Urine Updated: 06/12/22 0921     Significant Indicator POS     Source UR     Site -     Culture Result -      Staphylococcus epidermidis  >100,000 cfu/mL      Narrative:      Indication for culture:->Evaluation for sepsis without a  clear source of infection  Indication for culture:->Evaluation for sepsis without a    Susceptibility     Staphylococcus epidermidis (1)     Antibiotic Interpretation Microscan   Method Status    Cefazolin Resistant <=8 mcg/mL ED Final    Cefepime Resistant 8 mcg/mL ED Final    Daptomycin Sensitive <=0.5 mcg/mL ED Final    Nitrofurantoin Sensitive <=32 mcg/mL ED Final    Trimeth/Sulfa Sensitive <=0.5/9.5 mcg/mL ED Final    Tetracycline Sensitive <=4 mcg/mL ED Final                         Assessment:  82 y.o. man with underlying dementia, type 2 diabetes mellitus and recent hospitalization in April secondary to left ankle cellulitis with MRSA admitted on 6/10/2022 secondary to acute encephalopathy and increasing left ankle pain and swelling.  " Patient found to have findings highly concerning for osteomyelitis on radiograph and MRSA bacteremia.  Source of his bacteremia is from his left foot infection.    Pertinent diagnoses:  MRSA bacteremia  Group A Strep bacteremia  Left ankle osteomyelitis  Bacteriuria, urine cx +staph epi  Leukocytosis, resolved  Type 2 diabetes mellitus, hemoglobin A1c 6.3  Dementia    Plan:  -Blood cultures x2 from 6/10 positive for MRSA and group A Strep.  Repeat blood cultures x2 from 6/12 negative  -Continue IV vancomycin for now.  Vancomycin trough of 6.3 on 6/15, peak 21.9 on 6/14  -MRI of the left ankle suggestive of underlying osteomyelitis  -Patient was taken to the OR 6/14 for excisional debridement with application of wound VAC.  Will follow OR cultures -no growth till date  -TTE with no obvious valvular vegetations  -On discharge, anticipate IV daptomycin 750 mg every 24 hours for 6 weeks through 7/23/2022  -At least weekly CBC with differential, CMP, CPK while on IV antibiotics    Disposition: Anticipate placement and 6 weeks of IV daptomycin as above.  If facility refuses daptomycin due to cost, okay to use IV vancomycin instead, ONLY IF facility has access to pharmacists to dose and monitor vancomycin appropriately.  Patient is a .  If the [facility] will need orders from a [facility] clinician, may need Dr. Holley's assistance.  Okay for discharge from an ID standpoint if the outpatient IV antibiotics are set up  Need for PICC line: Yes, ordered    Discussed with internal medicine Dr. Newton.  ID will sign off    ID clinic follow-up in 4 to 6 weeks

## 2022-06-18 NOTE — DISCHARGE SUMMARY
Discharge Summary    CHIEF COMPLAINT ON ADMISSION  Chief Complaint   Patient presents with   • Wound Check   • Altered Mental Status       Reason for Admission  Sent By EMS     CODE STATUS  DNAR/DNI    HPI & HOSPITAL COURSE  82 y.o. 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  He was started on broad-spectrum antibiotics. 6/10/2022 blood cultures positive for MRSA.  Infectious is following.  Echocardiogram normal and follow-up 6/12/2022 blood cultures with no growth to date. Orthopedic surgery following for which patient underwent Incision, drainage and excisional debridement of  left lateral ankle with application of wound VAC dressing by orthopedic surgery on 6/14/2022.  OR tissue cultures were obtained and with no growth to date.  As his blood cultures cleared, PICC line was placed.  He has been on vancomycin while inpatient but as per infectious disease recommendation he is to complete a 6-week IV antibiotic regimen which ends on 7/23/2022 and he is to be transition to Daptomycin at a skilled nursing facility.  Patient will need weekly CBCs, CMP and CPK while on IV daptomycin.  Patient will continue to require wound care and wound VAC management which we will continue at the skilled nursing facility.  Stable patient with in chronic condition was transferred to skilled nursing facility on daptomycin antibiotic regimen which he is to conclude on 7/23/2022.  Patient was transition to skilled nursing facility for continued care.    No interval changes. Remains with same care plan. Wound vac will be needed at the transition facility.     Therefore, he is discharged in good and stable condition to skilled nursing facility.    The patient met 2-midnight criteria for an inpatient stay at the time of discharge.      FOLLOW UP ITEMS POST DISCHARGE  Start IV daptomycin at skilled nursing facility which  she is to conclude on 7/23/2022 as per infectious disease recommendations.  Wound care follow-up for wound VAC management  Skilled nursing facility to follow post hospital discharge care    DISCHARGE DIAGNOSES  Principal Problem:    MRSA bacteremia POA: Yes  Active Problems:    Hyperlipidemia POA: Yes    Hypertension POA: Yes    Depression POA: Yes    Sepsis (HCC) POA: Yes    Type 2 diabetes mellitus (HCC) POA: Yes    Obesity POA: Yes    Advance care planning POA: Yes    UTI (urinary tract infection) POA: Yes    Diabetic foot infection (HCC) POA: Yes  Resolved Problems:    * No resolved hospital problems. *      FOLLOW UP  No future appointments.  [facility]  [address] [facility] [id number] [phone]        Stephanie Diaz M.D.  [address] [address]  [place] [id number] [phone]    Call  Please call your primary care provider to schedule a hospital follow up. Thank you.      MEDICATIONS ON DISCHARGE     Medication List      CONTINUE taking these medications      Instructions   acetaminophen 325 MG Tabs  Commonly known as: Tylenol   Take 2 Tablets by mouth every 6 hours as needed.  Dose: 650 mg     ascorbic acid 500 MG tablet  Commonly known as: Vitamin C   Take 1,000 mg by mouth every day.  Dose: 1,000 mg     atorvastatin 20 MG Tabs  Commonly known as: LIPITOR   Take 40 mg by mouth every evening.  Dose: 40 mg     cadexomer iodine 0.9 % pad  Commonly known as: IODOFLEX   Apply 1 Application topically every morning. APPLY TO L LATERAL MALEOLUS WOUND CARE  Dose: 1 Application     diclofenac sodium 1 % Gel  Commonly known as: Voltaren   Apply 4 g topically every evening.  Dose: 4 g     gabapentin 300 MG Caps  Commonly known as: NEURONTIN   Take 300 mg by mouth 2 times a day.  Dose: 300 mg     HYDROcodone-acetaminophen 5-325 MG Tabs per tablet  Commonly known as: NORCO   Take 1 Tablet by mouth 2 times a day.  Dose: 1 Tablet     lisinopril 2.5 MG Tabs  Commonly known as: PRINIVIL    Take 2.5 mg by mouth every day.  Dose: 2.5 mg     Melatonin 10 MG Tabs   Take 10 mg by mouth at bedtime.  Dose: 10 mg     metformin 1000 MG tablet  Commonly known as: GLUCOPHAGE   Take 1,000 mg by mouth 2 times a day, with meals.  Dose: 1,000 mg     metoprolol tartrate 25 MG Tabs  Commonly known as: LOPRESSOR   Take 25 mg by mouth 2 times a day.  Dose: 25 mg     mirtazapine 15 MG Tabs  Commonly known as: Remeron   Take 15 mg by mouth every evening.  Dose: 15 mg     sertraline 50 MG Tabs  Commonly known as: Zoloft   Take 50 mg by mouth every evening.  Dose: 50 mg     vitamin D2 (Ergocalciferol) 1.25 MG (26299 UT) Caps capsule  Commonly known as: Drisdol   Take 50,000 Units by mouth every 30 days.  Dose: 50,000 Units        STOP taking these medications    PROBIOTIC PO            Allergies  No Known Allergies    DIET  Orders Placed This Encounter   Procedures   • Diet Order Diet: Consistent CHO (Diabetic)     Standing Status:   Standing     Number of Occurrences:   1     Order Specific Question:   Diet:     Answer:   Consistent CHO (Diabetic) [4]       ACTIVITY  As tolerated.  Weight bearing as tolerated    LINES, DRAINS, AND WOUNDS  This is an automated list. Peripheral IVs will be removed prior to discharge.  PICC Single Lumen 06/15/22 Right Brachial (Active)   Site Assessment Clean;Dry;Intact 06/17/22 0830   Line Status Infusing 06/17/22 0830   Line Secured at (cm) 0 cm 06/15/22 1637   Extremity Circumference (cm) 33 cm 06/15/22 1637   Dressing Type Biopatch;Securing device;Skin barrier;Transparent 06/17/22 0830   Dressing Status Intact;Dry;Clean 06/17/22 0830   Dressing Intervention N/A 06/16/22 2000   Dressing Change Due 06/18/22 06/16/22 1000   Date IV Connector(s) Changed 06/15/22 06/15/22 1637   NEXT IV Connector(s) Change 06/18/22 06/15/22 1637   Line Necessity Assessed Antibiotic Therapy Greater than 7 Days 06/16/22 2000   $ Single Lumen PICC Charge Single kit used 06/15/22 1637       Wound 06/14/22 Incision  Ankle Left WOUND VAC, ACE WRAP (Active)   Wound Image    06/17/22 0900   Site Assessment Red;Pink 06/17/22 0900   Periwound Assessment Pink;Maceration 06/17/22 0900   Margins Defined edges;Unattached edges 06/17/22 0900   Closure Secondary intention 06/17/22 0900   Drainage Amount Small 06/17/22 0900   Drainage Description Sanguineous 06/17/22 0900   Treatments Cleansed;Site care;Offloading 06/17/22 0900   Wound Cleansing Normal Saline Irrigation 06/17/22 0900   Periwound Protectant Skin Protectant Wipes to Periwound;Drape 06/17/22 0900   Dressing Cleansing/Solutions Not Applicable 06/17/22 0900   Dressing Options Wound Vac;Mepilex Heel;Ace Wrap 06/17/22 0900   Dressing Changed Changed 06/17/22 0900   Dressing Status Clean;Dry;Intact 06/17/22 0900   Dressing Change/Treatment Frequency Monday, Wednesday, Friday, and As Needed 06/17/22 0900   NEXT Dressing Change/Treatment Date 06/20/22 06/17/22 0900   NEXT Weekly Photo (Inpatient Only) 06/24/22 06/17/22 0900   Non-staged Wound Description Full thickness 06/17/22 0900   Wound Length (cm) 3 cm 06/17/22 0900   Wound Width (cm) 1.7 cm 06/17/22 0900   Wound Depth (cm) 2.1 cm 06/17/22 0900   Wound Surface Area (cm^2) 5.1 cm^2 06/17/22 0900   Wound Volume (cm^3) 10.71 cm^3 06/17/22 0900   Shape nahid 06/17/22 0900   Wound Odor None 06/17/22 0900   Pulses 1+;Left 06/17/22 0900   Exposed Structures None 06/17/22 0900   WOUND NURSE ONLY - Time Spent with Patient (mins) 60 06/17/22 0900       Wound 06/16/22 Pressure Injury Mouth Lateral (Active)   Wound Image   06/16/22 2000   Site Assessment Purple 06/17/22 0830   Periwound Assessment Pink 06/17/22 0830   Dressing Status Open to Air 06/16/22 2000      PICC Single Lumen 06/15/22 Right Brachial (Active)   Site Assessment Clean;Dry;Intact 06/17/22 0830   Line Status Infusing 06/17/22 0830   Line Secured at (cm) 0 cm 06/15/22 1637   Extremity Circumference (cm) 33 cm 06/15/22 1637   Dressing Type Biopatch;Securing device;Skin  barrier;Transparent 06/17/22 0830   Dressing Status Intact;Dry;Clean 06/17/22 0830   Dressing Intervention N/A 06/16/22 2000   Dressing Change Due 06/18/22 06/16/22 1000   Date IV Connector(s) Changed 06/15/22 06/15/22 1637   NEXT IV Connector(s) Change 06/18/22 06/15/22 1637   Line Necessity Assessed Antibiotic Therapy Greater than 7 Days 06/16/22 2000   $ Single Lumen PICC Charge Single kit used 06/15/22 1637                MENTAL STATUS ON TRANSFER  At baseline    CONSULTATIONS  Infectious disease  Orthopedic surgery    PROCEDURES  Incision, drainage and excisional debridement of  left lateral ankle with application of wound VAC dressing    LABORATORY  Lab Results   Component Value Date    SODIUM 137 06/20/2022    POTASSIUM 3.2 (L) 06/20/2022    CHLORIDE 105 06/20/2022    CO2 23 06/20/2022    GLUCOSE 164 (H) 06/20/2022    BUN 10 06/20/2022    CREATININE 0.72 06/20/2022        Lab Results   Component Value Date    WBC 7.8 06/14/2022    HEMOGLOBIN 11.8 (L) 06/14/2022    HEMATOCRIT 35.1 (L) 06/14/2022    PLATELETCT 240 06/14/2022        Total time of the discharge process exceeds 35 minutes.

## 2022-06-18 NOTE — CARE PLAN
The patient is Stable - Low risk of patient condition declining or worsening    Shift Goals  Clinical Goals: PICC line  Patient Goals: comfort  Family Goals: discharge    Progress made toward(s) clinical / shift goals:  PICC line replaced this shift.    Patient is not progressing towards the following goals:      Problem: Knowledge Deficit - Standard  Goal: Patient and family/care givers will demonstrate understanding of plan of care, disease process/condition, diagnostic tests and medications  Outcome: Not Progressing       Problem: Fall Risk  Goal: Patient will remain free from falls  Outcome: Progressing     Problem: Pain - Standard  Goal: Alleviation of pain or a reduction in pain to the patient’s comfort goal  Outcome: Progressing

## 2022-06-18 NOTE — PROGRESS NOTES
Received bedside report from night shift RN.   Assumed care of patient at change of shift.   Assessment complete and POC discussed.   STATUS: Patient is A&Ox2, VSS, on RA.   DRAINS: wound vac in place. Settings verified.   PAIN: Patient denies pain, no apparent signs of distress or discomfort.   Patient is sitting up in bed eating breakfast.   Bed alarm set, call light in reach.  Bed is in lowest/locked position.   Call light and belongings are within reach.   No further needs at this time.

## 2022-06-18 NOTE — PROCEDURES
Vascular Access Team     Date of Insertion: 6/18/22  Arm Circumference: 35  Internal length: 39  External Length: Hub  Vein Occupancy %: 25   Reason for PICC: IV Abx  Labs: WBC 7.8, , BUN 14, Cr 0.67, GFR 94, INR N/A     Consents confirmed, vessel patency confirmed with ultrasound. Risks and benefits of procedure explained to family member and education regarding central line associated bloodstream infections provided. Questions answered.      PICC placed in RUE per licensed provider order with ultrasound guidance.  4 Fr, single lumen PICC placed in brachial vein after 1 attempt(s). 2 mL of 1% lidocaine injected intradermally at the insertion site. A 21 gauge microintroducer needle was visualized entering the vein and modified Seldinger technique was used to obtain access to the vein. 39 cm catheter inserted and brisk blood return was observed from each lumen upon aspiration. Line secured at the Hub cm marker. TCS stylet removed and observed to be fully intact. Each lumen flushed using pulsatile method without resistance with 10 mL 0.9% normal saline. PICC line secured with Biopatch and Tegaderm.     PICC tip placement location is confirmed by nurse to be in the Superior Vena Cava (SVC) utilizing 3CG technology. PICC line is appropriate for use at this time. Patient tolerated procedure well, without complications.  Patient condition relayed to primary RN or ordering physician via this post procedure note in the EMR.      Ultrasound images uploaded to PACS and viewable in the EMR - yes  Ultrasound imaged printed and placed in paper chart - no     BARD Power PICC ref # [id number], Lot # [id number], Expiration Date 3/31/23

## 2022-06-18 NOTE — PROGRESS NOTES
Received bedside report from FLORES Zavala, pt care assumed. VSS, pt assessment complete. Pt A&Ox2,  c/o 0/10  pain at this time. POC discussed with pt and verbalizes no questions. Pt denies any additional needs at this time. Bed locked and in lowest position, bed alarm on. Pt educated on fall risk and verbalized understanding, call light within reach, hourly rounding initiated.     2200-This RN arrived to patients room for hourly rounding and blood sugar check. Patient was sitting on side of bed. On the bedside table patient had put PICC line, which pt had pulled out. This RN assessed the site, alerted charge nurse and reached out to hospitalist Lindsay Wegener. Patient was meant to be discharged, but [facility] would not accept patient with no PICC in place. Patient discharge was canceled until new line can be placed.

## 2022-06-18 NOTE — PROGRESS NOTES
This nurse was notified by the primary nurse that the pt had pulled out his PICC line. This nurse called the [facility] to update them. The nurse at the [facility] said that they could not accept the pt because the PICC line was removed. This nurse canceled the scheduled GMT ride. Lindsay Wegener notified.

## 2022-06-19 LAB
BACTERIA SPEC ANAEROBE CULT: NORMAL
BACTERIA TISS AEROBE CULT: ABNORMAL
BACTERIA TISS AEROBE CULT: ABNORMAL
GLUCOSE BLD STRIP.AUTO-MCNC: 167 MG/DL (ref 65–99)
GLUCOSE BLD STRIP.AUTO-MCNC: 190 MG/DL (ref 65–99)
GLUCOSE BLD STRIP.AUTO-MCNC: 232 MG/DL (ref 65–99)
GLUCOSE BLD STRIP.AUTO-MCNC: 233 MG/DL (ref 65–99)
GRAM STN SPEC: ABNORMAL
SIGNIFICANT IND 70042: ABNORMAL
SIGNIFICANT IND 70042: NORMAL
SITE SITE: ABNORMAL
SITE SITE: NORMAL
SOURCE SOURCE: ABNORMAL
SOURCE SOURCE: NORMAL

## 2022-06-19 PROCEDURE — 770001 HCHG ROOM/CARE - MED/SURG/GYN PRIV*

## 2022-06-19 PROCEDURE — 700111 HCHG RX REV CODE 636 W/ 250 OVERRIDE (IP): Performed by: STUDENT IN AN ORGANIZED HEALTH CARE EDUCATION/TRAINING PROGRAM

## 2022-06-19 PROCEDURE — 99232 SBSQ HOSP IP/OBS MODERATE 35: CPT | Performed by: INTERNAL MEDICINE

## 2022-06-19 PROCEDURE — 82962 GLUCOSE BLOOD TEST: CPT

## 2022-06-19 PROCEDURE — 700102 HCHG RX REV CODE 250 W/ 637 OVERRIDE(OP): Performed by: STUDENT IN AN ORGANIZED HEALTH CARE EDUCATION/TRAINING PROGRAM

## 2022-06-19 PROCEDURE — A9270 NON-COVERED ITEM OR SERVICE: HCPCS | Performed by: STUDENT IN AN ORGANIZED HEALTH CARE EDUCATION/TRAINING PROGRAM

## 2022-06-19 PROCEDURE — A9270 NON-COVERED ITEM OR SERVICE: HCPCS | Performed by: INTERNAL MEDICINE

## 2022-06-19 PROCEDURE — 700102 HCHG RX REV CODE 250 W/ 637 OVERRIDE(OP): Performed by: INTERNAL MEDICINE

## 2022-06-19 PROCEDURE — 700105 HCHG RX REV CODE 258: Performed by: STUDENT IN AN ORGANIZED HEALTH CARE EDUCATION/TRAINING PROGRAM

## 2022-06-19 RX ORDER — LISINOPRIL 5 MG/1
2.5 TABLET ORAL DAILY
Status: DISCONTINUED | OUTPATIENT
Start: 2022-06-19 | End: 2022-06-20 | Stop reason: HOSPADM

## 2022-06-19 RX ADMIN — METOPROLOL TARTRATE 25 MG: 25 TABLET, FILM COATED ORAL at 14:28

## 2022-06-19 RX ADMIN — ATORVASTATIN CALCIUM 40 MG: 40 TABLET, FILM COATED ORAL at 17:55

## 2022-06-19 RX ADMIN — VANCOMYCIN HYDROCHLORIDE 1250 MG: 500 INJECTION, POWDER, LYOPHILIZED, FOR SOLUTION INTRAVENOUS at 17:56

## 2022-06-19 RX ADMIN — HYDROCODONE BITARTRATE AND ACETAMINOPHEN 1 TABLET: 5; 325 TABLET ORAL at 17:55

## 2022-06-19 RX ADMIN — GABAPENTIN 300 MG: 300 CAPSULE ORAL at 17:56

## 2022-06-19 RX ADMIN — GABAPENTIN 300 MG: 300 CAPSULE ORAL at 04:56

## 2022-06-19 RX ADMIN — VANCOMYCIN HYDROCHLORIDE 1250 MG: 500 INJECTION, POWDER, LYOPHILIZED, FOR SOLUTION INTRAVENOUS at 05:03

## 2022-06-19 RX ADMIN — INSULIN GLARGINE-YFGN 7 UNITS: 100 INJECTION, SOLUTION SUBCUTANEOUS at 18:04

## 2022-06-19 RX ADMIN — INSULIN HUMAN 2 UNITS: 100 INJECTION, SOLUTION PARENTERAL at 12:46

## 2022-06-19 RX ADMIN — SERTRALINE 50 MG: 50 TABLET, FILM COATED ORAL at 17:55

## 2022-06-19 RX ADMIN — INSULIN HUMAN 1 UNITS: 100 INJECTION, SOLUTION PARENTERAL at 20:44

## 2022-06-19 RX ADMIN — HYDROCODONE BITARTRATE AND ACETAMINOPHEN 1 TABLET: 5; 325 TABLET ORAL at 04:56

## 2022-06-19 RX ADMIN — MIRTAZAPINE 15 MG: 15 TABLET, FILM COATED ORAL at 20:44

## 2022-06-19 RX ADMIN — INSULIN HUMAN 2 UNITS: 100 INJECTION, SOLUTION PARENTERAL at 18:03

## 2022-06-19 RX ADMIN — INSULIN HUMAN 1 UNITS: 100 INJECTION, SOLUTION PARENTERAL at 08:48

## 2022-06-19 RX ADMIN — LISINOPRIL 2.5 MG: 5 TABLET ORAL at 14:28

## 2022-06-19 RX ADMIN — ENOXAPARIN SODIUM 40 MG: 40 INJECTION SUBCUTANEOUS at 17:55

## 2022-06-19 ASSESSMENT — ENCOUNTER SYMPTOMS
SHORTNESS OF BREATH: 0
VOMITING: 0
BACK PAIN: 0
ABDOMINAL PAIN: 0
INSOMNIA: 0
NAUSEA: 0
FALLS: 0
CHILLS: 0
DIZZINESS: 0
HEADACHES: 0
FEVER: 0

## 2022-06-19 ASSESSMENT — PAIN DESCRIPTION - PAIN TYPE
TYPE: CHRONIC PAIN

## 2022-06-19 ASSESSMENT — LIFESTYLE VARIABLES: SUBSTANCE_ABUSE: 0

## 2022-06-19 NOTE — CARE PLAN
The patient is Watcher - Medium risk of patient condition declining or worsening    Shift Goals  Clinical Goals: No pulling at lines  Patient Goals: Sleep  Family Goals: discharge    Progress made toward(s) clinical / shift goals:    Problem: Knowledge Deficit - Standard  Goal: Patient and family/care givers will demonstrate understanding of plan of care, disease process/condition, diagnostic tests and medications  Outcome: Progressing     Problem: Fall Risk  Goal: Patient will remain free from falls  Outcome: Progressing     Problem: Pain - Standard  Goal: Alleviation of pain or a reduction in pain to the patient’s comfort goal  Outcome: Progressing

## 2022-06-19 NOTE — PROGRESS NOTES
Received bedside report from NOC RN. Pt is A&Ox2. Pt is sitting up in the chair, no signs of labored breathing or pain. Denies CP/SOB. Pt on RA. Call light & personal belongings within reach, bed in lowest position & locked, and bed alarm is on. Fall precautions in place and education provided on how to use call light, hourly rounding in place. Educated on calling before getting OOB. Pt updated on plan of care for the shift. Pt declines any additional needs at this time.

## 2022-06-19 NOTE — PROGRESS NOTES
[facility] Medicine Daily Progress Note    Date of Service  6/19/2022    Chief Complaint  Andrei Garay is a 82 y.o. male admitted 6/10/2022 with ankle wound.    Hospital Course  Andrei Garay is a 82 y.o. male past medical history of diabetes mellitus, hypertension who presented 6/10/2022 with altered mental status for last 3 days and left ankle pain.  History is primarily provided by daughter.  No relieving or exacerbating factors were noted.  He denies any fever does report some chills.  In the ER, x-ray of the ankle showed signs concerning for osteomyelitis.  UA grossly positive for urinary tract infection.  Patient met criteria for sepsis started on IV broad-spectrum antibiotics and will be admitted for further work-up and limb preservation consult.     Interval Problem Update  Infectious issues-[facility] days now given lack of staff and setting up transportation issues. PICC placed yesterday without issue. Unclear [facility]. Remains afebrile. Tolerating all medications without issue.    I have personally seen and examined the patient at bedside. I discussed the plan of care with patient.    Consultants/Specialty  infectious disease and orthopedics    Code Status  DNAR/DNI    Disposition  Patient is not medically cleared for discharge.   Anticipate discharge back to [facility] home/SNF. Various issues precluding this discharge at this time  I have placed the appropriate orders for post-discharge needs.    Review of Systems  Review of Systems   Constitutional: Negative for chills and fever.        Frustrated   Respiratory: Negative for shortness of breath.    Cardiovascular: Negative for chest pain.   Gastrointestinal: Negative for abdominal pain, nausea and vomiting.   Musculoskeletal: Positive for joint pain. Negative for back pain and falls.   Skin: Negative for itching and rash.   Neurological: Negative for dizziness and headaches.    Psychiatric/Behavioral: Negative for substance abuse. The patient does not have insomnia.    All other systems reviewed and are negative.       Physical Exam  Temp:  [36.3 °C (97.3 °F)-37.1 °C (98.7 °F)] 36.5 °C (97.7 °F)  Pulse:  [64-86] 68  Resp:  [18] 18  BP: (134-163)/(75-86) 142/78  SpO2:  [92 %-96 %] 94 %    Physical Exam  Constitutional:       General: He is not in acute distress.     Appearance: He is not ill-appearing.      Comments: Sitting upright in chair  Eating breakfast   HENT:      Head: Normocephalic and atraumatic.      Right Ear: External ear normal.      Left Ear: External ear normal.      Mouth/Throat:      Pharynx: No oropharyngeal exudate or posterior oropharyngeal erythema.   Eyes:      Extraocular Movements: Extraocular movements intact.      Pupils: Pupils are equal, round, and reactive to light.   Cardiovascular:      Rate and Rhythm: Normal rate and regular rhythm.      Pulses: Normal pulses.      Heart sounds: Normal heart sounds. No murmur heard.  Pulmonary:      Effort: Pulmonary effort is normal.      Breath sounds: Normal breath sounds. No wheezing.   Abdominal:      General: Bowel sounds are normal.      Palpations: Abdomen is soft.      Tenderness: There is no abdominal tenderness.      Comments: Body mass index is 32.62 kg/m².     Musculoskeletal:      Cervical back: Normal range of motion and neck supple.      Right lower leg: No edema.      Left lower leg: No edema.      Comments: PICC line RUE   Skin:     General: Skin is warm and dry.      Findings: Erythema and lesion present.      Comments: Left lateral ankle wound with ulcer, packed, no leakage or soaking noted   Neurological:      General: No focal deficit present.      Mental Status: He is disoriented.      Sensory: No sensory deficit.      Motor: No weakness.   Psychiatric:         Mood and Affect: Mood normal.         Behavior: Behavior normal.         Fluids    Intake/Output Summary (Last 24 hours) at 6/19/2022  1402  Last data filed at 6/19/2022 0436  Gross per 24 hour   Intake 180 ml   Output 450 ml   Net -270 ml       Laboratory                        Imaging  IR-PICC LINE PLACEMENT W/ GUIDANCE > AGE 5   Final Result                  Ultrasound-guided PICC placement performed by qualified nursing staff as    above.          IR-PICC LINE PLACEMENT W/ GUIDANCE > AGE 5   Final Result                  Ultrasound-guided PICC placement performed by qualified nursing staff as    above.          MR-ANKLE W/O LEFT   Final Result      1.  Osteomyelitis of the lateral malleolus/distal fibula      2.  Negative for abscess      3.  Moderate-large tibiotalar joint effusion. This could be reactive versus septic arthritis.      4.  Longitudinal splitting of the peroneus brevis tendon and there is associated fluid within the peroneal tendon sheath      EC-ECHOCARDIOGRAM COMPLETE W/O CONT   Final Result      US-HONEY SINGLE LEVEL BILAT   Final Result      DX-ANKLE 3+ VIEWS LEFT   Final Result      1. Lateral left ankle soft tissue swelling with soft tissue gas extending from the skin surface to the lateral aspect of the distal left fibula, with cortical disruption concerning for osteomyelitis. MRI of the left ankle without and with IV contrast is    recommended.   2. Decreased bone mineralization.   3. The remainder of the left ankle radiography is within normal limits.      DX-CHEST-PORTABLE (1 VIEW)   Final Result      No acute cardiopulmonary abnormality.           Assessment/Plan  * MRSA bacteremia- (present on admission)  Assessment & Plan  Blood culture positive for MRSA  On vancomycin  Transthoracic echo without endocarditis  MRI shows osteomyelitis distal fibula and lateral malleolus  Repeat blood cultures 6/12  ID following, possible transition to IV dapto when going to [facility]  PICC line placed 6/18  Monitor  6 weeks total of therapy    Diabetic foot infection (HCC)- (present on admission)  Assessment & Plan  Now status post left  lateral ankle incision, draining and excisional debridement with wound VAC placement by orthopedic surgery on 6/14/2022  6/10 BCX w/MRSA  6/12 BCX w/NGTD   OR/TISS Clx w/NGTD  Continue IV vancomycin while inpatient, switched to daptomycin on discharge as per ID  Wound care following for wound VAC management  [facility]    UTI (urinary tract infection)- (present on admission)  Assessment & Plan  No clear e/o true infection    Advance care planning- (present on admission)  Assessment & Plan  Patient has POLST form stating DNR/DNI.  Confirmed with daughter bedside face-to-face.  Time spent 8 minutes.    Obesity- (present on admission)  Assessment & Plan  Counseled on dietary modification once stable    Type 2 diabetes mellitus (HCC)- (present on admission)  Assessment & Plan  On metformin at home  Continue with sliding scale and Accu-Cheks with hypoglycemia protocol   Persistent mild hyperglycemia with BS's>200  Start lantus 7 units nightly  Adjust PRN    Sepsis (HCC)- (present on admission)  Assessment & Plan  Resolved    Depression- (present on admission)  Assessment & Plan  Continue with Zoloft and Remeron    Hypertension- (present on admission)  Assessment & Plan  Sepsis has resolved, will re-start outpatient blood pressure medications    Hyperlipidemia- (present on admission)  Assessment & Plan  Continue statin       VTE prophylaxis: enoxaparin ppx    I have performed a physical exam and reviewed and updated ROS and Plan today (6/19/2022). In review of yesterday's note (6/18/2022), there are no changes except as documented above.

## 2022-06-19 NOTE — CARE PLAN
The patient is Stable - Low risk of patient condition declining or worsening    Shift Goals  Clinical Goals: No pulling at lines, safe discharge  Patient Goals: Sleep  Family Goals: discharge    Progress made toward(s) clinical / shift goals:    Problem: Knowledge Deficit - Standard  Goal: Patient and family/care givers will demonstrate understanding of plan of care, disease process/condition, diagnostic tests and medications  Outcome: Progressing     Problem: Hemodynamics  Goal: Patient's hemodynamics, fluid balance and neurologic status will be stable or improve  Outcome: Progressing     Problem: Fluid Volume  Goal: Fluid volume balance will be maintained  Outcome: Progressing     Problem: Urinary - Renal Perfusion  Goal: Ability to achieve and maintain adequate renal perfusion and functioning will improve  Outcome: Progressing       Patient is not progressing towards the following goals:

## 2022-06-19 NOTE — PROGRESS NOTES
Received bedside report from FLORES Osullivan, pt care assumed. VSS on RA, pt assessment complete. Pt A&Ox3, c/o no pain at this time. POC discussed with pt and verbalizes no questions. Pt denies any additional needs at this time. Bed locked and in lowest position, bed alarm on. Pt educated on fall risk and verbalized understanding, call light within reach, hourly rounding initiated.

## 2022-06-20 VITALS
OXYGEN SATURATION: 97 % | HEIGHT: 72 IN | DIASTOLIC BLOOD PRESSURE: 82 MMHG | SYSTOLIC BLOOD PRESSURE: 124 MMHG | RESPIRATION RATE: 18 BRPM | TEMPERATURE: 97.7 F | HEART RATE: 64 BPM | BODY MASS INDEX: 32.58 KG/M2 | WEIGHT: 240.52 LBS

## 2022-06-20 LAB
ANION GAP SERPL CALC-SCNC: 9 MMOL/L (ref 7–16)
BUN SERPL-MCNC: 10 MG/DL (ref 8–22)
CALCIUM SERPL-MCNC: 8.2 MG/DL (ref 8.5–10.5)
CHLORIDE SERPL-SCNC: 105 MMOL/L (ref 96–112)
CO2 SERPL-SCNC: 23 MMOL/L (ref 20–33)
CREAT SERPL-MCNC: 0.72 MG/DL (ref 0.5–1.4)
GFR SERPLBLD CREATININE-BSD FMLA CKD-EPI: 92 ML/MIN/1.73 M 2
GLUCOSE BLD STRIP.AUTO-MCNC: 166 MG/DL (ref 65–99)
GLUCOSE BLD STRIP.AUTO-MCNC: 196 MG/DL (ref 65–99)
GLUCOSE SERPL-MCNC: 164 MG/DL (ref 65–99)
POTASSIUM SERPL-SCNC: 3.2 MMOL/L (ref 3.6–5.5)
SARS-COV+SARS-COV-2 AG RESP QL IA.RAPID: NOTDETECTED
SODIUM SERPL-SCNC: 137 MMOL/L (ref 135–145)
SPECIMEN SOURCE: NORMAL

## 2022-06-20 PROCEDURE — 700102 HCHG RX REV CODE 250 W/ 637 OVERRIDE(OP): Performed by: STUDENT IN AN ORGANIZED HEALTH CARE EDUCATION/TRAINING PROGRAM

## 2022-06-20 PROCEDURE — 99232 SBSQ HOSP IP/OBS MODERATE 35: CPT | Performed by: NURSE PRACTITIONER

## 2022-06-20 PROCEDURE — 87426 SARSCOV CORONAVIRUS AG IA: CPT

## 2022-06-20 PROCEDURE — 97162 PT EVAL MOD COMPLEX 30 MIN: CPT

## 2022-06-20 PROCEDURE — 97607 NEG PRS WND THR NDME<=50SQCM: CPT

## 2022-06-20 PROCEDURE — 700111 HCHG RX REV CODE 636 W/ 250 OVERRIDE (IP): Performed by: STUDENT IN AN ORGANIZED HEALTH CARE EDUCATION/TRAINING PROGRAM

## 2022-06-20 PROCEDURE — 99239 HOSP IP/OBS DSCHRG MGMT >30: CPT | Performed by: INTERNAL MEDICINE

## 2022-06-20 PROCEDURE — A9270 NON-COVERED ITEM OR SERVICE: HCPCS | Performed by: INTERNAL MEDICINE

## 2022-06-20 PROCEDURE — A9270 NON-COVERED ITEM OR SERVICE: HCPCS | Performed by: STUDENT IN AN ORGANIZED HEALTH CARE EDUCATION/TRAINING PROGRAM

## 2022-06-20 PROCEDURE — 36415 COLL VENOUS BLD VENIPUNCTURE: CPT

## 2022-06-20 PROCEDURE — 82962 GLUCOSE BLOOD TEST: CPT

## 2022-06-20 PROCEDURE — 80048 BASIC METABOLIC PNL TOTAL CA: CPT

## 2022-06-20 PROCEDURE — 700102 HCHG RX REV CODE 250 W/ 637 OVERRIDE(OP): Performed by: INTERNAL MEDICINE

## 2022-06-20 PROCEDURE — 700105 HCHG RX REV CODE 258: Performed by: STUDENT IN AN ORGANIZED HEALTH CARE EDUCATION/TRAINING PROGRAM

## 2022-06-20 RX ORDER — HYDROCODONE BITARTRATE AND ACETAMINOPHEN 5; 325 MG/1; MG/1
1 TABLET ORAL 2 TIMES DAILY
Qty: 6 TABLET | Refills: 0 | Status: SHIPPED | OUTPATIENT
Start: 2022-06-20 | End: 2022-06-23

## 2022-06-20 RX ADMIN — INSULIN HUMAN 1 UNITS: 100 INJECTION, SOLUTION PARENTERAL at 12:21

## 2022-06-20 RX ADMIN — LISINOPRIL 2.5 MG: 5 TABLET ORAL at 04:56

## 2022-06-20 RX ADMIN — GABAPENTIN 300 MG: 300 CAPSULE ORAL at 04:56

## 2022-06-20 RX ADMIN — INSULIN HUMAN 1 UNITS: 100 INJECTION, SOLUTION PARENTERAL at 08:26

## 2022-06-20 RX ADMIN — HYDROCODONE BITARTRATE AND ACETAMINOPHEN 1 TABLET: 5; 325 TABLET ORAL at 04:55

## 2022-06-20 RX ADMIN — VANCOMYCIN HYDROCHLORIDE 1250 MG: 500 INJECTION, POWDER, LYOPHILIZED, FOR SOLUTION INTRAVENOUS at 04:56

## 2022-06-20 RX ADMIN — METOPROLOL TARTRATE 25 MG: 25 TABLET, FILM COATED ORAL at 04:56

## 2022-06-20 ASSESSMENT — GAIT ASSESSMENTS: GAIT LEVEL OF ASSIST: REFUSED

## 2022-06-20 ASSESSMENT — COGNITIVE AND FUNCTIONAL STATUS - GENERAL
STANDING UP FROM CHAIR USING ARMS: A LITTLE
SUGGESTED CMS G CODE MODIFIER MOBILITY: CK
TURNING FROM BACK TO SIDE WHILE IN FLAT BAD: A LITTLE
MOVING TO AND FROM BED TO CHAIR: A LITTLE
CLIMB 3 TO 5 STEPS WITH RAILING: TOTAL
MOVING FROM LYING ON BACK TO SITTING ON SIDE OF FLAT BED: A LITTLE
MOBILITY SCORE: 15
WALKING IN HOSPITAL ROOM: A LOT

## 2022-06-20 NOTE — THERAPY
"Physical Therapy   Initial Evaluation     Patient Name: Andrei Garay  Age:  82 y.o., Sex:  male  Medical Record #: [id number]  Today's Date: 6/20/2022     Precautions  Precautions: Fall Risk;Weight Bearing As Tolerated Left Lower Extremity  Comments: CAM boot LLE no formal orders, wound vac LLE, offloading boot RLE in room    Assessment  Patient is 82 y.o. male admitted with AMS and L foot pain, found L lateral malleolus OM now s/p I&D with wound vac placement 6/14. PMHx of DM, HTN, obesity, HLD, depression. Pt presents with impaired cognition, safety awareness, functional mobility, balance, and activity tolerance. Pt required CGA to come to EOB, refusing further mobility and telling this therapist to \"go away\". Per chart and dtr, pt has been transferring <> chair with FWW while in house. Recommend placement. Will continue to follow.     Plan    Recommend Physical Therapy 3 times per week until therapy goals are met for the following treatments:  Bed Mobility, Equipment, Gait Training, Manual Therapy, Neuro Re-Education / Balance, Self Care/Home Evaluation, Stair Training, Therapeutic Activities, and Therapeutic Exercises    DC Equipment Recommendations: Unable to determine at this time  Discharge Recommendations: Recommend post-acute placement for additional physical therapy services prior to discharge home ([facility] to [facility])       Subjective    \"It's not time for the boots yet. I'm eating my breakfast first. Go away.\"      Objective     06/20/22 1013   Vitals   O2 Delivery Device None - Room Air   Pain 0 - 10 Group   Therapist Pain Assessment Post Activity Pain Same as Prior to Activity;Nurse Notified;0   Prior Living Situation   Prior Services Intermittent Physical Support for ADL Per Service   Housing / Facility Skilled Nursing Facility   Steps Into Home 0   Steps In Home 0   Equipment Owned 4-Wheel Walker   Lives with - Patient's Self Care Capacity   (lives at [facility])   Comments Dtr present providing " "history. Able to get PT/OT at SNF   Prior Level of Functional Mobility   Bed Mobility Independent   Transfer Status Required Assist   Ambulation Required Assist   Distance Ambulation (Feet)   (dtr reports pt usually sitting on 4WW and \"scooting\" 100yds. Was able to walk to the BR)   Assistive Devices Used 4-Wheel Walker   Cognition    Cognition / Consciousness X   Level of Consciousness Responds to voice  (lethargic)   Ability To Follow Commands 1 Step   Safety Awareness Impaired   New Learning Impaired   Attention Impaired   Comments Minimally cooperative with max encouragement. telling this therapist to \"go away\"   Passive ROM Lower Body   Passive ROM Lower Body Not Tested   Comments pt not agreeable   Active ROM Lower Body    Active ROM Lower Body  Not Tested   Comments pt not agreeable to formal testing. Able to lift BLE to come to EOB   Strength Lower Body   Lower Body Strength  X   Gross Strength Generalized Weakness, Equal Bilaterally   Comments Not formally tested as pt not agreeable. Grossly 3+/5 with coming to EOB   Strength Upper Body   Upper Body Strength  X   Gross Strength Generalized Weakness, Equal Bilaterally.    Comments grossly 4/5   Balance Assessment   Sitting Balance (Static) Fair   Sitting Balance (Dynamic) Fair   Weight Shift Sitting Fair   Comments refused further than EOB   Gait Analysis   Gait Level Of Assist Refused   Weight Bearing Status WBAT LLE,  CAM boot   Bed Mobility    Supine to Sit Contact Guard Assist   Sit to Supine   (NT, seated EOB post)   Scooting Contact Guard Assist   Functional Mobility   Sit to Stand Refused   Bed, Chair, Wheelchair Transfer Refused   How much difficulty does the patient currently have...   Turning over in bed (including adjusting bedclothes, sheets and blankets)? 3   Sitting down on and standing up from a chair with arms (e.g., wheelchair, bedside commode, etc.) 3   Moving from lying on back to sitting on the side of the bed? 3   How much help from " another person does the patient currently need...   Moving to and from a bed to a chair (including a wheelchair)? 3   Need to walk in a hospital room? 2   Climbing 3-5 steps with a railing? 1   6 clicks Mobility Score 15   Activity Tolerance   Comments left seated EOB. Limited 2/2 volition   Edema / Skin Assessment   Edema / Skin  WDL   Comments L wound vac seal checked pre/post session   Patient / Family Goals    Patient / Family Goal #1 [facility] to [facility]   Short Term Goals    Short Term Goal # 1 Pt will perform supine <> sit with SPV in 6 visits to return to PLOF   Short Term Goal # 2 Pt will perform STS transfers with FWW and SPV in 6 visits to improve functional independence   Short Term Goal # 3 Pt will ambulate 25ft with FWW and SPV in 6 visits to ambulate <> BR   Education Group   Education Provided Role of Physical Therapist   Role of Physical Therapist Patient Response Patient;Family;Acceptance;Explanation;Verbal Demonstration;Reinforcement Needed   Problem List    Problems Impaired Bed Mobility;Impaired Transfers;Impaired Ambulation;Functional Strength Deficit;Impaired Balance;Decreased Activity Tolerance;Safety Awareness Deficits / Cognition   Anticipated Discharge Equipment and Recommendations   DC Equipment Recommendations Unable to determine at this time   Discharge Recommendations Recommend post-acute placement for additional physical therapy services prior to discharge home  ([facility] to [facility])   Interdisciplinary Plan of Care Collaboration   IDT Collaboration with  Nursing;Family / Caregiver   Patient Position at End of Therapy Edge of Bed;Bed Alarm On;Call Light within Reach;Tray Table within Reach;Phone within Reach;Family / Friend in Room   Collaboration Comments RN updated   Session Information   Date / Session Number  6/20 1 (1/3, 6/26)

## 2022-06-20 NOTE — DISCHARGE PLANNING
Agency/Facility Name: [facility]  Spoke To: Chicho  Outcome: Has a bed available today.  [facility] notify [facility]o of the transport time.     @1044  Agency/Facility Name: [facility]  Spoke To: Chicho  Outcome: Pt does not need COVID test to return to facility.  All pt's are vaccinated.    @ 1146  Agency/Facility Name:  [facility]  Spoke To: Chicho  Outcome: A wound vac was delivered to the facility on Friday for the pt.    @1245  Agency/Facility Name: [facility]  Spoke To: Chicho  Outcome: DPA notified Chicho the pt will transport at 1530 via [facility].

## 2022-06-20 NOTE — DISCHARGE PLANNING
DC Transport Scheduled    Received request at: 1219    Transport Company Scheduled:  [employer]      Scheduled Date: 06/20/2022  Scheduled Time: 1530    Destination: [facility]     Notified care team of scheduled transport via [employer].     If there are any changes needed to the DC transportation scheduled, please contact [facility] at ext. [phone] between the hours of 2108-8153 Mon-Fri. If outside those hours, contact the ED Case Manager at ext. [phone].

## 2022-06-20 NOTE — PROGRESS NOTES
LIMB PRESERVATION SERVICE  Post OP PROGRESS NOTE    HPI: 82 y.o.  with a past medical history that includes type 2 diabetes, hyperlipidemia, hypertension, MI, dementia.  Admitted 6/10/2022 for Sepsis (HCC) [A41.9].     LPS has been consulted for left lateral ankle wound that started in autumn 2021.    Patient lives at [facility] and has been receiving wound care there. Patient was seen by LPS for this wound 4/13/2022, at that time wound was 0.5 x 0.5 x 0.3 and did not probe to bone.  Family states that the wound appeared to be healing but then suddenly became very erythematous over the last 2 days and he was started on antibiotics before being transferred to [facility] for further care.      Surgery date: 6/14/22 by Dr. Wagner  PROCEDURE: Incision, drainage and excisional debridement of  left lateral ankle with application of wound VAC dressing        INTERVAL HISTORY:  6/13/2022: Patient denies fevers, chills, nausea, vomiting.  Pain controlled.  Seen by Dr. Wagner 6/11/2022.  No plans for surgery.  Arterial studies completed, MRI ankle completed.  ID involved.  6/20/22: POD # 6. Seen with wound team. Daughter at bedside. Updated on POC. All questions and concerns answered. Diabetic boot for L foot and even up for R foot at bedside.         PERTINENT LPS RESULTS:   COVID-19:  not completed this admission    Arterial studies:  RIGHT      Waveform            Systolic BPs (mmHg)                              125           Brachial   Triphasic                                Common Femoral   Triphasic                                Popliteal   Triphasic                  200           Posterior Tibial   Triphasic                  179           Dorsalis Pedis                                            Digit                              1.54          HONEY                                            TBI                           LEFT   Waveform        Systolic BPs (mmHg)                              130            Brachial   Triphasic                                Common Femoral   Triphasic                                Popliteal   Triphasic                  187           Posterior Tibial   Triphasic                  180           Dorsalis Pedis                                            Digit                              1.44          HONEY                                            TBI         Findings   Bilateral-   The ankle pressures are not accurately measured due to calcification and    noncompressibility of the tibial vessels.   Doppler waveforms of the common femoral artery and popliteal artery are of    high amplitude and triphasic.    Doppler waveforms at the ankle are brisk and triphasic.    TBI-   Right: 1.29   Left:    1.09      Additional testing was not performed in accordance with lower extremity    arterial evaluation protocol.        MRI left ankle  1.  Osteomyelitis of the lateral malleolus/distal fibula     2.  Negative for abscess     3.  Moderate-large tibiotalar joint effusion. This could be reactive versus septic arthritis.     4.  Longitudinal splitting of the peroneus brevis tendon and there is associated fluid within the peroneal tendon sheath      EXAM:      /82   Pulse 64   Temp 36.5 °C (97.7 °F) (Temporal)   Resp 18   Ht 1.829 m (6')   Wt 109 kg (240 lb 8.4 oz)   SpO2 97%   BMI 32.62 kg/m²     Pedal Pulses:   R foot: palpable DP, palpable PT  L foot: palpable PT, palpable DP      Sensation:   Monofilament testing completed on 4/13/2022  Feet Insensate to light touch      Wound(s) :    Wound location: Left lateral ankle  Full thickness, bone covered with thin layer of tissue  Wound characteristics: 100% red granular tissue  Erythema: almost resolved  Drainage: controlled by VAC  Callus: None  Odor: None  Moderate periwound maceration        Wound care completed by wound team. See note and flowsheets.     Wound photo:                                 DIABETES MANAGEMENT:    A1c:    Lab Results   Component Value Date/Time    HBA1C 6.3 (H) 06/11/2022 03:57 PM            INFECTION MANAGEMENT:    Results from last 7 days   Lab Units 06/14/22  1418   WBC 1501 K/uL 7.8   PLATELET COUNT 1518 K/uL 240     Wound culture results:   Results     Procedure Component Value Units Date/Time    CULTURE TISSUE W/ GRM STAIN [[id number]]  (Abnormal) Collected: 06/14/22 0930    Order Status: Completed Specimen: Tissue Updated: 06/19/22 1644     Significant Indicator POS     Source TISS     Site Left Lateral Ankle     Culture Result -     Gram Stain Result Rare WBCs.  No organisms seen.       Culture Result Corynebacterium striatum group  Rare growth      Narrative:      Surgery Specimen    Anaerobic Culture [[id number]] Collected: 06/14/22 0930    Order Status: Completed Specimen: Tissue Updated: 06/19/22 1644     Significant Indicator NEG     Source TISS     Site Left Lateral Ankle     Culture Result No Anaerobes isolated.    Narrative:      Surgery Specimen    CULTURE WOUND W/ GRAM STAIN [[id number]]  (Abnormal) Collected: 06/14/22 0929    Order Status: Completed Specimen: Wound Updated: 06/18/22 1817     Significant Indicator POS     Source WND     Site Left Lateral Ankle     Culture Result No growth at 72 hours.     Gram Stain Result Rare WBCs.  No organisms seen.       Culture Result -    Narrative:      Surgery - swabs received    Anaerobic Culture [[id number]] Collected: 06/14/22 0929    Order Status: Completed Specimen: Wound Updated: 06/18/22 1817     Significant Indicator NEG     Source WND     Site Left Lateral Ankle     Culture Result No Anaerobes isolated.    Narrative:      Surgery - swabs received    BLOOD CULTURE [[id number]] Collected: 06/12/22 0230    Order Status: Completed Specimen: Blood from Peripheral Updated: 06/17/22 0501     Significant Indicator NEG     Source BLD     Site PERIPHERAL     Culture Result No growth after 5 days of incubation.    Narrative:      Contact  Per Hospital Policy:  "Only change Specimen Src: to \"Line\" if  specified by physician order.  Right AC    BLOOD CULTURE [[id number]] Collected: 06/12/22 0230    Order Status: Completed Specimen: Blood from Peripheral Updated: 06/17/22 0501     Significant Indicator NEG     Source BLD     Site PERIPHERAL     Culture Result No growth after 5 days of incubation.    Narrative:      Contact  Per Hospital Policy: Only change Specimen Src: to \"Line\" if  specified by physician order.  Left Hand    MRSA By PCR (Amp) [[id number]] Collected: 06/15/22 0538    Order Status: Completed Specimen: Respirate from Nares Updated: 06/15/22 1318     MRSA by PCR Negative    Narrative:      Contact  Collected By: [id number] CELINA GAGE  Per P&T Lab Protocol    GRAM STAIN [[id number]] Collected: 06/14/22 0930    Order Status: Completed Specimen: Tissue Updated: 06/14/22 2022     Significant Indicator .     Source TISS     Site Left Lateral Ankle     Gram Stain Result Rare WBCs.  No organisms seen.      Narrative:      Surgery Specimen    GRAM STAIN [[id number]] Collected: 06/14/22 0929    Order Status: Completed Specimen: Wound Updated: 06/14/22 1650     Significant Indicator .     Source WND     Site Left Lateral Ankle     Gram Stain Result Rare WBCs.  No organisms seen.      Narrative:      Surgery - swabs received    E-Test [[id number]] Collected: 06/10/22 1003    Order Status: Completed Specimen: Other Updated: 06/14/22 1619     ETEST Sensitivity FINAL**2    Narrative:      61 tel. [phone] 06/13/2022, 17:46, RB PERF. RESULTS CALLED TO:[phone]  Per Hospital Policy: Only change Specimen Src: to \"Line\" if  specified by physician order.    BLOOD CULTURE [[id number]]  (Abnormal)  (Susceptibility) Collected: 06/10/22 1003    Order Status: Completed Specimen: Blood from Peripheral Updated: 06/14/22 1619     Significant Indicator POS     Source BLD     Site PERIPHERAL     Culture Result Growth detected by Bactec instrument. 06/11/2022  13:45      Methicillin " "Resistant Staphylococcus aureus  See previous culture for sensitivity report.        Streptococcus pyogenes (Group A)    Narrative:      CALL  [facility]  61 tel. [phone],  CALLED  61 tel. [phone] 06/13/2022, 17:46, RB PERF. RESULTS CALLED TO:[phone]  Per Hospital Policy: Only change Specimen Src: to \"Line\" if  specified by physician order.  Right AC    Susceptibility     Streptococcus pyogenes (group a) (2)     Antibiotic Interpretation Microscan   Method Status    Penicillin Sensitive 0.023 mcg/mL E-TEST Final    Cefotaxime Sensitive 0.047 mcg/mL E-TEST Final    Clindamycin Intermediate - mcg/mL E-TEST Final                   MRSA By PCR (Amp) [[id number]]     Order Status: Canceled Specimen: Respirate from Nares                      ASSESSMENT/PLAN:   82 y.o. admitted for Sepsis ([facility]) [A41.9]. Presents with chronic nonhealing left lateral ankle wound.    -wound bed red and granular. periwound maceration.   Ok to DC from LPS/ortho standpoint. Continue VAC. IV abx for 6 wk  See below for further detail      Wound care:   wound care orders updated    - Left: Ankle: add paste ring for periwound maceration.   Continue micah and reg wound VAC. Heel mepilex to offload ulcer. Add tubigrip D for mild compression. Heel float boot while in bed.     Labs/Imaging:  -COVID-19: not completed this admission.   -no further labs or imaging at this time        Vascular status:   -  palpable pedal pulses bilaterally  -TBI is normal.  Arterial studies negative.      Surgery:   No plans for further surgery    Antibiotics:   MRSA bacteremia.  On Vanco.  -ID: involved.   Stop date 7/23/2022    Weight Bearing Status:     -Left foot: Weight bearing as tolerated    Offloading:   Heel float boot while in bed to offload ankle ulcer with heel Mepilex  Offloading boot when OOB for L foot. Even up for R foot to assist with balance  -Orthotic company: None      PT/OT :   Involved    Diabetes Education:   not involved at this time.  Lives at " [facility] who provides his diabetes care.  - Implications of loss of protective sensation (LOPS) discussed with patient- including increased risk for amputation.  Advised to check feet at least daily, moisturize feet, and to always wear protective foot wear.   -avoid trimming own nails. See podiatrist or certified foot and nail RN  -keep blood sugars <150 for improved wound healing        DISCHARGE PLAN:    Disposition:   [email or web address]       Follow-up with Dr. Wagner in 1 to 2 weeks  Follow-up LPS rounds in 1 month 7/15/2022 for wound check and possible excisional debridement PRN            Discussed with: pt, Marah-daughter, RN, Dr. Wagner, Lindy Davis RN wound care at [facility], wound care RN Monika    Please note that this dictation was created using voice recognition software. I have  worked with technical experts from [facility] to optimize the interface.  I have made every reasonable attempt to correct obvious errors, but there may be errors of grammar and possibly content that I did not discover before finalizing the note.    Ailyn Aguero, A.P.R.N.    If any questions or concerns, please contact [facility] through voalte.

## 2022-06-20 NOTE — PROGRESS NOTES
Alert and oriented x4. Offered fluids and snacks. Safety precautions in placed. Bed in lowest position. Upper side rails up. Treaded socks on. Reinforced the use of call light when needing assistance. Bed alarm is on.

## 2022-06-20 NOTE — WOUND TEAM
[facility]  Inpatient Services   Wound and Skin Care Evaluation    Admission Date: 6/10/2022     Last order of IP CONSULT TO WOUND CARE was found on 2022 from Hospital Encounter on 2022     HPI, PMH, SH: Reviewed    Past Surgical History:   Procedure Laterality Date   • IRRIGATION & DEBRIDEMENT GENERAL  2022    Procedure: IRRIGATION AND DEBRIDEMENT LEFT ANKLE;  Surgeon: Panfilo Wagner M.D.;  Location: SURGERY [facility];  Service: Orthopedics   • APPENDECTOMY     • STENT PLACEMENT      cardiac     Social History     Tobacco Use   • Smoking status: Former Smoker     Quit date: 2000     Years since quittin.7   • Smokeless tobacco: Never Used   Substance Use Topics   • Alcohol use: Never     Chief Complaint   Patient presents with   • Wound Check   • Altered Mental Status     Diagnosis: Sepsis (HCC) [A41.9]    Unit where seen by Wound Team: S6     WOUND CONSULT/FOLLOW UP RELATED TO:  L. Lateral ankle NPWT     WOUND HISTORY:  Andrei Garay is a 82 y.o. male past medical history of diabetes mellitus, hypertension who presented 6/10/2022 with altered mental status for last 3 days and left ankle pain.  History is primarily provided by daughter.  No relieving or exacerbating factors were noted.  He denies any fever does report some chills.  In the ER, x-ray of the ankle showed signs concerning for osteomyelitis.  UA grossly positive for urinary tract infection.  Patient met criteria for sepsis started on IV broad-spectrum antibiotics and will be admitted for further work-up and limb preservation consult.    SX with Dr. Wagner 22    WOUND ASSESSMENT/LDA   Negative Pressure Wound Therapy 22 Surgical Ankle Lateral Left (Active)   NPWT Pump Mode / Pressure Setting Continuous;125 mmHg 22 1300   Dressing Type Small;Black Foam (Regular) 22 1300   Number of Foam Pieces Used 2 22 1300   Canister Changed No 22 1300   Output (mL) 0 mL 22  1300   NEXT Dressing Change/Treatment Date 06/22/22 06/20/22 1300   VAC VeraFlo Pressure (mm/Hg) Continuous;125 mmHg 06/20/22 1300           Wound 06/14/22 Incision Ankle Left WOUND VAC, ACE WRAP (Active)   Wound Image    06/17/22 0900   Site Assessment Red 06/20/22 1300   Periwound Assessment Maceration 06/20/22 1300   Margins Defined edges;Unattached edges 06/20/22 1300   Closure Secondary intention 06/20/22 1300   Drainage Amount Small 06/20/22 1300   Drainage Description Serosanguineous 06/20/22 1300   Treatments Cleansed;Site care;Offloading 06/20/22 1300   Wound Cleansing Approved Wound Cleanser 06/20/22 1300   Periwound Protectant Skin Protectant Wipes to Periwound;Paste Ring;Drape 06/20/22 1300   Dressing Cleansing/Solutions Not Applicable 06/20/22 1300   Dressing Options Collagen Dressing;Wound Vac;Tubigrip 06/20/22 1300   Dressing Changed Changed 06/20/22 1300   Dressing Status Clean;Dry;Intact 06/20/22 1300   Dressing Change/Treatment Frequency Monday, Wednesday, Friday, and As Needed 06/20/22 1300   NEXT Dressing Change/Treatment Date 06/22/22 06/20/22 1300   NEXT Weekly Photo (Inpatient Only) 06/24/22 06/20/22 1300   Non-staged Wound Description Full thickness 06/17/22 0900   Wound Length (cm) 3 cm 06/17/22 0900   Wound Width (cm) 1.7 cm 06/17/22 0900   Wound Depth (cm) 2.1 cm 06/17/22 0900   Wound Surface Area (cm^2) 5.1 cm^2 06/17/22 0900   Wound Volume (cm^3) 10.71 cm^3 06/17/22 0900   Shape nahid 06/20/22 1300   Wound Odor None 06/20/22 1300   Pulses 1+;Left 06/20/22 1300   Exposed Structures None 06/20/22 1300   WOUND NURSE ONLY - Time Spent with Patient (mins) 60 06/20/22 1300           Vascular:    HONEY:   HONEY Results, Last 30 Days US-HONEY SINGLE LEVEL BILAT    Result Date: 6/12/2022  Narrative  Vascular Laboratory  Conclusions  The ankle pressures are not accurately measured due to calcification and  noncompressibility of the tibial vessels.  There is no evidence of significant arterial disease.   ROSIE COOKIE  Age:    82    Gender:     M  MRN:    [id number]  :    1940      BSA:  Exam Date:     2022 13:45  Room #:     Inpatient  Priority:     Routine  Ht (in):             Wt (lb):  Ordering Physician:        JIN GREGG  Referring Physician:       [id number]  Sonographer:               Aliyah Pandya RVDAISHA  Study Type:                Complete Bilateral  Technical Quality:         Adequate  Indications:     Ulceration of LE  CPT Codes:       79750  ICD Codes:       707.1  History:         Ulceration of the left lower extremity. No prior exams.  Limitations:                 RIGHT  Waveform            Systolic BPs (mmHg)                             125           Brachial  Triphasic                                Common Femoral  Triphasic                                Popliteal  Triphasic                  200           Posterior Tibial  Triphasic                  179           Dorsalis Pedis                                           Digit                             1.54          HONEY                                           TBI                       LEFT  Waveform        Systolic BPs (mmHg)                             130           Brachial  Triphasic                                Common Femoral  Triphasic                                Popliteal  Triphasic                  187           Posterior Tibial  Triphasic                  180           Dorsalis Pedis                                           Digit                             1.44          HONEY                                           TBI  Findings  Bilateral-  The ankle pressures are not accurately measured due to calcification and  noncompressibility of the tibial vessels.  Doppler waveforms of the common femoral artery and popliteal artery are of  high amplitude and triphasic.  Doppler waveforms at the ankle are brisk and triphasic.  TBI-  Right: 1.29  Left:    1.09  Additional testing was  not performed in accordance with lower extremity  arterial evaluation protocol.  James Gonzalez  (Electronically Signed)  Final Date:      12 June 2022                   16:17      Lab Values:    Lab Results   Component Value Date/Time    WBC 7.8 06/14/2022 02:18 PM    RBC 4.02 (L) 06/14/2022 02:18 PM    HEMOGLOBIN 11.8 (L) 06/14/2022 02:18 PM    HEMATOCRIT 35.1 (L) 06/14/2022 02:18 PM    CREACTPROT 22.27 (H) 04/12/2022 03:03 PM    SEDRATEWES 40 (H) 04/13/2022 02:19 AM    HBA1C 6.3 (H) 06/11/2022 03:57 PM        Culture Results show:  Recent Results (from the past 720 hour(s))   CULTURE WOUND W/ GRAM STAIN    Collection Time: 06/14/22  9:29 AM    Specimen: Wound   Result Value Ref Range    Significant Indicator POS (POS)     Source WND     Site Left Lateral Ankle     Culture Result No growth at 72 hours. (A)     Gram Stain Result Rare WBCs.  No organisms seen.       Culture Result - (A)        Pain Level/Medicated:  Denied pain       INTERVENTIONS BY WOUND TEAM:  Chart and images reviewed. Discussed with bedside RN. All areas of concern (based on picture review, LDA review and discussion with bedside RN) have been thoroughly assessed. Documentation of areas based on significant findings. This RN in to assess patient. Performed standard wound care which includes appropriate positioning, dressing removal and non-selective debridement. Pictures and measurements obtained weekly if/when required.  Preparation for Dressing removal: Dressing soaked with normal saline  Non-selectively Debrided with:  normal saline and gauze.  Sharp debridement: n/a  Rosa M wound: Cleansed with normal saline, Prepped with no sting, paste ring  Primary Dressing: micah 1/2 thickness black foam placed into wound bed and secured with drape  Secondary (Outer) Dressing: a small window was cut and a button was applied to attach the tracpad.  No leak detected, seal intact. mepilex placed for offloading, and Tubi   Interdisciplinary consultation:  Patient, Bedside RN ,     EVALUATION / RATIONALE FOR TREATMENT:  Most Recent Date:  6/20/22: Maceration still present, applied paste ring around periwound, added micah to the base of the wound.  Continue NPWT.  Tubi  added for light compression   6/17/22: Patient's ankle was a little macerated, but turned pink prior to re-application, wound bed appears nice and red for first dressing change post-op.  Continue NPWT     Goals: Steady decrease in wound area and depth weekly.    WOUND TEAM PLAN OF CARE ([X] for frequency of wound follow up,): X  Nursing to follow dressing orders written for wound care. Contact wound team if area fails to progress, deteriorates or with any questions/concerns if something comes up before next scheduled follow up (See below as to whether wound is following and frequency of wound follow up)  Dressing changes by wound team:                   Follow up 3 times weekly:                NPWT change 3 times weekly:  XMWF   Follow up 1-2 times weekly:      Follow up Bi-Monthly:                   Follow up as needed:     Other (explain):     NURSING PLAN OF CARE ORDERS (X):  Dressing changes: See Dressing Care orders: X  Skin care: See Skin Care orders: X  RN Prevention Protocol: X  Rectal tube care: See Rectal Tube Care orders:   Other orders:    RSKIN:   CURRENTLY IN PLACE (X), APPLIED THIS VISIT (A), ORDERED (O):   Q shift Montrell:  X  Q shift pressure point assessments:  X    Surface/Positioning X  Pressure redistribution mattressX            Low Airloss          Bariatric foam      Bariatric AKILA     Waffle cushion        Waffle Overlay          Reposition q 2 hours  X    TAPs Turning system     [place] Pillow     Offloading/Redistribution A  Sacral Mepilex (Silicone dressing)     Heel Mepilex (Silicone dressing)      A   Heel float boots (Prevalon boot)             Float Heels off Bed with Pillows           Respiratory Room air  Silicone O2 tubing         Gray Foam Ear protectors     Cannula  fixation Device (Tender )          High flow offloading Clip    Elastic head band offloading device      Anchorfast                                                         Trach with Optifoam split foam             Containment/Moisture Prevention continent    Rectal tube or BMS    Purwick/Condom Cath        Martinez Catheter    Barrier wipes           Barrier paste       Antifungal tx      Interdry        Mobilization offloading boot      Up to chair        Ambulate      PT/OT      Nutrition PO      Dietician        Diabetes Education      PO     TF     TPN     NPO   # days     Other        Anticipated discharge plans: MONAE  LTACH:        SNF/Rehab:                  Home Health Care:           Outpatient Wound Center:            Self/Family Care:        Other:                  Vac Discharge Needs: X  Not Applicable Pt not on a wound vac:       Regular Vac while inpatient, alternative dressing at DC:        Regular Vac in use and continued at DC:      X      Reg. Vac w/ Skin Sub/Biologic in use. Will need to be changed 2x wkly:      Veraflo Vac while inpatient, ok to transition to Regular Vac on Discharge:           Veraflo Vac while inpatient, will need to remain on Veraflo Vac upon discharge:

## 2022-06-20 NOTE — DISCHARGE PLANNING
Anticipated Discharge Disposition: snf    Action: Rev’d in IDT rounds. Pt med cleared.   Chicho, Admission Director, has accepted pt back to [facility]. Chicho is aware pt has a wound vac.   Dr Rutledge did addendum for dc summary.  Chart copy and cobra to be completed and given to NIRALI Jaramillo.   Pt can transport via [facility].   Ride line request sent to Nettie at ride line requesting 3:30 PM transport. Rec’d confirmation via voalte as to actual time.   Dtchris Crystal notified via phone by this CM.     Barriers to Discharge:     Plan: dc to snf 3:30.

## 2022-06-20 NOTE — CARE PLAN
The patient is Stable - Low risk of patient condition declining or worsening    Shift Goals  Clinical Goals: comfort  Patient Goals: pt will be able to rest and sleep comfortably  Family Goals: discharge    Progress made toward(s) clinical / shift goals:      Patient is not progressing towards the following goals:

## 2022-06-21 ENCOUNTER — TELEPHONE (OUTPATIENT)
Dept: INFECTIOUS DISEASES | Facility: MEDICAL CENTER | Age: 82
End: 2022-06-21
Payer: COMMERCIAL

## 2022-06-29 ENCOUNTER — APPOINTMENT (OUTPATIENT)
Dept: RADIOLOGY | Facility: MEDICAL CENTER | Age: 82
DRG: 540 | End: 2022-06-29
Attending: EMERGENCY MEDICINE
Payer: COMMERCIAL

## 2022-06-29 ENCOUNTER — HOSPITAL ENCOUNTER (INPATIENT)
Facility: MEDICAL CENTER | Age: 82
LOS: 26 days | DRG: 540 | End: 2022-07-25
Attending: EMERGENCY MEDICINE | Admitting: STUDENT IN AN ORGANIZED HEALTH CARE EDUCATION/TRAINING PROGRAM
Payer: COMMERCIAL

## 2022-06-29 DIAGNOSIS — R33.9 URINARY RETENTION: ICD-10-CM

## 2022-06-29 DIAGNOSIS — E11.628 DIABETIC FOOT INFECTION (HCC): ICD-10-CM

## 2022-06-29 DIAGNOSIS — L08.9 DIABETIC FOOT INFECTION (HCC): ICD-10-CM

## 2022-06-29 DIAGNOSIS — Z79.4 TYPE 2 DIABETES MELLITUS WITH HYPERGLYCEMIA, WITH LONG-TERM CURRENT USE OF INSULIN (HCC): ICD-10-CM

## 2022-06-29 DIAGNOSIS — R41.82 ALTERED MENTAL STATUS, UNSPECIFIED ALTERED MENTAL STATUS TYPE: ICD-10-CM

## 2022-06-29 DIAGNOSIS — E78.5 HYPERLIPIDEMIA, UNSPECIFIED HYPERLIPIDEMIA TYPE: ICD-10-CM

## 2022-06-29 DIAGNOSIS — E11.65 TYPE 2 DIABETES MELLITUS WITH HYPERGLYCEMIA, WITH LONG-TERM CURRENT USE OF INSULIN (HCC): ICD-10-CM

## 2022-06-29 DIAGNOSIS — G30.1 LATE ONSET ALZHEIMER'S DEMENTIA WITH BEHAVIORAL DISTURBANCE (HCC): ICD-10-CM

## 2022-06-29 DIAGNOSIS — F02.818 LATE ONSET ALZHEIMER'S DEMENTIA WITH BEHAVIORAL DISTURBANCE (HCC): ICD-10-CM

## 2022-06-29 DIAGNOSIS — I10 PRIMARY HYPERTENSION: ICD-10-CM

## 2022-06-29 DIAGNOSIS — G47.00 INSOMNIA, UNSPECIFIED TYPE: ICD-10-CM

## 2022-06-29 PROBLEM — M86.9 OSTEOMYELITIS (HCC): Status: ACTIVE | Noted: 2022-06-29

## 2022-06-29 LAB
ALBUMIN SERPL BCP-MCNC: 3.8 G/DL (ref 3.2–4.9)
ALBUMIN/GLOB SERPL: 1 G/DL
ALP SERPL-CCNC: 84 U/L (ref 30–99)
ALT SERPL-CCNC: 9 U/L (ref 2–50)
ANION GAP SERPL CALC-SCNC: 11 MMOL/L (ref 7–16)
APPEARANCE UR: CLEAR
AST SERPL-CCNC: 22 U/L (ref 12–45)
BACTERIA #/AREA URNS HPF: NEGATIVE /HPF
BASOPHILS # BLD AUTO: 0.7 % (ref 0–1.8)
BASOPHILS # BLD: 0.06 K/UL (ref 0–0.12)
BILIRUB SERPL-MCNC: 0.9 MG/DL (ref 0.1–1.5)
BILIRUB UR QL STRIP.AUTO: NEGATIVE
BUN SERPL-MCNC: 9 MG/DL (ref 8–22)
CALCIUM SERPL-MCNC: 9.3 MG/DL (ref 8.5–10.5)
CHLORIDE SERPL-SCNC: 101 MMOL/L (ref 96–112)
CK SERPL-CCNC: 130 U/L (ref 0–154)
CO2 SERPL-SCNC: 26 MMOL/L (ref 20–33)
COLOR UR: YELLOW
CREAT SERPL-MCNC: 0.82 MG/DL (ref 0.5–1.4)
CRP SERPL HS-MCNC: 0.78 MG/DL (ref 0–0.75)
EKG IMPRESSION: NORMAL
EOSINOPHIL # BLD AUTO: 0.2 K/UL (ref 0–0.51)
EOSINOPHIL NFR BLD: 2.3 % (ref 0–6.9)
EPI CELLS #/AREA URNS HPF: NEGATIVE /HPF
ERYTHROCYTE [DISTWIDTH] IN BLOOD BY AUTOMATED COUNT: 43.2 FL (ref 35.9–50)
ERYTHROCYTE [SEDIMENTATION RATE] IN BLOOD BY WESTERGREN METHOD: 27 MM/HOUR (ref 0–20)
FLUAV RNA SPEC QL NAA+PROBE: NEGATIVE
FLUBV RNA SPEC QL NAA+PROBE: NEGATIVE
GFR SERPLBLD CREATININE-BSD FMLA CKD-EPI: 88 ML/MIN/1.73 M 2
GLOBULIN SER CALC-MCNC: 3.8 G/DL (ref 1.9–3.5)
GLUCOSE SERPL-MCNC: 139 MG/DL (ref 65–99)
GLUCOSE UR STRIP.AUTO-MCNC: NEGATIVE MG/DL
HCT VFR BLD AUTO: 38.6 % (ref 42–52)
HGB BLD-MCNC: 13 G/DL (ref 14–18)
HYALINE CASTS #/AREA URNS LPF: ABNORMAL /LPF
IMM GRANULOCYTES # BLD AUTO: 0.05 K/UL (ref 0–0.11)
IMM GRANULOCYTES NFR BLD AUTO: 0.6 % (ref 0–0.9)
KETONES UR STRIP.AUTO-MCNC: NEGATIVE MG/DL
LACTATE BLD-SCNC: 1.7 MMOL/L (ref 0.5–2)
LEUKOCYTE ESTERASE UR QL STRIP.AUTO: NEGATIVE
LYMPHOCYTES # BLD AUTO: 2.12 K/UL (ref 1–4.8)
LYMPHOCYTES NFR BLD: 24.5 % (ref 22–41)
MCH RBC QN AUTO: 29.3 PG (ref 27–33)
MCHC RBC AUTO-ENTMCNC: 33.7 G/DL (ref 33.7–35.3)
MCV RBC AUTO: 86.9 FL (ref 81.4–97.8)
MICRO URNS: ABNORMAL
MONOCYTES # BLD AUTO: 0.74 K/UL (ref 0–0.85)
MONOCYTES NFR BLD AUTO: 8.6 % (ref 0–13.4)
NEUTROPHILS # BLD AUTO: 5.47 K/UL (ref 1.82–7.42)
NEUTROPHILS NFR BLD: 63.3 % (ref 44–72)
NITRITE UR QL STRIP.AUTO: NEGATIVE
NRBC # BLD AUTO: 0 K/UL
NRBC BLD-RTO: 0 /100 WBC
NT-PROBNP SERPL IA-MCNC: 415 PG/ML (ref 0–125)
PH UR STRIP.AUTO: 8 [PH] (ref 5–8)
PLATELET # BLD AUTO: 337 K/UL (ref 164–446)
PMV BLD AUTO: 9 FL (ref 9–12.9)
POTASSIUM SERPL-SCNC: 3.9 MMOL/L (ref 3.6–5.5)
PROT SERPL-MCNC: 7.6 G/DL (ref 6–8.2)
PROT UR QL STRIP: 30 MG/DL
RBC # BLD AUTO: 4.44 M/UL (ref 4.7–6.1)
RBC # URNS HPF: ABNORMAL /HPF
RBC UR QL AUTO: NEGATIVE
RSV RNA SPEC QL NAA+PROBE: NEGATIVE
SARS-COV-2 RNA RESP QL NAA+PROBE: NOTDETECTED
SODIUM SERPL-SCNC: 138 MMOL/L (ref 135–145)
SP GR UR STRIP.AUTO: 1.01
SPECIMEN SOURCE: NORMAL
TROPONIN T SERPL-MCNC: 30 NG/L (ref 6–19)
UROBILINOGEN UR STRIP.AUTO-MCNC: 0.2 MG/DL
WBC # BLD AUTO: 8.6 K/UL (ref 4.8–10.8)
WBC #/AREA URNS HPF: ABNORMAL /HPF

## 2022-06-29 PROCEDURE — 83880 ASSAY OF NATRIURETIC PEPTIDE: CPT

## 2022-06-29 PROCEDURE — 85652 RBC SED RATE AUTOMATED: CPT

## 2022-06-29 PROCEDURE — 81001 URINALYSIS AUTO W/SCOPE: CPT

## 2022-06-29 PROCEDURE — 94760 N-INVAS EAR/PLS OXIMETRY 1: CPT

## 2022-06-29 PROCEDURE — 84145 PROCALCITONIN (PCT): CPT

## 2022-06-29 PROCEDURE — 36415 COLL VENOUS BLD VENIPUNCTURE: CPT

## 2022-06-29 PROCEDURE — A9270 NON-COVERED ITEM OR SERVICE: HCPCS | Performed by: STUDENT IN AN ORGANIZED HEALTH CARE EDUCATION/TRAINING PROGRAM

## 2022-06-29 PROCEDURE — 71045 X-RAY EXAM CHEST 1 VIEW: CPT

## 2022-06-29 PROCEDURE — 85025 COMPLETE CBC W/AUTO DIFF WBC: CPT

## 2022-06-29 PROCEDURE — 86140 C-REACTIVE PROTEIN: CPT

## 2022-06-29 PROCEDURE — 82550 ASSAY OF CK (CPK): CPT

## 2022-06-29 PROCEDURE — 93971 EXTREMITY STUDY: CPT | Mod: RT

## 2022-06-29 PROCEDURE — 99285 EMERGENCY DEPT VISIT HI MDM: CPT

## 2022-06-29 PROCEDURE — 87040 BLOOD CULTURE FOR BACTERIA: CPT | Mod: 91

## 2022-06-29 PROCEDURE — 70450 CT HEAD/BRAIN W/O DYE: CPT | Mod: MG

## 2022-06-29 PROCEDURE — 80053 COMPREHEN METABOLIC PANEL: CPT

## 2022-06-29 PROCEDURE — 700111 HCHG RX REV CODE 636 W/ 250 OVERRIDE (IP): Performed by: STUDENT IN AN ORGANIZED HEALTH CARE EDUCATION/TRAINING PROGRAM

## 2022-06-29 PROCEDURE — 96372 THER/PROPH/DIAG INJ SC/IM: CPT

## 2022-06-29 PROCEDURE — 73610 X-RAY EXAM OF ANKLE: CPT | Mod: LT

## 2022-06-29 PROCEDURE — 84484 ASSAY OF TROPONIN QUANT: CPT

## 2022-06-29 PROCEDURE — 83605 ASSAY OF LACTIC ACID: CPT

## 2022-06-29 PROCEDURE — 0241U HCHG SARS-COV-2 COVID-19 NFCT DS RESP RNA 4 TRGT MIC: CPT

## 2022-06-29 PROCEDURE — 99223 1ST HOSP IP/OBS HIGH 75: CPT | Performed by: STUDENT IN AN ORGANIZED HEALTH CARE EDUCATION/TRAINING PROGRAM

## 2022-06-29 PROCEDURE — 93005 ELECTROCARDIOGRAM TRACING: CPT | Performed by: EMERGENCY MEDICINE

## 2022-06-29 PROCEDURE — 700102 HCHG RX REV CODE 250 W/ 637 OVERRIDE(OP): Performed by: STUDENT IN AN ORGANIZED HEALTH CARE EDUCATION/TRAINING PROGRAM

## 2022-06-29 PROCEDURE — C9803 HOPD COVID-19 SPEC COLLECT: HCPCS | Performed by: EMERGENCY MEDICINE

## 2022-06-29 PROCEDURE — 770001 HCHG ROOM/CARE - MED/SURG/GYN PRIV*

## 2022-06-29 RX ORDER — POLYETHYLENE GLYCOL 3350 17 G/17G
1 POWDER, FOR SOLUTION ORAL
Status: DISCONTINUED | OUTPATIENT
Start: 2022-06-29 | End: 2022-07-25 | Stop reason: HOSPADM

## 2022-06-29 RX ORDER — LISINOPRIL 10 MG/1
10 TABLET ORAL DAILY
Status: DISCONTINUED | OUTPATIENT
Start: 2022-06-30 | End: 2022-07-01

## 2022-06-29 RX ORDER — ATORVASTATIN CALCIUM 40 MG/1
40 TABLET, FILM COATED ORAL EVERY EVENING
Status: DISCONTINUED | OUTPATIENT
Start: 2022-06-29 | End: 2022-07-25 | Stop reason: HOSPADM

## 2022-06-29 RX ORDER — MIRTAZAPINE 15 MG/1
15 TABLET, FILM COATED ORAL
Status: DISCONTINUED | OUTPATIENT
Start: 2022-06-29 | End: 2022-07-25 | Stop reason: HOSPADM

## 2022-06-29 RX ORDER — ACETAMINOPHEN 325 MG/1
650 TABLET ORAL EVERY 6 HOURS PRN
Status: DISCONTINUED | OUTPATIENT
Start: 2022-06-29 | End: 2022-07-25 | Stop reason: HOSPADM

## 2022-06-29 RX ORDER — HYDROCODONE BITARTRATE AND ACETAMINOPHEN 5; 325 MG/1; MG/1
1 TABLET ORAL SEE ADMIN INSTRUCTIONS
Status: ON HOLD | COMMUNITY
End: 2022-07-25

## 2022-06-29 RX ORDER — DAPTOMYCIN 50 MG/ML
750 INJECTION, POWDER, LYOPHILIZED, FOR SOLUTION INTRAVENOUS EVERY 24 HOURS
Status: ON HOLD | COMMUNITY
End: 2022-07-25

## 2022-06-29 RX ORDER — AMOXICILLIN 250 MG
2 CAPSULE ORAL 2 TIMES DAILY
Status: DISCONTINUED | OUTPATIENT
Start: 2022-06-29 | End: 2022-07-25 | Stop reason: HOSPADM

## 2022-06-29 RX ORDER — DAPTOMYCIN 50 MG/ML
750 INJECTION, POWDER, LYOPHILIZED, FOR SOLUTION INTRAVENOUS EVERY 24 HOURS
Status: DISCONTINUED | OUTPATIENT
Start: 2022-06-29 | End: 2022-06-29

## 2022-06-29 RX ORDER — ENOXAPARIN SODIUM 100 MG/ML
40 INJECTION SUBCUTANEOUS DAILY
Status: DISCONTINUED | OUTPATIENT
Start: 2022-06-29 | End: 2022-07-25 | Stop reason: HOSPADM

## 2022-06-29 RX ORDER — CHOLECALCIFEROL (VITAMIN D3) 125 MCG
10 CAPSULE ORAL
Status: DISCONTINUED | OUTPATIENT
Start: 2022-06-29 | End: 2022-07-25 | Stop reason: HOSPADM

## 2022-06-29 RX ORDER — BISACODYL 10 MG
10 SUPPOSITORY, RECTAL RECTAL
Status: DISCONTINUED | OUTPATIENT
Start: 2022-06-29 | End: 2022-07-25 | Stop reason: HOSPADM

## 2022-06-29 RX ORDER — DEXTROSE MONOHYDRATE 25 G/50ML
25 INJECTION, SOLUTION INTRAVENOUS
Status: DISCONTINUED | OUTPATIENT
Start: 2022-06-29 | End: 2022-07-25 | Stop reason: HOSPADM

## 2022-06-29 RX ADMIN — ATORVASTATIN CALCIUM 40 MG: 40 TABLET, FILM COATED ORAL at 23:28

## 2022-06-29 RX ADMIN — MIRTAZAPINE 15 MG: 15 TABLET, FILM COATED ORAL at 23:27

## 2022-06-29 RX ADMIN — SERTRALINE 50 MG: 100 TABLET, FILM COATED ORAL at 23:28

## 2022-06-29 RX ADMIN — Medication 10 MG: at 23:28

## 2022-06-29 RX ADMIN — METOPROLOL TARTRATE 25 MG: 25 TABLET, FILM COATED ORAL at 23:28

## 2022-06-29 RX ADMIN — ENOXAPARIN SODIUM 40 MG: 40 INJECTION SUBCUTANEOUS at 23:28

## 2022-06-29 ASSESSMENT — FIBROSIS 4 INDEX: FIB4 SCORE: 2.15

## 2022-06-30 PROBLEM — G93.40 ENCEPHALOPATHY: Status: ACTIVE | Noted: 2022-06-30

## 2022-06-30 LAB
ANION GAP SERPL CALC-SCNC: 12 MMOL/L (ref 7–16)
BUN SERPL-MCNC: 8 MG/DL (ref 8–22)
CALCIUM SERPL-MCNC: 9.5 MG/DL (ref 8.5–10.5)
CHLORIDE SERPL-SCNC: 102 MMOL/L (ref 96–112)
CO2 SERPL-SCNC: 25 MMOL/L (ref 20–33)
CREAT SERPL-MCNC: 0.74 MG/DL (ref 0.5–1.4)
ERYTHROCYTE [DISTWIDTH] IN BLOOD BY AUTOMATED COUNT: 42.6 FL (ref 35.9–50)
GFR SERPLBLD CREATININE-BSD FMLA CKD-EPI: 91 ML/MIN/1.73 M 2
GLUCOSE BLD STRIP.AUTO-MCNC: 159 MG/DL (ref 65–99)
GLUCOSE BLD STRIP.AUTO-MCNC: 195 MG/DL (ref 65–99)
GLUCOSE BLD STRIP.AUTO-MCNC: 198 MG/DL (ref 65–99)
GLUCOSE SERPL-MCNC: 156 MG/DL (ref 65–99)
HCT VFR BLD AUTO: 40.5 % (ref 42–52)
HGB BLD-MCNC: 13.7 G/DL (ref 14–18)
MCH RBC QN AUTO: 29.3 PG (ref 27–33)
MCHC RBC AUTO-ENTMCNC: 33.8 G/DL (ref 33.7–35.3)
MCV RBC AUTO: 86.5 FL (ref 81.4–97.8)
PLATELET # BLD AUTO: 352 K/UL (ref 164–446)
PMV BLD AUTO: 9.1 FL (ref 9–12.9)
POTASSIUM SERPL-SCNC: 4 MMOL/L (ref 3.6–5.5)
PROCALCITONIN SERPL-MCNC: 0.09 NG/ML
RBC # BLD AUTO: 4.68 M/UL (ref 4.7–6.1)
SODIUM SERPL-SCNC: 139 MMOL/L (ref 135–145)
WBC # BLD AUTO: 9.3 K/UL (ref 4.8–10.8)

## 2022-06-30 PROCEDURE — 700111 HCHG RX REV CODE 636 W/ 250 OVERRIDE (IP): Performed by: STUDENT IN AN ORGANIZED HEALTH CARE EDUCATION/TRAINING PROGRAM

## 2022-06-30 PROCEDURE — 96372 THER/PROPH/DIAG INJ SC/IM: CPT

## 2022-06-30 PROCEDURE — 770001 HCHG ROOM/CARE - MED/SURG/GYN PRIV*

## 2022-06-30 PROCEDURE — 80048 BASIC METABOLIC PNL TOTAL CA: CPT

## 2022-06-30 PROCEDURE — 82962 GLUCOSE BLOOD TEST: CPT | Mod: 91

## 2022-06-30 PROCEDURE — 36415 COLL VENOUS BLD VENIPUNCTURE: CPT

## 2022-06-30 PROCEDURE — 96375 TX/PRO/DX INJ NEW DRUG ADDON: CPT

## 2022-06-30 PROCEDURE — 99232 SBSQ HOSP IP/OBS MODERATE 35: CPT | Performed by: NURSE PRACTITIONER

## 2022-06-30 PROCEDURE — 700105 HCHG RX REV CODE 258: Performed by: STUDENT IN AN ORGANIZED HEALTH CARE EDUCATION/TRAINING PROGRAM

## 2022-06-30 PROCEDURE — 96366 THER/PROPH/DIAG IV INF ADDON: CPT

## 2022-06-30 PROCEDURE — 0JBR3ZZ EXCISION OF LEFT FOOT SUBCUTANEOUS TISSUE AND FASCIA, PERCUTANEOUS APPROACH: ICD-10-PCS | Performed by: ORTHOPAEDIC SURGERY

## 2022-06-30 PROCEDURE — 700102 HCHG RX REV CODE 250 W/ 637 OVERRIDE(OP): Performed by: STUDENT IN AN ORGANIZED HEALTH CARE EDUCATION/TRAINING PROGRAM

## 2022-06-30 PROCEDURE — 96365 THER/PROPH/DIAG IV INF INIT: CPT

## 2022-06-30 PROCEDURE — 85027 COMPLETE CBC AUTOMATED: CPT

## 2022-06-30 PROCEDURE — 11042 DBRDMT SUBQ TIS 1ST 20SQCM/<: CPT

## 2022-06-30 PROCEDURE — A9270 NON-COVERED ITEM OR SERVICE: HCPCS | Performed by: STUDENT IN AN ORGANIZED HEALTH CARE EDUCATION/TRAINING PROGRAM

## 2022-06-30 PROCEDURE — 99222 1ST HOSP IP/OBS MODERATE 55: CPT | Mod: 25

## 2022-06-30 RX ADMIN — LISINOPRIL 10 MG: 10 TABLET ORAL at 06:22

## 2022-06-30 RX ADMIN — SODIUM CHLORIDE 3 G: 900 INJECTION INTRAVENOUS at 17:37

## 2022-06-30 RX ADMIN — INSULIN HUMAN 1 UNITS: 100 INJECTION, SOLUTION PARENTERAL at 17:37

## 2022-06-30 RX ADMIN — ATORVASTATIN CALCIUM 40 MG: 40 TABLET, FILM COATED ORAL at 17:36

## 2022-06-30 RX ADMIN — SODIUM CHLORIDE 3 G: 900 INJECTION INTRAVENOUS at 06:23

## 2022-06-30 RX ADMIN — Medication 10 MG: at 20:17

## 2022-06-30 RX ADMIN — DAPTOMYCIN 750 MG: 500 INJECTION, POWDER, LYOPHILIZED, FOR SOLUTION INTRAVENOUS at 00:18

## 2022-06-30 RX ADMIN — MIRTAZAPINE 15 MG: 15 TABLET, FILM COATED ORAL at 20:17

## 2022-06-30 RX ADMIN — METOPROLOL TARTRATE 25 MG: 25 TABLET, FILM COATED ORAL at 21:45

## 2022-06-30 RX ADMIN — SODIUM CHLORIDE 3 G: 900 INJECTION INTRAVENOUS at 11:32

## 2022-06-30 RX ADMIN — SERTRALINE 50 MG: 100 TABLET, FILM COATED ORAL at 17:36

## 2022-06-30 RX ADMIN — INSULIN HUMAN 1 UNITS: 100 INJECTION, SOLUTION PARENTERAL at 08:55

## 2022-06-30 RX ADMIN — METOPROLOL TARTRATE 25 MG: 25 TABLET, FILM COATED ORAL at 11:33

## 2022-06-30 RX ADMIN — ENOXAPARIN SODIUM 40 MG: 40 INJECTION SUBCUTANEOUS at 17:36

## 2022-06-30 RX ADMIN — SODIUM CHLORIDE 3 G: 900 INJECTION INTRAVENOUS at 01:30

## 2022-06-30 RX ADMIN — DAPTOMYCIN 750 MG: 500 INJECTION, POWDER, LYOPHILIZED, FOR SOLUTION INTRAVENOUS at 22:43

## 2022-06-30 RX ADMIN — INSULIN HUMAN 1 UNITS: 100 INJECTION, SOLUTION PARENTERAL at 11:40

## 2022-06-30 ASSESSMENT — COGNITIVE AND FUNCTIONAL STATUS - GENERAL
MOBILITY SCORE: 15
TURNING FROM BACK TO SIDE WHILE IN FLAT BAD: A LITTLE
CLIMB 3 TO 5 STEPS WITH RAILING: TOTAL
DRESSING REGULAR LOWER BODY CLOTHING: A LOT
WALKING IN HOSPITAL ROOM: A LOT
MOVING FROM LYING ON BACK TO SITTING ON SIDE OF FLAT BED: A LITTLE
SUGGESTED CMS G CODE MODIFIER MOBILITY: CK
STANDING UP FROM CHAIR USING ARMS: A LITTLE
MOVING TO AND FROM BED TO CHAIR: A LITTLE

## 2022-06-30 ASSESSMENT — PAIN SCALES - WONG BAKER: WONGBAKER_NUMERICALRESPONSE: DOESN'T HURT AT ALL

## 2022-06-30 ASSESSMENT — PAIN DESCRIPTION - PAIN TYPE: TYPE: CHRONIC PAIN

## 2022-06-30 NOTE — HOSPITAL COURSE
An 82-year-old man with h/o DM, HTN, HLD, osteomyelitis presented with AMS. According to the patient's daughter who is visiting him she states that he began becoming more confused and slurring his words yesterday. He also reports that he had episode of incontinence which is not his normal baseline. Has been receiving IV daptomycin for left lower leg osteomyelitis which was diagnosed 2 weeks ago, MRSA culture positive.  US of the right lower extremity negative for DVT. Limb preservation services consulted and following.  Completed a bedside debridement and applied new dressings.

## 2022-06-30 NOTE — ED NOTES
Pt becoming increasingly combative and verbally aggressive. Pt refusing to sit back in bed. Tech at bedside to reposition pt. Side rail up x2.

## 2022-06-30 NOTE — ED TRIAGE NOTES
Chief Complaint   Patient presents with   • Leg Swelling     Pt has new swelling to his left lower swelling and new confusion per staff at [facility] home and daughter. Pt was incontinent of urine today and is usually continent. Pt is also weak and unable to walk like normal. Pt is A&O to self only today and is usually A&Ox3. Pt was recently admitted here for osteomyelitis of his left lower leg, an I&D, a positive MRSA culture of the left lower leg wound. Pt is currently on a daptomycin IV antibiotic regimen.        BP (!) 170/80   Pulse 76   Temp 36.7 °C (98 °F) (Temporal)   Resp 18   Ht 1.829 m (6')   Wt 113 kg (250 lb)   SpO2 91%   BMI 33.91 kg/m²

## 2022-06-30 NOTE — ED PROVIDER NOTES
ED Provider Note    Scribed for Lamont Gagnon M.D. by Neeta Mojica. 6/29/2022, 5:26 PM.    Primary care provider: Stephanie Diaz M.D.  Means of arrival: EMS  History obtained from: Family Member  History limited by: Poor Historian    CHIEF COMPLAINT  Chief Complaint   Patient presents with   • Leg Swelling     Pt has new swelling to his left lower swelling and new confusion per staff at [facility] home and daughter. Pt was incontinent of urine today and is usually continent. Pt is also weak and unable to walk like normal. Pt is A&O to self only today and is usually A&Ox3. Pt was recently admitted here for osteomyelitis of his left lower leg, an I&D, a positive MRSA culture of the left lower leg wound. Pt is currently on a daptomycin IV antibiotic regimen.          HYACINTH Garay is a 82 y.o. male who presents to the Emergency Department for evaluation of new right leg swelling onset few days.  Per daughter at bedside, patient has been slurring his words since yesterday. Patient additionally has new onset incontinence of urine today. Patient was in the hospital two weeks ago for osteomyelitis of his left lower leg and a positive MRSA culture of a left lower leg wound. He recently had a debridement of his left leg. He admits to associated symptoms of diarrhea, but denies chest pain, abdominal pain, headache, or vomiting. No alleviating factors were reported. Patient had a UTI two weeks ago that was treated with antibiotics. Patient believes we are at a [facility], the year is 2002, and that we are in [place].     Review of Records show that the patient has a history of diabetes, hypertension, and MI. He was discharged from the hospital on the 20th. He is DNR and DNI. On 6/10, patient was septic secondary to left lateral malleolus osteomyelitis noted on MRI. Blood culture was positive MRSA. His Eco was okay. Blood cultures on 6/12 showed no growth. Ortho did and IND and debridement on left ankle and tissue  cultures showed no growth. On , patient was transitioning to a skilled nursing facility. He will need weekly CBCs, CMP and CPK while on IV daptomycin.      REVIEW OF SYSTEMS  Pertinent positives include right leg swelling, urine incontinence, and diarrhea. Pertinent negatives include no chest pain, abdominal pain, headache, or vomiting.  All other systems reviewed and negative.    PAST MEDICAL HISTORY   has a past medical history of Diabetes (HCC), Hyperlipemia, Hypertension, and MI (myocardial infarction) (HCC).    SURGICAL HISTORY   has a past surgical history that includes stent placement; appendectomy; and irrigation & debridement general (2022).    SOCIAL HISTORY  Social History     Tobacco Use   • Smoking status: Former Smoker     Quit date: 2000     Years since quittin.7   • Smokeless tobacco: Never Used   Substance Use Topics   • Alcohol use: Never   • Drug use: Never      Social History     Substance and Sexual Activity   Drug Use Never       FAMILY HISTORY  None noted.     CURRENT MEDICATIONS  Home Medications     Reviewed by Gali Weldon R.N. (Registered Nurse) on 22 at 1716  Med List Status: Not Addressed   Medication Last Dose Status   acetaminophen (TYLENOL) 325 MG Tab  Active   ascorbic acid (VITAMIN C) 500 MG tablet  Active   atorvastatin (LIPITOR) 20 MG Tab  Active   cadexomer iodine (IODOFLEX) 0.9 % pad  Active   diclofenac sodium (VOLTAREN) 1 % Gel  Active   gabapentin (NEURONTIN) 300 MG Cap  Active   lisinopril (PRINIVIL) 2.5 MG Tab  Active   Melatonin 10 MG Tab  Active   metformin (GLUCOPHAGE) 1000 MG tablet  Active   metoprolol (LOPRESSOR) 25 MG Tab  Active   mirtazapine (REMERON) 15 MG Tab  Active   sertraline (ZOLOFT) 50 MG Tab  Active   vitamin D2, Ergocalciferol, (DRISDOL) 1.25 MG (54910 UT) Cap capsule  Active                ALLERGIES  No Known Allergies    PHYSICAL EXAM  VITAL SIGNS: BP (!) 170/80   Pulse 76   Temp 36.7 °C (98 °F) (Temporal)   Resp 18    Ht 1.829 m (6')   Wt 113 kg (250 lb)   SpO2 91%   BMI 33.91 kg/m²     Constitutional: Well developed, Well nourished, No acute distress, Non-toxic appearance.   HENT: Normocephalic, Atraumatic, TMs normal, mucous membranes moist, no erythema, exudates, swelling, or masses, nares patent  Eyes: nonicteric  Neck: Supple, no meningismus  Lymphatic: No lymphadenopathy noted.   Cardiovascular: Regular rate and rhythm, no gallops rubs or murmurs  Lungs: Clear bilaterally   Abdomen: Bowel sounds normal, Soft, No tenderness  Skin: Right leg with open would with mild surrounding erythema. No discharge or foul smell. Warm  Back: No tenderness, No CVA tenderness.   Genitalia: Deferred  Rectal: Deferred  Extremities: Edema of right lower extremity with mild erythema. No break in skin on the right. PICC line with no redness, swelling, or discharge.    Neurologic: Alert, follows commands, moving all extremities, normal speech   Psychiatric: Confused. Answers questions appropriately but poor historian. Does not know location or year. Grossly non focal.       DIAGNOSTIC STUDIES / PROCEDURES    LABS  Results for orders placed or performed during the hospital encounter of 06/29/22   CBC WITH DIFFERENTIAL   Result Value Ref Range    WBC 8.6 4.8 - 10.8 K/uL    RBC 4.44 (L) 4.70 - 6.10 M/uL    Hemoglobin 13.0 (L) 14.0 - 18.0 g/dL    Hematocrit 38.6 (L) 42.0 - 52.0 %    MCV 86.9 81.4 - 97.8 fL    MCH 29.3 27.0 - 33.0 pg    MCHC 33.7 33.7 - 35.3 g/dL    RDW 43.2 35.9 - 50.0 fL    Platelet Count 337 164 - 446 K/uL    MPV 9.0 9.0 - 12.9 fL    Neutrophils-Polys 63.30 44.00 - 72.00 %    Lymphocytes 24.50 22.00 - 41.00 %    Monocytes 8.60 0.00 - 13.40 %    Eosinophils 2.30 0.00 - 6.90 %    Basophils 0.70 0.00 - 1.80 %    Immature Granulocytes 0.60 0.00 - 0.90 %    Nucleated RBC 0.00 /100 WBC    Neutrophils (Absolute) 5.47 1.82 - 7.42 K/uL    Lymphs (Absolute) 2.12 1.00 - 4.80 K/uL    Monos (Absolute) 0.74 0.00 - 0.85 K/uL    Eos (Absolute)  0.20 0.00 - 0.51 K/uL    Baso (Absolute) 0.06 0.00 - 0.12 K/uL    Immature Granulocytes (abs) 0.05 0.00 - 0.11 K/uL    NRBC (Absolute) 0.00 K/uL   COMP METABOLIC PANEL   Result Value Ref Range    Sodium 138 135 - 145 mmol/L    Potassium 3.9 3.6 - 5.5 mmol/L    Chloride 101 96 - 112 mmol/L    Co2 26 20 - 33 mmol/L    Anion Gap 11.0 7.0 - 16.0    Glucose 139 (H) 65 - 99 mg/dL    Bun 9 8 - 22 mg/dL    Creatinine 0.82 0.50 - 1.40 mg/dL    Calcium 9.3 8.5 - 10.5 mg/dL    AST(SGOT) 22 12 - 45 U/L    ALT(SGPT) 9 2 - 50 U/L    Alkaline Phosphatase 84 30 - 99 U/L    Total Bilirubin 0.9 0.1 - 1.5 mg/dL    Albumin 3.8 3.2 - 4.9 g/dL    Total Protein 7.6 6.0 - 8.2 g/dL    Globulin 3.8 (H) 1.9 - 3.5 g/dL    A-G Ratio 1.0 g/dL   TROPONIN   Result Value Ref Range    Troponin T 30 (H) 6 - 19 ng/L   URINALYSIS (UA)    Specimen: Urine   Result Value Ref Range    Color Yellow     Character Clear     Specific Gravity 1.012 <1.035    Ph 8.0 5.0 - 8.0    Glucose Negative Negative mg/dL    Ketones Negative Negative mg/dL    Protein 30 (A) Negative mg/dL    Bilirubin Negative Negative    Urobilinogen, Urine 0.2 Negative    Nitrite Negative Negative    Leukocyte Esterase Negative Negative    Occult Blood Negative Negative    Micro Urine Req Microscopic    CRP QUANTITIVE (NON-CARDIAC)   Result Value Ref Range    Stat C-Reactive Protein 0.78 (H) 0.00 - 0.75 mg/dL   COV-2, FLU A/B, AND RSV BY PCR (2-4 HOURS CEPHEID): Collect NP swab in VTM    Specimen: Respirate   Result Value Ref Range    Influenza virus A RNA Negative Negative    Influenza virus B, PCR Negative Negative    RSV, PCR Negative Negative    SARS-CoV-2 by PCR NotDetected     SARS-CoV-2 Source NP Swab    proBrain Natriuretic Peptide, NT   Result Value Ref Range    NT-proBNP 415 (H) 0 - 125 pg/mL   LACTIC ACID   Result Value Ref Range    Lactic Acid 1.7 0.5 - 2.0 mmol/L   ESTIMATED GFR   Result Value Ref Range    GFR (CKD-EPI) 88 >60 mL/min/1.73 m 2   URINE MICROSCOPIC (W/UA)    Result Value Ref Range    WBC 0-2 (A) /hpf    RBC 0-2 (A) /hpf    Bacteria Negative None /hpf    Epithelial Cells Negative /hpf    Hyaline Cast 0-2 /lpf   EKG (NOW)   Result Value Ref Range    Report       [facility] Emergency Dept.    Test Date:  2022  Pt Name:    COOKIE VELEZ                Department: ER  MRN:        [id number]                      Room:       [facility]  Gender:     Male                         Technician: [id number]  :        [id number]                   Requested By:BRANDON RAUSCH  Order #:    [id number]                    Reading MD:    Measurements  Intervals                                Axis  Rate:       76                           P:  HI:                                      QRS:        -51  QRSD:       140                          T:          19  QT:         416  QTc:        468    Interpretive Statements  A-FLUTTER W/ PREDOM 3:1 AV BLOCK, A-RATE 234  VENTRICULAR PREMATURE COMPLEX  INTERPOLATED VENTRICULAR PREMATURE COMPLEX  RIGHT BUNDLE BRANCH BLOCK  Compared to ECG 2022 06:35:30  Ventricular premature complex(es) now present  Sinus rhythm no longer present  Myocardial infarct finding no longer present       All labs reviewed by me.    EKG  Obtained at 7PM  Normal Sinus rhythm   Rate 76  Axis normal   Intervals normal   No ST segment elevation or depression.   Right bundle. No T wave changes. Upward axis.      RADIOLOGY  US-EXTREMITY VENOUS LOWER UNILAT RIGHT   Final Result      CT-HEAD W/O   Final Result         No acute intracranial abnormality.            DX-ANKLE 3+ VIEWS LEFT   Final Result         Bony destruction in the lateral aspect of the distal fibula is consistent with osteomyelitis described on recent MRI. Overlying soft tissue edema and a small amount of soft tissue gas      DX-CHEST-PORTABLE (1 VIEW)   Final Result      1.  Increased perihilar interstitial markings are suggestive of pulmonary edema.      2.  Stable mild cardiomegaly        The  radiologist's interpretation of all radiological studies have been reviewed by me.    COURSE & MEDICAL DECISION MAKING  Nursing notes, VS, PMSFHx reviewed in chart.     5:26 PM Patient seen and examined at bedside. Ordered for US-extremity, DX-ankle, CT-head, DX-chest, COVID swab, CBC with diff, CMP, Troponin, Lactic acid, Blood culture, Urinalysis, CRP Quant, Sed Rate, and EKG to evaluate.    8:45 PM - Paged Hospitalist.     9:03 PM I discussed the patient's case and the above findings with Dr. Araujo (Hospitalist) who kindly agreed to evaluate the patient.     9:05PM - Patient was reevaluated at bedside. Discussed plans for admission. Patient verbalizes understanding and agreement to this plan of care.      Decision Making:  This is a 82 y.o. year old male who presents with alteration in his mental status to include confusion, difficulty with walking and some intermittent slurred speech.  The patient has no focal deficits on my exam outside of confusion.  He does not have a definite source of infection with urine and chest demonstrating no evidence of UTI or pneumonia respectively.  Is DVT study on the right leg is negative.  His work-up includes reassuring CBC and electrolytes and normal lactate.  Patient's head CT was unremarkable.  Given his confusion I feel he merits admission for further evaluation to include MRI.  In terms of his osteomyelitis, there is some evidence of active osteomyelitis on x-ray and his inflammatory markers are somewhat elevated consistent with his disease process.  He is currently on appropriate treatment for MRSA-daptomycin.  Case discussed with Dr. Araujo and he will be admitted for altered mental status.    DISPOSITION:  Patient will be hospitalized by Dr. Araujo in guarded condition.    FINAL IMPRESSION  1. Altered mental status, unspecified altered mental status type          I, Neeta Mojica (Scrcathi), alison scribing for, and in the presence of, Lamont Gagnon M.D..    Electronically  signed by: Neeta Mojica (Scribe), 6/29/2022    Lamont DOBSON M.D. personally performed the services described in this documentation, as scribed by Neeta Mojica in my presence, and it is both accurate and complete.    The note accurately reflects work and decisions made by me.  Lamont Gagnon M.D.  6/29/2022  9:11 PM

## 2022-06-30 NOTE — PROGRESS NOTES
[facility] Medicine Daily Progress Note    Date of Service  6/30/2022    Chief Complaint  Andrei Garay is a 82 y.o. male admitted 6/29/2022 with left leg swelling and altered mental status    Hospital Course  Andrei Garay is a 82 y.o. male who presented 6/29/2022 with a history of type 2 diabetes, hyperlipidemia, hypertension, osteomyelitis who presents with right leg swelling and altered mental status.     According to the patient's daughter who is visiting him she states that he began becoming more confused and slurring his words yesterday.  He also reports that he had episode of incontinence which is not his normal baseline.  Has been receiving IV daptomycin for left lower leg osteomyelitis which was diagnosed 2 weeks ago, MRSA culture positive.    Emergency department ultrasound of the right lower extremity negative for DVT. Limb preservation services consulted and following.  Completed a bedside debridement and applied new dressings.  Sure of wound management [facility].  Wound does not appear to be improved from prior hospitalization.  We will consult infectious disease for recommendations and continue to work with LPS for management.      Interval Problem Update  Patient being up in chair, has been impulsive frequently getting out of bed.  Nursing continues to reorient patient.  Patient able to tell me that he is in the hospital however disoriented to date or reason for hospitalization.  Denies any pain, fever or any symptoms.  -Limb preservation services following, completed wound change, unsure why wound VAC was DC'd, [facility]   -Continue daptomycin, infectious disease consulted   -Unsure of patient's baseline however no longer having slurred speech  -Continue to avoid sedating medications    I have discussed this patient's plan of care and discharge plan at IDT rounds today with Case Management, Nursing, Nursing leadership, and  other members of the IDT team.    Consultants/Specialty  infectious disease  Limb preservation services  Code Status  DNAR/DNI    Disposition  Patient is not medically cleared for discharge.   Anticipate discharge to to skilled nursing facility.  I have placed the appropriate orders for post-discharge needs.    Review of Systems  Review of Systems   Unable to perform ROS: Dementia        Physical Exam  Temp:  [36.7 °C (98 °F)-36.9 °C (98.4 °F)] 36.9 °C (98.4 °F)  Pulse:  [75-91] 81  Resp:  [18] 18  BP: (160-185)/() 160/76  SpO2:  [91 %-95 %] 91 %    Physical Exam  Vitals and nursing note reviewed.   Constitutional:       General: He is not in acute distress.     Appearance: He is obese. He is not ill-appearing.   HENT:      Head: Normocephalic and atraumatic.   Eyes:      General: No scleral icterus.        Right eye: No discharge.         Left eye: No discharge.   Cardiovascular:      Rate and Rhythm: Normal rate and regular rhythm.      Heart sounds: Normal heart sounds.   Pulmonary:      Effort: No respiratory distress.      Breath sounds: Normal breath sounds. No wheezing or rales.   Abdominal:      General: Abdomen is protuberant. There is no distension.      Palpations: Abdomen is soft.      Tenderness: There is no abdominal tenderness. There is no guarding.   Musculoskeletal:         General: No tenderness.   Skin:     Findings: Erythema and signs of injury present.      Comments: Left leg dressing in place   Neurological:      General: No focal deficit present.      Mental Status: He is alert. He is disoriented.      Cranial Nerves: No cranial nerve deficit.      Motor: Motor function is intact.   Psychiatric:         Mood and Affect: Affect is flat.         Behavior: Behavior is hyperactive.         Cognition and Memory: Cognition is impaired. Memory is impaired.         Judgment: Judgment is impulsive.         Fluids  No intake or output data in the 24 hours ending 06/30/22 1253    Laboratory  Recent  Labs     06/29/22  1851 06/30/22  0301   WBC 8.6 9.3   RBC 4.44* 4.68*   HEMOGLOBIN 13.0* 13.7*   HEMATOCRIT 38.6* 40.5*   MCV 86.9 86.5   MCH 29.3 29.3   MCHC 33.7 33.8   RDW 43.2 42.6   PLATELETCT 337 352   MPV 9.0 9.1     Recent Labs     06/29/22  1851 06/30/22  0301   SODIUM 138 139   POTASSIUM 3.9 4.0   CHLORIDE 101 102   CO2 26 25   GLUCOSE 139* 156*   BUN 9 8   CREATININE 0.82 0.74   CALCIUM 9.3 9.5                   Imaging  US-EXTREMITY VENOUS LOWER UNILAT RIGHT   Final Result      CT-HEAD W/O   Final Result         No acute intracranial abnormality.            DX-ANKLE 3+ VIEWS LEFT   Final Result         Bony destruction in the lateral aspect of the distal fibula is consistent with osteomyelitis described on recent MRI. Overlying soft tissue edema and a small amount of soft tissue gas      DX-CHEST-PORTABLE (1 VIEW)   Final Result      1.  Increased perihilar interstitial markings are suggestive of pulmonary edema.      2.  Stable mild cardiomegaly           Assessment/Plan  * Encephalopathy  Assessment & Plan  Patient's daughter reports that the patient has been encephalopathic starting yesterday.    Likely secondary to worsening cellulitis of the right leg.    Patient has been receiving IV daptomycin for osteomyelitis of the left leg, will continue    Avoid sedating medications    Osteomyelitis (HCC)- (present on admission)  Assessment & Plan  Patient with osteomyelitis currently on daptomycin.   CRP mildly elevated, ESR ordered  LPS    Type 2 diabetes mellitus (HCC)- (present on admission)  Assessment & Plan  Last A1c was 6.33 weeks ago.  Hold metformin  Insulin sliding scale  Diabetic diet    Cellulitis- (present on admission)  Assessment & Plan  Daughter reports that the patient's right leg is more red and swollen than normal.  Doppler ultrasound does not show DVT  On examination is not warm but does have increased redness apparent of the surrounding tissue.  Continue the daptomycin will add  Unasyn.  Limb preservation services following      Dementia with behavioral disturbance (HCC)- (present on admission)  Assessment & Plan  History of dementia.  Oriented x 2, unsure of baseline  Avoid sedating medications    Hypertension- (present on admission)  Assessment & Plan  Continue home medication lisinopril and metoprolol      Hyperlipidemia- (present on admission)  Assessment & Plan  Continue home med atorvastatin         VTE prophylaxis: enoxaparin ppx    I have performed a physical exam and reviewed and updated ROS and Plan today (6/30/2022). In review of yesterday's note (6/29/2022), there are no changes except as documented above.

## 2022-06-30 NOTE — ED NOTES
Patient is resting comfortably, back in bed, fall precautions in place, daughter at bedside.  Will monitor.

## 2022-06-30 NOTE — ED NOTES
Pt attempting to get out of bed to go pee every 30 minutes. However, pt is unable to or has minimal output every time. APRN contacted for barbosa order. Upon attempting to insert barbosa, pt became combative and refused.

## 2022-06-30 NOTE — ASSESSMENT & PLAN NOTE
Likely secondary to worsening cellulitis of the right leg.    Patient has been receiving IV daptomycin for osteomyelitis of the left leg, will continue    Avoid sedating medications  Improving

## 2022-06-30 NOTE — CONSULTS
LIMB PRESERVATION SERVICE CONSULT      REFERRED BY: Dr Forbes     DATE OF CONSULTATION: 6/30/2022    REASON FOR CONSULT: Left lateral ankle     HISTORY OF PRESENT ILLNESS: Andrei Garay is a 82 y.o.  with a past medical history that includes type 2 diabetes, hyperlipidemia, HTN, MI, dementia. Admitted 6/29/2022 for Osteomyelitis (HCC) [M86.9].     LPS has been consulted for left lateral ankle.    Pt known to LPS from prior admissions, 4/12/22 through 4/14/22 and  6/10/22 through 6/20/22. The ulcer started originally from a protuberance on his FWW in the fall 2021.  Patient has been having wound care at [facility] where he resides.  When patient was seen in April wound did not probe to the bone at that time but upon his readmission on 6/10/2022 wound probes to bone and patient had I&D of lateral left ankle on 6/14/2022.  Patient was discharged back to [facility] 6/20/2022.  Per case management notes facility was to order patient's wound VAC and patient was kept on IV daptomycin with end date of 7/23/2022.  Patient denied fevers, chills, nausea, vomiting or pain at this time.       IV antibiotics were started on this admission.  Infectious diseases has not been consulted.  Xray completed and is positive for osteomyelitis. Ortho has not been consulted yet.    Diagnosed with diabetes 10 years ago years ago, and is currently managing with insulin.  His blood sugars are checked 2 times per day at his facility.  Does have numbness to feet.  Does not check feet routinely. Usually wears OTC shoes. Does not have diabetic shoes and inserts family stated in the past he would not tolerate.       Smoking:   reports that he quit smoking about 21 years ago. He has never used smokeless tobacco.    Alcohol:   reports no history of alcohol use.    Drug:   reports no history of drug use.      PAST MEDICAL HISTORY:   Past Medical History:   Diagnosis Date   • Diabetes (HCC)    • Hyperlipemia     • Hypertension    • MI (myocardial infarction) (HCC)         PAST SURGICAL HISTORY:   Past Surgical History:   Procedure Laterality Date   • IRRIGATION & DEBRIDEMENT GENERAL  6/14/2022    Procedure: IRRIGATION AND DEBRIDEMENT LEFT ANKLE;  Surgeon: Panfilo Wagner M.D.;  Location: SURGERY [facility];  Service: Orthopedics   • APPENDECTOMY     • STENT PLACEMENT      cardiac       MEDICATIONS:   Current Facility-Administered Medications   Medication Dose   • ampicillin/sulbactam (UNASYN) 3 g in  mL IVPB  3 g   • senna-docusate (PERICOLACE or SENOKOT S) 8.6-50 MG per tablet 2 Tablet  2 Tablet    And   • polyethylene glycol/lytes (MIRALAX) PACKET 1 Packet  1 Packet    And   • magnesium hydroxide (MILK OF MAGNESIA) suspension 30 mL  30 mL    And   • bisacodyl (DULCOLAX) suppository 10 mg  10 mg   • enoxaparin (Lovenox) inj 40 mg  40 mg   • acetaminophen (Tylenol) tablet 650 mg  650 mg   • insulin regular (HumuLIN R,NovoLIN R) injection  1-6 Units    And   • dextrose 50% (D50W) injection 25 g  25 g   • atorvastatin (LIPITOR) tablet 40 mg  40 mg   • lisinopril (PRINIVIL) tablet 10 mg  10 mg   • metoprolol tartrate (LOPRESSOR) tablet 25 mg  25 mg   • mirtazapine (Remeron) tablet 15 mg  15 mg   • sertraline (Zoloft) tablet 50 mg  50 mg   • melatonin tablet 10 mg  10 mg   • DAPTOmycin (CUBICIN) 750 mg in NS 50 mL IVPB  750 mg     Current Outpatient Medications   Medication   • insulin regular (HUMULIN R) 100 Unit/mL Solution   • DAPTOmycin (CUBICIN) 500 MG Recon Soln   • HYDROcodone-acetaminophen (NORCO) 5-325 MG Tab per tablet   • Probiotic Product (PROBIOTIC PO)   • cadexomer iodine (IODOFLEX) 0.9 % pad   • ascorbic acid (VITAMIN C) 500 MG tablet   • diclofenac sodium (VOLTAREN) 1 % Gel   • vitamin D2, Ergocalciferol, (DRISDOL) 1.25 MG (87746 UT) Cap capsule   • gabapentin (NEURONTIN) 300 MG Cap   • lisinopril (PRINIVIL) 10 MG Tab   • Melatonin 10 MG Tab   • mirtazapine (REMERON) 15 MG Tab   • sertraline (ZOLOFT)  50 MG Tab   • metformin (GLUCOPHAGE) 1000 MG tablet   • metoprolol (LOPRESSOR) 25 MG Tab   • atorvastatin (LIPITOR) 20 MG Tab       ALLERGIES:  No Known Allergies     FAMILY HISTORY: No family history on file.      REVIEW OF SYSTEMS:   Constitutional: Negative for chills, fever   Respiratory: Negative for cough and shortness of breath.    Cardiovascular:Negative for chest pain, and claudication.   Gastrointestinal: Negative for constipation, diarrhea, nausea and vomiting.   Lower extremities: positive for swelling and redness  Neurological: positive for numbness to feet and lower legs  All other systems reviewed and are negative     RESULTS:     Recent Labs     06/29/22  1851 06/30/22  0301   WBC 8.6 9.3   RBC 4.44* 4.68*   HEMOGLOBIN 13.0* 13.7*   HEMATOCRIT 38.6* 40.5*   MCV 86.9 86.5   MCH 29.3 29.3   MCHC 33.7 33.8   RDW 43.2 42.6   PLATELETCT 337 352   MPV 9.0 9.1     Recent Labs     06/29/22  1851 06/30/22  0301   SODIUM 138 139   POTASSIUM 3.9 4.0   CHLORIDE 101 102   CO2 26 25   GLUCOSE 139* 156*   BUN 9 8   CPKTOTAL 130  --          ESR:     Results from last 7 days   Lab Units 06/29/22  1851   SED RATE WESTERGREN 1526 mm/hour 27*       CRP:       Results from last 7 days   Lab Units 06/29/22  1851   C REACTIVE PROTEIN 4596 mg/dL 0.78*         COVID-19: Not completed this admission     Imaging:  US-EXTREMITY VENOUS LOWER UNILAT RIGHT   Final Result      CT-HEAD W/O   Final Result         No acute intracranial abnormality.            DX-ANKLE 3+ VIEWS LEFT   Final Result         Bony destruction in the lateral aspect of the distal fibula is consistent with osteomyelitis described on recent MRI. Overlying soft tissue edema and a small amount of soft tissue gas      DX-CHEST-PORTABLE (1 VIEW)   Final Result      1.  Increased perihilar interstitial markings are suggestive of pulmonary edema.      2.  Stable mild cardiomegaly          Arterial studies: 6/12/2022                    RIGHT      Waveform             "Systolic BPs (mmHg)                              125           Brachial   Triphasic                                Common Femoral   Triphasic                                Popliteal   Triphasic                  200           Posterior Tibial   Triphasic                  179           Dorsalis Pedis                                            Digit                              1.54          HONEY                                            TBI                           LEFT   Waveform        Systolic BPs (mmHg)                              130           Brachial   Triphasic                                Common Femoral   Triphasic                                Popliteal   Triphasic                  187           Posterior Tibial   Triphasic                  180           Dorsalis Pedis                                            Digit                              1.44          HONEY                                            TBI         Findings   Bilateral-   The ankle pressures are not accurately measured due to calcification and    noncompressibility of the tibial vessels.   Doppler waveforms of the common femoral artery and popliteal artery are of    high amplitude and triphasic.    Doppler waveforms at the ankle are brisk and triphasic.    TBI-   Right: 1.29   Left:    1.09      A1c:  Lab Results   Component Value Date/Time    HBA1C 6.3 (H) 06/11/2022 03:57 PM            Microbiology:  Results     Procedure Component Value Units Date/Time    BLOOD CULTURE [[id number]] Collected: 06/29/22 1857    Order Status: Completed Specimen: Blood from Peripheral Updated: 06/30/22 0741     Significant Indicator NEG     Source BLD     Site PERIPHERAL     Culture Result No Growth  Note: Blood cultures are incubated for 5 days and  are monitored continuously.Positive blood cultures  are called to the RN and reported as soon as  they are identified.      Narrative:      Per Hospital Policy: Only change Specimen Src: to \"Line\" " "if  specified by physician order.  Right Forearm/Arm    BLOOD CULTURE [[id number]] Collected: 06/29/22 1840    Order Status: Completed Specimen: Blood from Peripheral Updated: 06/30/22 0741     Significant Indicator NEG     Source BLD     Site PERIPHERAL     Culture Result No Growth  Note: Blood cultures are incubated for 5 days and  are monitored continuously.Positive blood cultures  are called to the RN and reported as soon as  they are identified.      Narrative:      Per Hospital Policy: Only change Specimen Src: to \"Line\" if  specified by physician order.  Right AC    URINALYSIS (UA) [[id number]]  (Abnormal) Collected: 06/29/22 1907    Order Status: Completed Specimen: Urine Updated: 06/29/22 2020     Color Yellow     Character Clear     Specific Gravity 1.012     Ph 8.0     Glucose Negative mg/dL      Ketones Negative mg/dL      Protein 30 mg/dL      Bilirubin Negative     Urobilinogen, Urine 0.2     Nitrite Negative     Leukocyte Esterase Negative     Occult Blood Negative     Micro Urine Req Microscopic    COV-2, FLU A/B, AND RSV BY PCR (2-4 HOURS CEPHEID): Collect NP swab in VTM [[id number]] Collected: 06/29/22 1907    Order Status: Completed Specimen: Respirate Updated: 06/29/22 2010     Influenza virus A RNA Negative     Influenza virus B, PCR Negative     RSV, PCR Negative     SARS-CoV-2 by PCR NotDetected     Comment: PATIENTS: Important information regarding your results and instructions can  be found at [email or web address]   \"After your  Covid-19 Test\"    [facility] providers: PLEASE REFER TO DE-ESCALATION AND RETESTING PROTOCOL  on [email or web address]    **The [employer] GeneXpert Xpress SARS-CoV-2 RT-PCR Test has been made  available for use under the Emergency Use Authorization (EUA) only.          SARS-CoV-2 Source NP Swab           PHYSICAL EXAMINATION:     VITAL SIGNS: BP (!) 160/76   Pulse 81   Temp 36.9 °C (98.4 °F)   Resp 18   Ht 1.829 m (6')   Wt 113 kg (250 lb)   SpO2 " 91%   BMI 33.91 kg/m²       General Appearance:  Well developed, well nourished, in no acute distress      Lower Extremity Assessment:    Edema:   Mild pitting edema      ROM dorsi/plantarflexion:   Dorsiflexion limited    Structural /mechanical changes:    mycotic toenails    Sensory Assessment:  Monofilament testing with a 10 gram force: Completed 4/13/2022  Visual Inspection: Feet with maceration, ulcers, fissures.  Pedal pulses: decreased bilaterally    Feet Insensate to light touch    Pulses:  R foot: Diminished, palpable DP, palpable PT  L foot: Diminished, palpable DP, palpable PT    R foot:  With doppler brisk tones noted to PT/DP  L foot:  With doppler dimished tones noted to PT/tones to DP    Wound Assessment:    Wound(s)   location: Left lateral ankle  Full thickness, does  probe to bone  Wound characteristics: Yellow slough to entire wound bed  Erythema: Moderate  Drainage: Small amount, sanguinous  Callus: None  Odor: None    PROCEDURE:   -Curette used to debride wound bed.  Excisional debridement was performed to remove devitalized tissue until healthy, bleeding tissue was visualized.   Entire surface of wound, 3.74cm2 debrided.  Tissue debrided into the subcutaneous layer.    -Bleeding controlled with manual pressure.    -Wound care completed by wound RN, refer to flowsheet  -Patient tolerated the procedure well, without c/o pain or discomfort.       Wound care completed by [facility] APRN.  Cleanse with wound cleanser, pat dry.  Wound packed and covered with strip of Aquacel Ag, secure with nonadhesive foam, Hypafix tape, Tubigrip.    Wound photo: Predebridement    Postdebridement          ASSESSMENT AND PLAN:   82 y.o. admitted for Osteomyelitis (HCC) [M86.9]. Presents with left lateral ankle wound    -Wound bed with thick layer of adherent slough.  Debridement necessary to promote wound healing  -Continue with IV antibiotics  -Patient may require wound VAC placement if wound healing does not  progress      Wound care:   -Wound care orders placed for nursing by LPS  -Left lateral ankle:Cleanse with wound cleanser, pat dry.  Wound packed and covered with strip of Aquacel Ag, secure with nonadhesive foam, Hypafix tape, Tubigrip.    Imaging/Labs:  -COVID-19: not completed this admission.    Vascular status:   R foot: Diminished, palpable DP, palpable PT  L foot: Diminished, palpable DP, palpable PT    R foot:  With doppler brisk tones noted to PT/DP  L foot:  With doppler dimished tones noted to PT/tones to DP    Surgery:   -  no plans for surgery at this time    Antibiotics:   -currently on antibiotics managed by hospitalist   - recommend ID consult         Weight Bearing Status:   -Right foot: Weight bearing as tolerated  -Left foot: Weight bearing as tolerated    Offloading:   -Offloading boot  when ambulating; patient currently wearing  -Orthotic company: None    PT/OT:   -consult placed      Diabetes Education:   -No consult placed for CDE and RD due to patient's mentation  -Patient[facility] at [facility]  -   Lab Results   Component Value Date/Time    HBA1C 6.3 (H) 06/11/2022 03:57 PM          - Implications of loss of protective sensation (LOPS) discussed with patient- including increased risk for amputation.  Advised to check feet at least daily, moisturize feet, and to always wear protective foot wear.   -avoid trimming own nails. See podiatrist or certified foot and nail RN  -keep blood sugars <150 for improved wound healing        Discharge Plan:  TBD  referrals have been placed for [facility]          D/W: pt, RN, Yolanda VERDUGO, Wound Team     Please note that this dictation was created using voice recognition software. I have  worked with technical experts from [employer] to optimize the interface.  I have made every reasonable attempt to correct obvious errors, but there may be errors of grammar and possibly content that I did not discover before finalizing the  note.    Please contact LPS through [employer].

## 2022-06-30 NOTE — H&P
Hospital Medicine History & Physical Note    Date of Service  6/30/2022    Primary Care Physician  Stephanie Diaz M.D.    Consultants  none    Specialist Names: n/a    Code Status  DNAR/DNI    Chief Complaint  Chief Complaint   Patient presents with   • Leg Swelling     Pt has new swelling to his left lower swelling and new confusion per staff at [facility] and daughter. Pt was incontinent of urine today and is usually continent. Pt is also weak and unable to walk like normal. Pt is A&O to self only today and is usually A&Ox3. Pt was recently admitted here for osteomyelitis of his left lower leg, an I&D, a positive MRSA culture of the left lower leg wound. Pt is currently on a daptomycin IV antibiotic regimen.          History of Presenting Illness  Andrei Garay is a 82 y.o. male who presented 6/29/2022 with a history of type 2 diabetes, hyperlipidemia, hypertension, osteomyelitis who presents with right leg swelling and altered mental status.  Information was obtained from the chart and from the patient's daughter.  The daughter reports that the patient began acting confused and slurring words starting yesterday.  She reports that he had episodes of incontinence today which is not his baseline.  She reports that he was in the hospital 2 weeks ago for osteomyelitis of the left lower leg and was found to be MRSA culture positive and was placed on daptomycin.  She reports he was tolerating the antibiotics well until his recent change.  She does not report that he had any complaints of fevers, chills, chest pain, shortness of breath.  He did not show any signs of focal weakness but showed general malaise.    From chart review patient was discharged on the 20th on daptomycin.  Patient had an incision and drainage with debridement of the left ankle performed last hospitalization.      In the ED ultrasound of the right lower extremity did not show DVT.  Patient is confused and agitated.    I discussed the plan  of care with patient, family and bedside RN.    Review of Systems  Review of Systems   Unable to perform ROS: Acuity of condition       Past Medical History   has a past medical history of Diabetes (HCC), Hyperlipemia, Hypertension, and MI (myocardial infarction) ([facility]).    Surgical History   has a past surgical history that includes stent placement; appendectomy; and irrigation & debridement general (6/14/2022).     Family History    Family history reviewed with patient. There is no family history that is pertinent to the chief complaint.     Social History   reports that he quit smoking about 21 years ago. He has never used smokeless tobacco. He reports that he does not drink alcohol and does not use drugs.    Allergies  No Known Allergies    Medications  Prior to Admission Medications   Prescriptions Last Dose Informant Patient Reported? Taking?   DAPTOmycin (CUBICIN) 500 MG Recon Soln 6/27/2022 at 3408-9411 MAR from Other Facility Yes Yes   Sig: Infuse 750 mg into a venous catheter every 24 hours. 6/20/2022 - 7/26/2022.  Indications: Osteomyelitis of L Ankle   HYDROcodone-acetaminophen (NORCO) 5-325 MG Tab per tablet 6/29/2022 at 8014-5130 MAR from Other Facility Yes Yes   Sig: Take 1 Tablet by mouth see administration instructions. Take 1 tablet by mouth 2 times per day for pain control. May take an additional 1 tablet up to every 4 hours as needed for pain.  Indications: Pain   Melatonin 10 MG Tab 6/28/2022 at 3416-5902 MAR from Other Facility Yes No   Sig: Take 10 mg by mouth at bedtime.   Probiotic Product (PROBIOTIC PO) 6/29/2022 at 3268-0629 MAR from Other Facility Yes Yes   Sig: Take 1 Capsule by mouth 2 times a day.   ascorbic acid (VITAMIN C) 500 MG tablet 6/29/2022 at 2074-2059 MAR from Other Facility Yes No   Sig: Take 1,000 mg by mouth every day. 2 tablets = 1,000 mg.   atorvastatin (LIPITOR) 20 MG Tab 6/27/2022 at 3246-0931 MAR from Other Facility Yes No   Sig: Take 40 mg by mouth every evening. 2  tablets = 40 mg.   cadexomer iodine (IODOFLEX) 0.9 % pad 6/8/2022 at DISCONTINUED MAR from Other Facility Yes No   Sig: Apply 1 Application topically every morning. Apply to L lateral malleolus for wound care. (DISCONTINUED 6/17/2022)   diclofenac sodium (VOLTAREN) 1 % Gel 6/28/2022 at 7813-0631 MAR from Other Facility Yes No   Sig: Apply 4 g topically every evening. Apply to bilateral knees and feet (dorsal).   gabapentin (NEURONTIN) 300 MG Cap 6/29/2022 at 5084-3953 MAR from Other Facility Yes No   Sig: Take 300 mg by mouth 2 times a day.   insulin regular (HUMULIN R) 100 Unit/mL Solution 6/29/2022 at 0700 MAR from Other Facility Yes Yes   Sig: Inject 2-10 Units under the skin 2 times a day. Inject per sliding scale. For blood glucose:  150 - 200 = 2 units  201 - 250 = 4 units  251 - 300 = 6 units  301 - 350 = 8 units  351 - 400 = 10 units  If >400, call M.D.   lisinopril (PRINIVIL) 10 MG Tab 6/29/2022 at 4146-0457 MAR from Other Facility Yes No   Sig: Take 10 mg by mouth every day. Hold if SBP <100 or DBP <60.   metformin (GLUCOPHAGE) 1000 MG tablet 6/29/2022 at 4222-1559 MAR from Other Facility Yes No   Sig: Take 1,000 mg by mouth 2 times a day.   metoprolol (LOPRESSOR) 25 MG Tab 6/29/2022 at 4041-7029 MAR from Other Facility Yes No   Sig: Take 25 mg by mouth 2 times a day. Hold for SBP <90, DBP <50, or HR <50.   mirtazapine (REMERON) 15 MG Tab 6/28/2022 at 1684-6264 MAR from Other Facility Yes No   Sig: Take 15 mg by mouth at bedtime.   sertraline (ZOLOFT) 50 MG Tab 6/28/2022 at 9037-7018 MAR from Other Facility Yes No   Sig: Take 50 mg by mouth every evening.   vitamin D2, Ergocalciferol, (DRISDOL) 1.25 MG (13448 UT) Cap capsule 5/12/2022 at UNKNOWN TIME Historical Yes No   Sig: Take 50,000 Units by mouth every 30 days.      Facility-Administered Medications: None       Physical Exam  Temp:  [36.7 °C (98 °F)] 36.7 °C (98 °F)  Pulse:  [75-91] 75  Resp:  [18] 18  BP: (165-185)/() 165/89  SpO2:  [91 %-94 %]  94 %  Blood Pressure : (!) 165/89   Temperature: 36.7 °C (98 °F)   Pulse: 75   Respiration: 18   Pulse Oximetry: 94 %       Physical Exam  Vitals and nursing note reviewed.   Constitutional:       General: He is not in acute distress.     Appearance: He is ill-appearing.   HENT:      Head: Normocephalic and atraumatic.   Eyes:      General: No scleral icterus.        Right eye: No discharge.         Left eye: No discharge.   Cardiovascular:      Rate and Rhythm: Normal rate and regular rhythm.      Heart sounds: Normal heart sounds.   Pulmonary:      Effort: No respiratory distress.      Breath sounds: Normal breath sounds. No wheezing or rales.   Abdominal:      General: There is no distension.      Palpations: Abdomen is soft.      Tenderness: There is no abdominal tenderness. There is no guarding.   Musculoskeletal:         General: No tenderness.      Right lower leg: Edema (2+) present.      Left lower leg: No edema.   Skin:     Findings: Erythema (Right leg) present.      Comments: Bandaging of the left leg   Neurological:      General: No focal deficit present.      Mental Status: He is alert. He is disoriented.      Cranial Nerves: No cranial nerve deficit.   Psychiatric:         Behavior: Behavior is agitated.         Cognition and Memory: Cognition is impaired.         Judgment: Judgment is impulsive.         Laboratory:  Recent Labs     06/29/22  1851   WBC 8.6   RBC 4.44*   HEMOGLOBIN 13.0*   HEMATOCRIT 38.6*   MCV 86.9   MCH 29.3   MCHC 33.7   RDW 43.2   PLATELETCT 337   MPV 9.0     Recent Labs     06/29/22  1851   SODIUM 138   POTASSIUM 3.9   CHLORIDE 101   CO2 26   GLUCOSE 139*   BUN 9   CREATININE 0.82   CALCIUM 9.3     Recent Labs     06/29/22  1851   ALTSGPT 9   ASTSGOT 22   ALKPHOSPHAT 84   TBILIRUBIN 0.9   GLUCOSE 139*         Recent Labs     06/29/22  1851   NTPROBNP 415*         Recent Labs     06/29/22  1851   TROPONINT 30*       Imaging:  US-EXTREMITY VENOUS LOWER UNILAT RIGHT   Final Result       CT-HEAD W/O   Final Result         No acute intracranial abnormality.            DX-ANKLE 3+ VIEWS LEFT   Final Result         Bony destruction in the lateral aspect of the distal fibula is consistent with osteomyelitis described on recent MRI. Overlying soft tissue edema and a small amount of soft tissue gas      DX-CHEST-PORTABLE (1 VIEW)   Final Result      1.  Increased perihilar interstitial markings are suggestive of pulmonary edema.      2.  Stable mild cardiomegaly          no X-Ray or EKG requiring interpretation    Assessment/Plan:  Justification for Admission Status  I anticipate this patient will require at least two midnights for appropriate medical management, necessitating inpatient admission because AndFurther work-up for the patient's encephalopathy biotic treatment for possible cellulitis    * Encephalopathy  Assessment & Plan  Patient's daughter reports that the patient has been encephalopathic starting yesterday.  She reports that this is not his baseline.  Likely secondary to the cellulitis of the right leg.  Patient is also being treated for osteomyelitis of the left leg.    IV antibiotics    Osteomyelitis (HCC)- (present on admission)  Assessment & Plan  Patient with osteomyelitis currently on daptomycin.   CRP mildly elevated, ESR ordered  LPS    Type 2 diabetes mellitus (HCC)- (present on admission)  Assessment & Plan  Last A1c was 6.33 weeks ago.  Hold metformin  Insulin sliding scale  Diabetic diet    Cellulitis- (present on admission)  Assessment & Plan  Daughter reports that the patient's right leg is more red and swollen than normal.  Doppler ultrasound does not show DVT  On examination is not warm but does have increased redness apparent of the surrounding tissue.  Continue the daptomycin will add Unasyn.    Dementia with behavioral disturbance (HCC)- (present on admission)  Assessment & Plan  History of dementia.  Patient not at his baseline.    Hypertension- (present on  admission)  Assessment & Plan  Continue home medication lisinopril and metoprolol      Hyperlipidemia- (present on admission)  Assessment & Plan  Continue home med atorvastatin      VTE prophylaxis: enoxaparin ppx

## 2022-06-30 NOTE — ASSESSMENT & PLAN NOTE
Daughter reports that the patient's right leg is more red and swollen than normal.  Doppler ultrasound does not show DVT  ID consulted, patient reportedly had removed his wound VAC while at SNF  Continue the daptomycin (until 7/23) and Unasyn (until 7/6)

## 2022-06-30 NOTE — PROGRESS NOTES
Received report from FLORES Kunz. Assumed care at 1530 This pt is AOx1, ambulatory with assist,  Patient and RN discussed plan of care, all questions answered. Call light in place, personal belongings available, bed in lowest position, 3 bedrails up bed alarm on, patient educated on importance of calling for assistance, hourly rounding in place. No additional needs at this time. VSS Daughter Marah at bedside

## 2022-06-30 NOTE — ED NOTES
Entered room to find patient trying to get out of bed, covered in stool.  Patient cleaned, sheets changed, placed in a chair with an alarm in place for safety measures. High fall risk.  Socks in place.  Daughter came but has left.  Will monitor.

## 2022-07-01 LAB
ANION GAP SERPL CALC-SCNC: 12 MMOL/L (ref 7–16)
BASOPHILS # BLD AUTO: 0.8 % (ref 0–1.8)
BASOPHILS # BLD: 0.07 K/UL (ref 0–0.12)
BUN SERPL-MCNC: 9 MG/DL (ref 8–22)
CALCIUM SERPL-MCNC: 9.1 MG/DL (ref 8.5–10.5)
CHLORIDE SERPL-SCNC: 103 MMOL/L (ref 96–112)
CO2 SERPL-SCNC: 24 MMOL/L (ref 20–33)
CREAT SERPL-MCNC: 0.75 MG/DL (ref 0.5–1.4)
EOSINOPHIL # BLD AUTO: 0.24 K/UL (ref 0–0.51)
EOSINOPHIL NFR BLD: 2.7 % (ref 0–6.9)
ERYTHROCYTE [DISTWIDTH] IN BLOOD BY AUTOMATED COUNT: 43.2 FL (ref 35.9–50)
GFR SERPLBLD CREATININE-BSD FMLA CKD-EPI: 90 ML/MIN/1.73 M 2
GLUCOSE BLD STRIP.AUTO-MCNC: 181 MG/DL (ref 65–99)
GLUCOSE BLD STRIP.AUTO-MCNC: 181 MG/DL (ref 65–99)
GLUCOSE BLD STRIP.AUTO-MCNC: 200 MG/DL (ref 65–99)
GLUCOSE SERPL-MCNC: 193 MG/DL (ref 65–99)
HCT VFR BLD AUTO: 39.4 % (ref 42–52)
HGB BLD-MCNC: 13.2 G/DL (ref 14–18)
IMM GRANULOCYTES # BLD AUTO: 0.04 K/UL (ref 0–0.11)
IMM GRANULOCYTES NFR BLD AUTO: 0.5 % (ref 0–0.9)
LYMPHOCYTES # BLD AUTO: 1.8 K/UL (ref 1–4.8)
LYMPHOCYTES NFR BLD: 20.6 % (ref 22–41)
MCH RBC QN AUTO: 29.1 PG (ref 27–33)
MCHC RBC AUTO-ENTMCNC: 33.5 G/DL (ref 33.7–35.3)
MCV RBC AUTO: 87 FL (ref 81.4–97.8)
MONOCYTES # BLD AUTO: 0.73 K/UL (ref 0–0.85)
MONOCYTES NFR BLD AUTO: 8.4 % (ref 0–13.4)
NEUTROPHILS # BLD AUTO: 5.86 K/UL (ref 1.82–7.42)
NEUTROPHILS NFR BLD: 67 % (ref 44–72)
NRBC # BLD AUTO: 0 K/UL
NRBC BLD-RTO: 0 /100 WBC
PLATELET # BLD AUTO: 279 K/UL (ref 164–446)
PMV BLD AUTO: 9.2 FL (ref 9–12.9)
POTASSIUM SERPL-SCNC: 4 MMOL/L (ref 3.6–5.5)
RBC # BLD AUTO: 4.53 M/UL (ref 4.7–6.1)
SODIUM SERPL-SCNC: 139 MMOL/L (ref 135–145)
WBC # BLD AUTO: 8.7 K/UL (ref 4.8–10.8)

## 2022-07-01 PROCEDURE — 85025 COMPLETE CBC W/AUTO DIFF WBC: CPT

## 2022-07-01 PROCEDURE — 700105 HCHG RX REV CODE 258: Performed by: HOSPITALIST

## 2022-07-01 PROCEDURE — 302098 PASTE RING (FLAT): Performed by: NURSE PRACTITIONER

## 2022-07-01 PROCEDURE — 700105 HCHG RX REV CODE 258: Performed by: STUDENT IN AN ORGANIZED HEALTH CARE EDUCATION/TRAINING PROGRAM

## 2022-07-01 PROCEDURE — 700111 HCHG RX REV CODE 636 W/ 250 OVERRIDE (IP): Performed by: STUDENT IN AN ORGANIZED HEALTH CARE EDUCATION/TRAINING PROGRAM

## 2022-07-01 PROCEDURE — 99233 SBSQ HOSP IP/OBS HIGH 50: CPT | Performed by: HOSPITALIST

## 2022-07-01 PROCEDURE — 97535 SELF CARE MNGMENT TRAINING: CPT

## 2022-07-01 PROCEDURE — 306591 TRAY SUTURE REMOVAL DISP: Performed by: NURSE PRACTITIONER

## 2022-07-01 PROCEDURE — 700111 HCHG RX REV CODE 636 W/ 250 OVERRIDE (IP): Performed by: HOSPITALIST

## 2022-07-01 PROCEDURE — 302098 PASTE RING (FLAT): Performed by: HOSPITALIST

## 2022-07-01 PROCEDURE — 770001 HCHG ROOM/CARE - MED/SURG/GYN PRIV*

## 2022-07-01 PROCEDURE — A9270 NON-COVERED ITEM OR SERVICE: HCPCS | Performed by: HOSPITALIST

## 2022-07-01 PROCEDURE — 700102 HCHG RX REV CODE 250 W/ 637 OVERRIDE(OP): Performed by: STUDENT IN AN ORGANIZED HEALTH CARE EDUCATION/TRAINING PROGRAM

## 2022-07-01 PROCEDURE — 306591 TRAY SUTURE REMOVAL DISP: Performed by: HOSPITALIST

## 2022-07-01 PROCEDURE — 700102 HCHG RX REV CODE 250 W/ 637 OVERRIDE(OP): Performed by: HOSPITALIST

## 2022-07-01 PROCEDURE — A9270 NON-COVERED ITEM OR SERVICE: HCPCS | Performed by: STUDENT IN AN ORGANIZED HEALTH CARE EDUCATION/TRAINING PROGRAM

## 2022-07-01 PROCEDURE — 11042 DBRDMT SUBQ TIS 1ST 20SQCM/<: CPT

## 2022-07-01 PROCEDURE — 99231 SBSQ HOSP IP/OBS SF/LOW 25: CPT | Mod: 25

## 2022-07-01 PROCEDURE — 99223 1ST HOSP IP/OBS HIGH 75: CPT | Performed by: INTERNAL MEDICINE

## 2022-07-01 PROCEDURE — 82962 GLUCOSE BLOOD TEST: CPT | Mod: 91

## 2022-07-01 PROCEDURE — 80048 BASIC METABOLIC PNL TOTAL CA: CPT

## 2022-07-01 PROCEDURE — 97605 NEG PRS WND THER DME<=50SQCM: CPT

## 2022-07-01 RX ORDER — LISINOPRIL 20 MG/1
40 TABLET ORAL DAILY
Status: DISCONTINUED | OUTPATIENT
Start: 2022-07-02 | End: 2022-07-25 | Stop reason: HOSPADM

## 2022-07-01 RX ORDER — OXYCODONE HYDROCHLORIDE 5 MG/1
5 TABLET ORAL EVERY 6 HOURS PRN
Status: DISCONTINUED | OUTPATIENT
Start: 2022-07-01 | End: 2022-07-25 | Stop reason: HOSPADM

## 2022-07-01 RX ORDER — SODIUM HYPOCHLORITE 1.25 MG/ML
SOLUTION TOPICAL DAILY
Status: CANCELLED | OUTPATIENT
Start: 2022-07-01

## 2022-07-01 RX ADMIN — LISINOPRIL 30 MG: 10 TABLET ORAL at 11:18

## 2022-07-01 RX ADMIN — MIRTAZAPINE 15 MG: 15 TABLET, FILM COATED ORAL at 20:44

## 2022-07-01 RX ADMIN — SODIUM CHLORIDE 3 G: 900 INJECTION INTRAVENOUS at 17:25

## 2022-07-01 RX ADMIN — SODIUM CHLORIDE 3 G: 900 INJECTION INTRAVENOUS at 00:43

## 2022-07-01 RX ADMIN — SENNOSIDES AND DOCUSATE SODIUM 2 TABLET: 50; 8.6 TABLET ORAL at 05:49

## 2022-07-01 RX ADMIN — LISINOPRIL 10 MG: 10 TABLET ORAL at 05:50

## 2022-07-01 RX ADMIN — ATORVASTATIN CALCIUM 40 MG: 40 TABLET, FILM COATED ORAL at 17:24

## 2022-07-01 RX ADMIN — METOPROLOL TARTRATE 25 MG: 25 TABLET, FILM COATED ORAL at 20:43

## 2022-07-01 RX ADMIN — INSULIN HUMAN 1 UNITS: 100 INJECTION, SOLUTION PARENTERAL at 17:25

## 2022-07-01 RX ADMIN — ENOXAPARIN SODIUM 40 MG: 40 INJECTION SUBCUTANEOUS at 17:24

## 2022-07-01 RX ADMIN — INSULIN HUMAN 1 UNITS: 100 INJECTION, SOLUTION PARENTERAL at 09:20

## 2022-07-01 RX ADMIN — SODIUM CHLORIDE 3 G: 900 INJECTION INTRAVENOUS at 23:40

## 2022-07-01 RX ADMIN — OXYCODONE 5 MG: 5 TABLET ORAL at 23:45

## 2022-07-01 RX ADMIN — Medication 10 MG: at 20:43

## 2022-07-01 RX ADMIN — SODIUM CHLORIDE 3 G: 900 INJECTION INTRAVENOUS at 05:51

## 2022-07-01 RX ADMIN — SODIUM CHLORIDE 3 G: 900 INJECTION INTRAVENOUS at 11:18

## 2022-07-01 RX ADMIN — ACETAMINOPHEN 650 MG: 325 TABLET, FILM COATED ORAL at 11:23

## 2022-07-01 RX ADMIN — DAPTOMYCIN 750 MG: 500 INJECTION, POWDER, LYOPHILIZED, FOR SOLUTION INTRAVENOUS at 22:28

## 2022-07-01 RX ADMIN — INSULIN HUMAN 1 UNITS: 100 INJECTION, SOLUTION PARENTERAL at 20:42

## 2022-07-01 RX ADMIN — METOPROLOL TARTRATE 25 MG: 25 TABLET, FILM COATED ORAL at 09:20

## 2022-07-01 RX ADMIN — SERTRALINE 50 MG: 100 TABLET, FILM COATED ORAL at 17:24

## 2022-07-01 ASSESSMENT — PAIN SCALES - WONG BAKER
WONGBAKER_NUMERICALRESPONSE: HURTS EVEN MORE
WONGBAKER_NUMERICALRESPONSE: DOESN'T HURT AT ALL
WONGBAKER_NUMERICALRESPONSE: HURTS JUST A LITTLE BIT
WONGBAKER_NUMERICALRESPONSE: DOESN'T HURT AT ALL

## 2022-07-01 ASSESSMENT — PAIN DESCRIPTION - PAIN TYPE
TYPE: CHRONIC PAIN

## 2022-07-01 ASSESSMENT — ENCOUNTER SYMPTOMS
PALPITATIONS: 0
NAUSEA: 0
COUGH: 0
DIZZINESS: 0
SHORTNESS OF BREATH: 0
VOMITING: 0
HEADACHES: 0
FEVER: 0
CHILLS: 0
ABDOMINAL PAIN: 0

## 2022-07-01 ASSESSMENT — COGNITIVE AND FUNCTIONAL STATUS - GENERAL
WALKING IN HOSPITAL ROOM: TOTAL
STANDING UP FROM CHAIR USING ARMS: A LOT
TOILETING: TOTAL
PERSONAL GROOMING: A LOT
HELP NEEDED FOR BATHING: TOTAL
DRESSING REGULAR LOWER BODY CLOTHING: A LOT
DAILY ACTIVITIY SCORE: 10
MOBILITY SCORE: 12
CLIMB 3 TO 5 STEPS WITH RAILING: TOTAL
MOVING FROM LYING ON BACK TO SITTING ON SIDE OF FLAT BED: A LOT
SUGGESTED CMS G CODE MODIFIER DAILY ACTIVITY: CL
DRESSING REGULAR UPPER BODY CLOTHING: A LOT
EATING MEALS: A LOT
MOVING TO AND FROM BED TO CHAIR: A LITTLE
TURNING FROM BACK TO SIDE WHILE IN FLAT BAD: A LITTLE
SUGGESTED CMS G CODE MODIFIER MOBILITY: CL

## 2022-07-01 ASSESSMENT — PATIENT HEALTH QUESTIONNAIRE - PHQ9
5. POOR APPETITE OR OVEREATING: NOT AT ALL
9. THOUGHTS THAT YOU WOULD BE BETTER OFF DEAD, OR OF HURTING YOURSELF: NOT AT ALL
7. TROUBLE CONCENTRATING ON THINGS, SUCH AS READING THE NEWSPAPER OR WATCHING TELEVISION: NOT AT ALL
SUM OF ALL RESPONSES TO PHQ9 QUESTIONS 1 AND 2: 0
SUM OF ALL RESPONSES TO PHQ QUESTIONS 1-9: 0
8. MOVING OR SPEAKING SO SLOWLY THAT OTHER PEOPLE COULD HAVE NOTICED. OR THE OPPOSITE, BEING SO FIGETY OR RESTLESS THAT YOU HAVE BEEN MOVING AROUND A LOT MORE THAN USUAL: NOT AT ALL
3. TROUBLE FALLING OR STAYING ASLEEP OR SLEEPING TOO MUCH: NOT AT ALL
2. FEELING DOWN, DEPRESSED, IRRITABLE, OR HOPELESS: NOT AT ALL
1. LITTLE INTEREST OR PLEASURE IN DOING THINGS: NOT AT ALL
4. FEELING TIRED OR HAVING LITTLE ENERGY: NOT AT ALL
6. FEELING BAD ABOUT YOURSELF - OR THAT YOU ARE A FAILURE OR HAVE LET YOURSELF OR YOUR FAMILY DOWN: NOT AL ALL

## 2022-07-01 ASSESSMENT — LIFESTYLE VARIABLES
TOTAL SCORE: 0
HAVE YOU EVER FELT YOU SHOULD CUT DOWN ON YOUR DRINKING: NO
EVER HAD A DRINK FIRST THING IN THE MORNING TO STEADY YOUR NERVES TO GET RID OF A HANGOVER: NO
AVERAGE NUMBER OF DAYS PER WEEK YOU HAVE A DRINK CONTAINING ALCOHOL: 0
CONSUMPTION TOTAL: NEGATIVE
TOTAL SCORE: 0
HAVE PEOPLE ANNOYED YOU BY CRITICIZING YOUR DRINKING: NO
HOW MANY TIMES IN THE PAST YEAR HAVE YOU HAD 5 OR MORE DRINKS IN A DAY: 0
ALCOHOL_USE: NO
DOES PATIENT WANT TO STOP DRINKING: NO
ON A TYPICAL DAY WHEN YOU DRINK ALCOHOL HOW MANY DRINKS DO YOU HAVE: 0
EVER FELT BAD OR GUILTY ABOUT YOUR DRINKING: NO
TOTAL SCORE: 0

## 2022-07-01 NOTE — PROGRESS NOTES
Received report from FLORES Davies. Assumed care at 0715. This pt is A&O x1 . Patient and RN discussed plan of care, all questions answered. Labs noted. Call light in place, personal belongings available bed in lowest position, 3 bedrails up, bed alarm on, patient educated on importance of calling for assistance, hourly rounding in place. No additional needs at this time. VSS Patient very impulsive and refusing to stay in bed. Room is close to nurses station and alarms on.

## 2022-07-01 NOTE — PROGRESS NOTES
[facility] Medicine Daily Progress Note    Date of Service  7/1/2022    Chief Complaint  Andrei Garay is a 82 y.o. male admitted 6/29/2022 with left leg swelling and altered mental status    Hospital Course  Andrei Garay is a 82 y.o. male who presented 6/29/2022 with a history of type 2 diabetes, hyperlipidemia, hypertension, osteomyelitis who presents with right leg swelling and altered mental status.     According to the patient's daughter who is visiting him she states that he began becoming more confused and slurring his words yesterday.  He also reports that he had episode of incontinence which is not his normal baseline.  Has been receiving IV daptomycin for left lower leg osteomyelitis which was diagnosed 2 weeks ago, MRSA culture positive.    Emergency department ultrasound of the right lower extremity negative for DVT. Limb preservation services consulted and following.  Completed a bedside debridement and applied new dressings.  Sure of wound management patient was receiving it [facility].  Wound does not appear to be improved from prior hospitalization.  We will consult infectious disease for recommendations and continue to work with LPS for management.      Interval Problem Update  Pt very confused and impulsive trying to get out of bed. Had a fall last night. Restraints ordered  ID consult still pending  Pt is mentally more clear today however not back to baseline  Per daughter at bedside patient had reportedly removed his wound VAC while at SNF.  I discussed with LPS and wound VAC will be replaced    I have discussed this patient's plan of care and discharge plan at IDT rounds today with Case Management, Nursing, Nursing leadership, and other members of the IDT team.    Consultants/Specialty  infectious disease  Limb preservation services    Code Status  DNAR/DNI    Disposition  Patient is not medically cleared for discharge.   Anticipate discharge to to skilled nursing  facility.  I have placed the appropriate orders for post-discharge needs.    Review of Systems  Review of Systems   Constitutional: Negative for chills and fever.   Respiratory: Negative for cough and shortness of breath.    Cardiovascular: Negative for chest pain and palpitations.   Gastrointestinal: Negative for abdominal pain, nausea and vomiting.   Genitourinary: Negative for dysuria and urgency.   Neurological: Negative for dizziness and headaches.   All other systems reviewed and are negative.       Physical Exam  Temp:  [35.8 °C (96.5 °F)-36.7 °C (98 °F)] 35.8 °C (96.5 °F)  Pulse:  [65-89] 84  Resp:  [16-20] 16  BP: (160-199)/(76-99) 179/87  SpO2:  [91 %-97 %] 94 %    Physical Exam  Vitals and nursing note reviewed.   Constitutional:       General: He is not in acute distress.     Appearance: He is obese. He is not ill-appearing.   HENT:      Head: Normocephalic and atraumatic.   Eyes:      General: No scleral icterus.        Right eye: No discharge.         Left eye: No discharge.   Cardiovascular:      Rate and Rhythm: Normal rate and regular rhythm.      Heart sounds: Normal heart sounds.   Pulmonary:      Effort: No respiratory distress.      Breath sounds: Normal breath sounds. No wheezing or rales.   Abdominal:      General: Abdomen is protuberant. There is no distension.      Palpations: Abdomen is soft.      Tenderness: There is no abdominal tenderness. There is no guarding.   Musculoskeletal:         General: No tenderness.   Skin:     Findings: Erythema and signs of injury present.      Comments: Left leg dressing in place   Neurological:      General: No focal deficit present.      Mental Status: He is alert. He is disoriented.      Cranial Nerves: No cranial nerve deficit.      Motor: Motor function is intact.      Comments: Aware of location and soft, unaware of time   Psychiatric:         Mood and Affect: Affect is flat.         Behavior: Behavior is hyperactive.         Cognition and Memory:  Cognition is impaired. Memory is impaired.         Judgment: Judgment is impulsive.         Fluids    Intake/Output Summary (Last 24 hours) at 7/1/2022 1003  Last data filed at 7/1/2022 0621  Gross per 24 hour   Intake 450 ml   Output --   Net 450 ml       Laboratory  Recent Labs     06/29/22  1851 06/30/22  0301 07/01/22  0633   WBC 8.6 9.3 8.7   RBC 4.44* 4.68* 4.53*   HEMOGLOBIN 13.0* 13.7* 13.2*   HEMATOCRIT 38.6* 40.5* 39.4*   MCV 86.9 86.5 87.0   MCH 29.3 29.3 29.1   MCHC 33.7 33.8 33.5*   RDW 43.2 42.6 43.2   PLATELETCT 337 352 279   MPV 9.0 9.1 9.2     Recent Labs     06/29/22 1851 06/30/22  0301 07/01/22  0633   SODIUM 138 139 139   POTASSIUM 3.9 4.0 4.0   CHLORIDE 101 102 103   CO2 26 25 24   GLUCOSE 139* 156* 193*   BUN 9 8 9   CREATININE 0.82 0.74 0.75   CALCIUM 9.3 9.5 9.1                   Imaging  US-EXTREMITY VENOUS LOWER UNILAT RIGHT   Final Result      CT-HEAD W/O   Final Result         No acute intracranial abnormality.            DX-ANKLE 3+ VIEWS LEFT   Final Result         Bony destruction in the lateral aspect of the distal fibula is consistent with osteomyelitis described on recent MRI. Overlying soft tissue edema and a small amount of soft tissue gas      DX-CHEST-PORTABLE (1 VIEW)   Final Result      1.  Increased perihilar interstitial markings are suggestive of pulmonary edema.      2.  Stable mild cardiomegaly           Assessment/Plan  * Encephalopathy  Assessment & Plan  Likely secondary to worsening cellulitis of the right leg.    Patient has been receiving IV daptomycin for osteomyelitis of the left leg, will continue    Avoid sedating medications  Improving    Osteomyelitis (HCC)- (present on admission)  Assessment & Plan  Patient with osteomyelitis currently on daptomycin.   CRP mildly elevated  LPS    Type 2 diabetes mellitus (HCC)- (present on admission)  Assessment & Plan  Last A1c was 6.33 weeks ago.  Hold metformin  Insulin sliding scale  Diabetic diet    Cellulitis- (present  on admission)  Assessment & Plan  Daughter reports that the patient's right leg is more red and swollen than normal.  Doppler ultrasound does not show DVT  ID consulted, patient reportedly had removed his wound VAC while at SNF  Continue the daptomycin will add Unasyn.  Limb preservation services following and plan to replace wound VAC today      Dementia with behavioral disturbance (HCC)- (present on admission)  Assessment & Plan  History of dementia.  Oriented x 2, unsure of baseline  Avoid sedating medications    Hypertension- (present on admission)  Assessment & Plan  Continue home medication lisinopril and metoprolol      Hyperlipidemia- (present on admission)  Assessment & Plan  Continue home med atorvastatin       VTE prophylaxis: enoxaparin ppx    I have performed a physical exam and reviewed and updated ROS and Plan today (7/1/2022). In review of yesterday's note (6/30/2022), there are no changes except as documented above.

## 2022-07-01 NOTE — CONSULTS
"INFECTIOUS DISEASES INPATIENT CONSULT NOTE     Date of Service: 2022    Consult Requested By: Diego Hitchcock M.D.    Reason for Consultation: on abx for MRSA    History of Present Illness:   Andrei Garay is a 82 y.o. diabetic male admitted 2022 for AMS.   Remains confused. Per admitting history \"According to the patient's daughter who is visiting him she states that he began becoming more confused and slurring his words yesterday.  He also reports that he had episode of incontinence which is not his normal baseline.\"  Also reported pulled off wound VAC  He is currently on IV daptomycin for MRSA sepsis diagnosed last admission (6/10/2022) Prior stop date 2022  In the ED, afebrile. No leukocytosis.UA neg except trace protein COVID and flu neg  USG right lower extremity negative for DVT. LPS and Ortho consulted    Currently on daptomycin  Infectious Diseases consulted for antibiotic recommendation and management    Review Of Systems:  Limited by dementia  Remains afebrile    PMH:   Past Medical History:   Diagnosis Date   • Diabetes (HCC)    • Hyperlipemia    • Hypertension    • MI (myocardial infarction) ([facility])          PSH:  Past Surgical History:   Procedure Laterality Date   • IRRIGATION & DEBRIDEMENT GENERAL  2022    Procedure: IRRIGATION AND DEBRIDEMENT LEFT ANKLE;  Surgeon: Panfilo Wagner M.D.;  Location: SURGERY [facility];  Service: Orthopedics   • APPENDECTOMY     • STENT PLACEMENT      cardiac       FAMILY HX:  No family history on file.    SOCIAL HX:  Social History     Socioeconomic History   • Marital status: Legally      Spouse name: Not on file   • Number of children: Not on file   • Years of education: Not on file   • Highest education level: 10th grade   Occupational History   • Not on file   Tobacco Use   • Smoking status: Former Smoker     Quit date: 2000     Years since quittin.7   • Smokeless tobacco: Never Used   Substance and Sexual Activity   • " Alcohol use: Never   • Drug use: Never   • Sexual activity: Not on file   Other Topics Concern   • Not on file   Social History Narrative    ** Merged History Encounter **          Social Determinants of Health     Financial Resource Strain: Unknown   • Difficulty of Paying Living Expenses: Patient refused   Food Insecurity: Unknown   • Worried About Running Out of Food in the Last Year: Patient refused   • Ran Out of Food in the Last Year: Patient refused   Transportation Needs: Unknown   • Lack of Transportation (Medical): Patient refused   • Lack of Transportation (Non-Medical): Patient refused   Physical Activity: Inactive   • Days of Exercise per Week: 0 days   • Minutes of Exercise per Session: 0 min   Stress: Stress Concern Present   • Feeling of Stress : Very much   Social Connections: Socially Isolated   • Frequency of Communication with Friends and Family: Twice a week   • Frequency of Social Gatherings with Friends and Family: More than three times a week   • Attends Voodoo Services: Never   • Active Member of Clubs or Organizations: No   • Attends Club or Organization Meetings: Never   • Marital Status:    Intimate Partner Violence: Not on file   Housing Stability: Low Risk    • Unable to Pay for Housing in the Last Year: No   • Number of Places Lived in the Last Year: 1   • Unstable Housing in the Last Year: No     Social History     Tobacco Use   Smoking Status Former Smoker   • Quit date: 2000   • Years since quittin.7   Smokeless Tobacco Never Used     Social History     Substance and Sexual Activity   Alcohol Use Never       Allergies/Intolerances:  No Known Allergies      Other Current Medications:    Current Facility-Administered Medications:   •  [START ON 2022] lisinopril (PRINIVIL) tablet 40 mg, 40 mg, Oral, DAILY, Diego Hitchcock M.D.  •  oxyCODONE immediate-release (ROXICODONE) tablet 5 mg, 5 mg, Oral, Q6HRS PRN, Diego Hitchcock M.D.  •  ampicillin/sulbactam (UNASYN) 3  g in  mL IVPB, 3 g, Intravenous, Q6HRS, Fred Araujo D.O., Stopped at 07/01/22 1148  •  senna-docusate (PERICOLACE or SENOKOT S) 8.6-50 MG per tablet 2 Tablet, 2 Tablet, Oral, BID, 2 Tablet at 07/01/22 0549 **AND** polyethylene glycol/lytes (MIRALAX) PACKET 1 Packet, 1 Packet, Oral, QDAY PRN **AND** magnesium hydroxide (MILK OF MAGNESIA) suspension 30 mL, 30 mL, Oral, QDAY PRN **AND** bisacodyl (DULCOLAX) suppository 10 mg, 10 mg, Rectal, QDAY PRN, Fred Araujo D.O.  •  enoxaparin (Lovenox) inj 40 mg, 40 mg, Subcutaneous, DAILY AT 1800, Fred Araujo D.O., 40 mg at 06/30/22 1736  •  acetaminophen (Tylenol) tablet 650 mg, 650 mg, Oral, Q6HRS PRN, Fred Araujo D.O., 650 mg at 07/01/22 1123  •  insulin regular (HumuLIN R,NovoLIN R) injection, 1-6 Units, Subcutaneous, 4X/DAY ACHS, 1 Units at 07/01/22 0920 **AND** POC blood glucose manual result, , , Q AC AND BEDTIME(S) **AND** NOTIFY MD and PharmD, , , Once **AND** Administer 20 grams of glucose (approximately 8 ounces of fruit juice) every 15 minutes PRN FSBG less than 70 mg/dL, , , PRN **AND** dextrose 50% (D50W) injection 25 g, 25 g, Intravenous, Q15 MIN PRN, Fred Araujo D.O.  •  atorvastatin (LIPITOR) tablet 40 mg, 40 mg, Oral, Q EVENING, Fred Araujo D.O., 40 mg at 06/30/22 1736  •  metoprolol tartrate (LOPRESSOR) tablet 25 mg, 25 mg, Oral, BID, Fred Araujo D.O., 25 mg at 07/01/22 0920  •  mirtazapine (Remeron) tablet 15 mg, 15 mg, Oral, QHS, Fred Araujo D.O., 15 mg at 06/30/22 2017  •  sertraline (Zoloft) tablet 50 mg, 50 mg, Oral, Q EVENING, Fred Araujo D.O., 50 mg at 06/30/22 1736  •  melatonin tablet 10 mg, 10 mg, Oral, QHS, Fred Araujo D.O., 10 mg at 06/30/22 2017  •  DAPTOmycin (CUBICIN) 750 mg in NS 50 mL IVPB, 750 mg, Intravenous, Q24HR, Diego Hitchcock M.D., Last Rate: 100 mL/hr at 06/30/22 2243, 750 mg at 06/30/22 2243      Most Recent Vital Signs:  BP (!) 179/87   Pulse 84   Temp 35.8 °C (96.5 °F) (Temporal)    "Resp 16   Ht 1.829 m (6')   Wt 113 kg (250 lb)   SpO2 94%   BMI 33.91 kg/m²   Temp  Av.7 °C (98 °F)  Min: 35.8 °C (96.5 °F)  Max: 37.6 °C (99.6 °F)    Physical Exam:  General: well-appearing, well nourished no acute distress  HEENT: NCAT, PERRLA, sclera anicteric, PERRL, EOMI  Neck: supple, no lymphadenopathy  Chest: CTAB, unlabored.  Cardiac: RRR,  no m/r/g   Abdomen: + bowel sounds, soft, non-tender, non-distended  Extremities: No cyanosis, clubbing. Left lat ankle ulcer with swelling and erythema-pictures reviewed  Skin: no rashes   Neuro: Alert and disoriented less able to stand  Psych: Mood normal Affect normal    Pertinent Lab Results:  Recent Labs     22  0633   WBC 8.6 9.3 8.7      Recent Labs     22  0633   HEMOGLOBIN 13.0* 13.7* 13.2*   HEMATOCRIT 38.6* 40.5* 39.4*   MCV 86.9 86.5 87.0   MCH 29.3 29.3 29.1   PLATELETCT 337 352 279         Recent Labs     22  0633   SODIUM 138 139 139   POTASSIUM 3.9 4.0 4.0   CHLORIDE 101 102 103   CO2 26 25 24   CREATININE 0.82 0.74 0.75        Recent Labs     22   ALBUMIN 3.8        Pertinent Micro:  Results     Procedure Component Value Units Date/Time    BLOOD CULTURE [[id number]] Collected: 22    Order Status: Completed Specimen: Blood from Peripheral Updated: 2241     Significant Indicator NEG     Source BLD     Site PERIPHERAL     Culture Result No Growth  Note: Blood cultures are incubated for 5 days and  are monitored continuously.Positive blood cultures  are called to the RN and reported as soon as  they are identified.      Narrative:      Per Hospital Policy: Only change Specimen Src: to \"Line\" if  specified by physician order.  Right Forearm/Arm    BLOOD CULTURE [[id number]] Collected: 22 184    Order Status: Completed Specimen: Blood from Peripheral Updated: 22 0741     Significant Indicator NEG     " "Source BLD     Site PERIPHERAL     Culture Result No Growth  Note: Blood cultures are incubated for 5 days and  are monitored continuously.Positive blood cultures  are called to the RN and reported as soon as  they are identified.      Narrative:      Per Hospital Policy: Only change Specimen Src: to \"Line\" if  specified by physician order.  Right AC    URINALYSIS (UA) [[id number]]  (Abnormal) Collected: 06/29/22 1907    Order Status: Completed Specimen: Urine Updated: 06/29/22 2020     Color Yellow     Character Clear     Specific Gravity 1.012     Ph 8.0     Glucose Negative mg/dL      Ketones Negative mg/dL      Protein 30 mg/dL      Bilirubin Negative     Urobilinogen, Urine 0.2     Nitrite Negative     Leukocyte Esterase Negative     Occult Blood Negative     Micro Urine Req Microscopic    COV-2, FLU A/B, AND RSV BY PCR (2-4 HOURS CEPHEID): Collect NP swab in VTM [[id number]] Collected: 06/29/22 1907    Order Status: Completed Specimen: Respirate Updated: 06/29/22 2010     Influenza virus A RNA Negative     Influenza virus B, PCR Negative     RSV, PCR Negative     SARS-CoV-2 by PCR NotDetected     Comment: PATIENTS: Important information regarding your results and instructions can  be found at [email or web address]   \"After your  Covid-19 Test\"    [facility] providers: PLEASE REFER TO DE-ESCALATION AND RETESTING PROTOCOL  on inside[email or web address]    **The [employer] GeneXpert Xpress SARS-CoV-2 RT-PCR Test has been made  available for use under the Emergency Use Authorization (EUA) only.          SARS-CoV-2 Source NP Swab        No results found for: BLOODCULTU, BLDCULT, BCHOLD     Studies:  TTE neg 6/12  MRI 6/13/2022  IMPRESSION:     1.  Osteomyelitis of the lateral malleolus/distal fibula     2.  Negative for abscess     3.  Moderate-large tibiotalar joint effusion. This could be reactive versus septic arthritis.     4.  Longitudinal splitting of the peroneus brevis tendon and there is associated " fluid within the peroneal     IMPRESSION:   Left lateral ankle ankle ulcer  On treatment for MRSA sepsis  Prior GAS 6/2022 sepsis treated  DM  Dementia  Left ankle OM, fibula      PLAN:   Blood cultures this admission neg  Continue daptomycin  LPS/Ortho eval appreciated-may need new wound VAC  S/p bedside debridement of slough  Keep BS under 150 to help control current infection  Continue current IV antibiotics      Plan of care discussed with ANGEL Hitchcock M.D.. Will continue to follow    Mamie Montes M.D.

## 2022-07-01 NOTE — CARE PLAN
The patient is Watcher - Medium risk of patient condition declining or worsening    Shift Goals  Clinical Goals: Patients mentation will improve the shift  Patient Goals: Patient wants to rest  Family Goals: Patient to return to baseline    Progress made toward(s) clinical / shift goals:  Patient is confused and unable to be oriented    Patient is not progressing towards the following goals: Patients mentation is declining      Problem: Fall Risk  Goal: Patient will remain free from falls  Outcome: Not Progressing

## 2022-07-01 NOTE — PROGRESS NOTES
LIMB PRESERVATION SERVICE  PROGRESS NOTE    HPI: Pt known to LPS from prior admissions, 4/12/22 through 4/14/22 and  6/10/22 through 6/20/22. The ulcer started originally from a protuberance on his FWW in the fall 2021.  Patient has been having wound care at [facility] where he resides.  When patient was seen in April wound did not probe to the bone at that time but upon his readmission on 6/10/2022 wound probes to bone and patient had I&D of lateral left ankle on 6/14/2022.  Patient was discharged back to [facility] 6/20/2022.  Per case management notes facility was to order patient's wound VAC and patient was kept on IV daptomycin with end date of 7/23/2022.      INTERVAL HISTORY:  7/1/2022: Patient denies fevers, chills, nausea, vomiting.  Pain controlled.  Seen with wound team for wound VAC application.      PERTINENT LPS RESULTS:   COVID-19: not completed      EXAM:      BP (!) 179/87   Pulse 84   Temp 35.8 °C (96.5 °F) (Temporal)   Resp 16   Ht 1.829 m (6')   Wt 113 kg (250 lb)   SpO2 94%   BMI 33.91 kg/m²     Pedal Pulses:   R foot: Diminished, palpable DP, palpable PT  L foot: Diminished, palpable DP, palpable PT    Sensation:   Monofilament testing completed on 4/13/2022  Feet Insensate to light touch      Wound(s) :    Wound(s)   location: Left lateral ankle  Full thickness, does  probe to bone  Wound characteristics:  Red, pink tissue, thin layer yellow adherent biofilm  Erythema: Moderate  Drainage: Small amount, sanguinous  Callus: None  Odor: None    Wound photo: Left lateral ankle      PROCEDURE:   -2% viscous lidocaine applied topically to wound bed for approximately 5 minutes prior to debridement  -Curette used to debride wound bed.  Excisional debridement was performed to remove devitalized tissue until healthy, bleeding tissue was visualized.   Entire surface of wound, 4.48cm2 debrided.  Tissue debrided into the subcutaneous layer.    -Bleeding  "controlled with manual pressure.    -Wound care completed by wound RN, refer to flowsheet  -Patient tolerated the procedure well, without c/o pain or discomfort.       Wound care completed by [facility] APRN and Wound Team PT.  VF VAC applied, please see flowsheets for details.      DIABETES MANAGEMENT:    A1c:   Lab Results   Component Value Date/Time    HBA1C 6.3 (H) 06/11/2022 03:57 PM            INFECTION MANAGEMENT:    Results from last 7 days   Lab Units 07/01/22  0633 06/30/22  0301 06/29/22  1851   WBC 1501 K/uL 8.7 9.3 8.6   PLATELET COUNT 1518 K/uL 279 352 337     Wound culture results:   Results     Procedure Component Value Units Date/Time    BLOOD CULTURE [[id number]] Collected: 06/29/22 1857    Order Status: Completed Specimen: Blood from Peripheral Updated: 06/30/22 0741     Significant Indicator NEG     Source BLD     Site PERIPHERAL     Culture Result No Growth  Note: Blood cultures are incubated for 5 days and  are monitored continuously.Positive blood cultures  are called to the RN and reported as soon as  they are identified.      Narrative:      Per Hospital Policy: Only change Specimen Src: to \"Line\" if  specified by physician order.  Right Forearm/Arm    BLOOD CULTURE [[id number]] Collected: 06/29/22 1840    Order Status: Completed Specimen: Blood from Peripheral Updated: 06/30/22 0741     Significant Indicator NEG     Source BLD     Site PERIPHERAL     Culture Result No Growth  Note: Blood cultures are incubated for 5 days and  are monitored continuously.Positive blood cultures  are called to the RN and reported as soon as  they are identified.      Narrative:      Per Hospital Policy: Only change Specimen Src: to \"Line\" if  specified by physician order.  Right AC    URINALYSIS (UA) [[id number]]  (Abnormal) Collected: 06/29/22 1907    Order Status: Completed Specimen: Urine Updated: 06/29/22 2020     Color Yellow     Character Clear     Specific Gravity 1.012     Ph 8.0     Glucose Negative mg/dL      " "Ketones Negative mg/dL      Protein 30 mg/dL      Bilirubin Negative     Urobilinogen, Urine 0.2     Nitrite Negative     Leukocyte Esterase Negative     Occult Blood Negative     Micro Urine Req Microscopic    COV-2, FLU A/B, AND RSV BY PCR (2-4 HOURS CEPApothesourceID): Collect NP swab in VTM [[id number]] Collected: 06/29/22 1907    Order Status: Completed Specimen: Respirate Updated: 06/29/22 2010     Influenza virus A RNA Negative     Influenza virus B, PCR Negative     RSV, PCR Negative     SARS-CoV-2 by PCR NotDetected     Comment: PATIENTS: Important information regarding your results and instructions can  be found at [email or web address]   \"After your  Covid-19 Test\"    [facility] providers: PLEASE REFER TO DE-ESCALATION AND RETESTING PROTOCOL  on [email or web address]    **The [employer] GeneXpert Xpress SARS-CoV-2 RT-PCR Test has been made  available for use under the Emergency Use Authorization (EUA) only.          SARS-CoV-2 Source NP Swab             ASSESSMENT/PLAN:   82 y.o. admitted for Osteomyelitis (HCC) [M86.9]. Presents with left lateral ankle wound     -Wound bed with thin layer of adherent slough.  Debridement necessary to promote wound healing  -Continue with IV antibiotics  -VF VAC placed    Wound care:   -We have wound VAC to be managed by wound team    Labs/Imaging:  -COVID-19: not completed this admission.   -no further labs or imaging at this time    Vascular status:   -  palpable pedal pulses bilaterally    Surgery:   -No plans for surgery at this time    Antibiotics:   -ID: involved.     Weight Bearing Status:   -Right foot: Weight bearing as tolerated  -Left foot: Weight bearing as tolerated    Offloading:   -Offloading boot, at the bedside   -Orthotic company: None      PT/OT :   -involved, last seen 7/1    Diabetes Education:   -  not involved at this time due to patient's mentation.  [facility]'s diabetic care provided at [facility]      - Implications of loss of protective sensation " (LOPS) discussed with patient- including increased risk for amputation.  Advised to check feet at least daily, moisturize feet, and to always wear protective foot wear.   -avoid trimming own nails. See podiatrist or certified foot and nail RN  -keep blood sugars <150 for improved wound healing        DISCHARGE PLAN:  TBD, dependent on if patient will tolerate VF VAC due to mentation     Discussed with: pt, RN, Dr. Hitchcock    Please note that this dictation was created using voice recognition software. I have  worked with technical experts from [facility] to optimize the interface.  I have made every reasonable attempt to correct obvious errors, but there may be errors of grammar and possibly content that I did not discover before finalizing the note.    Jazzy David, A.P.R.N.    If any questions or concerns, please contact [facility] through voalte.

## 2022-07-01 NOTE — THERAPY
Physical Therapy Contact Note    PT consult received, liu attempted, pt eating lunch while sitting on BSC; discussed with dtr at bedside his baseline functional mobility; [facility] as needed (reports can feed himself and knows he cannot go back if he is a 1:1), can walk with 4WW but needs increased time for turns/transfers due to cognition; he can receive therapy services there; pt appears capable of [facility] to [facility] when medically appropriate to do so as was on IV antibiotics there per chart review; dtr unclear how much care the home can provide if pt were to progress to total assist needs; will round back but may not require formal acute PT consult if mobilizing at aforementioned baseline and has aforementioned dc plan.    Jenny GAGE, PT,  [phone]

## 2022-07-01 NOTE — CARE PLAN
Problem: Fall Risk  Goal: Patient will remain free from falls  Outcome: Not Progressing  Note: Safety and fall precautions observed, bed alarm on, patient very impulsive and doesn't use the call light, patient will get oob without asking for help, unsteady, up with 1-2 and walker for safety, doesn't follow commands, confused.   The patient is Watcher - Medium risk of patient condition declining or worsening    Shift Goals  Clinical Goals: comfort; safety  Patient Goals: rest    Progress made toward(s) clinical / shift goals:  PICC line intact, bp noted elevated, scheduled metoptolol given, patient refused blood sugar at bedtime, denies pain when asked, frequent rounding done.    Patient is not progressing towards the following goals:      Problem: Fall Risk  Goal: Patient will remain free from falls  Outcome: Not Progressing  Note: Safety and fall precautions observed, bed alarm on, patient very impulsive and doesn't use the call light, patient will get oob without asking for help, unsteady, up with 1-2 and walker for safety, doesn't follow commands, confused.

## 2022-07-01 NOTE — DISCHARGE PLANNING
Care Transition Team Assessment    Assessment completed per chart review, patient A/O x 1. Recent DC 6/20 to [facility] with IV abx Daptomycin expected to continue through 7/26 for dx of osteomyelitis. Expected dc plan to return to 's Home. CM will continue to monitor for dc readiness and dc planning needs.    Information Source  Orientation Level: Disoriented to place, Disoriented to time, Disoriented to situation  Information Given By: Other (Comments) (chart review, patient with dementia)  Who is responsible for making decisions for patient? : Adult child  Name(s) of Primary Decision Maker: Marah Rowland    Readmission Evaluation  Is this a readmission?: Yes - unplanned readmission  Why do you think you were readmitted?: Patient received from [facility], encephalopathic  Was an appointment arranged for you prior to discharge?: No  Were there new prescriptions you were supposed to fill after you were discharged?: Yes, prescriptions filled    Elopement Risk  Legal Hold: No  Ambulatory or Self Mobile in Wheelchair: Yes    Interdisciplinary Discharge Planning  Does Admitting Nurse Feel This Could be a Complex Discharge?: No  [facility]: [facility]  Lives with - Patient's Self Care Capacity: Other (Comments) ([facility] [place]  Patient or legal guardian wants to designate a caregiver: Yes  Caregiver name: Marah  ([facility]) Authorization for Release of Health Information has been completed: Patient refused to sign  Support Systems: Children  Housing / Facility: Skilled Nursing Facility  Name of Care Facility: [facility]  Do You Take your Prescribed Medications Regularly: Yes  Able to Return to Previous ADL's: Other  Mobility Issues: Yes  Prior Services: None  Patient Prefers to be Discharged to:: Back to [facility]  Assistance Needed: Yes  Durable Medical Equipment: Walker  DME Provider / Phone: (P) [facility]    Discharge Preparedness  What is your plan after discharge?: Skilled  nursing facility  What are your discharge supports?: Child  Prior Functional Level: Needs Assist with Activities of Daily Living, Needs Assist with Medication Management, Uses Walker    Functional Assesment  Prior Functional Level: Needs Assist with Activities of Daily Living, Needs Assist with Medication Management, Uses Walker    Finances  Financial Barriers to Discharge: No  Prescription Coverage: Yes    Vision / Hearing Impairment  Hearing Impairment: Both Ears, Hearing Device Not Available    Values / Beliefs / Concerns  Values / Beliefs Concerns : No    Advance Directive  Advance Directive?: POLST    Domestic Abuse  Have you ever been the victim of abuse or violence?: No       Anticipated Discharge Information  Discharge Disposition: D/T to SNF with Medicare cert in anticipation of skilled care (03)

## 2022-07-02 LAB
GLUCOSE BLD STRIP.AUTO-MCNC: 214 MG/DL (ref 65–99)
GLUCOSE BLD STRIP.AUTO-MCNC: 219 MG/DL (ref 65–99)
GLUCOSE BLD STRIP.AUTO-MCNC: 256 MG/DL (ref 65–99)
GLUCOSE BLD STRIP.AUTO-MCNC: 76 MG/DL (ref 65–99)

## 2022-07-02 PROCEDURE — 306591 TRAY SUTURE REMOVAL DISP: Performed by: HOSPITALIST

## 2022-07-02 PROCEDURE — 99233 SBSQ HOSP IP/OBS HIGH 50: CPT | Performed by: INTERNAL MEDICINE

## 2022-07-02 PROCEDURE — 99232 SBSQ HOSP IP/OBS MODERATE 35: CPT | Performed by: HOSPITALIST

## 2022-07-02 PROCEDURE — 700111 HCHG RX REV CODE 636 W/ 250 OVERRIDE (IP): Performed by: STUDENT IN AN ORGANIZED HEALTH CARE EDUCATION/TRAINING PROGRAM

## 2022-07-02 PROCEDURE — 700102 HCHG RX REV CODE 250 W/ 637 OVERRIDE(OP): Performed by: HOSPITALIST

## 2022-07-02 PROCEDURE — 700101 HCHG RX REV CODE 250: Performed by: HOSPITALIST

## 2022-07-02 PROCEDURE — 700111 HCHG RX REV CODE 636 W/ 250 OVERRIDE (IP): Performed by: HOSPITALIST

## 2022-07-02 PROCEDURE — 700102 HCHG RX REV CODE 250 W/ 637 OVERRIDE(OP): Performed by: STUDENT IN AN ORGANIZED HEALTH CARE EDUCATION/TRAINING PROGRAM

## 2022-07-02 PROCEDURE — A9270 NON-COVERED ITEM OR SERVICE: HCPCS | Performed by: STUDENT IN AN ORGANIZED HEALTH CARE EDUCATION/TRAINING PROGRAM

## 2022-07-02 PROCEDURE — A9270 NON-COVERED ITEM OR SERVICE: HCPCS | Performed by: HOSPITALIST

## 2022-07-02 PROCEDURE — 700105 HCHG RX REV CODE 258: Performed by: STUDENT IN AN ORGANIZED HEALTH CARE EDUCATION/TRAINING PROGRAM

## 2022-07-02 PROCEDURE — 700105 HCHG RX REV CODE 258: Performed by: HOSPITALIST

## 2022-07-02 PROCEDURE — 82962 GLUCOSE BLOOD TEST: CPT | Mod: 91

## 2022-07-02 PROCEDURE — 97605 NEG PRS WND THER DME<=50SQCM: CPT

## 2022-07-02 PROCEDURE — 770001 HCHG ROOM/CARE - MED/SURG/GYN PRIV*

## 2022-07-02 RX ORDER — SODIUM HYPOCHLORITE 1.25 MG/ML
SOLUTION TOPICAL 2 TIMES DAILY
Status: DISCONTINUED | OUTPATIENT
Start: 2022-07-02 | End: 2022-07-22

## 2022-07-02 RX ADMIN — SODIUM CHLORIDE 3 G: 900 INJECTION INTRAVENOUS at 05:48

## 2022-07-02 RX ADMIN — SENNOSIDES AND DOCUSATE SODIUM 2 TABLET: 50; 8.6 TABLET ORAL at 05:48

## 2022-07-02 RX ADMIN — ENOXAPARIN SODIUM 40 MG: 40 INJECTION SUBCUTANEOUS at 18:03

## 2022-07-02 RX ADMIN — SERTRALINE 50 MG: 100 TABLET, FILM COATED ORAL at 18:03

## 2022-07-02 RX ADMIN — SODIUM CHLORIDE 3 G: 900 INJECTION INTRAVENOUS at 12:18

## 2022-07-02 RX ADMIN — DAKIN'S SOLUTION 0.125% (QUARTER STRENGTH) 473 ML: 0.12 SOLUTION at 18:09

## 2022-07-02 RX ADMIN — INSULIN HUMAN 2 UNITS: 100 INJECTION, SOLUTION PARENTERAL at 18:04

## 2022-07-02 RX ADMIN — MIRTAZAPINE 15 MG: 15 TABLET, FILM COATED ORAL at 20:07

## 2022-07-02 RX ADMIN — INSULIN HUMAN 3 UNITS: 100 INJECTION, SOLUTION PARENTERAL at 12:26

## 2022-07-02 RX ADMIN — LISINOPRIL 40 MG: 20 TABLET ORAL at 05:48

## 2022-07-02 RX ADMIN — ATORVASTATIN CALCIUM 40 MG: 40 TABLET, FILM COATED ORAL at 18:03

## 2022-07-02 RX ADMIN — METOPROLOL TARTRATE 25 MG: 25 TABLET, FILM COATED ORAL at 08:47

## 2022-07-02 RX ADMIN — INSULIN HUMAN 2 UNITS: 100 INJECTION, SOLUTION PARENTERAL at 20:13

## 2022-07-02 RX ADMIN — DAPTOMYCIN 750 MG: 500 INJECTION, POWDER, LYOPHILIZED, FOR SOLUTION INTRAVENOUS at 23:27

## 2022-07-02 RX ADMIN — SODIUM CHLORIDE 3 G: 900 INJECTION INTRAVENOUS at 18:02

## 2022-07-02 RX ADMIN — Medication 10 MG: at 20:07

## 2022-07-02 RX ADMIN — OXYCODONE 5 MG: 5 TABLET ORAL at 20:07

## 2022-07-02 RX ADMIN — METOPROLOL TARTRATE 25 MG: 25 TABLET, FILM COATED ORAL at 20:09

## 2022-07-02 ASSESSMENT — PAIN SCALES - PAIN ASSESSMENT IN ADVANCED DEMENTIA (PAINAD)
BODYLANGUAGE: RELAXED
FACIALEXPRESSION: SAD, FRIGHTENED, FROWN
CONSOLABILITY: DISTRACTED OR REASSURED BY VOICE/TOUCH
TOTALSCORE: 5
TOTALSCORE: 0
BODYLANGUAGE: TENSE, DISTRESSED PACING, FIDGETING
BREATHING: NORMAL
CONSOLABILITY: NO NEED TO CONSOLE
NEGVOCALIZATION: OCCASIONAL MOAN/GROAN, LOW SPEECH, NEGATIVE/DISAPPROVING QUALITY
BREATHING: OCCASIONAL LABORED BREATHING, SHORT PERIOD OF HYPERVENTILATION
FACIALEXPRESSION: SMILING OR INEXPRESSIVE

## 2022-07-02 ASSESSMENT — PAIN DESCRIPTION - PAIN TYPE
TYPE: CHRONIC PAIN
TYPE: CHRONIC PAIN

## 2022-07-02 ASSESSMENT — ENCOUNTER SYMPTOMS
NAUSEA: 0
FEVER: 0
COUGH: 0
VOMITING: 0
HEADACHES: 0
PALPITATIONS: 0
DIZZINESS: 0
SHORTNESS OF BREATH: 0
CHILLS: 0
ABDOMINAL PAIN: 0

## 2022-07-02 ASSESSMENT — PAIN SCALES - WONG BAKER: WONGBAKER_NUMERICALRESPONSE: DOESN'T HURT AT ALL

## 2022-07-02 NOTE — PROGRESS NOTES
Pt voiding small amounts frequently and feels like he needs to void. This RN attempted to bladder scan patient and 1 ml per scan in bladder but patient kept pushing scanner away. Will continue to monitor.

## 2022-07-02 NOTE — THERAPY
PT Contact Note:    PT consult received, evaluation attempted (for 2nd time). Pt declined to work with PT. In and out of sleep and Dtr at bedside stated he just returned BTB from sitting EOB for breakfast x2 hours. Will reatttempt PT evaluation as able. Thanks    Laura Hawkins, PT, DPT    Voalte [phone]

## 2022-07-02 NOTE — CARE PLAN
The patient is Watcher - Medium risk of patient condition declining or worsening    Shift Goals  Clinical Goals: Patient will remain free of falls this shift  Patient Goals: Patient will rest with comfort  Family Goals: Patient to return to baseline    Progress made toward(s) clinical / shift goals:  Patient has a sitter at bedside to ensure safety

## 2022-07-02 NOTE — PROGRESS NOTES
Infectious Disease Progress Note    Author: Mamie Montes M.D. Date & Time of service: 2022  12:26 PM    Chief Complaint:  On treatmenet MRSA sepsis  Ankle ulceration  Dementia    Interval History:  82 y.o. diabetic male admitted 2022 for AMS. Pulled off wound VAC at facility   AF WBC 8.7 Up on side of bed eating pancakes, fully naked. NAD +. Pulled off VAC again LLE in boot    Labs Reviewed, Medications Reviewed, Radiology Reviewed and Wound Reviewed.    Review of Systems:  Review of Systems   Unable to perform ROS: Mental status change   Constitutional: Negative for fever.   Respiratory: Negative for cough and shortness of breath.        Hemodynamics:  Temp (24hrs), Av.4 °C (97.6 °F), Min:36.2 °C (97.1 °F), Max:37 °C (98.6 °F)  Temperature: 36.2 °C (97.1 °F)  Pulse  Av.9  Min: 61  Max: 100   Blood Pressure : (!) 165/91       Physical Exam:  Physical Exam  Vitals and nursing note reviewed.   Constitutional:       General: He is not in acute distress.     Appearance: He is not ill-appearing, toxic-appearing or diaphoretic.   HENT:      Nose: No rhinorrhea.   Eyes:      Extraocular Movements: Extraocular movements intact.      Pupils: Pupils are equal, round, and reactive to light.   Cardiovascular:      Rate and Rhythm: Normal rate.   Pulmonary:      Effort: Pulmonary effort is normal. No respiratory distress.      Breath sounds: No stridor.   Abdominal:      General: There is no distension.      Tenderness: There is no abdominal tenderness.   Musculoskeletal:      Cervical back: Neck supple. No rigidity.      Comments: LLE in boot  Wound 3.2 cm X 1.4 cm X 1.3 cm  RUE PICC no erythema   Skin:     Coloration: Skin is not jaundiced.   Neurological:      General: No focal deficit present.      Mental Status: He is alert. He is disoriented.   Psychiatric:         Mood and Affect: Mood normal.         Behavior: Behavior normal.         Meds:    Current Facility-Administered  Medications:   •  dakins 0.125% (1/4 strength)  •  lisinopril  •  oxyCODONE immediate-release  •  ampicillin-sulbactam (UNASYN) IV  •  senna-docusate **AND** polyethylene glycol/lytes **AND** magnesium hydroxide **AND** bisacodyl  •  enoxaparin (LOVENOX) injection  •  acetaminophen  •  insulin regular **AND** POC blood glucose manual result **AND** NOTIFY MD and PharmD **AND** Administer 20 grams of glucose (approximately 8 ounces of fruit juice) every 15 minutes PRN FSBG less than 70 mg/dL **AND** dextrose bolus  •  atorvastatin  •  metoprolol tartrate  •  mirtazapine  •  sertraline  •  melatonin  •  DAPTOmycin    Labs:  Recent Labs     06/29/22 1851 06/30/22 0301 07/01/22  0633   WBC 8.6 9.3 8.7   RBC 4.44* 4.68* 4.53*   HEMOGLOBIN 13.0* 13.7* 13.2*   HEMATOCRIT 38.6* 40.5* 39.4*   MCV 86.9 86.5 87.0   MCH 29.3 29.3 29.1   RDW 43.2 42.6 43.2   PLATELETCT 337 352 279   MPV 9.0 9.1 9.2   NEUTSPOLYS 63.30  --  67.00   LYMPHOCYTES 24.50  --  20.60*   MONOCYTES 8.60  --  8.40   EOSINOPHILS 2.30  --  2.70   BASOPHILS 0.70  --  0.80     Recent Labs     06/29/22 1851 06/30/22 0301 07/01/22  0633   SODIUM 138 139 139   POTASSIUM 3.9 4.0 4.0   CHLORIDE 101 102 103   CO2 26 25 24   GLUCOSE 139* 156* 193*   BUN 9 8 9   CPKTOTAL 130  --   --      Recent Labs     06/29/22 1851 06/30/22 0301 07/01/22  0633   ALBUMIN 3.8  --   --    TBILIRUBIN 0.9  --   --    ALKPHOSPHAT 84  --   --    TOTPROTEIN 7.6  --   --    ALTSGPT 9  --   --    ASTSGOT 22  --   --    CREATININE 0.82 0.74 0.75       Imaging:  CT-HEAD W/O    Result Date: 6/29/2022 6/29/2022 6:03 PM HISTORY/REASON FOR EXAM:  AMS, slurred speech. TECHNIQUE/EXAM DESCRIPTION AND NUMBER OF VIEWS: CT of the head without contrast. The study was performed on a helical multidetector CT scanner. Contiguous 2.5 mm axial sections were obtained from the skull base through the vertex. Up to date radiation dose reduction adjustments have been utilized to meet ALARA standards for  radiation dose reduction. COMPARISON:  5/11/2022 FINDINGS: Study is degraded by patient motion Mild/moderate generalized volume loss. Patchy hypodensities in cerebral white matter most likely represent mild chronic microvascular ischemic type changes. No acute intracranial hemorrhage, major vascular territory infarct, mass effect, midline shift or hydrocephalus. Paranasal sinuses and mastoids are clear. No depressed calvarial fracture.     No acute intracranial abnormality.     DX-ANKLE 3+ VIEWS LEFT    Result Date: 6/29/2022 6/29/2022 5:54 PM HISTORY/REASON FOR EXAM:  Atraumatic Pain/Swelling/Deformity Atraumatic left ankle pain TECHNIQUE/EXAM DESCRIPTION AND NUMBER OF VIEWS:  3 views of the LEFT ankle. COMPARISON: 6/10/2022 and MRI 6/13/2022 FINDINGS: No acute fracture or malalignment.  No osteochondral lesion is identified. There is loss of the normal appearing cortex in the lateral fibula with overlying soft tissue edema. There is a small amount of soft tissue gas     Bony destruction in the lateral aspect of the distal fibula is consistent with osteomyelitis described on recent MRI. Overlying soft tissue edema and a small amount of soft tissue gas    DX-ANKLE 3+ VIEWS LEFT    Result Date: 6/10/2022  6/10/2022 9:57 AM HISTORY/REASON FOR EXAM:  Atraumatic Pain/Swelling/Deformity. Lateral left ankle wound TECHNIQUE/EXAM DESCRIPTION AND NUMBER OF VIEWS:  3 views of the LEFT ankle. COMPARISON: Left ankle radiography, 4/12/2022. FINDINGS: Bones: Normal bone mineralization. There is cortical irregularity involving the inferior lateral aspect of the lateral malleolus, at the location of distention of soft tissue gas from the skin surface to the surface of the bone. Findings are concerning for osteomyelitis. No acute fracture. No other focal bone lesion. Joints: Intact. No subluxation. Ankle mortise is preserved. Soft tissues: Circumferential left ankle soft tissue swelling, greatest laterally, with soft tissue gas.      1. Lateral left ankle soft tissue swelling with soft tissue gas extending from the skin surface to the lateral aspect of the distal left fibula, with cortical disruption concerning for osteomyelitis. MRI of the left ankle without and with IV contrast is recommended. 2. Decreased bone mineralization. 3. The remainder of the left ankle radiography is within normal limits.    DX-CHEST-PORTABLE (1 VIEW)    Result Date: 2022 5:32 PM HISTORY/REASON FOR EXAM:  Chest Pain TECHNIQUE/EXAM DESCRIPTION AND NUMBER OF VIEWS: Single AP view of the chest. COMPARISON: 2022 FINDINGS: Heart: The cardiac silhouette is stable in size. Mediastinum: Normal contours. Calcification is in the aortic knob Lungs: Bilateral interstitial markings are increased. No confluent opacity or pneumothorax. Pleura: No pleural fluid. Bones: No suspicious bony lesions. Lines/tubes: Right PICC line is in place.     1.  Increased perihilar interstitial markings are suggestive of pulmonary edema. 2.  Stable mild cardiomegaly    DX-CHEST-PORTABLE (1 VIEW)    Result Date: 6/10/2022  6/10/2022 9:57 AM HISTORY/REASON FOR EXAM:  possible sepsis.. TECHNIQUE/EXAM DESCRIPTION AND NUMBER OF VIEWS: Single portable view of the chest. COMPARISON: 2019 FINDINGS: Cardiac silhouette is enlarged. Aortic calcified atherosclerotic plaque. No focal consolidation, pleural effusion, pulmonary edema or pneumothorax. Generalized osteopenia.     No acute cardiopulmonary abnormality.    US-HONEY SINGLE LEVEL BILAT    Result Date: 2022   Vascular Laboratory  Conclusions  The ankle pressures are not accurately measured due to calcification and  noncompressibility of the tibial vessels.  There is no evidence of significant arterial disease.  COOKIE VELEZ  Age:    82    Gender:     M  MRN:    [id number]  :    1940      BSA:  Exam Date:     2022 13:45  Room #:     Inpatient  Priority:     Routine  Ht (in):             Wt (lb):  Ordering  Physician:        JIN GREGG  Referring Physician:       [id number]  Sonographer:               Aliyah Pandya RVT  Study Type:                Complete Bilateral  Technical Quality:         Adequate  Indications:     Ulceration of LE  CPT Codes:       84500  ICD Codes:       707.1  History:         Ulceration of the left lower extremity. No prior exams.  Limitations:                 RIGHT  Waveform            Systolic BPs (mmHg)                             125           Brachial  Triphasic                                Common Femoral  Triphasic                                Popliteal  Triphasic                  200           Posterior Tibial  Triphasic                  179           Dorsalis Pedis                                           Digit                             1.54          HONEY                                           TBI                       LEFT  Waveform        Systolic BPs (mmHg)                             130           Brachial  Triphasic                                Common Femoral  Triphasic                                Popliteal  Triphasic                  187           Posterior Tibial  Triphasic                  180           Dorsalis Pedis                                           Digit                             1.44          HONEY                                           TBI  Findings  Bilateral-  The ankle pressures are not accurately measured due to calcification and  noncompressibility of the tibial vessels.  Doppler waveforms of the common femoral artery and popliteal artery are of  high amplitude and triphasic.  Doppler waveforms at the ankle are brisk and triphasic.  TBI-  Right: 1.29  Left:    1.09  Additional testing was not performed in accordance with lower extremity  arterial evaluation protocol.  James Gonzalez  (Electronically Signed)  Final Date:      12 June 2022                   16:17    US-EXTREMITY VENOUS LOWER UNILAT  RIGHT    Result Date: 2022   Vascular Laboratory  CONCLUSIONS  No deep venous thrombosis.  COOKIE VELEZ  Exam Date:     2022 19:07  Room #:     Inpatient  Priority:     Stat  Ht (in):             Wt (lb):  Ordering Physician:        BRANDON RAUSCH  Referring Physician:       [id number] Smith  Sonographer:               Do Lowe RVT  Study Type:                Complete Unilateral  Technical Quality:         Fair  Age:    82    Gender:     M  MRN:    [id number]  :    1940      BSA:  Indications:     Localized swelling, mass and lump, right lower limb  CPT Codes:       37832  ICD Codes:       R22.41  History:         Swelling and redness of the right foot and calf. No prior                   duplex.  Limitations:     Patient movement and resistance.  PROCEDURES:  Right lower extremity venous duplex imaging.  The following venous structures were evaluated: common femoral, deep  femoral, proximal great saphenous, femoral, popliteal, peroneal, and  posterior tibial veins.  Serial compression, color, and spectral Doppler flow evaluations were  performed.  FINDINGS:  Right lower extremity.  No deep venous thrombosis.  All veins demonstrate complete color filling and compressibility with  normal venous flow dynamics including spontaneous flow and respiratory  phasicity.  Flow was evaluated in the contralateral femoral vein and normal venous flow  dynamics including spontaneous flow and respiratory phasic variation were  demonstrated.  James Gonzalez  (Electronically Signed)  Final Date:      2022                   19:53    MR-ANKLE W/O LEFT    Result Date: 2022 10:07 AM HISTORY/REASON FOR EXAM:  Osteomyelitis suspected, ankle, xray done. Left ankle open wound, infection, rule out osteomyelitis TECHNIQUE/EXAM DESCRIPTION: MRI of the LEFT ankle without contrast. The study was performed on a "[employer] 1.5 Amie MRI scanner. T1 axial and sagittal, fast spin-echo T2 fat-suppressed axial  and coronal, and fast inversion recovery sagittal images were obtained. COMPARISON:  Left ankle x-ray 6/10/2022 FINDINGS: There is an open wound with associated soft tissue edema overlying the lateral malleolus. BONE AND MARROW: There is bone marrow edema within the distal fibula/lateral malleolus. This is evidenced on both T1 and T2-weighted imaging and therefore represents osteomyelitis. Bone marrow is otherwise within normal limits. There is a tibiotalar joint effusion. Size is moderate to large. There is no loculated fluid collection to suggest an abscess. MUSCLES: The muscular compartment is normal in appearance. LIGAMENTS and TENDONS: There is longitudinal splitting of the peroneus brevis tendon. No other tendon abnormalities are identified. Appearance. MISCELLANEOUS: No fluid collection or mass.     1.  Osteomyelitis of the lateral malleolus/distal fibula 2.  Negative for abscess 3.  Moderate-large tibiotalar joint effusion. This could be reactive versus septic arthritis. 4.  Longitudinal splitting of the peroneus brevis tendon and there is associated fluid within the peroneal tendon sheath    EC-ECHOCARDIOGRAM COMPLETE W/O CONT    Result Date: 2022  Transthoracic Echo Report Echocardiography Laboratory CONCLUSIONS No prior study is available for comparison. Normal left ventricular size and systolic function. The left ventricular ejection fraction is visually estimated to be 55%. The right ventricle is normal in size and systolic function. No significant valvular abnormalities. COOKIE VELEZ Exam Date:         2022                    14:11 Exam Location:     Inpatient Priority:          Routine Ordering Physician:        ZEINAB MONAE Referring Physician:       [id number] KIM Sonographer:               Lalita Villagran DANIEL Age:    82     Gender:    M MRN:    [id number] :    1940 BSA:    2.19   Ht (in):    72     Wt (lb):    214 Exam Type:     Complete Indications:     Encounter for screening for  cardiovascular disorders ICD Codes:       Z13.6 CPT Codes:       74155 BP:   117    /   60     HR:   80 Technical Quality:       Fair MEASUREMENTS  (Male / Female) Normal Values 2D ECHO LV Diastolic Diameter PLAX        5.2 cm                4.2 - 5.9 / 3.9 - 5.3 cm LV Systolic Diameter PLAX         3.3 cm                2.1 - 4.0 cm IVS Diastolic Thickness           1.1 cm                LVPW Diastolic Thickness          1.1 cm                LVOT Diameter                     1.9 cm                Estimated LV Ejection Fraction    55 %                  LV Ejection Fraction MOD BP       64.3 %                >= 55  % LV Ejection Fraction MOD 4C       68.1 %                LV Ejection Fraction MOD 2C       63.1 %                IVC Diameter                      2.2 cm                DOPPLER AV Peak Velocity                  1.3 m/s               AV Peak Gradient                  7.1 mmHg              AV Mean Gradient                  4.3 mmHg              LVOT Peak Velocity                1.2 m/s               AV Area Cont Eq vti               2.5 cm2               Mitral E Point Velocity           0.91 m/s              Mitral E to A Ratio               1                     MV Pressure Half Time             44.3 ms               MV Area PHT                       5 cm2                 MV Deceleration Time              153 ms                TV Peak E Velocity                0.44 m/s              * Indicates values subject to auto-interpretation LV EF:  55    % FINDINGS Left Ventricle Normal left ventricular chamber size. Normal left ventricular wall thickness. Normal left ventricular systolic function. The left ventricular ejection fraction is visually estimated to be 55%. Normal regional wall motion. Normal diastolic function. Right Ventricle The right ventricle is normal in size and systolic function. Right Atrium The right atrium is normal in size. Dilated inferior vena cava without inspiratory collapse. Left  Atrium The left atrium is normal in size. Left atrial volume index is 18 mL/sq m. Mitral Valve Structurally normal mitral valve. No mitral stenosis. Trace mitral regurgitation. Aortic Valve Structurally normal aortic valve without significant stenosis or regurgitation. Tricuspid Valve Structurally normal tricuspid valve without significant stenosis or regurgitation. Pulmonic Valve Structurally normal pulmonic valve without significant stenosis or regurgitation. Pericardium Normal pericardium without effusion. Aorta Normal aortic root for body surface area. The ascending aorta diameter is 3.8 cm. Lee Mccarthy MD (Electronically Signed) Final Date:     12 June 2022                 16:52    IR-PICC LINE PLACEMENT W/ GUIDANCE > AGE 5    Result Date: 6/18/2022  HISTORY/REASON FOR EXAM:   PICC placement. TECHNIQUE/EXAM DESCRIPTION AND NUMBER OF VIEWS:   PICC line insertion with ultrasound guidance.  The procedure was performed using maximal sterile barrier technique including sterile gown, mask, cap, and donning of sterile gloves following appropriate hand hygiene and/or sterile scrub. Patient skin site was prepped with 2% Chlorhexidine solution. FINDINGS:  PICC line insertion with Ultrasound Guidance was performed by qualified nursing staff without the assistance of a Radiologist. PICC positioning appropriateness confirmed by 3CG technology; chest xray only needed in the instance 3CG unable to confirm placement.              Ultrasound-guided PICC placement performed by qualified nursing staff as above.     IR-PICC LINE PLACEMENT W/ GUIDANCE > AGE 5    Result Date: 6/15/2022  HISTORY/REASON FOR EXAM:   PICC placement. TECHNIQUE/EXAM DESCRIPTION AND NUMBER OF VIEWS:   PICC line insertion with ultrasound guidance.  The procedure was performed using maximal sterile barrier technique including sterile gown, mask, cap, and donning of sterile gloves following appropriate hand hygiene and/or sterile scrub. Patient skin site was  "prepped with 2% Chlorhexidine solution. FINDINGS:  PICC line insertion with Ultrasound Guidance was performed by qualified nursing staff without the assistance of a Radiologist. PICC positioning appropriateness confirmed by 3CG technology; chest xray only needed in the instance 3CG unable to confirm placement.              Ultrasound-guided PICC placement performed by qualified nursing staff as above.       Micro:  Results     Procedure Component Value Units Date/Time    BLOOD CULTURE [[id number]] Collected: 06/29/22 1857    Order Status: Completed Specimen: Blood from Peripheral Updated: 06/30/22 0741     Significant Indicator NEG     Source BLD     Site PERIPHERAL     Culture Result No Growth  Note: Blood cultures are incubated for 5 days and  are monitored continuously.Positive blood cultures  are called to the RN and reported as soon as  they are identified.      Narrative:      Per Hospital Policy: Only change Specimen Src: to \"Line\" if  specified by physician order.  Right Forearm/Arm    BLOOD CULTURE [[id number]] Collected: 06/29/22 1840    Order Status: Completed Specimen: Blood from Peripheral Updated: 06/30/22 0741     Significant Indicator NEG     Source BLD     Site PERIPHERAL     Culture Result No Growth  Note: Blood cultures are incubated for 5 days and  are monitored continuously.Positive blood cultures  are called to the RN and reported as soon as  they are identified.      Narrative:      Per Hospital Policy: Only change Specimen Src: to \"Line\" if  specified by physician order.  Right AC    URINALYSIS (UA) [[id number]]  (Abnormal) Collected: 06/29/22 1907    Order Status: Completed Specimen: Urine Updated: 06/29/22 2020     Color Yellow     Character Clear     Specific Gravity 1.012     Ph 8.0     Glucose Negative mg/dL      Ketones Negative mg/dL      Protein 30 mg/dL      Bilirubin Negative     Urobilinogen, Urine 0.2     Nitrite Negative     Leukocyte Esterase Negative     Occult Blood Negative     " "Micro Urine Req Microscopic    COV-2, FLU A/B, AND RSV BY PCR (2-4 HOURS CEPHEID): Collect NP swab in [facility] [[id number]] Collected: 06/29/22 1907    Order Status: Completed Specimen: Respirate Updated: 06/29/22 2010     Influenza virus A RNA Negative     Influenza virus B, PCR Negative     RSV, PCR Negative     SARS-CoV-2 by PCR NotDetected     Comment: PATIENTS: Important information regarding your results and instructions can  be found at [email or web address]   \"After your  Covid-19 Test\"    [facility] providers: PLEASE REFER TO DE-ESCALATION AND RETESTING PROTOCOL  on [email or web address]    **The [facility] GeneXpert Xpress SARS-CoV-2 RT-PCR Test has been made  available for use under the Emergency Use Authorization (EUA) only.          SARS-CoV-2 Source NP Swab          Assessment:  Active Hospital Problems    Diagnosis    • *Encephalopathy [G93.40]    • Osteomyelitis (HCC) [M86.9]    • Cellulitis [L03.90]    • Type 2 diabetes mellitus (HCC) [E11.9]    • Dementia with behavioral disturbance (HCC) [F03.91]    • Hypertension [I10]    • Hyperlipidemia [E78.5]      Left lateral ankle ankle ulcer  On treatment for MRSA sepsis  Prior GAS 6/2022 sepsis treated  DM  Dementia  Left ankle OM, fibula        PLAN:   Blood cultures this admission neg  Continue daptomycin  Stop date daptomycin 7/23/2022  Weekly CPK  LPS/Ortho eval appreciated-plan for new wound VAC  S/p bedside debridement of slough-wound bed clean with granulation  Keep BS under 150 to help control current infection  Continue current IV antibiotics    DW DR Valera and daughter    "

## 2022-07-02 NOTE — PROGRESS NOTES
Hospital Medicine Daily Progress Note    Date of Service  7/2/2022    Chief Complaint  Andrei Garay is a 82 y.o. male admitted 6/29/2022 with left leg swelling and altered mental status    Hospital Course  Andrei Garay is a 82 y.o. male who presented 6/29/2022 with a history of type 2 diabetes, hyperlipidemia, hypertension, osteomyelitis who presents with right leg swelling and altered mental status.     According to the patient's daughter who is visiting him she states that he began becoming more confused and slurring his words yesterday.  He also reports that he had episode of incontinence which is not his normal baseline.  Has been receiving IV daptomycin for left lower leg osteomyelitis which was diagnosed 2 weeks ago, MRSA culture positive.    Emergency department ultrasound of the right lower extremity negative for DVT. Limb preservation services consulted and following.  Completed a bedside debridement and applied new dressings.  Sure of wound management patient was receiving it [facility] facility.  Wound does not appear to be improved from prior hospitalization.  We will consult infectious disease for recommendations and continue to work with LPS for management.      Interval Problem Update  Improved today, off restraints.  Sitter at bedside.   ID consult appreciated  Pt is mentally more clear today however not back to baseline    Consultants/Specialty  infectious disease  Limb preservation services    Code Status  DNAR/DNI    Disposition  Patient is not medically cleared for discharge.   Anticipate discharge to to skilled nursing facility.  I have placed the appropriate orders for post-discharge needs.    Review of Systems  Review of Systems   Constitutional: Negative for chills and fever.   Respiratory: Negative for cough and shortness of breath.    Cardiovascular: Negative for chest pain and palpitations.   Gastrointestinal: Negative for abdominal pain, nausea and vomiting.    Genitourinary: Negative for dysuria and urgency.   Neurological: Negative for dizziness and headaches.   All other systems reviewed and are negative.       Physical Exam  Temp:  [36.2 °C (97.1 °F)-37 °C (98.6 °F)] (P) 36.4 °C (97.5 °F)  Pulse:  [69-80] (P) 72  Resp:  [16-18] (P) 16  BP: (163-181)/(80-91) (P) 153/92  SpO2:  [92 %-95 %] 95 %    Physical Exam  Vitals and nursing note reviewed.   Constitutional:       General: He is not in acute distress.     Appearance: He is obese. He is not ill-appearing.   HENT:      Head: Normocephalic and atraumatic.   Eyes:      General: No scleral icterus.        Right eye: No discharge.         Left eye: No discharge.   Cardiovascular:      Rate and Rhythm: Normal rate and regular rhythm.      Heart sounds: Normal heart sounds.   Pulmonary:      Effort: No respiratory distress.      Breath sounds: Normal breath sounds. No wheezing or rales.   Abdominal:      General: Abdomen is protuberant. There is no distension.      Palpations: Abdomen is soft.      Tenderness: There is no abdominal tenderness. There is no guarding.   Musculoskeletal:         General: No tenderness.   Skin:     Findings: Erythema and signs of injury present.      Comments: Left leg dressing in place   Neurological:      General: No focal deficit present.      Mental Status: He is alert. He is disoriented.      Cranial Nerves: No cranial nerve deficit.      Motor: Motor function is intact.      Comments: Aware of location and soft, unaware of time   Psychiatric:         Mood and Affect: Affect is flat.         Behavior: Behavior is hyperactive.         Cognition and Memory: Cognition is impaired. Memory is impaired.         Judgment: Judgment is impulsive.         Fluids    Intake/Output Summary (Last 24 hours) at 7/2/2022 1556  Last data filed at 7/2/2022 0200  Gross per 24 hour   Intake --   Output 350 ml   Net -350 ml       Laboratory  Recent Labs     06/29/22  1851 06/30/22  0301 07/01/22  0633   WBC  8.6 9.3 8.7   RBC 4.44* 4.68* 4.53*   HEMOGLOBIN 13.0* 13.7* 13.2*   HEMATOCRIT 38.6* 40.5* 39.4*   MCV 86.9 86.5 87.0   MCH 29.3 29.3 29.1   MCHC 33.7 33.8 33.5*   RDW 43.2 42.6 43.2   PLATELETCT 337 352 279   MPV 9.0 9.1 9.2     Recent Labs     06/29/22  1851 06/30/22  0301 07/01/22  0633   SODIUM 138 139 139   POTASSIUM 3.9 4.0 4.0   CHLORIDE 101 102 103   CO2 26 25 24   GLUCOSE 139* 156* 193*   BUN 9 8 9   CREATININE 0.82 0.74 0.75   CALCIUM 9.3 9.5 9.1                   Imaging  US-EXTREMITY VENOUS LOWER UNILAT RIGHT   Final Result      CT-HEAD W/O   Final Result         No acute intracranial abnormality.            DX-ANKLE 3+ VIEWS LEFT   Final Result         Bony destruction in the lateral aspect of the distal fibula is consistent with osteomyelitis described on recent MRI. Overlying soft tissue edema and a small amount of soft tissue gas      DX-CHEST-PORTABLE (1 VIEW)   Final Result      1.  Increased perihilar interstitial markings are suggestive of pulmonary edema.      2.  Stable mild cardiomegaly           Assessment/Plan  * Encephalopathy  Assessment & Plan  Likely secondary to worsening cellulitis of the right leg.    Patient has been receiving IV daptomycin for osteomyelitis of the left leg, will continue    Avoid sedating medications  Improving    Osteomyelitis (HCC)- (present on admission)  Assessment & Plan  Patient with osteomyelitis currently on daptomycin.   CRP mildly elevated  LPS    Type 2 diabetes mellitus (HCC)- (present on admission)  Assessment & Plan  Last A1c was 6.33 weeks ago.  Hold metformin  Insulin sliding scale  Diabetic diet    Cellulitis- (present on admission)  Assessment & Plan  Daughter reports that the patient's right leg is more red and swollen than normal.  Doppler ultrasound does not show DVT  ID consulted, patient reportedly had removed his wound VAC while at SNF  Continue the daptomycin will add Unasyn.  Limb preservation services following and plan to replace wound  VAC today      Dementia with behavioral disturbance (HCC)- (present on admission)  Assessment & Plan  History of dementia.  Oriented x 2, unsure of baseline  Avoid sedating medications    Hypertension- (present on admission)  Assessment & Plan  Continue home medication lisinopril and metoprolol      Hyperlipidemia- (present on admission)  Assessment & Plan  Continue home med atorvastatin       VTE prophylaxis: enoxaparin ppx    I have performed a physical exam and reviewed and updated ROS and Plan today (7/2/2022). In review of yesterday's note (7/1/2022), there are no changes except as documented above.

## 2022-07-02 NOTE — WOUND TEAM
[facility]  Inpatient Services   Wound and Skin Care Evaluation    Admission Date: 6/10/2022     Last order of IP CONSULT TO WOUND CARE was found on 2022 from Hospital Encounter on 2022     HPI, PMH, SH: Reviewed    Past Surgical History:   Procedure Laterality Date   • IRRIGATION & DEBRIDEMENT GENERAL  2022    Procedure: IRRIGATION AND DEBRIDEMENT LEFT ANKLE;  Surgeon: Panfilo Wagner M.D.;  Location: SURGERY [facility];  Service: Orthopedics   • APPENDECTOMY     • STENT PLACEMENT      cardiac     Social History     Tobacco Use   • Smoking status: Former Smoker     Quit date: 2000     Years since quittin.7   • Smokeless tobacco: Never Used   Substance Use Topics   • Alcohol use: Never     Chief Complaint   Patient presents with   • Leg Swelling     Pt has new swelling to his left lower swelling and new confusion per staff at [facility] and daughter. Pt was incontinent of urine today and is usually continent. Pt is also weak and unable to walk like normal. Pt is A&O to self only today and is usually A&Ox3. Pt was recently admitted here for osteomyelitis of his left lower leg, an I&D, a positive MRSA culture of the left lower leg wound. Pt is currently on a daptomycin IV antibiotic regimen.        Diagnosis: Sepsis (HCC) [A41.9]    Unit where seen by Wound Team: S612/02     WOUND CONSULT/FOLLOW UP RELATED TO:  L. Lateral ankle NPWT     WOUND HISTORY:  Andrei Garay is a 82 y.o. male past medical history of diabetes mellitus, hypertension who presented 6/10/2022 with altered mental status for last 3 days and left ankle pain.  History is primarily provided by daughter.  No relieving or exacerbating factors were noted.  He denies any fever does report some chills.  In the ER, x-ray of the ankle showed signs concerning for osteomyelitis.  UA grossly positive for urinary tract infection.  Patient met criteria for sepsis started on IV broad-spectrum antibiotics and  will be admitted for further work-up and limb preservation consult.    SX with Dr. Wagner 6/14/22    WOUND ASSESSMENT/LDA            Negative Pressure Wound Therapy 06/17/22 Surgical Ankle Lateral Left (Active)   NPWT Pump Mode / Pressure Setting Continuous;125 mmHg 06/20/22 1300   Dressing Type Small;Black Foam (Regular) 06/20/22 1300   Number of Foam Pieces Used 2 06/20/22 1300   Canister Changed No 06/20/22 1300   Output (mL) 0 mL 06/20/22 1300   NEXT Dressing Change/Treatment Date 06/22/22 06/20/22 1300   VAC VeraFlo Irrigant Normal Saline 07/01/22 1700   VAC VeraFlo Soak Time (mins) 10 07/01/22 1700   VAC VeraFlo Instill Volume (ml) 6 07/01/22 1700   VAC VeraFlo - Therapy Time (hrs) 3.5 07/01/22 1700   VAC VeraFlo Pressure (mm/Hg) Continuous;125 mmHg 07/01/22 1700           Wound 06/14/22 Ankle Left lateral ankle (Active)   Wound Image    06/17/22 0900   Site Assessment Red;Yellow 07/01/22 1700   Periwound Assessment Maceration 07/01/22 1700   Margins MONAE 06/30/22 2030   Closure MONAE 06/30/22 2030   Drainage Amount Small 07/01/22 1700   Drainage Description Serosanguineous 07/01/22 1700   Treatments Cleansed;Site care;Offloading 06/20/22 1300   Wound Cleansing Approved Wound Cleanser 06/20/22 1300   Periwound Protectant Skin Protectant Wipes to Periwound;Paste Ring;Drape 06/20/22 1300   Dressing Cleansing/Solutions Not Applicable 06/20/22 1300   Dressing Options Wound Vac 07/01/22 1700   Dressing Changed Changed 07/01/22 1700   Dressing Status Dry;Clean;Intact 06/30/22 2030   Dressing Change/Treatment Frequency Monday, Wednesday, Friday, and As Needed 07/01/22 1700   NEXT Dressing Change/Treatment Date 07/04/22 07/01/22 1700   NEXT Weekly Photo (Inpatient Only) 07/08/22 07/01/22 1700   Non-staged Wound Description Full thickness 06/17/22 0900   Wound Length (cm) 3.2 cm 07/01/22 1700   Wound Width (cm) 1.4 cm 07/01/22 1700   Wound Depth (cm) 1.3 cm 07/01/22 1700   Wound Surface Area (cm^2) 4.48 cm^2 07/01/22 1700    Wound Volume (cm^3) 5.824 cm^3 22 1700   Wound Healing % 46 22 1700   Shape nahid 22 1300   Wound Odor None 22 170   Pulses 1+;Left 22 1300   Exposed Structures None 22 1700   WOUND NURSE ONLY - Time Spent with Patient (mins) 60 22 1700         Vascular:    HONEY:   HONEY Results, Last 30 Days US-HONEY SINGLE LEVEL BILAT    Result Date: 2022  Narrative  Vascular Laboratory  Conclusions  The ankle pressures are not accurately measured due to calcification and  noncompressibility of the tibial vessels.  There is no evidence of significant arterial disease.  ROSIECOOKIE  Age:    82    Gender:     M  MRN:    [id number]  :    1940      BSA:  Exam Date:     2022 13:45  Room #:     Inpatient  Priority:     Routine  Ht (in):             Wt (lb):  Ordering Physician:        JIN GREGG  Referring Physician:       [id number] Coronado  Sonographer:               Aliyah Pandya DAISHA  Study Type:                Complete Bilateral  Technical Quality:         Adequate  Indications:     Ulceration of LE  CPT Codes:       57650  ICD Codes:       707.1  History:         Ulceration of the left lower extremity. No prior exams.  Limitations:                 RIGHT  Waveform            Systolic BPs (mmHg)                             125           Brachial  Triphasic                                Common Femoral  Triphasic                                Popliteal  Triphasic                  200           Posterior Tibial  Triphasic                  179           Dorsalis Pedis                                           Digit                             1.54          HONEY                                           TBI                       LEFT  Waveform        Systolic BPs (mmHg)                             130           Brachial  Triphasic                                Common Femoral  Triphasic                                Popliteal  Triphasic                   187           Posterior Tibial  Triphasic                  180           Dorsalis Pedis                                           Digit                             1.44          HONEY                                           TBI  Findings  Bilateral-  The ankle pressures are not accurately measured due to calcification and  noncompressibility of the tibial vessels.  Doppler waveforms of the common femoral artery and popliteal artery are of  high amplitude and triphasic.  Doppler waveforms at the ankle are brisk and triphasic.  TBI-  Right: 1.29  Left:    1.09  Additional testing was not performed in accordance with lower extremity  arterial evaluation protocol.  James Gonzalez  (Electronically Signed)  Final Date:      12 June 2022                   16:17      Lab Values:    Lab Results   Component Value Date/Time    WBC 8.7 07/01/2022 06:33 AM    RBC 4.53 (L) 07/01/2022 06:33 AM    HEMOGLOBIN 13.2 (L) 07/01/2022 06:33 AM    HEMATOCRIT 39.4 (L) 07/01/2022 06:33 AM    CREACTPROT 0.78 (H) 06/29/2022 06:51 PM    SEDRATEWES 27 (H) 06/29/2022 06:51 PM    HBA1C 6.3 (H) 06/11/2022 03:57 PM        Culture Results show:  Recent Results (from the past 720 hour(s))   CULTURE WOUND W/ GRAM STAIN    Collection Time: 06/14/22  9:29 AM    Specimen: Wound   Result Value Ref Range    Significant Indicator POS (POS)     Source WND     Site Left Lateral Ankle     Culture Result No growth at 72 hours. (A)     Gram Stain Result Rare WBCs.  No organisms seen.       Culture Result - (A)        Pain Level/Medicated:  Denied pain       INTERVENTIONS BY WOUND TEAM:  Performed standard wound care which includes appropriate positioning, dressing removal and non-selective debridement. Pictures and measurements obtained weekly if/when required.  Preparation for Dressing removal: Dressing soaked with normal saline  Non-selectively Debrided with:  normal saline and gauze.  Sharp debridement: n/a  Rosa M wound: Cleansed with normal  saline, Prepped with no sting,   Primary Dressin/2 thickness black foam placed into wound bed and secured with drape - bridged a short distance anterior proximal.    Secondary (Outer) Dressing: a small window was cut and a button was applied to attach the tracpad.  No leak detected, seal intact. mepilex placed for offloading, mepilex  Interdisciplinary consultation: Patient, Bedside RN , Jazzy SNIDER APRN    EVALUATION / RATIONALE FOR TREATMENT:  Most Recent Date:  22 Pt confused and pulls at VAC tubing.  Pt pulled tubing off 5 minutes after placement.  Replaced tubing, secured tubing with large piece of drape.  Placed diabetic boot on leg to try to keep pt from pulling on tubing.  Wound bed developing biofilm.  Will order dakins to instill in VAC.    22: Maceration still present, applied paste ring around periwound, added micah to the base of the wound.  Continue NPWT.  Tubi  added for light compression   22: Patient's ankle was a little macerated, but turned pink prior to re-application, wound bed appears nice and red for first dressing change post-op.  Continue NPWT     Goals: Steady decrease in wound area and depth weekly.    WOUND TEAM PLAN OF CARE ([X] for frequency of wound follow up,): X  Nursing to follow dressing orders written for wound care. Contact wound team if area fails to progress, deteriorates or with any questions/concerns if something comes up before next scheduled follow up (See below as to whether wound is following and frequency of wound follow up)  Dressing changes by wound team:                   Follow up 3 times weekly:                NPWT change 3 times weekly:  XMWF   Follow up 1-2 times weekly:      Follow up Bi-Monthly:                   Follow up as needed:     Other (explain):     NURSING PLAN OF CARE ORDERS (X):  Dressing changes: See Dressing Care orders: X  Skin care: See Skin Care orders: X  RN Prevention Protocol: X  Rectal tube care: See Rectal Tube Care  orders:   Other orders:    RSKIN:   CURRENTLY IN PLACE (X), APPLIED THIS VISIT (A), ORDERED (O):   Q shift Montrell:  X  Q shift pressure point assessments:  X    Surface/Positioning X  Pressure redistribution mattressX            Low Airloss          Bariatric foam      Bariatric AKILA     Waffle cushion        Waffle Overlay          Reposition q 2 hours  X    TAPs Turning system     [place] Pillow     Offloading/Redistribution A  Sacral Mepilex (Silicone dressing)     Heel Mepilex (Silicone dressing)      A   Heel float boots (Prevalon boot)             Float Heels off Bed with Pillows           Respiratory Room air  Silicone O2 tubing         Gray Foam Ear protectors     Cannula fixation Device (Tender )          High flow offloading Clip    Elastic head band offloading device      Anchorfast                                                         Trach with Optifoam split foam             Containment/Moisture Prevention continent    Rectal tube or BMS    Purwick/Condom Cath        Martinez Catheter    Barrier wipes           Barrier paste       Antifungal tx      Interdry        Mobilization offloading boot      Up to chair        Ambulate      PT/OT      Nutrition PO      Dietician        Diabetes Education      PO     TF     TPN     NPO   # days     Other        Anticipated discharge plans: MONAE  LTACH:        SNF/Rehab:                  Home Health Care:           Outpatient Wound Center:            Self/Family Care:        Other:                  Vac Discharge Needs: X  Not Applicable Pt not on a wound vac:       Regular Vac while inpatient, alternative dressing at DC:        Regular Vac in use and continued at DC:      X      Reg. Vac w/ Skin Sub/Biologic in use. Will need to be changed 2x wkly:      Veraflo Vac while inpatient, ok to transition to Regular Vac on Discharge:           Veraflo Vac while inpatient, will need to remain on Veraflo Vac upon discharge:

## 2022-07-02 NOTE — PROGRESS NOTES
Received report from FLORES Johnson. Assumed care at 0715. This pt is A&O x1 . Patient and RN discussed plan of care, all questions answered. Labs noted. Call light in place, personal belongings available bed in lowest position, 3 bedrails up, bed alarm on, patient educated on importance of calling for assistance, hourly rounding in place. No additional needs at this time. VSS Sitter at bedside, no restraints in place. Wound vac removed by patient, wet to dry dressing placed by night shift nurse. Patient sleeping during report

## 2022-07-02 NOTE — CARE PLAN
Problem: Fall Risk  Goal: Patient will remain free from falls  Outcome: Progressing  Restraints- Safety vest and side rail x4 discontinued since patient calm and not attempting to get OOB. Safety sitter at bedside and will continue to monitor. Bed alarm on and patient near nurses station.   Problem: Skin Integrity  Goal: Skin integrity is maintained or improved  Outcome: Progressing  Pt refused Q2 turns; patient repositions self in bed. Pt refused to turn so this RN can evaluate sacrum. Skin tear to L elbow. Pillow placed under LUE. Mepilex in place to prevent skin breakdown.  Wound vac to LLE in place and c/d/I. Will continue to monitor.   The patient is Stable - Low risk of patient condition declining or worsening    Shift Goals  Clinical Goals: stable VS, safety, rest  Patient Goals: Patient wants to rest  Family Goals: Patient to return to baseline    Progress made toward(s) clinical / shift goals:  stable VS, pain control, rest, and safety.    Patient is not progressing towards the following goals:

## 2022-07-02 NOTE — PROGRESS NOTES
Pt needed to get use bathroom and attempted to get OOB. Wound vac accidentally removed when patient attempting to get OOB. Wet to dry dressing placed and voicemail left with wound team.

## 2022-07-03 LAB
GLUCOSE BLD STRIP.AUTO-MCNC: 199 MG/DL (ref 65–99)
GLUCOSE BLD STRIP.AUTO-MCNC: 208 MG/DL (ref 65–99)
GLUCOSE BLD STRIP.AUTO-MCNC: 210 MG/DL (ref 65–99)
GLUCOSE BLD STRIP.AUTO-MCNC: 223 MG/DL (ref 65–99)

## 2022-07-03 PROCEDURE — 700102 HCHG RX REV CODE 250 W/ 637 OVERRIDE(OP): Performed by: HOSPITALIST

## 2022-07-03 PROCEDURE — 700102 HCHG RX REV CODE 250 W/ 637 OVERRIDE(OP): Performed by: STUDENT IN AN ORGANIZED HEALTH CARE EDUCATION/TRAINING PROGRAM

## 2022-07-03 PROCEDURE — 700111 HCHG RX REV CODE 636 W/ 250 OVERRIDE (IP): Performed by: STUDENT IN AN ORGANIZED HEALTH CARE EDUCATION/TRAINING PROGRAM

## 2022-07-03 PROCEDURE — 770001 HCHG ROOM/CARE - MED/SURG/GYN PRIV*

## 2022-07-03 PROCEDURE — 700105 HCHG RX REV CODE 258: Performed by: HOSPITALIST

## 2022-07-03 PROCEDURE — A9270 NON-COVERED ITEM OR SERVICE: HCPCS | Performed by: STUDENT IN AN ORGANIZED HEALTH CARE EDUCATION/TRAINING PROGRAM

## 2022-07-03 PROCEDURE — 700111 HCHG RX REV CODE 636 W/ 250 OVERRIDE (IP): Performed by: HOSPITALIST

## 2022-07-03 PROCEDURE — 99231 SBSQ HOSP IP/OBS SF/LOW 25: CPT | Performed by: HOSPITALIST

## 2022-07-03 PROCEDURE — 82962 GLUCOSE BLOOD TEST: CPT | Mod: 91

## 2022-07-03 PROCEDURE — 700105 HCHG RX REV CODE 258: Performed by: STUDENT IN AN ORGANIZED HEALTH CARE EDUCATION/TRAINING PROGRAM

## 2022-07-03 PROCEDURE — 99232 SBSQ HOSP IP/OBS MODERATE 35: CPT | Performed by: INTERNAL MEDICINE

## 2022-07-03 PROCEDURE — 700111 HCHG RX REV CODE 636 W/ 250 OVERRIDE (IP)

## 2022-07-03 PROCEDURE — A9270 NON-COVERED ITEM OR SERVICE: HCPCS | Performed by: HOSPITALIST

## 2022-07-03 RX ORDER — HYDRALAZINE HYDROCHLORIDE 20 MG/ML
20 INJECTION INTRAMUSCULAR; INTRAVENOUS EVERY 6 HOURS PRN
Status: DISCONTINUED | OUTPATIENT
Start: 2022-07-03 | End: 2022-07-25 | Stop reason: HOSPADM

## 2022-07-03 RX ADMIN — OXYCODONE 5 MG: 5 TABLET ORAL at 23:30

## 2022-07-03 RX ADMIN — LISINOPRIL 40 MG: 20 TABLET ORAL at 05:58

## 2022-07-03 RX ADMIN — INSULIN HUMAN 2 UNITS: 100 INJECTION, SOLUTION PARENTERAL at 22:21

## 2022-07-03 RX ADMIN — INSULIN HUMAN 1 UNITS: 100 INJECTION, SOLUTION PARENTERAL at 10:18

## 2022-07-03 RX ADMIN — INSULIN HUMAN 2 UNITS: 100 INJECTION, SOLUTION PARENTERAL at 15:00

## 2022-07-03 RX ADMIN — ATORVASTATIN CALCIUM 40 MG: 40 TABLET, FILM COATED ORAL at 19:22

## 2022-07-03 RX ADMIN — SODIUM CHLORIDE 3 G: 900 INJECTION INTRAVENOUS at 12:33

## 2022-07-03 RX ADMIN — METOPROLOL TARTRATE 25 MG: 25 TABLET, FILM COATED ORAL at 22:18

## 2022-07-03 RX ADMIN — HYDRALAZINE HYDROCHLORIDE 20 MG: 20 INJECTION INTRAMUSCULAR; INTRAVENOUS at 05:58

## 2022-07-03 RX ADMIN — DAPTOMYCIN 750 MG: 500 INJECTION, POWDER, LYOPHILIZED, FOR SOLUTION INTRAVENOUS at 23:28

## 2022-07-03 RX ADMIN — SODIUM CHLORIDE 3 G: 900 INJECTION INTRAVENOUS at 05:59

## 2022-07-03 RX ADMIN — HYDRALAZINE HYDROCHLORIDE 20 MG: 20 INJECTION INTRAMUSCULAR; INTRAVENOUS at 19:27

## 2022-07-03 RX ADMIN — INSULIN HUMAN 2 UNITS: 100 INJECTION, SOLUTION PARENTERAL at 19:21

## 2022-07-03 RX ADMIN — SODIUM CHLORIDE 3 G: 900 INJECTION INTRAVENOUS at 19:20

## 2022-07-03 RX ADMIN — SODIUM CHLORIDE 3 G: 900 INJECTION INTRAVENOUS at 00:10

## 2022-07-03 RX ADMIN — SENNOSIDES AND DOCUSATE SODIUM 2 TABLET: 50; 8.6 TABLET ORAL at 05:58

## 2022-07-03 RX ADMIN — SERTRALINE 50 MG: 100 TABLET, FILM COATED ORAL at 19:22

## 2022-07-03 RX ADMIN — METOPROLOL TARTRATE 25 MG: 25 TABLET, FILM COATED ORAL at 08:13

## 2022-07-03 RX ADMIN — SENNOSIDES AND DOCUSATE SODIUM 2 TABLET: 50; 8.6 TABLET ORAL at 19:22

## 2022-07-03 RX ADMIN — ENOXAPARIN SODIUM 40 MG: 40 INJECTION SUBCUTANEOUS at 19:20

## 2022-07-03 RX ADMIN — DAKIN'S SOLUTION 0.125% (QUARTER STRENGTH) 1 ML: 0.12 SOLUTION at 06:35

## 2022-07-03 ASSESSMENT — ENCOUNTER SYMPTOMS
PALPITATIONS: 0
SHORTNESS OF BREATH: 0
COUGH: 0
FEVER: 0
CHILLS: 0
VOMITING: 0
DIZZINESS: 0
ABDOMINAL PAIN: 0
HEADACHES: 0
NAUSEA: 0

## 2022-07-03 ASSESSMENT — PAIN DESCRIPTION - PAIN TYPE
TYPE: ACUTE PAIN
TYPE: ACUTE PAIN
TYPE: CHRONIC PAIN

## 2022-07-03 NOTE — CARE PLAN
The patient is Stable - Low risk of patient condition declining or worsening    Shift Goals  Clinical Goals: Pt will remain free from injury  Patient Goals: Rest and to be left alone  Family Goals: MONAE    Progress made toward(s) clinical / shift goals:      Problem: Fall Risk  Goal: Patient will remain free from falls  Outcome: Progressing     Problem: Skin Integrity  Goal: Skin integrity is maintained or improved  Outcome: Progressing     Problem: Pain - Standard  Goal: Alleviation of pain or a reduction in pain to the patient’s comfort goal  Outcome: Progressing       Patient is not progressing towards the following goals:NA

## 2022-07-03 NOTE — CARE PLAN
The patient is Stable - Low risk of patient condition declining or worsening    Shift Goals  Clinical Goals: Patient will remain free from falls  Patient Goals: Rest and to be left alone  Family Goals: MONAE    Progress made toward(s) clinical / shift goals:  Pt remained free from falls overnight and had no episodes of behavioral noncompliance or agitation. Pt rested throughout most of the night and occasionally sat up in bed to use the urinal.     Problem: Knowledge Deficit - Standard  Goal: Patient and family/care givers will demonstrate understanding of plan of care, disease process/condition, diagnostic tests and medications  Outcome: Progressing     Problem: Fall Risk  Goal: Patient will remain free from falls  Outcome: Progressing     Problem: Skin Integrity  Goal: Skin integrity is maintained or improved  Outcome: Progressing     Problem: Pain - Standard  Goal: Alleviation of pain or a reduction in pain to the patient’s comfort goal  Outcome: Progressing     Problem: Safety - Medical Restraint  Goal: Free from restraint(s) (Restraint for Interference with Medical Device)  Outcome: Progressing

## 2022-07-03 NOTE — PROGRESS NOTES
Hospital Medicine Daily Progress Note    Date of Service  7/3/2022    Chief Complaint  Andrei Garay is a 82 y.o. male admitted 6/29/2022 with left leg swelling and altered mental status    Hospital Course  Andrei Garay is a 82 y.o. male who presented 6/29/2022 with a history of type 2 diabetes, hyperlipidemia, hypertension, osteomyelitis who presents with right leg swelling and altered mental status.     According to the patient's daughter who is visiting him she states that he began becoming more confused and slurring his words yesterday.  He also reports that he had episode of incontinence which is not his normal baseline.  Has been receiving IV daptomycin for left lower leg osteomyelitis which was diagnosed 2 weeks ago, MRSA culture positive.    Emergency department ultrasound of the right lower extremity negative for DVT. Limb preservation services consulted and following.  Completed a bedside debridement and applied new dressings.  Sure of wound management patient was receiving it [facility] facility.  Wound does not appear to be improved from prior hospitalization.  We will consult infectious disease for recommendations and continue to work with LPS for management.      Interval Problem Update  Improved today, off restraints.  Sitter at bedside.   ID consult appreciated  Pt is mentally more clear today however not back to baseline    Consultants/Specialty  infectious disease  Limb preservation services    Code Status  DNAR/DNI    Disposition  Patient is not medically cleared for discharge.   Anticipate discharge to to skilled nursing facility.  I have placed the appropriate orders for post-discharge needs.    Review of Systems  Review of Systems   Constitutional: Negative for chills and fever.   Respiratory: Negative for cough and shortness of breath.    Cardiovascular: Negative for chest pain and palpitations.   Gastrointestinal: Negative for abdominal pain, nausea and vomiting.    Genitourinary: Negative for dysuria and urgency.   Neurological: Negative for dizziness and headaches.   All other systems reviewed and are negative.       Physical Exam  Temp:  [36.4 °C (97.5 °F)] 36.4 °C (97.5 °F)  Pulse:  [72-86] 86  Resp:  [16-18] 18  BP: (153-192)/(77-99) 192/83  SpO2:  [92 %-95 %] 92 %    Physical Exam  Vitals and nursing note reviewed.   Constitutional:       General: He is not in acute distress.     Appearance: He is obese. He is not ill-appearing.   HENT:      Head: Normocephalic and atraumatic.   Eyes:      General: No scleral icterus.        Right eye: No discharge.         Left eye: No discharge.   Cardiovascular:      Rate and Rhythm: Normal rate and regular rhythm.      Heart sounds: Normal heart sounds.   Pulmonary:      Effort: No respiratory distress.      Breath sounds: Normal breath sounds. No wheezing or rales.   Abdominal:      General: Abdomen is protuberant. There is no distension.      Palpations: Abdomen is soft.      Tenderness: There is no abdominal tenderness. There is no guarding.   Musculoskeletal:         General: No tenderness.   Skin:     Findings: Erythema and signs of injury present.      Comments: Left leg dressing in place   Neurological:      General: No focal deficit present.      Mental Status: He is alert. He is disoriented.      Cranial Nerves: No cranial nerve deficit.      Motor: Motor function is intact.      Comments: Aware of location and soft, unaware of time   Psychiatric:         Mood and Affect: Affect is flat.         Behavior: Behavior is hyperactive.         Cognition and Memory: Cognition is impaired. Memory is impaired.         Judgment: Judgment is impulsive.         Fluids    Intake/Output Summary (Last 24 hours) at 7/3/2022 1427  Last data filed at 7/3/2022 0305  Gross per 24 hour   Intake --   Output 100 ml   Net -100 ml       Laboratory  Recent Labs     07/01/22  0633   WBC 8.7   RBC 4.53*   HEMOGLOBIN 13.2*   HEMATOCRIT 39.4*   MCV 87.0    MCH 29.1   MCHC 33.5*   RDW 43.2   PLATELETCT 279   MPV 9.2     Recent Labs     07/01/22  0633   SODIUM 139   POTASSIUM 4.0   CHLORIDE 103   CO2 24   GLUCOSE 193*   BUN 9   CREATININE 0.75   CALCIUM 9.1                   Imaging  US-EXTREMITY VENOUS LOWER UNILAT RIGHT   Final Result      CT-HEAD W/O   Final Result         No acute intracranial abnormality.            DX-ANKLE 3+ VIEWS LEFT   Final Result         Bony destruction in the lateral aspect of the distal fibula is consistent with osteomyelitis described on recent MRI. Overlying soft tissue edema and a small amount of soft tissue gas      DX-CHEST-PORTABLE (1 VIEW)   Final Result      1.  Increased perihilar interstitial markings are suggestive of pulmonary edema.      2.  Stable mild cardiomegaly           Assessment/Plan  * Encephalopathy  Assessment & Plan  Likely secondary to worsening cellulitis of the right leg.    Patient has been receiving IV daptomycin for osteomyelitis of the left leg, will continue    Avoid sedating medications  Improving    Osteomyelitis (HCC)- (present on admission)  Assessment & Plan  Patient with osteomyelitis currently on daptomycin.   CRP mildly elevated  LPS    Type 2 diabetes mellitus (HCC)- (present on admission)  Assessment & Plan  Last A1c was 6.33 weeks ago.  Hold metformin  Insulin sliding scale  Diabetic diet    Cellulitis- (present on admission)  Assessment & Plan  Daughter reports that the patient's right leg is more red and swollen than normal.  Doppler ultrasound does not show DVT  ID consulted, patient reportedly had removed his wound VAC while at SNF  Continue the daptomycin will add Unasyn.  Limb preservation services following and plan to replace wound VAC today      Dementia with behavioral disturbance (HCC)- (present on admission)  Assessment & Plan  History of dementia.  Oriented x 2, unsure of baseline  Avoid sedating medications    Hypertension- (present on admission)  Assessment & Plan  Continue home  medication lisinopril and metoprolol      Hyperlipidemia- (present on admission)  Assessment & Plan  Continue home med atorvastatin       VTE prophylaxis: enoxaparin ppx    I have performed a physical exam and reviewed and updated ROS and Plan today (7/3/2022). In review of yesterday's note (7/2/2022), there are no changes except as documented above.

## 2022-07-03 NOTE — PROGRESS NOTES
Assumed care of pt and received bedside report. Pt is A&O x 1 oriented to self only, demonstrates signs of pain based on dementia pain scale. PRN pain medication given see MAR, bedside table and personal items within reach, bed locked in low position, x3 bedrails up, pt ripped off ID band and fall risk band. Bed alarm on, wound vac secured in place and set at 125mmHg suction, no additional needs at this time.

## 2022-07-03 NOTE — PROGRESS NOTES
Infectious Disease Progress Note    Author: Mamie Montes M.D. Date & Time of service: 7/3/2022  11:38 AM    Chief Complaint:  On treatmenet MRSA sepsis  Ankle ulceration  Dementia    Interval History:  82 y.o. diabetic male admitted 2022 for AMS. Pulled off wound VAC at facility   AF WBC 8.7 Up on side of bed eating pancakes, fully naked. NAD +. Pulled off VAC again LLE in boot  7/3 AF somnolent in bed this am-VAC successfully placed yesterday-wound care pictures reviewed      Labs Reviewed, Medications Reviewed, and Wound Reviewed.    Review of Systems:  Review of Systems   Unable to perform ROS: Dementia   Constitutional: Negative for fever.   Gastrointestinal: Negative for abdominal pain, nausea and vomiting.       Hemodynamics:  Temp (24hrs), Av.4 °C (97.5 °F), Min:36.4 °C (97.5 °F), Max:36.4 °C (97.5 °F)  Temperature: 36.4 °C (97.5 °F)  Pulse  Av.1  Min: 61  Max: 100   Blood Pressure : (!) 192/83 (hydralazine administered)       Physical Exam:  Physical Exam  Vitals and nursing note reviewed.   Constitutional:       General: He is not in acute distress.     Appearance: He is not ill-appearing, toxic-appearing or diaphoretic.   HENT:      Nose: No rhinorrhea.   Eyes:      General: No scleral icterus.        Right eye: No discharge.         Left eye: No discharge.      Extraocular Movements: Extraocular movements intact.      Pupils: Pupils are equal, round, and reactive to light.   Cardiovascular:      Rate and Rhythm: Normal rate.   Pulmonary:      Effort: Pulmonary effort is normal. No respiratory distress.      Breath sounds: No stridor. No wheezing.   Abdominal:      General: There is no distension.      Tenderness: There is no abdominal tenderness.   Musculoskeletal:      Cervical back: Neck supple. No rigidity.      Comments: LLE in boot/VAC in place  Wound 3.2 cm X 1.4 cm X 1.3 cm-resolved surrounding erythema  RUE PICC no erythema   Skin:     Coloration: Skin is not  jaundiced.   Neurological:      General: No focal deficit present.      Mental Status: He is disoriented.         Meds:    Current Facility-Administered Medications:   •  hydrALAZINE  •  dakins 0.125% (1/4 strength)  •  lisinopril  •  oxyCODONE immediate-release  •  ampicillin-sulbactam (UNASYN) IV  •  senna-docusate **AND** polyethylene glycol/lytes **AND** magnesium hydroxide **AND** bisacodyl  •  enoxaparin (LOVENOX) injection  •  acetaminophen  •  insulin regular **AND** POC blood glucose manual result **AND** NOTIFY MD and PharmD **AND** Administer 20 grams of glucose (approximately 8 ounces of fruit juice) every 15 minutes PRN FSBG less than 70 mg/dL **AND** dextrose bolus  •  atorvastatin  •  metoprolol tartrate  •  mirtazapine  •  sertraline  •  melatonin  •  DAPTOmycin    Labs:  Recent Labs     07/01/22  0633   WBC 8.7   RBC 4.53*   HEMOGLOBIN 13.2*   HEMATOCRIT 39.4*   MCV 87.0   MCH 29.1   RDW 43.2   PLATELETCT 279   MPV 9.2   NEUTSPOLYS 67.00   LYMPHOCYTES 20.60*   MONOCYTES 8.40   EOSINOPHILS 2.70   BASOPHILS 0.80     Recent Labs     07/01/22  0633   SODIUM 139   POTASSIUM 4.0   CHLORIDE 103   CO2 24   GLUCOSE 193*   BUN 9     Recent Labs     07/01/22  0633   CREATININE 0.75       Imaging:  CT-HEAD W/O    Result Date: 6/29/2022 6/29/2022 6:03 PM HISTORY/REASON FOR EXAM:  AMS, slurred speech. TECHNIQUE/EXAM DESCRIPTION AND NUMBER OF VIEWS: CT of the head without contrast. The study was performed on a helical multidetector CT scanner. Contiguous 2.5 mm axial sections were obtained from the skull base through the vertex. Up to date radiation dose reduction adjustments have been utilized to meet ALARA standards for radiation dose reduction. COMPARISON:  5/11/2022 FINDINGS: Study is degraded by patient motion Mild/moderate generalized volume loss. Patchy hypodensities in cerebral white matter most likely represent mild chronic microvascular ischemic type changes. No acute intracranial hemorrhage, major  vascular territory infarct, mass effect, midline shift or hydrocephalus. Paranasal sinuses and mastoids are clear. No depressed calvarial fracture.     No acute intracranial abnormality.     DX-ANKLE 3+ VIEWS LEFT    Result Date: 6/29/2022 6/29/2022 5:54 PM HISTORY/REASON FOR EXAM:  Atraumatic Pain/Swelling/Deformity Atraumatic left ankle pain TECHNIQUE/EXAM DESCRIPTION AND NUMBER OF VIEWS:  3 views of the LEFT ankle. COMPARISON: 6/10/2022 and MRI 6/13/2022 FINDINGS: No acute fracture or malalignment.  No osteochondral lesion is identified. There is loss of the normal appearing cortex in the lateral fibula with overlying soft tissue edema. There is a small amount of soft tissue gas     Bony destruction in the lateral aspect of the distal fibula is consistent with osteomyelitis described on recent MRI. Overlying soft tissue edema and a small amount of soft tissue gas    DX-ANKLE 3+ VIEWS LEFT    Result Date: 6/10/2022  6/10/2022 9:57 AM HISTORY/REASON FOR EXAM:  Atraumatic Pain/Swelling/Deformity. Lateral left ankle wound TECHNIQUE/EXAM DESCRIPTION AND NUMBER OF VIEWS:  3 views of the LEFT ankle. COMPARISON: Left ankle radiography, 4/12/2022. FINDINGS: Bones: Normal bone mineralization. There is cortical irregularity involving the inferior lateral aspect of the lateral malleolus, at the location of distention of soft tissue gas from the skin surface to the surface of the bone. Findings are concerning for osteomyelitis. No acute fracture. No other focal bone lesion. Joints: Intact. No subluxation. Ankle mortise is preserved. Soft tissues: Circumferential left ankle soft tissue swelling, greatest laterally, with soft tissue gas.     1. Lateral left ankle soft tissue swelling with soft tissue gas extending from the skin surface to the lateral aspect of the distal left fibula, with cortical disruption concerning for osteomyelitis. MRI of the left ankle without and with IV contrast is recommended. 2. Decreased bone  mineralization. 3. The remainder of the left ankle radiography is within normal limits.    DX-CHEST-PORTABLE (1 VIEW)    Result Date: 2022 5:32 PM HISTORY/REASON FOR EXAM:  Chest Pain TECHNIQUE/EXAM DESCRIPTION AND NUMBER OF VIEWS: Single AP view of the chest. COMPARISON: 2022 FINDINGS: Heart: The cardiac silhouette is stable in size. Mediastinum: Normal contours. Calcification is in the aortic knob Lungs: Bilateral interstitial markings are increased. No confluent opacity or pneumothorax. Pleura: No pleural fluid. Bones: No suspicious bony lesions. Lines/tubes: Right PICC line is in place.     1.  Increased perihilar interstitial markings are suggestive of pulmonary edema. 2.  Stable mild cardiomegaly    DX-CHEST-PORTABLE (1 VIEW)    Result Date: 6/10/2022  6/10/2022 9:57 AM HISTORY/REASON FOR EXAM:  possible sepsis.. TECHNIQUE/EXAM DESCRIPTION AND NUMBER OF VIEWS: Single portable view of the chest. COMPARISON: 2019 FINDINGS: Cardiac silhouette is enlarged. Aortic calcified atherosclerotic plaque. No focal consolidation, pleural effusion, pulmonary edema or pneumothorax. Generalized osteopenia.     No acute cardiopulmonary abnormality.    US-HONEY SINGLE LEVEL BILAT    Result Date: 2022   Vascular Laboratory  Conclusions  The ankle pressures are not accurately measured due to calcification and  noncompressibility of the tibial vessels.  There is no evidence of significant arterial disease.  ROSIECOOKIE  Age:    82    Gender:     M  MRN:    [id number]  :    1940      BSA:  Exam Date:     2022 13:45  Room #:     Inpatient  Priority:     Routine  Ht (in):             Wt (lb):  Ordering Physician:        JIN GREGG  Referring Physician:       [id number]  Sonographer:               Aliyah Pandya RVT  Study Type:                Complete Bilateral  Technical Quality:         Adequate  Indications:     Ulceration of LE  CPT Codes:        97814  ICD Codes:       707.1  History:         Ulceration of the left lower extremity. No prior exams.  Limitations:                 RIGHT  Waveform            Systolic BPs (mmHg)                             125           Brachial  Triphasic                                Common Femoral  Triphasic                                Popliteal  Triphasic                  200           Posterior Tibial  Triphasic                  179           Dorsalis Pedis                                           Digit                             1.54          HONEY                                           TBI                       LEFT  Waveform        Systolic BPs (mmHg)                             130           Brachial  Triphasic                                Common Femoral  Triphasic                                Popliteal  Triphasic                  187           Posterior Tibial  Triphasic                  180           Dorsalis Pedis                                           Digit                             1.44          HONEY                                           TBI  Findings  Bilateral-  The ankle pressures are not accurately measured due to calcification and  noncompressibility of the tibial vessels.  Doppler waveforms of the common femoral artery and popliteal artery are of  high amplitude and triphasic.  Doppler waveforms at the ankle are brisk and triphasic.  TBI-  Right: 1.29  Left:    1.09  Additional testing was not performed in accordance with lower extremity  arterial evaluation protocol.  James Gonzalez  (Electronically Signed)  Final Date:      12 June 2022                   16:17    US-EXTREMITY VENOUS LOWER UNILAT RIGHT    Result Date: 6/29/2022   Vascular Laboratory  CONCLUSIONS  No deep venous thrombosis.  COOKIE VELEZ  Exam Date:     06/29/2022 19:07  Room #:     Inpatient  Priority:     Stat  Ht (in):             Wt (lb):  Ordering Physician:        BRANDON RAUSCH  Referring Physician:       [id number],  MIRACLE  Sonographer:               Do Lowe RVT  Study Type:                Complete Unilateral  Technical Quality:         Fair  Age:    82    Gender:     M  MRN:    [id number]  :    1940      BSA:  Indications:     Localized swelling, mass and lump, right lower limb  CPT Codes:       36056  ICD Codes:       R22.41  History:         Swelling and redness of the right foot and calf. No prior                   duplex.  Limitations:     Patient movement and resistance.  PROCEDURES:  Right lower extremity venous duplex imaging.  The following venous structures were evaluated: common femoral, deep  femoral, proximal great saphenous, femoral, popliteal, peroneal, and  posterior tibial veins.  Serial compression, color, and spectral Doppler flow evaluations were  performed.  FINDINGS:  Right lower extremity.  No deep venous thrombosis.  All veins demonstrate complete color filling and compressibility with  normal venous flow dynamics including spontaneous flow and respiratory  phasicity.  Flow was evaluated in the contralateral femoral vein and normal venous flow  dynamics including spontaneous flow and respiratory phasic variation were  demonstrated.  James Gonzalez  (Electronically Signed)  Final Date:      2022                   19:53    MR-ANKLE W/O LEFT    Result Date: 2022 10:07 AM HISTORY/REASON FOR EXAM:  Osteomyelitis suspected, ankle, xray done. Left ankle open wound, infection, rule out osteomyelitis TECHNIQUE/EXAM DESCRIPTION: MRI of the LEFT ankle without contrast. The study was performed on a [employer] 1.5 Amie MRI scanner. T1 axial and sagittal, fast spin-echo T2 fat-suppressed axial and coronal, and fast inversion recovery sagittal images were obtained. COMPARISON:  Left ankle x-ray 6/10/2022 FINDINGS: There is an open wound with associated soft tissue edema overlying the lateral malleolus. BONE AND MARROW: There is bone marrow edema within the distal fibula/lateral  malleolus. This is evidenced on both T1 and T2-weighted imaging and therefore represents osteomyelitis. Bone marrow is otherwise within normal limits. There is a tibiotalar joint effusion. Size is moderate to large. There is no loculated fluid collection to suggest an abscess. MUSCLES: The muscular compartment is normal in appearance. LIGAMENTS and TENDONS: There is longitudinal splitting of the peroneus brevis tendon. No other tendon abnormalities are identified. Appearance. MISCELLANEOUS: No fluid collection or mass.     1.  Osteomyelitis of the lateral malleolus/distal fibula 2.  Negative for abscess 3.  Moderate-large tibiotalar joint effusion. This could be reactive versus septic arthritis. 4.  Longitudinal splitting of the peroneus brevis tendon and there is associated fluid within the peroneal tendon sheath    EC-ECHOCARDIOGRAM COMPLETE W/O CONT    Result Date: 2022  Transthoracic Echo Report Echocardiography Laboratory CONCLUSIONS No prior study is available for comparison. Normal left ventricular size and systolic function. The left ventricular ejection fraction is visually estimated to be 55%. The right ventricle is normal in size and systolic function. No significant valvular abnormalities. COOKIE VELEZ Exam Date:         2022                    14:11 Exam Location:     Inpatient Priority:          Routine Ordering Physician:        ZEINAB MONAE Referring Physician:       [id number], KIM Sonographer:               Lalita Villagran RDCS Age:    82     Gender:    M MRN:    [id number] :    1940 BSA:    2.19   Ht (in):    72     Wt (lb):    214 Exam Type:     Complete Indications:     Encounter for screening for cardiovascular disorders ICD Codes:       Z13.6 CPT Codes:       38317 BP:   117    /   60     HR:   80 Technical Quality:       Fair MEASUREMENTS  (Male / Female) Normal Values 2D ECHO LV Diastolic Diameter PLAX        5.2 cm                4.2 - 5.9 / 3.9 - 5.3 cm LV Systolic Diameter  PLAX         3.3 cm                2.1 - 4.0 cm IVS Diastolic Thickness           1.1 cm                LVPW Diastolic Thickness          1.1 cm                LVOT Diameter                     1.9 cm                Estimated LV Ejection Fraction    55 %                  LV Ejection Fraction MOD BP       64.3 %                >= 55  % LV Ejection Fraction MOD 4C       68.1 %                LV Ejection Fraction MOD 2C       63.1 %                IVC Diameter                      2.2 cm                DOPPLER AV Peak Velocity                  1.3 m/s               AV Peak Gradient                  7.1 mmHg              AV Mean Gradient                  4.3 mmHg              LVOT Peak Velocity                1.2 m/s               AV Area Cont Eq vti               2.5 cm2               Mitral E Point Velocity           0.91 m/s              Mitral E to A Ratio               1                     MV Pressure Half Time             44.3 ms               MV Area PHT                       5 cm2                 MV Deceleration Time              153 ms                TV Peak E Velocity                0.44 m/s              * Indicates values subject to auto-interpretation LV EF:  55    % FINDINGS Left Ventricle Normal left ventricular chamber size. Normal left ventricular wall thickness. Normal left ventricular systolic function. The left ventricular ejection fraction is visually estimated to be 55%. Normal regional wall motion. Normal diastolic function. Right Ventricle The right ventricle is normal in size and systolic function. Right Atrium The right atrium is normal in size. Dilated inferior vena cava without inspiratory collapse. Left Atrium The left atrium is normal in size. Left atrial volume index is 18 mL/sq m. Mitral Valve Structurally normal mitral valve. No mitral stenosis. Trace mitral regurgitation. Aortic Valve Structurally normal aortic valve without significant stenosis or regurgitation. Tricuspid Valve  Structurally normal tricuspid valve without significant stenosis or regurgitation. Pulmonic Valve Structurally normal pulmonic valve without significant stenosis or regurgitation. Pericardium Normal pericardium without effusion. Aorta Normal aortic root for body surface area. The ascending aorta diameter is 3.8 cm. Lee Mccarthy MD (Electronically Signed) Final Date:     12 June 2022                 16:52    IR-PICC LINE PLACEMENT W/ GUIDANCE > AGE 5    Result Date: 6/18/2022  HISTORY/REASON FOR EXAM:   PICC placement. TECHNIQUE/EXAM DESCRIPTION AND NUMBER OF VIEWS:   PICC line insertion with ultrasound guidance.  The procedure was performed using maximal sterile barrier technique including sterile gown, mask, cap, and donning of sterile gloves following appropriate hand hygiene and/or sterile scrub. Patient skin site was prepped with 2% Chlorhexidine solution. FINDINGS:  PICC line insertion with Ultrasound Guidance was performed by qualified nursing staff without the assistance of a Radiologist. PICC positioning appropriateness confirmed by 3CG technology; chest xray only needed in the instance 3CG unable to confirm placement.              Ultrasound-guided PICC placement performed by qualified nursing staff as above.     IR-PICC LINE PLACEMENT W/ GUIDANCE > AGE 5    Result Date: 6/15/2022  HISTORY/REASON FOR EXAM:   PICC placement. TECHNIQUE/EXAM DESCRIPTION AND NUMBER OF VIEWS:   PICC line insertion with ultrasound guidance.  The procedure was performed using maximal sterile barrier technique including sterile gown, mask, cap, and donning of sterile gloves following appropriate hand hygiene and/or sterile scrub. Patient skin site was prepped with 2% Chlorhexidine solution. FINDINGS:  PICC line insertion with Ultrasound Guidance was performed by qualified nursing staff without the assistance of a Radiologist. PICC positioning appropriateness confirmed by 3CG technology; chest xray only needed in the instance 3CG  "unable to confirm placement.              Ultrasound-guided PICC placement performed by qualified nursing staff as above.       Micro:  Results     Procedure Component Value Units Date/Time    BLOOD CULTURE [[id number]] Collected: 06/29/22 1857    Order Status: Completed Specimen: Blood from Peripheral Updated: 06/30/22 0741     Significant Indicator NEG     Source BLD     Site PERIPHERAL     Culture Result No Growth  Note: Blood cultures are incubated for 5 days and  are monitored continuously.Positive blood cultures  are called to the RN and reported as soon as  they are identified.      Narrative:      Per Hospital Policy: Only change Specimen Src: to \"Line\" if  specified by physician order.  Right Forearm/Arm    BLOOD CULTURE [[id number]] Collected: 06/29/22 1840    Order Status: Completed Specimen: Blood from Peripheral Updated: 06/30/22 0741     Significant Indicator NEG     Source BLD     Site PERIPHERAL     Culture Result No Growth  Note: Blood cultures are incubated for 5 days and  are monitored continuously.Positive blood cultures  are called to the RN and reported as soon as  they are identified.      Narrative:      Per Hospital Policy: Only change Specimen Src: to \"Line\" if  specified by physician order.  Right AC    URINALYSIS (UA) [[id number]]  (Abnormal) Collected: 06/29/22 1907    Order Status: Completed Specimen: Urine Updated: 06/29/22 2020     Color Yellow     Character Clear     Specific Gravity 1.012     Ph 8.0     Glucose Negative mg/dL      Ketones Negative mg/dL      Protein 30 mg/dL      Bilirubin Negative     Urobilinogen, Urine 0.2     Nitrite Negative     Leukocyte Esterase Negative     Occult Blood Negative     Micro Urine Req Microscopic    COV-2, FLU A/B, AND RSV BY PCR (2-4 HOURS CEPHEID): Collect NP swab in [place] [[id number]] Collected: 06/29/22 1907    Order Status: Completed Specimen: Respirate Updated: 06/29/22 2010     Influenza virus A RNA Negative     Influenza virus B, PCR Negative " "    RSV, PCR Negative     SARS-CoV-2 by PCR NotDetected     Comment: PATIENTS: Important information regarding your results and instructions can  be found at [email or web address]   \"After your  Covid-19 Test\"    [facility] providers: PLEASE REFER TO DE-ESCALATION AND RETESTING PROTOCOL  on [email or web address]    **The [employer] GeneXpert Xpress SARS-CoV-2 RT-PCR Test has been made  available for use under the Emergency Use Authorization (EUA) only.          SARS-CoV-2 Source NP Swab          Assessment:  Active Hospital Problems    Diagnosis    • *Encephalopathy [G93.40]    • Osteomyelitis (HCC) [M86.9]    • Cellulitis [L03.90]    • Type 2 diabetes mellitus (HCC) [E11.9]    • Dementia with behavioral disturbance (HCC) [F03.91]    • Hypertension [I10]    • Hyperlipidemia [E78.5]      Left lateral ankle ankle ulcer  On treatment for MRSA sepsis  Prior GAS 6/2022 sepsis treated  DM  Dementia  Left ankle OM, fibula        PLAN:   Blood cultures this admission neg  Continue daptomycin  Stop date daptomycin 7/23/2022  Weekly CPK  LPS/Ortho eval appreciated-plan for new wound VAC  S/p bedside debridement of slough-wound bed clean with granulation  Keep BS under 150 to help control current infection  Continue current IV antibiotics    Will follow as needed    PICC in place    "

## 2022-07-03 NOTE — WOUND TEAM
[facility]  Inpatient Services   Wound and Skin Care Evaluation    Admission Date: 6/10/2022     Last order of IP CONSULT TO WOUND CARE was found on 2022 from Hospital Encounter on 2022     HPI, PMH, SH: Reviewed    Past Surgical History:   Procedure Laterality Date   • IRRIGATION & DEBRIDEMENT GENERAL  2022    Procedure: IRRIGATION AND DEBRIDEMENT LEFT ANKLE;  Surgeon: Panfilo Wagner M.D.;  Location: SURGERY [facility];  Service: Orthopedics   • APPENDECTOMY     • STENT PLACEMENT      cardiac     Social History     Tobacco Use   • Smoking status: Former Smoker     Quit date: 2000     Years since quittin.7   • Smokeless tobacco: Never Used   Substance Use Topics   • Alcohol use: Never     Chief Complaint   Patient presents with   • Leg Swelling     Pt has new swelling to his left lower swelling and new confusion per staff at [facility] and daughter. Pt was incontinent of urine today and is usually continent. Pt is also weak and unable to walk like normal. Pt is A&O to self only today and is usually A&Ox3. Pt was recently admitted here for osteomyelitis of his left lower leg, an I&D, a positive MRSA culture of the left lower leg wound. Pt is currently on a daptomycin IV antibiotic regimen.        Diagnosis: Sepsis (HCC) [A41.9]    Unit where seen by Wound Team: S612/02     WOUND CONSULT/FOLLOW UP RELATED TO:  L. Lateral ankle NPWT     WOUND HISTORY:  Andrei Garay is a 82 y.o. male past medical history of diabetes mellitus, hypertension who presented 6/10/2022 with altered mental status for last 3 days and left ankle pain.  History is primarily provided by daughter.  No relieving or exacerbating factors were noted.  He denies any fever does report some chills.  In the ER, x-ray of the ankle showed signs concerning for osteomyelitis.  UA grossly positive for urinary tract infection.  Patient met criteria for sepsis started on IV broad-spectrum antibiotics and  will be admitted for further work-up and limb preservation consult.    SX with Dr. Wagner 6/14/22    WOUND ASSESSMENT/LDA         Negative Pressure Wound Therapy 06/17/22 Surgical Ankle Lateral Left (Active)   NPWT Pump Mode / Pressure Setting Continuous;125 mmHg 07/02/22 1600   Dressing Type Small;Black Foam (Regular) 07/02/22 1600   Number of Foam Pieces Used 2 06/20/22 1300   Canister Changed No 07/02/22 1600   Output (mL) 0 mL 06/20/22 1300   NEXT Dressing Change/Treatment Date 06/22/22 06/20/22 1300   VAC VeraFlo Irrigant dakins   07/01/22 1700   VAC VeraFlo Soak Time (mins) 10 07/01/22 1700   VAC VeraFlo Instill Volume (ml) 6 07/01/22 1700   VAC VeraFlo - Therapy Time (hrs) 3.5 07/01/22 1700   VAC VeraFlo Pressure (mm/Hg) Continuous;125 mmHg 07/02/22 1600           Wound 06/14/22 Ankle Left lateral ankle (Active)   Wound Image    06/17/22 0900   Site Assessment Red 07/02/22 1600   Periwound Assessment Maceration 07/02/22 1600   Margins Defined edges;Unattached edges 07/02/22 1600   Closure MONAE 06/30/22 2030   Drainage Amount Small 07/01/22 1700   Drainage Description Serosanguineous 07/01/22 1700   Treatments Cleansed;Site care;Offloading 06/20/22 1300   Wound Cleansing Approved Wound Cleanser 06/20/22 1300   Periwound Protectant Skin Protectant Wipes to Periwound;Paste Ring;Drape 06/20/22 1300   Dressing Cleansing/Solutions Not Applicable 06/20/22 1300   Dressing Options Wound Vac 07/01/22 1700   Dressing Changed Changed 07/01/22 1700   Dressing Status Clean;Dry;Intact 07/01/22 2000   Dressing Change/Treatment Frequency Monday, Wednesday, Friday, and As Needed 07/02/22 1600   NEXT Dressing Change/Treatment Date 07/04/22 07/01/22 1700   NEXT Weekly Photo (Inpatient Only) 07/08/22 07/01/22 1700   Non-staged Wound Description Full thickness 06/17/22 0900   Wound Length (cm) 3.2 cm 07/01/22 1700   Wound Width (cm) 1.4 cm 07/01/22 1700   Wound Depth (cm) 1.3 cm 07/01/22 1700   Wound Surface Area (cm^2) 4.48 cm^2  22 170   Wound Volume (cm^3) 5.824 cm^3 22 1700   Wound Healing % 46 22 1700   Shape nahid 22 1300   Wound Odor None 22   Pulses 1+;Left 22 1300   Exposed Structures None 22 170   WOUND NURSE ONLY - Time Spent with Patient (mins) 60 22 1700          Vascular:    HONEY:   HONEY Results, Last 30 Days US-HONEY SINGLE LEVEL BILAT    Result Date: 2022  Narrative  Vascular Laboratory  Conclusions  The ankle pressures are not accurately measured due to calcification and  noncompressibility of the tibial vessels.  There is no evidence of significant arterial disease.  ORSIECOOKIE  Age:    82    Gender:     M  MRN:    [id number]  :    1940      BSA:  Exam Date:     2022 13:45  Room #:     Inpatient  Priority:     Routine  Ht (in):             Wt (lb):  Ordering Physician:        JIN GREGG  Referring Physician:       [id number] Coronado  Sonographer:               Aliyah Pandya DAISHA  Study Type:                Complete Bilateral  Technical Quality:         Adequate  Indications:     Ulceration of LE  CPT Codes:       65744  ICD Codes:       707.1  History:         Ulceration of the left lower extremity. No prior exams.  Limitations:                 RIGHT  Waveform            Systolic BPs (mmHg)                             125           Brachial  Triphasic                                Common Femoral  Triphasic                                Popliteal  Triphasic                  200           Posterior Tibial  Triphasic                  179           Dorsalis Pedis                                           Digit                             1.54          HONEY                                           TBI                       LEFT  Waveform        Systolic BPs (mmHg)                             130           Brachial  Triphasic                                Common Femoral  Triphasic                                 Popliteal  Triphasic                  187           Posterior Tibial  Triphasic                  180           Dorsalis Pedis                                           Digit                             1.44          HONEY                                           TBI  Findings  Bilateral-  The ankle pressures are not accurately measured due to calcification and  noncompressibility of the tibial vessels.  Doppler waveforms of the common femoral artery and popliteal artery are of  high amplitude and triphasic.  Doppler waveforms at the ankle are brisk and triphasic.  TBI-  Right: 1.29  Left:    1.09  Additional testing was not performed in accordance with lower extremity  arterial evaluation protocol.  James Gonzalez  (Electronically Signed)  Final Date:      12 June 2022                   16:17      Lab Values:    Lab Results   Component Value Date/Time    WBC 8.7 07/01/2022 06:33 AM    RBC 4.53 (L) 07/01/2022 06:33 AM    HEMOGLOBIN 13.2 (L) 07/01/2022 06:33 AM    HEMATOCRIT 39.4 (L) 07/01/2022 06:33 AM    CREACTPROT 0.78 (H) 06/29/2022 06:51 PM    SEDRATEWES 27 (H) 06/29/2022 06:51 PM    HBA1C 6.3 (H) 06/11/2022 03:57 PM        Culture Results show:  Recent Results (from the past 720 hour(s))   CULTURE WOUND W/ GRAM STAIN    Collection Time: 06/14/22  9:29 AM    Specimen: Wound   Result Value Ref Range    Significant Indicator POS (POS)     Source WND     Site Left Lateral Ankle     Culture Result No growth at 72 hours. (A)     Gram Stain Result Rare WBCs.  No organisms seen.       Culture Result - (A)        Pain Level/Medicated:  Denied pain       INTERVENTIONS BY WOUND TEAM:  Performed standard wound care which includes appropriate positioning, dressing removal and non-selective debridement. Pictures and measurements obtained weekly if/when required.  Preparation for Dressing removal: Dressing soaked with normal saline  Non-selectively Debrided with:  normal saline and gauze.  Sharp debridement: n/a  Rosa M wound:  Cleansed with normal saline, Prepped with no sting,   Primary Dressin/2 thickness black foam placed into wound bed and secured with drape - bridged a short distance anterior proximal.    Secondary (Outer) Dressing: a small window was cut and a button was applied to attach the tracpad.  No leak detected, seal intact. mepilex placed for offloading, mepilex  Interdisciplinary consultation: Patient, Bedside RN , Jazzy SNIDER APRN    EVALUATION / RATIONALE FOR TREATMENT:  Most Recent Date:  22 Pt confused and pulls at VAC tubing.  Pt pulled tubing off 5 minutes after placement.  Replaced tubing, secured tubing with large piece of drape.  Placed diabetic boot on leg to try to keep pt from pulling on tubing.  Wound bed developing biofilm.  Will order dakins to instill in VAC.    22: Maceration still present, applied paste ring around periwound, added micah to the base of the wound.  Continue NPWT.  Tubi  added for light compression   22: Patient's ankle was a little macerated, but turned pink prior to re-application, wound bed appears nice and red for first dressing change post-op.  Continue NPWT     Goals: Steady decrease in wound area and depth weekly.    WOUND TEAM PLAN OF CARE ([X] for frequency of wound follow up,): X  Nursing to follow dressing orders written for wound care. Contact wound team if area fails to progress, deteriorates or with any questions/concerns if something comes up before next scheduled follow up (See below as to whether wound is following and frequency of wound follow up)  Dressing changes by wound team:                   Follow up 3 times weekly:                NPWT change 3 times weekly:  XMWF   Follow up 1-2 times weekly:      Follow up Bi-Monthly:                   Follow up as needed:     Other (explain):     NURSING PLAN OF CARE ORDERS (X):  Dressing changes: See Dressing Care orders: X  Skin care: See Skin Care orders: X  RN Prevention Protocol: X  Rectal tube care:  See Rectal Tube Care orders:   Other orders:    RSKIN:   CURRENTLY IN PLACE (X), APPLIED THIS VISIT (A), ORDERED (O):   Q shift Montrell:  X  Q shift pressure point assessments:  X    Surface/Positioning X  Pressure redistribution mattressX            Low Airloss          Bariatric foam      Bariatric AKILA     Waffle cushion        Waffle Overlay          Reposition q 2 hours  X    TAPs Turning system     [place] Pillow     Offloading/Redistribution A  Sacral Mepilex (Silicone dressing)     Heel Mepilex (Silicone dressing)      A   Heel float boots (Prevalon boot)             Float Heels off Bed with Pillows           Respiratory Room air  Silicone O2 tubing         Gray Foam Ear protectors     Cannula fixation Device (Tender )          High flow offloading Clip    Elastic head band offloading device      Anchorfast                                                         Trach with Optifoam split foam             Containment/Moisture Prevention continent    Rectal tube or BMS    Purwick/Condom Cath        Martinez Catheter    Barrier wipes           Barrier paste       Antifungal tx      Interdry        Mobilization offloading boot      Up to chair        Ambulate      PT/OT      Nutrition PO      Dietician        Diabetes Education      PO     TF     TPN     NPO   # days     Other        Anticipated discharge plans: MONAE  LTACH:        SNF/Rehab:                  Home Health Care:           Outpatient Wound Center:            Self/Family Care:        Other:                  Vac Discharge Needs: X  Not Applicable Pt not on a wound vac:       Regular Vac while inpatient, alternative dressing at DC:        Regular Vac in use and continued at DC:      X      Reg. Vac w/ Skin Sub/Biologic in use. Will need to be changed 2x wkly:      Veraflo Vac while inpatient, ok to transition to Regular Vac on Discharge:           Veraflo Vac while inpatient, will need to remain on Veraflo Vac upon discharge:

## 2022-07-04 PROBLEM — R33.9 URINARY RETENTION: Status: ACTIVE | Noted: 2022-07-04

## 2022-07-04 LAB
ALBUMIN SERPL BCP-MCNC: 3.7 G/DL (ref 3.2–4.9)
ALBUMIN/GLOB SERPL: 1 G/DL
ALP SERPL-CCNC: 84 U/L (ref 30–99)
ALT SERPL-CCNC: 7 U/L (ref 2–50)
ANION GAP SERPL CALC-SCNC: 15 MMOL/L (ref 7–16)
APTT PPP: 33.5 SEC (ref 24.7–36)
AST SERPL-CCNC: 16 U/L (ref 12–45)
BACTERIA BLD CULT: NORMAL
BACTERIA BLD CULT: NORMAL
BILIRUB SERPL-MCNC: 1.1 MG/DL (ref 0.1–1.5)
BUN SERPL-MCNC: 19 MG/DL (ref 8–22)
CALCIUM SERPL-MCNC: 9 MG/DL (ref 8.5–10.5)
CHLORIDE SERPL-SCNC: 104 MMOL/L (ref 96–112)
CO2 SERPL-SCNC: 21 MMOL/L (ref 20–33)
CREAT SERPL-MCNC: 1.95 MG/DL (ref 0.5–1.4)
ERYTHROCYTE [DISTWIDTH] IN BLOOD BY AUTOMATED COUNT: 44.8 FL (ref 35.9–50)
GFR SERPLBLD CREATININE-BSD FMLA CKD-EPI: 34 ML/MIN/1.73 M 2
GLOBULIN SER CALC-MCNC: 3.7 G/DL (ref 1.9–3.5)
GLUCOSE BLD STRIP.AUTO-MCNC: 184 MG/DL (ref 65–99)
GLUCOSE BLD STRIP.AUTO-MCNC: 199 MG/DL (ref 65–99)
GLUCOSE BLD STRIP.AUTO-MCNC: 237 MG/DL (ref 65–99)
GLUCOSE BLD STRIP.AUTO-MCNC: 267 MG/DL (ref 65–99)
GLUCOSE SERPL-MCNC: 220 MG/DL (ref 65–99)
HCT VFR BLD AUTO: 38.8 % (ref 42–52)
HGB BLD-MCNC: 12.8 G/DL (ref 14–18)
INR PPP: 1.32 (ref 0.87–1.13)
MCH RBC QN AUTO: 29 PG (ref 27–33)
MCHC RBC AUTO-ENTMCNC: 33 G/DL (ref 33.7–35.3)
MCV RBC AUTO: 87.8 FL (ref 81.4–97.8)
PLATELET # BLD AUTO: 295 K/UL (ref 164–446)
PMV BLD AUTO: 9.5 FL (ref 9–12.9)
POTASSIUM SERPL-SCNC: 3.4 MMOL/L (ref 3.6–5.5)
PROT SERPL-MCNC: 7.4 G/DL (ref 6–8.2)
PROTHROMBIN TIME: 16.1 SEC (ref 12–14.6)
RBC # BLD AUTO: 4.42 M/UL (ref 4.7–6.1)
SIGNIFICANT IND 70042: NORMAL
SIGNIFICANT IND 70042: NORMAL
SITE SITE: NORMAL
SITE SITE: NORMAL
SODIUM SERPL-SCNC: 140 MMOL/L (ref 135–145)
SOURCE SOURCE: NORMAL
SOURCE SOURCE: NORMAL
WBC # BLD AUTO: 11.6 K/UL (ref 4.8–10.8)

## 2022-07-04 PROCEDURE — 700105 HCHG RX REV CODE 258: Performed by: HOSPITALIST

## 2022-07-04 PROCEDURE — 306591 TRAY SUTURE REMOVAL DISP: Performed by: HOSPITALIST

## 2022-07-04 PROCEDURE — 700111 HCHG RX REV CODE 636 W/ 250 OVERRIDE (IP)

## 2022-07-04 PROCEDURE — 700111 HCHG RX REV CODE 636 W/ 250 OVERRIDE (IP): Performed by: HOSPITALIST

## 2022-07-04 PROCEDURE — A9270 NON-COVERED ITEM OR SERVICE: HCPCS | Performed by: HOSPITALIST

## 2022-07-04 PROCEDURE — 700105 HCHG RX REV CODE 258: Performed by: STUDENT IN AN ORGANIZED HEALTH CARE EDUCATION/TRAINING PROGRAM

## 2022-07-04 PROCEDURE — 700101 HCHG RX REV CODE 250: Performed by: HOSPITALIST

## 2022-07-04 PROCEDURE — 97607 NEG PRS WND THR NDME<=50SQCM: CPT

## 2022-07-04 PROCEDURE — 85610 PROTHROMBIN TIME: CPT

## 2022-07-04 PROCEDURE — 85027 COMPLETE CBC AUTOMATED: CPT

## 2022-07-04 PROCEDURE — 82962 GLUCOSE BLOOD TEST: CPT | Mod: 91

## 2022-07-04 PROCEDURE — 3C1ZX8Z IRRIGATION OF INDWELLING DEVICE USING IRRIGATING SUBSTANCE, EXTERNAL APPROACH: ICD-10-PCS | Performed by: HOSPITALIST

## 2022-07-04 PROCEDURE — 99232 SBSQ HOSP IP/OBS MODERATE 35: CPT | Performed by: NURSE PRACTITIONER

## 2022-07-04 PROCEDURE — 99233 SBSQ HOSP IP/OBS HIGH 50: CPT | Performed by: INTERNAL MEDICINE

## 2022-07-04 PROCEDURE — 700102 HCHG RX REV CODE 250 W/ 637 OVERRIDE(OP): Performed by: STUDENT IN AN ORGANIZED HEALTH CARE EDUCATION/TRAINING PROGRAM

## 2022-07-04 PROCEDURE — 97166 OT EVAL MOD COMPLEX 45 MIN: CPT

## 2022-07-04 PROCEDURE — 51798 US URINE CAPACITY MEASURE: CPT

## 2022-07-04 PROCEDURE — 85730 THROMBOPLASTIN TIME PARTIAL: CPT

## 2022-07-04 PROCEDURE — 97535 SELF CARE MNGMENT TRAINING: CPT

## 2022-07-04 PROCEDURE — 700102 HCHG RX REV CODE 250 W/ 637 OVERRIDE(OP): Performed by: HOSPITALIST

## 2022-07-04 PROCEDURE — 80053 COMPREHEN METABOLIC PANEL: CPT

## 2022-07-04 PROCEDURE — 99233 SBSQ HOSP IP/OBS HIGH 50: CPT | Performed by: HOSPITALIST

## 2022-07-04 PROCEDURE — 302098 PASTE RING (FLAT): Performed by: HOSPITALIST

## 2022-07-04 PROCEDURE — 97162 PT EVAL MOD COMPLEX 30 MIN: CPT

## 2022-07-04 PROCEDURE — 700111 HCHG RX REV CODE 636 W/ 250 OVERRIDE (IP): Performed by: STUDENT IN AN ORGANIZED HEALTH CARE EDUCATION/TRAINING PROGRAM

## 2022-07-04 PROCEDURE — 770001 HCHG ROOM/CARE - MED/SURG/GYN PRIV*

## 2022-07-04 PROCEDURE — A9270 NON-COVERED ITEM OR SERVICE: HCPCS | Performed by: STUDENT IN AN ORGANIZED HEALTH CARE EDUCATION/TRAINING PROGRAM

## 2022-07-04 RX ORDER — AMLODIPINE BESYLATE 5 MG/1
5 TABLET ORAL
Status: DISCONTINUED | OUTPATIENT
Start: 2022-07-04 | End: 2022-07-06

## 2022-07-04 RX ORDER — ONDANSETRON 2 MG/ML
4 INJECTION INTRAMUSCULAR; INTRAVENOUS EVERY 4 HOURS PRN
Status: DISCONTINUED | OUTPATIENT
Start: 2022-07-04 | End: 2022-07-25 | Stop reason: HOSPADM

## 2022-07-04 RX ADMIN — MIRTAZAPINE 15 MG: 15 TABLET, FILM COATED ORAL at 21:11

## 2022-07-04 RX ADMIN — AMLODIPINE BESYLATE 5 MG: 5 TABLET ORAL at 17:59

## 2022-07-04 RX ADMIN — LISINOPRIL 40 MG: 20 TABLET ORAL at 05:53

## 2022-07-04 RX ADMIN — Medication 10 MG: at 21:11

## 2022-07-04 RX ADMIN — OXYCODONE 5 MG: 5 TABLET ORAL at 08:42

## 2022-07-04 RX ADMIN — SODIUM CHLORIDE 3 G: 900 INJECTION INTRAVENOUS at 00:55

## 2022-07-04 RX ADMIN — INSULIN HUMAN 1 UNITS: 100 INJECTION, SOLUTION PARENTERAL at 17:59

## 2022-07-04 RX ADMIN — SODIUM CHLORIDE 3 G: 900 INJECTION INTRAVENOUS at 18:07

## 2022-07-04 RX ADMIN — SERTRALINE 50 MG: 100 TABLET, FILM COATED ORAL at 17:58

## 2022-07-04 RX ADMIN — SODIUM CHLORIDE 3 G: 900 INJECTION INTRAVENOUS at 12:47

## 2022-07-04 RX ADMIN — METOPROLOL TARTRATE 25 MG: 25 TABLET, FILM COATED ORAL at 21:11

## 2022-07-04 RX ADMIN — SODIUM CHLORIDE 3 G: 900 INJECTION INTRAVENOUS at 05:54

## 2022-07-04 RX ADMIN — DAKIN'S SOLUTION 0.125% (QUARTER STRENGTH) 1 ML: 0.12 SOLUTION at 05:53

## 2022-07-04 RX ADMIN — INSULIN HUMAN 1 UNITS: 100 INJECTION, SOLUTION PARENTERAL at 21:16

## 2022-07-04 RX ADMIN — METOPROLOL TARTRATE 25 MG: 25 TABLET, FILM COATED ORAL at 08:08

## 2022-07-04 RX ADMIN — INSULIN HUMAN 3 UNITS: 100 INJECTION, SOLUTION PARENTERAL at 10:00

## 2022-07-04 RX ADMIN — SENNOSIDES AND DOCUSATE SODIUM 2 TABLET: 50; 8.6 TABLET ORAL at 17:59

## 2022-07-04 RX ADMIN — DAPTOMYCIN 750 MG: 500 INJECTION, POWDER, LYOPHILIZED, FOR SOLUTION INTRAVENOUS at 23:04

## 2022-07-04 RX ADMIN — ATORVASTATIN CALCIUM 40 MG: 40 TABLET, FILM COATED ORAL at 17:59

## 2022-07-04 RX ADMIN — ONDANSETRON 4 MG: 2 INJECTION INTRAMUSCULAR; INTRAVENOUS at 01:36

## 2022-07-04 RX ADMIN — OXYCODONE 5 MG: 5 TABLET ORAL at 21:11

## 2022-07-04 ASSESSMENT — ENCOUNTER SYMPTOMS
VOMITING: 0
FEVER: 0
HEADACHES: 0
PALPITATIONS: 0
DIZZINESS: 0
SHORTNESS OF BREATH: 0
COUGH: 0
ABDOMINAL PAIN: 0
CHILLS: 0
NAUSEA: 0

## 2022-07-04 ASSESSMENT — COGNITIVE AND FUNCTIONAL STATUS - GENERAL
SUGGESTED CMS G CODE MODIFIER DAILY ACTIVITY: CK
PERSONAL GROOMING: A LOT
DAILY ACTIVITIY SCORE: 14
TOILETING: A LOT
HELP NEEDED FOR BATHING: A LOT
DRESSING REGULAR UPPER BODY CLOTHING: A LOT
DRESSING REGULAR LOWER BODY CLOTHING: A LOT

## 2022-07-04 ASSESSMENT — PAIN DESCRIPTION - PAIN TYPE
TYPE: ACUTE PAIN
TYPE: ACUTE PAIN

## 2022-07-04 ASSESSMENT — GAIT ASSESSMENTS: GAIT LEVEL OF ASSIST: REFUSED

## 2022-07-04 ASSESSMENT — ACTIVITIES OF DAILY LIVING (ADL): TOILETING: UNABLE TO DETERMINE AT THIS TIME

## 2022-07-04 NOTE — PROGRESS NOTES
Infectious Disease Progress Note    Author: Kassandra Penny M.D. Date & Time of service: 2022  8:00 AM    Chief Complaint:  Follow-up for recent MRSA bacteremia, left ankle osteomyelitis    Interval History:  82 y.o. diabetic male admitted 2022 for AMS. Pulled off wound VAC at facility   AF WBC 8.7 Up on side of bed eating pancakes, fully naked. NAD +. Pulled off VAC again LLE in boot  7/3 AF somnolent in bed this am-VAC successfully placed yesterday-wound care pictures reviewed   afebrile, patient quite somnolent in bed.  Not answering questions.  Daughter at bedside.  She states that her father does not really know what is going on.  Wound care nurse at bedside and lateral malleolar wound on the left ankle reviewed, erythema has improved.  Wound bed clean    Labs Reviewed, Medications Reviewed, and Wound Reviewed.    Review of Systems:  Review of Systems   Unable to perform ROS: Dementia       Hemodynamics:  Temp (24hrs), Av.6 °C (97.9 °F), Min:36.6 °C (97.9 °F), Max:36.6 °C (97.9 °F)  Temperature: 36.6 °C (97.9 °F)  Pulse  Av.8  Min: 61  Max: 102   Blood Pressure : (!) 168/98       Physical Exam:  Physical Exam  Vitals and nursing note reviewed.   Constitutional:       General: He is not in acute distress.     Appearance: He is not ill-appearing, toxic-appearing or diaphoretic.   HENT:      Nose: No rhinorrhea.      Mouth/Throat:      Mouth: Mucous membranes are moist.   Eyes:      General: No scleral icterus.        Right eye: No discharge.         Left eye: No discharge.      Extraocular Movements: Extraocular movements intact.      Pupils: Pupils are equal, round, and reactive to light.   Cardiovascular:      Rate and Rhythm: Normal rate.   Pulmonary:      Effort: Pulmonary effort is normal. No respiratory distress.      Breath sounds: No stridor. No wheezing.   Abdominal:      General: There is no distension.      Tenderness: There is no abdominal tenderness.    Musculoskeletal:      Cervical back: Neck supple. No rigidity.      Comments: Left lateral malleoli are open wound with resolved erythema.  Open wound is clean.  No active drainage    RUE PICC in place-no erythema or surrounding tenderness to palpation   Skin:     Coloration: Skin is not jaundiced.   Neurological:      General: No focal deficit present.      Mental Status: He is alert. He is disoriented.         Meds:    Current Facility-Administered Medications:   •  ondansetron  •  hydrALAZINE  •  dakins 0.125% (1/4 strength)  •  lisinopril  •  oxyCODONE immediate-release  •  ampicillin-sulbactam (UNASYN) IV  •  senna-docusate **AND** polyethylene glycol/lytes **AND** magnesium hydroxide **AND** bisacodyl  •  enoxaparin (LOVENOX) injection  •  acetaminophen  •  insulin regular **AND** POC blood glucose manual result **AND** NOTIFY MD and PharmD **AND** Administer 20 grams of glucose (approximately 8 ounces of fruit juice) every 15 minutes PRN FSBG less than 70 mg/dL **AND** dextrose bolus  •  atorvastatin  •  metoprolol tartrate  •  mirtazapine  •  sertraline  •  melatonin  •  DAPTOmycin    Labs:  No results for input(s): WBC, RBC, HEMOGLOBIN, HEMATOCRIT, MCV, MCH, RDW, PLATELETCT, MPV, NEUTSPOLYS, LYMPHOCYTES, MONOCYTES, EOSINOPHILS, BASOPHILS, RBCMORPHOLO in the last 72 hours.  No results for input(s): SODIUM, POTASSIUM, CHLORIDE, CO2, GLUCOSE, BUN, CPKTOTAL in the last 72 hours.  No results for input(s): ALBUMIN, TBILIRUBIN, ALKPHOSPHAT, TOTPROTEIN, ALTSGPT, ASTSGOT, CREATININE in the last 72 hours.    Imaging:  CT-HEAD W/O    Result Date: 6/29/2022 6/29/2022 6:03 PM HISTORY/REASON FOR EXAM:  AMS, slurred speech. TECHNIQUE/EXAM DESCRIPTION AND NUMBER OF VIEWS: CT of the head without contrast. The study was performed on a helical multidetector CT scanner. Contiguous 2.5 mm axial sections were obtained from the skull base through the vertex. Up to date radiation dose reduction adjustments have been utilized to  no meet ALARA standards for radiation dose reduction. COMPARISON:  5/11/2022 FINDINGS: Study is degraded by patient motion Mild/moderate generalized volume loss. Patchy hypodensities in cerebral white matter most likely represent mild chronic microvascular ischemic type changes. No acute intracranial hemorrhage, major vascular territory infarct, mass effect, midline shift or hydrocephalus. Paranasal sinuses and mastoids are clear. No depressed calvarial fracture.     No acute intracranial abnormality.     DX-ANKLE 3+ VIEWS LEFT    Result Date: 6/29/2022 6/29/2022 5:54 PM HISTORY/REASON FOR EXAM:  Atraumatic Pain/Swelling/Deformity Atraumatic left ankle pain TECHNIQUE/EXAM DESCRIPTION AND NUMBER OF VIEWS:  3 views of the LEFT ankle. COMPARISON: 6/10/2022 and MRI 6/13/2022 FINDINGS: No acute fracture or malalignment.  No osteochondral lesion is identified. There is loss of the normal appearing cortex in the lateral fibula with overlying soft tissue edema. There is a small amount of soft tissue gas     Bony destruction in the lateral aspect of the distal fibula is consistent with osteomyelitis described on recent MRI. Overlying soft tissue edema and a small amount of soft tissue gas    DX-ANKLE 3+ VIEWS LEFT    Result Date: 6/10/2022  6/10/2022 9:57 AM HISTORY/REASON FOR EXAM:  Atraumatic Pain/Swelling/Deformity. Lateral left ankle wound TECHNIQUE/EXAM DESCRIPTION AND NUMBER OF VIEWS:  3 views of the LEFT ankle. COMPARISON: Left ankle radiography, 4/12/2022. FINDINGS: Bones: Normal bone mineralization. There is cortical irregularity involving the inferior lateral aspect of the lateral malleolus, at the location of distention of soft tissue gas from the skin surface to the surface of the bone. Findings are concerning for osteomyelitis. No acute fracture. No other focal bone lesion. Joints: Intact. No subluxation. Ankle mortise is preserved. Soft tissues: Circumferential left ankle soft tissue swelling, greatest laterally,  with soft tissue gas.     1. Lateral left ankle soft tissue swelling with soft tissue gas extending from the skin surface to the lateral aspect of the distal left fibula, with cortical disruption concerning for osteomyelitis. MRI of the left ankle without and with IV contrast is recommended. 2. Decreased bone mineralization. 3. The remainder of the left ankle radiography is within normal limits.    DX-CHEST-PORTABLE (1 VIEW)    Result Date: 2022 5:32 PM HISTORY/REASON FOR EXAM:  Chest Pain TECHNIQUE/EXAM DESCRIPTION AND NUMBER OF VIEWS: Single AP view of the chest. COMPARISON: 2022 FINDINGS: Heart: The cardiac silhouette is stable in size. Mediastinum: Normal contours. Calcification is in the aortic knob Lungs: Bilateral interstitial markings are increased. No confluent opacity or pneumothorax. Pleura: No pleural fluid. Bones: No suspicious bony lesions. Lines/tubes: Right PICC line is in place.     1.  Increased perihilar interstitial markings are suggestive of pulmonary edema. 2.  Stable mild cardiomegaly    DX-CHEST-PORTABLE (1 VIEW)    Result Date: 6/10/2022  6/10/2022 9:57 AM HISTORY/REASON FOR EXAM:  possible sepsis.. TECHNIQUE/EXAM DESCRIPTION AND NUMBER OF VIEWS: Single portable view of the chest. COMPARISON: 2019 FINDINGS: Cardiac silhouette is enlarged. Aortic calcified atherosclerotic plaque. No focal consolidation, pleural effusion, pulmonary edema or pneumothorax. Generalized osteopenia.     No acute cardiopulmonary abnormality.    US-HONEY SINGLE LEVEL BILAT    Result Date: 2022   Vascular Laboratory  Conclusions  The ankle pressures are not accurately measured due to calcification and  noncompressibility of the tibial vessels.  There is no evidence of significant arterial disease.  COOKIE VELEZ  Age:    82    Gender:     M  MRN:    [id number]  :    1940      BSA:  Exam Date:     2022 13:45  Room #:     Inpatient  Priority:     Routine  Ht (in):             Wt  (lb):  Ordering Physician:        JIN GREGG  Referring Physician:       [id number]  Sonographer:               Aliyah Pandya RVT  Study Type:                Complete Bilateral  Technical Quality:         Adequate  Indications:     Ulceration of LE  CPT Codes:       57162  ICD Codes:       707.1  History:         Ulceration of the left lower extremity. No prior exams.  Limitations:                 RIGHT  Waveform            Systolic BPs (mmHg)                             125           Brachial  Triphasic                                Common Femoral  Triphasic                                Popliteal  Triphasic                  200           Posterior Tibial  Triphasic                  179           Dorsalis Pedis                                           Digit                             1.54          HONEY                                           TBI                       LEFT  Waveform        Systolic BPs (mmHg)                             130           Brachial  Triphasic                                Common Femoral  Triphasic                                Popliteal  Triphasic                  187           Posterior Tibial  Triphasic                  180           Dorsalis Pedis                                           Digit                             1.44          HONEY                                           TBI  Findings  Bilateral-  The ankle pressures are not accurately measured due to calcification and  noncompressibility of the tibial vessels.  Doppler waveforms of the common femoral artery and popliteal artery are of  high amplitude and triphasic.  Doppler waveforms at the ankle are brisk and triphasic.  TBI-  Right: 1.29  Left:    1.09  Additional testing was not performed in accordance with lower extremity  arterial evaluation protocol.  James Gonzalez  (Electronically Signed)  Final Date:      12 June 2022                   16:17    US-EXTREMITY  VENOUS LOWER UNILAT RIGHT    Result Date: 2022   Vascular Laboratory  CONCLUSIONS  No deep venous thrombosis.  COOKIE VELEZ  Exam Date:     2022 19:07  Room #:     Inpatient  Priority:     Stat  Ht (in):             Wt (lb):  Ordering Physician:        BRANDON RAUSCH  Referring Physician:       [id number] Smith  Sonographer:               Do Lowe RVT  Study Type:                Complete Unilateral  Technical Quality:         Fair  Age:    82    Gender:     M  MRN:    [id number]  :    1940      BSA:  Indications:     Localized swelling, mass and lump, right lower limb  CPT Codes:       00843  ICD Codes:       R22.41  History:         Swelling and redness of the right foot and calf. No prior                   duplex.  Limitations:     Patient movement and resistance.  PROCEDURES:  Right lower extremity venous duplex imaging.  The following venous structures were evaluated: common femoral, deep  femoral, proximal great saphenous, femoral, popliteal, peroneal, and  posterior tibial veins.  Serial compression, color, and spectral Doppler flow evaluations were  performed.  FINDINGS:  Right lower extremity.  No deep venous thrombosis.  All veins demonstrate complete color filling and compressibility with  normal venous flow dynamics including spontaneous flow and respiratory  phasicity.  Flow was evaluated in the contralateral femoral vein and normal venous flow  dynamics including spontaneous flow and respiratory phasic variation were  demonstrated.  James Gonzalez  (Electronically Signed)  Final Date:      2022                   19:53    MR-ANKLE W/O LEFT    Result Date: 2022 10:07 AM HISTORY/REASON FOR EXAM:  Osteomyelitis suspected, ankle, xray done. Left ankle open wound, infection, rule out osteomyelitis TECHNIQUE/EXAM DESCRIPTION: MRI of the LEFT ankle without contrast. The study was performed on a [employer] Signa 1.5 Amie MRI scanner. T1 axial and sagittal, fast spin-echo T2  fat-suppressed axial and coronal, and fast inversion recovery sagittal images were obtained. COMPARISON:  Left ankle x-ray 6/10/2022 FINDINGS: There is an open wound with associated soft tissue edema overlying the lateral malleolus. BONE AND MARROW: There is bone marrow edema within the distal fibula/lateral malleolus. This is evidenced on both T1 and T2-weighted imaging and therefore represents osteomyelitis. Bone marrow is otherwise within normal limits. There is a tibiotalar joint effusion. Size is moderate to large. There is no loculated fluid collection to suggest an abscess. MUSCLES: The muscular compartment is normal in appearance. LIGAMENTS and TENDONS: There is longitudinal splitting of the peroneus brevis tendon. No other tendon abnormalities are identified. Appearance. MISCELLANEOUS: No fluid collection or mass.     1.  Osteomyelitis of the lateral malleolus/distal fibula 2.  Negative for abscess 3.  Moderate-large tibiotalar joint effusion. This could be reactive versus septic arthritis. 4.  Longitudinal splitting of the peroneus brevis tendon and there is associated fluid within the peroneal tendon sheath    EC-ECHOCARDIOGRAM COMPLETE W/O CONT    Result Date: 2022  Transthoracic Echo Report Echocardiography Laboratory CONCLUSIONS No prior study is available for comparison. Normal left ventricular size and systolic function. The left ventricular ejection fraction is visually estimated to be 55%. The right ventricle is normal in size and systolic function. No significant valvular abnormalities. COOKIE VELEZ Exam Date:         2022                    14:11 Exam Location:     Inpatient Priority:          Routine Ordering Physician:        ZEINAB MONAE Referring Physician:       [id number] Harvey Sonographer:               Lalita Villagran RDCS Age:    82     Gender:    M MRN:    [id number] :    1940 BSA:    2.19   Ht (in):    72     Wt (lb):    214 Exam Type:     Complete Indications:      Encounter for screening for cardiovascular disorders ICD Codes:       Z13.6 CPT Codes:       58875 BP:   117    /   60     HR:   80 Technical Quality:       Fair MEASUREMENTS  (Male / Female) Normal Values 2D ECHO LV Diastolic Diameter PLAX        5.2 cm                4.2 - 5.9 / 3.9 - 5.3 cm LV Systolic Diameter PLAX         3.3 cm                2.1 - 4.0 cm IVS Diastolic Thickness           1.1 cm                LVPW Diastolic Thickness          1.1 cm                LVOT Diameter                     1.9 cm                Estimated LV Ejection Fraction    55 %                  LV Ejection Fraction MOD BP       64.3 %                >= 55  % LV Ejection Fraction MOD 4C       68.1 %                LV Ejection Fraction MOD 2C       63.1 %                IVC Diameter                      2.2 cm                DOPPLER AV Peak Velocity                  1.3 m/s               AV Peak Gradient                  7.1 mmHg              AV Mean Gradient                  4.3 mmHg              LVOT Peak Velocity                1.2 m/s               AV Area Cont Eq vti               2.5 cm2               Mitral E Point Velocity           0.91 m/s              Mitral E to A Ratio               1                     MV Pressure Half Time             44.3 ms               MV Area PHT                       5 cm2                 MV Deceleration Time              153 ms                TV Peak E Velocity                0.44 m/s              * Indicates values subject to auto-interpretation LV EF:  55    % FINDINGS Left Ventricle Normal left ventricular chamber size. Normal left ventricular wall thickness. Normal left ventricular systolic function. The left ventricular ejection fraction is visually estimated to be 55%. Normal regional wall motion. Normal diastolic function. Right Ventricle The right ventricle is normal in size and systolic function. Right Atrium The right atrium is normal in size. Dilated inferior vena cava without  inspiratory collapse. Left Atrium The left atrium is normal in size. Left atrial volume index is 18 mL/sq m. Mitral Valve Structurally normal mitral valve. No mitral stenosis. Trace mitral regurgitation. Aortic Valve Structurally normal aortic valve without significant stenosis or regurgitation. Tricuspid Valve Structurally normal tricuspid valve without significant stenosis or regurgitation. Pulmonic Valve Structurally normal pulmonic valve without significant stenosis or regurgitation. Pericardium Normal pericardium without effusion. Aorta Normal aortic root for body surface area. The ascending aorta diameter is 3.8 cm. Lee Mccarthy MD (Electronically Signed) Final Date:     12 June 2022                 16:52    IR-PICC LINE PLACEMENT W/ GUIDANCE > AGE 5    Result Date: 6/18/2022  HISTORY/REASON FOR EXAM:   PICC placement. TECHNIQUE/EXAM DESCRIPTION AND NUMBER OF VIEWS:   PICC line insertion with ultrasound guidance.  The procedure was performed using maximal sterile barrier technique including sterile gown, mask, cap, and donning of sterile gloves following appropriate hand hygiene and/or sterile scrub. Patient skin site was prepped with 2% Chlorhexidine solution. FINDINGS:  PICC line insertion with Ultrasound Guidance was performed by qualified nursing staff without the assistance of a Radiologist. PICC positioning appropriateness confirmed by 3CG technology; chest xray only needed in the instance 3CG unable to confirm placement.              Ultrasound-guided PICC placement performed by qualified nursing staff as above.     IR-PICC LINE PLACEMENT W/ GUIDANCE > AGE 5    Result Date: 6/15/2022  HISTORY/REASON FOR EXAM:   PICC placement. TECHNIQUE/EXAM DESCRIPTION AND NUMBER OF VIEWS:   PICC line insertion with ultrasound guidance.  The procedure was performed using maximal sterile barrier technique including sterile gown, mask, cap, and donning of sterile gloves following appropriate hand hygiene and/or sterile  "scrub. Patient skin site was prepped with 2% Chlorhexidine solution. FINDINGS:  PICC line insertion with Ultrasound Guidance was performed by qualified nursing staff without the assistance of a Radiologist. PICC positioning appropriateness confirmed by 3CG technology; chest xray only needed in the instance 3CG unable to confirm placement.              Ultrasound-guided PICC placement performed by qualified nursing staff as above.       Micro:  Results     Procedure Component Value Units Date/Time    BLOOD CULTURE [[id number]] Collected: 06/29/22 1857    Order Status: Completed Specimen: Blood from Peripheral Updated: 06/30/22 0741     Significant Indicator NEG     Source BLD     Site PERIPHERAL     Culture Result No Growth  Note: Blood cultures are incubated for 5 days and  are monitored continuously.Positive blood cultures  are called to the RN and reported as soon as  they are identified.      Narrative:      Per Hospital Policy: Only change Specimen Src: to \"Line\" if  specified by physician order.  Right Forearm/Arm    BLOOD CULTURE [[id number]] Collected: 06/29/22 1840    Order Status: Completed Specimen: Blood from Peripheral Updated: 06/30/22 0741     Significant Indicator NEG     Source BLD     Site PERIPHERAL     Culture Result No Growth  Note: Blood cultures are incubated for 5 days and  are monitored continuously.Positive blood cultures  are called to the RN and reported as soon as  they are identified.      Narrative:      Per Hospital Policy: Only change Specimen Src: to \"Line\" if  specified by physician order.  Right AC    URINALYSIS (UA) [[id number]]  (Abnormal) Collected: 06/29/22 1907    Order Status: Completed Specimen: Urine Updated: 06/29/22 2020     Color Yellow     Character Clear     Specific Gravity 1.012     Ph 8.0     Glucose Negative mg/dL      Ketones Negative mg/dL      Protein 30 mg/dL      Bilirubin Negative     Urobilinogen, Urine 0.2     Nitrite Negative     Leukocyte Esterase Negative " "    Occult Blood Negative     Micro Urine Req Microscopic    COV-2, FLU A/B, AND RSV BY PCR (2-4 HOURS [employer]PFox TechnologiesID): Collect NP swab in [facility] [[id number]] Collected: 06/29/22 1907    Order Status: Completed Specimen: Respirate Updated: 06/29/22 2010     Influenza virus A RNA Negative     Influenza virus B, PCR Negative     RSV, PCR Negative     SARS-CoV-2 by PCR NotDetected     Comment: PATIENTS: Important information regarding your results and instructions can  be found at [email or web address]   \"After your  Covid-19 Test\"    [facility] providers: PLEASE REFER TO DE-ESCALATION AND RETESTING PROTOCOL  on [email or web address]    **The [employer] GeneXpert Xpress SARS-CoV-2 RT-PCR Test has been made  available for use under the Emergency Use Authorization (EUA) only.          SARS-CoV-2 Source NP Swab          Assessment:  82-year-old man with a history of dementia and type 2 diabetes mellitus, recent hospitalization for MRSA bacteremia and left ankle osteomyelitis admitted on 6/29/2022 secondary to altered mental status and [facility].    Pertinent diagnoses:  Left lateral ankle ulcer  On treatment for MRSA bacteremia  Prior GAS 6/2022 sepsis treated  Type II DM  Dementia  Left ankle OM, fibula        PLAN:   -Blood cultures on 6/29-negative  -Continue daptomycin  -Stop date daptomycin 7/23/2022  -Unasyn added on 6/30 given minimal improvement of erythema on lateral malleolus wound.  Erythema now improved.  We will plan a 7-day course of Unasyn with stop date of 7/6/2022  -Monitor weekly CPK  LPS/Ortho eval appreciated-plan for new wound VAC  S/p bedside debridement of slough-wound bed clean with granulation  -Continue wound care/wound VAC-erythema now resolved on lateral malleolar area of left ankle  -Blood sugar control    PICC in place     Dis[facility]: [facility]    Follow-up in the ID clinic prior to completion of IV " antibiotic course    Plan of care discussed with hospitalist, Dr. Valera.  ID signing off

## 2022-07-04 NOTE — PROGRESS NOTES
LIMB PRESERVATION SERVICE   POST SURGICAL PROGRESS NOTE      HPI:  82 y.o.  with a past medical history that includes type 2 diabetes, hyperlipidemia, HTN, MI, dementia. Admitted 6/29/2022 for Osteomyelitis (HCC) [M86.9].     LPS has been consulted for left lateral ankle.    Pt known to LPS from prior admissions, 4/12/22 through 4/14/22 and  6/10/22 through 6/20/22. The ulcer started originally from a protuberance on his FWW in the fall 2021.  Patient has been having wound care at [facility] where he resides.  When patient was seen in April 2022 wound did not probe to the bone at that time but upon his readmission on 6/10/2022 wound probed to bone and patient had I&D of lateral left ankle on 6/14/2022.  Patient was discharged back to [facility] 6/20/2022 with VAC and IV daptomycin with end date of 7/23/2022.  Patient denied fevers, chills, nausea, vomiting or pain at this time.           INTERVAL HISTORY:  7/4/2022: Patient resting.  Daughter at bedside.  Questions and concerns answered.  Seen with wound team for VAC change.  Per daughter patient wearing boot at all times while in bed.  Patient not interfering with VAC dressing.  Tele sitter at bedside    PERTINENT LPS RESULTS:   COVID not detected 6/29/2022  CRP decreased 0.78  Sed rate 27    SURGICAL SITE EXAM:      BP (!) 180/106 Comment: RN NOTIFIED  Pulse 94   Temp 36.1 °C (97 °F) (Temporal)   Resp 18   Ht 1.829 m (6')   Wt 113 kg (250 lb)   SpO2 92%   BMI 33.91 kg/m²   Monofilament testing completed on 4/13/2022  Sensation: insensate  Surgical foot warm     Pedal Pulses:   R foot: palpable DP, palpable PT  L foot: palpable DP, palpable PT            Open surgical wound   location: Left lateral ankle  Full thickness, bone easily palpated however covered with thin layer of tissue  Wound characteristics: Red nongranular tissue 100%  Wound edges open  Mild erythema  Minimal periwound maceration  No odor      Wound care  "completed by wound care RN/PT. See note and flowsheets.           Photo(s):                         DIABETES MANAGEMENT:    A1c:   Lab Results   Component Value Date/Time    HBA1C 6.3 (H) 06/11/2022 03:57 PM            INFECTION MANAGEMENT:    Results from last 7 days   Lab Units 07/01/22  0633 06/30/22  0301 06/29/22  1851   WBC 1501 K/uL 8.7 9.3 8.6   PLATELET COUNT 1518 K/uL 279 352 337     Wound culture results:   Results     Procedure Component Value Units Date/Time    BLOOD CULTURE [[id number]] Collected: 06/29/22 1857    Order Status: Completed Specimen: Blood from Peripheral Updated: 06/30/22 0741     Significant Indicator NEG     Source BLD     Site PERIPHERAL     Culture Result No Growth  Note: Blood cultures are incubated for 5 days and  are monitored continuously.Positive blood cultures  are called to the RN and reported as soon as  they are identified.      Narrative:      Per Hospital Policy: Only change Specimen Src: to \"Line\" if  specified by physician order.  Right Forearm/Arm    BLOOD CULTURE [[id number]] Collected: 06/29/22 1840    Order Status: Completed Specimen: Blood from Peripheral Updated: 06/30/22 0741     Significant Indicator NEG     Source BLD     Site PERIPHERAL     Culture Result No Growth  Note: Blood cultures are incubated for 5 days and  are monitored continuously.Positive blood cultures  are called to the RN and reported as soon as  they are identified.      Narrative:      Per Hospital Policy: Only change Specimen Src: to \"Line\" if  specified by physician order.  Right AC    URINALYSIS (UA) [[id number]]  (Abnormal) Collected: 06/29/22 1907    Order Status: Completed Specimen: Urine Updated: 06/29/22 2020     Color Yellow     Character Clear     Specific Gravity 1.012     Ph 8.0     Glucose Negative mg/dL      Ketones Negative mg/dL      Protein 30 mg/dL      Bilirubin Negative     Urobilinogen, Urine 0.2     Nitrite Negative     Leukocyte Esterase Negative     Occult Blood Negative " "    Micro Urine Req Microscopic    COV-2, FLU A/B, AND RSV BY PCR (2-4 HOURS CEPHEID): Collect NP swab in [place] [[id number]] Collected: 06/29/22 1907    Order Status: Completed Specimen: Respirate Updated: 06/29/22 2010     Influenza virus A RNA Negative     Influenza virus B, PCR Negative     RSV, PCR Negative     SARS-CoV-2 by PCR NotDetected     Comment: PATIENTS: Important information regarding your results and instructions can  be found at [email or web address]   \"After your  Covid-19 Test\"    [facility] providers: PLEASE REFER TO DE-ESCALATION AND RETESTING PROTOCOL  on [email or web address]    **The [employer] GeneXpert Xpress SARS-CoV-2 RT-PCR Test has been made  available for use under the Emergency Use Authorization (EUA) only.          SARS-CoV-2 Source NP Swab               ASSESSMENT/PLAN:     POD #20 s/p left lateral ankle I&D with VAC by Dr. Wagner    -VF VAC with Dakin's in place.  Wound bed clean nongranular tissue.  Change Dakin's to NS with VF VAC.  Okay to transition to regular VAC at discharge  -Initiate Tubigrip D to BLE to control edema.  Continue with offloading boot left leg minimize ankle ROM to allow for wound granulation.            Wound care:   -Wound care orders updated for nursing by wound care team    -Left ankle: VF VAC with NS.  Wound team to change 3 times per week  Heel Mepilex BLE  Tubigrip D BLE    Imaging/Labs:  -COVID-19: not detected this admission    Vascular status:    palpable pedal pulses bilaterally     Surgery:   --no further surgeries planned at this time     Antibiotics:   -ID involved  On Unasyn and Dapto  Unasyn stop date 7/6/2022  Dapto stop date 7/23/2022    Weight Bearing Status:   -Weight bearing as tolerated    Offloading:   Offloading boot at all times left foot.  May transition to heel float boot while in bed.  Boot at bedside  Heel float boot right foot while in bed  -Orthotic company not involved      PT/OT:   Involved, patient declined to work " with PT      Diabetes Education:   Not involved at this time secondary to mentation                DISCHARGE PLAN:    Disposition:     -recommend patient return to [facility] home   ok to discharge from LPS/ortho standpoint once medically cleared    Follow-up: Dr. Wagner  Follow up: wound care clinic referral placed   Follow up: LPS rounds at [facility] wound care clinic scheduled for 7/15/2022      OK to transition Veraflo VAC to regular VAC at discharge.        Discussed with: pt, RN, Dr. Valera, Monika wound care RN            Please note that this dictation was created using voice recognition software. I have  worked with technical experts from [facility] to optimize the interface.  I have made every reasonable attempt to correct obvious errors, but there may be errors of grammar and possibly content that I did not discover before finalizing the note.      Ailyn Aguero, A.P.R.N.    If any questions or concerns, please contact [facility] through voalte.

## 2022-07-04 NOTE — PROGRESS NOTES
Hospital Medicine Daily Progress Note    Date of Service  7/4/2022    Chief Complaint  Andrei Garay is a 82 y.o. male admitted 6/29/2022 with left leg swelling and altered mental status    Hospital Course  Andrei Garay is a 82 y.o. male who presented 6/29/2022 with a history of type 2 diabetes, hyperlipidemia, hypertension, osteomyelitis who presents with right leg swelling and altered mental status.     According to the patient's daughter who is visiting him she states that he began becoming more confused and slurring his words yesterday.  He also reports that he had episode of incontinence which is not his normal baseline.  Has been receiving IV daptomycin for left lower leg osteomyelitis which was diagnosed 2 weeks ago, MRSA culture positive.    Emergency department ultrasound of the right lower extremity negative for DVT. Limb preservation services consulted and following.  Completed a bedside debridement and applied new dressings.  Sure of wound management patient was receiving it [facility] facility.  Wound does not appear to be improved from prior hospitalization.  We will consult infectious disease for recommendations and continue to work with LPS for management.      Interval Problem Update  Improved today, off restraints.  Sitter at bedside.   ID consult appreciated  Pt is mentally more clear today however not back to baseline    Consultants/Specialty  infectious disease  Limb preservation services    Code Status  DNAR/DNI    Disposition  Patient is not medically cleared for discharge.   Anticipate discharge to to skilled nursing facility.  I have placed the appropriate orders for post-discharge needs.    Review of Systems  Review of Systems   Constitutional: Negative for chills and fever.   Respiratory: Negative for cough and shortness of breath.    Cardiovascular: Negative for chest pain and palpitations.   Gastrointestinal: Negative for abdominal pain, nausea and vomiting.    Genitourinary: Negative for dysuria and urgency.   Neurological: Negative for dizziness and headaches.   All other systems reviewed and are negative.       Physical Exam  Temp:  [36.1 °C (97 °F)-36.6 °C (97.9 °F)] 36.1 °C (97 °F)  Pulse:  [] 94  Resp:  [18-20] 18  BP: (143-188)/() 180/106  SpO2:  [92 %-95 %] 92 %    Physical Exam  Vitals and nursing note reviewed.   Constitutional:       General: He is not in acute distress.     Appearance: He is obese. He is not ill-appearing.   HENT:      Head: Normocephalic and atraumatic.   Eyes:      General: No scleral icterus.        Right eye: No discharge.         Left eye: No discharge.   Cardiovascular:      Rate and Rhythm: Normal rate and regular rhythm.      Heart sounds: Normal heart sounds.   Pulmonary:      Effort: No respiratory distress.      Breath sounds: Normal breath sounds. No wheezing or rales.   Abdominal:      General: Abdomen is protuberant. There is no distension.      Palpations: Abdomen is soft.      Tenderness: There is no abdominal tenderness. There is no guarding.   Musculoskeletal:         General: No tenderness.   Skin:     Findings: Erythema and signs of injury present.      Comments: Left leg dressing in place   Neurological:      General: No focal deficit present.      Mental Status: He is alert. He is disoriented.      Cranial Nerves: No cranial nerve deficit.      Motor: Motor function is intact.      Comments: Aware of location and soft, unaware of time   Psychiatric:         Mood and Affect: Affect is flat.         Behavior: Behavior is hyperactive.         Cognition and Memory: Cognition is impaired. Memory is impaired.         Judgment: Judgment is impulsive.         Fluids    Intake/Output Summary (Last 24 hours) at 7/4/2022 0937  Last data filed at 7/3/2022 2200  Gross per 24 hour   Intake 120 ml   Output --   Net 120 ml       Laboratory                        Imaging  US-EXTREMITY VENOUS LOWER UNILAT RIGHT   Final Result       CT-HEAD W/O   Final Result         No acute intracranial abnormality.            DX-ANKLE 3+ VIEWS LEFT   Final Result         Bony destruction in the lateral aspect of the distal fibula is consistent with osteomyelitis described on recent MRI. Overlying soft tissue edema and a small amount of soft tissue gas      DX-CHEST-PORTABLE (1 VIEW)   Final Result      1.  Increased perihilar interstitial markings are suggestive of pulmonary edema.      2.  Stable mild cardiomegaly           Assessment/Plan  * Encephalopathy  Assessment & Plan  Likely secondary to worsening cellulitis of the right leg.    Patient has been receiving IV daptomycin for osteomyelitis of the left leg, will continue    Avoid sedating medications  Improving    Osteomyelitis (HCC)- (present on admission)  Assessment & Plan  Patient with osteomyelitis currently on daptomycin.   CRP mildly elevated  LPS    Type 2 diabetes mellitus (HCC)- (present on admission)  Assessment & Plan  Last A1c was 6.33 weeks ago.  Hold metformin  Insulin sliding scale  Diabetic diet    Cellulitis- (present on admission)  Assessment & Plan  Daughter reports that the patient's right leg is more red and swollen than normal.  Doppler ultrasound does not show DVT  ID consulted, patient reportedly had removed his wound VAC while at SNF  Continue the daptomycin will add Unasyn.  Limb preservation services following and plan to replace wound VAC today      Dementia with behavioral disturbance (HCC)- (present on admission)  Assessment & Plan  History of dementia.  Oriented x 2, unsure of baseline  Avoid sedating medications    Hypertension- (present on admission)  Assessment & Plan  Continue home medication lisinopril and metoprolol      Hyperlipidemia- (present on admission)  Assessment & Plan  Continue home med atorvastatin       VTE prophylaxis: enoxaparin ppx    I have performed a physical exam and reviewed and updated ROS and Plan today (7/4/2022). In review of  yesterday's note (7/3/2022), there are no changes except as documented above.

## 2022-07-04 NOTE — PROGRESS NOTES
[facility] Medicine Daily Progress Note    Date of Service  7/4/2022    Chief Complaint  Andrei Garay is a 82 y.o. male admitted 6/29/2022 with left leg swelling and altered mental status    Hospital Course  Andrei Garay is a 82 y.o. male who presented 6/29/2022 with a history of type 2 diabetes, hyperlipidemia, hypertension, osteomyelitis who presents with right leg swelling and altered mental status.     According to the patient's daughter who is visiting him she states that he began becoming more confused and slurring his words yesterday.  He also reports that he had episode of incontinence which is not his normal baseline.  Has been receiving IV daptomycin for left lower leg osteomyelitis which was diagnosed 2 weeks ago, MRSA culture positive.    Emergency department ultrasound of the right lower extremity negative for DVT. Limb preservation services consulted and following.  Completed a bedside debridement and applied new dressings.  Sure of wound management patient was receiving it [facility].  Wound does not appear to be improved from prior hospitalization.  We will consult infectious disease for recommendations and continue to work with LPS for management.      Interval Problem Update  Pt is mentally more clear today however not back to baseline.  Urinary retention with bloody clot noted upon straight cath.  May require CBI, will check CBC and basic labs with INR    Consultants/Specialty  infectious disease  Limb preservation services    Code Status  DNAR/DNI    Disposition  Patient is not medically cleared for discharge.   Anticipate discharge to to skilled nursing facility.  I have placed the appropriate orders for post-discharge needs.    Review of Systems  Review of Systems   Constitutional: Negative for chills and fever.   Respiratory: Negative for cough and shortness of breath.    Cardiovascular: Negative for chest pain and palpitations.   Gastrointestinal: Negative for abdominal  pain, nausea and vomiting.   Genitourinary: Negative for dysuria and urgency.   Neurological: Negative for dizziness and headaches.   All other systems reviewed and are negative.       Physical Exam  Temp:  [36.1 °C (97 °F)-36.6 °C (97.9 °F)] 36.1 °C (97 °F)  Pulse:  [] 94  Resp:  [18-20] 18  BP: (143-188)/() 180/106  SpO2:  [92 %-95 %] 92 %    Physical Exam  Vitals and nursing note reviewed.   Constitutional:       General: He is not in acute distress.     Appearance: He is obese. He is not ill-appearing.   HENT:      Head: Normocephalic and atraumatic.   Eyes:      General: No scleral icterus.        Right eye: No discharge.         Left eye: No discharge.   Cardiovascular:      Rate and Rhythm: Normal rate and regular rhythm.      Heart sounds: Normal heart sounds.   Pulmonary:      Effort: No respiratory distress.      Breath sounds: Normal breath sounds. No wheezing or rales.   Abdominal:      General: Abdomen is protuberant. There is no distension.      Palpations: Abdomen is soft.      Tenderness: There is no abdominal tenderness. There is no guarding.   Musculoskeletal:         General: No tenderness.   Skin:     Findings: Erythema and signs of injury present.      Comments: Left leg dressing in place   Neurological:      General: No focal deficit present.      Mental Status: He is alert. He is disoriented.      Cranial Nerves: No cranial nerve deficit.      Motor: Motor function is intact.      Comments: Aware of location and soft, unaware of time   Psychiatric:         Mood and Affect: Affect is flat.         Behavior: Behavior is hyperactive.         Cognition and Memory: Cognition is impaired. Memory is impaired.         Judgment: Judgment is impulsive.         Fluids    Intake/Output Summary (Last 24 hours) at 7/4/2022 1355  Last data filed at 7/4/2022 0900  Gross per 24 hour   Intake 120 ml   Output 0 ml   Net 120 ml       Laboratory                        Imaging  US-EXTREMITY VENOUS LOWER  UNILAT RIGHT   Final Result      CT-HEAD W/O   Final Result         No acute intracranial abnormality.            DX-ANKLE 3+ VIEWS LEFT   Final Result         Bony destruction in the lateral aspect of the distal fibula is consistent with osteomyelitis described on recent MRI. Overlying soft tissue edema and a small amount of soft tissue gas      DX-CHEST-PORTABLE (1 VIEW)   Final Result      1.  Increased perihilar interstitial markings are suggestive of pulmonary edema.      2.  Stable mild cardiomegaly           Assessment/Plan  * Encephalopathy  Assessment & Plan  Likely secondary to worsening cellulitis of the right leg.    Patient has been receiving IV daptomycin for osteomyelitis of the left leg, will continue    Avoid sedating medications  Improving    Urinary retention  Assessment & Plan  Bloody clots seen upon straight cath, will initiate CBI 7/4/2022, evaluate response.    Osteomyelitis (HCC)- (present on admission)  Assessment & Plan  Patient with osteomyelitis currently on daptomycin.   CRP mildly elevated  LPS    Type 2 diabetes mellitus (HCC)- (present on admission)  Assessment & Plan  Last A1c was 6.33 weeks ago.  Hold metformin  Insulin sliding scale  Diabetic diet    Cellulitis- (present on admission)  Assessment & Plan  Daughter reports that the patient's right leg is more red and swollen than normal.  Doppler ultrasound does not show DVT  ID consulted, patient reportedly had removed his wound VAC while at SNF  Continue the daptomycin will add Unasyn.  Limb preservation services following and plan to replace wound VAC today      Dementia with behavioral disturbance (HCC)- (present on admission)  Assessment & Plan  History of dementia.  Oriented x 2, unsure of baseline  Avoid sedating medications    Hypertension- (present on admission)  Assessment & Plan  Continue home medication lisinopril and metoprolol      Hyperlipidemia- (present on admission)  Assessment & Plan  Continue home med  atorvastatin       VTE prophylaxis: enoxaparin ppx    I have performed a physical exam and reviewed and updated ROS and Plan today (7/4/2022). In review of yesterday's note (7/3/2022), there are no changes except as documented above.

## 2022-07-04 NOTE — THERAPY
"Occupational Therapy   Initial Evaluation     Patient Name: Andrei Garay  Age:  82 y.o., Sex:  male  Medical Record #: [id number]  Today's Date: 7/4/2022     Precautions: Fall Risk, Weight Bearing As Tolerated Right Lower Extremity, Weight Bearing As Tolerated Left Lower Extremity  Comments: CAM boot to LLE    Assessment  Patient is 82 y.o. male with pmhx dementia, lives at [facility] home, chronic L ankle wound. Pt presents to OT eval with cognition/behaviors impacting participation in meaningful therapy. Pt initially unwilling to participate, with encouragement pt sat up on his own accord without assistance. Pt ate his breakfast and reported a need to use the bathroom, once appropriate equipment was obtained and lines managed pt no longer interested in toileting or even standing up. Pt refused additional participation, baseline dementia limits pts rehab potential. Pt receives all care necessary at [facility]. Anticipate return to prior residence once medically cleared.     Plan    Recommend Occupational Therapy for Evaluation only Patient will not be actively followed for occupational therapy services at this time, however may be seen if requested by physician for 1 more visit within 30 days to address any discharge or equipment needs.     DC Equipment Recommendations: Unable to determine at this time  Discharge Recommendations: Other - ([facility]     Subjective    \"No.\"     Objective     07/04/22 1055   Prior Living Situation   Prior Services None   Housing / Facility Skilled Nursing Facility   Steps Into Home 0   Steps In Home 0   Lives with - Patient's Self Care Capacity Attendant / Paid Care Giver   Comments [facility] SNF   Prior Level of ADL Function   Self Feeding Unable To Determine At This Time   Grooming / Hygiene Unable To Determine At This Time   Bathing Unable To Determine At This Time   Dressing Unable To Determine At This Time   Toileting Unable To Determine At This Time   Comments " unreliable historian   Prior Level of IADL Function   Medication Management Requires Assist   Laundry Requires Assist   Kitchen Mobility Requires Assist   Finances Requires Assist   Home Management Requires Assist   Shopping Requires Assist   Precautions   Precautions Fall Risk;Weight Bearing As Tolerated Right Lower Extremity;Weight Bearing As Tolerated Left Lower Extremity   Comments CAM boot to LLE   Pain 0 - 10 Group   Therapist Pain Assessment Post Activity Pain Same as Prior to Activity;Nurse Notified   Cognition    Cognition / Consciousness X   Level of Consciousness Alert   Ability To Follow Commands 1 Step   Safety Awareness Impaired   New Learning Impaired   Attention Impaired   Comments minimally cooperative, delayed, stubborn   Strength Upper Body   Upper Body Strength  X   Gross Strength Generalized Weakness, Equal Bilaterally.    Balance Assessment   Sitting Balance (Static) Fair   Sitting Balance (Dynamic) Fair   Standing Balance (Static) Fair -   Standing Balance (Dynamic) Poor +   Weight Shift Sitting Fair   Weight Shift Standing Poor   Bed Mobility    Supine to Sit Standby Assist   Scooting Standby Assist   Rolling Supervised   Comments when internally motivated pt able to demonstrate supine>sit however very stubborn and refused additional activity   ADL Assessment   Eating Supervision   Grooming Supervision;Seated   Lower Body Dressing Maximal Assist   Toileting Maximal Assist   How much help from another person does the patient currently need...   Putting on and taking off regular lower body clothing? 2   Bathing (including washing, rinsing, and drying)? 2   Toileting, which includes using a toilet, bedpan, or urinal? 2   Putting on and taking off regular upper body clothing? 2   Taking care of personal grooming such as brushing teeth? 2   Eating meals? 4   6 Clicks Daily Activity Score 14   Functional Mobility   Sit to Stand Refused   Bed, Chair, Wheelchair Transfer Refused   Mobility attempted  x5, refused all variations of activity   Activity Tolerance   Sitting Edge of Bed 10+min   Education Group   Education Provided Weight Bearing Precautions;Activities of Daily Living;Role of Occupational Therapist;Transfers   Role of Occupational Therapist Patient Response Patient;Acceptance;Explanation;Verbal Demonstration   Transfers Patient Response Patient;Acceptance;Explanation;Verbal Demonstration   ADL Patient Response Patient;Acceptance;Explanation;Verbal Demonstration   Problem List   Problem List None   Anticipated Discharge Equipment and Recommendations   DC Equipment Recommendations Unable to determine at this time   Discharge Recommendations Other -  (return to [facility] SNF)

## 2022-07-04 NOTE — CARE PLAN
The patient is Stable - Low risk of patient condition declining or worsening    Shift Goals  Clinical Goals: pt will remain free from falls, iv abx  Patient Goals: go home  Family Goals: N/A    Progress made toward(s) clinical / shift goals:  POC/medications discussed with pt. Incontinenec linen changed. Pt reported pain of LLE. Medicated per MAR for pain. All fall precautions in place. Bed alarm is on. Telesitter in place.    Problem: Knowledge Deficit - Standard  Goal: Patient and family/care givers will demonstrate understanding of plan of care, disease process/condition, diagnostic tests and medications  Outcome: Progressing     Problem: Fall Risk  Goal: Patient will remain free from falls  Outcome: Progressing     Problem: Skin Integrity  Goal: Skin integrity is maintained or improved  Outcome: Progressing     Problem: Pain - Standard  Goal: Alleviation of pain or a reduction in pain to the patient’s comfort goal  Outcome: Progressing

## 2022-07-04 NOTE — WOUND TEAM
[facility]  Inpatient Services   Wound and Skin Care Evaluation    Admission Date: 6/10/2022     Last order of IP CONSULT TO WOUND CARE was found on 2022 from Hospital Encounter on 2022     HPI, PMH, SH: Reviewed    Past Surgical History:   Procedure Laterality Date   • IRRIGATION & DEBRIDEMENT GENERAL  2022    Procedure: IRRIGATION AND DEBRIDEMENT LEFT ANKLE;  Surgeon: Panfilo Wagner M.D.;  Location: SURGERY [facility];  Service: Orthopedics   • APPENDECTOMY     • STENT PLACEMENT      cardiac     Social History     Tobacco Use   • Smoking status: Former Smoker     Quit date: 2000     Years since quittin.7   • Smokeless tobacco: Never Used   Substance Use Topics   • Alcohol use: Never     Chief Complaint   Patient presents with   • Leg Swelling     Pt has new swelling to his left lower swelling and new confusion per staff at [facility] and daughter. Pt was incontinent of urine today and is usually continent. Pt is also weak and unable to walk like normal. Pt is A&O to self only today and is usually A&Ox3. Pt was recently admitted here for osteomyelitis of his left lower leg, an I&D, a positive MRSA culture of the left lower leg wound. Pt is currently on a daptomycin IV antibiotic regimen.        Diagnosis: Sepsis (HCC) [A41.9]    Unit where seen by Wound Team: [id number]/[id number]     WOUND CONSULT/FOLLOW UP RELATED TO:  L. Lateral ankle NPWT     WOUND HISTORY:  Andrei Garay is a 82 y.o. male past medical history of diabetes mellitus, hypertension who presented 6/10/2022 with altered mental status for last 3 days and left ankle pain.  History is primarily provided by daughter.  No relieving or exacerbating factors were noted.  He denies any fever does report some chills.  In the ER, x-ray of the ankle showed signs concerning for osteomyelitis.  UA grossly positive for urinary tract infection.  Patient met criteria for sepsis started on IV broad-spectrum antibiotics and  will be admitted for further work-up and limb preservation consult.    SX with Dr. Wagner 6/14/22    WOUND ASSESSMENT/LDA          Negative Pressure Wound Therapy 06/17/22 Surgical Ankle Lateral Left (Active)   NPWT Pump Mode / Pressure Setting Continuous;125 mmHg 07/04/22 0900   Dressing Type Black Foam (Veraflo);Small 07/04/22 0900   Number of Foam Pieces Used 2 07/04/22 0900   Canister Changed No 07/04/22 0900   Output (mL) 0 mL 07/04/22 0900   NEXT Dressing Change/Treatment Date 07/06/22 07/04/22 0900   VAC VeraFlo Irrigant 1/4 Strength Dakins 07/04/22 0900   VAC VeraFlo Soak Time (mins) 10 07/04/22 0900   VAC VeraFlo Instill Volume (ml) 6 07/04/22 0900   VAC VeraFlo - Therapy Time (hrs) 3.5 07/04/22 0900   VAC VeraFlo Pressure (mm/Hg) Continuous;125 mmHg 07/04/22 0900           Wound 06/14/22 Ankle Left lateral ankle (Active)   Wound Image    06/17/22 0900   Site Assessment Pink;Red 07/04/22 0900   Periwound Assessment Maceration;White 07/04/22 0900   Margins Defined edges;Unattached edges 07/04/22 0900   Closure Secondary intention 07/04/22 0900   Drainage Amount Small 07/04/22 0900   Drainage Description Serosanguineous 07/04/22 0900   Treatments Cleansed;Site care;Offloading 07/04/22 0900   Wound Cleansing Normal Saline Irrigation 07/04/22 0900   Periwound Protectant Skin Protectant Wipes to Periwound;Drape 07/04/22 0900   Dressing Cleansing/Solutions 1/4 Strength Dakin's Solution 07/04/22 0900   Dressing Options Wound Vac 07/04/22 0900   Dressing Changed Changed 07/04/22 0900   Dressing Status Clean;Dry;Intact 07/04/22 0900   Dressing Change/Treatment Frequency Monday, Wednesday, Friday, and As Needed 07/04/22 0900   NEXT Dressing Change/Treatment Date 07/06/22 07/04/22 0900   NEXT Weekly Photo (Inpatient Only) 07/08/22 07/04/22 0900   Non-staged Wound Description Full thickness 06/17/22 0900   Wound Length (cm) 3.2 cm 07/01/22 1700   Wound Width (cm) 1.4 cm 07/01/22 1700   Wound Depth (cm) 1.3 cm 07/01/22  1700   Wound Surface Area (cm^2) 4.48 cm^2 22 1700   Wound Volume (cm^3) 5.824 cm^3 22 1700   Wound Healing % 46 22 1700   Shape nahid 22 0900   Wound Odor None 22 09   Pulses 1+;Left 22 09   Exposed Structures None 22 09   WOUND NURSE ONLY - Time Spent with Patient (mins) 60 22 0900          Vascular:    HONEY:   HONEY Results, Last 30 Days US-HONEY SINGLE LEVEL BILAT    Result Date: 2022  Narrative  Vascular Laboratory  Conclusions  The ankle pressures are not accurately measured due to calcification and  noncompressibility of the tibial vessels.  There is no evidence of significant arterial disease.  ROSIE COOKIE  Age:    82    Gender:     M  MRN:    [id number]  :    [id number]      BSA:  Exam Date:     2022 13:45  Room #:     Inpatient  Priority:     Routine  Ht (in):             Wt (lb):  Ordering Physician:        JIN GREGG  Referring Physician:       [id number] Coronado  Sonographer:               Aliyah Pandya RVT  Study Type:                Complete Bilateral  Technical Quality:         Adequate  Indications:     Ulceration of LE  CPT Codes:       51680  ICD Codes:       707.1  History:         Ulceration of the left lower extremity. No prior exams.  Limitations:                 RIGHT  Waveform            Systolic BPs (mmHg)                             125           Brachial  Triphasic                                Common Femoral  Triphasic                                Popliteal  Triphasic                  200           Posterior Tibial  Triphasic                  179           Dorsalis Pedis                                           Digit                             1.54          HONEY                                           TBI                       LEFT  Waveform        Systolic BPs (mmHg)                             130           Brachial  Triphasic                                Common Femoral   Triphasic                                Popliteal  Triphasic                  187           Posterior Tibial  Triphasic                  180           Dorsalis Pedis                                           Digit                             1.44          HONEY                                           TBI  Findings  Bilateral-  The ankle pressures are not accurately measured due to calcification and  noncompressibility of the tibial vessels.  Doppler waveforms of the common femoral artery and popliteal artery are of  high amplitude and triphasic.  Doppler waveforms at the ankle are brisk and triphasic.  TBI-  Right: 1.29  Left:    1.09  Additional testing was not performed in accordance with lower extremity  arterial evaluation protocol.  James Gonzalez  (Electronically Signed)  Final Date:      12 June 2022                   16:17      Lab Values:    Lab Results   Component Value Date/Time    WBC 8.7 07/01/2022 06:33 AM    RBC 4.53 (L) 07/01/2022 06:33 AM    HEMOGLOBIN 13.2 (L) 07/01/2022 06:33 AM    HEMATOCRIT 39.4 (L) 07/01/2022 06:33 AM    CREACTPROT 0.78 (H) 06/29/2022 06:51 PM    SEDRATEWES 27 (H) 06/29/2022 06:51 PM    HBA1C 6.3 (H) 06/11/2022 03:57 PM        Culture Results show:  Recent Results (from the past 720 hour(s))   CULTURE WOUND W/ GRAM STAIN    Collection Time: 06/14/22  9:29 AM    Specimen: Wound   Result Value Ref Range    Significant Indicator POS (POS)     Source WND     Site Left Lateral Ankle     Culture Result No growth at 72 hours. (A)     Gram Stain Result Rare WBCs.  No organisms seen.       Culture Result - (A)        Pain Level/Medicated:  Denied pain       INTERVENTIONS BY WOUND TEAM:  Performed standard wound care which includes appropriate positioning, dressing removal and non-selective debridement. Pictures and measurements obtained weekly if/when required.  Preparation for Dressing removal: Dressing soaked with normal saline  Non-selectively Debrided with:  normal saline and  gauze.  Sharp debridement: n/a  Rosa M wound: Cleansed with normal saline, Prepped with no sting,   Primary Dressin/2 thickness black foam placed into wound bed and secured with drape - bridged a short distance anterior proximal.    Secondary (Outer) Dressing: a small window was cut and a button was applied to attach the tracpad.  No leak detected, seal intact. mepilex placed for offloading, Tubi  D applied and then the offloading boot applied.    Interdisciplinary consultation: Patient, Bedside RN , Ashutosh VERDUGO, Dr. Penny    EVALUATION / RATIONALE FOR TREATMENT:  Most Recent Date:  22: Patient's wound vac was intact, no granular tissue present but surrounding redness is not present, no odor.  Wound looks dry.  Added tubi  D per Edwin VERDUGO  22 Pt confused and pulls at VAC tubing.  Pt pulled tubing off 5 minutes after placement.  Replaced tubing, secured tubing with large piece of drape.  Placed diabetic boot on leg to try to keep pt from pulling on tubing.  Wound bed developing biofilm.  Will order dakins to instill in VAC.    22: Maceration still present, applied paste ring around periwound, added micah to the base of the wound.  Continue NPWT.  Tubi  added for light compression   22: Patient's ankle was a little macerated, but turned pink prior to re-application, wound bed appears nice and red for first dressing change post-op.  Continue NPWT     Goals: Steady decrease in wound area and depth weekly.    WOUND TEAM PLAN OF CARE ([X] for frequency of wound follow up,): X  Nursing to follow dressing orders written for wound care. Contact wound team if area fails to progress, deteriorates or with any questions/concerns if something comes up before next scheduled follow up (See below as to whether wound is following and frequency of wound follow up)  Dressing changes by wound team:                   Follow up 3 times weekly:                NPWT change 3 times weekly:  XMWF   Follow up  1-2 times weekly:      Follow up Bi-Monthly:                   Follow up as needed:     Other (explain):     NURSING PLAN OF CARE ORDERS (X):  Dressing changes: See Dressing Care orders: X  Skin care: See Skin Care orders: X  RN Prevention Protocol: X  Rectal tube care: See Rectal Tube Care orders:   Other orders:    RSKIN:   CURRENTLY IN PLACE (X), APPLIED THIS VISIT (A), ORDERED (O):   Q shift Montrell:  X  Q shift pressure point assessments:  X    Surface/Positioning X  Pressure redistribution mattressX            Low Airloss          Bariatric foam      Bariatric AKILA     Waffle cushion        Waffle Overlay          Reposition q 2 hours  X    TAPs Turning system     [place] Pillow     Offloading/Redistribution A  Sacral Mepilex (Silicone dressing)     Heel Mepilex (Silicone dressing)      A   Heel float boots (Prevalon boot)             Float Heels off Bed with Pillows           Respiratory Room air  Silicone O2 tubing         Gray Foam Ear protectors     Cannula fixation Device (Tender )          High flow offloading Clip    Elastic head band offloading device      Anchorfast                                                         Trach with Optifoam split foam             Containment/Moisture Prevention continent    Rectal tube or BMS    Purwick/Condom Cath        Martinez Catheter    Barrier wipes           Barrier paste       Antifungal tx      Interdry        Mobilization offloading boot      Up to chair        Ambulate      PT/OT      Nutrition PO      Dietician        Diabetes Education      PO     TF     TPN     NPO   # days     Other        Anticipated discharge plans: MONAE  LTACH:        SNF/Rehab:                  Home Health Care:           Outpatient Wound Center:            Self/Family Care:        Other:                  Vac Discharge Needs: X  Not Applicable Pt not on a wound vac:       Regular Vac while inpatient, alternative dressing at DC:        Regular Vac in use and continued at DC:      X       Reg. Vac w/ Skin Sub/Biologic in use. Will need to be changed 2x wkly:      Veraflo Vac while inpatient, ok to transition to Regular Vac on Discharge:           Veraflo Vac while inpatient, will need to remain on Veraflo Vac upon discharge:

## 2022-07-04 NOTE — THERAPY
"Physical Therapy   Initial Evaluation     Patient Name: Andrei Garay  Age:  82 y.o., Sex:  male  Medical Record #: [id number]  Today's Date: 7/4/2022     Precautions  Precautions: Fall Risk;Weight Bearing As Tolerated Right Lower Extremity;Weight Bearing As Tolerated Left Lower Extremity  Comments: CAM boot to LLE no formal orders, wound vac LLE    Assessment  Patient is 82 y.o. male admitted with LLE swelling and AMS. Pt know to this therapist from previous admission. PMHx of DM, HLD, HTN, and OM. Pt required Max encouragement to participate in therapy session, only agreeable to sitting EOB which required SBA to complete. Pt with impaired short term memory stating he wishes to ambulate to BR, then refusing to stand the next moment. Recommend return to [facility]. Will continue to follow pt, dependent upon if pt agreeable to participate more in future sessions.     Plan    Recommend Physical Therapy 2 times per week until therapy goals are met for the following treatments:  Bed Mobility, Equipment, Gait Training, Manual Therapy, Neuro Re-Education / Balance, Self Care/Home Evaluation, Therapeutic Activities and Therapeutic Exercises    DC Equipment Recommendations: Unable to determine at this time  Discharge Recommendations: Recommend post-acute placement for additional physical therapy services prior to discharge home [facility]       Subjective    \"I'm going to walk to the bathroom.\" Pt then refusing to stand     Objective     07/04/22 1050   Vitals   O2 Delivery Device None - Room Air   Pain 0 - 10 Group   Therapist Pain Assessment Post Activity Pain Same as Prior to Activity;Nurse Notified  (c/o testicular pain 2/2 stuck in sheet. Otherwise no c/o pain)   Prior Living Situation   Prior Services None   Housing / Facility Skilled Nursing Facility   Steps Into Home 0   Steps In Home 0   Equipment Owned 4-Wheel Walker   Lives with - Patient's Self Care Capacity Other (Comments)  ([facility]   Comments Obtained per previous admission and chart as pt unreliable historian. Able to get PT/OT at SNF   Prior Level of Functional Mobility   Bed Mobility Independent   Transfer Status Required Assist   Ambulation Required Assist   Distance Ambulation (Feet)   (pt usually sitting on 4WW and \"scooting\" 100yds. Was able to walk to the BR)   Assistive Devices Used 4-Wheel Walker   Comments Per chart/previous admission   Cognition    Cognition / Consciousness X   Level of Consciousness Alert   Ability To Follow Commands 1 Step   Safety Awareness Impaired;Impulsive   New Learning Impaired   Attention Impaired   Comments Minimally cooperative with max encouragement. Impaired short term memory, stating he wishes to go to the bathroom then wishing to lay down but not agreeable to physically lay down. Very delayed.   Active ROM Lower Body    Active ROM Lower Body  X   Comments appears WDL, however, not formally assessed 2/2 volition   Strength Lower Body   Lower Body Strength  X   Comments grossly BLE 3/5 assessed with functional mobility   Strength Upper Body   Upper Body Strength  X   Gross Strength Generalized Weakness, Equal Bilaterally.    Balance Assessment   Sitting Balance (Static) Fair   Sitting Balance (Dynamic) Fair   Weight Shift Sitting Fair   Comments refused further than EOB   Gait Analysis   Gait Level Of Assist Refused   Weight Bearing Status WBAT BLE,  LLE CAM boot   Bed Mobility    Supine to Sit Standby Assist   Sit to Supine   (NT, seated EOB post)   Scooting Standby Assist   Rolling Supervised   Functional Mobility   Sit to Stand Refused   Bed, Chair, Wheelchair Transfer Refused   Mobility w/ FWW   Activity Tolerance   Comments left seated EOB. Limited 2/2 volition   Short Term Goals    Short Term Goal # 1 Pt will perform supine <> sit with SPV in 6 visits to return to PLOF   Short Term Goal # 2 Pt will perform STS transfers with FWW and SPV in 6 visits to improve functional " independence   Short Term Goal # 3 Pt will ambulate 25ft with FWW and SPV in 6 visits to ambulate <> BR   Education Group   Education Provided Role of Physical Therapist   Role of Physical Therapist Patient Response Acceptance;Explanation;No Learning Evidence   Additional Comments Extensive edu regarding importance of OOB mobility, PT role, WB precautions and wearing LLE CAM boot   Problem List    Problems Impaired Bed Mobility;Impaired Transfers;Impaired Ambulation;Functional Strength Deficit;Impaired Balance;Decreased Activity Tolerance;Safety Awareness Deficits / Cognition   Anticipated Discharge Equipment and Recommendations   DC Equipment Recommendations Unable to determine at this time   Discharge Recommendations Recommend post-acute placement for additional physical therapy services prior to discharge home  ([facility])   Interdisciplinary Plan of Care Collaboration   IDT Collaboration with  Nursing;Occupational Therapist   Patient Position at End of Therapy Edge of Bed;Call Light within Reach;Tray Table within Reach;Phone within Reach;Bed Alarm On  (pts room in direct view of RN station)   Collaboration Comments RN updated   Session Information   Date / Session Number  7/4 1 (1/2, 7/10)

## 2022-07-05 LAB
ANION GAP SERPL CALC-SCNC: 11 MMOL/L (ref 7–16)
BUN SERPL-MCNC: 23 MG/DL (ref 8–22)
CALCIUM SERPL-MCNC: 9.4 MG/DL (ref 8.5–10.5)
CHLORIDE SERPL-SCNC: 104 MMOL/L (ref 96–112)
CO2 SERPL-SCNC: 24 MMOL/L (ref 20–33)
CREAT SERPL-MCNC: 1.4 MG/DL (ref 0.5–1.4)
GFR SERPLBLD CREATININE-BSD FMLA CKD-EPI: 50 ML/MIN/1.73 M 2
GLUCOSE BLD STRIP.AUTO-MCNC: 164 MG/DL (ref 65–99)
GLUCOSE BLD STRIP.AUTO-MCNC: 170 MG/DL (ref 65–99)
GLUCOSE BLD STRIP.AUTO-MCNC: 189 MG/DL (ref 65–99)
GLUCOSE BLD STRIP.AUTO-MCNC: 201 MG/DL (ref 65–99)
GLUCOSE SERPL-MCNC: 165 MG/DL (ref 65–99)
MAGNESIUM SERPL-MCNC: 1.9 MG/DL (ref 1.5–2.5)
POTASSIUM SERPL-SCNC: 3.2 MMOL/L (ref 3.6–5.5)
SODIUM SERPL-SCNC: 139 MMOL/L (ref 135–145)

## 2022-07-05 PROCEDURE — 700102 HCHG RX REV CODE 250 W/ 637 OVERRIDE(OP): Performed by: HOSPITALIST

## 2022-07-05 PROCEDURE — 80048 BASIC METABOLIC PNL TOTAL CA: CPT

## 2022-07-05 PROCEDURE — 770001 HCHG ROOM/CARE - MED/SURG/GYN PRIV*

## 2022-07-05 PROCEDURE — 83735 ASSAY OF MAGNESIUM: CPT

## 2022-07-05 PROCEDURE — 700111 HCHG RX REV CODE 636 W/ 250 OVERRIDE (IP): Performed by: HOSPITALIST

## 2022-07-05 PROCEDURE — 700111 HCHG RX REV CODE 636 W/ 250 OVERRIDE (IP): Performed by: STUDENT IN AN ORGANIZED HEALTH CARE EDUCATION/TRAINING PROGRAM

## 2022-07-05 PROCEDURE — A9270 NON-COVERED ITEM OR SERVICE: HCPCS | Performed by: HOSPITALIST

## 2022-07-05 PROCEDURE — 700102 HCHG RX REV CODE 250 W/ 637 OVERRIDE(OP): Performed by: STUDENT IN AN ORGANIZED HEALTH CARE EDUCATION/TRAINING PROGRAM

## 2022-07-05 PROCEDURE — 700105 HCHG RX REV CODE 258: Performed by: HOSPITALIST

## 2022-07-05 PROCEDURE — 99233 SBSQ HOSP IP/OBS HIGH 50: CPT | Performed by: HOSPITALIST

## 2022-07-05 PROCEDURE — A9270 NON-COVERED ITEM OR SERVICE: HCPCS | Performed by: STUDENT IN AN ORGANIZED HEALTH CARE EDUCATION/TRAINING PROGRAM

## 2022-07-05 PROCEDURE — 82962 GLUCOSE BLOOD TEST: CPT | Mod: 91

## 2022-07-05 PROCEDURE — 700105 HCHG RX REV CODE 258: Performed by: STUDENT IN AN ORGANIZED HEALTH CARE EDUCATION/TRAINING PROGRAM

## 2022-07-05 RX ADMIN — SODIUM CHLORIDE 3 G: 900 INJECTION INTRAVENOUS at 18:26

## 2022-07-05 RX ADMIN — SODIUM CHLORIDE 3 G: 900 INJECTION INTRAVENOUS at 05:08

## 2022-07-05 RX ADMIN — ENOXAPARIN SODIUM 40 MG: 40 INJECTION SUBCUTANEOUS at 18:26

## 2022-07-05 RX ADMIN — LISINOPRIL 40 MG: 20 TABLET ORAL at 05:08

## 2022-07-05 RX ADMIN — INSULIN HUMAN 2 UNITS: 100 INJECTION, SOLUTION PARENTERAL at 20:37

## 2022-07-05 RX ADMIN — METOPROLOL TARTRATE 25 MG: 25 TABLET, FILM COATED ORAL at 20:35

## 2022-07-05 RX ADMIN — SERTRALINE 50 MG: 100 TABLET, FILM COATED ORAL at 18:26

## 2022-07-05 RX ADMIN — Medication 10 MG: at 20:35

## 2022-07-05 RX ADMIN — DAPTOMYCIN 750 MG: 500 INJECTION, POWDER, LYOPHILIZED, FOR SOLUTION INTRAVENOUS at 23:16

## 2022-07-05 RX ADMIN — INSULIN HUMAN 1 UNITS: 100 INJECTION, SOLUTION PARENTERAL at 18:28

## 2022-07-05 RX ADMIN — MIRTAZAPINE 15 MG: 15 TABLET, FILM COATED ORAL at 20:35

## 2022-07-05 RX ADMIN — DAKIN'S SOLUTION 0.125% (QUARTER STRENGTH) 1 ML: 0.12 SOLUTION at 05:08

## 2022-07-05 RX ADMIN — METOPROLOL TARTRATE 25 MG: 25 TABLET, FILM COATED ORAL at 10:05

## 2022-07-05 RX ADMIN — SODIUM CHLORIDE 3 G: 900 INJECTION INTRAVENOUS at 12:02

## 2022-07-05 RX ADMIN — AMLODIPINE BESYLATE 5 MG: 5 TABLET ORAL at 05:08

## 2022-07-05 RX ADMIN — SODIUM CHLORIDE 3 G: 900 INJECTION INTRAVENOUS at 00:17

## 2022-07-05 RX ADMIN — INSULIN HUMAN 1 UNITS: 100 INJECTION, SOLUTION PARENTERAL at 14:41

## 2022-07-05 RX ADMIN — SENNOSIDES AND DOCUSATE SODIUM 2 TABLET: 50; 8.6 TABLET ORAL at 18:26

## 2022-07-05 RX ADMIN — ATORVASTATIN CALCIUM 40 MG: 40 TABLET, FILM COATED ORAL at 18:26

## 2022-07-05 ASSESSMENT — ENCOUNTER SYMPTOMS
NAUSEA: 0
CHILLS: 0
HEADACHES: 0
SHORTNESS OF BREATH: 0
COUGH: 0
ABDOMINAL PAIN: 0
PALPITATIONS: 0
DIZZINESS: 0
VOMITING: 0
FEVER: 0

## 2022-07-05 NOTE — PROGRESS NOTES
[facility] Medicine Daily Progress Note    Date of Service  7/5/2022    Chief Complaint  Andrei Garay is a 82 y.o. male admitted 6/29/2022 with left leg swelling and altered mental status    Hospital Course  Andrei Garay is a 82 y.o. male who presented 6/29/2022 with a history of type 2 diabetes, hyperlipidemia, hypertension, osteomyelitis who presents with right leg swelling and altered mental status.     According to the patient's daughter who is visiting him she states that he began becoming more confused and slurring his words yesterday.  He also reports that he had episode of incontinence which is not his normal baseline.  Has been receiving IV daptomycin for left lower leg osteomyelitis which was diagnosed 2 weeks ago, MRSA culture positive.    Emergency department ultrasound of the right lower extremity negative for DVT. Limb preservation services consulted and following.  Completed a bedside debridement and applied new dressings.  Sure of wound management patient was receiving it [facility].  Wound does not appear to be improved from prior hospitalization.  We will consult infectious disease for recommendations and continue to work with LPS for management.      Interval Problem Update  Martinez in place, urine clear  Developed SADIE, repeat BMP ordered- if Cr increasing will get renal US and start IVF  In restraints    I have discussed this patient's plan of care and discharge plan at IDT rounds today with Case Management, Nursing, Nursing leadership, and other members of the IDT team.    Consultants/Specialty  infectious disease  Limb preservation services    Code Status  DNAR/DNI    Disposition  Patient is not medically cleared for discharge.   Anticipate discharge to to skilled nursing facility.  I have placed the appropriate orders for post-discharge needs.    Review of Systems  Review of Systems   Constitutional: Negative for chills and fever.   Respiratory: Negative for cough and  shortness of breath.    Cardiovascular: Negative for chest pain and palpitations.   Gastrointestinal: Negative for abdominal pain, nausea and vomiting.   Genitourinary: Negative for dysuria and urgency.   Neurological: Negative for dizziness and headaches.   All other systems reviewed and are negative.       Physical Exam  Temp:  [36.2 °C (97.1 °F)-36.6 °C (97.8 °F)] 36.6 °C (97.8 °F)  Pulse:  [80-97] 80  Resp:  [18-20] 20  BP: (126-163)/(70-99) 142/70  SpO2:  [92 %-95 %] 95 %    Physical Exam  Vitals and nursing note reviewed.   Constitutional:       General: He is not in acute distress.     Appearance: He is obese. He is not ill-appearing.   HENT:      Head: Normocephalic and atraumatic.   Eyes:      General: No scleral icterus.        Right eye: No discharge.         Left eye: No discharge.   Cardiovascular:      Rate and Rhythm: Normal rate and regular rhythm.      Heart sounds: Normal heart sounds.   Pulmonary:      Effort: No respiratory distress.      Breath sounds: Normal breath sounds. No wheezing or rales.   Abdominal:      General: Abdomen is protuberant. There is no distension.      Palpations: Abdomen is soft.      Tenderness: There is no abdominal tenderness. There is no guarding.   Musculoskeletal:         General: No tenderness.   Skin:     Findings: Erythema and signs of injury present.      Comments: Left leg dressing in place   Neurological:      General: No focal deficit present.      Mental Status: He is alert. He is disoriented.      Cranial Nerves: No cranial nerve deficit.      Motor: Motor function is intact.      Comments: Aware of location and soft, unaware of time   Psychiatric:         Mood and Affect: Affect is flat.         Behavior: Behavior is hyperactive.         Cognition and Memory: Cognition is impaired. Memory is impaired.         Judgment: Judgment is impulsive.         Fluids    Intake/Output Summary (Last 24 hours) at 7/5/2022 1206  Last data filed at 7/5/2022 0508  Gross per  24 hour   Intake 100 ml   Output 2445 ml   Net -2345 ml       Laboratory  Recent Labs     07/04/22  1645   WBC 11.6*   RBC 4.42*   HEMOGLOBIN 12.8*   HEMATOCRIT 38.8*   MCV 87.8   MCH 29.0   MCHC 33.0*   RDW 44.8   PLATELETCT 295   MPV 9.5     Recent Labs     07/04/22  1645   SODIUM 140   POTASSIUM 3.4*   CHLORIDE 104   CO2 21   GLUCOSE 220*   BUN 19   CREATININE 1.95*   CALCIUM 9.0     Recent Labs     07/04/22  1645   APTT 33.5   INR 1.32*               Imaging  US-EXTREMITY VENOUS LOWER UNILAT RIGHT   Final Result      CT-HEAD W/O   Final Result         No acute intracranial abnormality.            DX-ANKLE 3+ VIEWS LEFT   Final Result         Bony destruction in the lateral aspect of the distal fibula is consistent with osteomyelitis described on recent MRI. Overlying soft tissue edema and a small amount of soft tissue gas      DX-CHEST-PORTABLE (1 VIEW)   Final Result      1.  Increased perihilar interstitial markings are suggestive of pulmonary edema.      2.  Stable mild cardiomegaly           Assessment/Plan  * Encephalopathy  Assessment & Plan  Likely secondary to worsening cellulitis of the right leg.    Patient has been receiving IV daptomycin for osteomyelitis of the left leg, will continue    Avoid sedating medications  Improving    Urinary retention  Assessment & Plan  Bloody clots seen upon straight cath, will initiate CBI 7/4/2022, evaluate response.    Osteomyelitis ([facility])- (present on admission)  Assessment & Plan  Patient with osteomyelitis currently on daptomycin.   CRP mildly elevated  LPS    SADIE (acute kidney injury) ([facility])  Assessment & Plan  Monitor BMP and assess response  Avoid IV contrast/nephrotoxins/NSAIDs  Dose adjust meds for decreased GFR  repeat BMP ordered- if Cr increasing will get renal US and start IVF    Type 2 diabetes mellitus (HCC)- (present on admission)  Assessment & Plan  Last A1c was 6.33 weeks ago.  Hold metformin  Insulin sliding scale  Diabetic diet    Cellulitis-  (present on admission)  Assessment & Plan  Daughter reports that the patient's right leg is more red and swollen than normal.  Doppler ultrasound does not show DVT  ID consulted, patient reportedly had removed his wound VAC while at SNF  Continue the daptomycin (until 7/23) and Unasyn (until 7/6)    Dementia with behavioral disturbance (HCC)- (present on admission)  Assessment & Plan  History of dementia.  Oriented x 2, unsure of baseline  Avoid sedating medications    Hypertension- (present on admission)  Assessment & Plan  Continue home medication lisinopril and metoprolol      Hyperlipidemia- (present on admission)  Assessment & Plan  Continue home med atorvastatin       VTE prophylaxis: enoxaparin ppx    I have performed a physical exam and reviewed and updated ROS and Plan today (7/5/2022). In review of yesterday's note (7/4/2022), there are no changes except as documented above.

## 2022-07-05 NOTE — DOCUMENTATION QUERY
[facility]                                                                       Query Response Note      PATIENT:               COOKIE VELEZ  ACCT #:                  [id number]  MRN:                     [id number]  :                      1940  ADMIT DATE:       2022 4:58 PM  DISCH DATE:          RESPONDING  PROVIDER #:        [id number]           QUERY TEXT:    81 y/o male was brought in by family due to reports of acting confused, slurring words, and not at baseline. Also  in the hospital 2 weeks ago for osteomyelitis of the left lower leg and was found to be MRSA culture positive and was placed on daptomycin, currently still on Daptomycin.  As of  PN very confused and agitated,  mentally more clear today however not back to baseline, (encephalopathy) improving.    Please clarify after study the cause of the patient?s altered mental status:      The patient's Clinical Indicators include:  Admit labs: glucose 139    H&P - encephalopathic starting yesterday, no baseline, likely secondary to cellulitis of R leg, also being treated for osteomyelitis of L leg; Dx Dementia with behavioral disturbance.   - impulsive   - Interval update - very confused and impulsive, mentally more clear today however not back to baseline; Dx encephalopathy - improving    Treatment - Debridement; replace wound vac; IV Unasyn; IV Daptomycin; restraints; avoid sedating medications; IV Insulin    Risk factors - Dementia, Encephalopathy, Osteomyelitis, Cellulitis, DM    Thank you,  Sruthi CAMPOVERDE  Clinical   Connect via [employer]  Options provided:   -- Acute encephalopathy ruled out; patient with Dementia worsening causing the altered mental status   -- Acute encephalopathy valid diagnosis and is related to the acute condition of cellulitis, (If known, please specify type of encephalopathy (metabolic, etc)   --  Acute encephalopathy due to other medical condition, (please specify other medical condition)   -- Other explanation, (please specify the other explanation)   -- Unable to Determine      Query created by: Sruthi Thomas on 7/1/2022 1:05 PM    RESPONSE TEXT:    Acute encephalopathy valid diagnosis and is related to the acute condition of cellulitis-          Electronically signed by:  AMARILIS MENESES MD 7/5/2022 12:05 PM

## 2022-07-05 NOTE — PROGRESS NOTES
Bladder scan result at 2230 = 410. Peng hospitalist. Spoke with LUCINA Scruggs and updated with situation, order were placed. Inserted barbosa catheter fr 16. Pt is attempting to pull out the catheter. Applied bilateral soft wrist restraints as ordered. Daughter Marah, aware of restraints.  Pt left comfortably lying in bed. Telesitter in placed for safety.

## 2022-07-05 NOTE — PROGRESS NOTES
Alert and oriented x1, only to self. Bed alarm is on. Safety precautions in placed. Bed in lowest position. Upper side rails up. Treaded socks on. Reinforced the use of call light when needing assistance.

## 2022-07-05 NOTE — CARE PLAN
The patient is Watcher - Medium risk of patient condition declining or worsening    Shift Goals  Clinical Goals: pt will remain free from injury  Patient Goals: go home  Family Goals: N/A    Progress made toward(s) clinical / shift goals:      Problem: Knowledge Deficit - Standard  Goal: Patient and family/care givers will demonstrate understanding of plan of care, disease process/condition, diagnostic tests and medications  Outcome: Progressing     Problem: Fall Risk  Goal: Patient will remain free from falls  Outcome: Progressing     Problem: Skin Integrity  Goal: Skin integrity is maintained or improved  Outcome: Progressing     Problem: Pain - Standard  Goal: Alleviation of pain or a reduction in pain to the patient’s comfort goal  Outcome: Progressing       Patient is not progressing towards the following goals:NA

## 2022-07-05 NOTE — CARE PLAN
The patient is Stable - Low risk of patient condition declining or worsening    Shift Goals  Clinical Goals: decrease level of anxiety  Patient Goals: pt will not have episodes of aggression, impulsivity. Pt will participate in insertion of barbosa catheter  Family Goals: N/A    Progress made toward(s) clinical / shift goals:      Patient is not progressing towards the following goals:

## 2022-07-06 LAB
ANION GAP SERPL CALC-SCNC: 12 MMOL/L (ref 7–16)
BUN SERPL-MCNC: 19 MG/DL (ref 8–22)
CALCIUM SERPL-MCNC: 9.1 MG/DL (ref 8.5–10.5)
CHLORIDE SERPL-SCNC: 101 MMOL/L (ref 96–112)
CO2 SERPL-SCNC: 23 MMOL/L (ref 20–33)
CREAT SERPL-MCNC: 0.87 MG/DL (ref 0.5–1.4)
GFR SERPLBLD CREATININE-BSD FMLA CKD-EPI: 86 ML/MIN/1.73 M 2
GLUCOSE BLD STRIP.AUTO-MCNC: 187 MG/DL (ref 65–99)
GLUCOSE BLD STRIP.AUTO-MCNC: 209 MG/DL (ref 65–99)
GLUCOSE BLD STRIP.AUTO-MCNC: 225 MG/DL (ref 65–99)
GLUCOSE SERPL-MCNC: 230 MG/DL (ref 65–99)
POTASSIUM SERPL-SCNC: 3.1 MMOL/L (ref 3.6–5.5)
SODIUM SERPL-SCNC: 136 MMOL/L (ref 135–145)

## 2022-07-06 PROCEDURE — 97116 GAIT TRAINING THERAPY: CPT

## 2022-07-06 PROCEDURE — 99232 SBSQ HOSP IP/OBS MODERATE 35: CPT | Performed by: HOSPITALIST

## 2022-07-06 PROCEDURE — 306591 TRAY SUTURE REMOVAL DISP: Performed by: HOSPITALIST

## 2022-07-06 PROCEDURE — 700102 HCHG RX REV CODE 250 W/ 637 OVERRIDE(OP): Performed by: HOSPITALIST

## 2022-07-06 PROCEDURE — 770001 HCHG ROOM/CARE - MED/SURG/GYN PRIV*

## 2022-07-06 PROCEDURE — 700105 HCHG RX REV CODE 258: Performed by: STUDENT IN AN ORGANIZED HEALTH CARE EDUCATION/TRAINING PROGRAM

## 2022-07-06 PROCEDURE — A9270 NON-COVERED ITEM OR SERVICE: HCPCS | Performed by: HOSPITALIST

## 2022-07-06 PROCEDURE — 80048 BASIC METABOLIC PNL TOTAL CA: CPT

## 2022-07-06 PROCEDURE — 700111 HCHG RX REV CODE 636 W/ 250 OVERRIDE (IP): Performed by: STUDENT IN AN ORGANIZED HEALTH CARE EDUCATION/TRAINING PROGRAM

## 2022-07-06 PROCEDURE — A9270 NON-COVERED ITEM OR SERVICE: HCPCS | Performed by: STUDENT IN AN ORGANIZED HEALTH CARE EDUCATION/TRAINING PROGRAM

## 2022-07-06 PROCEDURE — 97605 NEG PRS WND THER DME<=50SQCM: CPT

## 2022-07-06 PROCEDURE — 97530 THERAPEUTIC ACTIVITIES: CPT

## 2022-07-06 PROCEDURE — 302098 PASTE RING (FLAT): Performed by: HOSPITALIST

## 2022-07-06 PROCEDURE — 82962 GLUCOSE BLOOD TEST: CPT | Mod: 91

## 2022-07-06 PROCEDURE — 700102 HCHG RX REV CODE 250 W/ 637 OVERRIDE(OP): Performed by: STUDENT IN AN ORGANIZED HEALTH CARE EDUCATION/TRAINING PROGRAM

## 2022-07-06 RX ORDER — TAMSULOSIN HYDROCHLORIDE 0.4 MG/1
0.4 CAPSULE ORAL
Status: DISCONTINUED | OUTPATIENT
Start: 2022-07-06 | End: 2022-07-07

## 2022-07-06 RX ORDER — AMLODIPINE BESYLATE 10 MG/1
10 TABLET ORAL
Status: DISCONTINUED | OUTPATIENT
Start: 2022-07-07 | End: 2022-07-25 | Stop reason: HOSPADM

## 2022-07-06 RX ADMIN — AMLODIPINE BESYLATE 5 MG: 5 TABLET ORAL at 04:31

## 2022-07-06 RX ADMIN — SODIUM CHLORIDE 3 G: 900 INJECTION INTRAVENOUS at 17:25

## 2022-07-06 RX ADMIN — SODIUM CHLORIDE 3 G: 900 INJECTION INTRAVENOUS at 05:16

## 2022-07-06 RX ADMIN — ENOXAPARIN SODIUM 40 MG: 40 INJECTION SUBCUTANEOUS at 17:25

## 2022-07-06 RX ADMIN — ATORVASTATIN CALCIUM 40 MG: 40 TABLET, FILM COATED ORAL at 17:25

## 2022-07-06 RX ADMIN — INSULIN HUMAN 1 UNITS: 100 INJECTION, SOLUTION PARENTERAL at 21:11

## 2022-07-06 RX ADMIN — SODIUM CHLORIDE 3 G: 900 INJECTION INTRAVENOUS at 00:00

## 2022-07-06 RX ADMIN — MIRTAZAPINE 15 MG: 15 TABLET, FILM COATED ORAL at 21:08

## 2022-07-06 RX ADMIN — LISINOPRIL 40 MG: 20 TABLET ORAL at 04:31

## 2022-07-06 RX ADMIN — METOPROLOL TARTRATE 25 MG: 25 TABLET, FILM COATED ORAL at 21:08

## 2022-07-06 RX ADMIN — OXYCODONE 5 MG: 5 TABLET ORAL at 21:12

## 2022-07-06 RX ADMIN — Medication 10 MG: at 21:08

## 2022-07-06 RX ADMIN — INSULIN HUMAN 2 UNITS: 100 INJECTION, SOLUTION PARENTERAL at 14:27

## 2022-07-06 RX ADMIN — SODIUM CHLORIDE 3 G: 900 INJECTION INTRAVENOUS at 12:21

## 2022-07-06 RX ADMIN — OXYCODONE 5 MG: 5 TABLET ORAL at 15:13

## 2022-07-06 RX ADMIN — INSULIN HUMAN 1 UNITS: 100 INJECTION, SOLUTION PARENTERAL at 09:59

## 2022-07-06 RX ADMIN — TAMSULOSIN HYDROCHLORIDE 0.4 MG: 0.4 CAPSULE ORAL at 12:21

## 2022-07-06 RX ADMIN — METOPROLOL TARTRATE 25 MG: 25 TABLET, FILM COATED ORAL at 09:57

## 2022-07-06 RX ADMIN — INSULIN HUMAN 2 UNITS: 100 INJECTION, SOLUTION PARENTERAL at 18:14

## 2022-07-06 RX ADMIN — SERTRALINE 50 MG: 100 TABLET, FILM COATED ORAL at 17:25

## 2022-07-06 RX ADMIN — SENNOSIDES AND DOCUSATE SODIUM 2 TABLET: 50; 8.6 TABLET ORAL at 04:31

## 2022-07-06 ASSESSMENT — ENCOUNTER SYMPTOMS
NAUSEA: 0
COUGH: 0
FEVER: 0
DIZZINESS: 0
SHORTNESS OF BREATH: 0
VOMITING: 0
PALPITATIONS: 0
ABDOMINAL PAIN: 0
HEADACHES: 0
CHILLS: 0

## 2022-07-06 ASSESSMENT — PAIN DESCRIPTION - PAIN TYPE
TYPE: ACUTE PAIN
TYPE: ACUTE PAIN

## 2022-07-06 ASSESSMENT — COGNITIVE AND FUNCTIONAL STATUS - GENERAL
CLIMB 3 TO 5 STEPS WITH RAILING: TOTAL
MOVING FROM LYING ON BACK TO SITTING ON SIDE OF FLAT BED: A LITTLE
STANDING UP FROM CHAIR USING ARMS: A LOT
SUGGESTED CMS G CODE MODIFIER MOBILITY: CL
WALKING IN HOSPITAL ROOM: A LOT
TURNING FROM BACK TO SIDE WHILE IN FLAT BAD: A LOT
MOBILITY SCORE: 13
MOVING TO AND FROM BED TO CHAIR: A LITTLE

## 2022-07-06 ASSESSMENT — PAIN SCALES - PAIN ASSESSMENT IN ADVANCED DEMENTIA (PAINAD)
CONSOLABILITY: NO NEED TO CONSOLE
BODYLANGUAGE: RELAXED
FACIALEXPRESSION: FACIAL GRIMACING
TOTALSCORE: 3
NEGVOCALIZATION: OCCASIONAL MOAN/GROAN, LOW SPEECH, NEGATIVE/DISAPPROVING QUALITY
BREATHING: NORMAL

## 2022-07-06 ASSESSMENT — GAIT ASSESSMENTS
GAIT LEVEL OF ASSIST: MINIMAL ASSIST
ASSISTIVE DEVICE: FRONT WHEEL WALKER
DISTANCE (FEET): 4

## 2022-07-06 NOTE — CARE PLAN
The patient is Watcher - Medium risk of patient condition declining or worsening    Shift Goals  Clinical Goals: compliance with care  Patient Goals: remove restraints  Family Goals: N/A    Progress made toward(s) clinical / shift goals:      Problem: Knowledge Deficit - Standard  Goal: Patient and family/care givers will demonstrate understanding of plan of care, disease process/condition, diagnostic tests and medications  Outcome: Progressing     Problem: Fall Risk  Goal: Patient will remain free from falls  Outcome: Progressing       Patient is not progressing towards the following goals:

## 2022-07-06 NOTE — WOUND TEAM
[facility]  Inpatient Services   Wound and Skin Care Evaluation    Admission Date: 6/10/2022     Last order of IP CONSULT TO WOUND CARE was found on 2022 from Hospital Encounter on 2022     HPI, PMH, SH: Reviewed    Past Surgical History:   Procedure Laterality Date   • IRRIGATION & DEBRIDEMENT GENERAL  2022    Procedure: IRRIGATION AND DEBRIDEMENT LEFT ANKLE;  Surgeon: Panfilo Wagner M.D.;  Location: SURGERY [facility];  Service: Orthopedics   • APPENDECTOMY     • STENT PLACEMENT      cardiac     Social History     Tobacco Use   • Smoking status: Former Smoker     Quit date: 2000     Years since quittin.8   • Smokeless tobacco: Never Used   Substance Use Topics   • Alcohol use: Never     Chief Complaint   Patient presents with   • Leg Swelling     Pt has new swelling to his left lower swelling and new confusion per staff at [facility] and daughter. Pt was incontinent of urine today and is usually continent. Pt is also weak and unable to walk like normal. Pt is A&O to self only today and is usually A&Ox3. Pt was recently admitted here for osteomyelitis of his left lower leg, an I&D, a positive MRSA culture of the left lower leg wound. Pt is currently on a daptomycin IV antibiotic regimen.        Diagnosis: Sepsis (HCC) [A41.9]    Unit where seen by Wound Team: [id number]/[id number]     WOUND CONSULT/FOLLOW UP RELATED TO:  L. Lateral ankle NPWT     WOUND HISTORY:  Andrei Garay is a 82 y.o. male past medical history of diabetes mellitus, hypertension who presented 6/10/2022 with altered mental status for last 3 days and left ankle pain.  History is primarily provided by daughter.  No relieving or exacerbating factors were noted.  He denies any fever does report some chills.  In the ER, x-ray of the ankle showed signs concerning for osteomyelitis.  UA grossly positive for urinary tract infection.  Patient met criteria for sepsis started on IV broad-spectrum antibiotics and  will be admitted for further work-up and limb preservation consult.    SX with Dr. Wagner 6/14/22    WOUND ASSESSMENT/LDA       Negative Pressure Wound Therapy 06/17/22 Surgical Ankle Lateral Left (Active)   NPWT Pump Mode / Pressure Setting Continuous;125 mmHg 07/06/22 1500   Dressing Type Small;Black Foam (Veraflo) 07/06/22 1500   Number of Foam Pieces Used 1 07/06/22 1500   Canister Changed No 07/06/22 1500   Output (mL) 0 mL 07/06/22 0400   NEXT Dressing Change/Treatment Date 07/08/22 07/06/22 1500   VAC VeraFlo Irrigant 1/4 Strength Dakins 07/06/22 1500   VAC VeraFlo Soak Time (mins) 10 07/06/22 1500   VAC VeraFlo Instill Volume (ml) 10 07/06/22 1500   VAC VeraFlo - Therapy Time (hrs) 3.5 07/06/22 1500   VAC VeraFlo Pressure (mm/Hg) Continuous;125 mmHg 07/06/22 1500   WOUND NURSE ONLY - Time Spent with Patient (mins) 60 07/06/22 1500           Wound 06/14/22 Ankle Left lateral ankle (Active)   Wound Image    06/17/22 0900   Site Assessment Brown;Red;Dry 07/06/22 1500   Periwound Assessment Intact 07/06/22 1500   Margins Defined edges;Unattached edges 07/06/22 1500   Closure Secondary intention 07/06/22 1500   Drainage Amount None 07/06/22 1500   Drainage Description Serosanguineous 07/04/22 0900   Treatments Cleansed;Offloading;Site care 07/06/22 1500   Wound Cleansing Approved Wound Cleanser 07/06/22 1500   Periwound Protectant Drape;Paste Ring;Skin Protectant Wipes to Periwound 07/06/22 1500   Dressing Cleansing/Solutions 1/4 Strength Dakin's Solution 07/06/22 1500   Dressing Options Wound Vac 07/06/22 1500   Dressing Changed Changed 07/06/22 1500   Dressing Status Clean;Dry;Intact 07/06/22 1500   Dressing Change/Treatment Frequency Monday, Wednesday, Friday, and As Needed 07/06/22 1500   NEXT Dressing Change/Treatment Date 07/08/22 07/06/22 1500   NEXT Weekly Photo (Inpatient Only) 07/08/22 07/06/22 1500   Non-staged Wound Description Full thickness 07/06/22 1500   Wound Length (cm) 3.2 cm 07/01/22 1700    Wound Width (cm) 1.4 cm 22 1700   Wound Depth (cm) 1.3 cm 22 1700   Wound Surface Area (cm^2) 4.48 cm^2 22 1700   Wound Volume (cm^3) 5.824 cm^3 22 1700   Wound Healing % 46 22 1700   Shape oval 22 1500   Wound Odor None 22 1500   Pulses 1+;Left 22 0900   Exposed Structures None 22 1500   WOUND NURSE ONLY - Time Spent with Patient (mins) 60 22 1500       Vascular:    HONEY:   HONEY Results, Last 30 Days US-HONEY SINGLE LEVEL BILAT    Result Date: 2022  Narrative  Vascular Laboratory  Conclusions  The ankle pressures are not accurately measured due to calcification and  noncompressibility of the tibial vessels.  There is no evidence of significant arterial disease.  COOKIE VELEZ  Age:    82    Gender:     M  MRN:    [id number]  :    1940      BSA:  Exam Date:     2022 13:45  Room #:     Inpatient  Priority:     Routine  Ht (in):             Wt (lb):  Ordering Physician:        JIN GREGG  Referring Physician:       [id number]  Sonographer:               Aliyah Pandya RVT  Study Type:                Complete Bilateral  Technical Quality:         Adequate  Indications:     Ulceration of LE  CPT Codes:       08248  ICD Codes:       707.1  History:         Ulceration of the left lower extremity. No prior exams.  Limitations:                 RIGHT  Waveform            Systolic BPs (mmHg)                             125           Brachial  Triphasic                                Common Femoral  Triphasic                                Popliteal  Triphasic                  200           Posterior Tibial  Triphasic                  179           Dorsalis Pedis                                           Digit                             1.54          HONEY                                           TBI                       LEFT  Waveform        Systolic BPs (mmHg)                             130            Brachial  Triphasic                                Common Femoral  Triphasic                                Popliteal  Triphasic                  187           Posterior Tibial  Triphasic                  180           Dorsalis Pedis                                           Digit                             1.44          HONEY                                           TBI  Findings  Bilateral-  The ankle pressures are not accurately measured due to calcification and  noncompressibility of the tibial vessels.  Doppler waveforms of the common femoral artery and popliteal artery are of  high amplitude and triphasic.  Doppler waveforms at the ankle are brisk and triphasic.  TBI-  Right: 1.29  Left:    1.09  Additional testing was not performed in accordance with lower extremity  arterial evaluation protocol.  James Gonzalez  (Electronically Signed)  Final Date:      12 June 2022                   16:17      Lab Values:    Lab Results   Component Value Date/Time    WBC 11.6 (H) 07/04/2022 04:45 PM    RBC 4.42 (L) 07/04/2022 04:45 PM    HEMOGLOBIN 12.8 (L) 07/04/2022 04:45 PM    HEMATOCRIT 38.8 (L) 07/04/2022 04:45 PM    CREACTPROT 0.78 (H) 06/29/2022 06:51 PM    SEDRATEWES 27 (H) 06/29/2022 06:51 PM    HBA1C 6.3 (H) 06/11/2022 03:57 PM        Culture Results show:  Recent Results (from the past 720 hour(s))   CULTURE WOUND W/ GRAM STAIN    Collection Time: 06/14/22  9:29 AM    Specimen: Wound   Result Value Ref Range    Significant Indicator POS (POS)     Source WND     Site Left Lateral Ankle     Culture Result No growth at 72 hours. (A)     Gram Stain Result Rare WBCs.  No organisms seen.       Culture Result - (A)        Pain Level/Medicated:  Denied pain       INTERVENTIONS BY WOUND TEAM:  Performed standard wound care which includes appropriate positioning, dressing removal and non-selective debridement. Pictures and measurements obtained weekly if/when required.    Preparation for Dressing removal: Dressing soaked  with wound cleanser  Non-selectively Debrided with:  wound cleanser and gauze.  Sharp debridement: n/a  Rosa M wound: Cleansed with wound cleanser, Prepped with no sting,   Primary Dressin/2 thickness black foam placed into wound bed and secured with drape - bridged a short distance anterior proximal.    Secondary (Outer) Dressing: Trac pad. No leak detected, seal intact. mepilex placed for offloading, Tubi  D applied and then the offloading boot applied.    Interdisciplinary consultation: Patient, Bedside RN    EVALUATION / RATIONALE FOR TREATMENT:  Most Recent Date:    22: Wound bed remains dry and with no granulation tissue. Increased amount of Dakins instillation, however if wound continues to be dry at next change, patient may benefit from switching to NS instillation. No odor or purulence noted to wound bed. Pt confused at this time, in restraints and with telesitter at bedside.     ** This RN was unable to assess R 3rd toe, for which wound team was consulted. If possible, please assess at next VAC change. **     22: Patient's wound vac was intact, no granular tissue present but surrounding redness is not present, no odor.  Wound looks dry.  Added tubi  D per Edwin FUENTESN  22 Pt confused and pulls at VAC tubing.  Pt pulled tubing off 5 minutes after placement.  Replaced tubing, secured tubing with large piece of drape.  Placed diabetic boot on leg to try to keep pt from pulling on tubing.  Wound bed developing biofilm.  Will order dakins to instill in VAC.    22: Maceration still present, applied paste ring around periwound, added micah to the base of the wound.  Continue NPWT.  Tubi  added for light compression   22: Patient's ankle was a little macerated, but turned pink prior to re-application, wound bed appears nice and red for first dressing change post-op.  Continue NPWT     Goals: Steady decrease in wound area and depth weekly.    WOUND TEAM PLAN OF CARE ([X] for  frequency of wound follow up,): X  Nursing to follow dressing orders written for wound care. Contact wound team if area fails to progress, deteriorates or with any questions/concerns if something comes up before next scheduled follow up (See below as to whether wound is following and frequency of wound follow up)  Dressing changes by wound team:                   Follow up 3 times weekly:                NPWT change 3 times weekly:  XMWF   Follow up 1-2 times weekly:      Follow up Bi-Monthly:                   Follow up as needed:     Other (explain):     NURSING PLAN OF CARE ORDERS (X):  Dressing changes: See Dressing Care orders: X  Skin care: See Skin Care orders: X  RN Prevention Protocol: X  Rectal tube care: See Rectal Tube Care orders:   Other orders:    RSKIN:   CURRENTLY IN PLACE (X), APPLIED THIS VISIT (A), ORDERED (O):   Q shift Montrell:  X  Q shift pressure point assessments:  X    Surface/Positioning X  Pressure redistribution mattressX            Low Airloss          Bariatric foam      Bariatric AKILA     Waffle cushion        Waffle Overlay          Reposition q 2 hours  X    TAPs Turning system     [place] Pillow     Offloading/Redistribution A  Sacral Mepilex (Silicone dressing)     Heel Mepilex (Silicone dressing)      A   Heel float boots (Prevalon boot)             Float Heels off Bed with Pillows           Respiratory Room air  Silicone O2 tubing         Gray Foam Ear protectors     Cannula fixation Device (Tender )          High flow offloading Clip    Elastic head band offloading device      Anchorfast                                                         Trach with Optifoam split foam             Containment/Moisture Prevention continent    Rectal tube or BMS    Purwick/Condom Cath        Martinez Catheter    Barrier wipes           Barrier paste       Antifungal tx      Interdry        Mobilization offloading boot      Up to chair        Ambulate      PT/OT      Nutrition PO      Dietician         Diabetes Education      PO     TF     TPN     NPO   # days     Other        Anticipated discharge plans: MONAE  LTACH:        SNF/Rehab:                  Home Health Care:           Outpatient Wound Center:            Self/Family Care:        Other:                  Vac Discharge Needs: X  Not Applicable Pt not on a wound vac:       Regular Vac while inpatient, alternative dressing at DC:        Regular Vac in use and continued at DC:      X      Reg. Vac w/ Skin Sub/Biologic in use. Will need to be changed 2x wkly:      Veraflo Vac while inpatient, ok to transition to Regular Vac on Discharge:           Veraflo Vac while inpatient, will need to remain on Veraflo Vac upon discharge:

## 2022-07-06 NOTE — PROGRESS NOTES
Pt A&Ox1, disoriented to place, time and situation. Room air. Barbosa catheter in place. PICC line to right arm. Wound vac dressing changed to foot, oxycodone given before. Continued bilateral wrist restraints and telesitter. When allowing pt to perform ROM pt continuously tries to take off black walking boot to left foot and pull at barbosa catheter. Pt educated each time. Family at bedside throughout the shift. Walked with physical therapy.

## 2022-07-06 NOTE — THERAPY
Physical Therapy   Daily Treatment     Patient Name: Andrei Garay  Age:  82 y.o., Sex:  male  Medical Record #: [id number]  Today's Date: 2022     Precautions  Precautions: Fall Risk;Weight Bearing As Tolerated Left Lower Extremity;Weight Bearing As Tolerated Right Lower Extremity  Comments: CAM boot Lt LE, WBAT B LE's, wound vac+    Assessment    PT tx order received, chart reviewed, FLORES UGALDE cleared pt for PT tx.Pt ID via name and  and was agreeable to PT tx. Pt tolerated treatment as per below treatment grid with fair effort.  See grid below for details on session.  OOB was encouraged this date and with daughter's encouragement, pt was agreeable to participate. Upon completion of session pt was left BTB, personal needs met, quarter turned towards Rt for PrU relief, B heels offloaded, B UE wrist restraints re-donned, bed alarm ON and activated, duaghter at bedside, AVASURE in place.       Plan    Continue current treatment plan.    DC Equipment Recommendations: Unable to determine at this time  Discharge Recommendations: Recommend post-acute placement for additional physical therapy services prior to discharge home       Objective       22 0939   Charge Group   Charges  Yes   Total Time Spent   PT Total Time Yes   PT Gait Training Time Spent (Mins) 15   PT Therapeutic Activities Time Spent (Mins) 15   PT Total Time Spent (Calculated) 30   Precautions   Precautions Fall Risk;Weight Bearing As Tolerated Left Lower Extremity;Weight Bearing As Tolerated Right Lower Extremity   Comments CAM boot Lt LE, WBAT B LE's, wound vac+   Vitals   Pulse Oximetry 92 %   O2 (LPM) 0   O2 Delivery Device None - Room Air   Pain   Intervention Declines   Pain 0 - 10 Group   Pain Rating Scale (NPRS) 0   Comfort Goal Comfort with Movement   Therapist Pain Assessment Prior to Activity;0  (declines pain during session)   Non Verbal Descriptors   Non Verbal Scale  Calm;Sleeping   Cognition    Cognition / Consciousness X    Orientation Level Not Oriented to Year;Not Oriented to Month;Not Oriented to Day;Not Oriented to Time;Not Oriented to Reason;Not Oriented to Place   Level of Consciousness Alert   Ability To Follow Commands 1 Step   Safety Awareness Impaired;Impulsive   New Learning Impaired   Attention Impaired   Comments confused but cooperative with daughter at bedside   Active ROM Lower Body    Active ROM Lower Body  X   Comments grossly WFL pt at times will yell out in pain with movement from LE's but reports pain is in genital region from catheter   Strength Lower Body   Lower Body Strength  X   Comments grossly 3/5 B LE's   Balance   Sitting Balance (Static) Fair -   Sitting Balance (Dynamic) Fair -   Standing Balance (Static) Fair -   Standing Balance (Dynamic) Trace +   Weight Shift Sitting Fair   Weight Shift Standing Poor   Skilled Intervention Compensatory Strategies;Verbal Cuing;Tactile Cuing   Comments standing with FWW   Gait Analysis   Gait Level Of Assist Minimal Assist   Assistive Device Front Wheel Walker   Distance (Feet) 4   # of Times Distance was Traveled 2   Deviation Decreased Base Of Support;Shuffled Gait;Decreased Toe Off   # of Stairs Climbed 0   Weight Bearing Status WBAT BLE,  LLE CAM boot   Skilled Intervention Verbal Cuing;Tactile Cuing;Sequencing;Postural Facilitation;Compensatory Strategies   Comments Pt able to sidestep along EOB ~ 4' x 2 with FWW requiring min A for balance. Pt impulsive behavior, cues to hold onto FWW for safety, unsteady but no significant LOB or buckling noted   Bed Mobility    Supine to Sit Moderate Assist   Sit to Supine Minimal Assist   Scooting Minimal Assist   Comments mod A for trunk upriht for supine to sit.  Sit->supine min A for ~ 20% assist of LE's back to bed   Functional Mobility   Sit to Stand Minimal Assist   Skilled Intervention Verbal Cuing;Tactile Cuing;Compensatory Strategies   Comments standing with FWW, min A for balance. Completed x 4 reps   How much  difficulty does the patient currently have...   Turning over in bed (including adjusting bedclothes, sheets and blankets)? 2   Sitting down on and standing up from a chair with arms (e.g., wheelchair, bedside commode, etc.) 3   Moving from lying on back to sitting on the side of the bed? 3   How much help from another person does the patient currently need...   Moving to and from a bed to a chair (including a wheelchair)? 2   Need to walk in a hospital room? 2   Climbing 3-5 steps with a railing? 1   6 clicks Mobility Score 13   Activity Tolerance   Comments standing with FWW   Patient / Family Goals    Patient / Family Goal [facility]   Short Term Goals    Short Term Goal # 1 Pt will perform supine <> sit with SPV in 6 visits to return to PLOF   Goal Outcome # 1 Progressing slower than expected   Short Term Goal # 2 Pt will perform STS transfers with FWW and SPV in 6 visits to improve functional independence   Goal Outcome # 2 Progressing slower than expected   Short Term Goal # 3 Pt will ambulate 25ft with FWW and SPV in 6 visits to ambulate <> BR   Goal Outcome # 3 Progressing slower than expected   Education Group   Education Provided Role of Physical Therapist   Role of Physical Therapist Patient Response Patient;Family;Acceptance;Explanation;Verbal Demonstration;Action Demonstration   Anticipated Discharge Equipment and Recommendations   DC Equipment Recommendations Unable to determine at this time   Discharge Recommendations Recommend post-acute placement for additional physical therapy services prior to discharge home   Interdisciplinary Plan of Care Collaboration   IDT Collaboration with  Nursing   Patient Position at End of Therapy In Bed;Bed Alarm On;Wrist Restraints Applied;Call Light within Reach;Tray Table within Reach;Family / Friend in Room  (handoff to RN completed, daughter at bedside)   Collaboration Comments bed alarm ON and actiated, quarter turned towards Rt for PrU relief, B heels  offloaded, B UE wrist restraints re-donned   Session Information   Date / Session Number  7/6-2(2/2,7/10)

## 2022-07-06 NOTE — PROGRESS NOTES
[facility] Medicine Daily Progress Note    Date of Service  7/6/2022    Chief Complaint  Andrei Garay is a 82 y.o. male admitted 6/29/2022 with left leg swelling and altered mental status    Hospital Course  Andrei Garay is a 82 y.o. male who presented 6/29/2022 with a history of type 2 diabetes, hyperlipidemia, hypertension, osteomyelitis who presents with right leg swelling and altered mental status.     According to the patient's daughter who is visiting him she states that he began becoming more confused and slurring his words yesterday.  He also reports that he had episode of incontinence which is not his normal baseline.  Has been receiving IV daptomycin for left lower leg osteomyelitis which was diagnosed 2 weeks ago, MRSA culture positive.    Emergency department ultrasound of the right lower extremity negative for DVT. Limb preservation services consulted and following.  Completed a bedside debridement and applied new dressings.  Sure of wound management patient was receiving it [facility].  Wound does not appear to be improved from prior hospitalization.  We will consult infectious disease for recommendations and continue to work with LPS for management.      Interval Problem Update  SHREYA overnight  SADIE improving, cont IVF  He is feeling ok  Started on flomax  BP elevated    I have discussed this patient's plan of care and discharge plan at IDT rounds today with Case Management, Nursing, Nursing leadership, and other members of the IDT team.    Consultants/Specialty  infectious disease  Limb preservation services    Code Status  DNAR/DNI    Disposition  Patient is not medically cleared for discharge.   Anticipate discharge to to skilled nursing facility.  I have placed the appropriate orders for post-discharge needs.    Review of Systems  Review of Systems   Constitutional: Negative for chills and fever.   Respiratory: Negative for cough and shortness of breath.    Cardiovascular:  Negative for chest pain and palpitations.   Gastrointestinal: Negative for abdominal pain, nausea and vomiting.   Genitourinary: Negative for dysuria and urgency.   Neurological: Negative for dizziness and headaches.   All other systems reviewed and are negative.       Physical Exam  Temp:  [36.4 °C (97.5 °F)-36.7 °C (98.1 °F)] 36.6 °C (97.8 °F)  Pulse:  [76-87] 84  Resp:  [18-19] 18  BP: (152-169)/(62-85) 169/72  SpO2:  [92 %-93 %] 92 %    Physical Exam  Vitals and nursing note reviewed.   Constitutional:       General: He is not in acute distress.     Appearance: He is obese. He is not ill-appearing.   HENT:      Head: Normocephalic and atraumatic.   Eyes:      General: No scleral icterus.        Right eye: No discharge.         Left eye: No discharge.   Cardiovascular:      Rate and Rhythm: Normal rate and regular rhythm.      Heart sounds: Normal heart sounds.   Pulmonary:      Effort: No respiratory distress.      Breath sounds: Normal breath sounds. No wheezing or rales.   Abdominal:      General: Abdomen is protuberant. There is no distension.      Palpations: Abdomen is soft.      Tenderness: There is no abdominal tenderness. There is no guarding.   Musculoskeletal:         General: No tenderness.   Skin:     Findings: Erythema and signs of injury present.      Comments: Left leg dressing in place   Neurological:      General: No focal deficit present.      Mental Status: He is alert. He is disoriented.      Cranial Nerves: No cranial nerve deficit.      Motor: Motor function is intact.      Comments: Aware of location and soft, unaware of time   Psychiatric:         Mood and Affect: Affect is flat.         Behavior: Behavior is hyperactive.         Cognition and Memory: Cognition is impaired. Memory is impaired.         Judgment: Judgment is impulsive.         Fluids    Intake/Output Summary (Last 24 hours) at 7/6/2022 1140  Last data filed at 7/6/2022 1000  Gross per 24 hour   Intake 920 ml   Output 1500  ml   Net -580 ml       Laboratory  Recent Labs     07/04/22  1645   WBC 11.6*   RBC 4.42*   HEMOGLOBIN 12.8*   HEMATOCRIT 38.8*   MCV 87.8   MCH 29.0   MCHC 33.0*   RDW 44.8   PLATELETCT 295   MPV 9.5     Recent Labs     07/04/22  1645 07/05/22  1205   SODIUM 140 139   POTASSIUM 3.4* 3.2*   CHLORIDE 104 104   CO2 21 24   GLUCOSE 220* 165*   BUN 19 23*   CREATININE 1.95* 1.40   CALCIUM 9.0 9.4     Recent Labs     07/04/22  1645   APTT 33.5   INR 1.32*               Imaging  US-EXTREMITY VENOUS LOWER UNILAT RIGHT   Final Result      CT-HEAD W/O   Final Result         No acute intracranial abnormality.            DX-ANKLE 3+ VIEWS LEFT   Final Result         Bony destruction in the lateral aspect of the distal fibula is consistent with osteomyelitis described on recent MRI. Overlying soft tissue edema and a small amount of soft tissue gas      DX-CHEST-PORTABLE (1 VIEW)   Final Result      1.  Increased perihilar interstitial markings are suggestive of pulmonary edema.      2.  Stable mild cardiomegaly           Assessment/Plan  * Encephalopathy  Assessment & Plan  Likely secondary to worsening cellulitis of the right leg.    Patient has been receiving IV daptomycin for osteomyelitis of the left leg, will continue    Avoid sedating medications  Improving    Urinary retention  Assessment & Plan  Bloody clots seen upon straight cath- no blood there after  hgb stable  Start flomax  Will attempt barbosa removal tomorrow    Osteomyelitis (HCC)- (present on admission)  Assessment & Plan  Patient with osteomyelitis currently on daptomycin.   CRP mildly elevated  LPS    SADIE (acute kidney injury) (HCC)  Assessment & Plan  Monitor BMP and assess response  Avoid IV contrast/nephrotoxins/NSAIDs  Dose adjust meds for decreased GFR  Cont IVF    Type 2 diabetes mellitus (HCC)- (present on admission)  Assessment & Plan  Last A1c was 6.33 weeks ago.  Hold metformin  Insulin sliding scale  Diabetic diet    Cellulitis- (present on  admission)  Assessment & Plan  Daughter reports that the patient's right leg is more red and swollen than normal.  Doppler ultrasound does not show DVT  ID consulted, patient reportedly had removed his wound VAC while at SNF  Continue the daptomycin (until 7/23) and Unasyn (until 7/6)    Dementia with behavioral disturbance (HCC)- (present on admission)  Assessment & Plan  History of dementia.  Oriented x 2, unsure of baseline  Avoid sedating medications    Hypertension- (present on admission)  Assessment & Plan  Continue home medication lisinopril and metoprolol      Hyperlipidemia- (present on admission)  Assessment & Plan  Continue home med atorvastatin       VTE prophylaxis: enoxaparin ppx    I have performed a physical exam and reviewed and updated ROS and Plan today (7/6/2022). In review of yesterday's note (7/5/2022), there are no changes except as documented above.

## 2022-07-06 NOTE — PROGRESS NOTES
Alert x2, bed alarm on and near nurses station. bilat wrist restraints in place for pulling at lines/tubes. Wound vac in place to sxn as ordered. Cont plan of care, call light within reach

## 2022-07-07 LAB
ANION GAP SERPL CALC-SCNC: 11 MMOL/L (ref 7–16)
BUN SERPL-MCNC: 17 MG/DL (ref 8–22)
CALCIUM SERPL-MCNC: 8.9 MG/DL (ref 8.5–10.5)
CHLORIDE SERPL-SCNC: 105 MMOL/L (ref 96–112)
CK SERPL-CCNC: 86 U/L (ref 0–154)
CO2 SERPL-SCNC: 21 MMOL/L (ref 20–33)
CREAT SERPL-MCNC: 0.85 MG/DL (ref 0.5–1.4)
GFR SERPLBLD CREATININE-BSD FMLA CKD-EPI: 87 ML/MIN/1.73 M 2
GLUCOSE BLD STRIP.AUTO-MCNC: 161 MG/DL (ref 65–99)
GLUCOSE BLD STRIP.AUTO-MCNC: 166 MG/DL (ref 65–99)
GLUCOSE BLD STRIP.AUTO-MCNC: 209 MG/DL (ref 65–99)
GLUCOSE SERPL-MCNC: 227 MG/DL (ref 65–99)
POTASSIUM SERPL-SCNC: 3.4 MMOL/L (ref 3.6–5.5)
SODIUM SERPL-SCNC: 137 MMOL/L (ref 135–145)

## 2022-07-07 PROCEDURE — 51798 US URINE CAPACITY MEASURE: CPT

## 2022-07-07 PROCEDURE — 700111 HCHG RX REV CODE 636 W/ 250 OVERRIDE (IP): Performed by: HOSPITALIST

## 2022-07-07 PROCEDURE — 700102 HCHG RX REV CODE 250 W/ 637 OVERRIDE(OP): Performed by: STUDENT IN AN ORGANIZED HEALTH CARE EDUCATION/TRAINING PROGRAM

## 2022-07-07 PROCEDURE — 770001 HCHG ROOM/CARE - MED/SURG/GYN PRIV*

## 2022-07-07 PROCEDURE — A9270 NON-COVERED ITEM OR SERVICE: HCPCS | Performed by: STUDENT IN AN ORGANIZED HEALTH CARE EDUCATION/TRAINING PROGRAM

## 2022-07-07 PROCEDURE — 82550 ASSAY OF CK (CPK): CPT

## 2022-07-07 PROCEDURE — 99233 SBSQ HOSP IP/OBS HIGH 50: CPT | Performed by: HOSPITALIST

## 2022-07-07 PROCEDURE — 700102 HCHG RX REV CODE 250 W/ 637 OVERRIDE(OP): Performed by: HOSPITALIST

## 2022-07-07 PROCEDURE — A9270 NON-COVERED ITEM OR SERVICE: HCPCS | Performed by: HOSPITALIST

## 2022-07-07 PROCEDURE — 700105 HCHG RX REV CODE 258: Performed by: HOSPITALIST

## 2022-07-07 PROCEDURE — 80048 BASIC METABOLIC PNL TOTAL CA: CPT

## 2022-07-07 PROCEDURE — 700111 HCHG RX REV CODE 636 W/ 250 OVERRIDE (IP): Performed by: STUDENT IN AN ORGANIZED HEALTH CARE EDUCATION/TRAINING PROGRAM

## 2022-07-07 PROCEDURE — 82962 GLUCOSE BLOOD TEST: CPT

## 2022-07-07 PROCEDURE — 36415 COLL VENOUS BLD VENIPUNCTURE: CPT

## 2022-07-07 RX ORDER — POTASSIUM CHLORIDE 20 MEQ/1
40 TABLET, EXTENDED RELEASE ORAL ONCE
Status: COMPLETED | OUTPATIENT
Start: 2022-07-07 | End: 2022-07-07

## 2022-07-07 RX ORDER — QUETIAPINE FUMARATE 25 MG/1
12.5 TABLET, FILM COATED ORAL 2 TIMES DAILY
Status: DISCONTINUED | OUTPATIENT
Start: 2022-07-07 | End: 2022-07-08

## 2022-07-07 RX ORDER — TAMSULOSIN HYDROCHLORIDE 0.4 MG/1
0.8 CAPSULE ORAL
Status: DISCONTINUED | OUTPATIENT
Start: 2022-07-08 | End: 2022-07-25 | Stop reason: HOSPADM

## 2022-07-07 RX ADMIN — SERTRALINE 50 MG: 100 TABLET, FILM COATED ORAL at 17:20

## 2022-07-07 RX ADMIN — OXYCODONE 5 MG: 5 TABLET ORAL at 21:10

## 2022-07-07 RX ADMIN — DAPTOMYCIN 750 MG: 500 INJECTION, POWDER, LYOPHILIZED, FOR SOLUTION INTRAVENOUS at 02:13

## 2022-07-07 RX ADMIN — INSULIN HUMAN 1 UNITS: 100 INJECTION, SOLUTION PARENTERAL at 09:38

## 2022-07-07 RX ADMIN — INSULIN HUMAN 2 UNITS: 100 INJECTION, SOLUTION PARENTERAL at 17:18

## 2022-07-07 RX ADMIN — ATORVASTATIN CALCIUM 40 MG: 40 TABLET, FILM COATED ORAL at 17:20

## 2022-07-07 RX ADMIN — TAMSULOSIN HYDROCHLORIDE 0.4 MG: 0.4 CAPSULE ORAL at 10:26

## 2022-07-07 RX ADMIN — DAPTOMYCIN 750 MG: 500 INJECTION, POWDER, LYOPHILIZED, FOR SOLUTION INTRAVENOUS at 22:20

## 2022-07-07 RX ADMIN — POTASSIUM CHLORIDE 40 MEQ: 1500 TABLET, EXTENDED RELEASE ORAL at 10:25

## 2022-07-07 RX ADMIN — ENOXAPARIN SODIUM 40 MG: 40 INJECTION SUBCUTANEOUS at 17:21

## 2022-07-07 RX ADMIN — QUETIAPINE FUMARATE 12.5 MG: 25 TABLET ORAL at 10:26

## 2022-07-07 RX ADMIN — SENNOSIDES AND DOCUSATE SODIUM 2 TABLET: 50; 8.6 TABLET ORAL at 17:20

## 2022-07-07 RX ADMIN — AMLODIPINE BESYLATE 10 MG: 10 TABLET ORAL at 05:13

## 2022-07-07 RX ADMIN — QUETIAPINE FUMARATE 12.5 MG: 25 TABLET ORAL at 17:24

## 2022-07-07 RX ADMIN — METOPROLOL TARTRATE 25 MG: 25 TABLET, FILM COATED ORAL at 09:31

## 2022-07-07 RX ADMIN — METOPROLOL TARTRATE 25 MG: 25 TABLET, FILM COATED ORAL at 21:07

## 2022-07-07 RX ADMIN — Medication 10 MG: at 21:07

## 2022-07-07 RX ADMIN — MIRTAZAPINE 15 MG: 15 TABLET, FILM COATED ORAL at 21:08

## 2022-07-07 ASSESSMENT — ENCOUNTER SYMPTOMS
NAUSEA: 0
ABDOMINAL PAIN: 0
CHILLS: 0
PALPITATIONS: 0
FEVER: 0
HEADACHES: 0
SHORTNESS OF BREATH: 0
DIZZINESS: 0
VOMITING: 0
COUGH: 0

## 2022-07-07 ASSESSMENT — PAIN SCALES - WONG BAKER: WONGBAKER_NUMERICALRESPONSE: DOESN'T HURT AT ALL

## 2022-07-07 NOTE — DISCHARGE PLANNING
Case Management Discharge Planning    Admission Date: 6/29/2022  GMLOS: 4.3  ALOS: 8    6-Clicks ADL Score: 10  6-Clicks Mobility Score: 15  PT and/or OT Eval ordered: Yes  Post-acute Referrals Ordered: Yes  Post-acute Choice Obtained: NA  Has referral(s) been sent to post-acute provider:  FANNY      Anticipated Discharge Dispo: Discharge Disposition: D/T to SNF with Medicare cert in anticipation of skilled care (03)    DME Needed: No    Action(s) Taken: OTHER, none    Escalations Completed: None    Medically Clear: No    Next Steps: Patient continues to be in bilat. Wrist restraints with telesitter d/t pulling at lines and tubes, A/O x1-2 with dx of advanced dementia. DC plan is to return patient to [facility] when medically appropriate.    Barriers to Discharge: Medical clearance    Is the patient up for discharge tomorrow: No

## 2022-07-07 NOTE — PROGRESS NOTES
[facility] Medicine Daily Progress Note    Date of Service  7/7/2022    Chief Complaint  Andrei Garay is a 82 y.o. male admitted 6/29/2022 with left leg swelling and altered mental status    Hospital Course  Andrei Garay is a 82 y.o. male who presented 6/29/2022 with a history of type 2 diabetes, hyperlipidemia, hypertension, osteomyelitis who presents with right leg swelling and altered mental status.     According to the patient's daughter who is visiting him she states that he began becoming more confused and slurring his words yesterday.  He also reports that he had episode of incontinence which is not his normal baseline.  Has been receiving IV daptomycin for left lower leg osteomyelitis which was diagnosed 2 weeks ago, MRSA culture positive.    Emergency department ultrasound of the right lower extremity negative for DVT. Limb preservation services consulted and following.  Completed a bedside debridement and applied new dressings.  Sure of wound management patient was receiving it [facility] facility.  Wound does not appear to be improved from prior hospitalization.  We will consult infectious disease for recommendations and continue to work with LPS for management.      Interval Problem Update  SADIE resolved  Potassium replaced  Started on seroquel to help facilitate removing restraints  Will trial off barbosa  Appears more mentally clear today  Family at bedside, all questions answered    I have discussed this patient's plan of care and discharge plan at IDT rounds today with Case Management, Nursing, Nursing leadership, and other members of the IDT team.    Consultants/Specialty  infectious disease  Limb preservation services    Code Status  DNAR/DNI    Disposition  Patient is not medically cleared for discharge.   Anticipate discharge to to skilled nursing facility.  I have placed the appropriate orders for post-discharge needs.    Review of Systems  Review of Systems   Constitutional: Negative  for chills and fever.   Respiratory: Negative for cough and shortness of breath.    Cardiovascular: Negative for chest pain and palpitations.   Gastrointestinal: Negative for abdominal pain, nausea and vomiting.   Genitourinary: Negative for dysuria and urgency.   Neurological: Negative for dizziness and headaches.   All other systems reviewed and are negative.       Physical Exam  Temp:  [36.5 °C (97.7 °F)-36.8 °C (98.3 °F)] 36.5 °C (97.7 °F)  Pulse:  [67-79] 67  Resp:  [17-18] 18  BP: (144-175)/(69-95) 158/71  SpO2:  [92 %-95 %] 93 %    Physical Exam  Vitals and nursing note reviewed.   Constitutional:       General: He is not in acute distress.     Appearance: He is obese. He is not ill-appearing.   HENT:      Head: Normocephalic and atraumatic.   Eyes:      General: No scleral icterus.        Right eye: No discharge.         Left eye: No discharge.   Cardiovascular:      Rate and Rhythm: Normal rate and regular rhythm.      Heart sounds: Normal heart sounds.   Pulmonary:      Effort: No respiratory distress.      Breath sounds: Normal breath sounds. No wheezing or rales.   Abdominal:      General: Abdomen is protuberant. There is no distension.      Palpations: Abdomen is soft.      Tenderness: There is no abdominal tenderness. There is no guarding.   Musculoskeletal:         General: No tenderness.   Skin:     Findings: Erythema and signs of injury present.      Comments: Left leg dressing in place   Neurological:      General: No focal deficit present.      Mental Status: He is alert. He is disoriented.      Cranial Nerves: No cranial nerve deficit.      Motor: Motor function is intact.      Comments: Aware of location and soft, unaware of time   Psychiatric:         Mood and Affect: Affect is flat.         Behavior: Behavior is hyperactive.         Cognition and Memory: Cognition is impaired. Memory is impaired.         Judgment: Judgment is impulsive.         Fluids    Intake/Output Summary (Last 24 hours)  at 7/7/2022 1059  Last data filed at 7/7/2022 0415  Gross per 24 hour   Intake 600 ml   Output 1950 ml   Net -1350 ml       Laboratory  Recent Labs     07/04/22  1645   WBC 11.6*   RBC 4.42*   HEMOGLOBIN 12.8*   HEMATOCRIT 38.8*   MCV 87.8   MCH 29.0   MCHC 33.0*   RDW 44.8   PLATELETCT 295   MPV 9.5     Recent Labs     07/04/22  1645 07/05/22  1205 07/06/22  1102   SODIUM 140 139 136   POTASSIUM 3.4* 3.2* 3.1*   CHLORIDE 104 104 101   CO2 21 24 23   GLUCOSE 220* 165* 230*   BUN 19 23* 19   CREATININE 1.95* 1.40 0.87   CALCIUM 9.0 9.4 9.1     Recent Labs     07/04/22  1645   APTT 33.5   INR 1.32*               Imaging  US-EXTREMITY VENOUS LOWER UNILAT RIGHT   Final Result      CT-HEAD W/O   Final Result         No acute intracranial abnormality.            DX-ANKLE 3+ VIEWS LEFT   Final Result         Bony destruction in the lateral aspect of the distal fibula is consistent with osteomyelitis described on recent MRI. Overlying soft tissue edema and a small amount of soft tissue gas      DX-CHEST-PORTABLE (1 VIEW)   Final Result      1.  Increased perihilar interstitial markings are suggestive of pulmonary edema.      2.  Stable mild cardiomegaly           Assessment/Plan  * Encephalopathy  Assessment & Plan  Likely secondary to worsening cellulitis of the right leg.    Patient has been receiving IV daptomycin for osteomyelitis of the left leg, will continue    Avoid sedating medications  Improving    Urinary retention  Assessment & Plan  Bloody clots seen upon straight cath- no blood there after  hgb stable  Start flomax  Will attempt barbosa removal today    Osteomyelitis (HCC)- (present on admission)  Assessment & Plan  Patient with osteomyelitis currently on daptomycin.   CRP mildly elevated  LPS    SADIE (acute kidney injury) (HCC)  Assessment & Plan  ersolved    Type 2 diabetes mellitus (HCC)- (present on admission)  Assessment & Plan  Last A1c was 6.33 weeks ago.  Hold metformin  Insulin sliding scale  Diabetic  diet    Cellulitis- (present on admission)  Assessment & Plan  Daughter reports that the patient's right leg is more red and swollen than normal.  Doppler ultrasound does not show DVT  ID consulted, patient reportedly had removed his wound VAC while at SNF  Continue the daptomycin (until 7/23) and Unasyn (until 7/6)    Dementia with behavioral disturbance (HCC)- (present on admission)  Assessment & Plan  History of dementia.  Oriented x 2, unsure of baseline  seroqeul 12.5mg to facilitate off restraints    Hypertension- (present on admission)  Assessment & Plan  Continue home medication lisinopril and metoprolol      Hyperlipidemia- (present on admission)  Assessment & Plan  Continue home med atorvastatin       VTE prophylaxis: enoxaparin ppx    I have performed a physical exam and reviewed and updated ROS and Plan today (7/7/2022). In review of yesterday's note (7/6/2022), there are no changes except as documented above.

## 2022-07-07 NOTE — CARE PLAN
The patient is Stable - Low risk of patient condition declining or worsening    Shift Goals  Clinical Goals: Safety  Patient Goals: remove restraints  Family Goals: N/A    Progress made toward(s) clinical / shift goals:  Telesitter and restraints in place for safety and due to patient pulling at lines. When performing ROM, patient tugs at barbosa and wound vac. Bed is in lowest/locked position. Call light and belongings are within reach.    Patient is not progressing towards the following goals:  Problem: Knowledge Deficit - Standard  Goal: Patient and family/care givers will demonstrate understanding of plan of care, disease process/condition, diagnostic tests and medications  Outcome: Not Progressing

## 2022-07-07 NOTE — PROGRESS NOTES
Alert x1-2 bed alarm on, wrist restraints for pulling at lines and drains and telesitter because able to get to tubes and lines with restraints on. Constantly using his hands to get to tubes and lines; monitor with direct eye contact from nurses station

## 2022-07-07 NOTE — PROGRESS NOTES
"Received bedside report from night shift RN.   Assumed care of patient at change of shift.   Assessment complete and POC discussed.   Patient is A&Ox2 (disoriented to event and place), VSS, on RA.   Bilateral soft wrist restraints and telesitter in place due to patient pulling at lines.  Barbosa patent and draining appropriately.  Wound vac on and intact to left ankle.  Denies pain, no apparent signs of distress or discomfort.   Patient is sitting up in bed for breakfast w/ family at bedside.  Bed is in lowest/locked position.   Call light and belongings are within reach.   No further needs at this time.    1215: Barbosa removed for voiding trial. Will bladder scan ~6 hours.    1245: Patient became very irritable after being cleaned up from a BM. Attempted to check patient's blood sugar prior to lunch. Patient adamantly refused. States \"I will end you, knock it off\". Educated patient on importance of checking blood sugar prior to lunch. Patient would not allow this RN to see his hand.    1800: Bladder scan = 516. Patient reports no urge to urinate. Paged on call hospitalist to discuss inserting barbosa vs straight cath.    1810: Per MD, straight cath patient x1 and recheck bladder scan in 6 hours. MD adjusted flomax from 0.4mg to 0.8mg.     1840: Straight cath'd - received 450 of yellow urine output. Light blood tinge urine noted when removing straight cath.  "

## 2022-07-08 LAB
ANION GAP SERPL CALC-SCNC: 11 MMOL/L (ref 7–16)
BUN SERPL-MCNC: 18 MG/DL (ref 8–22)
CALCIUM SERPL-MCNC: 8.6 MG/DL (ref 8.5–10.5)
CHLORIDE SERPL-SCNC: 101 MMOL/L (ref 96–112)
CO2 SERPL-SCNC: 22 MMOL/L (ref 20–33)
CREAT SERPL-MCNC: 0.85 MG/DL (ref 0.5–1.4)
GFR SERPLBLD CREATININE-BSD FMLA CKD-EPI: 87 ML/MIN/1.73 M 2
GLUCOSE BLD STRIP.AUTO-MCNC: 176 MG/DL (ref 65–99)
GLUCOSE BLD STRIP.AUTO-MCNC: 205 MG/DL (ref 65–99)
GLUCOSE BLD STRIP.AUTO-MCNC: 212 MG/DL (ref 65–99)
GLUCOSE BLD STRIP.AUTO-MCNC: 220 MG/DL (ref 65–99)
GLUCOSE SERPL-MCNC: 201 MG/DL (ref 65–99)
MAGNESIUM SERPL-MCNC: 1.6 MG/DL (ref 1.5–2.5)
PHOSPHATE SERPL-MCNC: 2.5 MG/DL (ref 2.5–4.5)
POTASSIUM SERPL-SCNC: 2.9 MMOL/L (ref 3.6–5.5)
SODIUM SERPL-SCNC: 134 MMOL/L (ref 135–145)

## 2022-07-08 PROCEDURE — 302098 PASTE RING (FLAT): Performed by: HOSPITALIST

## 2022-07-08 PROCEDURE — A9270 NON-COVERED ITEM OR SERVICE: HCPCS | Performed by: STUDENT IN AN ORGANIZED HEALTH CARE EDUCATION/TRAINING PROGRAM

## 2022-07-08 PROCEDURE — 700111 HCHG RX REV CODE 636 W/ 250 OVERRIDE (IP): Performed by: HOSPITALIST

## 2022-07-08 PROCEDURE — 770001 HCHG ROOM/CARE - MED/SURG/GYN PRIV*

## 2022-07-08 PROCEDURE — 306591 TRAY SUTURE REMOVAL DISP: Performed by: HOSPITALIST

## 2022-07-08 PROCEDURE — 700102 HCHG RX REV CODE 250 W/ 637 OVERRIDE(OP): Performed by: STUDENT IN AN ORGANIZED HEALTH CARE EDUCATION/TRAINING PROGRAM

## 2022-07-08 PROCEDURE — 36415 COLL VENOUS BLD VENIPUNCTURE: CPT

## 2022-07-08 PROCEDURE — 80048 BASIC METABOLIC PNL TOTAL CA: CPT

## 2022-07-08 PROCEDURE — A9270 NON-COVERED ITEM OR SERVICE: HCPCS | Performed by: HOSPITALIST

## 2022-07-08 PROCEDURE — 83735 ASSAY OF MAGNESIUM: CPT

## 2022-07-08 PROCEDURE — 99232 SBSQ HOSP IP/OBS MODERATE 35: CPT | Performed by: HOSPITALIST

## 2022-07-08 PROCEDURE — 82962 GLUCOSE BLOOD TEST: CPT | Mod: 91

## 2022-07-08 PROCEDURE — 700111 HCHG RX REV CODE 636 W/ 250 OVERRIDE (IP): Performed by: STUDENT IN AN ORGANIZED HEALTH CARE EDUCATION/TRAINING PROGRAM

## 2022-07-08 PROCEDURE — 700105 HCHG RX REV CODE 258: Performed by: HOSPITALIST

## 2022-07-08 PROCEDURE — 51798 US URINE CAPACITY MEASURE: CPT

## 2022-07-08 PROCEDURE — 700102 HCHG RX REV CODE 250 W/ 637 OVERRIDE(OP): Performed by: HOSPITALIST

## 2022-07-08 PROCEDURE — 84100 ASSAY OF PHOSPHORUS: CPT

## 2022-07-08 PROCEDURE — 700101 HCHG RX REV CODE 250: Performed by: HOSPITALIST

## 2022-07-08 RX ORDER — QUETIAPINE FUMARATE 25 MG/1
12.5 TABLET, FILM COATED ORAL NIGHTLY
Status: DISCONTINUED | OUTPATIENT
Start: 2022-07-08 | End: 2022-07-10

## 2022-07-08 RX ORDER — POTASSIUM CHLORIDE 20 MEQ/1
40 TABLET, EXTENDED RELEASE ORAL DAILY
Status: DISPENSED | OUTPATIENT
Start: 2022-07-08 | End: 2022-07-11

## 2022-07-08 RX ORDER — MAGNESIUM SULFATE HEPTAHYDRATE 40 MG/ML
2 INJECTION, SOLUTION INTRAVENOUS ONCE
Status: COMPLETED | OUTPATIENT
Start: 2022-07-08 | End: 2022-07-08

## 2022-07-08 RX ADMIN — Medication 10 MG: at 20:54

## 2022-07-08 RX ADMIN — MIRTAZAPINE 15 MG: 15 TABLET, FILM COATED ORAL at 21:00

## 2022-07-08 RX ADMIN — POTASSIUM CHLORIDE 40 MEQ: 1500 TABLET, EXTENDED RELEASE ORAL at 11:29

## 2022-07-08 RX ADMIN — INSULIN HUMAN 2 UNITS: 100 INJECTION, SOLUTION PARENTERAL at 20:55

## 2022-07-08 RX ADMIN — MAGNESIUM SULFATE HEPTAHYDRATE 2 G: 40 INJECTION, SOLUTION INTRAVENOUS at 11:29

## 2022-07-08 RX ADMIN — DAKIN'S SOLUTION 0.125% (QUARTER STRENGTH) 1 ML: 0.12 SOLUTION at 06:00

## 2022-07-08 RX ADMIN — INSULIN HUMAN 1 UNITS: 100 INJECTION, SOLUTION PARENTERAL at 09:02

## 2022-07-08 RX ADMIN — METOPROLOL TARTRATE 25 MG: 25 TABLET, FILM COATED ORAL at 20:54

## 2022-07-08 RX ADMIN — OXYCODONE 5 MG: 5 TABLET ORAL at 05:20

## 2022-07-08 RX ADMIN — TAMSULOSIN HYDROCHLORIDE 0.8 MG: 0.4 CAPSULE ORAL at 12:53

## 2022-07-08 RX ADMIN — QUETIAPINE FUMARATE 12.5 MG: 25 TABLET ORAL at 20:54

## 2022-07-08 RX ADMIN — INSULIN HUMAN 2 UNITS: 100 INJECTION, SOLUTION PARENTERAL at 14:29

## 2022-07-08 RX ADMIN — DAPTOMYCIN 750 MG: 500 INJECTION, POWDER, LYOPHILIZED, FOR SOLUTION INTRAVENOUS at 23:53

## 2022-07-08 RX ADMIN — AMLODIPINE BESYLATE 10 MG: 10 TABLET ORAL at 05:20

## 2022-07-08 RX ADMIN — QUETIAPINE FUMARATE 12.5 MG: 25 TABLET ORAL at 05:20

## 2022-07-08 RX ADMIN — ENOXAPARIN SODIUM 40 MG: 40 INJECTION SUBCUTANEOUS at 17:26

## 2022-07-08 RX ADMIN — INSULIN HUMAN 2 UNITS: 100 INJECTION, SOLUTION PARENTERAL at 17:21

## 2022-07-08 RX ADMIN — SENNOSIDES AND DOCUSATE SODIUM 2 TABLET: 50; 8.6 TABLET ORAL at 17:27

## 2022-07-08 RX ADMIN — DAKIN'S SOLUTION 0.125% (QUARTER STRENGTH) 473 ML: 0.12 SOLUTION at 17:26

## 2022-07-08 RX ADMIN — SERTRALINE 50 MG: 100 TABLET, FILM COATED ORAL at 17:27

## 2022-07-08 RX ADMIN — ATORVASTATIN CALCIUM 40 MG: 40 TABLET, FILM COATED ORAL at 17:27

## 2022-07-08 ASSESSMENT — ENCOUNTER SYMPTOMS
CHILLS: 0
DIZZINESS: 0
FEVER: 0
ABDOMINAL PAIN: 0
NAUSEA: 0
SHORTNESS OF BREATH: 0
COUGH: 0
PALPITATIONS: 0
VOMITING: 0
HEADACHES: 0

## 2022-07-08 ASSESSMENT — PAIN DESCRIPTION - PAIN TYPE: TYPE: ACUTE PAIN

## 2022-07-08 NOTE — PROGRESS NOTES
Received bedside report from night shift RN.   Assumed care of patient at change of shift.   Assessment complete and POC discussed.   Unable to assess orientation, patient drowsy and not able to answer questions at time of assessment.  Wound vac on and intact to left ankle.  Patient denies pain, no apparent signs of distress or discomfort.   Bilateral soft wrist restraints and telesitter in place due to patient pulling at lines.  Son at bedside with patient.  Bed is in lowest/locked position.   Call light and belongings are within reach.   No further needs at this time.    1240: Barbosa inserted for retention. Per MD, clamp barbosa x6 hours then unclamp and repeat process to train bladder. Barbosa drained ~200mL and clamped.    1830: Patient reports no urge to urinate. Barbosa unclamped. 255 drained into barbosa bag. Barbosa clamped again.

## 2022-07-08 NOTE — PROGRESS NOTES
[facility] Daily Progress Note    Date of Service  7/8/2022    Chief Complaint  Andrei Garay is a 82 y.o. male admitted 6/29/2022 with left leg swelling and altered mental status    Hospital Course  Andrei Garay is a 82 y.o. male who presented 6/29/2022 with a history of type 2 diabetes, hyperlipidemia, hypertension, osteomyelitis who presents with right leg swelling and altered mental status.     According to the patient's daughter who is visiting him she states that he began becoming more confused and slurring his words yesterday.  He also reports that he had episode of incontinence which is not his normal baseline.  Has been receiving IV daptomycin for left lower leg osteomyelitis which was diagnosed 2 weeks ago, MRSA culture positive.    Emergency department ultrasound of the right lower extremity negative for DVT. Limb preservation services consulted and following.  Completed a bedside debridement and applied new dressings.  Sure of wound management patient was receiving it [facility].  Wound does not appear to be improved from prior hospitalization.  We will consult infectious disease for recommendations and continue to work with LPS for management.      Interval Problem Update  Pt failed barbosa trial, will replace barbosa and clamp  Potassium replaced  Too sleepy with BID seroquel, changed to qhs  Pt awakes to verbal stimuli and responds appropriately  Family at bedside, all questions answered    I have discussed this patient's plan of care and discharge plan at IDT rounds today with Case Management, Nursing, Nursing leadership, and other members of the IDT team.    Consultants/Specialty  infectious disease  Limb preservation services    Code Status  DNAR/DNI    Disposition  Patient is not medically cleared for discharge.   Anticipate discharge to to skilled nursing facility.  I have placed the appropriate orders for post-discharge needs.    Review of Systems  Review of Systems    Constitutional: Negative for chills and fever.   Respiratory: Negative for cough and shortness of breath.    Cardiovascular: Negative for chest pain and palpitations.   Gastrointestinal: Negative for abdominal pain, nausea and vomiting.   Genitourinary: Negative for dysuria and urgency.   Neurological: Negative for dizziness and headaches.   All other systems reviewed and are negative.       Physical Exam  Temp:  [35.9 °C (96.7 °F)-36.7 °C (98 °F)] 35.9 °C (96.7 °F)  Pulse:  [56-82] 56  Resp:  [18] 18  BP: (108-160)/(55-72) 108/55  SpO2:  [91 %-95 %] 91 %    Physical Exam  Vitals and nursing note reviewed.   Constitutional:       General: He is not in acute distress.     Appearance: He is obese. He is not ill-appearing.   HENT:      Head: Normocephalic and atraumatic.   Eyes:      General: No scleral icterus.        Right eye: No discharge.         Left eye: No discharge.   Cardiovascular:      Rate and Rhythm: Normal rate and regular rhythm.      Heart sounds: Normal heart sounds.   Pulmonary:      Effort: No respiratory distress.      Breath sounds: Normal breath sounds. No wheezing or rales.   Abdominal:      General: Abdomen is protuberant. There is no distension.      Palpations: Abdomen is soft.      Tenderness: There is no abdominal tenderness. There is no guarding.   Musculoskeletal:         General: No tenderness.   Skin:     Findings: Erythema and signs of injury present.      Comments: Left leg dressing in place   Neurological:      General: No focal deficit present.      Mental Status: He is alert. He is disoriented.      Cranial Nerves: No cranial nerve deficit.      Motor: Motor function is intact.      Comments: Aware of location and soft, unaware of time   Psychiatric:         Mood and Affect: Affect is flat.         Behavior: Behavior is hyperactive.         Cognition and Memory: Cognition is impaired. Memory is impaired.         Judgment: Judgment is impulsive.         Fluids    Intake/Output  Summary (Last 24 hours) at 7/8/2022 1215  Last data filed at 7/8/2022 0629  Gross per 24 hour   Intake 180 ml   Output 550 ml   Net -370 ml       Laboratory      Recent Labs     07/06/22  1102 07/07/22  1706 07/08/22  0856   SODIUM 136 137 134*   POTASSIUM 3.1* 3.4* 2.9*   CHLORIDE 101 105 101   CO2 23 21 22   GLUCOSE 230* 227* 201*   BUN 19 17 18   CREATININE 0.87 0.85 0.85   CALCIUM 9.1 8.9 8.6                   Imaging  US-EXTREMITY VENOUS LOWER UNILAT RIGHT   Final Result      CT-HEAD W/O   Final Result         No acute intracranial abnormality.            DX-ANKLE 3+ VIEWS LEFT   Final Result         Bony destruction in the lateral aspect of the distal fibula is consistent with osteomyelitis described on recent MRI. Overlying soft tissue edema and a small amount of soft tissue gas      DX-CHEST-PORTABLE (1 VIEW)   Final Result      1.  Increased perihilar interstitial markings are suggestive of pulmonary edema.      2.  Stable mild cardiomegaly           Assessment/Plan  * Encephalopathy  Assessment & Plan  Likely secondary to worsening cellulitis of the right leg.    Patient has been receiving IV daptomycin for osteomyelitis of the left leg, will continue    Avoid sedating medications  Improving    Urinary retention  Assessment & Plan  Cont flomax  Failed barbosa trial out  Will replace barbosa and start bladder training      Osteomyelitis (HCC)- (present on admission)  Assessment & Plan  Patient with osteomyelitis currently on daptomycin.   CRP mildly elevated  LPS    SADIE (acute kidney injury) (HCC)  Assessment & Plan  ersolved    Type 2 diabetes mellitus (HCC)- (present on admission)  Assessment & Plan  Last A1c was 6.33 weeks ago.  Hold metformin  Insulin sliding scale  Diabetic diet    Cellulitis- (present on admission)  Assessment & Plan  Daughter reports that the patient's right leg is more red and swollen than normal.  Doppler ultrasound does not show DVT  ID consulted, patient reportedly had removed his  wound VAC while at SNF  Continue the daptomycin (until 7/23) and Unasyn (until 7/6)    Dementia with behavioral disturbance (HCC)- (present on admission)  Assessment & Plan  History of dementia.  Oriented x 2, unsure of baseline  seroqeul 12.5mg to facilitate off restraints    Hypertension- (present on admission)  Assessment & Plan  Continue home medication lisinopril and metoprolol      Hyperlipidemia- (present on admission)  Assessment & Plan  Continue home med atorvastatin       VTE prophylaxis: enoxaparin ppx    I have performed a physical exam and reviewed and updated ROS and Plan today (7/8/2022). In review of yesterday's note (7/7/2022), there are no changes except as documented above.

## 2022-07-08 NOTE — WOUND TEAM
[facility]  Inpatient Services   Wound and Skin Care Evaluation    Admission Date: 6/10/2022     Last order of IP CONSULT TO WOUND CARE was found on 2022 from Hospital Encounter on 2022     HPI, PMH, SH: Reviewed    Past Surgical History:   Procedure Laterality Date   • IRRIGATION & DEBRIDEMENT GENERAL  2022    Procedure: IRRIGATION AND DEBRIDEMENT LEFT ANKLE;  Surgeon: Panfilo Wagner M.D.;  Location: SURGERY [facility];  Service: Orthopedics   • APPENDECTOMY     • STENT PLACEMENT      cardiac     Social History     Tobacco Use   • Smoking status: Former Smoker     Quit date: 2000     Years since quittin.8   • Smokeless tobacco: Never Used   Substance Use Topics   • Alcohol use: Never     Chief Complaint   Patient presents with   • Leg Swelling     Pt has new swelling to his left lower swelling and new confusion per staff at [facility] and daughter. Pt was incontinent of urine today and is usually continent. Pt is also weak and unable to walk like normal. Pt is A&O to self only today and is usually A&Ox3. Pt was recently admitted here for osteomyelitis of his left lower leg, an I&D, a positive MRSA culture of the left lower leg wound. Pt is currently on a daptomycin IV antibiotic regimen.        Diagnosis: Sepsis (HCC) [A41.9]    Unit where seen by Wound Team: S612/[id number]     WOUND CONSULT/FOLLOW UP RELATED TO:  L. Lateral ankle NPWT     WOUND HISTORY:  Andrei Garay is a 82 y.o. male past medical history of diabetes mellitus, hypertension who presented 6/10/2022 with altered mental status for last 3 days and left ankle pain.  History is primarily provided by daughter.  No relieving or exacerbating factors were noted.  He denies any fever does report some chills.  In the ER, x-ray of the ankle showed signs concerning for osteomyelitis.  UA grossly positive for urinary tract infection.  Patient met criteria for sepsis started on IV broad-spectrum antibiotics and  will be admitted for further work-up and limb preservation consult.    SX with Dr. Wagner 6/14/22    WOUND ASSESSMENT/LDA       Negative Pressure Wound Therapy 06/17/22 Surgical Ankle Lateral Left (Active)   NPWT Pump Mode / Pressure Setting Ulta;Continuous;125 mmHg 07/08/22 0930   Dressing Type Small;Black Foam (Veraflo) 07/08/22 0930   Number of Foam Pieces Used 2 07/08/22 0930   Canister Changed Yes 07/08/22 0930   Output (mL) 100 mL 07/08/22 0629   NEXT Dressing Change/Treatment Date 07/11/22 07/08/22 0930   VAC VeraFlo Irrigant 1/4 Strength Dakins 07/08/22 0930   VAC VeraFlo Soak Time (mins) 8 07/08/22 0930   VAC VeraFlo Instill Volume (ml) 10 07/08/22 0930   VAC VeraFlo - Therapy Time (hrs) 3 07/08/22 0930   VAC VeraFlo Pressure (mm/Hg) Intermittent;125 mmHg 07/08/22 0930   WOUND NURSE ONLY - Time Spent with Patient (mins) 60 07/08/22 0930           Wound 06/14/22 Ankle Left lateral ankle (Active)   Wound Image    07/08/22 0930   Site Assessment Red;Brown 07/08/22 0930   Periwound Assessment Maceration 07/08/22 0930   Margins Defined edges;Unattached edges 07/08/22 0930   Closure Secondary intention 07/08/22 0930   Drainage Amount Scant 07/08/22 0930   Drainage Description Serosanguineous 07/08/22 0930   Treatments Cleansed;Site care;Offloading;Compression 07/08/22 0930   Wound Cleansing Approved Wound Cleanser 07/08/22 0930   Periwound Protectant Drape;Paste Ring;Skin Protectant Wipes to Periwound 07/08/22 0930   Dressing Cleansing/Solutions 1/4 Strength Dakin's Solution 07/08/22 0930   Dressing Options Wound Vac 07/08/22 0930   Dressing Changed Changed 07/08/22 0930   Dressing Status Clean;Dry;Intact 07/08/22 0930   Dressing Change/Treatment Frequency Monday, Wednesday, Friday, and As Needed 07/08/22 0930   NEXT Dressing Change/Treatment Date 07/11/22 07/08/22 0930   NEXT Weekly Photo (Inpatient Only) 07/15/22 07/08/22 0930   Non-staged Wound Description Full thickness 07/08/22 0930   Wound Length (cm) 3.5  cm 22 0930   Wound Width (cm) 1 cm 2230   Wound Depth (cm) 1.4 cm 2230   Wound Surface Area (cm^2) 3.5 cm^2 2230   Wound Volume (cm^3) 4.9 cm^3 2230   Wound Healing % 54 22 0930   Shape oval 2230   Wound Odor None 22   Pulses 1+;Left 22 0900   Exposed Structures None 22 0930   WOUND NURSE ONLY - Time Spent with Patient (mins) 60 22 0930          Vascular:    HONEY:   HONEY Results, Last 30 Days US-HONEY SINGLE LEVEL BILAT    Result Date: 2022  Narrative  Vascular Laboratory  Conclusions  The ankle pressures are not accurately measured due to calcification and  noncompressibility of the tibial vessels.  There is no evidence of significant arterial disease.  ROSIE COOKIE  Age:    82    Gender:     M  MRN:    [id number]  :    1940      BSA:  Exam Date:     2022 13:45  Room #:     Inpatient  Priority:     Routine  Ht (in):             Wt (lb):  Ordering Physician:        JIN GREGG  Referring Physician:       [id number] Coronado  Sonographer:               Aliyah Pandya DAISHA  Study Type:                Complete Bilateral  Technical Quality:         Adequate  Indications:     Ulceration of LE  CPT Codes:       37702  ICD Codes:       707.1  History:         Ulceration of the left lower extremity. No prior exams.  Limitations:                 RIGHT  Waveform            Systolic BPs (mmHg)                             125           Brachial  Triphasic                                Common Femoral  Triphasic                                Popliteal  Triphasic                  200           Posterior Tibial  Triphasic                  179           Dorsalis Pedis                                           Digit                             1.54          HONEY                                           TBI                       LEFT  Waveform        Systolic BPs (mmHg)                              130           Brachial  Triphasic                                Common Femoral  Triphasic                                Popliteal  Triphasic                  187           Posterior Tibial  Triphasic                  180           Dorsalis Pedis                                           Digit                             1.44          HONEY                                           TBI  Findings  Bilateral-  The ankle pressures are not accurately measured due to calcification and  noncompressibility of the tibial vessels.  Doppler waveforms of the common femoral artery and popliteal artery are of  high amplitude and triphasic.  Doppler waveforms at the ankle are brisk and triphasic.  TBI-  Right: 1.29  Left:    1.09  Additional testing was not performed in accordance with lower extremity  arterial evaluation protocol.  James Gonzalez  (Electronically Signed)  Final Date:      12 June 2022                   16:17      Lab Values:    Lab Results   Component Value Date/Time    WBC 11.6 (H) 07/04/2022 04:45 PM    RBC 4.42 (L) 07/04/2022 04:45 PM    HEMOGLOBIN 12.8 (L) 07/04/2022 04:45 PM    HEMATOCRIT 38.8 (L) 07/04/2022 04:45 PM    CREACTPROT 0.78 (H) 06/29/2022 06:51 PM    SEDRATEWES 27 (H) 06/29/2022 06:51 PM    HBA1C 6.3 (H) 06/11/2022 03:57 PM        Culture Results show:  Recent Results (from the past 720 hour(s))   CULTURE WOUND W/ GRAM STAIN    Collection Time: 06/14/22  9:29 AM    Specimen: Wound   Result Value Ref Range    Significant Indicator POS (POS)     Source WND     Site Left Lateral Ankle     Culture Result No growth at 72 hours. (A)     Gram Stain Result Rare WBCs.  No organisms seen.       Culture Result - (A)        Pain Level/Medicated:  Denied pain       INTERVENTIONS BY WOUND TEAM:  Performed standard wound care which includes appropriate positioning, dressing removal and non-selective debridement. Pictures and measurements obtained weekly if/when required.    Preparation for Dressing removal:  Dressing soaked with wound cleanser  Non-selectively Debrided with:  wound cleanser and gauze.  Sharp debridement: n/a  Rosa M wound: Cleansed with wound cleanser, Prepped with no sting,   Primary Dressin/2 thickness black foam placed into wound bed and secured with drape - bridged a short distance anterior proximal.    Secondary (Outer) Dressing: Trac pad. No leak detected, seal intact. mepilex placed for offloading, Tubi  D applied and then the offloading boot applied.    Interdisciplinary consultation: Patient, Bedside RN    EVALUATION / RATIONALE FOR TREATMENT:  Most Recent Date:    22: Wound bed more moist in comparison to last assessment. Majority of wound bed is red, little granulation tissue still, and portion of wound with dark, spongy tissue. Resumed Dakins VF to help possibly debride or moisturize this area of dark tissue. Periwound is macerated, thus soak time and frequency of instillation decreased.     R 3rd toe with small scab that should resolve on its own. Left NETTE.     22: Wound bed remains dry and with no granulation tissue. Increased amount of Dakins instillation, however if wound continues to be dry at next change, patient may benefit from switching to NS instillation. No odor or purulence noted to wound bed. Pt confused at this time, in restraints and with telesitter at bedside.     ** This RN was unable to assess R 3rd toe, for which wound team was consulted. If possible, please assess at next VAC change. **     22: Patient's wound vac was intact, no granular tissue present but surrounding redness is not present, no odor.  Wound looks dry.  Added tubi  D per Edwin APRN  22 Pt confused and pulls at VAC tubing.  Pt pulled tubing off 5 minutes after placement.  Replaced tubing, secured tubing with large piece of drape.  Placed diabetic boot on leg to try to keep pt from pulling on tubing.  Wound bed developing biofilm.  Will order dakins to instill in VAC.    22:  Maceration still present, applied paste ring around periwound, added micah to the base of the wound.  Continue NPWT.  Tubi  added for light compression   6/17/22: Patient's ankle was a little macerated, but turned pink prior to re-application, wound bed appears nice and red for first dressing change post-op.  Continue NPWT     Goals: Steady decrease in wound area and depth weekly.    WOUND TEAM PLAN OF CARE ([X] for frequency of wound follow up,): X  Nursing to follow dressing orders written for wound care. Contact wound team if area fails to progress, deteriorates or with any questions/concerns if something comes up before next scheduled follow up (See below as to whether wound is following and frequency of wound follow up)  Dressing changes by wound team:                   Follow up 3 times weekly:                NPWT change 3 times weekly:  XMWF   Follow up 1-2 times weekly:      Follow up Bi-Monthly:                   Follow up as needed:     Other (explain):     NURSING PLAN OF CARE ORDERS (X):  Dressing changes: See Dressing Care orders: X  Skin care: See Skin Care orders: X  RN Prevention Protocol: X  Rectal tube care: See Rectal Tube Care orders:   Other orders:    RSKIN:   CURRENTLY IN PLACE (X), APPLIED THIS VISIT (A), ORDERED (O):   Q shift Montrell:  X  Q shift pressure point assessments:  X    Surface/Positioning X  Pressure redistribution mattressX            Low Airloss          Bariatric foam      Bariatric AKILA     Waffle cushion        Waffle Overlay          Reposition q 2 hours  X    TAPs Turning system     [place] Pillow     Offloading/Redistribution A  Sacral Mepilex (Silicone dressing)     Heel Mepilex (Silicone dressing)      A   Heel float boots (Prevalon boot)             Float Heels off Bed with Pillows           Respiratory Room air  Silicone O2 tubing         Gray Foam Ear protectors     Cannula fixation Device (Tender )          High flow offloading Clip    Elastic head band  offloading device      Anchorfast                                                         Trach with Optifoam split foam             Containment/Moisture Prevention continent    Rectal tube or BMS    Purwick/Condom Cath        Martinez Catheter    Barrier wipes           Barrier paste       Antifungal tx      Interdry        Mobilization offloading boot      Up to chair        Ambulate      PT/OT      Nutrition PO      Dietician        Diabetes Education      PO     TF     TPN     NPO   # days     Other        Anticipated discharge plans: MONAE  LTACH:        SNF/Rehab:                  Home Health Care:           Outpatient Wound Center:            Self/Family Care:        Other:                  Vac Discharge Needs: X  Not Applicable Pt not on a wound vac:       Regular Vac while inpatient, alternative dressing at DC:        Regular Vac in use and continued at DC:      X      Reg. Vac w/ Skin Sub/Biologic in use. Will need to be changed 2x wkly:      Veraflo Vac while inpatient, ok to transition to Regular Vac on Discharge:           Veraflo Vac while inpatient, will need to remain on Veraflo Vac upon discharge:

## 2022-07-08 NOTE — PROGRESS NOTES
Pt had not voided or felt the urge to void. Bladder scan showed 502cc retaining. Straight cath performed as ordered. Will notify day shift RN.

## 2022-07-08 NOTE — CARE PLAN
The patient is Stable - Low risk of patient condition declining or worsening    Shift Goals  Clinical Goals: Safety  Patient Goals: remove restraints  Family Goals: N/A    Progress made toward(s) clinical / shift goals:  Patient remains in bilateral wrist restraints due to pulling at lines. Patient's bed alarm is on, bed is in lowest/locked position, call light and belongings within reach.    Patient is not progressing towards the following goals:    Problem: Knowledge Deficit - Standard  Goal: Patient and family/care givers will demonstrate understanding of plan of care, disease process/condition, diagnostic tests and medications  Outcome: Not Progressing    Problem: Urinary Elimination  Goal: Establish and maintain regular urinary output  Outcome: Not Progressing

## 2022-07-08 NOTE — CARE PLAN
The patient is Stable - Low risk of patient condition declining or worsening    Shift Goals  Clinical Goals: Safety  Patient Goals: remove restraints  Family Goals: N/A    Progress made toward(s) clinical / shift goals:    Problem: Knowledge Deficit - Standard  Goal: Patient and family/care givers will demonstrate understanding of plan of care, disease process/condition, diagnostic tests and medications  Outcome: Progressing     Problem: Fall Risk  Goal: Patient will remain free from falls  Outcome: Progressing     Problem: Skin Integrity  Goal: Skin integrity is maintained or improved  Outcome: Progressing       Patient is not progressing towards the following goals:

## 2022-07-09 PROBLEM — N17.9 AKI (ACUTE KIDNEY INJURY) (HCC): Status: RESOLVED | Noted: 2022-04-12 | Resolved: 2022-07-09

## 2022-07-09 LAB
GLUCOSE BLD STRIP.AUTO-MCNC: 202 MG/DL (ref 65–99)
GLUCOSE BLD STRIP.AUTO-MCNC: 213 MG/DL (ref 65–99)
GLUCOSE BLD STRIP.AUTO-MCNC: 245 MG/DL (ref 65–99)
GLUCOSE BLD STRIP.AUTO-MCNC: 297 MG/DL (ref 65–99)

## 2022-07-09 PROCEDURE — 700102 HCHG RX REV CODE 250 W/ 637 OVERRIDE(OP): Performed by: HOSPITALIST

## 2022-07-09 PROCEDURE — 82962 GLUCOSE BLOOD TEST: CPT | Mod: 91

## 2022-07-09 PROCEDURE — 770001 HCHG ROOM/CARE - MED/SURG/GYN PRIV*

## 2022-07-09 PROCEDURE — 700102 HCHG RX REV CODE 250 W/ 637 OVERRIDE(OP): Performed by: STUDENT IN AN ORGANIZED HEALTH CARE EDUCATION/TRAINING PROGRAM

## 2022-07-09 PROCEDURE — A9270 NON-COVERED ITEM OR SERVICE: HCPCS | Performed by: STUDENT IN AN ORGANIZED HEALTH CARE EDUCATION/TRAINING PROGRAM

## 2022-07-09 PROCEDURE — 700111 HCHG RX REV CODE 636 W/ 250 OVERRIDE (IP): Performed by: STUDENT IN AN ORGANIZED HEALTH CARE EDUCATION/TRAINING PROGRAM

## 2022-07-09 PROCEDURE — 700111 HCHG RX REV CODE 636 W/ 250 OVERRIDE (IP): Performed by: HOSPITALIST

## 2022-07-09 PROCEDURE — 700105 HCHG RX REV CODE 258: Performed by: HOSPITALIST

## 2022-07-09 PROCEDURE — A9270 NON-COVERED ITEM OR SERVICE: HCPCS | Performed by: HOSPITALIST

## 2022-07-09 PROCEDURE — 99232 SBSQ HOSP IP/OBS MODERATE 35: CPT | Performed by: HOSPITALIST

## 2022-07-09 RX ADMIN — TAMSULOSIN HYDROCHLORIDE 0.8 MG: 0.4 CAPSULE ORAL at 08:47

## 2022-07-09 RX ADMIN — ENOXAPARIN SODIUM 40 MG: 40 INJECTION SUBCUTANEOUS at 18:05

## 2022-07-09 RX ADMIN — INSULIN HUMAN 2 UNITS: 100 INJECTION, SOLUTION PARENTERAL at 18:05

## 2022-07-09 RX ADMIN — SENNOSIDES AND DOCUSATE SODIUM 2 TABLET: 50; 8.6 TABLET ORAL at 05:23

## 2022-07-09 RX ADMIN — INSULIN HUMAN 2 UNITS: 100 INJECTION, SOLUTION PARENTERAL at 08:47

## 2022-07-09 RX ADMIN — OXYCODONE 5 MG: 5 TABLET ORAL at 08:47

## 2022-07-09 RX ADMIN — AMLODIPINE BESYLATE 10 MG: 10 TABLET ORAL at 05:23

## 2022-07-09 RX ADMIN — SERTRALINE 50 MG: 100 TABLET, FILM COATED ORAL at 18:05

## 2022-07-09 RX ADMIN — METOPROLOL TARTRATE 25 MG: 25 TABLET, FILM COATED ORAL at 21:16

## 2022-07-09 RX ADMIN — INSULIN HUMAN 3 UNITS: 100 INJECTION, SOLUTION PARENTERAL at 12:20

## 2022-07-09 RX ADMIN — DAPTOMYCIN 750 MG: 500 INJECTION, POWDER, LYOPHILIZED, FOR SOLUTION INTRAVENOUS at 23:12

## 2022-07-09 RX ADMIN — METOPROLOL TARTRATE 25 MG: 25 TABLET, FILM COATED ORAL at 08:47

## 2022-07-09 RX ADMIN — Medication 10 MG: at 21:16

## 2022-07-09 RX ADMIN — MIRTAZAPINE 15 MG: 15 TABLET, FILM COATED ORAL at 21:17

## 2022-07-09 RX ADMIN — QUETIAPINE FUMARATE 12.5 MG: 25 TABLET ORAL at 21:17

## 2022-07-09 RX ADMIN — ATORVASTATIN CALCIUM 40 MG: 40 TABLET, FILM COATED ORAL at 18:05

## 2022-07-09 RX ADMIN — POTASSIUM CHLORIDE 40 MEQ: 1500 TABLET, EXTENDED RELEASE ORAL at 05:23

## 2022-07-09 RX ADMIN — SENNOSIDES AND DOCUSATE SODIUM 2 TABLET: 50; 8.6 TABLET ORAL at 18:05

## 2022-07-09 RX ADMIN — DAKIN'S SOLUTION 0.125% (QUARTER STRENGTH) 473 ML: 0.12 SOLUTION at 06:00

## 2022-07-09 RX ADMIN — DAKIN'S SOLUTION 0.125% (QUARTER STRENGTH) 1 ML: 0.12 SOLUTION at 18:00

## 2022-07-09 RX ADMIN — OXYCODONE 5 MG: 5 TABLET ORAL at 21:17

## 2022-07-09 RX ADMIN — INSULIN HUMAN 2 UNITS: 100 INJECTION, SOLUTION PARENTERAL at 21:26

## 2022-07-09 ASSESSMENT — ENCOUNTER SYMPTOMS
ABDOMINAL PAIN: 0
PALPITATIONS: 0
NAUSEA: 0
SHORTNESS OF BREATH: 0
FEVER: 0
CHILLS: 0
COUGH: 0
HEADACHES: 0
DIZZINESS: 0
VOMITING: 0

## 2022-07-09 ASSESSMENT — PAIN DESCRIPTION - PAIN TYPE
TYPE: ACUTE PAIN

## 2022-07-09 NOTE — CARE PLAN
The patient is Stable - Low risk of patient condition declining or worsening    Shift Goals  Clinical Goals: Saftey  Patient Goals: Rest & Comfort  Family Goals: N/A    Progress made toward(s) clinical / shift goals:    Problem: Pain - Standard  Goal: Alleviation of pain or a reduction in pain to the patient’s comfort goal  Outcome: Progressing  Note: Patient remains pain free at this time     Problem: Safety - Medical Restraint  Goal: Remains free of injury from restraints (Restraint for Interference with Medical Device)  Outcome: Progressing  Goal: Free from restraint(s) (Restraint for Interference with Medical Device)  Outcome: Progressing       Patient is not progressing towards the following goals: n/a

## 2022-07-09 NOTE — PROGRESS NOTES
Bedside report received from nurse. Assumed care of pt. Pt resting comfortably in bed. A/Ox2, VSS,  All needs met. POC reviewed and white board updated. Call light in reach. Bed locked in lowest position with 2 upper bed rails up. No pain or complaints at this time. Restraints checked and secured.

## 2022-07-10 LAB
ANION GAP SERPL CALC-SCNC: 12 MMOL/L (ref 7–16)
BUN SERPL-MCNC: 15 MG/DL (ref 8–22)
CALCIUM SERPL-MCNC: 8.7 MG/DL (ref 8.5–10.5)
CHLORIDE SERPL-SCNC: 100 MMOL/L (ref 96–112)
CO2 SERPL-SCNC: 22 MMOL/L (ref 20–33)
CREAT SERPL-MCNC: 0.76 MG/DL (ref 0.5–1.4)
GFR SERPLBLD CREATININE-BSD FMLA CKD-EPI: 90 ML/MIN/1.73 M 2
GLUCOSE BLD STRIP.AUTO-MCNC: 180 MG/DL (ref 65–99)
GLUCOSE BLD STRIP.AUTO-MCNC: 202 MG/DL (ref 65–99)
GLUCOSE BLD STRIP.AUTO-MCNC: 205 MG/DL (ref 65–99)
GLUCOSE SERPL-MCNC: 194 MG/DL (ref 65–99)
MAGNESIUM SERPL-MCNC: 1.7 MG/DL (ref 1.5–2.5)
POTASSIUM SERPL-SCNC: 3.2 MMOL/L (ref 3.6–5.5)
SODIUM SERPL-SCNC: 134 MMOL/L (ref 135–145)

## 2022-07-10 PROCEDURE — 770001 HCHG ROOM/CARE - MED/SURG/GYN PRIV*

## 2022-07-10 PROCEDURE — 700105 HCHG RX REV CODE 258: Performed by: HOSPITALIST

## 2022-07-10 PROCEDURE — 80048 BASIC METABOLIC PNL TOTAL CA: CPT

## 2022-07-10 PROCEDURE — 700102 HCHG RX REV CODE 250 W/ 637 OVERRIDE(OP): Performed by: STUDENT IN AN ORGANIZED HEALTH CARE EDUCATION/TRAINING PROGRAM

## 2022-07-10 PROCEDURE — 700111 HCHG RX REV CODE 636 W/ 250 OVERRIDE (IP): Performed by: HOSPITALIST

## 2022-07-10 PROCEDURE — 99232 SBSQ HOSP IP/OBS MODERATE 35: CPT | Performed by: HOSPITALIST

## 2022-07-10 PROCEDURE — 700102 HCHG RX REV CODE 250 W/ 637 OVERRIDE(OP): Performed by: HOSPITALIST

## 2022-07-10 PROCEDURE — 700111 HCHG RX REV CODE 636 W/ 250 OVERRIDE (IP)

## 2022-07-10 PROCEDURE — A9270 NON-COVERED ITEM OR SERVICE: HCPCS | Performed by: STUDENT IN AN ORGANIZED HEALTH CARE EDUCATION/TRAINING PROGRAM

## 2022-07-10 PROCEDURE — A9270 NON-COVERED ITEM OR SERVICE: HCPCS | Performed by: HOSPITALIST

## 2022-07-10 PROCEDURE — 82962 GLUCOSE BLOOD TEST: CPT | Mod: 91

## 2022-07-10 PROCEDURE — 83735 ASSAY OF MAGNESIUM: CPT

## 2022-07-10 RX ORDER — HALOPERIDOL 5 MG/ML
1 INJECTION INTRAMUSCULAR EVERY 6 HOURS PRN
Status: DISCONTINUED | OUTPATIENT
Start: 2022-07-10 | End: 2022-07-25 | Stop reason: HOSPADM

## 2022-07-10 RX ORDER — QUETIAPINE FUMARATE 25 MG/1
25 TABLET, FILM COATED ORAL NIGHTLY
Status: DISCONTINUED | OUTPATIENT
Start: 2022-07-10 | End: 2022-07-25 | Stop reason: HOSPADM

## 2022-07-10 RX ADMIN — QUETIAPINE FUMARATE 25 MG: 25 TABLET ORAL at 22:22

## 2022-07-10 RX ADMIN — HALOPERIDOL LACTATE 1 MG: 5 INJECTION, SOLUTION INTRAMUSCULAR at 04:42

## 2022-07-10 RX ADMIN — METOPROLOL TARTRATE 25 MG: 25 TABLET, FILM COATED ORAL at 22:22

## 2022-07-10 RX ADMIN — INSULIN HUMAN 2 UNITS: 100 INJECTION, SOLUTION PARENTERAL at 22:44

## 2022-07-10 RX ADMIN — DAPTOMYCIN 750 MG: 500 INJECTION, POWDER, LYOPHILIZED, FOR SOLUTION INTRAVENOUS at 22:52

## 2022-07-10 RX ADMIN — MIRTAZAPINE 15 MG: 15 TABLET, FILM COATED ORAL at 22:22

## 2022-07-10 RX ADMIN — Medication 10 MG: at 22:22

## 2022-07-10 ASSESSMENT — PAIN SCALES - PAIN ASSESSMENT IN ADVANCED DEMENTIA (PAINAD)
BREATHING: NORMAL
FACIALEXPRESSION: SMILING OR INEXPRESSIVE
TOTALSCORE: 0
BODYLANGUAGE: RELAXED
BREATHING: NORMAL
BODYLANGUAGE: RELAXED
CONSOLABILITY: NO NEED TO CONSOLE
FACIALEXPRESSION: SMILING OR INEXPRESSIVE
CONSOLABILITY: NO NEED TO CONSOLE
TOTALSCORE: 0

## 2022-07-10 ASSESSMENT — PAIN DESCRIPTION - PAIN TYPE
TYPE: ACUTE PAIN
TYPE: ACUTE PAIN

## 2022-07-10 NOTE — PROGRESS NOTES
Received bedside report from night shift RN.   Assumed care of patient at change of shift.   Assessment complete and POC discussed.   STATUS: Patient is A&Ox2, VSS, on RA.   DRAINS: Barbosa catheter  Patient restraints continued due to patient attempting oull out PICC line and barbosa.  Telesitter in place.   PAIN: Patient denies pain, no apparent signs of distress or discomfort.   Patient is sitting up in bed for breakfast.   Bed alarm set, call light in reach.  Bed is in lowest/locked position.   Call light and belongings are within reach.   No further needs at this time.  Will continue to monitor.

## 2022-07-10 NOTE — CARE PLAN
The patient is Watcher - Medium risk of patient condition declining or worsening    Shift Goals  Clinical Goals: pain control, picc dressing change, rest, safety  Patient Goals: rest  Family Goals: N/A    Progress made toward(s) clinical / shift goals:    Problem: Knowledge Deficit - Standard  Goal: Patient and family/care givers will demonstrate understanding of plan of care, disease process/condition, diagnostic tests and medications  Outcome: Progressing     Problem: Fall Risk  Goal: Patient will remain free from falls  Outcome: Progressing     Problem: Skin Integrity  Goal: Skin integrity is maintained or improved  Outcome: Progressing     Problem: Pain - Standard  Goal: Alleviation of pain or a reduction in pain to the patient’s comfort goal  Outcome: Progressing     Problem: Safety - Medical Restraint  Goal: Remains free of injury from restraints (Restraint for Interference with Medical Device)  Outcome: Progressing     Problem: Urinary Elimination  Goal: Establish and maintain regular urinary output  Outcome: Progressing       Patient is not progressing towards the following goals:

## 2022-07-10 NOTE — PROGRESS NOTES
Martinez catheter clamped per MD orders. Will unclamp in the 8 hours as ordered or when patient feels urge to void.

## 2022-07-10 NOTE — PROGRESS NOTES
NOC HOSPITALIST CROSS COVER    Notified by RN regarding patient having increasing agitation and attempting to pull out his barbosa catheter, requiring 3 people to get him to release his hand. He was already on bilat soft wrist restraints. RN requesting ankle restraint because he then pulled his leg up to try to get the barbosa pulled out.      Plan:  #Agitation  -Renewed wrist restraints and added right ankle restraint  -Haldol 1 mg IV Q6 hours agitation        -----------------------------------------------------------------------------------------------------------    Electronically signed by:  LUCINA Martin, AGACNP-BC

## 2022-07-10 NOTE — PROGRESS NOTES
[facility] Medicine Daily Progress Note    Date of Service  7/10/2022    Chief Complaint  Andrei Garay is a 82 y.o. male admitted 6/29/2022 with left leg swelling and altered mental status    Hospital Course  Andrei Garay is a 82 y.o. male who presented 6/29/2022 with a history of type 2 diabetes, hyperlipidemia, hypertension, osteomyelitis who presents with right leg swelling and altered mental status.     According to the patient's daughter who is visiting him she states that he began becoming more confused and slurring his words yesterday.  He also reports that he had episode of incontinence which is not his normal baseline.  Has been receiving IV daptomycin for left lower leg osteomyelitis which was diagnosed 2 weeks ago, MRSA culture positive.    Emergency department ultrasound of the right lower extremity negative for DVT. Limb preservation services consulted and following.  Completed a bedside debridement and applied new dressings.  Sure of wound management patient was receiving it [facility].  Wound does not appear to be improved from prior hospitalization.  We will consult infectious disease for recommendations and continue to work with LPS for management.      Interval Problem Update  Patient with increased agitation overnight and early a.m., ankle restraint added  I have increased his nighttime Seroquel dose from 12.5 mg to 25 mg  His blood pressure has been elevated, resume home lisinopril  This morning patient is lethargic and sleepy  Discussed POC with family at bedside      I have discussed this patient's plan of care and discharge plan at IDT rounds today with Case Management, Nursing, Nursing leadership, and other members of the IDT team.    Consultants/Specialty  infectious disease  Limb preservation services    Code Status  DNAR/DNI    Disposition  Patient is not medically cleared for discharge.   Anticipate discharge to to skilled nursing facility.  I have placed the  appropriate orders for post-discharge needs.    Review of Systems  Review of Systems   Unable to perform ROS: Mental acuity        Physical Exam  Temp:  [36.3 °C (97.3 °F)-36.7 °C (98.1 °F)] 36.7 °C (98.1 °F)  Pulse:  [60-97] 80  Resp:  [18] 18  BP: (136-162)/(62-86) 152/86  SpO2:  [91 %-93 %] 93 %    Physical Exam  Vitals and nursing note reviewed.   Constitutional:       General: He is not in acute distress.     Appearance: He is obese. He is not ill-appearing.   Cardiovascular:      Rate and Rhythm: Normal rate and regular rhythm.   Pulmonary:      Effort: Pulmonary effort is normal. No respiratory distress.   Abdominal:      General: Abdomen is protuberant.   Skin:     Findings: Erythema and signs of injury present.      Comments: Left leg wound vac in place   Neurological:      General: No focal deficit present.      Mental Status: He is disoriented.      Motor: Motor function is intact.   Psychiatric:         Mood and Affect: Affect is flat.         Behavior: Behavior is hyperactive.         Cognition and Memory: Cognition is impaired. Memory is impaired.         Judgment: Judgment is impulsive.         Fluids    Intake/Output Summary (Last 24 hours) at 7/10/2022 1028  Last data filed at 7/10/2022 0500  Gross per 24 hour   Intake 760 ml   Output 1500 ml   Net -740 ml       Laboratory      Recent Labs     07/07/22  1706 07/08/22  0856   SODIUM 137 134*   POTASSIUM 3.4* 2.9*   CHLORIDE 105 101   CO2 21 22   GLUCOSE 227* 201*   BUN 17 18   CREATININE 0.85 0.85   CALCIUM 8.9 8.6                   Imaging  US-EXTREMITY VENOUS LOWER UNILAT RIGHT   Final Result      CT-HEAD W/O   Final Result         No acute intracranial abnormality.            DX-ANKLE 3+ VIEWS LEFT   Final Result         Bony destruction in the lateral aspect of the distal fibula is consistent with osteomyelitis described on recent MRI. Overlying soft tissue edema and a small amount of soft tissue gas      DX-CHEST-PORTABLE (1 VIEW)   Final Result       1.  Increased perihilar interstitial markings are suggestive of pulmonary edema.      2.  Stable mild cardiomegaly           Assessment/Plan  * Encephalopathy  Assessment & Plan  Likely secondary to worsening cellulitis of the right leg.    Patient has been receiving IV daptomycin for osteomyelitis of the left leg, will continue    Avoid sedating medications  Improving    Urinary retention  Assessment & Plan  Cont flomax  Failed barbosa trial out  Will replace barbosa and start bladder training      Osteomyelitis (HCC)- (present on admission)  Assessment & Plan  Patient with osteomyelitis currently on daptomycin.   CRP mildly elevated  LPS    Type 2 diabetes mellitus (HCC)- (present on admission)  Assessment & Plan  Last A1c was 6.33 weeks ago.  Hold metformin  Insulin sliding scale  Diabetic diet    Cellulitis- (present on admission)  Assessment & Plan  Daughter reports that the patient's right leg is more red and swollen than normal.  Doppler ultrasound does not show DVT  ID consulted, patient reportedly had removed his wound VAC while at SNF  Continue the daptomycin (until 7/23) and Unasyn (until 7/6)    Dementia with behavioral disturbance (HCC)- (present on admission)  Assessment & Plan  History of dementia.  Oriented x 2, unsure of baseline  seroqeul 12.5mg to facilitate off restraints    Hypertension- (present on admission)  Assessment & Plan  Continue home medication lisinopril and metoprolol      Hyperlipidemia- (present on admission)  Assessment & Plan  Continue home med atorvastatin       VTE prophylaxis: enoxaparin ppx    I have performed a physical exam and reviewed and updated ROS and Plan today (7/10/2022). In review of yesterday's note (7/9/2022), there are no changes except as documented above.

## 2022-07-10 NOTE — PROGRESS NOTES
Report received. Assumed care. Pt in bed awake. A/O x 2- disoriented to situation and time. VSS. Responds appropriately. Denies pain, denies SOB on RA. Assessment complete. Bilateral soft wrist restraints in place, verified order. Telesitter in place. Voiding trial initiated, pt educated on importance and verbalized understanding. Wound vac in place, device functioning appropriately. PICC in place. Discussed POC, pt verbalizes understanding. Explained importance of calling before getting OOB. Call light and belongings within reach. Bed alarm on. Bed in the lowest position. Treaded socks in place. Hourly rounding in progress. All needs met at this time.

## 2022-07-11 LAB
ANION GAP SERPL CALC-SCNC: 11 MMOL/L (ref 7–16)
BUN SERPL-MCNC: 15 MG/DL (ref 8–22)
CALCIUM SERPL-MCNC: 8.6 MG/DL (ref 8.5–10.5)
CHLORIDE SERPL-SCNC: 99 MMOL/L (ref 96–112)
CO2 SERPL-SCNC: 22 MMOL/L (ref 20–33)
CREAT SERPL-MCNC: 0.75 MG/DL (ref 0.5–1.4)
ERYTHROCYTE [DISTWIDTH] IN BLOOD BY AUTOMATED COUNT: 41 FL (ref 35.9–50)
GFR SERPLBLD CREATININE-BSD FMLA CKD-EPI: 90 ML/MIN/1.73 M 2
GLUCOSE BLD STRIP.AUTO-MCNC: 188 MG/DL (ref 65–99)
GLUCOSE SERPL-MCNC: 170 MG/DL (ref 65–99)
HCT VFR BLD AUTO: 37.5 % (ref 42–52)
HGB BLD-MCNC: 12.8 G/DL (ref 14–18)
MCH RBC QN AUTO: 28.6 PG (ref 27–33)
MCHC RBC AUTO-ENTMCNC: 34.1 G/DL (ref 33.7–35.3)
MCV RBC AUTO: 83.9 FL (ref 81.4–97.8)
PLATELET # BLD AUTO: 271 K/UL (ref 164–446)
PMV BLD AUTO: 9.8 FL (ref 9–12.9)
POTASSIUM SERPL-SCNC: 3 MMOL/L (ref 3.6–5.5)
RBC # BLD AUTO: 4.47 M/UL (ref 4.7–6.1)
SODIUM SERPL-SCNC: 132 MMOL/L (ref 135–145)
WBC # BLD AUTO: 10.4 K/UL (ref 4.8–10.8)

## 2022-07-11 PROCEDURE — 97607 NEG PRS WND THR NDME<=50SQCM: CPT

## 2022-07-11 PROCEDURE — 700111 HCHG RX REV CODE 636 W/ 250 OVERRIDE (IP): Performed by: STUDENT IN AN ORGANIZED HEALTH CARE EDUCATION/TRAINING PROGRAM

## 2022-07-11 PROCEDURE — A9270 NON-COVERED ITEM OR SERVICE: HCPCS | Performed by: STUDENT IN AN ORGANIZED HEALTH CARE EDUCATION/TRAINING PROGRAM

## 2022-07-11 PROCEDURE — 80048 BASIC METABOLIC PNL TOTAL CA: CPT

## 2022-07-11 PROCEDURE — 306591 TRAY SUTURE REMOVAL DISP: Performed by: HOSPITALIST

## 2022-07-11 PROCEDURE — 302098 PASTE RING (FLAT): Performed by: HOSPITALIST

## 2022-07-11 PROCEDURE — 700102 HCHG RX REV CODE 250 W/ 637 OVERRIDE(OP): Performed by: STUDENT IN AN ORGANIZED HEALTH CARE EDUCATION/TRAINING PROGRAM

## 2022-07-11 PROCEDURE — 85027 COMPLETE CBC AUTOMATED: CPT

## 2022-07-11 PROCEDURE — 700102 HCHG RX REV CODE 250 W/ 637 OVERRIDE(OP): Performed by: HOSPITALIST

## 2022-07-11 PROCEDURE — 700105 HCHG RX REV CODE 258: Performed by: HOSPITALIST

## 2022-07-11 PROCEDURE — 99232 SBSQ HOSP IP/OBS MODERATE 35: CPT | Performed by: HOSPITALIST

## 2022-07-11 PROCEDURE — 82962 GLUCOSE BLOOD TEST: CPT

## 2022-07-11 PROCEDURE — 770001 HCHG ROOM/CARE - MED/SURG/GYN PRIV*

## 2022-07-11 PROCEDURE — A9270 NON-COVERED ITEM OR SERVICE: HCPCS | Performed by: HOSPITALIST

## 2022-07-11 PROCEDURE — 700111 HCHG RX REV CODE 636 W/ 250 OVERRIDE (IP): Performed by: HOSPITALIST

## 2022-07-11 RX ORDER — POTASSIUM CHLORIDE 20 MEQ/1
40 TABLET, EXTENDED RELEASE ORAL ONCE
Status: COMPLETED | OUTPATIENT
Start: 2022-07-11 | End: 2022-07-11

## 2022-07-11 RX ADMIN — SERTRALINE 50 MG: 100 TABLET, FILM COATED ORAL at 19:56

## 2022-07-11 RX ADMIN — METOPROLOL TARTRATE 25 MG: 25 TABLET, FILM COATED ORAL at 23:40

## 2022-07-11 RX ADMIN — AMLODIPINE BESYLATE 10 MG: 10 TABLET ORAL at 06:28

## 2022-07-11 RX ADMIN — MIRTAZAPINE 15 MG: 15 TABLET, FILM COATED ORAL at 23:44

## 2022-07-11 RX ADMIN — Medication 10 MG: at 23:41

## 2022-07-11 RX ADMIN — METOPROLOL TARTRATE 25 MG: 25 TABLET, FILM COATED ORAL at 09:44

## 2022-07-11 RX ADMIN — DAPTOMYCIN 750 MG: 500 INJECTION, POWDER, LYOPHILIZED, FOR SOLUTION INTRAVENOUS at 23:42

## 2022-07-11 RX ADMIN — INSULIN HUMAN 1 UNITS: 100 INJECTION, SOLUTION PARENTERAL at 09:36

## 2022-07-11 RX ADMIN — QUETIAPINE FUMARATE 25 MG: 25 TABLET ORAL at 23:42

## 2022-07-11 RX ADMIN — ATORVASTATIN CALCIUM 40 MG: 40 TABLET, FILM COATED ORAL at 19:56

## 2022-07-11 RX ADMIN — LISINOPRIL 40 MG: 20 TABLET ORAL at 06:28

## 2022-07-11 RX ADMIN — TAMSULOSIN HYDROCHLORIDE 0.8 MG: 0.4 CAPSULE ORAL at 09:44

## 2022-07-11 RX ADMIN — POTASSIUM CHLORIDE 40 MEQ: 1500 TABLET, EXTENDED RELEASE ORAL at 10:52

## 2022-07-11 RX ADMIN — ENOXAPARIN SODIUM 40 MG: 40 INJECTION SUBCUTANEOUS at 19:56

## 2022-07-11 RX ADMIN — SENNOSIDES AND DOCUSATE SODIUM 2 TABLET: 50; 8.6 TABLET ORAL at 19:56

## 2022-07-11 RX ADMIN — SENNOSIDES AND DOCUSATE SODIUM 2 TABLET: 50; 8.6 TABLET ORAL at 06:28

## 2022-07-11 ASSESSMENT — PAIN SCALES - PAIN ASSESSMENT IN ADVANCED DEMENTIA (PAINAD)
BREATHING: NORMAL
TOTALSCORE: 0
BODYLANGUAGE: RELAXED
BREATHING: NORMAL
BODYLANGUAGE: RELAXED
FACIALEXPRESSION: SMILING OR INEXPRESSIVE
FACIALEXPRESSION: SMILING OR INEXPRESSIVE
TOTALSCORE: 0
CONSOLABILITY: NO NEED TO CONSOLE
BREATHING: NORMAL
TOTALSCORE: 0
FACIALEXPRESSION: SMILING OR INEXPRESSIVE
CONSOLABILITY: NO NEED TO CONSOLE
CONSOLABILITY: NO NEED TO CONSOLE
BODYLANGUAGE: RELAXED

## 2022-07-11 NOTE — PROGRESS NOTES
Martinez unclamped at 1030, 8 hours after last clamping. Patient denies urge to void. Martinez output 525ml. Martinez reclamped. Will continue to monitor.

## 2022-07-11 NOTE — PROGRESS NOTES
[facility] Medicine Daily Progress Note    Date of Service  7/11/2022    Chief Complaint  Andrei Garay is a 82 y.o. male admitted 6/29/2022 with left leg swelling and altered mental status    Hospital Course  Andrei Garay is a 82 y.o. male who presented 6/29/2022 with a history of type 2 diabetes, hyperlipidemia, hypertension, osteomyelitis who presents with right leg swelling and altered mental status.     According to the patient's daughter who is visiting him she states that he began becoming more confused and slurring his words yesterday.  He also reports that he had episode of incontinence which is not his normal baseline.  Has been receiving IV daptomycin for left lower leg osteomyelitis which was diagnosed 2 weeks ago, MRSA culture positive.    Emergency department ultrasound of the right lower extremity negative for DVT. Limb preservation services consulted and following.  Completed a bedside debridement and applied new dressings.  Sure of wound management patient was receiving it [facility].  Wound does not appear to be improved from prior hospitalization.  We will consult infectious disease for recommendations and continue to work with LPS for management.      Interval Problem Update  SHREYA overnight  Still not having urge to urinate, barbosa in place      I have discussed this patient's plan of care and discharge plan at IDT rounds today with Case Management, Nursing, Nursing leadership, and other members of the IDT team.    Consultants/Specialty  infectious disease  Limb preservation services    Code Status  DNAR/DNI    Disposition  Patient is not medically cleared for discharge.   Anticipate discharge to to skilled nursing facility.  I have placed the appropriate orders for post-discharge needs.    Review of Systems  Review of Systems   Unable to perform ROS: Mental acuity        Physical Exam  Temp:  [36.3 °C (97.3 °F)-37.3 °C (99.2 °F)] 36.8 °C (98.3 °F)  Pulse:  [68-98] 69  Resp:   [17-20] 17  BP: (127-149)/(68-80) 131/68  SpO2:  [94 %-98 %] 98 %    Physical Exam  Vitals and nursing note reviewed.   Constitutional:       General: He is not in acute distress.     Appearance: He is obese. He is not ill-appearing.   Cardiovascular:      Rate and Rhythm: Normal rate and regular rhythm.   Pulmonary:      Effort: Pulmonary effort is normal. No respiratory distress.   Abdominal:      General: Abdomen is protuberant.   Skin:     Findings: Erythema and signs of injury present.      Comments: Left leg wound vac in place   Neurological:      General: No focal deficit present.      Mental Status: He is disoriented.      Motor: Motor function is intact.   Psychiatric:         Mood and Affect: Affect is flat.         Behavior: Behavior is hyperactive.         Cognition and Memory: Cognition is impaired. Memory is impaired.         Judgment: Judgment is impulsive.         Fluids    Intake/Output Summary (Last 24 hours) at 7/11/2022 1044  Last data filed at 7/11/2022 0900  Gross per 24 hour   Intake 380 ml   Output 1015 ml   Net -635 ml       Laboratory  Recent Labs     07/11/22  0305   WBC 10.4   RBC 4.47*   HEMOGLOBIN 12.8*   HEMATOCRIT 37.5*   MCV 83.9   MCH 28.6   MCHC 34.1   RDW 41.0   PLATELETCT 271   MPV 9.8     Recent Labs     07/10/22  1115 07/11/22  0305   SODIUM 134* 132*   POTASSIUM 3.2* 3.0*   CHLORIDE 100 99   CO2 22 22   GLUCOSE 194* 170*   BUN 15 15   CREATININE 0.76 0.75   CALCIUM 8.7 8.6                   Imaging  US-EXTREMITY VENOUS LOWER UNILAT RIGHT   Final Result      CT-HEAD W/O   Final Result         No acute intracranial abnormality.            DX-ANKLE 3+ VIEWS LEFT   Final Result         Bony destruction in the lateral aspect of the distal fibula is consistent with osteomyelitis described on recent MRI. Overlying soft tissue edema and a small amount of soft tissue gas      DX-CHEST-PORTABLE (1 VIEW)   Final Result      1.  Increased perihilar interstitial markings are suggestive of  pulmonary edema.      2.  Stable mild cardiomegaly           Assessment/Plan  * Encephalopathy  Assessment & Plan  Likely secondary to worsening cellulitis of the right leg.    Patient has been receiving IV daptomycin for osteomyelitis of the left leg, will continue    Avoid sedating medications  Improving    Urinary retention  Assessment & Plan  Cont flomax  Failed barbosa trial out  Will replace barbosa and start bladder training      Osteomyelitis (HCC)- (present on admission)  Assessment & Plan  Patient with osteomyelitis currently on daptomycin.   CRP mildly elevated  LPS    Type 2 diabetes mellitus (HCC)- (present on admission)  Assessment & Plan  Last A1c was 6.33 weeks ago.  Hold metformin  Insulin sliding scale  Diabetic diet    Cellulitis- (present on admission)  Assessment & Plan  Daughter reports that the patient's right leg is more red and swollen than normal.  Doppler ultrasound does not show DVT  ID consulted, patient reportedly had removed his wound VAC while at SNF  Continue the daptomycin (until 7/23) and Unasyn (until 7/6)    Dementia with behavioral disturbance (HCC)- (present on admission)  Assessment & Plan  History of dementia.  Oriented x 2, unsure of baseline  seroqeul 12.5mg to facilitate off restraints    Hypertension- (present on admission)  Assessment & Plan  Continue home medication lisinopril and metoprolol      Hyperlipidemia- (present on admission)  Assessment & Plan  Continue home med atorvastatin       VTE prophylaxis: enoxaparin ppx    I have performed a physical exam and reviewed and updated ROS and Plan today (7/11/2022). In review of yesterday's note (7/10/2022), there are no changes except as documented above.

## 2022-07-11 NOTE — PROGRESS NOTES
Martinez unclamped at 0230, 8 hours after last clamping. Patient denies urge to void. Martinez output 475mL. Martinez reclamped. Will continue to monitor.

## 2022-07-11 NOTE — DISCHARGE PLANNING
Case Management Discharge Planning    Admission Date: 6/29/2022  GMLOS: 4.3  ALOS: 12    6-Clicks ADL Score: 14  6-Clicks Mobility Score: 13  PT and/or OT Eval ordered: Yes  Post-acute Referrals Ordered: Yes  Post-acute Choice Obtained: NA  Has referral(s) been sent to post-acute provider:  FANNY      Anticipated Discharge Dispo: Discharge Disposition: D/T to SNF with Medicare cert in anticipation of skilled care (03)    DME Needed: No    Action(s) Taken: Acceptance Received    Escalations Completed: None    Medically Clear: No    Next Steps: Patient discussed in IDT rounds. [facility] admissions director, Chicho came by office today to ask for update. Pt still in soft wrist restraints to prevent pulling at medical tubes/lines. Chicho states they are unable to take restraints but will keep patient's bed and take him back to the home whenever he is medically stable.    Barriers to Discharge: Restraints    Is the patient up for discharge tomorrow: No

## 2022-07-11 NOTE — CARE PLAN
The patient is Stable - Low risk of patient condition declining or worsening    Shift Goals  Clinical Goals: pain control, restraint monitoring  Patient Goals: rest, pain control  Family Goals: N/A    Progress made toward(s) clinical / shift goals:  yes      Problem: Knowledge Deficit - Standard  Goal: Patient and family/care givers will demonstrate understanding of plan of care, disease process/condition, diagnostic tests and medications  Outcome: Progressing     Problem: Fall Risk  Goal: Patient will remain free from falls  Outcome: Progressing     Problem: Skin Integrity  Goal: Skin integrity is maintained or improved  Outcome: Progressing     Problem: Pain - Standard  Goal: Alleviation of pain or a reduction in pain to the patient’s comfort goal  Outcome: Progressing     Problem: Safety - Medical Restraint  Goal: Remains free of injury from restraints (Restraint for Interference with Medical Device)  Outcome: Progressing  Goal: Free from restraint(s) (Restraint for Interference with Medical Device)  Outcome: Progressing     Problem: Urinary Elimination  Goal: Establish and maintain regular urinary output  Outcome: Progressing       Patient is not progressing towards the following goals:

## 2022-07-11 NOTE — PROGRESS NOTES
Patient barbosa unclamped at 8 hour peg. Patient denies urge to void. Barbosa drained for 540. Patient barbosa reclamped at this time.

## 2022-07-11 NOTE — CARE PLAN
The patient is Stable - Low risk of patient condition declining or worsening    Shift Goals  Clinical Goals: pain control, restraint monitoring  Patient Goals: rest, pain control  Family Goals: N/A    Progress made toward(s) clinical / shift goals:  Patient remained in soft restraints throughout shift. Continuously attempts to pull out lines, drain, and medical devices. Patient able to rest comfortably.     Patient is not progressing towards the following goals:      Problem: Knowledge Deficit - Standard  Goal: Patient and family/care givers will demonstrate understanding of plan of care, disease process/condition, diagnostic tests and medications  Outcome: Not Met     Problem: Fall Risk  Goal: Patient will remain free from falls  Outcome: Not Met     Needs further reinforcement.

## 2022-07-11 NOTE — PROGRESS NOTES
Received bedside report from night shift RN.   Assumed care of patient at change of shift.   Assessment complete and POC discussed.   STATUS: Patient is A&Ox2, VSS, on RA.   DRAINS: barbosa catheter; clamped at 0230.  PAIN: Patient denies pain, no apparent signs of distress or discomfort.   Patient bilateral BUE and RLE restraints in place along with telesitter.   Bed alarm set, call light in reach.  Bed is in lowest/locked position.   Call light and belongings are within reach.   No further needs at this time.  Will continue to monitor.

## 2022-07-11 NOTE — WOUND TEAM
[facility]  Inpatient Services   Wound and Skin Care Evaluation    Admission Date: 6/10/2022     Last order of IP CONSULT TO WOUND CARE was found on 2022 from Hospital Encounter on 2022     HPI, PMH, SH: Reviewed    Past Surgical History:   Procedure Laterality Date   • IRRIGATION & DEBRIDEMENT GENERAL  2022    Procedure: IRRIGATION AND DEBRIDEMENT LEFT ANKLE;  Surgeon: Panfilo Wagner M.D.;  Location: SURGERY [facility];  Service: Orthopedics   • APPENDECTOMY     • STENT PLACEMENT      cardiac     Social History     Tobacco Use   • Smoking status: Former Smoker     Quit date: 2000     Years since quittin.8   • Smokeless tobacco: Never Used   Substance Use Topics   • Alcohol use: Never     Chief Complaint   Patient presents with   • Leg Swelling     Pt has new swelling to his left lower swelling and new confusion per staff at [facility] and daughter. Pt was incontinent of urine today and is usually continent. Pt is also weak and unable to walk like normal. Pt is A&O to self only today and is usually A&Ox3. Pt was recently admitted here for osteomyelitis of his left lower leg, an I&D, a positive MRSA culture of the left lower leg wound. Pt is currently on a daptomycin IV antibiotic regimen.        Diagnosis: Sepsis (HCC) [A41.9]    Unit where seen by Wound Team: [id number]/[id number]     WOUND CONSULT/FOLLOW UP RELATED TO:  L. Lateral ankle NPWT     WOUND HISTORY:  Andrei Garay is a 82 y.o. male past medical history of diabetes mellitus, hypertension who presented 6/10/2022 with altered mental status for last 3 days and left ankle pain.  History is primarily provided by daughter.  No relieving or exacerbating factors were noted.  He denies any fever does report some chills.  In the ER, x-ray of the ankle showed signs concerning for osteomyelitis.  UA grossly positive for urinary tract infection.  Patient met criteria for sepsis started on IV broad-spectrum antibiotics and  will be admitted for further work-up and limb preservation consult.    SX with Dr. Wagner 6/14/22    WOUND ASSESSMENT/LDA     Negative Pressure Wound Therapy 06/17/22 Surgical Ankle Lateral Left (Active)   NPWT Pump Mode / Pressure Setting Continuous;125 mmHg 07/11/22 0900   Dressing Type Small;Black Foam (Veraflo) 07/11/22 0900   Number of Foam Pieces Used 1 07/11/22 0900   Canister Changed No 07/11/22 0900   Output (mL) 0 mL 07/11/22 0900   NEXT Dressing Change/Treatment Date 07/13/22 07/11/22 0900   VAC VeraFlo Irrigant 1/4 Strength Dakins 07/11/22 0900   VAC VeraFlo Soak Time (mins) 8 07/11/22 0900   VAC VeraFlo Instill Volume (ml) 10 07/11/22 0900   VAC VeraFlo - Therapy Time (hrs) 3 07/11/22 0900   VAC VeraFlo Pressure (mm/Hg) Continuous;125 mmHg 07/11/22 0900   WOUND NURSE ONLY - Time Spent with Patient (mins) 60 07/08/22 0930           Wound 06/14/22 Ankle Left lateral ankle (Active)   Wound Image    07/08/22 0930   Site Assessment Red;Yellow 07/11/22 0900   Periwound Assessment Pink 07/11/22 0900   Margins Defined edges;Unattached edges 07/11/22 0900   Closure Secondary intention 07/11/22 0900   Drainage Amount Small 07/11/22 0900   Drainage Description Serosanguineous 07/11/22 0900   Treatments Cleansed;Site care;Offloading 07/11/22 0900   Wound Cleansing Approved Wound Cleanser 07/11/22 0900   Periwound Protectant Skin Protectant Wipes to Periwound;Paste Ring;Drape 07/11/22 0900   Dressing Cleansing/Solutions 1/4 Strength Dakin's Solution 07/11/22 0900   Dressing Options Wound Vac;Mepilex Heel;Mepilex 07/11/22 0900   Dressing Changed Changed 07/11/22 0900   Dressing Status Clean;Dry;Intact 07/11/22 0900   Dressing Change/Treatment Frequency Monday, Wednesday, Friday, and As Needed 07/11/22 0900   NEXT Dressing Change/Treatment Date 07/13/22 07/11/22 0900   NEXT Weekly Photo (Inpatient Only) 07/15/22 07/11/22 0900   Non-staged Wound Description Full thickness 07/08/22 0930   Wound Length (cm) 3.5 cm  2230   Wound Width (cm) 1 cm 22   Wound Depth (cm) 1.4 cm 22   Wound Surface Area (cm^2) 3.5 cm^2 22   Wound Volume (cm^3) 4.9 cm^3 22   Wound Healing % 54 22   Shape oval 22   Wound Odor None 22   Pulses 1+;Left 22   Exposed Structures None 22   WOUND NURSE ONLY - Time Spent with Patient (mins) 60 22              Vascular:    HONEY:   HONEY Results, Last 30 Days US-HONEY SINGLE LEVEL BILAT    Result Date: 2022  Narrative  Vascular Laboratory  Conclusions  The ankle pressures are not accurately measured due to calcification and  noncompressibility of the tibial vessels.  There is no evidence of significant arterial disease.  ROSIECOOKIE  Age:    82    Gender:     M  MRN:    [id number]  :    [id number]      BSA:  Exam Date:     2022 13:45  Room #:     Inpatient  Priority:     Routine  Ht (in):             Wt (lb):  Ordering Physician:        JIN GREGG  Referring Physician:       [id number] Coronado  Sonographer:               Aliyah Pandya DAISHA  Study Type:                Complete Bilateral  Technical Quality:         Adequate  Indications:     Ulceration of LE  CPT Codes:       59014  ICD Codes:       707.1  History:         Ulceration of the left lower extremity. No prior exams.  Limitations:                 RIGHT  Waveform            Systolic BPs (mmHg)                             125           Brachial  Triphasic                                Common Femoral  Triphasic                                Popliteal  Triphasic                  200           Posterior Tibial  Triphasic                  179           Dorsalis Pedis                                           Digit                             1.54          HONEY                                           TBI                       LEFT  Waveform        Systolic BPs (mmHg)                              130           Brachial  Triphasic                                Common Femoral  Triphasic                                Popliteal  Triphasic                  187           Posterior Tibial  Triphasic                  180           Dorsalis Pedis                                           Digit                             1.44          HONEY                                           TBI  Findings  Bilateral-  The ankle pressures are not accurately measured due to calcification and  noncompressibility of the tibial vessels.  Doppler waveforms of the common femoral artery and popliteal artery are of  high amplitude and triphasic.  Doppler waveforms at the ankle are brisk and triphasic.  TBI-  Right: 1.29  Left:    1.09  Additional testing was not performed in accordance with lower extremity  arterial evaluation protocol.  James Gonzalez  (Electronically Signed)  Final Date:      12 June 2022                   16:17      Lab Values:    Lab Results   Component Value Date/Time    WBC 10.4 07/11/2022 03:05 AM    RBC 4.47 (L) 07/11/2022 03:05 AM    HEMOGLOBIN 12.8 (L) 07/11/2022 03:05 AM    HEMATOCRIT 37.5 (L) 07/11/2022 03:05 AM    CREACTPROT 0.78 (H) 06/29/2022 06:51 PM    SEDRATEWES 27 (H) 06/29/2022 06:51 PM    HBA1C 6.3 (H) 06/11/2022 03:57 PM        Culture Results show:  Recent Results (from the past 720 hour(s))   CULTURE WOUND W/ GRAM STAIN    Collection Time: 06/14/22  9:29 AM    Specimen: Wound   Result Value Ref Range    Significant Indicator POS (POS)     Source WND     Site Left Lateral Ankle     Culture Result No growth at 72 hours. (A)     Gram Stain Result Rare WBCs.  No organisms seen.       Culture Result - (A)        Pain Level/Medicated:  Denied pain       INTERVENTIONS BY WOUND TEAM:  Performed standard wound care which includes appropriate positioning, dressing removal and non-selective debridement. Pictures and measurements obtained weekly if/when required.    Preparation for Dressing removal:  Dressing soaked with wound cleanser  Non-selectively Debrided with:  wound cleanser and gauze.  Sharp debridement: n/a  Rosa M wound: Cleansed with wound cleanser, Prepped with no sting,   Primary Dressin/2 thickness black foam placed into wound bed and secured with drape - bridged a short distance anterior proximal.    Secondary (Outer) Dressing: Trac pad. No leak detected, seal intact. mepilex placed for offloading, Tubi  D applied and then the offloading boot applied.    Interdisciplinary consultation: Patient, Bedside RN Wound RN Dania    EVALUATION / RATIONALE FOR TREATMENT:  Most Recent Date:  22: wound bed is red and yellow, with a little granular tissue per-iwound is less macerated.  Continue VF NPWT    22: Wound bed more moist in comparison to last assessment. Majority of wound bed is red, little granulation tissue still, and portion of wound with dark, spongy tissue. Resumed Dakins VF to help possibly debride or moisturize this area of dark tissue. Periwound is macerated, thus soak time and frequency of instillation decreased.     R 3rd toe with small scab that should resolve on its own. Left NETTE.     22: Wound bed remains dry and with no granulation tissue. Increased amount of Dakins instillation, however if wound continues to be dry at next change, patient may benefit from switching to NS instillation. No odor or purulence noted to wound bed. Pt confused at this time, in restraints and with telesitter at bedside.     ** This RN was unable to assess R 3rd toe, for which wound team was consulted. If possible, please assess at next VAC change. **     22: Patient's wound vac was intact, no granular tissue present but surrounding redness is not present, no odor.  Wound looks dry.  Added tubi  D per Edwin APRN  22 Pt confused and pulls at VAC tubing.  Pt pulled tubing off 5 minutes after placement.  Replaced tubing, secured tubing with large piece of drape.  Placed diabetic boot on  leg to try to keep pt from pulling on tubing.  Wound bed developing biofilm.  Will order dakins to instill in VAC.    6/20/22: Maceration still present, applied paste ring around periwound, added micah to the base of the wound.  Continue NPWT.  Tubi  added for light compression   6/17/22: Patient's ankle was a little macerated, but turned pink prior to re-application, wound bed appears nice and red for first dressing change post-op.  Continue NPWT     Goals: Steady decrease in wound area and depth weekly.    WOUND TEAM PLAN OF CARE ([X] for frequency of wound follow up,): X  Nursing to follow dressing orders written for wound care. Contact wound team if area fails to progress, deteriorates or with any questions/concerns if something comes up before next scheduled follow up (See below as to whether wound is following and frequency of wound follow up)  Dressing changes by wound team:                   Follow up 3 times weekly:                NPWT change 3 times weekly:  XMWF   Follow up 1-2 times weekly:      Follow up Bi-Monthly:                   Follow up as needed:     Other (explain):     NURSING PLAN OF CARE ORDERS (X):  Dressing changes: See Dressing Care orders: X  Skin care: See Skin Care orders: X  RN Prevention Protocol: X  Rectal tube care: See Rectal Tube Care orders:   Other orders:    RSKIN:   CURRENTLY IN PLACE (X), APPLIED THIS VISIT (A), ORDERED (O):   Q shift Montrell:  X  Q shift pressure point assessments:  X    Surface/Positioning X  Pressure redistribution mattressX            Low Airloss          Bariatric foam      Bariatric AKILA     Waffle cushion        Waffle Overlay          Reposition q 2 hours  X    TAPs Turning system     [place] Pillow     Offloading/Redistribution A  Sacral Mepilex (Silicone dressing)     Heel Mepilex (Silicone dressing)      A   Heel float boots (Prevalon boot)             Float Heels off Bed with Pillows           Respiratory Room air  Silicone O2 tubing          Gray Foam Ear protectors     Cannula fixation Device (Tender )          High flow offloading Clip    Elastic head band offloading device      Anchorfast                                                         Trach with Optifoam split foam             Containment/Moisture Prevention continent    Rectal tube or BMS    Purwick/Condom Cath        Martinez Catheter    Barrier wipes           Barrier paste       Antifungal tx      Interdry        Mobilization offloading boot      Up to chair        Ambulate      PT/OT      Nutrition PO      Dietician        Diabetes Education      PO     TF     TPN     NPO   # days     Other        Anticipated discharge plans: MONAE  LTACH:        SNF/Rehab:                  Home Health Care:           Outpatient Wound Center:            Self/Family Care:        Other:                  Vac Discharge Needs: X  Not Applicable Pt not on a wound vac:       Regular Vac while inpatient, alternative dressing at DC:        Regular Vac in use and continued at DC:      X      Reg. Vac w/ Skin Sub/Biologic in use. Will need to be changed 2x wkly:      Veraflo Vac while inpatient, ok to transition to Regular Vac on Discharge:           Veraflo Vac while inpatient, will need to remain on Veraflo Vac upon discharge:

## 2022-07-12 LAB
GLUCOSE BLD STRIP.AUTO-MCNC: 174 MG/DL (ref 65–99)
GLUCOSE BLD STRIP.AUTO-MCNC: 202 MG/DL (ref 65–99)
GLUCOSE BLD STRIP.AUTO-MCNC: 235 MG/DL (ref 65–99)
GLUCOSE BLD STRIP.AUTO-MCNC: 239 MG/DL (ref 65–99)

## 2022-07-12 PROCEDURE — 700111 HCHG RX REV CODE 636 W/ 250 OVERRIDE (IP): Performed by: STUDENT IN AN ORGANIZED HEALTH CARE EDUCATION/TRAINING PROGRAM

## 2022-07-12 PROCEDURE — 700102 HCHG RX REV CODE 250 W/ 637 OVERRIDE(OP): Performed by: HOSPITALIST

## 2022-07-12 PROCEDURE — A9270 NON-COVERED ITEM OR SERVICE: HCPCS | Performed by: HOSPITALIST

## 2022-07-12 PROCEDURE — 82962 GLUCOSE BLOOD TEST: CPT

## 2022-07-12 PROCEDURE — 770001 HCHG ROOM/CARE - MED/SURG/GYN PRIV*

## 2022-07-12 PROCEDURE — 97530 THERAPEUTIC ACTIVITIES: CPT | Mod: CQ

## 2022-07-12 PROCEDURE — A9270 NON-COVERED ITEM OR SERVICE: HCPCS | Performed by: STUDENT IN AN ORGANIZED HEALTH CARE EDUCATION/TRAINING PROGRAM

## 2022-07-12 PROCEDURE — 700102 HCHG RX REV CODE 250 W/ 637 OVERRIDE(OP): Performed by: STUDENT IN AN ORGANIZED HEALTH CARE EDUCATION/TRAINING PROGRAM

## 2022-07-12 PROCEDURE — 700111 HCHG RX REV CODE 636 W/ 250 OVERRIDE (IP): Performed by: HOSPITALIST

## 2022-07-12 PROCEDURE — 99231 SBSQ HOSP IP/OBS SF/LOW 25: CPT | Performed by: HOSPITALIST

## 2022-07-12 PROCEDURE — 700105 HCHG RX REV CODE 258: Performed by: HOSPITALIST

## 2022-07-12 RX ADMIN — ATORVASTATIN CALCIUM 40 MG: 40 TABLET, FILM COATED ORAL at 18:12

## 2022-07-12 RX ADMIN — QUETIAPINE FUMARATE 25 MG: 25 TABLET ORAL at 21:19

## 2022-07-12 RX ADMIN — SENNOSIDES AND DOCUSATE SODIUM 2 TABLET: 50; 8.6 TABLET ORAL at 18:12

## 2022-07-12 RX ADMIN — SERTRALINE 50 MG: 100 TABLET, FILM COATED ORAL at 18:12

## 2022-07-12 RX ADMIN — METOPROLOL TARTRATE 25 MG: 25 TABLET, FILM COATED ORAL at 10:18

## 2022-07-12 RX ADMIN — MIRTAZAPINE 15 MG: 15 TABLET, FILM COATED ORAL at 21:19

## 2022-07-12 RX ADMIN — INSULIN HUMAN 2 UNITS: 100 INJECTION, SOLUTION PARENTERAL at 14:12

## 2022-07-12 RX ADMIN — DAPTOMYCIN 750 MG: 500 INJECTION, POWDER, LYOPHILIZED, FOR SOLUTION INTRAVENOUS at 22:50

## 2022-07-12 RX ADMIN — Medication 10 MG: at 21:20

## 2022-07-12 RX ADMIN — ENOXAPARIN SODIUM 40 MG: 40 INJECTION SUBCUTANEOUS at 18:12

## 2022-07-12 RX ADMIN — INSULIN HUMAN 2 UNITS: 100 INJECTION, SOLUTION PARENTERAL at 18:18

## 2022-07-12 RX ADMIN — SENNOSIDES AND DOCUSATE SODIUM 2 TABLET: 50; 8.6 TABLET ORAL at 05:16

## 2022-07-12 RX ADMIN — AMLODIPINE BESYLATE 10 MG: 10 TABLET ORAL at 05:17

## 2022-07-12 RX ADMIN — OXYCODONE 5 MG: 5 TABLET ORAL at 22:33

## 2022-07-12 RX ADMIN — DAKIN'S SOLUTION 0.125% (QUARTER STRENGTH) 1 ML: 0.12 SOLUTION at 18:28

## 2022-07-12 RX ADMIN — INSULIN HUMAN 1 UNITS: 100 INJECTION, SOLUTION PARENTERAL at 21:28

## 2022-07-12 RX ADMIN — INSULIN HUMAN 2 UNITS: 100 INJECTION, SOLUTION PARENTERAL at 10:15

## 2022-07-12 RX ADMIN — LISINOPRIL 40 MG: 20 TABLET ORAL at 05:16

## 2022-07-12 RX ADMIN — TAMSULOSIN HYDROCHLORIDE 0.8 MG: 0.4 CAPSULE ORAL at 10:18

## 2022-07-12 RX ADMIN — METOPROLOL TARTRATE 25 MG: 25 TABLET, FILM COATED ORAL at 21:19

## 2022-07-12 ASSESSMENT — PAIN SCALES - PAIN ASSESSMENT IN ADVANCED DEMENTIA (PAINAD)
BREATHING: NORMAL
TOTALSCORE: 0
BODYLANGUAGE: RELAXED
CONSOLABILITY: NO NEED TO CONSOLE
FACIALEXPRESSION: SMILING OR INEXPRESSIVE

## 2022-07-12 ASSESSMENT — PAIN DESCRIPTION - PAIN TYPE: TYPE: ACUTE PAIN

## 2022-07-12 NOTE — PROGRESS NOTES
[facility] Medicine Daily Progress Note    Date of Service  7/12/2022    Chief Complaint  Andrei Garay is a 82 y.o. male admitted 6/29/2022 with left leg swelling and altered mental status    Hospital Course  Andrei Garay is a 82 y.o. male who presented 6/29/2022 with a history of type 2 diabetes, hyperlipidemia, hypertension, osteomyelitis who presents with right leg swelling and altered mental status.     According to the patient's daughter who is visiting him she states that he began becoming more confused and slurring his words yesterday.  He also reports that he had episode of incontinence which is not his normal baseline.  Has been receiving IV daptomycin for left lower leg osteomyelitis which was diagnosed 2 weeks ago, MRSA culture positive.    Emergency department ultrasound of the right lower extremity negative for DVT. Limb preservation services consulted and following.  Completed a bedside debridement and applied new dressings.  Sure of wound management patient was receiving it [facility].  Wound does not appear to be improved from prior hospitalization.  We will consult infectious disease for recommendations and continue to work with LPS for management.      Interval Problem Update  SHREYA overnight  Still not having urge to urinate, barbosa in place      I have discussed this patient's plan of care and discharge plan at IDT rounds today with Case Management, Nursing, Nursing leadership, and other members of the IDT team.    Consultants/Specialty  infectious disease  Limb preservation services    Code Status  DNAR/DNI    Disposition  Patient is not medically cleared for discharge.   Anticipate discharge to to skilled nursing facility.  I have placed the appropriate orders for post-discharge needs.    Review of Systems  Review of Systems   Unable to perform ROS: Mental acuity        Physical Exam  Temp:  [36.3 °C (97.3 °F)-37.4 °C (99.3 °F)] 36.8 °C (98.2 °F)  Pulse:  [70-96] 74  Resp:   [16-18] 17  BP: (125-145)/(59-73) 125/59  SpO2:  [91 %-92 %] 91 %    Physical Exam  Vitals and nursing note reviewed.   Constitutional:       General: He is not in acute distress.     Appearance: He is obese. He is not ill-appearing.   Cardiovascular:      Rate and Rhythm: Normal rate and regular rhythm.   Pulmonary:      Effort: Pulmonary effort is normal. No respiratory distress.   Abdominal:      General: Abdomen is protuberant.   Skin:     Findings: Erythema and signs of injury present.      Comments: Left leg wound vac in place   Neurological:      General: No focal deficit present.      Mental Status: He is disoriented.      Motor: Motor function is intact.   Psychiatric:         Mood and Affect: Affect is flat.         Behavior: Behavior is hyperactive.         Cognition and Memory: Cognition is impaired. Memory is impaired.         Judgment: Judgment is impulsive.         Fluids    Intake/Output Summary (Last 24 hours) at 7/12/2022 1656  Last data filed at 7/12/2022 1449  Gross per 24 hour   Intake 440 ml   Output 625 ml   Net -185 ml       Laboratory  Recent Labs     07/11/22  0305   WBC 10.4   RBC 4.47*   HEMOGLOBIN 12.8*   HEMATOCRIT 37.5*   MCV 83.9   MCH 28.6   MCHC 34.1   RDW 41.0   PLATELETCT 271   MPV 9.8     Recent Labs     07/10/22  1115 07/11/22  0305   SODIUM 134* 132*   POTASSIUM 3.2* 3.0*   CHLORIDE 100 99   CO2 22 22   GLUCOSE 194* 170*   BUN 15 15   CREATININE 0.76 0.75   CALCIUM 8.7 8.6                   Imaging  US-EXTREMITY VENOUS LOWER UNILAT RIGHT   Final Result      CT-HEAD W/O   Final Result         No acute intracranial abnormality.            DX-ANKLE 3+ VIEWS LEFT   Final Result         Bony destruction in the lateral aspect of the distal fibula is consistent with osteomyelitis described on recent MRI. Overlying soft tissue edema and a small amount of soft tissue gas      DX-CHEST-PORTABLE (1 VIEW)   Final Result      1.  Increased perihilar interstitial markings are suggestive of  pulmonary edema.      2.  Stable mild cardiomegaly           Assessment/Plan  * Encephalopathy  Assessment & Plan  Likely secondary to worsening cellulitis of the right leg.    Patient has been receiving IV daptomycin for osteomyelitis of the left leg, will continue    Avoid sedating medications  Improving    Urinary retention  Assessment & Plan  Cont flomax  Failed barbosa trial out  Will replace barbosa and start bladder training      Osteomyelitis (HCC)- (present on admission)  Assessment & Plan  Patient with osteomyelitis currently on daptomycin.   CRP mildly elevated  LPS    Type 2 diabetes mellitus (HCC)- (present on admission)  Assessment & Plan  Last A1c was 6.33 weeks ago.  Hold metformin  Insulin sliding scale  Diabetic diet    Cellulitis- (present on admission)  Assessment & Plan  Daughter reports that the patient's right leg is more red and swollen than normal.  Doppler ultrasound does not show DVT  ID consulted, patient reportedly had removed his wound VAC while at SNF  Continue the daptomycin (until 7/23) and Unasyn (until 7/6)    Dementia with behavioral disturbance (HCC)- (present on admission)  Assessment & Plan  History of dementia.  Oriented x 2, unsure of baseline  seroqeul 12.5mg to facilitate off restraints    Hypertension- (present on admission)  Assessment & Plan  Continue home medication lisinopril and metoprolol      Hyperlipidemia- (present on admission)  Assessment & Plan  Continue home med atorvastatin       VTE prophylaxis: enoxaparin ppx    I have performed a physical exam and reviewed and updated ROS and Plan today (7/12/2022). In review of yesterday's note (7/11/2022), there are no changes except as documented above.

## 2022-07-12 NOTE — PROGRESS NOTES
Martinez unclamped at 2135, patient denied urge to void. Output 625mL. Martinez reclamped.  Will continue to monitor.

## 2022-07-12 NOTE — PROGRESS NOTES
Received report from NOC RN, pt care assumed. VS stable on RA. No signs of acute distress. Pt is aaox1. Call light within reach. telesitter in place for safety. Family at bedside.

## 2022-07-12 NOTE — THERAPY
Physical Therapy   Daily Treatment     Patient Name: Andrei Garay  Age:  82 y.o., Sex:  male  Medical Record #: [id number]  Today's Date: 7/12/2022     Precautions  Precautions: Fall Risk;Weight Bearing As Tolerated Left Lower Extremity  Comments: CAM boot LLE, WBAT BLE wound vac    Assessment     Attempted therapy follow up session this pm. Pt initiated moving BLE toward EOB but than refusing further and stating no. Encouraged to continue to participate however pt declining, asking to try tomorrow, Rn staff had just finished turning and repositioning in bed upon arrival. Pt mainly seems limited due to cognition at this time. Will continue to follow.    Plan    Continue current treatment plan.    DC Equipment Recommendations: Unable to determine at this time  Discharge Recommendations: Recommend post-acute placement for additional physical therapy services prior to discharge home

## 2022-07-13 LAB
GLUCOSE BLD STRIP.AUTO-MCNC: 179 MG/DL (ref 65–99)
GLUCOSE BLD STRIP.AUTO-MCNC: 180 MG/DL (ref 65–99)

## 2022-07-13 PROCEDURE — 770001 HCHG ROOM/CARE - MED/SURG/GYN PRIV*

## 2022-07-13 PROCEDURE — 82962 GLUCOSE BLOOD TEST: CPT | Mod: 91

## 2022-07-13 PROCEDURE — A9270 NON-COVERED ITEM OR SERVICE: HCPCS | Performed by: STUDENT IN AN ORGANIZED HEALTH CARE EDUCATION/TRAINING PROGRAM

## 2022-07-13 PROCEDURE — 700102 HCHG RX REV CODE 250 W/ 637 OVERRIDE(OP): Performed by: STUDENT IN AN ORGANIZED HEALTH CARE EDUCATION/TRAINING PROGRAM

## 2022-07-13 PROCEDURE — A9270 NON-COVERED ITEM OR SERVICE: HCPCS | Performed by: HOSPITALIST

## 2022-07-13 PROCEDURE — 97605 NEG PRS WND THER DME<=50SQCM: CPT

## 2022-07-13 PROCEDURE — 99232 SBSQ HOSP IP/OBS MODERATE 35: CPT | Performed by: HOSPITALIST

## 2022-07-13 PROCEDURE — 306591 TRAY SUTURE REMOVAL DISP: Performed by: HOSPITALIST

## 2022-07-13 PROCEDURE — 700102 HCHG RX REV CODE 250 W/ 637 OVERRIDE(OP): Performed by: HOSPITALIST

## 2022-07-13 PROCEDURE — 302098 PASTE RING (FLAT): Performed by: HOSPITALIST

## 2022-07-13 RX ADMIN — AMLODIPINE BESYLATE 10 MG: 10 TABLET ORAL at 06:56

## 2022-07-13 RX ADMIN — Medication 10 MG: at 20:40

## 2022-07-13 RX ADMIN — LISINOPRIL 40 MG: 20 TABLET ORAL at 06:56

## 2022-07-13 RX ADMIN — MIRTAZAPINE 15 MG: 15 TABLET, FILM COATED ORAL at 20:40

## 2022-07-13 RX ADMIN — OXYCODONE 5 MG: 5 TABLET ORAL at 20:39

## 2022-07-13 RX ADMIN — QUETIAPINE FUMARATE 25 MG: 25 TABLET ORAL at 20:39

## 2022-07-13 RX ADMIN — METOPROLOL TARTRATE 25 MG: 25 TABLET, FILM COATED ORAL at 20:55

## 2022-07-13 RX ADMIN — INSULIN HUMAN 1 UNITS: 100 INJECTION, SOLUTION PARENTERAL at 20:48

## 2022-07-13 RX ADMIN — SENNOSIDES AND DOCUSATE SODIUM 2 TABLET: 50; 8.6 TABLET ORAL at 06:56

## 2022-07-13 ASSESSMENT — PAIN DESCRIPTION - PAIN TYPE: TYPE: ACUTE PAIN

## 2022-07-13 NOTE — PROGRESS NOTES
Martinez unclamped after 8 hours at 0015, patient denied urge to void. Output 350 mL. Martinez reclamped.  Will continue to monitor.

## 2022-07-13 NOTE — PROGRESS NOTES
Martinez unclamped after 8 hours at 1600, patient denied urge to void. Output 550 mL. Martinez reclamped at this time.  Will continue to monitor.

## 2022-07-13 NOTE — CARE PLAN
The patient is Stable - Low risk of patient condition declining or worsening    Shift Goals  Clinical Goals: monitoring restraints, safety  Patient Goals: rest, pain control  Family Goals: N/A    Progress made toward(s) clinical / shift goals:  yes      Problem: Knowledge Deficit - Standard  Goal: Patient and family/care givers will demonstrate understanding of plan of care, disease process/condition, diagnostic tests and medications  Outcome: Progressing     Problem: Fall Risk  Goal: Patient will remain free from falls  Outcome: Progressing     Problem: Skin Integrity  Goal: Skin integrity is maintained or improved  Outcome: Progressing     Problem: Pain - Standard  Goal: Alleviation of pain or a reduction in pain to the patient’s comfort goal  Outcome: Progressing     Problem: Safety - Medical Restraint  Goal: Remains free of injury from restraints (Restraint for Interference with Medical Device)  Outcome: Progressing  Goal: Free from restraint(s) (Restraint for Interference with Medical Device)  Outcome: Progressing     Problem: Urinary Elimination  Goal: Establish and maintain regular urinary output  Outcome: Progressing       Patient is not progressing towards the following goals:

## 2022-07-13 NOTE — PROGRESS NOTES
Received bedside report from night shift RN.   Assumed care of patient at change of shift.   Assessment complete and POC discussed.   STATUS: Patient is A&Ox2, VSS, on RA.   DRAINS: wound vac LLE, barbosa  PAIN: No apparent signs of distress or discomfort.   Telesitter in use, patient is in BLE and RLE soft NV restraints.   Patient is sitting up in bed for breakfast.   Bed alarm set, call light in reach.  Bed is in lowest/locked position.   Call light and belongings are within reach.   No further needs at this time.  Will continue to monitor.

## 2022-07-13 NOTE — CARE PLAN
The patient is Stable - Low risk of patient condition declining or worsening    Shift Goals  Clinical Goals: monitoring restraints, safety  Patient Goals: rest, pain control  Family Goals: N/A    Progress made toward(s) clinical / shift goals:  Patient remained in soft restraints throughout this shift and documentation completed q2h. Patient remained free from falls throughout this shift. Pain controlled with PRN medication, given once.

## 2022-07-13 NOTE — PROGRESS NOTES
[facility] Medicine Daily Progress Note    Date of Service  7/13/2022    Chief Complaint  Andrei Garay is a 82 y.o. male admitted 6/29/2022 with left leg swelling and altered mental status    Hospital Course  Andrei Garay is a 82 y.o. male who presented 6/29/2022 with a history of type 2 diabetes, hyperlipidemia, hypertension, osteomyelitis who presents with right leg swelling and altered mental status.     According to the patient's daughter who is visiting him she states that he began becoming more confused and slurring his words yesterday.  He also reports that he had episode of incontinence which is not his normal baseline.  Has been receiving IV daptomycin for left lower leg osteomyelitis which was diagnosed 2 weeks ago, MRSA culture positive.    Emergency department ultrasound of the right lower extremity negative for DVT. Limb preservation services consulted and following.  Completed a bedside debridement and applied new dressings.  Sure of wound management patient was receiving it [facility].  Wound does not appear to be improved from prior hospitalization.  We will consult infectious disease for recommendations and continue to work with LPS for management.      Interval Problem Update  SHREYA overnight  Still not having urge to urinate, barbosa in place      I have discussed this patient's plan of care and discharge plan at IDT rounds today with Case Management, Nursing, Nursing leadership, and other members of the IDT team.    Consultants/Specialty  infectious disease  Limb preservation services    Code Status  DNAR/DNI    Disposition  Patient is not medically cleared for discharge.   Anticipate discharge to to skilled nursing facility.  I have placed the appropriate orders for post-discharge needs.    Review of Systems  Review of Systems   Unable to perform ROS: Mental acuity        Physical Exam  Temp:  [36.4 °C (97.5 °F)-37.3 °C (99.1 °F)] 36.6 °C (97.8 °F)  Pulse:  [64-86] 64  Resp:   [17-20] 17  BP: (108-143)/(45-75) 114/70  SpO2:  [91 %-92 %] 92 %    Physical Exam  Vitals and nursing note reviewed.   Constitutional:       General: He is not in acute distress.     Appearance: He is obese. He is not ill-appearing.   Cardiovascular:      Rate and Rhythm: Normal rate and regular rhythm.   Pulmonary:      Effort: Pulmonary effort is normal. No respiratory distress.   Abdominal:      General: Abdomen is protuberant.   Skin:     Findings: Erythema and signs of injury present.      Comments: Left leg wound vac in place   Neurological:      General: No focal deficit present.      Mental Status: He is disoriented.      Motor: Motor function is intact.   Psychiatric:         Mood and Affect: Affect is flat.         Behavior: Behavior is hyperactive.         Cognition and Memory: Cognition is impaired. Memory is impaired.         Judgment: Judgment is impulsive.         Fluids    Intake/Output Summary (Last 24 hours) at 7/13/2022 1549  Last data filed at 7/13/2022 0808  Gross per 24 hour   Intake 300 ml   Output 1425 ml   Net -1125 ml       Laboratory  Recent Labs     07/11/22  0305   WBC 10.4   RBC 4.47*   HEMOGLOBIN 12.8*   HEMATOCRIT 37.5*   MCV 83.9   MCH 28.6   MCHC 34.1   RDW 41.0   PLATELETCT 271   MPV 9.8     Recent Labs     07/11/22  0305   SODIUM 132*   POTASSIUM 3.0*   CHLORIDE 99   CO2 22   GLUCOSE 170*   BUN 15   CREATININE 0.75   CALCIUM 8.6                   Imaging  US-EXTREMITY VENOUS LOWER UNILAT RIGHT   Final Result      CT-HEAD W/O   Final Result         No acute intracranial abnormality.            DX-ANKLE 3+ VIEWS LEFT   Final Result         Bony destruction in the lateral aspect of the distal fibula is consistent with osteomyelitis described on recent MRI. Overlying soft tissue edema and a small amount of soft tissue gas      DX-CHEST-PORTABLE (1 VIEW)   Final Result      1.  Increased perihilar interstitial markings are suggestive of pulmonary edema.      2.  Stable mild  cardiomegaly           Assessment/Plan  * Encephalopathy  Assessment & Plan  Likely secondary to worsening cellulitis of the right leg.    Patient has been receiving IV daptomycin for osteomyelitis of the left leg, will continue    Avoid sedating medications  Improving    Urinary retention  Assessment & Plan  Cont flomax  Failed barbosa trial out  Will replace barbosa and start bladder training      Osteomyelitis (HCC)- (present on admission)  Assessment & Plan  Patient with osteomyelitis currently on daptomycin.   CRP mildly elevated  LPS    Type 2 diabetes mellitus (HCC)- (present on admission)  Assessment & Plan  Last A1c was 6.33 weeks ago.  Hold metformin  Insulin sliding scale  Diabetic diet    Cellulitis- (present on admission)  Assessment & Plan  Daughter reports that the patient's right leg is more red and swollen than normal.  Doppler ultrasound does not show DVT  ID consulted, patient reportedly had removed his wound VAC while at SNF  Continue the daptomycin (until 7/23) and Unasyn (until 7/6)    Dementia with behavioral disturbance (HCC)- (present on admission)  Assessment & Plan  History of dementia.  Oriented x 2, unsure of baseline  seroqeul 12.5mg to facilitate off restraints    Hypertension- (present on admission)  Assessment & Plan  Continue home medication lisinopril and metoprolol      Hyperlipidemia- (present on admission)  Assessment & Plan  Continue home med atorvastatin       VTE prophylaxis: enoxaparin ppx    I have performed a physical exam and reviewed and updated ROS and Plan today (7/13/2022). In review of yesterday's note (7/12/2022), there are no changes except as documented above.

## 2022-07-13 NOTE — WOUND TEAM
[facility]  Inpatient Services   Wound and Skin Care Evaluation    Admission Date: 6/10/2022     Last order of IP CONSULT TO WOUND CARE was found on 2022 from Hospital Encounter on 2022     HPI, PMH, SH: Reviewed    Past Surgical History:   Procedure Laterality Date   • IRRIGATION & DEBRIDEMENT GENERAL  2022    Procedure: IRRIGATION AND DEBRIDEMENT LEFT ANKLE;  Surgeon: Panfilo Wagner M.D.;  Location: SURGERY [facility];  Service: Orthopedics   • APPENDECTOMY     • STENT PLACEMENT      cardiac     Social History     Tobacco Use   • Smoking status: Former Smoker     Quit date: 2000     Years since quittin.8   • Smokeless tobacco: Never Used   Substance Use Topics   • Alcohol use: Never     Chief Complaint   Patient presents with   • Leg Swelling     Pt has new swelling to his left lower swelling and new confusion per staff at [facility] and daughter. Pt was incontinent of urine today and is usually continent. Pt is also weak and unable to walk like normal. Pt is A&O to self only today and is usually A&Ox3. Pt was recently admitted here for osteomyelitis of his left lower leg, an I&D, a positive MRSA culture of the left lower leg wound. Pt is currently on a daptomycin IV antibiotic regimen.        Diagnosis: Sepsis (HCC) [A41.9]    Unit where seen by Wound Team: S612/[id number]     WOUND CONSULT/FOLLOW UP RELATED TO:  L. Lateral ankle NPWT     WOUND HISTORY:  Andrei Garay is a 82 y.o. male past medical history of diabetes mellitus, hypertension who presented 6/10/2022 with altered mental status for last 3 days and left ankle pain.  History is primarily provided by daughter.  No relieving or exacerbating factors were noted.  He denies any fever does report some chills.  In the ER, x-ray of the ankle showed signs concerning for osteomyelitis.  UA grossly positive for urinary tract infection.  Patient met criteria for sepsis started on IV broad-spectrum antibiotics and  will be admitted for further work-up and limb preservation consult.    SX with Dr. Wagner 6/14/22    WOUND ASSESSMENT/LDA        Negative Pressure Wound Therapy 06/17/22 Surgical Ankle Lateral Left (Active)   NPWT Pump Mode / Pressure Setting Ulta;Intermittent;125 mmHg 07/13/22 0900   Dressing Type Small;Black Foam (Veraflo) 07/13/22 0900   Number of Foam Pieces Used 2 07/13/22 0900   Canister Changed No 07/13/22 0900   Output (mL) 0 mL 07/11/22 0900   NEXT Dressing Change/Treatment Date 07/15/22 07/13/22 0900   VAC VeraFlo Irrigant 1/4 Strength Dakins 07/13/22 0900   VAC VeraFlo Soak Time (mins) 8 07/13/22 0900   VAC VeraFlo Instill Volume (ml) 10 07/13/22 0900   VAC VeraFlo - Therapy Time (hrs) 3 07/13/22 0900   VAC VeraFlo Pressure (mm/Hg) Intermittent;125 mmHg 07/13/22 0900   WOUND NURSE ONLY - Time Spent with Patient (mins) 60 07/13/22 0900           Wound 06/14/22 Ankle Left lateral ankle (Active)   Wound Image    07/13/22 0900   Site Assessment Pink;Brown 07/13/22 0900   Periwound Assessment Intact 07/13/22 0900   Margins Defined edges;Unattached edges 07/13/22 0900   Closure Secondary intention 07/13/22 0900   Drainage Amount Scant 07/13/22 0900   Drainage Description Serous 07/13/22 0900   Treatments Cleansed;Compression;Site care 07/13/22 0900   Wound Cleansing Approved Wound Cleanser 07/13/22 0900   Periwound Protectant Drape;Paste Ring;Skin Protectant Wipes to Periwound 07/13/22 0900   Dressing Cleansing/Solutions 1/4 Strength Dakin's Solution 07/13/22 0900   Dressing Options Wound Vac;Mepilex Heel 07/13/22 0900   Dressing Changed Changed 07/13/22 0900   Dressing Status Clean;Dry;Intact 07/13/22 0900   Dressing Change/Treatment Frequency Monday, Wednesday, Friday, and As Needed 07/13/22 0900   NEXT Dressing Change/Treatment Date 07/15/22 07/13/22 0900   NEXT Weekly Photo (Inpatient Only) 07/20/22 07/13/22 0900   Non-staged Wound Description Full thickness 07/13/22 0900   Wound Length (cm) 4 cm 07/13/22  09   Wound Width (cm) 1.3 cm 22   Wound Depth (cm) 1.1 cm 22   Wound Surface Area (cm^2) 5.2 cm^2 22   Wound Volume (cm^3) 5.72 cm^3 22   Wound Healing % 47 22 09   Shape oval 22   Wound Odor None 22   Pulses 1+;Left 22   Exposed Structures None 22   WOUND NURSE ONLY - Time Spent with Patient (mins) 60 22 09     Vascular:    HONEY:   HONEY Results, Last 30 Days US-HONEY SINGLE LEVEL BILAT    Result Date: 2022  Narrative  Vascular Laboratory  Conclusions  The ankle pressures are not accurately measured due to calcification and  noncompressibility of the tibial vessels.  There is no evidence of significant arterial disease.  ROSIECOOKIE  Age:    82    Gender:     M  MRN:    [id number]  :    1940      BSA:  Exam Date:     2022 13:45  Room #:     Inpatient  Priority:     Routine  Ht (in):             Wt (lb):  Ordering Physician:        JIN GREGG  Referring Physician:       [id number] Coronado  Sonographer:               Aliyah Pandya RVT  Study Type:                Complete Bilateral  Technical Quality:         Adequate  Indications:     Ulceration of LE  CPT Codes:       79849  ICD Codes:       707.1  History:         Ulceration of the left lower extremity. No prior exams.  Limitations:                 RIGHT  Waveform            Systolic BPs (mmHg)                             125           Brachial  Triphasic                                Common Femoral  Triphasic                                Popliteal  Triphasic                  200           Posterior Tibial  Triphasic                  179           Dorsalis Pedis                                           Digit                             1.54          HONEY                                           TBI                       LEFT  Waveform        Systolic BPs (mmHg)                             130            Brachial  Triphasic                                Common Femoral  Triphasic                                Popliteal  Triphasic                  187           Posterior Tibial  Triphasic                  180           Dorsalis Pedis                                           Digit                             1.44          HONEY                                           TBI  Findings  Bilateral-  The ankle pressures are not accurately measured due to calcification and  noncompressibility of the tibial vessels.  Doppler waveforms of the common femoral artery and popliteal artery are of  high amplitude and triphasic.  Doppler waveforms at the ankle are brisk and triphasic.  TBI-  Right: 1.29  Left:    1.09  Additional testing was not performed in accordance with lower extremity  arterial evaluation protocol.  James Gonzalez  (Electronically Signed)  Final Date:      12 June 2022                   16:17      Lab Values:    Lab Results   Component Value Date/Time    WBC 10.4 07/11/2022 03:05 AM    RBC 4.47 (L) 07/11/2022 03:05 AM    HEMOGLOBIN 12.8 (L) 07/11/2022 03:05 AM    HEMATOCRIT 37.5 (L) 07/11/2022 03:05 AM    CREACTPROT 0.78 (H) 06/29/2022 06:51 PM    SEDRATEWES 27 (H) 06/29/2022 06:51 PM    HBA1C 6.3 (H) 06/11/2022 03:57 PM        Culture Results show:  Recent Results (from the past 720 hour(s))   CULTURE WOUND W/ GRAM STAIN    Collection Time: 06/14/22  9:29 AM    Specimen: Wound   Result Value Ref Range    Significant Indicator POS (POS)     Source WND     Site Left Lateral Ankle     Culture Result No growth at 72 hours. (A)     Gram Stain Result Rare WBCs.  No organisms seen.       Culture Result - (A)        Pain Level/Medicated:  Denied pain       INTERVENTIONS BY WOUND TEAM:  Performed standard wound care which includes appropriate positioning, dressing removal and non-selective debridement. Pictures and measurements obtained weekly if/when required.    Preparation for Dressing removal: Dressing soaked with  wound cleanser  Non-selectively Debrided with:  wound cleanser and gauze.  Sharp debridement: n/a  Rosa M wound: Cleansed with wound cleanser, Prepped with no sting,   Primary Dressin/2 thickness black foam placed into wound bed and secured with drape - bridged a short distance anterior proximal.    Secondary (Outer) Dressing: Trac pad. No leak detected, seal intact. mepilex placed for offloading, Tubi  D applied and then the offloading boot applied.    Interdisciplinary consultation: Patient, Bedside RN    EVALUATION / RATIONALE FOR TREATMENT:  Most Recent Date:    22: Depth with slight decrease in measurement. Maceration to periwound improved. Dark tissue seen during previous assessment is decreasing in size. Continue POC.     22: wound bed is red and yellow, with a little granular tissue per-iwound is less macerated.  Continue VF NPWT    22: Wound bed more moist in comparison to last assessment. Majority of wound bed is red, little granulation tissue still, and portion of wound with dark, spongy tissue. Resumed Dakins VF to help possibly debride or moisturize this area of dark tissue. Periwound is macerated, thus soak time and frequency of instillation decreased.     R 3rd toe with small scab that should resolve on its own. Left NETTE.     22: Wound bed remains dry and with no granulation tissue. Increased amount of Dakins instillation, however if wound continues to be dry at next change, patient may benefit from switching to NS instillation. No odor or purulence noted to wound bed. Pt confused at this time, in restraints and with telesitter at bedside.     ** This RN was unable to assess R 3rd toe, for which wound team was consulted. If possible, please assess at next VAC change. **     22: Patient's wound vac was intact, no granular tissue present but surrounding redness is not present, no odor.  Wound looks dry.  Added tubi  D per Edwin APRN  22 Pt confused and pulls at VAC  tubing.  Pt pulled tubing off 5 minutes after placement.  Replaced tubing, secured tubing with large piece of drape.  Placed diabetic boot on leg to try to keep pt from pulling on tubing.  Wound bed developing biofilm.  Will order dakins to instill in VAC.    6/20/22: Maceration still present, applied paste ring around periwound, added micah to the base of the wound.  Continue NPWT.  Tubi  added for light compression   6/17/22: Patient's ankle was a little macerated, but turned pink prior to re-application, wound bed appears nice and red for first dressing change post-op.  Continue NPWT     Goals: Steady decrease in wound area and depth weekly.    WOUND TEAM PLAN OF CARE ([X] for frequency of wound follow up,): X  Nursing to follow dressing orders written for wound care. Contact wound team if area fails to progress, deteriorates or with any questions/concerns if something comes up before next scheduled follow up (See below as to whether wound is following and frequency of wound follow up)  Dressing changes by wound team:                   Follow up 3 times weekly:                NPWT change 3 times weekly:  XMWF   Follow up 1-2 times weekly:      Follow up Bi-Monthly:                   Follow up as needed:     Other (explain):     NURSING PLAN OF CARE ORDERS (X):  Dressing changes: See Dressing Care orders: X  Skin care: See Skin Care orders: X  RN Prevention Protocol: X  Rectal tube care: See Rectal Tube Care orders:   Other orders:    RSKIN:   CURRENTLY IN PLACE (X), APPLIED THIS VISIT (A), ORDERED (O):   Q shift Montrell:  X  Q shift pressure point assessments:  X    Surface/Positioning X  Pressure redistribution mattressX            Low Airloss          Bariatric foam      Bariatric AKILA     Waffle cushion        Waffle Overlay          Reposition q 2 hours  X    TAPs Turning system     [place] Pillow     Offloading/Redistribution A  Sacral Mepilex (Silicone dressing)     Heel Mepilex (Silicone dressing)      A    Heel float boots (Prevalon boot)             Float Heels off Bed with Pillows           Respiratory Room air  Silicone O2 tubing         Gray Foam Ear protectors     Cannula fixation Device (Tender )          High flow offloading Clip    Elastic head band offloading device      Anchorfast                                                         Trach with Optifoam split foam             Containment/Moisture Prevention continent    Rectal tube or BMS    Purwick/Condom Cath        Martinez Catheter    Barrier wipes           Barrier paste       Antifungal tx      Interdry        Mobilization offloading boot      Up to chair        Ambulate      PT/OT      Nutrition PO      Dietician        Diabetes Education      PO     TF     TPN     NPO   # days     Other        Anticipated discharge plans: MONAE  LTACH:        SNF/Rehab:                  Home Health Care:           Outpatient Wound Center:            Self/Family Care:        Other:                  Vac Discharge Needs: X  Not Applicable Pt not on a wound vac:       Regular Vac while inpatient, alternative dressing at DC:        Regular Vac in use and continued at DC:      X      Reg. Vac w/ Skin Sub/Biologic in use. Will need to be changed 2x wkly:      Veraflo Vac while inpatient, ok to transition to Regular Vac on Discharge:           Veraflo Vac while inpatient, will need to remain on Veraflo Vac upon discharge:

## 2022-07-13 NOTE — PROGRESS NOTES
Martinez unclamped after 8 hours at 0808, patient denied urge to void. Output 325 mL. Martinez reclamped at this time.  Will continue to monitor.

## 2022-07-13 NOTE — PROGRESS NOTES
Barbosa unclamped at 1530, pt stated he did not feel the need to void. 750 output and barbosa re-clamped afterwards

## 2022-07-14 LAB
CK SERPL-CCNC: 30 U/L (ref 0–154)
GLUCOSE BLD STRIP.AUTO-MCNC: 161 MG/DL (ref 65–99)
GLUCOSE BLD STRIP.AUTO-MCNC: 231 MG/DL (ref 65–99)

## 2022-07-14 PROCEDURE — 700102 HCHG RX REV CODE 250 W/ 637 OVERRIDE(OP): Performed by: STUDENT IN AN ORGANIZED HEALTH CARE EDUCATION/TRAINING PROGRAM

## 2022-07-14 PROCEDURE — 770001 HCHG ROOM/CARE - MED/SURG/GYN PRIV*

## 2022-07-14 PROCEDURE — 306591 TRAY SUTURE REMOVAL DISP: Performed by: HOSPITALIST

## 2022-07-14 PROCEDURE — 302098 PASTE RING (FLAT): Performed by: HOSPITALIST

## 2022-07-14 PROCEDURE — 82962 GLUCOSE BLOOD TEST: CPT

## 2022-07-14 PROCEDURE — A9270 NON-COVERED ITEM OR SERVICE: HCPCS | Performed by: STUDENT IN AN ORGANIZED HEALTH CARE EDUCATION/TRAINING PROGRAM

## 2022-07-14 PROCEDURE — 99231 SBSQ HOSP IP/OBS SF/LOW 25: CPT | Performed by: HOSPITALIST

## 2022-07-14 PROCEDURE — 36415 COLL VENOUS BLD VENIPUNCTURE: CPT

## 2022-07-14 PROCEDURE — 82550 ASSAY OF CK (CPK): CPT

## 2022-07-14 PROCEDURE — 700105 HCHG RX REV CODE 258: Performed by: HOSPITALIST

## 2022-07-14 PROCEDURE — 700111 HCHG RX REV CODE 636 W/ 250 OVERRIDE (IP): Performed by: HOSPITALIST

## 2022-07-14 PROCEDURE — 700102 HCHG RX REV CODE 250 W/ 637 OVERRIDE(OP): Performed by: HOSPITALIST

## 2022-07-14 PROCEDURE — A9270 NON-COVERED ITEM OR SERVICE: HCPCS | Performed by: HOSPITALIST

## 2022-07-14 RX ADMIN — SENNOSIDES AND DOCUSATE SODIUM 2 TABLET: 50; 8.6 TABLET ORAL at 17:05

## 2022-07-14 RX ADMIN — METOPROLOL TARTRATE 25 MG: 25 TABLET, FILM COATED ORAL at 20:40

## 2022-07-14 RX ADMIN — QUETIAPINE FUMARATE 25 MG: 25 TABLET ORAL at 20:40

## 2022-07-14 RX ADMIN — TAMSULOSIN HYDROCHLORIDE 0.8 MG: 0.4 CAPSULE ORAL at 07:37

## 2022-07-14 RX ADMIN — METOPROLOL TARTRATE 25 MG: 25 TABLET, FILM COATED ORAL at 07:38

## 2022-07-14 RX ADMIN — ATORVASTATIN CALCIUM 40 MG: 40 TABLET, FILM COATED ORAL at 17:06

## 2022-07-14 RX ADMIN — DAPTOMYCIN 750 MG: 500 INJECTION, POWDER, LYOPHILIZED, FOR SOLUTION INTRAVENOUS at 00:31

## 2022-07-14 RX ADMIN — SERTRALINE 50 MG: 100 TABLET, FILM COATED ORAL at 17:06

## 2022-07-14 RX ADMIN — Medication 10 MG: at 20:40

## 2022-07-14 RX ADMIN — MIRTAZAPINE 15 MG: 15 TABLET, FILM COATED ORAL at 20:42

## 2022-07-14 RX ADMIN — INSULIN HUMAN 2 UNITS: 100 INJECTION, SOLUTION PARENTERAL at 20:45

## 2022-07-14 ASSESSMENT — PAIN DESCRIPTION - PAIN TYPE: TYPE: ACUTE PAIN

## 2022-07-14 ASSESSMENT — FIBROSIS 4 INDEX: FIB4 SCORE: 1.81

## 2022-07-14 NOTE — CARE PLAN
The patient is Stable - Low risk of patient condition declining or worsening    Shift Goals  Clinical Goals: safety, restraint monitoring  Patient Goals: rest. pain control  Family Goals: N/A    Progress made toward(s) clinical / shift goals:  yes      Problem: Knowledge Deficit - Standard  Goal: Patient and family/care givers will demonstrate understanding of plan of care, disease process/condition, diagnostic tests and medications  Outcome: Progressing     Problem: Fall Risk  Goal: Patient will remain free from falls  Outcome: Progressing     Problem: Skin Integrity  Goal: Skin integrity is maintained or improved  Outcome: Progressing     Problem: Pain - Standard  Goal: Alleviation of pain or a reduction in pain to the patient’s comfort goal  Outcome: Progressing     Problem: Safety - Medical Restraint  Goal: Remains free of injury from restraints (Restraint for Interference with Medical Device)  Outcome: Progressing  Goal: Free from restraint(s) (Restraint for Interference with Medical Device)  Outcome: Progressing     Problem: Urinary Elimination  Goal: Establish and maintain regular urinary output  Outcome: Progressing       Patient is not progressing towards the following goals:

## 2022-07-14 NOTE — CARE PLAN
The patient is Stable - Low risk of patient condition declining or worsening    Shift Goals  Clinical Goals: safety, restraint monitoring  Patient Goals: rest. pain control  Family Goals: N/A    Progress made toward(s) clinical / shift goals:    Problem: Fall Risk  Goal: Patient will remain free from falls  Outcome: Progressing     Problem: Skin Integrity  Goal: Skin integrity is maintained or improved  Outcome: Progressing     Problem: Pain - Standard  Goal: Alleviation of pain or a reduction in pain to the patient’s comfort goal  Outcome: Progressing

## 2022-07-14 NOTE — PROGRESS NOTES
Received bedside report from night shift RN.   Assumed care of patient at change of shift.   Assessment complete and POC discussed.   STATUS: Patient is A&Ox2, VSS, on RA.   DRAINS: wound vac to LLE; barbosa catheter on clamp trial.  PAIN: No apparent signs of distress or discomfort.   Telesitter in use.  Patient is sitting up in bed for breakfast.   Bed alarm set, call light in reach.  Bed is in lowest/locked position.   Call light and belongings are within reach.   No further needs at this time.  Will continue to monitor.

## 2022-07-14 NOTE — PROGRESS NOTES
Martinez unclamped after 8 hours at 0745, patient denied urge to void. Output 325 mL. Martinez reclamped at this time.  Will continue to monitor.

## 2022-07-15 LAB
GLUCOSE BLD STRIP.AUTO-MCNC: 194 MG/DL (ref 65–99)
GLUCOSE BLD STRIP.AUTO-MCNC: 230 MG/DL (ref 65–99)
GLUCOSE BLD STRIP.AUTO-MCNC: 237 MG/DL (ref 65–99)

## 2022-07-15 PROCEDURE — A9270 NON-COVERED ITEM OR SERVICE: HCPCS | Performed by: HOSPITALIST

## 2022-07-15 PROCEDURE — 700111 HCHG RX REV CODE 636 W/ 250 OVERRIDE (IP): Performed by: HOSPITALIST

## 2022-07-15 PROCEDURE — A9270 NON-COVERED ITEM OR SERVICE: HCPCS | Performed by: STUDENT IN AN ORGANIZED HEALTH CARE EDUCATION/TRAINING PROGRAM

## 2022-07-15 PROCEDURE — 99231 SBSQ HOSP IP/OBS SF/LOW 25: CPT | Mod: 25

## 2022-07-15 PROCEDURE — 700102 HCHG RX REV CODE 250 W/ 637 OVERRIDE(OP): Performed by: STUDENT IN AN ORGANIZED HEALTH CARE EDUCATION/TRAINING PROGRAM

## 2022-07-15 PROCEDURE — 82962 GLUCOSE BLOOD TEST: CPT | Mod: 91

## 2022-07-15 PROCEDURE — 99232 SBSQ HOSP IP/OBS MODERATE 35: CPT | Performed by: HOSPITALIST

## 2022-07-15 PROCEDURE — 700101 HCHG RX REV CODE 250: Performed by: HOSPITALIST

## 2022-07-15 PROCEDURE — 700102 HCHG RX REV CODE 250 W/ 637 OVERRIDE(OP): Performed by: HOSPITALIST

## 2022-07-15 PROCEDURE — 11042 DBRDMT SUBQ TIS 1ST 20SQCM/<: CPT

## 2022-07-15 PROCEDURE — 97605 NEG PRS WND THER DME<=50SQCM: CPT

## 2022-07-15 PROCEDURE — 700105 HCHG RX REV CODE 258: Performed by: HOSPITALIST

## 2022-07-15 PROCEDURE — 770001 HCHG ROOM/CARE - MED/SURG/GYN PRIV*

## 2022-07-15 PROCEDURE — 0JBP3ZZ EXCISION OF LEFT LOWER LEG SUBCUTANEOUS TISSUE AND FASCIA, PERCUTANEOUS APPROACH: ICD-10-PCS | Performed by: ORTHOPAEDIC SURGERY

## 2022-07-15 RX ORDER — SODIUM HYPOCHLORITE 1.25 MG/ML
SOLUTION TOPICAL DAILY
Status: CANCELLED | OUTPATIENT
Start: 2022-07-15

## 2022-07-15 RX ADMIN — INSULIN HUMAN 2 UNITS: 100 INJECTION, SOLUTION PARENTERAL at 13:49

## 2022-07-15 RX ADMIN — INSULIN HUMAN 1 UNITS: 100 INJECTION, SOLUTION PARENTERAL at 09:30

## 2022-07-15 RX ADMIN — MIRTAZAPINE 15 MG: 15 TABLET, FILM COATED ORAL at 20:48

## 2022-07-15 RX ADMIN — QUETIAPINE FUMARATE 25 MG: 25 TABLET ORAL at 20:48

## 2022-07-15 RX ADMIN — DAPTOMYCIN 750 MG: 500 INJECTION, POWDER, LYOPHILIZED, FOR SOLUTION INTRAVENOUS at 23:36

## 2022-07-15 RX ADMIN — METOPROLOL TARTRATE 25 MG: 25 TABLET, FILM COATED ORAL at 10:45

## 2022-07-15 RX ADMIN — DAKIN'S SOLUTION 0.125% (QUARTER STRENGTH) 473 ML: 0.12 SOLUTION at 04:41

## 2022-07-15 RX ADMIN — METOPROLOL TARTRATE 25 MG: 25 TABLET, FILM COATED ORAL at 20:49

## 2022-07-15 RX ADMIN — Medication 10 MG: at 20:48

## 2022-07-15 RX ADMIN — SENNOSIDES AND DOCUSATE SODIUM 2 TABLET: 50; 8.6 TABLET ORAL at 04:41

## 2022-07-15 RX ADMIN — TAMSULOSIN HYDROCHLORIDE 0.8 MG: 0.4 CAPSULE ORAL at 10:45

## 2022-07-15 RX ADMIN — LISINOPRIL 40 MG: 20 TABLET ORAL at 04:41

## 2022-07-15 RX ADMIN — DAPTOMYCIN 750 MG: 500 INJECTION, POWDER, LYOPHILIZED, FOR SOLUTION INTRAVENOUS at 00:09

## 2022-07-15 RX ADMIN — INSULIN HUMAN 2 UNITS: 100 INJECTION, SOLUTION PARENTERAL at 20:55

## 2022-07-15 RX ADMIN — AMLODIPINE BESYLATE 10 MG: 10 TABLET ORAL at 04:41

## 2022-07-15 ASSESSMENT — PAIN DESCRIPTION - PAIN TYPE: TYPE: ACUTE PAIN

## 2022-07-15 NOTE — CARE PLAN
The patient is Stable - Low risk of patient condition declining or worsening    Shift Goals  Clinical Goals: Safety  Patient Goals: Rest  Family Goals: N/A    Progress made toward(s) clinical / shift goals: Safety precautions in place: desk bed, non-skid socks, call light and belongings within reach, bed in lowest/locked position.    Patient is not progressing towards the following goals:

## 2022-07-15 NOTE — PROGRESS NOTES
Received bedside report from RN Martha, pt care assumed. VSS on RA, pt assessment complete. Pt A&Ox2, c/o no pain at this time. POC discussed with pt and verbalizes no questions. Pt denies any additional needs at this time. Bed locked and in lowest position, bed alarm on, telesitter in place. Pt educated on fall risk and verbalized understanding, call light within reach, hourly rounding initiated.

## 2022-07-15 NOTE — WOUND TEAM
[facility]  Inpatient Services   Wound and Skin Care Evaluation    Admission Date: 6/10/2022     Last order of IP CONSULT TO WOUND CARE was found on 2022 from Hospital Encounter on 2022     HPI, PMH, SH: Reviewed    Past Surgical History:   Procedure Laterality Date   • IRRIGATION & DEBRIDEMENT GENERAL  2022    Procedure: IRRIGATION AND DEBRIDEMENT LEFT ANKLE;  Surgeon: Panfilo Wagner M.D.;  Location: SURGERY [facility];  Service: Orthopedics   • APPENDECTOMY     • STENT PLACEMENT      cardiac     Social History     Tobacco Use   • Smoking status: Former Smoker     Quit date: 2000     Years since quittin.8   • Smokeless tobacco: Never Used   Substance Use Topics   • Alcohol use: Never     Chief Complaint   Patient presents with   • Leg Swelling     Pt has new swelling to his left lower swelling and new confusion per staff at [place] and daughter. Pt was incontinent of urine today and is usually continent. Pt is also weak and unable to walk like normal. Pt is A&O to self only today and is usually A&Ox3. Pt was recently admitted here for osteomyelitis of his left lower leg, an I&D, a positive MRSA culture of the left lower leg wound. Pt is currently on a daptomycin IV antibiotic regimen.        Diagnosis: Sepsis (HCC) [A41.9]    Unit where seen by Wound Team: [id number]     WOUND CONSULT/FOLLOW UP RELATED TO:  L. Lateral ankle NPWT     WOUND HISTORY:  Andrei Garay is a 82 y.o. male past medical history of diabetes mellitus, hypertension who presented 6/10/2022 with altered mental status for last 3 days and left ankle pain.  History is primarily provided by daughter.  No relieving or exacerbating factors were noted.  He denies any fever does report some chills.  In the ER, x-ray of the ankle showed signs concerning for osteomyelitis.  UA grossly positive for urinary tract infection.  Patient met criteria for sepsis started on IV broad-spectrum antibiotics and  will be admitted for further work-up and limb preservation consult.    SX with Dr. Wagner 6/14/22    WOUND ASSESSMENT/LDA        Skin Risk/Montrell   Sensory Perception: No Impairment  Moisture: Occasionally Moist  Activity: Chairfast  Mobility: Slightly Limited  Nutrition: Adequate  Friction and Shear: No Apparent Problem  Total Score: 18  Skin Breakdown Risk: 18-23 Minimal Risk for Skin Breakdown    Sensory Interventions  Bed Types: Pressure Redistribution Mattress (Atmosair)  Skin Preventative Measures: Pillows in Use for Support / Positioning, Waffle Overlay  Skin Preventative Dressing: Mepilex  Moisturizers/Barriers: Barrier Cream  PT / OT Involved in Care: Physical Therapy Involved, Occupational Therapy Involved  Activity : Bed  Patient Turns / Repositioning: Patient Turns Self from Side to Side  Patient is Receiving Nutrition: Oral Intake Adequate     Vitamin Therapy in Use: No  Friction Interventions: Draw Sheet / Pad Used for Repositioning                Negative Pressure Wound Therapy 06/17/22 Surgical Ankle Lateral Left (Active)   NPWT Pump Mode / Pressure Setting 125 mmHg 07/15/22 1000   Dressing Type Small;Black Foam (Veraflo) 07/15/22 1000   Number of Foam Pieces Used 2 07/15/22 1000   Canister Changed Yes 07/14/22 2040   Output (mL) 0 mL 07/11/22 0900   NEXT Dressing Change/Treatment Date 07/15/22 07/14/22 2040   VAC VeraFlo Irrigant 1/4 Strength Dakins 07/15/22 1000   VAC VeraFlo Soak Time (mins) 8 07/13/22 0900   VAC VeraFlo Instill Volume (ml) 10 07/13/22 0900   VAC VeraFlo - Therapy Time (hrs) 3 07/13/22 0900   VAC VeraFlo Pressure (mm/Hg) Intermittent;125 mmHg 07/13/22 0900   WOUND NURSE ONLY - Time Spent with Patient (mins) 60 07/13/22 0900           Wound 06/14/22 Ankle Left lateral ankle (Active)   Wound Image   07/15/22 1000   Site Assessment Red;Yellow 07/15/22 1000   Periwound Assessment Kendrick 07/15/22 1000   Margins Defined edges;Unattached edges 07/13/22 0900   Closure Secondary intention  22 0900   Drainage Amount Small 07/15/22 1000   Drainage Description Serosanguineous 07/15/22 1000   Treatments Cleansed;Compression 22   Wound Cleansing Approved Wound Cleanser 22   Periwound Protectant Drape;Paste Ring;Skin Protectant Wipes to Periwound 22   Dressing Cleansing/Solutions 1/4 Strength Dakin's Solution 07/15/22 1000   Dressing Options Wound Vac 07/15/22 1000   Dressing Changed Changed 07/15/22 1000   Dressing Status Clean;Dry;Intact 22   Dressing Change/Treatment Frequency Monday, Wednesday, Friday, and As Needed 07/15/22 1000   NEXT Dressing Change/Treatment Date 07/18/22 07/15/22 1000   NEXT Weekly Photo (Inpatient Only) 07/22/22 07/15/22 1000   Non-staged Wound Description Full thickness 22 0900   Wound Length (cm) 3.5 cm 07/15/22 1000   Wound Width (cm) 1.3 cm 07/15/22 1000   Wound Depth (cm) 0.4 cm 07/15/22 1000   Wound Surface Area (cm^2) 4.55 cm^2 07/15/22 1000   Wound Volume (cm^3) 1.82 cm^3 07/15/22 1000   Wound Healing % 83 07/15/22 1000   Wound Bed Granulation (%) 80 % 07/15/22 1000   Wound Bed Slough (%) 20 % 07/15/22 1000   Shape oval 22 0900   Wound Odor None 07/15/22 1000   Pulses 1+;Left 22 0900   Exposed Structures MONAE 07/15/22 1000   WOUND NURSE ONLY - Time Spent with Patient (mins) 60 07/15/22 1000            Vascular:    HONEY:   HONEY Results, Last 30 Days US-HONEY SINGLE LEVEL BILAT    Result Date: 2022  Narrative  Vascular Laboratory  Conclusions  The ankle pressures are not accurately measured due to calcification and  noncompressibility of the tibial vessels.  There is no evidence of significant arterial disease.  COOKIE VELEZ  Age:    82    Gender:     M  MRN:    [id number]  :    1940      BSA:  Exam Date:     2022 13:45  Room #:     Inpatient  Priority:     Routine  Ht (in):             Wt (lb):  Ordering Physician:        O'RILEY, JIN J  Referring Physician:       [id number]                              JIN  Sonographer:               Aliyah Pandya RVT  Study Type:                Complete Bilateral  Technical Quality:         Adequate  Indications:     Ulceration of LE  CPT Codes:       45017  ICD Codes:       707.1  History:         Ulceration of the left lower extremity. No prior exams.  Limitations:                 RIGHT  Waveform            Systolic BPs (mmHg)                             125           Brachial  Triphasic                                Common Femoral  Triphasic                                Popliteal  Triphasic                  200           Posterior Tibial  Triphasic                  179           Dorsalis Pedis                                           Digit                             1.54          HONEY                                           TBI                       LEFT  Waveform        Systolic BPs (mmHg)                             130           Brachial  Triphasic                                Common Femoral  Triphasic                                Popliteal  Triphasic                  187           Posterior Tibial  Triphasic                  180           Dorsalis Pedis                                           Digit                             1.44          HONEY                                           TBI  Findings  Bilateral-  The ankle pressures are not accurately measured due to calcification and  noncompressibility of the tibial vessels.  Doppler waveforms of the common femoral artery and popliteal artery are of  high amplitude and triphasic.  Doppler waveforms at the ankle are brisk and triphasic.  TBI-  Right: 1.29  Left:    1.09  Additional testing was not performed in accordance with lower extremity  arterial evaluation protocol.  James Gonzalez  (Electronically Signed)  Final Date:      12 June 2022                   16:17      Lab Values:    Lab Results   Component Value Date/Time    WBC 10.4 07/11/2022 03:05 AM    RBC 4.47 (L) 07/11/2022 03:05  AM    HEMOGLOBIN 12.8 (L) 2022 03:05 AM    HEMATOCRIT 37.5 (L) 2022 03:05 AM    CREACTPROT 0.78 (H) 2022 06:51 PM    SEDRATEWES 27 (H) 2022 06:51 PM    HBA1C 6.3 (H) 2022 03:57 PM        Culture Results show:  No results found for this or any previous visit (from the past 720 hour(s)).    Pain Level/Medicated:  Denied pain       INTERVENTIONS BY WOUND TEAM:  Performed standard wound care which includes appropriate positioning, dressing removal and non-selective debridement. Pictures and measurements obtained weekly if/when required.    Preparation for Dressing removal: Dressing soaked with wound cleanser  Non-selectively Debrided with:  wound cleanser and gauze.  Sharp debridement: Jazzy VERDUGO LPS performed  Rosa M wound: Cleansed with wound cleanser, Prepped with no sting, paste ring   Primary Dressin/2 thickness black foam placed into wound bed and secured with drape - bridged a short distance anterior .    Secondary (Outer) Dressing: Trac pad. No leak detected, seal intact. mepilex placed for offloading, Tubi  D applied and then the offloading boot applied.    Interdisciplinary consultation: Patient, Bedside RN, Jazzy VERDUGO    EVALUATION / RATIONALE FOR TREATMENT:  Most Recent Date:  7/15/22 decrease in area, able to achieve  wound bed with debridement.  Continue VAC VF to assist with granular tissue formation and slough removal   22: Depth with slight decrease in measurement. Maceration to periwound improved. Dark tissue seen during previous assessment is decreasing in size. Continue POC.     22: wound bed is red and yellow, with a little granular tissue per-iwound is less macerated.  Continue VF NPWT    22: Wound bed more moist in comparison to last assessment. Majority of wound bed is red, little granulation tissue still, and portion of wound with dark, spongy tissue. Resumed Dakins VF to help possibly debride or moisturize this area of dark tissue.  Periwound is macerated, thus soak time and frequency of instillation decreased.     R 3rd toe with small scab that should resolve on its own. Left NETTE.     7/6/22: Wound bed remains dry and with no granulation tissue. Increased amount of Dakins instillation, however if wound continues to be dry at next change, patient may benefit from switching to NS instillation. No odor or purulence noted to wound bed. Pt confused at this time, in restraints and with telesitter at bedside.     ** This RN was unable to assess R 3rd toe, for which wound team was consulted. If possible, please assess at next VAC change. **     7/4/22: Patient's wound vac was intact, no granular tissue present but surrounding redness is not present, no odor.  Wound looks dry.  Added tubi  D per Edwin VERDUGO  7/1/22 Pt confused and pulls at VAC tubing.  Pt pulled tubing off 5 minutes after placement.  Replaced tubing, secured tubing with large piece of drape.  Placed diabetic boot on leg to try to keep pt from pulling on tubing.  Wound bed developing biofilm.  Will order dakins to instill in VAC.    6/20/22: Maceration still present, applied paste ring around periwound, added micah to the base of the wound.  Continue NPWT.  Tubi  added for light compression   6/17/22: Patient's ankle was a little macerated, but turned pink prior to re-application, wound bed appears nice and red for first dressing change post-op.  Continue NPWT     Goals: Steady decrease in wound area and depth weekly.    WOUND TEAM PLAN OF CARE ([X] for frequency of wound follow up,): X  Nursing to follow dressing orders written for wound care. Contact wound team if area fails to progress, deteriorates or with any questions/concerns if something comes up before next scheduled follow up (See below as to whether wound is following and frequency of wound follow up)  Dressing changes by wound team:                   Follow up 3 times weekly:                NPWT change 3 times weekly:   XMWF   Follow up 1-2 times weekly:      Follow up Bi-Monthly:                   Follow up as needed:     Other (explain):     NURSING PLAN OF CARE ORDERS (X):  Dressing changes: See Dressing Care orders: X  Skin care: See Skin Care orders: X  RN Prevention Protocol: X  Rectal tube care: See Rectal Tube Care orders:   Other orders:    RSKIN:   CURRENTLY IN PLACE (X), APPLIED THIS VISIT (A), ORDERED (O):   Q shift Montrell:  X  Q shift pressure point assessments:  X    Surface/Positioning X  Pressure redistribution mattressX            Low Airloss          Bariatric foam      Bariatric AKILA     Waffle cushion        Waffle Overlay          Reposition q 2 hours  X    TAPs Turning system     [place] Pillow     Offloading/Redistribution A  Sacral Mepilex (Silicone dressing)     Heel Mepilex (Silicone dressing)      A   Heel float boots (Prevalon boot)             Float Heels off Bed with Pillows           Respiratory Room air  Silicone O2 tubing         Gray Foam Ear protectors     Cannula fixation Device (Tender )          High flow offloading Clip    Elastic head band offloading device      Anchorfast                                                         Trach with Optifoam split foam             Containment/Moisture Prevention continent    Rectal tube or BMS    Purwick/Condom Cath        Martinez Catheter    Barrier wipes           Barrier paste       Antifungal tx      Interdry        Mobilization offloading boot      Up to chair        Ambulate      PT/OT      Nutrition PO      Dietician        Diabetes Education      PO     TF     TPN     NPO   # days     Other        Anticipated discharge plans: MONAE  LTACH:        SNF/Rehab:                  Home Health Care:           Outpatient Wound Center:            Self/Family Care:        Other:                  Vac Discharge Needs: X  Not Applicable Pt not on a wound vac:       Regular Vac while inpatient, alternative dressing at DC:        Regular Vac in use and continued  at DC:      X      Reg. Vac w/ Skin Sub/Biologic in use. Will need to be changed 2x wkly:      Veraflo Vac while inpatient, ok to transition to Regular Vac on Discharge:           Veraflo Vac while inpatient, will need to remain on Veraflo Vac upon discharge:

## 2022-07-15 NOTE — CARE PLAN
The patient is Watcher - Medium risk of patient condition declining or worsening    Shift Goals  Clinical Goals: Maintain Safety  Patient Goals: Rest  Family Goals: N/A    Progress made toward(s) clinical / shift goals:    Problem: Fall Risk  Goal: Patient will remain free from falls  Outcome: Progressing     Problem: Pain - Standard  Goal: Alleviation of pain or a reduction in pain to the patient’s comfort goal  Outcome: Progressing       Patient is not progressing towards the following goals:      Problem: Knowledge Deficit - Standard  Goal: Patient and family/care givers will demonstrate understanding of plan of care, disease process/condition, diagnostic tests and medications  Outcome: Not Progressing

## 2022-07-15 NOTE — PROGRESS NOTES
LIMB PRESERVATION SERVICE  PROGRESS NOTE    HPI: Pt known to LPS from prior admissions, 4/12/22 through 4/14/22 and  6/10/22 through 6/20/22. The ulcer started originally from a protuberance on his FWW in the fall 2021.  Patient has been having wound care at [facility] where he resides.  When patient was seen in April wound did not probe to the bone at that time but upon his readmission on 6/10/2022 wound probes to bone and patient had I&D of lateral left ankle on 6/14/2022.  Patient was discharged back to [facility] 6/20/2022.  Per case management notes facility was to order patient's wound VAC and patient was kept on IV daptomycin with end date of 7/23/2022.      INTERVAL HISTORY:  7/1/2022: Patient denies fevers, chills, nausea, vomiting.  Pain controlled.  Seen with wound team for wound VAC application  7/15/2022: Patient denies fevers, chills, nausea, vomiting.  Pain controlled.  Seen with wound team for VAC change.  Small amount of slough debrided to promote healthy tissue       PERTINENT LPS RESULTS:   COVID-19: not completed      EXAM:      BP (!) 140/76   Pulse 79   Temp 36.6 °C (97.8 °F) (Temporal)   Resp 16   Ht 1.829 m (6')   Wt 98.5 kg (217 lb 2.5 oz)   SpO2 91%   BMI 29.45 kg/m²     Pedal Pulses:   R foot: Diminished, palpable DP, palpable PT  L foot: Diminished, palpable DP, palpable PT    Sensation:   Monofilament testing completed on 4/13/2022  Feet Insensate to light touch      Wound(s) :    Wound(s)   location: Left lateral ankle  Full thickness, does  not probe to bone  Wound characteristics:  Red, pink tissue, thin layer yellow adherent biofilm  Erythema:  Mild, improving  Drainage: Small amount, sanguinous  Callus: None  Odor: None    Wound photo: Left lateral ankle          PROCEDURE:   -2% viscous lidocaine applied topically to wound bed for approximately 5 minutes prior to debridement  -Curette used to debride wound bed.  Excisional debridement  was performed to remove devitalized tissue until healthy, bleeding tissue was visualized.   Entire surface of wound, 4.55cm2 debrided.  Tissue debrided into the subcutaneous layer.    -Bleeding controlled with manual pressure.    -Wound care completed by wound RN, refer to flowsheet  -Patient tolerated the procedure well, without c/o pain or discomfort.       Wound care completed by LPS APRN and Wound Team PT. Continue VF VAC, please see flowsheets for details.      DIABETES MANAGEMENT:    A1c:   Lab Results   Component Value Date/Time    HBA1C 6.3 (H) 06/11/2022 03:57 PM            INFECTION MANAGEMENT:    Results from last 7 days   Lab Units 07/11/22  0305   WBC 1501 K/uL 10.4   PLATELET COUNT 1518 K/uL 271     Wound culture results:   Results     ** No results found for the last 168 hours. **             ASSESSMENT/PLAN:   82 y.o. admitted for Osteomyelitis (HCC) [M86.9]. Presents with left lateral ankle wound     -Wound bed continues to build up biofilm and adherent slough.  Debridement necessary today to promote wound healing  -Continue with IV antibiotics  -Continue VF VAC     Wound care:   -Left lateral ankle: VF VAC to be managed by wound team    Labs/Imaging:  -COVID-19: not completed this admission.   -no further labs or imaging at this time    Vascular status:   -  palpable pedal pulses bilaterally    Surgery:   -No plans for surgery at this time    Antibiotics:   -ID: involved.     Weight Bearing Status:   -Right foot: Weight bearing as tolerated  -Left foot: Weight bearing as tolerated    Offloading:   -Offloading boot, at the bedside   -Orthotic company: None      PT/OT :   -involved, last seen 7/12    Diabetes Education:   -  not involved at this time due to patient's mentation.  Patient's diabetic care provided at [facility]      - Implications of loss of protective sensation (LOPS) discussed with patient- including increased risk for amputation.  Advised to check feet at least daily, moisturize feet, and  to always wear protective foot wear.   -avoid trimming own nails. See podiatrist or certified foot and nail RN  -keep blood sugars <150 for improved wound healing        DISCHARGE PLAN:  TBD,     -VF VAC can be switched to regular VAC for DC    Discussed with: pt, RN, Dr. Valera    Please note that this dictation was created using voice recognition software. I have  worked with technical experts from [employer] to optimize the interface.  I have made every reasonable attempt to correct obvious errors, but there may be errors of grammar and possibly content that I did not discover before finalizing the note.    Jazzy David, A.P.R.N.    If any questions or concerns, please contact LPS through voalte.

## 2022-07-15 NOTE — PROGRESS NOTES
Received bedside report from night shift RN.   Assumed care of patient at change of shift.   Assessment complete and POC discussed.   Patient is A&Ox2, VSS, on RA.   Denies pain, no apparent signs of distress or discomfort.   Wound vac changed by wound team this morning.  Martinez clamped for bladder training.  FSBG 194 - administered 1 unit of insulin.  Patient is sitting up in bed for breakfast.   Bed is in lowest/locked position.   Call light and belongings are within reach.   No further needs at this time.    Telesitter discontinued today at 1110. Patient is currently on no restraints.    1300: Martinez unclamped after 8 hours. Patient states no urge to pee. 900mL drained from bladder. Martinez clamped afterwards.

## 2022-07-15 NOTE — PROGRESS NOTES
Martinez unclamped after 8 hours at 1615, patient denied urge to void. Output 375 mL. Martinez reclamped at this time. Patient has refused to allow staff to turn him throughout the shift. Patient did allow RN to apply barrier cream one time this afternoon with family at bedside.  Will continue to monitor.

## 2022-07-16 LAB
GLUCOSE BLD STRIP.AUTO-MCNC: 224 MG/DL (ref 65–99)
GLUCOSE BLD STRIP.AUTO-MCNC: 247 MG/DL (ref 65–99)

## 2022-07-16 PROCEDURE — 700111 HCHG RX REV CODE 636 W/ 250 OVERRIDE (IP): Performed by: HOSPITALIST

## 2022-07-16 PROCEDURE — 700105 HCHG RX REV CODE 258: Performed by: HOSPITALIST

## 2022-07-16 PROCEDURE — 700102 HCHG RX REV CODE 250 W/ 637 OVERRIDE(OP): Performed by: HOSPITALIST

## 2022-07-16 PROCEDURE — 700102 HCHG RX REV CODE 250 W/ 637 OVERRIDE(OP): Performed by: STUDENT IN AN ORGANIZED HEALTH CARE EDUCATION/TRAINING PROGRAM

## 2022-07-16 PROCEDURE — 82962 GLUCOSE BLOOD TEST: CPT

## 2022-07-16 PROCEDURE — 770001 HCHG ROOM/CARE - MED/SURG/GYN PRIV*

## 2022-07-16 PROCEDURE — A9270 NON-COVERED ITEM OR SERVICE: HCPCS | Performed by: STUDENT IN AN ORGANIZED HEALTH CARE EDUCATION/TRAINING PROGRAM

## 2022-07-16 PROCEDURE — A9270 NON-COVERED ITEM OR SERVICE: HCPCS | Performed by: HOSPITALIST

## 2022-07-16 PROCEDURE — 99231 SBSQ HOSP IP/OBS SF/LOW 25: CPT | Performed by: HOSPITALIST

## 2022-07-16 RX ADMIN — MIRTAZAPINE 15 MG: 15 TABLET, FILM COATED ORAL at 20:11

## 2022-07-16 RX ADMIN — METOPROLOL TARTRATE 25 MG: 25 TABLET, FILM COATED ORAL at 09:27

## 2022-07-16 RX ADMIN — Medication 10 MG: at 20:11

## 2022-07-16 RX ADMIN — DAPTOMYCIN 750 MG: 500 INJECTION, POWDER, LYOPHILIZED, FOR SOLUTION INTRAVENOUS at 23:29

## 2022-07-16 RX ADMIN — TAMSULOSIN HYDROCHLORIDE 0.8 MG: 0.4 CAPSULE ORAL at 11:11

## 2022-07-16 RX ADMIN — QUETIAPINE FUMARATE 25 MG: 25 TABLET ORAL at 20:11

## 2022-07-16 RX ADMIN — DAKIN'S SOLUTION 0.125% (QUARTER STRENGTH) 1 ML: 0.12 SOLUTION at 18:22

## 2022-07-16 RX ADMIN — INSULIN HUMAN 2 UNITS: 100 INJECTION, SOLUTION PARENTERAL at 09:27

## 2022-07-16 RX ADMIN — SENNOSIDES AND DOCUSATE SODIUM 2 TABLET: 50; 8.6 TABLET ORAL at 05:17

## 2022-07-16 RX ADMIN — INSULIN HUMAN 2 UNITS: 100 INJECTION, SOLUTION PARENTERAL at 20:19

## 2022-07-16 RX ADMIN — METOPROLOL TARTRATE 25 MG: 25 TABLET, FILM COATED ORAL at 20:11

## 2022-07-16 ASSESSMENT — PAIN DESCRIPTION - PAIN TYPE: TYPE: ACUTE PAIN

## 2022-07-16 NOTE — PROGRESS NOTES
Received bedside report from RN Joaquina, pt care assumed. VSS, pt assessment complete. Pt A&Ox2,  c/o 0/10 pain at this time. POC discussed with pt and verbalizes no questions. Pt denies any additional needs at this time. Bed locked and in lowest position, bed alarm on. Pt educated on fall risk and verbalized understanding, call light within reach, hourly rounding initiated.     2100-Unclamped pt barbosa, 250 Mls output     0500-Unclamped pt barbosa, 350 mls output.     0517-Pt refused all medications. Education provided regarding medications and pt blood pressure.

## 2022-07-16 NOTE — PROGRESS NOTES
"Assumed care of patient this shift. Patient is alert and oriented x 2, to self and place. Patient refusing scheduled medications this morning stating \"Yes I will take them...but not today\" attempted to educate patient on importance of scheduled medications but patient continued to refuse. Denied complaints of pain this shift when asked. Martinez catheter in place, currently clamped for bladder training. Fall prevention tactics in place, bed locked and in lowest position and bed alarm on for safety.   "

## 2022-07-16 NOTE — CARE PLAN
The patient is Watcher - Medium risk of patient condition declining or worsening    Shift Goals  Clinical Goals: Safety,  Patient Goals: Rest  Family Goals: N/A    Progress made toward(s) clinical / shift goals:       Problem: Fall Risk  Goal: Patient will remain free from falls  Outcome: Progressing     Problem: Safety - Medical Restraint  Goal: Free from restraint(s) (Restraint for Interference with Medical Device)  Outcome: Progressing     Problem: Urinary Elimination  Goal: Establish and maintain regular urinary output  Outcome: Progressing       Patient is not progressing towards the following goals:      Problem: Knowledge Deficit - Standard  Goal: Patient and family/care givers will demonstrate understanding of plan of care, disease process/condition, diagnostic tests and medications  Outcome: Not Progressing

## 2022-07-16 NOTE — PROGRESS NOTES
[facility] Medicine Daily Progress Note    Date of Service  7/16/2022    Chief Complaint  Andrei Garay is a 82 y.o. male admitted 6/29/2022 with left leg swelling and altered mental status    Hospital Course  Andrei Garay is a 82 y.o. male who presented 6/29/2022 with a history of type 2 diabetes, hyperlipidemia, hypertension, osteomyelitis who presents with right leg swelling and altered mental status.     According to the patient's daughter who is visiting him she states that he began becoming more confused and slurring his words yesterday.  He also reports that he had episode of incontinence which is not his normal baseline.  Has been receiving IV daptomycin for left lower leg osteomyelitis which was diagnosed 2 weeks ago, MRSA culture positive.    Emergency department ultrasound of the right lower extremity negative for DVT. Limb preservation services consulted and following.  Completed a bedside debridement and applied new dressings.  Sure of wound management patient was receiving it [facility] facility.  Wound does not appear to be improved from prior hospitalization.  We will consult infectious disease for recommendations and continue to work with LPS for management.      Interval Problem Update  SHREYA overnight, restraints removed, wound VAC remains in place.  Patient's cognition appears improved today off restraints.  Continuing with all ongoing supports.  Still not having urge to urinate, barbosa in place, bladder training ongoing.        Consultants/Specialty  infectious disease  Limb preservation services    Code Status  DNAR/DNI    Disposition  Patient is not medically cleared for discharge.   Anticipate discharge to to skilled nursing facility.  I have placed the appropriate orders for post-discharge needs.    Review of Systems  Review of Systems   Unable to perform ROS: Mental acuity        Physical Exam  Temp:  [36.1 °C (97 °F)-36.6 °C (97.8 °F)] 36.1 °C (97 °F)  Pulse:  [68-90] 68  Resp:   [16-18] 16  BP: (132-143)/(68-76) 136/76  SpO2:  [91 %-92 %] 92 %    Physical Exam  Vitals and nursing note reviewed.   Constitutional:       General: He is not in acute distress.     Appearance: He is obese. He is not ill-appearing.   Cardiovascular:      Rate and Rhythm: Normal rate and regular rhythm.   Pulmonary:      Effort: Pulmonary effort is normal. No respiratory distress.   Abdominal:      General: Abdomen is protuberant.   Skin:     Findings: Erythema and signs of injury present.      Comments: Left leg wound vac in place   Neurological:      General: No focal deficit present.      Mental Status: He is disoriented.      Motor: Motor function is intact.   Psychiatric:         Mood and Affect: Affect is flat.         Behavior: Behavior is hyperactive.         Cognition and Memory: Cognition is impaired. Memory is impaired.         Judgment: Judgment is impulsive.         Fluids    Intake/Output Summary (Last 24 hours) at 7/16/2022 1434  Last data filed at 7/16/2022 1300  Gross per 24 hour   Intake --   Output 650 ml   Net -650 ml       Laboratory                        Imaging  US-EXTREMITY VENOUS LOWER UNILAT RIGHT   Final Result      CT-HEAD W/O   Final Result         No acute intracranial abnormality.            DX-ANKLE 3+ VIEWS LEFT   Final Result         Bony destruction in the lateral aspect of the distal fibula is consistent with osteomyelitis described on recent MRI. Overlying soft tissue edema and a small amount of soft tissue gas      DX-CHEST-PORTABLE (1 VIEW)   Final Result      1.  Increased perihilar interstitial markings are suggestive of pulmonary edema.      2.  Stable mild cardiomegaly           Assessment/Plan  * Encephalopathy  Assessment & Plan  Likely secondary to worsening cellulitis of the right leg.    Patient has been receiving IV daptomycin for osteomyelitis of the left leg, will continue    Avoid sedating medications  Improving    Urinary retention  Assessment & Plan  Cont  flomax  Failed barbosa trial out  Will replace barbosa and start bladder training      Osteomyelitis (HCC)- (present on admission)  Assessment & Plan  Patient with osteomyelitis currently on daptomycin.   CRP mildly elevated  LPS    Type 2 diabetes mellitus (HCC)- (present on admission)  Assessment & Plan  Last A1c was 6.33 weeks ago.  Hold metformin  Insulin sliding scale  Diabetic diet    Cellulitis- (present on admission)  Assessment & Plan  Daughter reports that the patient's right leg is more red and swollen than normal.  Doppler ultrasound does not show DVT  ID consulted, patient reportedly had removed his wound VAC while at SNF  Continue the daptomycin (until 7/23) and Unasyn (until 7/6)    Dementia with behavioral disturbance (HCC)- (present on admission)  Assessment & Plan  History of dementia.  Oriented x 2, unsure of baseline  seroqeul 12.5mg to facilitate off restraints    Hypertension- (present on admission)  Assessment & Plan  Continue home medication lisinopril and metoprolol      Hyperlipidemia- (present on admission)  Assessment & Plan  Continue home med atorvastatin       VTE prophylaxis: enoxaparin ppx    I have performed a physical exam and reviewed and updated ROS and Plan today (7/16/2022). In review of yesterday's note (7/15/2022), there are no changes except as documented above.

## 2022-07-16 NOTE — PROGRESS NOTES
"This RN brought in patient's medications patient stated \"Don't touch me or I'm going to slug you!\" Patient was agreeable to taking scheduled Metoprolol and insulin from another RN who entered room.   "

## 2022-07-17 LAB
GLUCOSE BLD STRIP.AUTO-MCNC: 213 MG/DL (ref 65–99)
GLUCOSE BLD STRIP.AUTO-MCNC: 239 MG/DL (ref 65–99)

## 2022-07-17 PROCEDURE — A9270 NON-COVERED ITEM OR SERVICE: HCPCS | Performed by: HOSPITALIST

## 2022-07-17 PROCEDURE — 770001 HCHG ROOM/CARE - MED/SURG/GYN PRIV*

## 2022-07-17 PROCEDURE — 82962 GLUCOSE BLOOD TEST: CPT | Mod: 91

## 2022-07-17 PROCEDURE — 700102 HCHG RX REV CODE 250 W/ 637 OVERRIDE(OP): Performed by: HOSPITALIST

## 2022-07-17 PROCEDURE — A9270 NON-COVERED ITEM OR SERVICE: HCPCS | Performed by: STUDENT IN AN ORGANIZED HEALTH CARE EDUCATION/TRAINING PROGRAM

## 2022-07-17 PROCEDURE — 700105 HCHG RX REV CODE 258: Performed by: HOSPITALIST

## 2022-07-17 PROCEDURE — 700111 HCHG RX REV CODE 636 W/ 250 OVERRIDE (IP): Performed by: HOSPITALIST

## 2022-07-17 PROCEDURE — 700111 HCHG RX REV CODE 636 W/ 250 OVERRIDE (IP): Performed by: STUDENT IN AN ORGANIZED HEALTH CARE EDUCATION/TRAINING PROGRAM

## 2022-07-17 PROCEDURE — 99231 SBSQ HOSP IP/OBS SF/LOW 25: CPT | Performed by: HOSPITALIST

## 2022-07-17 PROCEDURE — 700102 HCHG RX REV CODE 250 W/ 637 OVERRIDE(OP): Performed by: STUDENT IN AN ORGANIZED HEALTH CARE EDUCATION/TRAINING PROGRAM

## 2022-07-17 RX ADMIN — TAMSULOSIN HYDROCHLORIDE 0.8 MG: 0.4 CAPSULE ORAL at 10:15

## 2022-07-17 RX ADMIN — SERTRALINE 50 MG: 100 TABLET, FILM COATED ORAL at 16:39

## 2022-07-17 RX ADMIN — SENNOSIDES AND DOCUSATE SODIUM 2 TABLET: 50; 8.6 TABLET ORAL at 04:40

## 2022-07-17 RX ADMIN — INSULIN HUMAN 2 UNITS: 100 INJECTION, SOLUTION PARENTERAL at 13:49

## 2022-07-17 RX ADMIN — ATORVASTATIN CALCIUM 40 MG: 40 TABLET, FILM COATED ORAL at 16:39

## 2022-07-17 RX ADMIN — SENNOSIDES AND DOCUSATE SODIUM 2 TABLET: 50; 8.6 TABLET ORAL at 16:39

## 2022-07-17 RX ADMIN — DAPTOMYCIN 750 MG: 500 INJECTION, POWDER, LYOPHILIZED, FOR SOLUTION INTRAVENOUS at 23:55

## 2022-07-17 RX ADMIN — AMLODIPINE BESYLATE 10 MG: 10 TABLET ORAL at 04:40

## 2022-07-17 RX ADMIN — METOPROLOL TARTRATE 25 MG: 25 TABLET, FILM COATED ORAL at 10:15

## 2022-07-17 RX ADMIN — INSULIN HUMAN 2 UNITS: 100 INJECTION, SOLUTION PARENTERAL at 10:14

## 2022-07-17 RX ADMIN — DAKIN'S SOLUTION 0.125% (QUARTER STRENGTH) 473 ML: 0.12 SOLUTION at 16:35

## 2022-07-17 RX ADMIN — ENOXAPARIN SODIUM 40 MG: 40 INJECTION SUBCUTANEOUS at 16:38

## 2022-07-17 RX ADMIN — LISINOPRIL 40 MG: 20 TABLET ORAL at 04:40

## 2022-07-17 ASSESSMENT — PAIN DESCRIPTION - PAIN TYPE: TYPE: ACUTE PAIN

## 2022-07-17 NOTE — PROGRESS NOTES
"Received bedside report from RN Mackenzie, pt care assumed. VSS on RA, pt assessment complete. Pt A&Ox2, c/o no pain at this time. POC discussed with pt and verbalizes no questions. Pt denies any additional needs at this time. Bed locked and in lowest position, bed alarm on. Pt educated on fall risk and verbalized understanding, call light within reach, hourly rounding initiated.     2000-Discussed with patient the importance of taking meds. Patient allowed this RN to check  blood sugar and took scheduled medication. When attempting to flush pt picc line, patient stated \"No your done.\" Family at bedside asked patient to allow this RN to flush line, pt stated \"Ok, but that's it for tonight.No more.\"     2200-Catheter unclamped     0000-Pt expressed need to urinate. Catheter unclamped, 200 mls output.   "

## 2022-07-17 NOTE — PROGRESS NOTES
[facility] Medicine Daily Progress Note    Date of Service  7/17/2022    Chief Complaint  Andrei Garay is a 82 y.o. male admitted 6/29/2022 with left leg swelling and altered mental status    Hospital Course  Andrei Garay is a 82 y.o. male who presented 6/29/2022 with a history of type 2 diabetes, hyperlipidemia, hypertension, osteomyelitis who presents with right leg swelling and altered mental status.     According to the patient's daughter who is visiting him she states that he began becoming more confused and slurring his words yesterday.  He also reports that he had episode of incontinence which is not his normal baseline.  Has been receiving IV daptomycin for left lower leg osteomyelitis which was diagnosed 2 weeks ago, MRSA culture positive.    Emergency department ultrasound of the right lower extremity negative for DVT. Limb preservation services consulted and following.  Completed a bedside debridement and applied new dressings.  Sure of wound management patient was receiving it [facility] facility.  Wound does not appear to be improved from prior hospitalization.  We will consult infectious disease for recommendations and continue to work with LPS for management.      Interval Problem Update  SHREYA overnight, restraints removed, wound VAC remains in place.  Patient's cognition appears improved today off restraints.  Continuing with all ongoing supports.  Still not having urge to urinate, barbosa in place, bladder training ongoing.        Consultants/Specialty  infectious disease  Limb preservation services    Code Status  DNAR/DNI    Disposition  Patient is not medically cleared for discharge.   Anticipate discharge to to skilled nursing facility.  I have placed the appropriate orders for post-discharge needs.    Review of Systems  Review of Systems   Unable to perform ROS: Mental acuity        Physical Exam  Temp:  [36 °C (96.8 °F)-36.8 °C (98.2 °F)] 36 °C (96.8 °F)  Pulse:  [77-95] 86  Resp:   [17-18] 17  BP: (124-157)/(57-83) 154/57  SpO2:  [92 %-94 %] 94 %    Physical Exam  Vitals and nursing note reviewed.   Constitutional:       General: He is not in acute distress.     Appearance: He is obese. He is not ill-appearing.   Cardiovascular:      Rate and Rhythm: Normal rate and regular rhythm.   Pulmonary:      Effort: Pulmonary effort is normal. No respiratory distress.   Abdominal:      General: Abdomen is protuberant.   Skin:     Findings: Erythema and signs of injury present.      Comments: Left leg wound vac in place   Neurological:      General: No focal deficit present.      Mental Status: He is disoriented.      Motor: Motor function is intact.   Psychiatric:         Mood and Affect: Affect is flat.         Behavior: Behavior is hyperactive.         Cognition and Memory: Cognition is impaired. Memory is impaired.         Judgment: Judgment is impulsive.         Fluids  No intake or output data in the 24 hours ending 07/17/22 1545    Laboratory                        Imaging  US-EXTREMITY VENOUS LOWER UNILAT RIGHT   Final Result      CT-HEAD W/O   Final Result         No acute intracranial abnormality.            DX-ANKLE 3+ VIEWS LEFT   Final Result         Bony destruction in the lateral aspect of the distal fibula is consistent with osteomyelitis described on recent MRI. Overlying soft tissue edema and a small amount of soft tissue gas      DX-CHEST-PORTABLE (1 VIEW)   Final Result      1.  Increased perihilar interstitial markings are suggestive of pulmonary edema.      2.  Stable mild cardiomegaly           Assessment/Plan  * Encephalopathy  Assessment & Plan  Likely secondary to worsening cellulitis of the right leg.    Patient has been receiving IV daptomycin for osteomyelitis of the left leg, will continue    Avoid sedating medications  Improving    Urinary retention  Assessment & Plan  Cont flomax  Failed barbosa trial out  Will replace barbosa and start bladder training      Osteomyelitis  (HCC)- (present on admission)  Assessment & Plan  Patient with osteomyelitis currently on daptomycin.   CRP mildly elevated  LPS    Type 2 diabetes mellitus (HCC)- (present on admission)  Assessment & Plan  Last A1c was 6.33 weeks ago.  Hold metformin  Insulin sliding scale  Diabetic diet    Cellulitis- (present on admission)  Assessment & Plan  Daughter reports that the patient's right leg is more red and swollen than normal.  Doppler ultrasound does not show DVT  ID consulted, patient reportedly had removed his wound VAC while at SNF  Continue the daptomycin (until 7/23) and Unasyn (until 7/6)    Dementia with behavioral disturbance (HCC)- (present on admission)  Assessment & Plan  History of dementia.  Oriented x 2, unsure of baseline  seroqeul 12.5mg to facilitate off restraints    Hypertension- (present on admission)  Assessment & Plan  Continue home medication lisinopril and metoprolol      Hyperlipidemia- (present on admission)  Assessment & Plan  Continue home med atorvastatin       VTE prophylaxis: enoxaparin ppx    I have performed a physical exam and reviewed and updated ROS and Plan today (7/17/2022). In review of yesterday's note (7/16/2022), there are no changes except as documented above.

## 2022-07-17 NOTE — CARE PLAN
The patient is Watcher - Medium risk of patient condition declining or worsening    Shift Goals  Clinical Goals: Patient will remain free from falls  Patient Goals: Rest, To be left alone  Family Goals: Patient to comply with care and med admin    Progress made toward(s) clinical / shift goals:      Problem: Fall Risk  Goal: Patient will remain free from falls  Outcome: Progressing     Problem: Urinary Elimination  Goal: Establish and maintain regular urinary output  Outcome: Progressing       Patient is not progressing towards the following goals:      Problem: Knowledge Deficit - Standard  Goal: Patient and family/care givers will demonstrate understanding of plan of care, disease process/condition, diagnostic tests and medications  Outcome: Not Progressing

## 2022-07-18 LAB
GLUCOSE BLD STRIP.AUTO-MCNC: 216 MG/DL (ref 65–99)
GLUCOSE BLD STRIP.AUTO-MCNC: 246 MG/DL (ref 65–99)
GLUCOSE BLD STRIP.AUTO-MCNC: 255 MG/DL (ref 65–99)

## 2022-07-18 PROCEDURE — 97607 NEG PRS WND THR NDME<=50SQCM: CPT

## 2022-07-18 PROCEDURE — 770001 HCHG ROOM/CARE - MED/SURG/GYN PRIV*

## 2022-07-18 PROCEDURE — 97116 GAIT TRAINING THERAPY: CPT

## 2022-07-18 PROCEDURE — A9270 NON-COVERED ITEM OR SERVICE: HCPCS | Performed by: STUDENT IN AN ORGANIZED HEALTH CARE EDUCATION/TRAINING PROGRAM

## 2022-07-18 PROCEDURE — 700102 HCHG RX REV CODE 250 W/ 637 OVERRIDE(OP): Performed by: HOSPITALIST

## 2022-07-18 PROCEDURE — A9270 NON-COVERED ITEM OR SERVICE: HCPCS | Performed by: HOSPITALIST

## 2022-07-18 PROCEDURE — 700111 HCHG RX REV CODE 636 W/ 250 OVERRIDE (IP): Performed by: STUDENT IN AN ORGANIZED HEALTH CARE EDUCATION/TRAINING PROGRAM

## 2022-07-18 PROCEDURE — 306591 TRAY SUTURE REMOVAL DISP: Performed by: HOSPITALIST

## 2022-07-18 PROCEDURE — 302098 PASTE RING (FLAT): Performed by: HOSPITALIST

## 2022-07-18 PROCEDURE — 97530 THERAPEUTIC ACTIVITIES: CPT

## 2022-07-18 PROCEDURE — 700102 HCHG RX REV CODE 250 W/ 637 OVERRIDE(OP): Performed by: STUDENT IN AN ORGANIZED HEALTH CARE EDUCATION/TRAINING PROGRAM

## 2022-07-18 PROCEDURE — 82962 GLUCOSE BLOOD TEST: CPT | Mod: 91

## 2022-07-18 PROCEDURE — 99231 SBSQ HOSP IP/OBS SF/LOW 25: CPT | Performed by: HOSPITALIST

## 2022-07-18 RX ADMIN — SERTRALINE 50 MG: 100 TABLET, FILM COATED ORAL at 17:47

## 2022-07-18 RX ADMIN — ENOXAPARIN SODIUM 40 MG: 40 INJECTION SUBCUTANEOUS at 17:47

## 2022-07-18 RX ADMIN — DAKIN'S SOLUTION 0.125% (QUARTER STRENGTH) 1 ML: 0.12 SOLUTION at 17:47

## 2022-07-18 RX ADMIN — INSULIN HUMAN 3 UNITS: 100 INJECTION, SOLUTION PARENTERAL at 14:51

## 2022-07-18 RX ADMIN — METOPROLOL TARTRATE 25 MG: 25 TABLET, FILM COATED ORAL at 21:57

## 2022-07-18 RX ADMIN — INSULIN HUMAN 3 UNITS: 100 INJECTION, SOLUTION PARENTERAL at 10:05

## 2022-07-18 RX ADMIN — MIRTAZAPINE 15 MG: 15 TABLET, FILM COATED ORAL at 20:53

## 2022-07-18 RX ADMIN — QUETIAPINE FUMARATE 25 MG: 25 TABLET ORAL at 20:53

## 2022-07-18 RX ADMIN — SENNOSIDES AND DOCUSATE SODIUM 2 TABLET: 50; 8.6 TABLET ORAL at 05:24

## 2022-07-18 RX ADMIN — AMLODIPINE BESYLATE 10 MG: 10 TABLET ORAL at 05:24

## 2022-07-18 RX ADMIN — TAMSULOSIN HYDROCHLORIDE 0.8 MG: 0.4 CAPSULE ORAL at 09:58

## 2022-07-18 RX ADMIN — ATORVASTATIN CALCIUM 40 MG: 40 TABLET, FILM COATED ORAL at 17:47

## 2022-07-18 RX ADMIN — INSULIN HUMAN 2 UNITS: 100 INJECTION, SOLUTION PARENTERAL at 20:59

## 2022-07-18 RX ADMIN — LISINOPRIL 40 MG: 20 TABLET ORAL at 05:24

## 2022-07-18 RX ADMIN — Medication 10 MG: at 20:53

## 2022-07-18 RX ADMIN — METOPROLOL TARTRATE 25 MG: 25 TABLET, FILM COATED ORAL at 09:58

## 2022-07-18 RX ADMIN — INSULIN HUMAN 2 UNITS: 100 INJECTION, SOLUTION PARENTERAL at 18:38

## 2022-07-18 ASSESSMENT — COGNITIVE AND FUNCTIONAL STATUS - GENERAL
CLIMB 3 TO 5 STEPS WITH RAILING: TOTAL
SUGGESTED CMS G CODE MODIFIER MOBILITY: CK
MOBILITY SCORE: 15
WALKING IN HOSPITAL ROOM: A LOT
TURNING FROM BACK TO SIDE WHILE IN FLAT BAD: A LITTLE
STANDING UP FROM CHAIR USING ARMS: A LITTLE
MOVING FROM LYING ON BACK TO SITTING ON SIDE OF FLAT BED: A LITTLE
MOVING TO AND FROM BED TO CHAIR: A LITTLE

## 2022-07-18 ASSESSMENT — GAIT ASSESSMENTS
GAIT LEVEL OF ASSIST: MINIMAL ASSIST
ASSISTIVE DEVICE: FRONT WHEEL WALKER
DISTANCE (FEET): 10

## 2022-07-18 ASSESSMENT — PAIN DESCRIPTION - PAIN TYPE: TYPE: ACUTE PAIN

## 2022-07-18 NOTE — WOUND TEAM
[facility]  Inpatient Services   Wound and Skin Care Evaluation    Admission Date: 6/10/2022     Last order of IP CONSULT TO WOUND CARE was found on 2022 from Hospital Encounter on 2022     HPI, PMH, SH: Reviewed    Past Surgical History:   Procedure Laterality Date   • IRRIGATION & DEBRIDEMENT GENERAL  2022    Procedure: IRRIGATION AND DEBRIDEMENT LEFT ANKLE;  Surgeon: Panfilo Wagner M.D.;  Location: SURGERY [facility];  Service: Orthopedics   • APPENDECTOMY     • STENT PLACEMENT      cardiac     Social History     Tobacco Use   • Smoking status: Former Smoker     Quit date: 2000     Years since quittin.8   • Smokeless tobacco: Never Used   Substance Use Topics   • Alcohol use: Never     Chief Complaint   Patient presents with   • Leg Swelling     Pt has new swelling to his left lower swelling and new confusion per staff at [place] home and daughter. Pt was incontinent of urine today and is usually continent. Pt is also weak and unable to walk like normal. Pt is A&O to self only today and is usually A&Ox3. Pt was recently admitted here for osteomyelitis of his left lower leg, an I&D, a positive MRSA culture of the left lower leg wound. Pt is currently on a daptomycin IV antibiotic regimen.        Diagnosis: Sepsis (HCC) [A41.9]    Unit where seen by Wound Team: S612/[id number]     WOUND CONSULT/FOLLOW UP RELATED TO:  L. Lateral ankle NPWT     WOUND HISTORY:  Andrei Garay is a 82 y.o. male past medical history of diabetes mellitus, hypertension who presented 6/10/2022 with altered mental status for last 3 days and left ankle pain.  History is primarily provided by daughter.  No relieving or exacerbating factors were noted.  He denies any fever does report some chills.  In the ER, x-ray of the ankle showed signs concerning for osteomyelitis.  UA grossly positive for urinary tract infection.  Patient met criteria for sepsis started on IV broad-spectrum antibiotics and  will be admitted for further work-up and limb preservation consult.    SX with Dr. Wagner 6/14/22    WOUND ASSESSMENT/LDA             Negative Pressure Wound Therapy 06/17/22 Surgical Ankle Lateral Left (Active)   NPWT Pump Mode / Pressure Setting Continuous;125 mmHg 07/18/22 1500   Dressing Type Small;Black Foam (Veraflo) 07/18/22 1500   Number of Foam Pieces Used 1 07/18/22 1500   Canister Changed No 07/18/22 1500   Output (mL) 0 mL 07/18/22 1500   NEXT Dressing Change/Treatment Date 07/20/22 07/18/22 1500   VAC VeraFlo Irrigant 1/4 Strength Dakins 07/18/22 1500   VAC VeraFlo Soak Time (mins) 8 07/18/22 1500   VAC VeraFlo Instill Volume (ml) 10 07/18/22 1500   VAC VeraFlo - Therapy Time (hrs) 3 07/18/22 1500   VAC VeraFlo Pressure (mm/Hg) Intermittent;125 mmHg 07/18/22 1500   WOUND NURSE ONLY - Time Spent with Patient (mins) 60 07/13/22 0900           Wound 06/14/22 Ankle Left (Active)   Wound Image   07/15/22 1000   Site Assessment Yellow;[place] 07/18/22 1500   Periwound Assessment [place] 07/18/22 1500   Margins Defined edges;Unattached edges 07/18/22 1500   Closure Secondary intention 07/18/22 1500   Drainage Amount Small 07/18/22 1500   Drainage Description Serosanguineous 07/18/22 1500   Treatments Cleansed;Site care;Offloading;Compression 07/18/22 1500   Wound Cleansing Approved Wound Cleanser 07/18/22 1500   Periwound Protectant Skin Protectant Wipes to Periwound;Paste Ring;Drape 07/18/22 1500   Dressing Cleansing/Solutions 1/4 Strength Dakin's Solution 07/18/22 1500   Dressing Options Wound Vac 07/18/22 1500   Dressing Changed Changed 07/18/22 1500   Dressing Status Clean;Dry;Intact 07/18/22 1500   Dressing Change/Treatment Frequency Monday, Wednesday, Friday, and As Needed 07/18/22 1500   NEXT Dressing Change/Treatment Date 07/20/22 07/18/22 1500   NEXT Weekly Photo (Inpatient Only) 07/22/22 07/18/22 1500   Non-staged Wound Description Full thickness 07/13/22 0900   Wound Length (cm) 3.5 cm 07/15/22 1000    Wound Width (cm) 1.3 cm 07/15/22 1000   Wound Depth (cm) 0.4 cm 07/15/22 1000   Wound Surface Area (cm^2) 4.55 cm^2 07/15/22 1000   Wound Volume (cm^3) 1.82 cm^3 07/15/22 1000   Wound Healing % 83 07/15/22 1000   Wound Bed Granulation (%) 80 % 07/15/22 1000   Wound Bed Slough (%) 20 % 07/15/22 1000   Shape oval 22 1500   Wound Odor None 22 1500   Pulses 1+;Left 22 1500   Exposed Structures None 22 1500   WOUND NURSE ONLY - Time Spent with Patient (mins) 60 22 1500           Vascular:    HONEY:   HONEY Results, Last 30 Days US-HONEY SINGLE LEVEL BILAT    Result Date: 2022  Narrative  Vascular Laboratory  Conclusions  The ankle pressures are not accurately measured due to calcification and  noncompressibility of the tibial vessels.  There is no evidence of significant arterial disease.  COOKIE VELEZ  Age:    82    Gender:     M  MRN:    [id number]  :    1940      BSA:  Exam Date:     2022 13:45  Room #:     Inpatient  Priority:     Routine  Ht (in):             Wt (lb):  Ordering Physician:        JIN GREGG  Referring Physician:       [id number] Coronado  Sonographer:               Aliyah Pandya RVDAISHA  Study Type:                Complete Bilateral  Technical Quality:         Adequate  Indications:     Ulceration of LE  CPT Codes:       28338  ICD Codes:       707.1  History:         Ulceration of the left lower extremity. No prior exams.  Limitations:                 RIGHT  Waveform            Systolic BPs (mmHg)                             125           Brachial  Triphasic                                Common Femoral  Triphasic                                Popliteal  Triphasic                  200           Posterior Tibial  Triphasic                  179           Dorsalis Pedis                                           Digit                             1.54          HONEY                                           TBI                        LEFT  Waveform        Systolic BPs (mmHg)                             130           Brachial  Triphasic                                Common Femoral  Triphasic                                Popliteal  Triphasic                  187           Posterior Tibial  Triphasic                  180           Dorsalis Pedis                                           Digit                             1.44          HONEY                                           TBI  Findings  Bilateral-  The ankle pressures are not accurately measured due to calcification and  noncompressibility of the tibial vessels.  Doppler waveforms of the common femoral artery and popliteal artery are of  high amplitude and triphasic.  Doppler waveforms at the ankle are brisk and triphasic.  TBI-  Right: 1.29  Left:    1.09  Additional testing was not performed in accordance with lower extremity  arterial evaluation protocol.  James Gonzalez  (Electronically Signed)  Final Date:      12 June 2022                   16:17      Lab Values:    Lab Results   Component Value Date/Time    WBC 10.4 07/11/2022 03:05 AM    RBC 4.47 (L) 07/11/2022 03:05 AM    HEMOGLOBIN 12.8 (L) 07/11/2022 03:05 AM    HEMATOCRIT 37.5 (L) 07/11/2022 03:05 AM    CREACTPROT 0.78 (H) 06/29/2022 06:51 PM    SEDRATEWES 27 (H) 06/29/2022 06:51 PM    HBA1C 6.3 (H) 06/11/2022 03:57 PM        Culture Results show:  No results found for this or any previous visit (from the past 720 hour(s)).    Pain Level/Medicated:  Denied pain       INTERVENTIONS BY WOUND TEAM:  Performed standard wound care which includes appropriate positioning, dressing removal and non-selective debridement. Pictures and measurements obtained weekly if/when required.    Preparation for Dressing removal: Dressing soaked with wound cleanser  Non-selectively Debrided with:  wound cleanser and gauze.  Sharp debridement: Jazzy VERDUGO LPS performed  Rosa M wound: Cleansed with wound cleanser, Prepped with no sting, paste  ring   Primary Dressin/2 thickness black foam placed into wound bed and secured with drape - bridged a short distance anterior .    Secondary (Outer) Dressing: Trac pad. No leak detected, seal intact. mepilex placed for offloading, Tubi  D applied and then the offloading boot applied.    Interdisciplinary consultation: Patient, Bedside RN, Wound RN Rima    EVALUATION / RATIONALE FOR TREATMENT:  Most Recent Date:  22: wound bed is light pink but clean. Continue VF NPWT. And light compression  7/15/22 decrease in area, able to achieve  wound bed with debridement.  Continue VAC VF to assist with granular tissue formation and slough removal   22: Depth with slight decrease in measurement. Maceration to periwound improved. Dark tissue seen during previous assessment is decreasing in size. Continue POC.     22: wound bed is red and yellow, with a little granular tissue per-iwound is less macerated.  Continue VF NPWT    22: Wound bed more moist in comparison to last assessment. Majority of wound bed is red, little granulation tissue still, and portion of wound with dark, spongy tissue. Resumed Dakins VF to help possibly debride or moisturize this area of dark tissue. Periwound is macerated, thus soak time and frequency of instillation decreased.     R 3rd toe with small scab that should resolve on its own. Left NETTE.     22: Wound bed remains dry and with no granulation tissue. Increased amount of Dakins instillation, however if wound continues to be dry at next change, patient may benefit from switching to NS instillation. No odor or purulence noted to wound bed. Pt confused at this time, in restraints and with telesitter at bedside.     ** This RN was unable to assess R 3rd toe, for which wound team was consulted. If possible, please assess at next VAC change. **     22: Patient's wound vac was intact, no granular tissue present but surrounding redness is not present, no odor.   Wound looks dry.  Added tubi  D per Edwin VERDUGO  7/1/22 Pt confused and pulls at VAC tubing.  Pt pulled tubing off 5 minutes after placement.  Replaced tubing, secured tubing with large piece of drape.  Placed diabetic boot on leg to try to keep pt from pulling on tubing.  Wound bed developing biofilm.  Will order dakins to instill in VAC.    6/20/22: Maceration still present, applied paste ring around periwound, added micah to the base of the wound.  Continue NPWT.  Tubi  added for light compression   6/17/22: Patient's ankle was a little macerated, but turned pink prior to re-application, wound bed appears nice and red for first dressing change post-op.  Continue NPWT     Goals: Steady decrease in wound area and depth weekly.    WOUND TEAM PLAN OF CARE ([X] for frequency of wound follow up,): X  Nursing to follow dressing orders written for wound care. Contact wound team if area fails to progress, deteriorates or with any questions/concerns if something comes up before next scheduled follow up (See below as to whether wound is following and frequency of wound follow up)  Dressing changes by wound team:                   Follow up 3 times weekly:                NPWT change 3 times weekly:  XMWF   Follow up 1-2 times weekly:      Follow up Bi-Monthly:                   Follow up as needed:     Other (explain):     NURSING PLAN OF CARE ORDERS (X):  Dressing changes: See Dressing Care orders: X  Skin care: See Skin Care orders: X  RN Prevention Protocol: X  Rectal tube care: See Rectal Tube Care orders:   Other orders:    RSKIN:   CURRENTLY IN PLACE (X), APPLIED THIS VISIT (A), ORDERED (O):   Q shift [place]:  X  Q shift pressure point assessments:  X    Surface/Positioning X  Pressure redistribution mattressX            Low Airloss          Bariatric foam      Bariatric AKILA     Waffle cushion        Waffle Overlay          Reposition q 2 hours  X    TAPs Turning system     [place] Pillow      Offloading/Redistribution A  Sacral Mepilex (Silicone dressing)     Heel Mepilex (Silicone dressing)      A   Heel float boots (Prevalon boot)             Float Heels off Bed with Pillows           Respiratory Room air  Silicone O2 tubing         Gray Foam Ear protectors     Cannula fixation Device (Tender )          High flow offloading Clip    Elastic head band offloading device      Anchorfast                                                         Trach with Optifoam split foam             Containment/Moisture Prevention continent    Rectal tube or BMS    Purwick/Condom Cath        Martinez Catheter    Barrier wipes           Barrier paste       Antifungal tx      Interdry        Mobilization offloading boot      Up to chair        Ambulate      PT/OT      Nutrition PO      Dietician        Diabetes Education      PO     TF     TPN     NPO   # days     Other        Anticipated discharge plans: MONAE  LTACH:        SNF/Rehab:                  Home Health Care:           Outpatient Wound Center:            Self/Family Care:        Other:                  Vac Discharge Needs: X  Not Applicable Pt not on a wound vac:       Regular Vac while inpatient, alternative dressing at DC:        Regular Vac in use and continued at DC:      X      Reg. Vac w/ Skin Sub/Biologic in use. Will need to be changed 2x wkly:      Veraflo Vac while inpatient, ok to transition to Regular Vac on Discharge:           Veraflo Vac while inpatient, will need to remain on Veraflo Vac upon discharge:

## 2022-07-18 NOTE — THERAPY
Physical Therapy   Daily Treatment     Patient Name: Andrei Garay  Age:  82 y.o., Sex:  male  Medical Record #: [id number]  Today's Date: 2022     Precautions  Precautions: Fall Risk;Weight Bearing As Tolerated Left Lower Extremity;Other (See Comments) (CAM boot Lt LE)  Comments: wound vac    Assessment    PT tx order received, chart reviewed, RN cleared pt for PT tx. Pt was ID via name and  and was agreeable to PT tx. Pt tolerated treatment as per below treatment grid. See below grid for details. Pt was pleasant, calm, cooperative but h/o dementia still somewhat confused. However he seemed to be more motivated to participate with therapy although he perseverates over how he wants his catheter out.  Upon completion of session pt was left back in bed, personal needs met, bed alarms ON and activated, JAMAL SAM's elevated.     Plan    Continue current treatment plan.    DC Equipment Recommendations: Unable to determine at this time  Discharge Recommendations: Recommend post-acute placement for additional physical therapy services prior to discharge home           Objective       22 0919   Charge Group   Charges  Yes   PT Gait Training 1   PT Therapeutic Activities 1   Total Time Spent   PT Total Time Yes   PT Gait Training Time Spent (Mins) 16   PT Therapeutic Activities Time Spent (Mins) 10   PT Total Time Spent (Calculated) 26   Precautions   Precautions Fall Risk;Weight Bearing As Tolerated Left Lower Extremity;Other (See Comments)  (CAM boot Lt LE)   Comments wound vac   Vitals   O2 (LPM) 0   O2 Delivery Device None - Room Air   Pain   Pain Scales 0 to 10 Scale    Intervention Declines   Pain 0 - 10 Group   Pain Rating Scale (NPRS) 0   Therapist Pain Assessment Prior to Activity;0  (no c/o pain during therapy)   Cognition    Cognition / Consciousness X   Orientation Level Not Oriented to Year;Not Oriented to Month;Not Oriented to Day;Not Oriented to Time;Not Oriented to Reason;Not Oriented to  Place   Level of Consciousness Alert   Ability To Follow Commands 1 Step   Safety Awareness Impaired;Impulsive   New Learning Impaired   Attention Impaired   Comments confused but pleasant and cooperative   Balance   Sitting Balance (Static) Fair   Sitting Balance (Dynamic) Fair -   Standing Balance (Static) Poor -   Standing Balance (Dynamic) Trace +   Weight Shift Sitting Good   Weight Shift Standing Fair   Skilled Intervention Compensatory Strategies   Comments standing with FWW   Gait Analysis   Gait Level Of Assist Minimal Assist   Assistive Device Front Wheel Walker   Distance (Feet) 10   # of Times Distance was Traveled 2   Deviation Decreased Base Of Support;Shuffled Gait;Decreased Toe Off   # of Stairs Climbed 0   Weight Bearing Status WBAT BLE,  LLE CAM boot   Skilled Intervention Compensatory Strategies;Verbal Cuing;Tactile Cuing;Postural Facilitation   Comments Pt ambulated ~ 10' x 2 with FWW requiring min A/CGA for balance and steadying, also required assist with wound vac. no LOB/buckling noted, impuslive behavior continues.   Bed Mobility    Supine to Sit Moderate Assist   Sit to Supine Minimal Assist   Scooting Supervised   Rolling Supervised   Comments supine to sit mod A for B LE's over EOB.  Sit->supine requires min A for ~ 20% assist of LE's over EOB.   Functional Mobility   Sit to Stand Contact Guard Assist   Skilled Intervention Tactile Cuing;Verbal Cuing;Compensatory Strategies   Comments STS x 7 reps with FWW reuqiring CGA for balance. max VC's for hand placement and sequencing   How much difficulty does the patient currently have...   Turning over in bed (including adjusting bedclothes, sheets and blankets)? 3   Sitting down on and standing up from a chair with arms (e.g., wheelchair, bedside commode, etc.) 3   Moving from lying on back to sitting on the side of the bed? 3   How much help from another person does the patient currently need...   Moving to and from a bed to a chair (including  a wheelchair)? 3   Need to walk in a hospital room? 2   Climbing 3-5 steps with a railing? 1   6 clicks Mobility Score 15   Activity Tolerance   Sitting Edge of Bed 10+ minutes   Comments standing with FWW   Patient / Family Goals    Patient / Family Goal #[facility]   Short Term Goals    Short Term Goal # 1 Pt will perform supine <> sit with SPV in 6 visits to return to PLOF   Goal Outcome # 1 Progressing as expected   Short Term Goal # 2 Pt will perform STS transfers with FWW and SPV in 6 visits to improve functional independence   Goal Outcome # 2 Progressing slower than expected   Short Term Goal # 3 Pt will ambulate 25ft with FWW and SPV in 6 visits to ambulate <> BR   Goal Outcome # 3 Progressing slower than expected   Education Group   Education Provided Role of Physical Therapist   Role of Physical Therapist Patient Response Patient;Acceptance;Explanation;Demonstration;Verbal Demonstration;Reinforcement Needed   Anticipated Discharge Equipment and Recommendations   DC Equipment Recommendations Unable to determine at this time   Discharge Recommendations Recommend post-acute placement for additional physical therapy services prior to discharge home   Interdisciplinary Plan of Care Collaboration   IDT Collaboration with  Nursing   Patient Position at End of Therapy In Bed;Bed Alarm On;Call Light within Reach;Tray Table within Reach;Phone within Reach  (B LE's elevated)   Collaboration Comments handoff to RN completed, both bed alarms ON and activated   Session Information   Date / Session Number  7/18-4(1/2,7/24)

## 2022-07-18 NOTE — PROGRESS NOTES
Patient A&Ox2, asking to get out of bed. Pt states he needs to urinate. Martinez was clamped so after unclamping it, 500ml drained. Reclamped at 0830.
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
## 2022-07-18 NOTE — CARE PLAN
The patient is Stable - Low risk of patient condition declining or worsening    Shift Goals  Clinical Goals: Patient will be compliant with care  Patient Goals: To be left alone  Family Goals: Patient to comply with care and med admin    Progress made toward(s) clinical / shift goals:    Problem: Fall Risk  Goal: Patient will remain free from falls  Outcome: Progressing     Problem: Pain - Standard  Goal: Alleviation of pain or a reduction in pain to the patient’s comfort goal  Outcome: Progressing     Problem: Safety - Medical Restraint  Goal: Free from restraint(s) (Restraint for Interference with Medical Device)  Outcome: Progressing       Patient is not progressing towards the following goals:      Problem: Knowledge Deficit - Standard  Goal: Patient and family/care givers will demonstrate understanding of plan of care, disease process/condition, diagnostic tests and medications  Outcome: Not Progressing      Coronavirus/COVID19

## 2022-07-18 NOTE — CARE PLAN
The patient is Stable - Low risk of patient condition declining or worsening    Shift Goals  Clinical Goals: Patient will be compliant with plan of care      Progress made toward(s) clinical / shift goals:    Patient compliant throughout day with allowing nurse to do assessments and taking scheduled medications as ordered.       Problem: Knowledge Deficit - Standard  Goal: Patient and family/care givers will demonstrate understanding of plan of care, disease process/condition, diagnostic tests and medications  Outcome: Progressing     Problem: Fall Risk  Goal: Patient will remain free from falls  Outcome: Progressing     Problem: Skin Integrity  Goal: Skin integrity is maintained or improved  Outcome: Progressing     Problem: Pain - Standard  Goal: Alleviation of pain or a reduction in pain to the patient’s comfort goal  Outcome: Progressing

## 2022-07-18 NOTE — PROGRESS NOTES
Pt starting to climb out of bed, sets the bed alarm off, and states he needs to use the restroom. Unclamped the barbosa catheter to relieve the urine and assisted him to bedside commode for a BM.   Notified Dr. Valera that patient had the urge to urinate. MD ordered barbosa to be removed.

## 2022-07-19 LAB
GLUCOSE BLD STRIP.AUTO-MCNC: 215 MG/DL (ref 65–99)
GLUCOSE BLD STRIP.AUTO-MCNC: 230 MG/DL (ref 65–99)
GLUCOSE BLD STRIP.AUTO-MCNC: 264 MG/DL (ref 65–99)
GLUCOSE BLD STRIP.AUTO-MCNC: 268 MG/DL (ref 65–99)

## 2022-07-19 PROCEDURE — A9270 NON-COVERED ITEM OR SERVICE: HCPCS | Performed by: STUDENT IN AN ORGANIZED HEALTH CARE EDUCATION/TRAINING PROGRAM

## 2022-07-19 PROCEDURE — 51798 US URINE CAPACITY MEASURE: CPT

## 2022-07-19 PROCEDURE — 700102 HCHG RX REV CODE 250 W/ 637 OVERRIDE(OP): Performed by: STUDENT IN AN ORGANIZED HEALTH CARE EDUCATION/TRAINING PROGRAM

## 2022-07-19 PROCEDURE — 770001 HCHG ROOM/CARE - MED/SURG/GYN PRIV*

## 2022-07-19 PROCEDURE — 82962 GLUCOSE BLOOD TEST: CPT

## 2022-07-19 PROCEDURE — 700111 HCHG RX REV CODE 636 W/ 250 OVERRIDE (IP): Performed by: STUDENT IN AN ORGANIZED HEALTH CARE EDUCATION/TRAINING PROGRAM

## 2022-07-19 PROCEDURE — 700102 HCHG RX REV CODE 250 W/ 637 OVERRIDE(OP): Performed by: HOSPITALIST

## 2022-07-19 PROCEDURE — 700111 HCHG RX REV CODE 636 W/ 250 OVERRIDE (IP): Performed by: HOSPITALIST

## 2022-07-19 PROCEDURE — 99232 SBSQ HOSP IP/OBS MODERATE 35: CPT | Performed by: STUDENT IN AN ORGANIZED HEALTH CARE EDUCATION/TRAINING PROGRAM

## 2022-07-19 PROCEDURE — 700105 HCHG RX REV CODE 258: Performed by: HOSPITALIST

## 2022-07-19 PROCEDURE — A9270 NON-COVERED ITEM OR SERVICE: HCPCS | Performed by: HOSPITALIST

## 2022-07-19 RX ADMIN — SENNOSIDES AND DOCUSATE SODIUM 2 TABLET: 50; 8.6 TABLET ORAL at 18:14

## 2022-07-19 RX ADMIN — INSULIN HUMAN 2 UNITS: 100 INJECTION, SOLUTION PARENTERAL at 09:49

## 2022-07-19 RX ADMIN — QUETIAPINE FUMARATE 25 MG: 25 TABLET ORAL at 20:52

## 2022-07-19 RX ADMIN — DAKIN'S SOLUTION 0.125% (QUARTER STRENGTH) 1 ML: 0.12 SOLUTION at 18:14

## 2022-07-19 RX ADMIN — LISINOPRIL 40 MG: 20 TABLET ORAL at 06:11

## 2022-07-19 RX ADMIN — INSULIN HUMAN 3 UNITS: 100 INJECTION, SOLUTION PARENTERAL at 21:02

## 2022-07-19 RX ADMIN — AMLODIPINE BESYLATE 10 MG: 10 TABLET ORAL at 06:10

## 2022-07-19 RX ADMIN — Medication 10 MG: at 20:53

## 2022-07-19 RX ADMIN — TAMSULOSIN HYDROCHLORIDE 0.8 MG: 0.4 CAPSULE ORAL at 09:52

## 2022-07-19 RX ADMIN — DAKIN'S SOLUTION 0.125% (QUARTER STRENGTH) 1 ML: 0.12 SOLUTION at 08:58

## 2022-07-19 RX ADMIN — MIRTAZAPINE 15 MG: 15 TABLET, FILM COATED ORAL at 20:53

## 2022-07-19 RX ADMIN — ATORVASTATIN CALCIUM 40 MG: 40 TABLET, FILM COATED ORAL at 18:14

## 2022-07-19 RX ADMIN — DAPTOMYCIN 750 MG: 500 INJECTION, POWDER, LYOPHILIZED, FOR SOLUTION INTRAVENOUS at 22:58

## 2022-07-19 RX ADMIN — INSULIN HUMAN 2 UNITS: 100 INJECTION, SOLUTION PARENTERAL at 18:40

## 2022-07-19 RX ADMIN — SENNOSIDES AND DOCUSATE SODIUM 2 TABLET: 50; 8.6 TABLET ORAL at 06:11

## 2022-07-19 RX ADMIN — ENOXAPARIN SODIUM 40 MG: 40 INJECTION SUBCUTANEOUS at 18:14

## 2022-07-19 RX ADMIN — SERTRALINE 50 MG: 100 TABLET, FILM COATED ORAL at 18:14

## 2022-07-19 RX ADMIN — METOPROLOL TARTRATE 25 MG: 25 TABLET, FILM COATED ORAL at 20:52

## 2022-07-19 RX ADMIN — METOPROLOL TARTRATE 25 MG: 25 TABLET, FILM COATED ORAL at 10:51

## 2022-07-19 RX ADMIN — DAPTOMYCIN 750 MG: 500 INJECTION, POWDER, LYOPHILIZED, FOR SOLUTION INTRAVENOUS at 00:25

## 2022-07-19 ASSESSMENT — PAIN DESCRIPTION - PAIN TYPE
TYPE: ACUTE PAIN
TYPE: ACUTE PAIN

## 2022-07-19 ASSESSMENT — ENCOUNTER SYMPTOMS
EYES NEGATIVE: 1
SHORTNESS OF BREATH: 0
MYALGIAS: 0
FEVER: 0
CHILLS: 0
NAUSEA: 0
ABDOMINAL PAIN: 0
COUGH: 0
VOMITING: 0
FOCAL WEAKNESS: 0

## 2022-07-19 NOTE — PROGRESS NOTES
[facility] Medicine Daily Progress Note    Date of Service  7/18/2022    Chief Complaint  Andrei Garay is a 82 y.o. male admitted 6/29/2022 with left leg swelling and altered mental status    Hospital Course  Andrei Garay is a 82 y.o. male who presented 6/29/2022 with a history of type 2 diabetes, hyperlipidemia, hypertension, osteomyelitis who presents with right leg swelling and altered mental status.     According to the patient's daughter who is visiting him she states that he began becoming more confused and slurring his words yesterday.  He also reports that he had episode of incontinence which is not his normal baseline.  Has been receiving IV daptomycin for left lower leg osteomyelitis which was diagnosed 2 weeks ago, MRSA culture positive.    Emergency department ultrasound of the right lower extremity negative for DVT. Limb preservation services consulted and following.  Completed a bedside debridement and applied new dressings.  Sure of wound management patient was receiving it [facility] facility.  Wound does not appear to be improved from prior hospitalization.  We will consult infectious disease for recommendations and continue to work with LPS for management.      Interval Problem Update  SHREYA overnight, restraints removed, wound VAC remains in place.  Patient's cognition appears improved today off restraints.  Continuing with all ongoing supports.  Still not having urge to urinate, barbosa in place, bladder training ongoing.        Consultants/Specialty  infectious disease  Limb preservation services    Code Status  DNAR/DNI    Disposition  Patient is not medically cleared for discharge.   Anticipate discharge to to skilled nursing facility.  I have placed the appropriate orders for post-discharge needs.    Review of Systems  Review of Systems   Unable to perform ROS: Mental acuity        Physical Exam  Temp:  [36 °C (96.8 °F)-36.9 °C (98.4 °F)] 36 °C (96.8 °F)  Pulse:  [69-99] 69  Resp:   [16-18] 17  BP: (117-154)/(54-76) 117/71  SpO2:  [90 %-91 %] 91 %    Physical Exam  Vitals and nursing note reviewed.   Constitutional:       General: He is not in acute distress.     Appearance: He is obese. He is not ill-appearing.   Cardiovascular:      Rate and Rhythm: Normal rate and regular rhythm.   Pulmonary:      Effort: Pulmonary effort is normal. No respiratory distress.   Abdominal:      General: Abdomen is protuberant.   Skin:     Findings: Erythema and signs of injury present.      Comments: Left leg wound vac in place   Neurological:      General: No focal deficit present.      Mental Status: He is disoriented.      Motor: Motor function is intact.   Psychiatric:         Mood and Affect: Affect is flat.         Behavior: Behavior is hyperactive.         Cognition and Memory: Cognition is impaired. Memory is impaired.         Judgment: Judgment is impulsive.         Fluids    Intake/Output Summary (Last 24 hours) at 7/18/2022 1750  Last data filed at 7/18/2022 1500  Gross per 24 hour   Intake 720 ml   Output 1100 ml   Net -380 ml       Laboratory                        Imaging  US-EXTREMITY VENOUS LOWER UNILAT RIGHT   Final Result      CT-HEAD W/O   Final Result         No acute intracranial abnormality.            DX-ANKLE 3+ VIEWS LEFT   Final Result         Bony destruction in the lateral aspect of the distal fibula is consistent with osteomyelitis described on recent MRI. Overlying soft tissue edema and a small amount of soft tissue gas      DX-CHEST-PORTABLE (1 VIEW)   Final Result      1.  Increased perihilar interstitial markings are suggestive of pulmonary edema.      2.  Stable mild cardiomegaly           Assessment/Plan  * Encephalopathy  Assessment & Plan  Likely secondary to worsening cellulitis of the right leg.    Patient has been receiving IV daptomycin for osteomyelitis of the left leg, will continue    Avoid sedating medications  Improving    Urinary retention  Assessment & Plan  Cont  flomax  Failed barbosa trial out  Will replace barbosa and start bladder training      Osteomyelitis (HCC)- (present on admission)  Assessment & Plan  Patient with osteomyelitis currently on daptomycin.   CRP mildly elevated  LPS    Type 2 diabetes mellitus (HCC)- (present on admission)  Assessment & Plan  Last A1c was 6.33 weeks ago.  Hold metformin  Insulin sliding scale  Diabetic diet    Cellulitis- (present on admission)  Assessment & Plan  Daughter reports that the patient's right leg is more red and swollen than normal.  Doppler ultrasound does not show DVT  ID consulted, patient reportedly had removed his wound VAC while at SNF  Continue the daptomycin (until 7/23) and Unasyn (until 7/6)    Dementia with behavioral disturbance (HCC)- (present on admission)  Assessment & Plan  History of dementia.  Oriented x 2, unsure of baseline  seroqeul 12.5mg to facilitate off restraints    Hypertension- (present on admission)  Assessment & Plan  Continue home medication lisinopril and metoprolol      Hyperlipidemia- (present on admission)  Assessment & Plan  Continue home med atorvastatin       VTE prophylaxis: enoxaparin ppx    I have performed a physical exam and reviewed and updated ROS and Plan today (7/18/2022). In review of yesterday's note (7/17/2022), there are no changes except as documented above.

## 2022-07-19 NOTE — CARE PLAN
The patient is Stable - Low risk of patient condition declining or worsening    Shift Goals  Clinical Goals: safety  Patient Goals: To be left alone  Family Goals: Patient to comply with care and med admin    Progress made toward(s) clinical / shift goals:  fall precautions in place, bed locked and in lowest position. Patient in a desk bed room.     Patient is not progressing towards the following goals:

## 2022-07-19 NOTE — DISCHARGE PLANNING
Case Management Discharge Planning    Admission Date: 6/29/2022  GMLOS: 4.3  ALOS: 20    6-Clicks ADL Score: 14  6-Clicks Mobility Score: 15  PT and/or OT Eval ordered: Yes  Post-acute Referrals Ordered: Yes  Post-acute Choice Obtained: NA  Has referral(s) been sent to post-acute provider:  FANNY      Anticipated Discharge Dispo: Discharge Disposition: D/T to SNF with Medicare cert in anticipation of skilled care (03)    DME Needed: No    Action(s) Taken: Updated Provider/Nurse on Discharge Plan    Escalations Completed: None    Medically Clear: Yes    Next Steps: CM placed call to Chicho with admissions at [facility] this morning to follow msg left yesterday 7/18. Message left again asking if patient can return with wound vac, IV abx and barbosa, he is medically cleared for discharge. CM will continue to follow up with [facility].     Barriers to Discharge: Pending Placement, patient is long term at [facility]    Is the patient up for discharge tomorrow: No

## 2022-07-19 NOTE — PROGRESS NOTES
Hospital Medicine Daily Progress Note    Date of Service  7/19/2022    Chief Complaint  Andrei Garay is a 82 y.o. male admitted 6/29/2022 with AMS    Hospital Course  An 82-year-old man with h/o DM, HTN, HLD, osteomyelitis presented with AMS. According to the patient's daughter who is visiting him she states that he began becoming more confused and slurring his words yesterday. He also reports that he had episode of incontinence which is not his normal baseline. Has been receiving IV daptomycin for left lower leg osteomyelitis which was diagnosed 2 weeks ago, MRSA culture positive.  US of the right lower extremity negative for DVT. Limb preservation services consulted and following.  Completed a bedside debridement and applied new dressings.      Interval Problem Update  Patient denies any pain.   Afebrile, hemodynamically stable.      I have discussed this patient's plan of care and discharge plan at IDT rounds today with Case Management, Nursing, Nursing leadership, and other members of the IDT team.    Consultants/Specialty  infectious disease, orthopedics and LPS    Code Status  DNAR/DNI    Disposition  Patient is medically cleared pending placement for discharge.   Anticipate discharge to to skilled nursing facility.  I have placed the appropriate orders for post-discharge needs.    Review of Systems  Review of Systems   Constitutional: Negative for chills, fever and malaise/fatigue.   HENT: Negative.    Eyes: Negative.    Respiratory: Negative for cough and shortness of breath.    Cardiovascular: Negative for chest pain and leg swelling.   Gastrointestinal: Negative for abdominal pain, nausea and vomiting.   Genitourinary: Negative for dysuria.   Musculoskeletal: Negative for myalgias.   Skin: Negative for rash.   Neurological: Negative for focal weakness.        Physical Exam  Temp:  [36 °C (96.8 °F)-36.8 °C (98.2 °F)] 36.4 °C (97.5 °F)  Pulse:  [69-96] 84  Resp:  [17-19] 19  BP: (117-147)/(63-80)  147/80  SpO2:  [90 %-92 %] 90 %    Physical Exam  Vitals and nursing note reviewed.   HENT:      Head: Normocephalic.      Nose: Nose normal.      Mouth/Throat:      Mouth: Mucous membranes are moist.      Pharynx: Oropharynx is clear.   Eyes:      Conjunctiva/sclera: Conjunctivae normal.      Pupils: Pupils are equal, round, and reactive to light.   Cardiovascular:      Rate and Rhythm: Normal rate and regular rhythm.      Comments: RUE PICC in place-no erythema or surrounding tenderness to palpation   Pulmonary:      Effort: Pulmonary effort is normal. No respiratory distress.      Breath sounds: No wheezing or rales.   Abdominal:      General: Abdomen is flat. Bowel sounds are normal. There is no distension.      Tenderness: There is no abdominal tenderness. There is no guarding.   Musculoskeletal:         General: Normal range of motion.      Cervical back: Normal range of motion.   Skin:     General: Skin is warm.      Comments: Left lateral malleoli clean wound, no active drainage   Neurological:      General: No focal deficit present.      Mental Status: He is alert. He is disoriented.         Fluids    Intake/Output Summary (Last 24 hours) at 7/19/2022 1326  Last data filed at 7/19/2022 1000  Gross per 24 hour   Intake 240 ml   Output 850 ml   Net -610 ml       Laboratory                        Imaging  US-EXTREMITY VENOUS LOWER UNILAT RIGHT   Final Result      CT-HEAD W/O   Final Result         No acute intracranial abnormality.            DX-ANKLE 3+ VIEWS LEFT   Final Result         Bony destruction in the lateral aspect of the distal fibula is consistent with osteomyelitis described on recent MRI. Overlying soft tissue edema and a small amount of soft tissue gas      DX-CHEST-PORTABLE (1 VIEW)   Final Result      1.  Increased perihilar interstitial markings are suggestive of pulmonary edema.      2.  Stable mild cardiomegaly           Assessment/Plan  * Encephalopathy  Assessment & Plan  Likely  secondary to worsening cellulitis of the right leg.    Patient has been receiving IV daptomycin for osteomyelitis of the left leg, will continue    Avoid sedating medications  Improving    Urinary retention  Assessment & Plan  Cont flomax  Failed barbosa trial out  Will replace barbosa and start bladder training      Osteomyelitis (HCC)- (present on admission)  Assessment & Plan  Patient with osteomyelitis currently on daptomycin.   CRP mildly elevated  LPS    Type 2 diabetes mellitus (HCC)- (present on admission)  Assessment & Plan  Last A1c was 6.33 weeks ago.  Hold metformin  Insulin sliding scale  Diabetic diet    Cellulitis- (present on admission)  Assessment & Plan  Daughter reports that the patient's right leg is more red and swollen than normal.  Doppler ultrasound does not show DVT  ID consulted, patient reportedly had removed his wound VAC while at SNF  Continue the daptomycin (until 7/23) and Unasyn (until 7/6)    Dementia with behavioral disturbance (HCC)- (present on admission)  Assessment & Plan  History of dementia.  Oriented x 2, unsure of baseline  seroqeul     Hypertension- (present on admission)  Assessment & Plan  Continue home medication lisinopril and metoprolol      Hyperlipidemia- (present on admission)  Assessment & Plan  Continue home med atorvastatin       VTE prophylaxis: enoxaparin ppx    I have performed a physical exam and reviewed and updated ROS and Plan today (7/19/2022). In review of yesterday's note (7/18/2022), there are no changes except as documented above.

## 2022-07-19 NOTE — PROGRESS NOTES
Received report and assumed care at shift change. A&O x 2, disoriented to time and situation.  Non compliant with  Cares at times. No complain of pain or distress. Fall precautions in place, bed locked and in lowest position. Martinez reinserted due to no urine output, bladder scan volume 387. Needs attended to.

## 2022-07-20 LAB
ANION GAP SERPL CALC-SCNC: 13 MMOL/L (ref 7–16)
BUN SERPL-MCNC: 17 MG/DL (ref 8–22)
CALCIUM SERPL-MCNC: 9.2 MG/DL (ref 8.5–10.5)
CHLORIDE SERPL-SCNC: 99 MMOL/L (ref 96–112)
CO2 SERPL-SCNC: 20 MMOL/L (ref 20–33)
CREAT SERPL-MCNC: 0.75 MG/DL (ref 0.5–1.4)
ERYTHROCYTE [DISTWIDTH] IN BLOOD BY AUTOMATED COUNT: 39.8 FL (ref 35.9–50)
GFR SERPLBLD CREATININE-BSD FMLA CKD-EPI: 90 ML/MIN/1.73 M 2
GLUCOSE BLD STRIP.AUTO-MCNC: 232 MG/DL (ref 65–99)
GLUCOSE BLD STRIP.AUTO-MCNC: 247 MG/DL (ref 65–99)
GLUCOSE BLD STRIP.AUTO-MCNC: 269 MG/DL (ref 65–99)
GLUCOSE BLD STRIP.AUTO-MCNC: 284 MG/DL (ref 65–99)
GLUCOSE SERPL-MCNC: 241 MG/DL (ref 65–99)
HCT VFR BLD AUTO: 39 % (ref 42–52)
HGB BLD-MCNC: 13.3 G/DL (ref 14–18)
MCH RBC QN AUTO: 28.2 PG (ref 27–33)
MCHC RBC AUTO-ENTMCNC: 34.1 G/DL (ref 33.7–35.3)
MCV RBC AUTO: 82.6 FL (ref 81.4–97.8)
PLATELET # BLD AUTO: 313 K/UL (ref 164–446)
PMV BLD AUTO: 9.3 FL (ref 9–12.9)
POTASSIUM SERPL-SCNC: 3.8 MMOL/L (ref 3.6–5.5)
RBC # BLD AUTO: 4.72 M/UL (ref 4.7–6.1)
SODIUM SERPL-SCNC: 132 MMOL/L (ref 135–145)
WBC # BLD AUTO: 12.8 K/UL (ref 4.8–10.8)

## 2022-07-20 PROCEDURE — 85027 COMPLETE CBC AUTOMATED: CPT

## 2022-07-20 PROCEDURE — 770001 HCHG ROOM/CARE - MED/SURG/GYN PRIV*

## 2022-07-20 PROCEDURE — 306591 TRAY SUTURE REMOVAL DISP: Performed by: STUDENT IN AN ORGANIZED HEALTH CARE EDUCATION/TRAINING PROGRAM

## 2022-07-20 PROCEDURE — 700105 HCHG RX REV CODE 258: Performed by: HOSPITALIST

## 2022-07-20 PROCEDURE — 700102 HCHG RX REV CODE 250 W/ 637 OVERRIDE(OP): Performed by: HOSPITALIST

## 2022-07-20 PROCEDURE — A9270 NON-COVERED ITEM OR SERVICE: HCPCS | Performed by: HOSPITALIST

## 2022-07-20 PROCEDURE — 700111 HCHG RX REV CODE 636 W/ 250 OVERRIDE (IP): Mod: JG

## 2022-07-20 PROCEDURE — 700102 HCHG RX REV CODE 250 W/ 637 OVERRIDE(OP): Performed by: STUDENT IN AN ORGANIZED HEALTH CARE EDUCATION/TRAINING PROGRAM

## 2022-07-20 PROCEDURE — 700111 HCHG RX REV CODE 636 W/ 250 OVERRIDE (IP): Performed by: HOSPITALIST

## 2022-07-20 PROCEDURE — 700101 HCHG RX REV CODE 250: Performed by: HOSPITALIST

## 2022-07-20 PROCEDURE — A9270 NON-COVERED ITEM OR SERVICE: HCPCS | Performed by: STUDENT IN AN ORGANIZED HEALTH CARE EDUCATION/TRAINING PROGRAM

## 2022-07-20 PROCEDURE — 302098 PASTE RING (FLAT): Performed by: STUDENT IN AN ORGANIZED HEALTH CARE EDUCATION/TRAINING PROGRAM

## 2022-07-20 PROCEDURE — 80048 BASIC METABOLIC PNL TOTAL CA: CPT

## 2022-07-20 PROCEDURE — 700111 HCHG RX REV CODE 636 W/ 250 OVERRIDE (IP): Performed by: STUDENT IN AN ORGANIZED HEALTH CARE EDUCATION/TRAINING PROGRAM

## 2022-07-20 PROCEDURE — 97605 NEG PRS WND THER DME<=50SQCM: CPT

## 2022-07-20 PROCEDURE — 99232 SBSQ HOSP IP/OBS MODERATE 35: CPT | Performed by: STUDENT IN AN ORGANIZED HEALTH CARE EDUCATION/TRAINING PROGRAM

## 2022-07-20 PROCEDURE — 82962 GLUCOSE BLOOD TEST: CPT | Mod: 91

## 2022-07-20 RX ADMIN — SENNOSIDES AND DOCUSATE SODIUM 2 TABLET: 50; 8.6 TABLET ORAL at 17:34

## 2022-07-20 RX ADMIN — INSULIN HUMAN 3 UNITS: 100 INJECTION, SOLUTION PARENTERAL at 14:26

## 2022-07-20 RX ADMIN — AMLODIPINE BESYLATE 10 MG: 10 TABLET ORAL at 06:29

## 2022-07-20 RX ADMIN — DAKIN'S SOLUTION 0.125% (QUARTER STRENGTH) 1 ML: 0.12 SOLUTION at 17:29

## 2022-07-20 RX ADMIN — DAPTOMYCIN 750 MG: 500 INJECTION, POWDER, LYOPHILIZED, FOR SOLUTION INTRAVENOUS at 23:53

## 2022-07-20 RX ADMIN — QUETIAPINE FUMARATE 25 MG: 25 TABLET ORAL at 21:12

## 2022-07-20 RX ADMIN — Medication 10 MG: at 21:12

## 2022-07-20 RX ADMIN — METOPROLOL TARTRATE 25 MG: 25 TABLET, FILM COATED ORAL at 09:42

## 2022-07-20 RX ADMIN — METOPROLOL TARTRATE 25 MG: 25 TABLET, FILM COATED ORAL at 21:12

## 2022-07-20 RX ADMIN — SERTRALINE 50 MG: 100 TABLET, FILM COATED ORAL at 17:35

## 2022-07-20 RX ADMIN — MIRTAZAPINE 15 MG: 15 TABLET, FILM COATED ORAL at 21:12

## 2022-07-20 RX ADMIN — DAKIN'S SOLUTION 0.125% (QUARTER STRENGTH) 1 ML: 0.12 SOLUTION at 06:31

## 2022-07-20 RX ADMIN — LISINOPRIL 40 MG: 20 TABLET ORAL at 06:29

## 2022-07-20 RX ADMIN — INSULIN HUMAN 2 UNITS: 100 INJECTION, SOLUTION PARENTERAL at 09:39

## 2022-07-20 RX ADMIN — SENNOSIDES AND DOCUSATE SODIUM 2 TABLET: 50; 8.6 TABLET ORAL at 06:29

## 2022-07-20 RX ADMIN — INSULIN HUMAN 3 UNITS: 100 INJECTION, SOLUTION PARENTERAL at 17:41

## 2022-07-20 RX ADMIN — ATORVASTATIN CALCIUM 40 MG: 40 TABLET, FILM COATED ORAL at 17:35

## 2022-07-20 RX ADMIN — TAMSULOSIN HYDROCHLORIDE 0.8 MG: 0.4 CAPSULE ORAL at 09:42

## 2022-07-20 RX ADMIN — INSULIN HUMAN 2 UNITS: 100 INJECTION, SOLUTION PARENTERAL at 21:18

## 2022-07-20 RX ADMIN — ALTEPLASE 2 MG: 2.2 INJECTION, POWDER, LYOPHILIZED, FOR SOLUTION INTRAVENOUS at 09:52

## 2022-07-20 RX ADMIN — ENOXAPARIN SODIUM 40 MG: 40 INJECTION SUBCUTANEOUS at 17:35

## 2022-07-20 ASSESSMENT — PAIN DESCRIPTION - PAIN TYPE
TYPE: ACUTE PAIN
TYPE: ACUTE PAIN

## 2022-07-20 NOTE — PROGRESS NOTES
Received report from NOC RN, pt care assumed. VS stable on RA. No signs of acute distress. Call light within reach. Pt is aaox2 and c/o generalized pain. Pt refuses pain medication at this time. Bed-alarm on.

## 2022-07-20 NOTE — PROGRESS NOTES
[facility] Medicine Daily Progress Note    Date of Service  7/20/2022    Chief Complaint  Andrei Garay is a 82 y.o. male admitted 6/29/2022 with AMS    Hospital Course  An 82-year-old man with h/o DM, HTN, HLD, osteomyelitis presented with AMS. According to the patient's daughter who is visiting him she states that he began becoming more confused and slurring his words yesterday. He also reports that he had episode of incontinence which is not his normal baseline. Has been receiving IV daptomycin for left lower leg osteomyelitis which was diagnosed 2 weeks ago, MRSA culture positive.  US of the right lower extremity negative for DVT. Limb preservation services consulted and following.  Completed a bedside debridement and applied new dressings.      Interval Problem Update  Patient reports generalized body aches. Still confused. Afebrile, hemodynamically stable. On room air.  WBC 12.8  Na 132    I have discussed this patient's plan of care and discharge plan at IDT rounds today with Case Management, Nursing, Nursing leadership, and other members of the IDT team.    Consultants/Specialty  infectious disease, orthopedics and LPS    Code Status  DNAR/DNI    Disposition  Patient is medically cleared pending placement for discharge.   Anticipate discharge to to skilled nursing facility.  I have placed the appropriate orders for post-discharge needs.    Review of Systems  Constitutional: Negative for chills, fever and malaise/fatigue.   HENT: Negative.    Eyes: Negative.    Respiratory: Negative for cough and shortness of breath.    Cardiovascular: Negative for chest pain and leg swelling.   Gastrointestinal: Negative for abdominal pain, nausea and vomiting.   Genitourinary: Negative for dysuria.   Musculoskeletal: Negative for myalgias.   Skin: Negative for rash.   Neurological: Negative for focal weakness.     Physical Exam  Temp:  [36.3 °C (97.4 °F)-36.8 °C (98.3 °F)] 36.6 °C (97.8 °F)  Pulse:  [78-90] 78  Resp:   [17-20] 17  BP: (114-144)/(54-71) 140/69  SpO2:  [90 %-98 %] 98 %    Physical Exam  Vitals and nursing note reviewed.   HENT:      Head: Normocephalic.      Nose: Nose normal.      Mouth/Throat:      Mouth: Mucous membranes are moist.      Pharynx: Oropharynx is clear.   Eyes:      Conjunctiva/sclera: Conjunctivae normal.      Pupils: Pupils are equal, round, and reactive to light.   Cardiovascular:      Rate and Rhythm: Normal rate and regular rhythm.      Comments: RUE PICC in place-no erythema or surrounding tenderness to palpation   Pulmonary:      Effort: Pulmonary effort is normal. No respiratory distress.      Breath sounds: No wheezing or rales.   Abdominal:      General: Abdomen is flat. Bowel sounds are normal. There is no distension.      Tenderness: There is no abdominal tenderness. There is no guarding.   Musculoskeletal:         General: Normal range of motion.      Cervical back: Normal range of motion.   Skin:     General: Skin is warm.      Comments: Left lateral malleoli clean wound, no active drainage   Neurological:      General: No focal deficit present.      Mental Status: He is alert. He is disoriented.     Fluids    Intake/Output Summary (Last 24 hours) at 7/20/2022 1234  Last data filed at 7/20/2022 1100  Gross per 24 hour   Intake 840 ml   Output 1300 ml   Net -460 ml       Laboratory  Recent Labs     07/20/22  0925   WBC 12.8*   RBC 4.72   HEMOGLOBIN 13.3*   HEMATOCRIT 39.0*   MCV 82.6   MCH 28.2   MCHC 34.1   RDW 39.8   PLATELETCT 313   MPV 9.3     Recent Labs     07/20/22  0925   SODIUM 132*   POTASSIUM 3.8   CHLORIDE 99   CO2 20   GLUCOSE 241*   BUN 17   CREATININE 0.75   CALCIUM 9.2                   Imaging  US-EXTREMITY VENOUS LOWER UNILAT RIGHT   Final Result      CT-HEAD W/O   Final Result         No acute intracranial abnormality.            DX-ANKLE 3+ VIEWS LEFT   Final Result         Bony destruction in the lateral aspect of the distal fibula is consistent with osteomyelitis  described on recent MRI. Overlying soft tissue edema and a small amount of soft tissue gas      DX-CHEST-PORTABLE (1 VIEW)   Final Result      1.  Increased perihilar interstitial markings are suggestive of pulmonary edema.      2.  Stable mild cardiomegaly           Assessment/Plan  * Encephalopathy  Assessment & Plan  Likely secondary to worsening cellulitis of the right leg.    Patient has been receiving IV daptomycin for osteomyelitis of the left leg, will continue    Avoid sedating medications  Improving    Urinary retention  Assessment & Plan  Cont flomax  Failed barbosa trial out  Will replace barbosa and start bladder training      Osteomyelitis (HCC)- (present on admission)  Assessment & Plan  Patient with osteomyelitis currently on daptomycin.   CRP mildly elevated  LPS    Type 2 diabetes mellitus (HCC)- (present on admission)  Assessment & Plan  Last A1c was 6.33 weeks ago.  Hold metformin  Insulin sliding scale  Diabetic diet    Cellulitis- (present on admission)  Assessment & Plan  Daughter reports that the patient's right leg is more red and swollen than normal.  Doppler ultrasound does not show DVT  ID consulted, patient reportedly had removed his wound VAC while at SNF  Continue the daptomycin (until 7/23) and Unasyn (until 7/6)    Dementia with behavioral disturbance (HCC)- (present on admission)  Assessment & Plan  History of dementia.  Oriented x 2, unsure of baseline  seroqeul     Hypertension- (present on admission)  Assessment & Plan  Continue home medication lisinopril and metoprolol      Hyperlipidemia- (present on admission)  Assessment & Plan  Continue home med atorvastatin       VTE prophylaxis: enoxaparin ppx    I have performed a physical exam and reviewed and updated ROS and Plan today (7/20/2022). In review of yesterday's note (7/19/2022), there are no changes except as documented above.

## 2022-07-20 NOTE — PROGRESS NOTES
Received report and assumed care at shift change. A&O x 2, disoriented to time and situation.impulsive at times,  cooperative with cares. On bladder training, patient denies having urge to urinate. NPWT intact, functioning well. No complain of pain or distress. Fall precautions in place, bed locked and in lowest position. Needs attended to.

## 2022-07-20 NOTE — DISCHARGE PLANNING
Case Management Discharge Planning    Admission Date: 6/29/2022  GMLOS: 4.3  ALOS: 21    6-Clicks ADL Score: 14  6-Clicks Mobility Score: 15  PT and/or OT Eval ordered: Yes  Post-acute Referrals Ordered: NA  Post-acute Choice Obtained: NA  Has referral(s) been sent to post-acute provider:  FANNY      Anticipated Discharge Dispo: Discharge Disposition: D/T to SNF with Medicare cert in anticipation of skilled care (03)    DME Needed: No    Action(s) Taken: Updated Provider/Nurse on Discharge Plan    Escalations Completed: None    Medically Clear: Yes    Next Steps: JIMMIE spoke with Chicho admissions coordinator from [facility] by phone. [facility] is happy to accept patient back to their facility once their wound nurse is back to work on Mon, 7/25/22. There is no wound nurse to care for his needs until that date. Chicho has arranged WC transport for 7/25 at 12 pm.     Barriers to Discharge: Pending Placement, will return to [facility] when facility wound nurse returns.    Is the patient up for discharge tomorrow: No

## 2022-07-20 NOTE — CARE PLAN
The patient is Stable - Low risk of patient condition declining or worsening    Shift Goals  Clinical Goals: safety  Patient Goals: To be left alone  Family Goals: Patient to comply with care and med admin    Progress made toward(s) clinical / shift goals:  Fall precautions in place. Patient in desk bed room.     Patient is not progressing towards the following goals:

## 2022-07-20 NOTE — PROGRESS NOTES
Martinez catheter output 400 mL, catheter re-clamped at this time. Pt denies feeling the urge to urinate.

## 2022-07-20 NOTE — WOUND TEAM
[facility]  Inpatient Services   Wound and Skin Care Progress Note    Admission Date: 6/10/2022     Last order of IP CONSULT TO WOUND CARE was found on 2022 from Hospital Encounter on 2022     HPI, PMH, SH: Reviewed    Past Surgical History:   Procedure Laterality Date   • IRRIGATION & DEBRIDEMENT GENERAL  2022    Procedure: IRRIGATION AND DEBRIDEMENT LEFT ANKLE;  Surgeon: Panfilo Wagner M.D.;  Location: SURGERY [facility];  Service: Orthopedics   • APPENDECTOMY     • STENT PLACEMENT      cardiac     Social History     Tobacco Use   • Smoking status: Former Smoker     Quit date: 2000     Years since quittin.8   • Smokeless tobacco: Never Used   Substance Use Topics   • Alcohol use: Never     Chief Complaint   Patient presents with   • Leg Swelling     Pt has new swelling to his left lower swelling and new confusion per staff at 's home and daughter. Pt was incontinent of urine today and is usually continent. Pt is also weak and unable to walk like normal. Pt is A&O to self only today and is usually A&Ox3. Pt was recently admitted here for osteomyelitis of his left lower leg, an I&D, a positive MRSA culture of the left lower leg wound. Pt is currently on a daptomycin IV antibiotic regimen.        Diagnosis: Sepsis (HCC) [A41.9]    Unit where seen by Wound Team: [id number]     WOUND CONSULT/FOLLOW UP RELATED TO:  L. Lateral ankle NPWT     WOUND HISTORY:  Andrei Garay is a 82 y.o. male past medical history of diabetes mellitus, hypertension who presented 6/10/2022 with altered mental status for last 3 days and left ankle pain.  History is primarily provided by daughter.  No relieving or exacerbating factors were noted.  He denies any fever does report some chills.  In the ER, x-ray of the ankle showed signs concerning for osteomyelitis.  UA grossly positive for urinary tract infection.  Patient met criteria for sepsis started on IV broad-spectrum antibiotics and  will be admitted for further work-up and limb preservation consult.    SX with Dr. Wagner 6/14/22    WOUND ASSESSMENT/LDA                 Negative Pressure Wound Therapy 06/17/22 Surgical Ankle Lateral Left (Active)   NPWT Pump Mode / Pressure Setting Ulta;Intermittent;125 mmHg 07/20/22 1000   Dressing Type Small;Black Foam (Veraflo) 07/20/22 1000   Number of Foam Pieces Used 1 07/20/22 1000   Canister Changed Yes 07/19/22 2055   Output (mL) 0 mL 07/18/22 1500   NEXT Dressing Change/Treatment Date 07/22/22 07/20/22 1000   VAC VeraFlo Irrigant 1/4 Strength Dakins 07/20/22 1000   VAC VeraFlo Soak Time (mins) 8 07/20/22 1000   VAC VeraFlo Instill Volume (ml) 10 07/20/22 1000   VAC VeraFlo - Therapy Time (hrs) 3 07/20/22 1000   VAC VeraFlo Pressure (mm/Hg) Intermittent;125 mmHg 07/20/22 1000   WOUND NURSE ONLY - Time Spent with Patient (mins) 30 07/20/22 1000           Wound 06/14/22 Ankle Lateral Left (Active)   Wound Image   07/15/22 1000   Site Assessment Red;Yellow 07/20/22 1000   Periwound Assessment Maceration 07/20/22 1000   Margins Defined edges 07/20/22 1000   Closure Secondary intention 07/20/22 1000   Drainage Amount Scant 07/20/22 1000   Drainage Description Serosanguineous 07/20/22 1000   Treatments Cleansed;Irrigation;Site care;Offloading 07/20/22 1000   Wound Cleansing Approved Wound Cleanser 07/20/22 1000   Periwound Protectant Skin Protectant Wipes to Periwound 07/20/22 1000   Dressing Cleansing/Solutions 1/4 Strength Dakin's Solution 07/20/22 1000   Dressing Options Wound Vac;Mepilex Heel 07/20/22 1000   Dressing Changed Changed 07/20/22 1000   Dressing Status Clean;Dry;Intact 07/20/22 1000   Dressing Change/Treatment Frequency Monday, Wednesday, Friday, and As Needed 07/20/22 1000   NEXT Dressing Change/Treatment Date 07/22/22 07/20/22 1000   NEXT Weekly Photo (Inpatient Only) 07/22/22 07/18/22 1500   Non-staged Wound Description Full thickness 07/13/22 0900   Wound Length (cm) 3.5 cm 07/15/22 1000    Wound Width (cm) 1.3 cm 07/15/22 1000   Wound Depth (cm) 0.4 cm 07/15/22 1000   Wound Surface Area (cm^2) 4.55 cm^2 07/15/22 1000   Wound Volume (cm^3) 1.82 cm^3 07/15/22 1000   Wound Healing % 83 07/15/22 1000   Wound Bed Granulation (%) 80 % 07/15/22 1000   Wound Bed Slough (%) 20 % 07/15/22 1000   Shape oval 22 1000   Wound Odor None 22 1000   Pulses 1+;Left 22 1500   Exposed Structures None 22 1000   WOUND NURSE ONLY - Time Spent with Patient (mins) 30 22 1000       Vascular:    HONEY:   HONEY Results, Last 30 Days US-HONEY SINGLE LEVEL BILAT    Result Date: 2022  Narrative  Vascular Laboratory  Conclusions  The ankle pressures are not accurately measured due to calcification and  noncompressibility of the tibial vessels.  There is no evidence of significant arterial disease.  COOKIE VELEZ  Age:    82    Gender:     M  MRN:    [id number]  :    1940      BSA:  Exam Date:     2022 13:45  Room #:     Inpatient  Priority:     Routine  Ht (in):             Wt (lb):  Ordering Physician:        JIN GREGG  Referring Physician:       [id number] Coronado  Sonographer:               Aliyah Pandya RVDAISHA  Study Type:                Complete Bilateral  Technical Quality:         Adequate  Indications:     Ulceration of LE  CPT Codes:       82793  ICD Codes:       707.1  History:         Ulceration of the left lower extremity. No prior exams.  Limitations:                 RIGHT  Waveform            Systolic BPs (mmHg)                             125           Brachial  Triphasic                                Common Femoral  Triphasic                                Popliteal  Triphasic                  200           Posterior Tibial  Triphasic                  179           Dorsalis Pedis                                           Digit                             1.54          HONEY                                           TBI                        LEFT  Waveform        Systolic BPs (mmHg)                             130           Brachial  Triphasic                                Common Femoral  Triphasic                                Popliteal  Triphasic                  187           Posterior Tibial  Triphasic                  180           Dorsalis Pedis                                           Digit                             1.44          HONEY                                           TBI  Findings  Bilateral-  The ankle pressures are not accurately measured due to calcification and  noncompressibility of the tibial vessels.  Doppler waveforms of the common femoral artery and popliteal artery are of  high amplitude and triphasic.  Doppler waveforms at the ankle are brisk and triphasic.  TBI-  Right: 1.29  Left:    1.09  Additional testing was not performed in accordance with lower extremity  arterial evaluation protocol.  James Gonzalez  (Electronically Signed)  Final Date:      2022                   16:17      Lab Values:    Lab Results   Component Value Date/Time    WBC 12.8 (H) 2022 09:25 AM    RBC 4.72 2022 09:25 AM    HEMOGLOBIN 13.3 (L) 2022 09:25 AM    HEMATOCRIT 39.0 (L) 2022 09:25 AM    CREACTPROT 0.78 (H) 2022 06:51 PM    SEDRATEWES 27 (H) 2022 06:51 PM    HBA1C 6.3 (H) 2022 03:57 PM        Culture Results show:  No results found for this or any previous visit (from the past 720 hour(s)).    Pain Level/Medicated:  Denied pain       INTERVENTIONS BY WOUND TEAM:  Performed standard wound care which includes appropriate positioning, dressing removal and non-selective debridement. Pictures and measurements obtained weekly if/when required.    Preparation for Dressing removal: Dressing soaked with wound cleanser  Non-selectively Debrided with:  wound cleanser and gauze.  Sharp debridement: NA  Rosa M wound: Cleansed with wound cleanser, Prepped with no sting, paste ring   Primary Dressin/2  thickness black foam placed into wound bed and secured with drape - bridged a short distance anterior .    Secondary (Outer) Dressing: Trac pad. No leak detected, seal intact. mepilex placed for offloading, Tubi  D applied and then the offloading boot applied.      Interdisciplinary consultation: Patient, Bedside RN    EVALUATION / RATIONALE FOR TREATMENT:  Most Recent Date:  7/20/22: Small area of maceration, turned volume down on VF.  Wound is granulating in nicely at base of wound.  7/18/22: wound bed is light pink but clean. Continue VF NPWT. And light compression  7/15/22 decrease in area, able to achieve  wound bed with debridement.  Continue VAC VF to assist with granular tissue formation and slough removal   7/13/22: Depth with slight decrease in measurement. Maceration to periwound improved. Dark tissue seen during previous assessment is decreasing in size. Continue POC.   7/11/22: wound bed is red and yellow, with a little granular tissue per-iwound is less macerated.  Continue VF NPWT  7/8/22: Wound bed more moist in comparison to last assessment. Majority of wound bed is red, little granulation tissue still, and portion of wound with dark, spongy tissue. Resumed Dakins VF to help possibly debride or moisturize this area of dark tissue. Periwound is macerated, thus soak time and frequency of instillation decreased.   R 3rd toe with small scab that should resolve on its own. Left NETTE.   7/6/22: Wound bed remains dry and with no granulation tissue. Increased amount of Dakins instillation, however if wound continues to be dry at next change, patient may benefit from switching to NS instillation. No odor or purulence noted to wound bed. Pt confused at this time, in restraints and with telesitter at bedside.   ** This RN was unable to assess R 3rd toe, for which wound team was consulted. If possible, please assess at next VAC change. **   7/4/22: Patient's wound vac was intact, no granular tissue  present but surrounding redness is not present, no odor.  Wound looks dry.  Added tubi  D per Edwin VERDUGO  7/1/22 Pt confused and pulls at VAC tubing.  Pt pulled tubing off 5 minutes after placement.  Replaced tubing, secured tubing with large piece of drape.  Placed diabetic boot on leg to try to keep pt from pulling on tubing.  Wound bed developing biofilm.  Will order dakins to instill in VAC.    6/20/22: Maceration still present, applied paste ring around periwound, added micah to the base of the wound.  Continue NPWT.  Tubi  added for light compression   6/17/22: Patient's ankle was a little macerated, but turned pink prior to re-application, wound bed appears nice and red for first dressing change post-op.  Continue NPWT     Goals: Steady decrease in wound area and depth weekly.    WOUND TEAM PLAN OF CARE ([X] for frequency of wound follow up,): X  Nursing to follow dressing orders written for wound care. Contact wound team if area fails to progress, deteriorates or with any questions/concerns if something comes up before next scheduled follow up (See below as to whether wound is following and frequency of wound follow up)  Dressing changes by wound team:                   Follow up 3 times weekly:                NPWT change 3 times weekly:  XMWF   Follow up 1-2 times weekly:      Follow up Bi-Monthly:                   Follow up as needed:     Other (explain):     NURSING PLAN OF CARE ORDERS (X):  Dressing changes: See Dressing Care orders: X  Skin care: See Skin Care orders: X  RN Prevention Protocol: X  Rectal tube care: See Rectal Tube Care orders:   Other orders:    Anticipated discharge plans: [facility]  LTACH:        SNF/Rehab:                  Home Health Care:           Outpatient Wound Center:            Self/Family Care:        Other:                  Vac Discharge Needs: X  Not Applicable Pt not on a wound vac:       Regular Vac while inpatient, alternative dressing at DC:        Regular Vac in  use and continued at DC:      X      Reg. Vac w/ Skin Sub/Biologic in use. Will need to be changed 2x wkly:      Veraflo Vac while inpatient, ok to transition to Regular Vac on Discharge:           Veraflo Vac while inpatient, will need to remain on Veraflo Vac upon discharge:

## 2022-07-21 LAB
ANION GAP SERPL CALC-SCNC: 13 MMOL/L (ref 7–16)
BUN SERPL-MCNC: 20 MG/DL (ref 8–22)
CALCIUM SERPL-MCNC: 9.1 MG/DL (ref 8.5–10.5)
CHLORIDE SERPL-SCNC: 99 MMOL/L (ref 96–112)
CK SERPL-CCNC: 21 U/L (ref 0–154)
CO2 SERPL-SCNC: 22 MMOL/L (ref 20–33)
CREAT SERPL-MCNC: 0.74 MG/DL (ref 0.5–1.4)
ERYTHROCYTE [DISTWIDTH] IN BLOOD BY AUTOMATED COUNT: 39.7 FL (ref 35.9–50)
GFR SERPLBLD CREATININE-BSD FMLA CKD-EPI: 91 ML/MIN/1.73 M 2
GLUCOSE BLD STRIP.AUTO-MCNC: 208 MG/DL (ref 65–99)
GLUCOSE BLD STRIP.AUTO-MCNC: 245 MG/DL (ref 65–99)
GLUCOSE BLD STRIP.AUTO-MCNC: 253 MG/DL (ref 65–99)
GLUCOSE BLD STRIP.AUTO-MCNC: 267 MG/DL (ref 65–99)
GLUCOSE SERPL-MCNC: 202 MG/DL (ref 65–99)
HCT VFR BLD AUTO: 39.6 % (ref 42–52)
HGB BLD-MCNC: 13.4 G/DL (ref 14–18)
MCH RBC QN AUTO: 28 PG (ref 27–33)
MCHC RBC AUTO-ENTMCNC: 33.8 G/DL (ref 33.7–35.3)
MCV RBC AUTO: 82.8 FL (ref 81.4–97.8)
PLATELET # BLD AUTO: 331 K/UL (ref 164–446)
PMV BLD AUTO: 9.3 FL (ref 9–12.9)
POTASSIUM SERPL-SCNC: 4 MMOL/L (ref 3.6–5.5)
RBC # BLD AUTO: 4.78 M/UL (ref 4.7–6.1)
SODIUM SERPL-SCNC: 134 MMOL/L (ref 135–145)
WBC # BLD AUTO: 13.5 K/UL (ref 4.8–10.8)

## 2022-07-21 PROCEDURE — A9270 NON-COVERED ITEM OR SERVICE: HCPCS | Performed by: HOSPITALIST

## 2022-07-21 PROCEDURE — 700105 HCHG RX REV CODE 258: Performed by: HOSPITALIST

## 2022-07-21 PROCEDURE — 700102 HCHG RX REV CODE 250 W/ 637 OVERRIDE(OP): Performed by: STUDENT IN AN ORGANIZED HEALTH CARE EDUCATION/TRAINING PROGRAM

## 2022-07-21 PROCEDURE — 82962 GLUCOSE BLOOD TEST: CPT | Mod: 91

## 2022-07-21 PROCEDURE — A9270 NON-COVERED ITEM OR SERVICE: HCPCS | Performed by: STUDENT IN AN ORGANIZED HEALTH CARE EDUCATION/TRAINING PROGRAM

## 2022-07-21 PROCEDURE — 36415 COLL VENOUS BLD VENIPUNCTURE: CPT

## 2022-07-21 PROCEDURE — 700102 HCHG RX REV CODE 250 W/ 637 OVERRIDE(OP): Performed by: HOSPITALIST

## 2022-07-21 PROCEDURE — 700111 HCHG RX REV CODE 636 W/ 250 OVERRIDE (IP): Performed by: STUDENT IN AN ORGANIZED HEALTH CARE EDUCATION/TRAINING PROGRAM

## 2022-07-21 PROCEDURE — 99231 SBSQ HOSP IP/OBS SF/LOW 25: CPT | Performed by: STUDENT IN AN ORGANIZED HEALTH CARE EDUCATION/TRAINING PROGRAM

## 2022-07-21 PROCEDURE — 700111 HCHG RX REV CODE 636 W/ 250 OVERRIDE (IP): Performed by: HOSPITALIST

## 2022-07-21 PROCEDURE — 82550 ASSAY OF CK (CPK): CPT

## 2022-07-21 PROCEDURE — 770001 HCHG ROOM/CARE - MED/SURG/GYN PRIV*

## 2022-07-21 PROCEDURE — 80048 BASIC METABOLIC PNL TOTAL CA: CPT

## 2022-07-21 PROCEDURE — 85027 COMPLETE CBC AUTOMATED: CPT

## 2022-07-21 RX ADMIN — SERTRALINE 50 MG: 100 TABLET, FILM COATED ORAL at 17:43

## 2022-07-21 RX ADMIN — METOPROLOL TARTRATE 25 MG: 25 TABLET, FILM COATED ORAL at 09:29

## 2022-07-21 RX ADMIN — INSULIN HUMAN 2 UNITS: 100 INJECTION, SOLUTION PARENTERAL at 17:40

## 2022-07-21 RX ADMIN — INSULIN HUMAN 3 UNITS: 100 INJECTION, SOLUTION PARENTERAL at 14:07

## 2022-07-21 RX ADMIN — ATORVASTATIN CALCIUM 40 MG: 40 TABLET, FILM COATED ORAL at 17:43

## 2022-07-21 RX ADMIN — SENNOSIDES AND DOCUSATE SODIUM 2 TABLET: 50; 8.6 TABLET ORAL at 05:49

## 2022-07-21 RX ADMIN — AMLODIPINE BESYLATE 10 MG: 10 TABLET ORAL at 05:49

## 2022-07-21 RX ADMIN — DAKIN'S SOLUTION 0.125% (QUARTER STRENGTH) 1 ML: 0.12 SOLUTION at 17:49

## 2022-07-21 RX ADMIN — LISINOPRIL 40 MG: 20 TABLET ORAL at 05:49

## 2022-07-21 RX ADMIN — INSULIN HUMAN 3 UNITS: 100 INJECTION, SOLUTION PARENTERAL at 09:26

## 2022-07-21 RX ADMIN — OXYCODONE 5 MG: 5 TABLET ORAL at 12:14

## 2022-07-21 RX ADMIN — DAPTOMYCIN 750 MG: 500 INJECTION, POWDER, LYOPHILIZED, FOR SOLUTION INTRAVENOUS at 23:24

## 2022-07-21 RX ADMIN — DAKIN'S SOLUTION 0.125% (QUARTER STRENGTH) 1 ML: 0.12 SOLUTION at 05:53

## 2022-07-21 RX ADMIN — ENOXAPARIN SODIUM 40 MG: 40 INJECTION SUBCUTANEOUS at 17:42

## 2022-07-21 RX ADMIN — TAMSULOSIN HYDROCHLORIDE 0.8 MG: 0.4 CAPSULE ORAL at 09:29

## 2022-07-21 RX ADMIN — SENNOSIDES AND DOCUSATE SODIUM 2 TABLET: 50; 8.6 TABLET ORAL at 17:42

## 2022-07-21 RX ADMIN — MIRTAZAPINE 15 MG: 15 TABLET, FILM COATED ORAL at 22:44

## 2022-07-21 RX ADMIN — METOPROLOL TARTRATE 25 MG: 25 TABLET, FILM COATED ORAL at 22:43

## 2022-07-21 RX ADMIN — QUETIAPINE FUMARATE 25 MG: 25 TABLET ORAL at 22:44

## 2022-07-21 RX ADMIN — INSULIN HUMAN 2 UNITS: 100 INJECTION, SOLUTION PARENTERAL at 22:06

## 2022-07-21 ASSESSMENT — PAIN DESCRIPTION - PAIN TYPE
TYPE: ACUTE PAIN
TYPE: ACUTE PAIN

## 2022-07-21 NOTE — PROGRESS NOTES
Received report and assumed care at shift change. A&O x 2, disoriented to time and situation, impulsive. Non cooperative with cares at times. No complain of pain or distress. NPWT intact, functioning well. Continue with bladder retraining, continue to deny urge to urinate. Fall precautions in place, bed locked and in lowest position. Needs attended to.

## 2022-07-21 NOTE — CARE PLAN
Problem: Fall Risk  Goal: Patient will remain free from falls  Outcome: Progressing     The patient is Stable - Low risk of patient condition declining or worsening    Shift Goals  Clinical Goals: safety  Patient Goals: To be left alone  Family Goals: Patient to comply with care and med admin    Progress made toward(s) clinical / shift goals:  fall precautions in place, bed locked and in lowest position. Patient in desk bed room.     Patient is not progressing towards the following goals:

## 2022-07-21 NOTE — PROGRESS NOTES
Received report from NOC RN, pt care assumed. VS stable on RA. No signs of acute distress. Call light within reach. Pt is aaox2 and denies having pain. Bed-alarm on.

## 2022-07-21 NOTE — PROGRESS NOTES
[facility] Medicine Daily Progress Note    Date of Service  7/21/2022    Chief Complaint  Andrei Garay is a 82 y.o. male admitted 6/29/2022 with AMS    Hospital Course  An 82-year-old man with h/o DM, HTN, HLD, osteomyelitis presented with AMS. According to the patient's daughter who is visiting him she states that he began becoming more confused and slurring his words yesterday. He also reports that he had episode of incontinence which is not his normal baseline. Has been receiving IV daptomycin for left lower leg osteomyelitis which was diagnosed 2 weeks ago, MRSA culture positive.  US of the right lower extremity negative for DVT. Limb preservation services consulted and following.  Completed a bedside debridement and applied new dressings.      Interval Problem Update  Afebrile, hemodynamically stable. On room air.  WBC 13.5.  Na 134.  Pending placement    I have discussed this patient's plan of care and discharge plan at IDT rounds today with Case Management, Nursing, Nursing leadership, and other members of the IDT team.    Consultants/Specialty  infectious disease, orthopedics and LPS    Code Status  DNAR/DNI    Disposition  Patient is medically cleared pending placement for discharge.   Anticipate discharge to to skilled nursing facility.  I have placed the appropriate orders for post-discharge needs.    Review of Systems  Constitutional: Negative for chills, fever and malaise/fatigue.   HENT: Negative.    Eyes: Negative.    Respiratory: Negative for cough and shortness of breath.    Cardiovascular: Negative for chest pain and leg swelling.   Gastrointestinal: Negative for abdominal pain, nausea and vomiting.   Genitourinary: Negative for dysuria.   Musculoskeletal: Negative for myalgias.   Skin: Negative for rash.   Neurological: Negative for focal weakness.     Physical Exam  Temp:  [36.3 °C (97.3 °F)-36.7 °C (98 °F)] 36.3 °C (97.3 °F)  Pulse:  [69-91] 91  Resp:  [17-18] 17  BP: (103-146)/(55-82)  103/55  SpO2:  [90 %-97 %] 92 %    Physical Exam  Vitals and nursing note reviewed.   HENT:      Head: Normocephalic.      Nose: Nose normal.      Mouth/Throat:      Mouth: Mucous membranes are moist.      Pharynx: Oropharynx is clear.   Eyes:      Conjunctiva/sclera: Conjunctivae normal.      Pupils: Pupils are equal, round, and reactive to light.   Cardiovascular:      Rate and Rhythm: Normal rate and regular rhythm.      Comments: RUE PICC in place-no erythema or surrounding tenderness to palpation   Pulmonary:      Effort: Pulmonary effort is normal. No respiratory distress.      Breath sounds: No wheezing or rales.   Abdominal:      General: Abdomen is flat. Bowel sounds are normal. There is no distension.      Tenderness: There is no abdominal tenderness. There is no guarding.   Musculoskeletal:         General: Normal range of motion.      Cervical back: Normal range of motion.   Skin:     General: Skin is warm.      Comments: Left lateral malleoli clean wound, no active drainage   Neurological:      General: No focal deficit present.      Mental Status: He is alert. He is disoriented.     Fluids    Intake/Output Summary (Last 24 hours) at 7/21/2022 1215  Last data filed at 7/21/2022 0700  Gross per 24 hour   Intake 650 ml   Output 1000 ml   Net -350 ml       Laboratory  Recent Labs     07/20/22  0925 07/21/22  0409   WBC 12.8* 13.5*   RBC 4.72 4.78   HEMOGLOBIN 13.3* 13.4*   HEMATOCRIT 39.0* 39.6*   MCV 82.6 82.8   MCH 28.2 28.0   MCHC 34.1 33.8   RDW 39.8 39.7   PLATELETCT 313 331   MPV 9.3 9.3     Recent Labs     07/20/22  0925 07/21/22  0409   SODIUM 132* 134*   POTASSIUM 3.8 4.0   CHLORIDE 99 99   CO2 20 22   GLUCOSE 241* 202*   BUN 17 20   CREATININE 0.75 0.74   CALCIUM 9.2 9.1                   Imaging  US-EXTREMITY VENOUS LOWER UNILAT RIGHT   Final Result      CT-HEAD W/O   Final Result         No acute intracranial abnormality.            DX-ANKLE 3+ VIEWS LEFT   Final Result         Bony destruction  in the lateral aspect of the distal fibula is consistent with osteomyelitis described on recent MRI. Overlying soft tissue edema and a small amount of soft tissue gas      DX-CHEST-PORTABLE (1 VIEW)   Final Result      1.  Increased perihilar interstitial markings are suggestive of pulmonary edema.      2.  Stable mild cardiomegaly           Assessment/Plan  * Encephalopathy  Assessment & Plan  Likely secondary to worsening cellulitis of the right leg.    Patient has been receiving IV daptomycin for osteomyelitis of the left leg, will continue    Avoid sedating medications  Improving    Urinary retention  Assessment & Plan  Cont flomax  Failed brabosa trial out  Will replace barbosa and start bladder training      Osteomyelitis (HCC)- (present on admission)  Assessment & Plan  Patient with osteomyelitis currently on daptomycin.   CRP mildly elevated  LPS    Type 2 diabetes mellitus (HCC)- (present on admission)  Assessment & Plan  Last A1c was 6.33 weeks ago.  Hold metformin  Insulin sliding scale  Diabetic diet    Cellulitis- (present on admission)  Assessment & Plan  Daughter reports that the patient's right leg is more red and swollen than normal.  Doppler ultrasound does not show DVT  ID consulted, patient reportedly had removed his wound VAC while at SNF  Continue the daptomycin (until 7/23) and Unasyn (until 7/6)    Dementia with behavioral disturbance (HCC)- (present on admission)  Assessment & Plan  History of dementia.  Oriented x 2, unsure of baseline  seroqeul     Hypertension- (present on admission)  Assessment & Plan  Continue home medication lisinopril and metoprolol      Hyperlipidemia- (present on admission)  Assessment & Plan  Continue home med atorvastatin       VTE prophylaxis: enoxaparin ppx    I have performed a physical exam and reviewed and updated ROS and Plan today (7/21/2022). In review of yesterday's note (7/20/2022), there are no changes except as documented above.

## 2022-07-21 NOTE — CARE PLAN
Problem: Fall Risk  Goal: Patient will remain free from falls  Outcome: Progressing     Problem: Pain - Standard  Goal: Alleviation of pain or a reduction in pain to the patient’s comfort goal  Outcome: Progressing   The patient is Stable - Low risk of patient condition declining or worsening    Shift Goals  Clinical Goals: will remain free of falls and injury  Patient Goals: To be left alone  Family Goals: Patient to comply with care and med admin    Progress made toward(s) clinical / shift goals:  pt refusing pain medication and does not try to get out of bed on his own at this time.     Patient is not progressing towards the following goals:

## 2022-07-22 LAB
ANION GAP SERPL CALC-SCNC: 11 MMOL/L (ref 7–16)
BUN SERPL-MCNC: 24 MG/DL (ref 8–22)
CALCIUM SERPL-MCNC: 9 MG/DL (ref 8.5–10.5)
CHLORIDE SERPL-SCNC: 98 MMOL/L (ref 96–112)
CO2 SERPL-SCNC: 22 MMOL/L (ref 20–33)
CREAT SERPL-MCNC: 0.84 MG/DL (ref 0.5–1.4)
ERYTHROCYTE [DISTWIDTH] IN BLOOD BY AUTOMATED COUNT: 40.3 FL (ref 35.9–50)
GFR SERPLBLD CREATININE-BSD FMLA CKD-EPI: 87 ML/MIN/1.73 M 2
GLUCOSE BLD STRIP.AUTO-MCNC: 234 MG/DL (ref 65–99)
GLUCOSE BLD STRIP.AUTO-MCNC: 238 MG/DL (ref 65–99)
GLUCOSE BLD STRIP.AUTO-MCNC: 251 MG/DL (ref 65–99)
GLUCOSE BLD STRIP.AUTO-MCNC: 257 MG/DL (ref 65–99)
GLUCOSE SERPL-MCNC: 204 MG/DL (ref 65–99)
HCT VFR BLD AUTO: 38.5 % (ref 42–52)
HGB BLD-MCNC: 13.2 G/DL (ref 14–18)
MCH RBC QN AUTO: 28.8 PG (ref 27–33)
MCHC RBC AUTO-ENTMCNC: 34.3 G/DL (ref 33.7–35.3)
MCV RBC AUTO: 83.9 FL (ref 81.4–97.8)
PLATELET # BLD AUTO: 348 K/UL (ref 164–446)
PMV BLD AUTO: 9.3 FL (ref 9–12.9)
POTASSIUM SERPL-SCNC: 4.1 MMOL/L (ref 3.6–5.5)
RBC # BLD AUTO: 4.59 M/UL (ref 4.7–6.1)
SODIUM SERPL-SCNC: 131 MMOL/L (ref 135–145)
WBC # BLD AUTO: 12.8 K/UL (ref 4.8–10.8)

## 2022-07-22 PROCEDURE — A9270 NON-COVERED ITEM OR SERVICE: HCPCS | Performed by: STUDENT IN AN ORGANIZED HEALTH CARE EDUCATION/TRAINING PROGRAM

## 2022-07-22 PROCEDURE — 700111 HCHG RX REV CODE 636 W/ 250 OVERRIDE (IP): Performed by: HOSPITALIST

## 2022-07-22 PROCEDURE — 80048 BASIC METABOLIC PNL TOTAL CA: CPT

## 2022-07-22 PROCEDURE — 770001 HCHG ROOM/CARE - MED/SURG/GYN PRIV*

## 2022-07-22 PROCEDURE — 0JBP3ZZ EXCISION OF LEFT LOWER LEG SUBCUTANEOUS TISSUE AND FASCIA, PERCUTANEOUS APPROACH: ICD-10-PCS | Performed by: ORTHOPAEDIC SURGERY

## 2022-07-22 PROCEDURE — 302098 PASTE RING (FLAT): Performed by: STUDENT IN AN ORGANIZED HEALTH CARE EDUCATION/TRAINING PROGRAM

## 2022-07-22 PROCEDURE — 306591 TRAY SUTURE REMOVAL DISP: Performed by: STUDENT IN AN ORGANIZED HEALTH CARE EDUCATION/TRAINING PROGRAM

## 2022-07-22 PROCEDURE — A9270 NON-COVERED ITEM OR SERVICE: HCPCS | Performed by: HOSPITALIST

## 2022-07-22 PROCEDURE — 97605 NEG PRS WND THER DME<=50SQCM: CPT

## 2022-07-22 PROCEDURE — 85027 COMPLETE CBC AUTOMATED: CPT

## 2022-07-22 PROCEDURE — 99231 SBSQ HOSP IP/OBS SF/LOW 25: CPT | Performed by: STUDENT IN AN ORGANIZED HEALTH CARE EDUCATION/TRAINING PROGRAM

## 2022-07-22 PROCEDURE — 700105 HCHG RX REV CODE 258: Performed by: HOSPITALIST

## 2022-07-22 PROCEDURE — 700102 HCHG RX REV CODE 250 W/ 637 OVERRIDE(OP): Performed by: STUDENT IN AN ORGANIZED HEALTH CARE EDUCATION/TRAINING PROGRAM

## 2022-07-22 PROCEDURE — 700111 HCHG RX REV CODE 636 W/ 250 OVERRIDE (IP): Performed by: STUDENT IN AN ORGANIZED HEALTH CARE EDUCATION/TRAINING PROGRAM

## 2022-07-22 PROCEDURE — 11042 DBRDMT SUBQ TIS 1ST 20SQCM/<: CPT

## 2022-07-22 PROCEDURE — 700102 HCHG RX REV CODE 250 W/ 637 OVERRIDE(OP): Performed by: HOSPITALIST

## 2022-07-22 PROCEDURE — 82962 GLUCOSE BLOOD TEST: CPT

## 2022-07-22 RX ADMIN — MIRTAZAPINE 15 MG: 15 TABLET, FILM COATED ORAL at 20:48

## 2022-07-22 RX ADMIN — OXYCODONE 5 MG: 5 TABLET ORAL at 20:55

## 2022-07-22 RX ADMIN — INSULIN HUMAN 3 UNITS: 100 INJECTION, SOLUTION PARENTERAL at 14:02

## 2022-07-22 RX ADMIN — SENNOSIDES AND DOCUSATE SODIUM 2 TABLET: 50; 8.6 TABLET ORAL at 17:35

## 2022-07-22 RX ADMIN — Medication 10 MG: at 20:47

## 2022-07-22 RX ADMIN — DAKIN'S SOLUTION 0.125% (QUARTER STRENGTH) 1 ML: 0.12 SOLUTION at 06:47

## 2022-07-22 RX ADMIN — SENNOSIDES AND DOCUSATE SODIUM 2 TABLET: 50; 8.6 TABLET ORAL at 05:49

## 2022-07-22 RX ADMIN — INSULIN HUMAN 2 UNITS: 100 INJECTION, SOLUTION PARENTERAL at 09:58

## 2022-07-22 RX ADMIN — TAMSULOSIN HYDROCHLORIDE 0.8 MG: 0.4 CAPSULE ORAL at 09:56

## 2022-07-22 RX ADMIN — QUETIAPINE FUMARATE 25 MG: 25 TABLET ORAL at 20:48

## 2022-07-22 RX ADMIN — METOPROLOL TARTRATE 25 MG: 25 TABLET, FILM COATED ORAL at 21:14

## 2022-07-22 RX ADMIN — AMLODIPINE BESYLATE 10 MG: 10 TABLET ORAL at 05:48

## 2022-07-22 RX ADMIN — INSULIN HUMAN 2 UNITS: 100 INJECTION, SOLUTION PARENTERAL at 20:50

## 2022-07-22 RX ADMIN — METOPROLOL TARTRATE 25 MG: 25 TABLET, FILM COATED ORAL at 09:56

## 2022-07-22 RX ADMIN — ATORVASTATIN CALCIUM 40 MG: 40 TABLET, FILM COATED ORAL at 17:35

## 2022-07-22 RX ADMIN — SERTRALINE 50 MG: 100 TABLET, FILM COATED ORAL at 17:35

## 2022-07-22 RX ADMIN — LISINOPRIL 40 MG: 20 TABLET ORAL at 05:49

## 2022-07-22 RX ADMIN — DAPTOMYCIN 750 MG: 500 INJECTION, POWDER, LYOPHILIZED, FOR SOLUTION INTRAVENOUS at 23:40

## 2022-07-22 RX ADMIN — INSULIN HUMAN 3 UNITS: 100 INJECTION, SOLUTION PARENTERAL at 17:40

## 2022-07-22 ASSESSMENT — PAIN DESCRIPTION - PAIN TYPE: TYPE: ACUTE PAIN

## 2022-07-22 NOTE — PROGRESS NOTES
Hospital Medicine Daily Progress Note    Date of Service  7/22/2022    Chief Complaint  Andrei Garay is a 82 y.o. male admitted 6/29/2022 with AMS    Hospital Course  An 82-year-old man with h/o DM, HTN, HLD, osteomyelitis presented with AMS. According to the patient's daughter who is visiting him she states that he began becoming more confused and slurring his words yesterday. He also reports that he had episode of incontinence which is not his normal baseline. Has been receiving IV daptomycin for left lower leg osteomyelitis which was diagnosed 2 weeks ago, MRSA culture positive.  US of the right lower extremity negative for DVT. Limb preservation services consulted and following.  Completed a bedside debridement and applied new dressings.      Interval Problem Update  Denies any pain. Afebrile, hemodynamically stable.  WBC 12.8.  Na 131.  Will discharge to [facility] on Monday. Pending bed availability.    I have discussed this patient's plan of care and discharge plan at IDT rounds today with Case Management, Nursing, Nursing leadership, and other members of the IDT team.    Consultants/Specialty  infectious disease, orthopedics and LPS    Code Status  DNAR/DNI    Disposition  Patient is medically cleared pending placement for discharge.   Anticipate discharge to to skilled nursing facility.  I have placed the appropriate orders for post-discharge needs.    Review of Systems  Constitutional: Negative for chills, fever and malaise/fatigue.   HENT: Negative.    Eyes: Negative.    Respiratory: Negative for cough and shortness of breath.    Cardiovascular: Negative for chest pain and leg swelling.   Gastrointestinal: Negative for abdominal pain, nausea and vomiting.   Genitourinary: Negative for dysuria.   Musculoskeletal: Negative for myalgias.   Skin: Negative for rash.   Neurological: Negative for focal weakness.      Physical Exam  Temp:  [36.2 °C (97.2 °F)-37.3 °C (99.1 °F)]  37.3 °C (99.1 °F)  Pulse:  [74-92] 75  Resp:  [17-20] 18  BP: (110-142)/(56-74) 115/63  SpO2:  [90 %-96 %] 92 %    Physical Exam  Vitals and nursing note reviewed.   HENT:      Head: Normocephalic.      Nose: Nose normal.      Mouth/Throat:      Mouth: Mucous membranes are moist.      Pharynx: Oropharynx is clear.   Eyes:      Conjunctiva/sclera: Conjunctivae normal.      Pupils: Pupils are equal, round, and reactive to light.   Cardiovascular:      Rate and Rhythm: Normal rate and regular rhythm.      Comments: RUE PICC in place-no erythema or surrounding tenderness to palpation   Pulmonary:      Effort: Pulmonary effort is normal. No respiratory distress.      Breath sounds: No wheezing or rales.   Abdominal:      General: Abdomen is flat. Bowel sounds are normal. There is no distension.      Tenderness: There is no abdominal tenderness. There is no guarding.   Musculoskeletal:         General: Normal range of motion.      Cervical back: Normal range of motion.   Skin:     General: Skin is warm.      Comments: Left lateral malleoli clean wound, no active drainage   Neurological:      General: No focal deficit present.      Mental Status: He is alert. He is disoriented.      Fluids    Intake/Output Summary (Last 24 hours) at 7/22/2022 1215  Last data filed at 7/22/2022 0600  Gross per 24 hour   Intake 400 ml   Output 875 ml   Net -475 ml       Laboratory  Recent Labs     07/20/22 0925 07/21/22  0409 07/22/22  0435   WBC 12.8* 13.5* 12.8*   RBC 4.72 4.78 4.59*   HEMOGLOBIN 13.3* 13.4* 13.2*   HEMATOCRIT 39.0* 39.6* 38.5*   MCV 82.6 82.8 83.9   MCH 28.2 28.0 28.8   MCHC 34.1 33.8 34.3   RDW 39.8 39.7 40.3   PLATELETCT 313 331 348   MPV 9.3 9.3 9.3     Recent Labs     07/20/22  0925 07/21/22  0409 07/22/22  0435   SODIUM 132* 134* 131*   POTASSIUM 3.8 4.0 4.1   CHLORIDE 99 99 98   CO2 20 22 22   GLUCOSE 241* 202* 204*   BUN 17 20 24*   CREATININE 0.75 0.74 0.84   CALCIUM 9.2 9.1 9.0                    Imaging  US-EXTREMITY VENOUS LOWER UNILAT RIGHT   Final Result      CT-HEAD W/O   Final Result         No acute intracranial abnormality.            DX-ANKLE 3+ VIEWS LEFT   Final Result         Bony destruction in the lateral aspect of the distal fibula is consistent with osteomyelitis described on recent MRI. Overlying soft tissue edema and a small amount of soft tissue gas      DX-CHEST-PORTABLE (1 VIEW)   Final Result      1.  Increased perihilar interstitial markings are suggestive of pulmonary edema.      2.  Stable mild cardiomegaly           Assessment/Plan  * Encephalopathy  Assessment & Plan  Likely secondary to worsening cellulitis of the right leg.    Patient has been receiving IV daptomycin for osteomyelitis of the left leg, will continue    Avoid sedating medications  Improving    Urinary retention  Assessment & Plan  Cont flomax  Failed barbosa trial out  Continue Barbosa    Osteomyelitis (HCC)- (present on admission)  Assessment & Plan  Patient with osteomyelitis currently on daptomycin.   CRP mildly elevated  LPS    Type 2 diabetes mellitus (HCC)- (present on admission)  Assessment & Plan  Last A1c was 6.33 weeks ago.  Hold metformin  Insulin sliding scale  Diabetic diet    Cellulitis- (present on admission)  Assessment & Plan  Daughter reports that the patient's right leg is more red and swollen than normal.  Doppler ultrasound does not show DVT  ID consulted, patient reportedly had removed his wound VAC while at SNF  Continue the daptomycin (until 7/23) and Unasyn (until 7/6)    Dementia with behavioral disturbance (HCC)- (present on admission)  Assessment & Plan  History of dementia.  Oriented x 2, unsure of baseline  seroqeul     Hypertension- (present on admission)  Assessment & Plan  Continue home medication lisinopril and metoprolol      Hyperlipidemia- (present on admission)  Assessment & Plan  Continue home med atorvastatin       VTE prophylaxis: enoxaparin ppx    I have performed a  physical exam and reviewed and updated ROS and Plan today (7/22/2022). In review of yesterday's note (7/21/2022), there are no changes except as documented above.

## 2022-07-22 NOTE — PROGRESS NOTES
Received report from NOC RN, pt care assumed. VS stable on 2 L NC. Call light within reach. Pt is sleeping comfortably in bed with no signs of acute distress or pain. Bed-alarm on.

## 2022-07-22 NOTE — CARE PLAN
Problem: Fall Risk  Goal: Patient will remain free from falls  Outcome: Progressing     The patient is Stable - Low risk of patient condition declining or worsening    Shift Goals  Clinical Goals: safety  Patient Goals: To be left alone  Family Goals: Patient to comply with care and med admin    Progress made toward(s) clinical / shift goals:  fall precautions in place. Patient in desk bed room.     Patient is not progressing towards the following goals:

## 2022-07-22 NOTE — PROGRESS NOTES
LIMB PRESERVATION SERVICE  PROGRESS NOTE    HPI: Pt known to LPS from prior admissions, 4/12/22 through 4/14/22 and  6/10/22 through 6/20/22. The ulcer started originally from a protuberance on his FWW in the fall 2021.  Patient has been having wound care at [facility] where he resides.  When patient was seen in April wound did not probe to the bone at that time but upon his readmission on 6/10/2022 wound probes to bone and patient had I&D of lateral left ankle on 6/14/2022.  Patient was discharged back to [facility] 6/20/2022.  Per case management notes facility was to order patient's wound VAC and patient was kept on IV daptomycin with end date of 7/23/2022.      INTERVAL HISTORY:  7/1/2022: Patient denies fevers, chills, nausea, vomiting.  Pain controlled.  Seen with wound team for wound VAC application  7/15/2022: Patient denies fevers, chills, nausea, vomiting.  Pain controlled.  Seen with wound team for VAC change.  Small amount of slough debrided to promote healthy tissue   7/22/2022: Patient denies fevers, chills, nausea, vomiting.  Pain controlled.  Seen with wound team for VAC change.  Small amount of slough debrided today, decreased biofilm seen.      PERTINENT LPS RESULTS:   COVID-19: not completed      EXAM:      /63   Pulse 75   Temp 37.3 °C (99.1 °F) (Temporal)   Resp 18   Ht 1.829 m (6')   Wt 98.5 kg (217 lb 2.5 oz)   SpO2 92%   BMI 29.45 kg/m²     Pedal Pulses:   R foot: Diminished, palpable DP, palpable PT  L foot: Diminished, palpable DP, palpable PT    Sensation:   Monofilament testing completed on 4/13/2022  Feet Insensate to light touch      Wound(s) :    Wound(s)   location: Left lateral ankle  Full thickness, does  not probe to bone  Wound characteristics:  Red, pink tissue, small amount of yellow adherent slough to proximal end of wound, macerated periwound  Erythema:  Mild, improving  Drainage: Small amount, sanguinous   Callus:  None  Odor: None      Wound photo: Left lateral ankle predebridement    Left lateral ankle postdebridement      Overall decrease in size of wound  7/15/2022 3.5 x 1.3 x 0.4  7/22/2022 3.0 x 0.8 x 0.3    PROCEDURE:   -2% viscous lidocaine applied topically to wound bed for approximately 5 minutes prior to debridement  -Curette used to debride wound bed.  Excisional debridement was performed to remove devitalized tissue until healthy, bleeding tissue was visualized.   Entire surface of wound, 2.4cm2 debrided.  Tissue debrided into the subcutaneous layer.  -Bleeding controlled with manual pressure and silver nitrate.      -Wound care completed by wound RN, refer to flowsheet  -Patient tolerated the procedure well, without c/o pain or discomfort.       Wound care completed by LPS APRN and Wound Team PT. discontinue VF VAC, initiated regular VAC with Laura to wound bed, please see flowsheets for details.      DIABETES MANAGEMENT:    A1c:   Lab Results   Component Value Date/Time    HBA1C 6.3 (H) 06/11/2022 03:57 PM            INFECTION MANAGEMENT:    Results from last 7 days   Lab Units 07/22/22  0435 07/21/22  0409 07/20/22  0925   WBC 1501 K/uL 12.8* 13.5* 12.8*   PLATELET COUNT 1518 K/uL 348 331 313     Wound culture results:   Results     ** No results found for the last 168 hours. **             ASSESSMENT/PLAN:   82 y.o. admitted for Osteomyelitis ([facility]) [M86.9]. Presents with left lateral ankle wound     -Wound bed with 75% red granular tissue, some adherent yellow slough to proximal end of wound.  -Debridement necessary to promote granular tissue  -Continue with IV antibiotics  -Continue VF VAC, transition to regular VAC with Laura to wound bed    Wound care:   -Left lateral ankle: VAC to be managed by wound team    Labs/Imaging:  -COVID-19: not completed this admission.   -no further labs or imaging at this time    Vascular status:   -  palpable pedal pulses bilaterally    Surgery:   -No plans for surgery at  this time    Antibiotics:   -ID: involved.     Weight Bearing Status:   -Right foot: Weight bearing as tolerated  -Left foot: Weight bearing as tolerated    Offloading:   -Offloading boot, at the bedside   -Orthotic company: None      PT/OT :   -involved, last seen 7/18    Diabetes Education:   -  not involved at this time due to patient's mentation.  Patient's diabetic care provided at [facility]      - Implications of loss of protective sensation (LOPS) discussed with patient- including increased risk for amputation.  Advised to check feet at least daily, moisturize feet, and to always wear protective foot wear.   -avoid trimming own nails. See podiatrist or certified foot and nail RN  -keep blood sugars <150 for improved wound healing        DISCHARGE PLAN:  TBD,     -VF VAC  switched to regular VAC     Discussed with: pt, RN, Dr. Fournier     Please note that this dictation was created using voice recognition software. I have  worked with technical experts from [employer] [facility] to optimize the interface.  I have made every reasonable attempt to correct obvious errors, but there may be errors of grammar and possibly content that I did not discover before finalizing the note.    Jazzy David, A.P.R.N.    If any questions or concerns, please contact LPS through voalte.

## 2022-07-22 NOTE — PROGRESS NOTES
Received report and assumed care at shift change. A&O x 2, disoriented to time and situation. Non compliant with cares at times. Patient has been lethargic this shift. Easily aroused but is unable to stay awake. No signs or symptoms of distress. Fall precautions in place, bed locked and in lowest position. Needs attended to. No complains of pain

## 2022-07-23 LAB
GLUCOSE BLD STRIP.AUTO-MCNC: 247 MG/DL (ref 65–99)
GLUCOSE BLD STRIP.AUTO-MCNC: 247 MG/DL (ref 65–99)
GLUCOSE BLD STRIP.AUTO-MCNC: 273 MG/DL (ref 65–99)
GLUCOSE BLD STRIP.AUTO-MCNC: 278 MG/DL (ref 65–99)

## 2022-07-23 PROCEDURE — 770001 HCHG ROOM/CARE - MED/SURG/GYN PRIV*

## 2022-07-23 PROCEDURE — 700111 HCHG RX REV CODE 636 W/ 250 OVERRIDE (IP): Performed by: STUDENT IN AN ORGANIZED HEALTH CARE EDUCATION/TRAINING PROGRAM

## 2022-07-23 PROCEDURE — 700102 HCHG RX REV CODE 250 W/ 637 OVERRIDE(OP): Performed by: STUDENT IN AN ORGANIZED HEALTH CARE EDUCATION/TRAINING PROGRAM

## 2022-07-23 PROCEDURE — A9270 NON-COVERED ITEM OR SERVICE: HCPCS | Performed by: STUDENT IN AN ORGANIZED HEALTH CARE EDUCATION/TRAINING PROGRAM

## 2022-07-23 PROCEDURE — 82962 GLUCOSE BLOOD TEST: CPT | Mod: 91

## 2022-07-23 PROCEDURE — 700111 HCHG RX REV CODE 636 W/ 250 OVERRIDE (IP): Performed by: HOSPITALIST

## 2022-07-23 PROCEDURE — 700105 HCHG RX REV CODE 258: Performed by: HOSPITALIST

## 2022-07-23 PROCEDURE — A9270 NON-COVERED ITEM OR SERVICE: HCPCS | Performed by: HOSPITALIST

## 2022-07-23 PROCEDURE — 700102 HCHG RX REV CODE 250 W/ 637 OVERRIDE(OP): Performed by: HOSPITALIST

## 2022-07-23 PROCEDURE — 99231 SBSQ HOSP IP/OBS SF/LOW 25: CPT | Performed by: STUDENT IN AN ORGANIZED HEALTH CARE EDUCATION/TRAINING PROGRAM

## 2022-07-23 RX ADMIN — ENOXAPARIN SODIUM 40 MG: 40 INJECTION SUBCUTANEOUS at 18:09

## 2022-07-23 RX ADMIN — MIRTAZAPINE 15 MG: 15 TABLET, FILM COATED ORAL at 20:34

## 2022-07-23 RX ADMIN — OXYCODONE 5 MG: 5 TABLET ORAL at 18:04

## 2022-07-23 RX ADMIN — METOPROLOL TARTRATE 25 MG: 25 TABLET, FILM COATED ORAL at 20:34

## 2022-07-23 RX ADMIN — QUETIAPINE FUMARATE 25 MG: 25 TABLET ORAL at 20:34

## 2022-07-23 RX ADMIN — ATORVASTATIN CALCIUM 40 MG: 40 TABLET, FILM COATED ORAL at 17:58

## 2022-07-23 RX ADMIN — SERTRALINE 50 MG: 100 TABLET, FILM COATED ORAL at 17:59

## 2022-07-23 RX ADMIN — LISINOPRIL 40 MG: 20 TABLET ORAL at 05:53

## 2022-07-23 RX ADMIN — Medication 10 MG: at 20:34

## 2022-07-23 RX ADMIN — METOPROLOL TARTRATE 25 MG: 25 TABLET, FILM COATED ORAL at 09:38

## 2022-07-23 RX ADMIN — AMLODIPINE BESYLATE 10 MG: 10 TABLET ORAL at 05:54

## 2022-07-23 RX ADMIN — INSULIN HUMAN 3 UNITS: 100 INJECTION, SOLUTION PARENTERAL at 18:04

## 2022-07-23 RX ADMIN — DAPTOMYCIN 750 MG: 500 INJECTION, POWDER, LYOPHILIZED, FOR SOLUTION INTRAVENOUS at 23:11

## 2022-07-23 RX ADMIN — SENNOSIDES AND DOCUSATE SODIUM 2 TABLET: 50; 8.6 TABLET ORAL at 05:53

## 2022-07-23 RX ADMIN — INSULIN HUMAN 2 UNITS: 100 INJECTION, SOLUTION PARENTERAL at 21:44

## 2022-07-23 RX ADMIN — INSULIN HUMAN 3 UNITS: 100 INJECTION, SOLUTION PARENTERAL at 14:06

## 2022-07-23 RX ADMIN — SENNOSIDES AND DOCUSATE SODIUM 2 TABLET: 50; 8.6 TABLET ORAL at 17:58

## 2022-07-23 RX ADMIN — TAMSULOSIN HYDROCHLORIDE 0.8 MG: 0.4 CAPSULE ORAL at 09:38

## 2022-07-23 RX ADMIN — INSULIN HUMAN 2 UNITS: 100 INJECTION, SOLUTION PARENTERAL at 09:49

## 2022-07-23 ASSESSMENT — FIBROSIS 4 INDEX: FIB4 SCORE: 1.41

## 2022-07-23 NOTE — THERAPY
Physical Therapy Contact Note    With wound in AM attempt; will return when able     Jenny GAGE, PT,  [phone]

## 2022-07-23 NOTE — WOUND TEAM
[facility]  Inpatient Services   Wound and Skin Care Progress Note    Admission Date: 6/10/2022     Last order of IP CONSULT TO WOUND CARE was found on 2022 from Hospital Encounter on 2022     HPI, PMH, SH: Reviewed    Past Surgical History:   Procedure Laterality Date   • IRRIGATION & DEBRIDEMENT GENERAL  2022    Procedure: IRRIGATION AND DEBRIDEMENT LEFT ANKLE;  Surgeon: Panfilo Wagner M.D.;  Location: SURGERY [facility];  Service: Orthopedics   • APPENDECTOMY     • STENT PLACEMENT      cardiac     Social History     Tobacco Use   • Smoking status: Former Smoker     Quit date: 2000     Years since quittin.8   • Smokeless tobacco: Never Used   Substance Use Topics   • Alcohol use: Never     Chief Complaint   Patient presents with   • Leg Swelling     Pt has new swelling to his left lower swelling and new confusion per staff at 's home and daughter. Pt was incontinent of urine today and is usually continent. Pt is also weak and unable to walk like normal. Pt is A&O to self only today and is usually A&Ox3. Pt was recently admitted here for osteomyelitis of his left lower leg, an I&D, a positive MRSA culture of the left lower leg wound. Pt is currently on a daptomycin IV antibiotic regimen.        Diagnosis: Sepsis (HCC) [A41.9]    Unit where seen by Wound Team: [id number]     WOUND CONSULT/FOLLOW UP RELATED TO:  L. Lateral ankle NPWT     WOUND HISTORY:  Andrei Garay is a 82 y.o. male past medical history of diabetes mellitus, hypertension who presented 6/10/2022 with altered mental status for last 3 days and left ankle pain.  History is primarily provided by daughter.  No relieving or exacerbating factors were noted.  He denies any fever does report some chills.  In the ER, x-ray of the ankle showed signs concerning for osteomyelitis.  UA grossly positive for urinary tract infection.  Patient met criteria for sepsis started on IV broad-spectrum antibiotics and  will be admitted for further work-up and limb preservation consult.    SX with Dr. Wagner 6/14/22    WOUND ASSESSMENT/LDA       Skin Risk/Montrell   Sensory Perception: Slightly Limited  Moisture: Occasionally Moist  Activity: Chairfast  Mobility: Very Limited  Nutrition: Adequate  Friction and Shear: Potential Problem  Total Score: 15  Skin Breakdown Risk: 13-17 Moderate Risk for Skin Breakdown    Sensory Interventions  Bed Types: Low Air Loss Mattress  Skin Preventative Measures: Pillows in Use for Support / Positioning  Skin Preventative Dressing: Mepilex  Moisturizers/Barriers: Barrier Paste  PT / OT Involved in Care: Physical Therapy Involved  Activity : Bed  Patient Turns / Repositioning: Patient Turns Self from Side to Side  Patient is Receiving Nutrition: Oral Intake Adequate     Vitamin Therapy in Use: No  Friction Interventions: Draw Sheet / Pad Used for Repositioning                Negative Pressure Wound Therapy 06/17/22 Surgical Ankle Lateral Left (Active)   NPWT Pump Mode / Pressure Setting 125 mmHg 07/22/22 1900   Dressing Type Small;Black Foam (Regular) 07/22/22 1900   Number of Foam Pieces Used 1 07/20/22 1000   Canister Changed Yes 07/21/22 2245   Output (mL) 25 mL 07/21/22 2245   NEXT Dressing Change/Treatment Date 07/22/22 07/21/22 2245   VAC VeraFlo Irrigant 1/4 Strength Dakins 07/22/22 0900   VAC VeraFlo Soak Time (mins) 8 07/20/22 1000   VAC VeraFlo Instill Volume (ml) 10 07/20/22 1000   VAC VeraFlo - Therapy Time (hrs) 3 07/20/22 1000   VAC VeraFlo Pressure (mm/Hg) Intermittent;125 mmHg 07/20/22 1000   WOUND NURSE ONLY - Time Spent with Patient (mins) 30 07/20/22 1000           Wound 06/14/22 Ankle Lateral Left (Active)   Wound Image   07/22/22 1900   Site Assessment Red 07/22/22 1900   Periwound Assessment MONAE 07/21/22 2245   Margins Defined edges 07/20/22 1000   Closure Secondary intention 07/20/22 1000   Drainage Amount Small 07/22/22 1900   Drainage Description Serosanguineous 07/22/22 1900    Treatments Cleansed;Irrigation;Site care;Offloading 07/20/22 1000   Wound Cleansing Approved Wound Cleanser 07/20/22 1000   Periwound Protectant Skin Protectant Wipes to Periwound 07/20/22 1000   Dressing Cleansing/Solutions 1/4 Strength Dakin's Solution 07/20/22 1000   Dressing Options Wound Vac 07/22/22 1900   Dressing Changed Changed 07/22/22 1900   Dressing Status Clean;Dry;Intact 07/21/22 0900   Dressing Change/Treatment Frequency Monday, Wednesday, Friday, and As Needed 07/22/22 1900   NEXT Dressing Change/Treatment Date 07/25/22 07/22/22 1900   NEXT Weekly Photo (Inpatient Only) 07/29/22 07/22/22 1900   Non-staged Wound Description Full thickness 07/22/22 1900   Wound Length (cm) 3 cm 07/22/22 1900   Wound Width (cm) 0.8 cm 07/22/22 1900   Wound Depth (cm) 0.3 cm 07/22/22 1900   Wound Surface Area (cm^2) 2.4 cm^2 07/22/22 1900   Wound Volume (cm^3) 0.72 cm^3 07/22/22 1900   Wound Healing % 93 07/22/22 1900   Wound Bed Granulation (%) 100 % 07/22/22 1900   Wound Bed Slough (%) 20 % 07/15/22 1000   Shape oval 07/20/22 1000   Wound Odor None 07/22/22 1900   Pulses 1+;Left 07/18/22 1500   Exposed Structures None 07/22/22 1900   WOUND NURSE ONLY - Time Spent with Patient (mins) 60 07/22/22 1900       Wound 07/10/22 Skin Tear Hand (Active)   Site Assessment Clean;Dry;Intact 07/21/22 2245   Periwound Assessment Clean;Dry;Intact;Other (Comment) 07/21/22 2245   Drainage Amount None 07/18/22 0800   Drainage Description Sanguineous 07/14/22 0725   Wound Cleansing Approved Wound Cleanser 07/16/22 2009   Dressing Options Open to Air 07/19/22 2055   Dressing Changed Observed 07/16/22 2009   Dressing Status Open to Air 07/16/22 2009             Vascular:    HONEY:   HONEY Results, Last 30 Days US-HONEY SINGLE LEVEL BILAT    Result Date: 6/12/2022  Narrative  Vascular Laboratory  Conclusions  The ankle pressures are not accurately measured due to calcification and  noncompressibility of the tibial vessels.  There is no evidence  of significant arterial disease.  COOKIE VELEZ  Age:    82    Gender:     M  MRN:    [id number]  :    1940      BSA:  Exam Date:     2022 13:45  Room #:     Inpatient  Priority:     Routine  Ht (in):             Wt (lb):  Ordering Physician:        JIN GREGG  Referring Physician:       [id number]  Sonographer:               Aliyah Pandya RVDAISHA  Study Type:                Complete Bilateral  Technical Quality:         Adequate  Indications:     Ulceration of LE  CPT Codes:       24803  ICD Codes:       707.1  History:         Ulceration of the left lower extremity. No prior exams.  Limitations:                 RIGHT  Waveform            Systolic BPs (mmHg)                             125           Brachial  Triphasic                                Common Femoral  Triphasic                                Popliteal  Triphasic                  200           Posterior Tibial  Triphasic                  179           Dorsalis Pedis                                           Digit                             1.54          HONEY                                           TBI                       LEFT  Waveform        Systolic BPs (mmHg)                             130           Brachial  Triphasic                                Common Femoral  Triphasic                                Popliteal  Triphasic                  187           Posterior Tibial  Triphasic                  180           Dorsalis Pedis                                           Digit                             1.44          HONEY                                           TBI  Findings  Bilateral-  The ankle pressures are not accurately measured due to calcification and  noncompressibility of the tibial vessels.  Doppler waveforms of the common femoral artery and popliteal artery are of  high amplitude and triphasic.  Doppler waveforms at the ankle are brisk and triphasic.  TBI-  Right: 1.29  Left:     1.09  Additional testing was not performed in accordance with lower extremity  arterial evaluation protocol.  Jamesadore Gonzalez  (Electronically Signed)  Final Date:      12 June 2022                   16:17      Lab Values:    Lab Results   Component Value Date/Time    WBC 12.8 (H) 07/22/2022 04:35 AM    RBC 4.59 (L) 07/22/2022 04:35 AM    HEMOGLOBIN 13.2 (L) 07/22/2022 04:35 AM    HEMATOCRIT 38.5 (L) 07/22/2022 04:35 AM    CREACTPROT 0.78 (H) 06/29/2022 06:51 PM    SEDRATEWES 27 (H) 06/29/2022 06:51 PM    HBA1C 6.3 (H) 06/11/2022 03:57 PM        Culture Results show:  No results found for this or any previous visit (from the past 720 hour(s)).    Pain Level/Medicated:  Denied pain       INTERVENTIONS BY WOUND TEAM:  Performed standard wound care which includes appropriate positioning, dressing removal and non-selective debridement. Pictures and measurements obtained weekly if/when required.    Preparation for Dressing removal: Dressing soaked with wound cleanser  Non-selectively Debrided with:  wound cleanser and gauze.  Sharp debridement: see Laly VERDUGO LPS note  Rosa M wound: Cleansed with wound cleanser, Prepped with no sting, drape   Primary Dressing: reg black foam placed into wound bed and secured with drape - bridged a short distance anterior .    Secondary (Outer) Dressing: Trac pad. No leak detected, seal intact. mepilex placed for offloading, Tubi  D applied and then the offloading boot applied.      Interdisciplinary consultation: Patient, Bedside RN    EVALUATION / RATIONALE FOR TREATMENT:  Most Recent Date:  7/22/22 wound area gurmeet, wound bed clean, will discontinue dakins for now and continue with NPWT  7/20/22: Small area of maceration, turned volume down on VF.  Wound is granulating in nicely at base of wound.  7/18/22: wound bed is light pink but clean. Continue VF NPWT. And light compression  7/15/22 decrease in area, able to achieve  wound bed with debridement.   Continue VAC VF to assist with granular tissue formation and slough removal   7/13/22: Depth with slight decrease in measurement. Maceration to periwound improved. Dark tissue seen during previous assessment is decreasing in size. Continue POC.   7/11/22: wound bed is red and yellow, with a little granular tissue per-iwound is less macerated.  Continue VF NPWT  7/8/22: Wound bed more moist in comparison to last assessment. Majority of wound bed is red, little granulation tissue still, and portion of wound with dark, spongy tissue. Resumed Dakins VF to help possibly debride or moisturize this area of dark tissue. Periwound is macerated, thus soak time and frequency of instillation decreased.   R 3rd toe with small scab that should resolve on its own. Left NETTE.   7/6/22: Wound bed remains dry and with no granulation tissue. Increased amount of Dakins instillation, however if wound continues to be dry at next change, patient may benefit from switching to NS instillation. No odor or purulence noted to wound bed. Pt confused at this time, in restraints and with telesitter at bedside.   ** This RN was unable to assess R 3rd toe, for which wound team was consulted. If possible, please assess at next VAC change. **   7/4/22: Patient's wound vac was intact, no granular tissue present but surrounding redness is not present, no odor.  Wound looks dry.  Added tubi  D per Edwin FUENTESN  7/1/22 Pt confused and pulls at VAC tubing.  Pt pulled tubing off 5 minutes after placement.  Replaced tubing, secured tubing with large piece of drape.  Placed diabetic boot on leg to try to keep pt from pulling on tubing.  Wound bed developing biofilm.  Will order dakins to instill in VAC.    6/20/22: Maceration still present, applied paste ring around periwound, added micah to the base of the wound.  Continue NPWT.  Tubi  added for light compression   6/17/22: Patient's ankle was a little macerated, but turned pink prior to  re-application, wound bed appears nice and red for first dressing change post-op.  Continue NPWT     Goals: Steady decrease in wound area and depth weekly.    WOUND TEAM PLAN OF CARE ([X] for frequency of wound follow up,): X  Nursing to follow dressing orders written for wound care. Contact wound team if area fails to progress, deteriorates or with any questions/concerns if something comes up before next scheduled follow up (See below as to whether wound is following and frequency of wound follow up)  Dressing changes by wound team:                   Follow up 3 times weekly:                NPWT change 3 times weekly:  XMWF   Follow up 1-2 times weekly:      Follow up Bi-Monthly:                   Follow up as needed:     Other (explain):     NURSING PLAN OF CARE ORDERS (X):  Dressing changes: See Dressing Care orders: X  Skin care: See Skin Care orders: X  RN Prevention Protocol: X  Rectal tube care: See Rectal Tube Care orders:   Other orders:    Anticipated discharge plans: MONAE  LTACH:        SNF/Rehab:                  Home Health Care:           Outpatient Wound Center:            Self/Family Care:        Other:                  Vac Discharge Needs: X  Not Applicable Pt not on a wound vac:       Regular Vac while inpatient, alternative dressing at DC:        Regular Vac in use and continued at DC:      X      Reg. Vac w/ Skin Sub/Biologic in use. Will need to be changed 2x wkly:      Veraflo Vac while inpatient, ok to transition to Regular Vac on Discharge:           Veraflo Vac while inpatient, will need to remain on Veraflo Vac upon discharge:

## 2022-07-23 NOTE — PROGRESS NOTES
Pt sleeping comfortably but easily arousable. Due meds were given and well tolerated. PICC DYLAN still intact.Wound vac still in place, nil leakage noted. Martinez catheter intact. Call light within reach and safety precautions maintained. Report given to day shift RN.

## 2022-07-23 NOTE — PROGRESS NOTES
[facility] Medicine Daily Progress Note    Date of Service  7/23/2022    Chief Complaint  Andrei Garay is a 82 y.o. male admitted 6/29/2022 with AMS    Hospital Course  An 82-year-old man with h/o DM, HTN, HLD, osteomyelitis presented with AMS. According to the patient's daughter who is visiting him she states that he began becoming more confused and slurring his words yesterday. He also reports that he had episode of incontinence which is not his normal baseline. Has been receiving IV daptomycin for left lower leg osteomyelitis which was diagnosed 2 weeks ago, MRSA culture positive.  US of the right lower extremity negative for DVT. Limb preservation services consulted and following.  Completed a bedside debridement and applied new dressings.      Interval Problem Update  Denies any pain. Afebrile, hemodynamically stable.  Will discharge to [facility] on Monday. Pending bed availability.    I have discussed this patient's plan of care and discharge plan at IDT rounds today with Case Management, Nursing, Nursing leadership, and other members of the IDT team.    Consultants/Specialty  infectious disease, orthopedics and LPS    Code Status  DNAR/DNI    Disposition  Patient is medically cleared pending placement for discharge.   Anticipate discharge to to skilled nursing facility.  I have placed the appropriate orders for post-discharge needs.    Review of Systems  Constitutional: Negative for chills, fever and malaise/fatigue.   HENT: Negative.    Eyes: Negative.    Respiratory: Negative for cough and shortness of breath.    Cardiovascular: Negative for chest pain and leg swelling.   Gastrointestinal: Negative for abdominal pain, nausea and vomiting.   Genitourinary: Negative for dysuria.   Musculoskeletal: Negative for myalgias.   Skin: Negative for rash.   Neurological: Negative for focal weakness.     Physical Exam  Temp:  [36.3 °C (97.3 °F)-37.1 °C (98.7 °F)] 36.3 °C (97.3  °F)  Pulse:  [] 73  Resp:  [16-20] 16  BP: (111-132)/(56-67) 129/63  SpO2:  [91 %-92 %] 92 %    Physical Exam  Vitals and nursing note reviewed.   HENT:      Head: Normocephalic.      Nose: Nose normal.      Mouth/Throat:      Mouth: Mucous membranes are moist.      Pharynx: Oropharynx is clear.   Eyes:      Conjunctiva/sclera: Conjunctivae normal.      Pupils: Pupils are equal, round, and reactive to light.   Cardiovascular:      Rate and Rhythm: Normal rate and regular rhythm.      Comments: RUE PICC in place-no erythema or surrounding tenderness to palpation   Pulmonary:      Effort: Pulmonary effort is normal. No respiratory distress.      Breath sounds: No wheezing or rales.   Abdominal:      General: Abdomen is flat. Bowel sounds are normal. There is no distension.      Tenderness: There is no abdominal tenderness. There is no guarding.   Musculoskeletal:         General: Normal range of motion.      Cervical back: Normal range of motion.   Skin:     General: Skin is warm.      Comments: Left lateral malleoli clean wound, no active drainage   Neurological:      General: No focal deficit present.      Mental Status: He is alert. He is disoriented.      Fluids    Intake/Output Summary (Last 24 hours) at 7/23/2022 1216  Last data filed at 7/23/2022 0935  Gross per 24 hour   Intake 879 ml   Output 1125 ml   Net -246 ml       Laboratory  Recent Labs     07/21/22  0409 07/22/22  0435   WBC 13.5* 12.8*   RBC 4.78 4.59*   HEMOGLOBIN 13.4* 13.2*   HEMATOCRIT 39.6* 38.5*   MCV 82.8 83.9   MCH 28.0 28.8   MCHC 33.8 34.3   RDW 39.7 40.3   PLATELETCT 331 348   MPV 9.3 9.3     Recent Labs     07/21/22  0409 07/22/22  0435   SODIUM 134* 131*   POTASSIUM 4.0 4.1   CHLORIDE 99 98   CO2 22 22   GLUCOSE 202* 204*   BUN 20 24*   CREATININE 0.74 0.84   CALCIUM 9.1 9.0                   Imaging  US-EXTREMITY VENOUS LOWER UNILAT RIGHT   Final Result      CT-HEAD W/O   Final Result         No acute intracranial abnormality.             DX-ANKLE 3+ VIEWS LEFT   Final Result         Bony destruction in the lateral aspect of the distal fibula is consistent with osteomyelitis described on recent MRI. Overlying soft tissue edema and a small amount of soft tissue gas      DX-CHEST-PORTABLE (1 VIEW)   Final Result      1.  Increased perihilar interstitial markings are suggestive of pulmonary edema.      2.  Stable mild cardiomegaly           Assessment/Plan  * Encephalopathy  Assessment & Plan  Likely secondary to worsening cellulitis of the right leg.    Patient has been receiving IV daptomycin for osteomyelitis of the left leg, will continue    Avoid sedating medications  Improving    Urinary retention  Assessment & Plan  Cont flomax  Failed barbosa trial out  Continue Barbosa    Osteomyelitis (HCC)- (present on admission)  Assessment & Plan  Patient with osteomyelitis currently on daptomycin.   CRP mildly elevated  LPS    Type 2 diabetes mellitus (HCC)- (present on admission)  Assessment & Plan  Last A1c was 6.33 weeks ago.  Hold metformin  Insulin sliding scale  Diabetic diet    Cellulitis- (present on admission)  Assessment & Plan  Daughter reports that the patient's right leg is more red and swollen than normal.  Doppler ultrasound does not show DVT  ID consulted, patient reportedly had removed his wound VAC while at SNF  Continue the daptomycin (until 7/23) and Unasyn (until 7/6)    Dementia with behavioral disturbance (HCC)- (present on admission)  Assessment & Plan  History of dementia.  Oriented x 2, unsure of baseline  seroqeul     Hypertension- (present on admission)  Assessment & Plan  Continue home medication lisinopril and metoprolol      Hyperlipidemia- (present on admission)  Assessment & Plan  Continue home med atorvastatin         VTE prophylaxis: enoxaparin ppx    I have performed a physical exam and reviewed and updated ROS and Plan today (7/23/2022). In review of yesterday's note (7/22/2022), there are no changes except as  documented above.

## 2022-07-23 NOTE — CARE PLAN
The patient is Stable - Low risk of patient condition declining or worsening    Problem: Knowledge Deficit - Standard  Goal: Patient and family/care givers will demonstrate understanding of plan of care, disease process/condition, diagnostic tests and medications  Outcome: Progressing     Problem: Fall Risk  Goal: Patient will remain free from falls  Outcome: Progressing     Problem: Pain - Standard  Goal: Alleviation of pain or a reduction in pain to the patient’s comfort goal  Outcome: Progressing     Problem: Urinary Elimination  Goal: Establish and maintain regular urinary output  Outcome: Progressing     Shift Goals  Clinical Goals: fall prevention, pain control,safety,maintain wound vac in place  Patient Goals: rest and sleep  Family Goals: Patient to comply with care and med admin    Progress made toward(s) clinical / shift goals:  Pt medicated per MAR and well tolerated. Wound vac still in place, no leakage noted. Pt remained free of fall. POC update provided.

## 2022-07-23 NOTE — PROGRESS NOTES
Received report from day shift RN. Pt met lying in bed awake and calm on taking over the shift. A/O X2, on RA, breathing unlabored and pt not in any respiratory distress. PICC DYLAN noted, CDI. Martinez noted and draining clear yellow urine. LLE wound noted, wound vac present, no leakage noted. LLE boot intact, circulation unimpaired. Pt denies pain, call light within reach and safety precautions maintained.

## 2022-07-24 LAB
ANION GAP SERPL CALC-SCNC: 12 MMOL/L (ref 7–16)
BUN SERPL-MCNC: 23 MG/DL (ref 8–22)
CALCIUM SERPL-MCNC: 9 MG/DL (ref 8.5–10.5)
CHLORIDE SERPL-SCNC: 98 MMOL/L (ref 96–112)
CO2 SERPL-SCNC: 22 MMOL/L (ref 20–33)
CREAT SERPL-MCNC: 1.04 MG/DL (ref 0.5–1.4)
ERYTHROCYTE [DISTWIDTH] IN BLOOD BY AUTOMATED COUNT: 39.6 FL (ref 35.9–50)
GFR SERPLBLD CREATININE-BSD FMLA CKD-EPI: 72 ML/MIN/1.73 M 2
GLUCOSE BLD STRIP.AUTO-MCNC: 207 MG/DL (ref 65–99)
GLUCOSE BLD STRIP.AUTO-MCNC: 226 MG/DL (ref 65–99)
GLUCOSE BLD STRIP.AUTO-MCNC: 240 MG/DL (ref 65–99)
GLUCOSE BLD STRIP.AUTO-MCNC: 269 MG/DL (ref 65–99)
GLUCOSE SERPL-MCNC: 212 MG/DL (ref 65–99)
HCT VFR BLD AUTO: 36.2 % (ref 42–52)
HGB BLD-MCNC: 12.2 G/DL (ref 14–18)
MCH RBC QN AUTO: 28.2 PG (ref 27–33)
MCHC RBC AUTO-ENTMCNC: 33.7 G/DL (ref 33.7–35.3)
MCV RBC AUTO: 83.8 FL (ref 81.4–97.8)
PLATELET # BLD AUTO: 339 K/UL (ref 164–446)
PMV BLD AUTO: 9.3 FL (ref 9–12.9)
POTASSIUM SERPL-SCNC: 4 MMOL/L (ref 3.6–5.5)
RBC # BLD AUTO: 4.32 M/UL (ref 4.7–6.1)
SODIUM SERPL-SCNC: 132 MMOL/L (ref 135–145)
WBC # BLD AUTO: 12.3 K/UL (ref 4.8–10.8)

## 2022-07-24 PROCEDURE — A9270 NON-COVERED ITEM OR SERVICE: HCPCS | Performed by: STUDENT IN AN ORGANIZED HEALTH CARE EDUCATION/TRAINING PROGRAM

## 2022-07-24 PROCEDURE — 700102 HCHG RX REV CODE 250 W/ 637 OVERRIDE(OP): Performed by: HOSPITALIST

## 2022-07-24 PROCEDURE — 700102 HCHG RX REV CODE 250 W/ 637 OVERRIDE(OP): Performed by: STUDENT IN AN ORGANIZED HEALTH CARE EDUCATION/TRAINING PROGRAM

## 2022-07-24 PROCEDURE — 99231 SBSQ HOSP IP/OBS SF/LOW 25: CPT | Performed by: STUDENT IN AN ORGANIZED HEALTH CARE EDUCATION/TRAINING PROGRAM

## 2022-07-24 PROCEDURE — 85027 COMPLETE CBC AUTOMATED: CPT

## 2022-07-24 PROCEDURE — 80048 BASIC METABOLIC PNL TOTAL CA: CPT

## 2022-07-24 PROCEDURE — 770001 HCHG ROOM/CARE - MED/SURG/GYN PRIV*

## 2022-07-24 PROCEDURE — A9270 NON-COVERED ITEM OR SERVICE: HCPCS | Performed by: HOSPITALIST

## 2022-07-24 PROCEDURE — 82962 GLUCOSE BLOOD TEST: CPT

## 2022-07-24 PROCEDURE — 97607 NEG PRS WND THR NDME<=50SQCM: CPT

## 2022-07-24 RX ADMIN — Medication 10 MG: at 19:49

## 2022-07-24 RX ADMIN — QUETIAPINE FUMARATE 25 MG: 25 TABLET ORAL at 19:49

## 2022-07-24 RX ADMIN — INSULIN HUMAN 2 UNITS: 100 INJECTION, SOLUTION PARENTERAL at 09:22

## 2022-07-24 RX ADMIN — POLYETHYLENE GLYCOL 3350 1 PACKET: 17 POWDER, FOR SOLUTION ORAL at 15:29

## 2022-07-24 RX ADMIN — SENNOSIDES AND DOCUSATE SODIUM 2 TABLET: 50; 8.6 TABLET ORAL at 06:28

## 2022-07-24 RX ADMIN — MIRTAZAPINE 15 MG: 15 TABLET, FILM COATED ORAL at 19:49

## 2022-07-24 RX ADMIN — METOPROLOL TARTRATE 25 MG: 25 TABLET, FILM COATED ORAL at 09:29

## 2022-07-24 RX ADMIN — METOPROLOL TARTRATE 25 MG: 25 TABLET, FILM COATED ORAL at 19:49

## 2022-07-24 RX ADMIN — ATORVASTATIN CALCIUM 40 MG: 40 TABLET, FILM COATED ORAL at 18:38

## 2022-07-24 RX ADMIN — TAMSULOSIN HYDROCHLORIDE 0.8 MG: 0.4 CAPSULE ORAL at 09:30

## 2022-07-24 RX ADMIN — INSULIN HUMAN 3 UNITS: 100 INJECTION, SOLUTION PARENTERAL at 13:53

## 2022-07-24 RX ADMIN — INSULIN HUMAN 2 UNITS: 100 INJECTION, SOLUTION PARENTERAL at 18:41

## 2022-07-24 RX ADMIN — LISINOPRIL 40 MG: 20 TABLET ORAL at 06:28

## 2022-07-24 RX ADMIN — SENNOSIDES AND DOCUSATE SODIUM 2 TABLET: 50; 8.6 TABLET ORAL at 18:37

## 2022-07-24 RX ADMIN — AMLODIPINE BESYLATE 10 MG: 10 TABLET ORAL at 06:28

## 2022-07-24 RX ADMIN — SERTRALINE 50 MG: 100 TABLET, FILM COATED ORAL at 18:38

## 2022-07-24 RX ADMIN — INSULIN HUMAN 2 UNITS: 100 INJECTION, SOLUTION PARENTERAL at 19:56

## 2022-07-24 NOTE — CARE PLAN
The patient is Stable - Low risk of patient condition declining or worsening    Shift Goals  Clinical Goals: comfort, safety  Patient Goals: rest, sleep  Family Goals: Patient to comply with care and med admin    Progress made toward(s) clinical / shift goals:  Patient remain safe and comfortable throughout the shift.     Patient is not progressing towards the following goals:      Problem: Knowledge Deficit - Standard  Goal: Patient and family/care givers will demonstrate understanding of plan of care, disease process/condition, diagnostic tests and medications  Outcome: Not Met

## 2022-07-24 NOTE — CARE PLAN
Shift Goals  Clinical Goals: safety; comfort  Patient Goals: sleep  Family Goals: Patient to comply with care and med admin    Progress made toward(s) clinical / shift goals:          Problem: Knowledge Deficit - Standard  Goal: Patient and family/care givers will demonstrate understanding of plan of care, disease process/condition, diagnostic tests and medications  Outcome: Progressing     Problem: Fall Risk  Goal: Patient will remain free from falls  Outcome: Progressing

## 2022-07-24 NOTE — PROGRESS NOTES
Pt is Alert, oriented to place and self  VSS  Pain declines  Declines nausea  Tolerating PO  +barbosa output  PTA BM  SCD's off  Family  Bed alarm on, pt high fall risk per shannan garza  Reviewed plan of care with patient, bed in lowest position and locked, pt resting comfortably now, call light within reach, all needs met at this time. Interventions will be executed per plan of care

## 2022-07-24 NOTE — PROGRESS NOTES
[facility] Medicine Daily Progress Note    Date of Service  7/24/2022    Chief Complaint  Andrei Garay is a 82 y.o. male admitted 6/29/2022 with AMS    Hospital Course  An 82-year-old man with h/o DM, HTN, HLD, osteomyelitis presented with AMS. According to the patient's daughter who is visiting him she states that he began becoming more confused and slurring his words yesterday. He also reports that he had episode of incontinence which is not his normal baseline. Has been receiving IV daptomycin for left lower leg osteomyelitis which was diagnosed 2 weeks ago, MRSA culture positive.  US of the right lower extremity negative for DVT. Limb preservation services consulted and following.  Completed a bedside debridement and applied new dressings.      Interval Problem Update  Denies any pain. Afebrile, hemodynamically stable. On 2 L NC oxygen. WBC 12.3. .  Pending placment.    I have discussed this patient's plan of care and discharge plan at IDT rounds today with Case Management, Nursing, Nursing leadership, and other members of the IDT team.    Consultants/Specialty  infectious disease, orthopedics and LPS    Code Status  DNAR/DNI    Disposition  Patient is medically cleared pending placement for discharge.   Anticipate discharge to to skilled nursing facility.  I have placed the appropriate orders for post-discharge needs.    Review of Systems  Constitutional: Negative for chills, fever and malaise/fatigue.   HENT: Negative.    Eyes: Negative.    Respiratory: Negative for cough and shortness of breath.    Cardiovascular: Negative for chest pain and leg swelling.   Gastrointestinal: Negative for abdominal pain, nausea and vomiting.   Genitourinary: Negative for dysuria.   Musculoskeletal: Negative for myalgias.   Skin: Negative for rash.   Neurological: Negative for focal weakness.     Physical Exam  Temp:  [36.2 °C (97.1 °F)-36.8 °C (98.3 °F)] 36.2 °C (97.1 °F)  Pulse:  [] 81  Resp:  [17-19]  18  BP: ()/(50-73) 118/54  SpO2:  [92 %-93 %] 93 %    Physical Exam  Vitals and nursing note reviewed.   HENT:      Head: Normocephalic.      Nose: Nose normal.      Mouth/Throat:      Mouth: Mucous membranes are moist.      Pharynx: Oropharynx is clear.   Eyes:      Conjunctiva/sclera: Conjunctivae normal.      Pupils: Pupils are equal, round, and reactive to light.   Cardiovascular:      Rate and Rhythm: Normal rate and regular rhythm.      Comments: RUE PICC in place-no erythema or surrounding tenderness to palpation   Pulmonary:      Effort: Pulmonary effort is normal. No respiratory distress.      Breath sounds: No wheezing or rales.   Abdominal:      General: Abdomen is flat. Bowel sounds are normal. There is no distension.      Tenderness: There is no abdominal tenderness. There is no guarding.   Musculoskeletal:         General: Normal range of motion.      Cervical back: Normal range of motion.   Skin:     General: Skin is warm.      Comments: Left lateral malleoli clean wound, no active drainage   Neurological:      General: No focal deficit present.      Mental Status: He is alert. He is disoriented.     Fluids    Intake/Output Summary (Last 24 hours) at 7/24/2022 1000  Last data filed at 7/24/2022 0400  Gross per 24 hour   Intake 422 ml   Output 950 ml   Net -528 ml       Laboratory  Recent Labs     07/22/22  0435 07/24/22  0348   WBC 12.8* 12.3*   RBC 4.59* 4.32*   HEMOGLOBIN 13.2* 12.2*   HEMATOCRIT 38.5* 36.2*   MCV 83.9 83.8   MCH 28.8 28.2   MCHC 34.3 33.7   RDW 40.3 39.6   PLATELETCT 348 339   MPV 9.3 9.3     Recent Labs     07/22/22  0435 07/24/22  0348   SODIUM 131* 132*   POTASSIUM 4.1 4.0   CHLORIDE 98 98   CO2 22 22   GLUCOSE 204* 212*   BUN 24* 23*   CREATININE 0.84 1.04   CALCIUM 9.0 9.0                   Imaging  US-EXTREMITY VENOUS LOWER UNILAT RIGHT   Final Result      CT-HEAD W/O   Final Result         No acute intracranial abnormality.            DX-ANKLE 3+ VIEWS LEFT   Final  Result         Bony destruction in the lateral aspect of the distal fibula is consistent with osteomyelitis described on recent MRI. Overlying soft tissue edema and a small amount of soft tissue gas      DX-CHEST-PORTABLE (1 VIEW)   Final Result      1.  Increased perihilar interstitial markings are suggestive of pulmonary edema.      2.  Stable mild cardiomegaly           Assessment/Plan  * Encephalopathy  Assessment & Plan  Likely secondary to worsening cellulitis of the right leg.    Patient has been receiving IV daptomycin for osteomyelitis of the left leg, will continue    Avoid sedating medications  Improving    Urinary retention  Assessment & Plan  Cont flomax  Failed barbosa trial out  Continue Barbosa    Osteomyelitis (HCC)- (present on admission)  Assessment & Plan  Patient with osteomyelitis currently on daptomycin.   CRP mildly elevated  LPS    Type 2 diabetes mellitus (HCC)- (present on admission)  Assessment & Plan  Last A1c was 6.33 weeks ago.  Hold metformin  Insulin sliding scale  Diabetic diet    Cellulitis- (present on admission)  Assessment & Plan  Daughter reports that the patient's right leg is more red and swollen than normal.  Doppler ultrasound does not show DVT  ID consulted, patient reportedly had removed his wound VAC while at SNF  Continue the daptomycin (until 7/23) and Unasyn (until 7/6)    Dementia with behavioral disturbance (HCC)- (present on admission)  Assessment & Plan  History of dementia.  Oriented x 2, unsure of baseline  seroqeul     Hypertension- (present on admission)  Assessment & Plan  Continue home medication lisinopril and metoprolol      Hyperlipidemia- (present on admission)  Assessment & Plan  Continue home med atorvastatin         VTE prophylaxis: enoxaparin ppx    I have performed a physical exam and reviewed and updated ROS and Plan today (7/24/2022). In review of yesterday's note (7/23/2022), there are no changes except as documented above.

## 2022-07-25 VITALS
RESPIRATION RATE: 18 BRPM | SYSTOLIC BLOOD PRESSURE: 139 MMHG | BODY MASS INDEX: 29.32 KG/M2 | OXYGEN SATURATION: 91 % | WEIGHT: 216.49 LBS | HEART RATE: 85 BPM | TEMPERATURE: 97.9 F | DIASTOLIC BLOOD PRESSURE: 107 MMHG | HEIGHT: 72 IN

## 2022-07-25 LAB — GLUCOSE BLD STRIP.AUTO-MCNC: 251 MG/DL (ref 65–99)

## 2022-07-25 PROCEDURE — A9270 NON-COVERED ITEM OR SERVICE: HCPCS | Performed by: STUDENT IN AN ORGANIZED HEALTH CARE EDUCATION/TRAINING PROGRAM

## 2022-07-25 PROCEDURE — 700102 HCHG RX REV CODE 250 W/ 637 OVERRIDE(OP): Performed by: STUDENT IN AN ORGANIZED HEALTH CARE EDUCATION/TRAINING PROGRAM

## 2022-07-25 PROCEDURE — 99239 HOSP IP/OBS DSCHRG MGMT >30: CPT | Performed by: STUDENT IN AN ORGANIZED HEALTH CARE EDUCATION/TRAINING PROGRAM

## 2022-07-25 PROCEDURE — A9270 NON-COVERED ITEM OR SERVICE: HCPCS | Performed by: HOSPITALIST

## 2022-07-25 PROCEDURE — 700102 HCHG RX REV CODE 250 W/ 637 OVERRIDE(OP): Performed by: HOSPITALIST

## 2022-07-25 PROCEDURE — 82962 GLUCOSE BLOOD TEST: CPT

## 2022-07-25 RX ORDER — TAMSULOSIN HYDROCHLORIDE 0.4 MG/1
0.8 CAPSULE ORAL
Qty: 30 CAPSULE | Refills: 1 | Status: SHIPPED | OUTPATIENT
Start: 2022-07-26 | End: 2022-08-20

## 2022-07-25 RX ORDER — LISINOPRIL 40 MG/1
40 TABLET ORAL DAILY
Qty: 30 TABLET | Refills: 1 | Status: ON HOLD | OUTPATIENT
Start: 2022-07-26 | End: 2022-10-27

## 2022-07-25 RX ORDER — ACETAMINOPHEN 325 MG/1
650 TABLET ORAL EVERY 6 HOURS PRN
Qty: 30 TABLET | Refills: 0 | Status: SHIPPED | OUTPATIENT
Start: 2022-07-25 | End: 2022-08-20

## 2022-07-25 RX ORDER — AMLODIPINE BESYLATE 10 MG/1
10 TABLET ORAL DAILY
Qty: 30 TABLET | Refills: 1 | Status: SHIPPED | OUTPATIENT
Start: 2022-07-26 | End: 2022-08-20

## 2022-07-25 RX ORDER — PHENOL 1.4 %
10 AEROSOL, SPRAY (ML) MUCOUS MEMBRANE
Qty: 30 TABLET | Refills: 0 | Status: SHIPPED | OUTPATIENT
Start: 2022-07-25 | End: 2022-08-24

## 2022-07-25 RX ORDER — QUETIAPINE FUMARATE 25 MG/1
25 TABLET, FILM COATED ORAL NIGHTLY
Qty: 60 TABLET | Refills: 3 | Status: SHIPPED | OUTPATIENT
Start: 2022-07-25 | End: 2022-08-20

## 2022-07-25 RX ORDER — MIRTAZAPINE 15 MG/1
15 TABLET, FILM COATED ORAL
Qty: 30 TABLET | Refills: 1 | Status: SHIPPED | OUTPATIENT
Start: 2022-07-25

## 2022-07-25 RX ORDER — ATORVASTATIN CALCIUM 40 MG/1
40 TABLET, FILM COATED ORAL EVERY EVENING
Qty: 30 TABLET | Refills: 1 | Status: SHIPPED | OUTPATIENT
Start: 2022-07-25

## 2022-07-25 RX ADMIN — BISACODYL 10 MG: 10 SUPPOSITORY RECTAL at 11:47

## 2022-07-25 RX ADMIN — MAGNESIUM HYDROXIDE 30 ML: 400 SUSPENSION ORAL at 09:42

## 2022-07-25 RX ADMIN — INSULIN HUMAN 3 UNITS: 100 INJECTION, SOLUTION PARENTERAL at 09:26

## 2022-07-25 RX ADMIN — LISINOPRIL 40 MG: 20 TABLET ORAL at 04:54

## 2022-07-25 RX ADMIN — TAMSULOSIN HYDROCHLORIDE 0.8 MG: 0.4 CAPSULE ORAL at 09:38

## 2022-07-25 RX ADMIN — AMLODIPINE BESYLATE 10 MG: 10 TABLET ORAL at 04:54

## 2022-07-25 NOTE — PROGRESS NOTES
Received report from NOC RN, pt care assumed. Pt refused vitals this morning. Pt becoming agitated towards staff when attempting to place BP cuff. Bed-alarm on. Pt is aaox1 and denies having pain, daughter at bedside.

## 2022-07-25 NOTE — CARE PLAN
The patient is Stable - Low risk of patient condition declining or worsening    Shift Goals  Clinical Goals: safety, comfort, bowel movement  Patient Goals: rest, sleep  Family Goals: Patient to comply with care and med admin    Progress made toward(s) clinical / shift goals:  Patient remain safe and comfortable throughout the whole shift.     Patient is not progressing towards the following goals:No bowel movement today even after multiple PRN and scheduled stool softeners, endorsed to night RN to continue monitoring for bowel movement as last BM reported was 07/18/2022.      Problem: Knowledge Deficit - Standard  Goal: Patient and family/care givers will demonstrate understanding of plan of care, disease process/condition, diagnostic tests and medications  Outcome: Not Met

## 2022-07-25 NOTE — WOUND TEAM
[facility]  Inpatient Services   Wound and Skin Care Progress Note    Admission Date: 6/10/2022     Last order of IP CONSULT TO WOUND CARE was found on 2022 from Hospital Encounter on 2022     HPI, PMH, SH: Reviewed    Past Surgical History:   Procedure Laterality Date   • IRRIGATION & DEBRIDEMENT GENERAL  2022    Procedure: IRRIGATION AND DEBRIDEMENT LEFT ANKLE;  Surgeon: Panfilo Wagner M.D.;  Location: SURGERY [facility];  Service: Orthopedics   • APPENDECTOMY     • STENT PLACEMENT      cardiac     Social History     Tobacco Use   • Smoking status: Former Smoker     Quit date: 2000     Years since quittin.8   • Smokeless tobacco: Never Used   Substance Use Topics   • Alcohol use: Never     Chief Complaint   Patient presents with   • Leg Swelling     Pt has new swelling to his left lower swelling and new confusion per staff at 's home and daughter. Pt was incontinent of urine today and is usually continent. Pt is also weak and unable to walk like normal. Pt is A&O to self only today and is usually A&Ox3. Pt was recently admitted here for osteomyelitis of his left lower leg, an I&D, a positive MRSA culture of the left lower leg wound. Pt is currently on a daptomycin IV antibiotic regimen.        Diagnosis: Sepsis (HCC) [A41.9]    Unit where seen by Wound Team: [id number]/[id number]     WOUND CONSULT/FOLLOW UP RELATED TO:  L. Lateral ankle NPWT     WOUND HISTORY:  Andrei Garay is a 82 y.o. male past medical history of diabetes mellitus, hypertension who presented 6/10/2022 with altered mental status for last 3 days and left ankle pain.  History is primarily provided by daughter.  No relieving or exacerbating factors were noted.  He denies any fever does report some chills.  In the ER, x-ray of the ankle showed signs concerning for osteomyelitis.  UA grossly positive for urinary tract infection.  Patient met criteria for sepsis started on IV broad-spectrum antibiotics and  will be admitted for further work-up and limb preservation consult.    SX with Dr. Wagner 6/14/22    WOUND ASSESSMENT/LDA    Negative Pressure Wound Therapy 06/17/22 Surgical Ankle Lateral Left (Active)   NPWT Pump Mode / Pressure Setting Continuous;125 mmHg 07/24/22 1800   Dressing Type Small;Black Foam (Regular) 07/24/22 1800   Number of Foam Pieces Used 1 07/24/22 1800   Canister Changed No 07/24/22 1800   Output (mL) 0 mL 07/24/22 1800   NEXT Dressing Change/Treatment Date 07/27/22 07/24/22 1800   VAC VeraFlo Irrigant Other (Comments) 07/24/22 1800   VAC VeraFlo Soak Time (mins) 0 07/24/22 1800   VAC VeraFlo Instill Volume (ml) 0 07/24/22 1800   VAC VeraFlo - Therapy Time (hrs) 0 07/24/22 1800   VAC VeraFlo Pressure (mm/Hg) Continuous;125 mmHg 07/24/22 1800   WOUND NURSE ONLY - Time Spent with Patient (mins) 30 07/20/22 1000           Wound 06/14/22 Ankle Lateral Left (Active)   Wound Image   07/22/22 1900   Site Assessment Red;Pink;Grey 07/24/22 1800   Periwound Assessment [place] 07/24/22 1800   Margins Defined edges;Unattached edges 07/24/22 1800   Closure Secondary intention 07/24/22 1800   Drainage Amount Scant 07/24/22 1800   Drainage Description Serous 07/24/22 1800   Treatments Cleansed;Site care;Offloading 07/24/22 1800   Wound Cleansing Approved Wound Cleanser 07/24/22 1800   Periwound Protectant Skin Protectant Wipes to Periwound;Drape 07/24/22 1800   Dressing Cleansing/Solutions Not Applicable 07/24/22 1800   Dressing Options Wound Vac;Mepilex Heel 07/24/22 1800   Dressing Changed Changed 07/24/22 1800   Dressing Status Clean;Dry;Intact 07/24/22 1800   Dressing Change/Treatment Frequency Monday, Wednesday, Friday, and As Needed 07/24/22 1800   NEXT Dressing Change/Treatment Date 07/27/22 07/24/22 1800   NEXT Weekly Photo (Inpatient Only) 07/29/22 07/24/22 1800   Non-staged Wound Description Full thickness 07/22/22 1900   Wound Length (cm) 3 cm 07/22/22 1900   Wound Width (cm) 0.8 cm 07/22/22 1900    Wound Depth (cm) 0.3 cm 22   Wound Surface Area (cm^2) 2.4 cm^2 220   Wound Volume (cm^3) 0.72 cm^3 22   Wound Healing % 93 22 190   Wound Bed Granulation (%) 100 % 22   Wound Bed Slough (%) 20 % 07/15/22 1000   Shape triangle 22 1800   Wound Odor None 22 1800   Pulses 1+ 22 1800   Exposed Structures None 22 1800   WOUND NURSE ONLY - Time Spent with Patient (mins) 60 22 1800            Vascular:    HONEY:   HONEY Results, Last 30 Days US-HONEY SINGLE LEVEL BILAT    Result Date: 2022  Narrative  Vascular Laboratory  Conclusions  The ankle pressures are not accurately measured due to calcification and  noncompressibility of the tibial vessels.  There is no evidence of significant arterial disease.  ROSIECOOKIE  Age:    82    Gender:     M  MRN:    [id number]  :    1940      BSA:  Exam Date:     2022 13:45  Room #:     Inpatient  Priority:     Routine  Ht (in):             Wt (lb):  Ordering Physician:        JIN GREGG  Referring Physician:       [id number] Coronado  Sonographer:               Aliyah Pandya RVT  Study Type:                Complete Bilateral  Technical Quality:         Adequate  Indications:     Ulceration of LE  CPT Codes:       08721  ICD Codes:       707.1  History:         Ulceration of the left lower extremity. No prior exams.  Limitations:                 RIGHT  Waveform            Systolic BPs (mmHg)                             125           Brachial  Triphasic                                Common Femoral  Triphasic                                Popliteal  Triphasic                  200           Posterior Tibial  Triphasic                  179           Dorsalis Pedis                                           Digit                             1.54          HONEY                                           TBI                       LEFT  Waveform        Systolic  BPs (mmHg)                             130           Brachial  Triphasic                                Common Femoral  Triphasic                                Popliteal  Triphasic                  187           Posterior Tibial  Triphasic                  180           Dorsalis Pedis                                           Digit                             1.44          HONEY                                           TBI  Findings  Bilateral-  The ankle pressures are not accurately measured due to calcification and  noncompressibility of the tibial vessels.  Doppler waveforms of the common femoral artery and popliteal artery are of  high amplitude and triphasic.  Doppler waveforms at the ankle are brisk and triphasic.  TBI-  Right: 1.29  Left:    1.09  Additional testing was not performed in accordance with lower extremity  arterial evaluation protocol.  James Gonzalez  (Electronically Signed)  Final Date:      12 June 2022                   16:17      Lab Values:    Lab Results   Component Value Date/Time    WBC 12.3 (H) 07/24/2022 03:48 AM    RBC 4.32 (L) 07/24/2022 03:48 AM    HEMOGLOBIN 12.2 (L) 07/24/2022 03:48 AM    HEMATOCRIT 36.2 (L) 07/24/2022 03:48 AM    CREACTPROT 0.78 (H) 06/29/2022 06:51 PM    SEDRATEWES 27 (H) 06/29/2022 06:51 PM    HBA1C 6.3 (H) 06/11/2022 03:57 PM        Culture Results show:  No results found for this or any previous visit (from the past 720 hour(s)).    Pain Level/Medicated:  Denied pain       INTERVENTIONS BY WOUND TEAM:  Performed standard wound care which includes appropriate positioning, dressing removal and non-selective debridement. Pictures and measurements obtained weekly if/when required.    Preparation for Dressing removal: Dressing soaked with wound cleanser  Non-selectively Debrided with:  wound cleanser and gauze.  Sharp debridement:  Rosa M wound: Cleansed with wound cleanser, Prepped with no sting, drape   Primary Dressing: reg black foam placed into wound bed and  secured with drape - bridged a short distance anterior .    Secondary (Outer) Dressing: Trac pad. No leak detected, seal intact. mepilex placed for offloading, Tubi  D applied and then the offloading boot applied.      Interdisciplinary consultation: Patient, Bedside RN    EVALUATION / RATIONALE FOR TREATMENT:  Most Recent Date:  7/24/22: wound bed appears clean and red, charlette wound appears pink.  Continued NPWT   7/22/22 wound area gurmeet, wound bed clean, will discontinue dakins for now and continue with NPWT  7/20/22: Small area of maceration, turned volume down on VF.  Wound is granulating in nicely at base of wound.  7/18/22: wound bed is light pink but clean. Continue VF NPWT. And light compression  7/15/22 decrease in area, able to achieve  wound bed with debridement.  Continue VAC VF to assist with granular tissue formation and slough removal   7/13/22: Depth with slight decrease in measurement. Maceration to periwound improved. Dark tissue seen during previous assessment is decreasing in size. Continue POC.   7/11/22: wound bed is red and yellow, with a little granular tissue per-iwound is less macerated.  Continue VF NPWT  7/8/22: Wound bed more moist in comparison to last assessment. Majority of wound bed is red, little granulation tissue still, and portion of wound with dark, spongy tissue. Resumed Dakins VF to help possibly debride or moisturize this area of dark tissue. Periwound is macerated, thus soak time and frequency of instillation decreased.   R 3rd toe with small scab that should resolve on its own. Left NETTE.   7/6/22: Wound bed remains dry and with no granulation tissue. Increased amount of Dakins instillation, however if wound continues to be dry at next change, patient may benefit from switching to NS instillation. No odor or purulence noted to wound bed. Pt confused at this time, in restraints and with telesitter at bedside.   ** This RN was unable to assess R 3rd toe, for  which wound team was consulted. If possible, please assess at next VAC change. **   7/4/22: Patient's wound vac was intact, no granular tissue present but surrounding redness is not present, no odor.  Wound looks dry.  Added tubi  D per Edwin VERDUGO  7/1/22 Pt confused and pulls at VAC tubing.  Pt pulled tubing off 5 minutes after placement.  Replaced tubing, secured tubing with large piece of drape.  Placed diabetic boot on leg to try to keep pt from pulling on tubing.  Wound bed developing biofilm.  Will order dakins to instill in VAC.    6/20/22: Maceration still present, applied paste ring around periwound, added micah to the base of the wound.  Continue NPWT.  Tubi  added for light compression   6/17/22: Patient's ankle was a little macerated, but turned pink prior to re-application, wound bed appears nice and red for first dressing change post-op.  Continue NPWT     Goals: Steady decrease in wound area and depth weekly.    WOUND TEAM PLAN OF CARE ([X] for frequency of wound follow up,): X  Nursing to follow dressing orders written for wound care. Contact wound team if area fails to progress, deteriorates or with any questions/concerns if something comes up before next scheduled follow up (See below as to whether wound is following and frequency of wound follow up)  Dressing changes by wound team:                   Follow up 3 times weekly:                NPWT change 3 times weekly:  XMWF   Follow up 1-2 times weekly:      Follow up Bi-Monthly:                   Follow up as needed:     Other (explain):     NURSING PLAN OF CARE ORDERS (X):  Dressing changes: See Dressing Care orders: X  Skin care: See Skin Care orders: X  RN Prevention Protocol: X  Rectal tube care: See Rectal Tube Care orders:   Other orders:    Anticipated discharge plans: MONAE  LTACH:        SNF/Rehab:                  Home Health Care:           Outpatient Wound Center:            Self/Family Care:        Other:                  Vac  Discharge Needs: X  Not Applicable Pt not on a wound vac:       Regular Vac while inpatient, alternative dressing at DC:        Regular Vac in use and continued at DC:      X      Reg. Vac w/ Skin Sub/Biologic in use. Will need to be changed 2x wkly:      Veraflo Vac while inpatient, ok to transition to Regular Vac on Discharge:           Veraflo Vac while inpatient, will need to remain on Veraflo Vac upon discharge:

## 2022-07-25 NOTE — DISCHARGE PLANNING
Case Management Discharge Planning    Admission Date: 6/29/2022  GMLOS: 4.3  ALOS: 26    6-Clicks ADL Score: 14  6-Clicks Mobility Score: 15  PT and/or OT Eval ordered: Yes  Post-acute Referrals Ordered: Yes  Post-acute Choice Obtained: NA  Has referral(s) been sent to post-acute provider:  FANNY      Anticipated Discharge Dispo: Discharge Disposition: D/T to SNF with Medicare cert in anticipation of skilled care (03)    DME Needed: No    Action(s) Taken: DC Assessment Complete (See below)    Escalations Completed: None    Medically Clear: Yes    Next Steps: Patient discussed in IDT rounds. [facility] has arranged transport for patient back to their facility today at noon by CHARLES garcia. Patient and daughter are in agreement with dc plan. Nurse and MD aware.    Barriers to Discharge: None    Is the patient up for discharge tomorrow: No, DC today.

## 2022-07-25 NOTE — PROGRESS NOTES
Discharge instructions given and discussed, signed copy in chart by two RN's. Prescriptions not needed since pt is going to SNF. Pt discharged in stable condition on 2 L O2 via NC. escorted by his daughter, Marah, and transport aide. Personal belongings sent with patient. PICC line removed and tolerated well.

## 2022-07-25 NOTE — CARE PLAN
The patient is a watcher    Shift Goals  Clinical Goals: safety, rest, reorientation  Patient Goals: sleep  Family Goals: Patient to comply with care and med admin    Progress made toward(s) clinical / shift goals:      Problem: Knowledge Deficit - Standard  Goal: Patient and family/care givers will demonstrate understanding of plan of care, disease process/condition, diagnostic tests and medications  Outcome: Progressing

## 2022-07-25 NOTE — PROGRESS NOTES
Report received from day shift RN, assumed Care.   Patient is AOx2 (disoriented to time and situation), responds appropriately.      Pain controlled at this time.  Patient is tolerating diabetic diet, denies nausea/vomiting. + flatus    Plan of care discussed, all questions answered.    Educated on use of call light and importance of calling before getting out of bed. Pt verbalizes understanding.    Call light and belongings within reach, treaded slipper socks on, bed alarm in use, bed in lowest locked position.  All needs met at this time.

## 2022-07-25 NOTE — DISCHARGE SUMMARY
Discharge Summary    CHIEF COMPLAINT ON ADMISSION  Chief Complaint   Patient presents with   • Leg Swelling     Pt has new swelling to his left lower swelling and new confusion per staff at [facility] and daughter. Pt was incontinent of urine today and is usually continent. Pt is also weak and unable to walk like normal. Pt is A&O to self only today and is usually A&Ox3. Pt was recently admitted here for osteomyelitis of his left lower leg, an I&D, a positive MRSA culture of the left lower leg wound. Pt is currently on a daptomycin IV antibiotic regimen.          Reason for Admission  ems     Admission Date  6/29/2022    CODE STATUS  DNAR/DNI    HPI & HOSPITAL COURSE  An 82-year-old man with h/o DM, HTN, HLD, osteomyelitis, dementia presented with AMS. According to the patient's daughter who is visiting him she states that he began becoming more confused and slurring his words yesterday. He also reports that he had episode of incontinence which is not his normal baseline. Has been receiving IV daptomycin for left lower leg osteomyelitis which was diagnosed 2 weeks ago, MRSA culture positive.  US of the right lower extremity negative for DVT. Limb preservation services consulted and following. Completed a bedside debridement and applied new dressings.  Patient completed course of antibiotics per ID. PICC line removes. Patient is afebrile, hemodynamically stable.   Patient will be discharged to [facility].    Therefore, he is discharged in fair and stable condition to skilled nursing facility.    The patient met 2-midnight criteria for an inpatient stay at the time of discharge.    Discharge Date  7/25/2022    FOLLOW UP ITEMS POST DISCHARGE  Follow up with PCP    DISCHARGE DIAGNOSES  Principal Problem:    Encephalopathy POA: Unknown  Active Problems:    Hyperlipidemia POA: Yes    Hypertension POA: Yes    Dementia with behavioral disturbance (HCC) POA: Yes    Cellulitis POA: Yes    Type 2  diabetes mellitus (HCC) POA: Yes    Osteomyelitis (HCC) POA: Yes    Urinary retention POA: Unknown  Resolved Problems:    SADIE (acute kidney injury) (HCC) POA: Unknown      FOLLOW UP  No future appointments.  [facility]  [address]  [place] [address]  [phone]          Stephanie Diaz M.D.  [address] [address]  [place] [address] [phone]    Follow up in 1 week(s)        MEDICATIONS ON DISCHARGE     Medication List      START taking these medications      Instructions   acetaminophen 325 MG Tabs  Commonly known as: Tylenol   Take 2 Tablets by mouth every 6 hours as needed for Moderate Pain.  Dose: 650 mg     amLODIPine 10 MG Tabs  Start taking on: July 26, 2022  Commonly known as: NORVASC   Take 1 Tablet by mouth every day.  Dose: 10 mg     QUEtiapine 25 MG Tabs  Commonly known as: Seroquel   Take 1 Tablet by mouth every evening.  Dose: 25 mg     tamsulosin 0.4 MG capsule  Start taking on: July 26, 2022  Commonly known as: FLOMAX   Take 2 Capsules by mouth 1/2 hour after breakfast.  Dose: 0.8 mg        CHANGE how you take these medications      Instructions   atorvastatin 40 MG Tabs  What changed:   · medication strength  · additional instructions  Commonly known as: LIPITOR   Take 1 Tablet by mouth every evening.  Dose: 40 mg     insulin regular 100 Unit/mL Soln  What changed:   · how much to take  · when to take this  · additional instructions  Commonly known as: HumuLIN R   Inject 1-6 Units under the skin 4 Times a Day,Before Meals and at Bedtime.  Dose: 1-6 Units     lisinopril 40 MG tablet  Start taking on: July 26, 2022  What changed:   · medication strength  · how much to take  · additional instructions  Commonly known as: PRINIVIL   Take 1 Tablet by mouth every day.  Dose: 40 mg     metoprolol tartrate 25 MG Tabs  What changed: additional instructions  Commonly known as: LOPRESSOR   Take 1 Tablet by mouth 2 times a day.  Dose: 25 mg        CONTINUE taking these medications       Instructions   Melatonin 10 MG Tabs   Take 10 mg by mouth at bedtime for 30 days.  Dose: 10 mg     metformin 1000 MG tablet  Commonly known as: GLUCOPHAGE   Take 1 Tablet by mouth 2 times a day.  Dose: 1,000 mg     mirtazapine 15 MG Tabs  Commonly known as: Remeron   Take 1 Tablet by mouth at bedtime.  Dose: 15 mg     sertraline 50 MG Tabs  Commonly known as: Zoloft   Take 1 Tablet by mouth every evening.  Dose: 50 mg        STOP taking these medications    ascorbic acid 500 MG tablet  Commonly known as: Vitamin C     cadexomer iodine 0.9 % pad  Commonly known as: IODOFLEX     DAPTOmycin 500 MG Solr  Commonly known as: CUBICIN     diclofenac sodium 1 % Gel  Commonly known as: Voltaren     gabapentin 300 MG Caps  Commonly known as: NEURONTIN     HYDROcodone-acetaminophen 5-325 MG Tabs per tablet  Commonly known as: NORCO     PROBIOTIC PO     vitamin D2 (Ergocalciferol) 1.25 MG (50214 UT) Caps capsule  Commonly known as: Drisdol            Allergies  No Known Allergies    DIET  Orders Placed This Encounter   Procedures   • Diet Order Diet: Consistent CHO (Diabetic)     Standing Status:   Standing     Number of Occurrences:   1     Order Specific Question:   Diet:     Answer:   Consistent CHO (Diabetic) [4]       ACTIVITY  As tolerated.  Weight bearing as tolerated    CONSULTATIONS  ID  Orthopedics  LPR    PROCEDURES  6/14: I&D and excisional debridement of left lateral ankle with application of wound VAC dressing.  6/15: RUE PICC line placement.  6/18: RUE PICC line placement.    LABORATORY  Lab Results   Component Value Date    SODIUM 132 (L) 07/24/2022    POTASSIUM 4.0 07/24/2022    CHLORIDE 98 07/24/2022    CO2 22 07/24/2022    GLUCOSE 212 (H) 07/24/2022    BUN 23 (H) 07/24/2022    CREATININE 1.04 07/24/2022        Lab Results   Component Value Date    WBC 12.3 (H) 07/24/2022    HEMOGLOBIN 12.2 (L) 07/24/2022    HEMATOCRIT 36.2 (L) 07/24/2022    PLATELETCT 339 07/24/2022        Total time of the discharge process  exceeds 35 minutes.

## 2022-07-28 ENCOUNTER — TELEPHONE (OUTPATIENT)
Dept: INFECTIOUS DISEASES | Facility: MEDICAL CENTER | Age: 82
End: 2022-07-28
Payer: COMMERCIAL

## 2022-07-28 NOTE — TELEPHONE ENCOUNTER
PT ended abx 7/23 while inpatient, PICC was removed before discharge. Would you still like him to have a FV? Next opening is in 1 month.

## 2022-08-08 ENCOUNTER — HOSPITAL ENCOUNTER (EMERGENCY)
Facility: MEDICAL CENTER | Age: 82
End: 2022-08-08
Attending: EMERGENCY MEDICINE
Payer: COMMERCIAL

## 2022-08-08 VITALS
DIASTOLIC BLOOD PRESSURE: 50 MMHG | HEART RATE: 60 BPM | TEMPERATURE: 97.1 F | OXYGEN SATURATION: 95 % | WEIGHT: 216 LBS | SYSTOLIC BLOOD PRESSURE: 101 MMHG | BODY MASS INDEX: 29.26 KG/M2 | HEIGHT: 72 IN | RESPIRATION RATE: 16 BRPM

## 2022-08-08 DIAGNOSIS — F03.90 DEMENTIA WITHOUT BEHAVIORAL DISTURBANCE, UNSPECIFIED DEMENTIA TYPE: ICD-10-CM

## 2022-08-08 DIAGNOSIS — Z46.6 ENCOUNTER FOR FOLEY CATHETER REPLACEMENT: ICD-10-CM

## 2022-08-08 PROCEDURE — 303105 HCHG CATHETER EXTRA

## 2022-08-08 PROCEDURE — 99284 EMERGENCY DEPT VISIT MOD MDM: CPT

## 2022-08-08 PROCEDURE — 51702 INSERT TEMP BLADDER CATH: CPT

## 2022-08-08 ASSESSMENT — FIBROSIS 4 INDEX: FIB4 SCORE: 1.44

## 2022-08-08 NOTE — DISCHARGE PLANNING
Late entry for 1100: Called [facility] and left v/m for Liz with transportation asking if their facility transport can come and pick patient up.    Called [facility] and spoke to nurse Tamara, she said in the past the patient's daughter has transported the patient in her car.  Met with daughter Marah who was at the bedside, she said she has transported her dad in the past but he is much weaker now and isn't sure if she can get him in and out of her car, also he has wound vac to lower extremity and barbosa.  Discussed with her that this CM will set up transport back to the [facility].  Called [facility], they can't pick patient up until between [id number]. Sent PCS to [employer]. Called [facility] and spoke to Erna, transport set up for 1215 .  Updated nurse Peña and daughter Marah who plans to follow the ambulance over to the [facility].

## 2022-08-08 NOTE — ED NOTES
Pt and pt daughter provided with discharge instructions. Pt verbalized understanding. Pt assisted out of ed via [facility].

## 2022-08-08 NOTE — ED TRIAGE NOTES
Chief Complaint   Patient presents with   • Other     Pt pulled his barbosa catheter out at the [facility] home where he resides.Pt has history of urinary retention. Pt sent here to have it reinserted. Blood noted in pt's depends.       Pt BIBA for above complaint.    /57   Pulse 93   Temp 36.2 °C (97.2 °F) (Temporal)   Resp 15   Ht 1.829 m (6')   Wt 98 kg (216 lb)   SpO2 94%   BMI 29.29 kg/m²

## 2022-08-08 NOTE — ED PROVIDER NOTES
ED Provider Note    ED Provider Note    Primary care provider: Stephanie Diaz M.D.  Means of arrival: EMS  History obtained from: patient, EMS, accompanying records  History limited by: Lacks decision making capacity    CHIEF COMPLAINT  Chief Complaint   Patient presents with   • Other     Pt [facility] barbosa [facility].Pt has history of urinary retention. Pt sent here to have it reinserted. Blood noted in pt's depends.       HPI  Andrei Garay is a 82 y.o. male who presents to the Emergency Department via EMS.  [facility]  He has a history of urinary retention and has a Barbosa catheter in place at baseline.  He apparently pulled it out.  He presents here to have it reinserted.  Patient has no complaints of pain.  He reports feeling well.  Patient presents with a POLST form, indicating that he is a DNR and lacks decision-making capacity.  It signed and dated 2022.    REVIEW OF SYSTEMS  Review of Systems   Unable to perform ROS: Dementia     PAST MEDICAL HISTORY   has a past medical history of Diabetes (HCC), Hyperlipemia, Hypertension, and MI (myocardial infarction) ([facility]).    SURGICAL HISTORY   has a past surgical history that includes stent placement; appendectomy; and irrigation & debridement general (2022).    SOCIAL HISTORY  Social History     Tobacco Use   • Smoking status: Former Smoker     Quit date: 2000     Years since quittin.8   • Smokeless tobacco: Never Used   Substance Use Topics   • Alcohol use: Never   • Drug use: Never      Social History     Substance and Sexual Activity   Drug Use Never       FAMILY HISTORY  History reviewed. No pertinent family history.    CURRENT MEDICATIONS  Home Medications     Reviewed by Mariana Ball R.N. (Registered Nurse) on 22 at 1012  Med List Status: Not Addressed   Medication Last Dose Status   acetaminophen (TYLENOL) 325 MG Tab  Active   amLODIPine  (NORVASC) 10 MG Tab  Active   atorvastatin (LIPITOR) 40 MG Tab  Active   insulin regular (HUMULIN R) 100 Unit/mL Solution  Active   lisinopril (PRINIVIL) 40 MG tablet  Active   Melatonin 10 MG Tab  Active   metformin (GLUCOPHAGE) 1000 MG tablet  Active   metoprolol tartrate (LOPRESSOR) 25 MG Tab  Active   mirtazapine (REMERON) 15 MG Tab  Active   QUEtiapine (SEROQUEL) 25 MG Tab  Active   sertraline (ZOLOFT) 50 MG Tab  Active   tamsulosin (FLOMAX) 0.4 MG capsule  Active                ALLERGIES  No Known Allergies    PHYSICAL EXAM  VITAL SIGNS: /50   Pulse 60   Temp 36.2 °C (97.1 °F) (Temporal)   Resp 16   Ht 1.829 m (6')   Wt 98 kg (216 lb)   SpO2 95%   BMI 29.29 kg/m²   Vitals reviewed.  Constitutional: Patient is oriented to person, place, and time. Appears well-developed and well-nourished. No distress.    Head: Normocephalic and atraumatic.   Eyes: Conjunctivae are normal. Pupils are equal and round.  Cardiovascular: Normal rate, regular rhythm and normal heart sounds.  Pulmonary/Chest: Effort normal and breath sounds normal. No respiratory distress, no wheezes, rhonchi, or rales.   Abdominal: Soft. Bowel sounds are normal. There is no tenderness. No rebound or guarding, or peritoneal signs. No CVA tenderness.  : Circumcised male.  There is blood in his adult diaper, no blood at the meatus.  Does not appear to be active bleeding.  Musculoskeletal: No edema and no tenderness.  Wound VAC, left lower extremity.  Neurological: No focal deficits.   Skin: Skin is warm and dry. No erythema. No pallor.     COURSE & MEDICAL DECISION MAKING  Pertinent Labs & Imaging studies reviewed. (See chart for details)    Obtained and reviewed past medical records.  Patient's last encounter was a hospital admission July 25, 2022.  He presented with leg swelling.  Patient is noted to be ANO to self only.  He was admitted for osteomyelitis of the left lower leg, had an I&D, was positive for MRSA and is currently on an IV  regiment of antibiotics.    10:16 AM - Patient seen and examined at bedside.  Patient seen at the bedside.  He has reassuring vital signs.  POLST reviewed.  There is no obvious trauma to the meatus or penis.  I discussed with nursing staff, reinserting Martinez catheter, irrigating Martinez catheter.    11:28 AM patient's reevaluated at the bedside.  Martinez catheter has been placed it is draining blood-tinged urine.  Patient has no new complaints.  Daughter is at the bedside and she notes that he has dementia and despite multiple reminders, he is pulled out his Martinez catheter multiple times and its been a difficult thing to make him attentive to.  Discussed with nursing staff, manual irrigation.    Nursing staff was able to manually irrigate the Martinez urine is now clear.  Patient plan for discharge back to nursing home, anticipate transport time 12:30 PM today.  He is in stable condition.        FINAL IMPRESSION  1. Encounter for Martinez catheter replacement    2. Dementia without behavioral disturbance, unspecified dementia type (HCC)

## 2022-08-14 ENCOUNTER — HOSPITAL ENCOUNTER (EMERGENCY)
Facility: MEDICAL CENTER | Age: 82
End: 2022-08-14
Attending: EMERGENCY MEDICINE
Payer: MEDICARE

## 2022-08-14 VITALS
HEART RATE: 74 BPM | SYSTOLIC BLOOD PRESSURE: 104 MMHG | BODY MASS INDEX: 24.79 KG/M2 | RESPIRATION RATE: 14 BRPM | DIASTOLIC BLOOD PRESSURE: 65 MMHG | WEIGHT: 183 LBS | TEMPERATURE: 98 F | OXYGEN SATURATION: 94 % | HEIGHT: 72 IN

## 2022-08-14 DIAGNOSIS — S91.002A ANKLE WOUND, LEFT, INITIAL ENCOUNTER: ICD-10-CM

## 2022-08-14 PROCEDURE — 99284 EMERGENCY DEPT VISIT MOD MDM: CPT

## 2022-08-14 ASSESSMENT — FIBROSIS 4 INDEX: FIB4 SCORE: 1.44

## 2022-08-14 NOTE — DISCHARGE PLANNING
Medical Social Work     JARVIS received a call from the RN requesting JARVIS assistance with [facility] transport home. The pt lives at [address] [facility] [place], [place].     JARVIS faxed  PCS to [facility] and set up transport with Lisa for 0230. RN aware of transport time.

## 2022-08-14 NOTE — ED PROVIDER NOTES
ED Provider Note    CHIEF COMPLAINT  Chief Complaint   Patient presents with    Ankle Injury     Pt reports previous injury to L lateral ankle. Reports the wound vac keeps coming off. Pt has open wound to lateral L ankle.  Minimal redness and swelling noted       HPI  Andrei Garay is a 82 y.o. male who presents to the emergency department from group home for left lateral ankle wound.  Apparently patient has chronic wound, with wound VAC in place, but this continues to fall off (or patient removes it).  Patient has no complaints today.    Patient denies new pain.  Denies drainage.  No history for fever.    REVIEW OF SYSTEMS  See HPI for further details. All other systems are negative.     PAST MEDICAL HISTORY   has a past medical history of Diabetes (HCC), Hyperlipemia, Hypertension, and MI (myocardial infarction) ([facility]).    SOCIAL HISTORY  Social History     Tobacco Use    Smoking status: Former     Types: Cigarettes     Quit date: 2000     Years since quittin.9    Smokeless tobacco: Never   Substance and Sexual Activity    Alcohol use: Never    Drug use: Never    Sexual activity: Not on file       SURGICAL HISTORY   has a past surgical history that includes stent placement; appendectomy; and irrigation & debridement general (2022).    CURRENT MEDICATIONS  Home Medications       Reviewed by Janice Valdivia R.N. (Registered Nurse) on 22 at 0038  Med List Status: Not Addressed     Medication Last Dose Status   acetaminophen (TYLENOL) 325 MG Tab  Active   amLODIPine (NORVASC) 10 MG Tab  Active   atorvastatin (LIPITOR) 40 MG Tab  Active   insulin regular (HUMULIN R) 100 Unit/mL Solution  Active   lisinopril (PRINIVIL) 40 MG tablet  Active   Melatonin 10 MG Tab  Active   metformin (GLUCOPHAGE) 1000 MG tablet  Active   metoprolol tartrate (LOPRESSOR) 25 MG Tab  Active   mirtazapine (REMERON) 15 MG Tab  Active   QUEtiapine (SEROQUEL) 25 MG Tab  Active   sertraline (ZOLOFT) 50 MG Tab  Active    tamsulosin (FLOMAX) 0.4 MG capsule  Active                    ALLERGIES  No Known Allergies    PHYSICAL EXAM  VITAL SIGNS: /65   Pulse 74   Temp 36.3 °C (97.4 °F) (Temporal)   Resp 14   Ht 1.829 m (6')   Wt 83 kg (183 lb)   SpO2 94%   BMI 24.82 kg/m²   Pulse ox interpretation: I interpret this pulse ox as normal.  Constitutional: Alert in no apparent distress.  HENT: Normocephalic, atraumatic. Bilateral external ears normal, Nose normal.   Eyes: Conjunctiva normal.   Neck: Normal range of motion  Cardiovascular: Normal peripheral perfusion  Thorax & Lungs: Nonlabored respiration  Abdomen: Soft, non-distended, non-tender to palpation. No palpable or pulsatile masses. No peritoneal signs. No CVA tenderness.  Skin: Warm, Dry, No erythema, No rash.   Musculoskeletal: 2 x 2 centimeter ulceration to the left lateral ankle without erythema, weeping, induration, crepitus.  Sensation and cap refill intact distally.  Neurologic: Alert and oriented x3.  Moves extremity spontaneously  Psychiatric: Affect normal, Judgment normal, Mood normal.     COURSE & MEDICAL DECISION MAKING  Patient seen evaluated at bedside.  No indication for further work-up or intervention at this time.  Well-appearing chronic left lateral ankle wound.  I have encouraged that the wound VAC to be replaced upon return to the group home, ongoing wound care as previously prescribed.  No evidence for new infection, sepsis.  No evidence for new trauma or injury.  Patient denies complaints otherwise.    Patient is stable for discharge at this time, anticipatory guidance provided, continue home medications, wound care per routine, close follow-up is encouraged, and strict ED return instructions have been detailed. Patient is agreeable to the disposition and plan.      FINAL IMPRESSION  (S91.002A) Ankle wound, left, initial encounter      Electronically signed by: Marah Tse D.O., 8/14/2022 1:37 AM      This dictation was created using voice  recognition software. The accuracy of the dictation is limited to the abilities of the software. I expect there may be some errors of grammar and possibly content. The nursing notes were reviewed and certain aspects of this information were incorporated into this note.

## 2022-08-14 NOTE — ED TRIAGE NOTES
Chief Complaint   Patient presents with    Ankle Injury     Pt reports previous injury to L lateral ankle. Reports the wound vac keeps coming off. Pt has open wound to lateral L ankle.  Minimal redness and swelling noted       BIB EMS to [facility], pt on monitor and in gown, labs drawn and sent. Pt transported from [facility]. Per EMS, RN reports pt will not leave wound vac on his ankle. Pt report the wound vac keeps coming off. Pt arrives with no wound vac, open wound to L lateral ankle.  Pt is GCS 15.  Pt reports wound initially began after cutting it on a metal part of a chair about 2 months ago.      /56   Pulse 78   Temp 36.3 °C (97.4 °F) (Temporal)   Resp 15   Ht 1.829 m (6')   Wt 83 kg (183 lb)   SpO2 93%   BMI 24.82 kg/m²

## 2022-08-14 NOTE — ED NOTES
[facility]. Report to AEMT. Pt leaving in gown and blanket. Pt had no belongings with him on arrival.  Pt and AEMT given discharge instructions and education.

## 2022-08-14 NOTE — DISCHARGE INSTRUCTIONS
Follow-up with primary care and wound care on Monday for reevaluation, medication management and continued treatment.    Continue home medications as previously indicated.  Continue wound care and wound VAC as previously recommended.    Return to the emergency department for fever, pain, swelling, bleeding or other drainage or other new concerns.

## 2022-08-20 ENCOUNTER — APPOINTMENT (OUTPATIENT)
Dept: RADIOLOGY | Facility: MEDICAL CENTER | Age: 82
DRG: 853 | End: 2022-08-20
Attending: EMERGENCY MEDICINE
Payer: COMMERCIAL

## 2022-08-20 ENCOUNTER — HOSPITAL ENCOUNTER (INPATIENT)
Facility: MEDICAL CENTER | Age: 82
LOS: 6 days | DRG: 853 | End: 2022-08-26
Attending: EMERGENCY MEDICINE | Admitting: INTERNAL MEDICINE
Payer: COMMERCIAL

## 2022-08-20 ENCOUNTER — APPOINTMENT (OUTPATIENT)
Dept: RADIOLOGY | Facility: MEDICAL CENTER | Age: 82
DRG: 853 | End: 2022-08-20
Attending: INTERNAL MEDICINE
Payer: COMMERCIAL

## 2022-08-20 DIAGNOSIS — N39.0 ACUTE UTI: ICD-10-CM

## 2022-08-20 DIAGNOSIS — A41.9 SEPTIC SHOCK (HCC): ICD-10-CM

## 2022-08-20 DIAGNOSIS — R65.21 SEPTIC SHOCK (HCC): ICD-10-CM

## 2022-08-20 DIAGNOSIS — N17.9 AKI (ACUTE KIDNEY INJURY) (HCC): ICD-10-CM

## 2022-08-20 PROBLEM — J98.4 PNEUMONITIS: Status: ACTIVE | Noted: 2022-08-20

## 2022-08-20 PROBLEM — S91.009A ANKLE WOUND: Status: ACTIVE | Noted: 2022-08-20

## 2022-08-20 LAB
ALBUMIN SERPL BCP-MCNC: 2.9 G/DL (ref 3.2–4.9)
ALBUMIN/GLOB SERPL: 0.8 G/DL
ALP SERPL-CCNC: 65 U/L (ref 30–99)
ALT SERPL-CCNC: 6 U/L (ref 2–50)
ANION GAP SERPL CALC-SCNC: 15 MMOL/L (ref 7–16)
APPEARANCE UR: CLEAR
AST SERPL-CCNC: 9 U/L (ref 12–45)
BACTERIA #/AREA URNS HPF: NEGATIVE /HPF
BASOPHILS # BLD AUTO: 0.4 % (ref 0–1.8)
BASOPHILS # BLD: 0.06 K/UL (ref 0–0.12)
BILIRUB SERPL-MCNC: 0.6 MG/DL (ref 0.1–1.5)
BILIRUB UR QL STRIP.AUTO: NEGATIVE
BUN SERPL-MCNC: 37 MG/DL (ref 8–22)
CALCIUM SERPL-MCNC: 8.4 MG/DL (ref 8.5–10.5)
CHLORIDE SERPL-SCNC: 99 MMOL/L (ref 96–112)
CO2 SERPL-SCNC: 22 MMOL/L (ref 20–33)
COLOR UR: YELLOW
CREAT SERPL-MCNC: 1.83 MG/DL (ref 0.5–1.4)
CRP SERPL HS-MCNC: 19.87 MG/DL (ref 0–0.75)
EKG IMPRESSION: NORMAL
EKG IMPRESSION: NORMAL
EOSINOPHIL # BLD AUTO: 0.08 K/UL (ref 0–0.51)
EOSINOPHIL NFR BLD: 0.5 % (ref 0–6.9)
EPI CELLS #/AREA URNS HPF: NEGATIVE /HPF
ERYTHROCYTE [DISTWIDTH] IN BLOOD BY AUTOMATED COUNT: 44.3 FL (ref 35.9–50)
ERYTHROCYTE [SEDIMENTATION RATE] IN BLOOD BY WESTERGREN METHOD: 66 MM/HOUR (ref 0–20)
FLUAV RNA SPEC QL NAA+PROBE: NEGATIVE
FLUBV RNA SPEC QL NAA+PROBE: NEGATIVE
GFR SERPLBLD CREATININE-BSD FMLA CKD-EPI: 36 ML/MIN/1.73 M 2
GLOBULIN SER CALC-MCNC: 3.8 G/DL (ref 1.9–3.5)
GLUCOSE BLD STRIP.AUTO-MCNC: 155 MG/DL (ref 65–99)
GLUCOSE SERPL-MCNC: 193 MG/DL (ref 65–99)
GLUCOSE UR STRIP.AUTO-MCNC: 250 MG/DL
HCT VFR BLD AUTO: 33.5 % (ref 42–52)
HGB BLD-MCNC: 11 G/DL (ref 14–18)
HYALINE CASTS #/AREA URNS LPF: ABNORMAL /LPF
IMM GRANULOCYTES # BLD AUTO: 0.08 K/UL (ref 0–0.11)
IMM GRANULOCYTES NFR BLD AUTO: 0.5 % (ref 0–0.9)
KETONES UR STRIP.AUTO-MCNC: NEGATIVE MG/DL
LACTATE SERPL-SCNC: 1.6 MMOL/L (ref 0.5–2)
LACTATE SERPL-SCNC: 3.4 MMOL/L (ref 0.5–2)
LACTATE SERPL-SCNC: 4.4 MMOL/L (ref 0.5–2)
LACTATE SERPL-SCNC: 4.5 MMOL/L (ref 0.5–2)
LEUKOCYTE ESTERASE UR QL STRIP.AUTO: ABNORMAL
LYMPHOCYTES # BLD AUTO: 1.31 K/UL (ref 1–4.8)
LYMPHOCYTES NFR BLD: 8.5 % (ref 22–41)
MCH RBC QN AUTO: 27.9 PG (ref 27–33)
MCHC RBC AUTO-ENTMCNC: 32.8 G/DL (ref 33.7–35.3)
MCV RBC AUTO: 85 FL (ref 81.4–97.8)
MICRO URNS: ABNORMAL
MONOCYTES # BLD AUTO: 1.44 K/UL (ref 0–0.85)
MONOCYTES NFR BLD AUTO: 9.3 % (ref 0–13.4)
NEUTROPHILS # BLD AUTO: 12.47 K/UL (ref 1.82–7.42)
NEUTROPHILS NFR BLD: 80.8 % (ref 44–72)
NITRITE UR QL STRIP.AUTO: NEGATIVE
NRBC # BLD AUTO: 0 K/UL
NRBC BLD-RTO: 0 /100 WBC
PH UR STRIP.AUTO: 5.5 [PH] (ref 5–8)
PLATELET # BLD AUTO: 289 K/UL (ref 164–446)
PMV BLD AUTO: 9.3 FL (ref 9–12.9)
POTASSIUM SERPL-SCNC: 4.5 MMOL/L (ref 3.6–5.5)
PROT SERPL-MCNC: 6.7 G/DL (ref 6–8.2)
PROT UR QL STRIP: 30 MG/DL
RBC # BLD AUTO: 3.94 M/UL (ref 4.7–6.1)
RBC # URNS HPF: ABNORMAL /HPF
RBC UR QL AUTO: ABNORMAL
RSV RNA SPEC QL NAA+PROBE: NEGATIVE
SARS-COV-2 RNA RESP QL NAA+PROBE: NOTDETECTED
SODIUM SERPL-SCNC: 136 MMOL/L (ref 135–145)
SP GR UR STRIP.AUTO: 1.02
SPECIMEN SOURCE: NORMAL
TROPONIN T SERPL-MCNC: 47 NG/L (ref 6–19)
UROBILINOGEN UR STRIP.AUTO-MCNC: 0.2 MG/DL
WBC # BLD AUTO: 15.4 K/UL (ref 4.8–10.8)
WBC #/AREA URNS HPF: ABNORMAL /HPF

## 2022-08-20 PROCEDURE — 71045 X-RAY EXAM CHEST 1 VIEW: CPT

## 2022-08-20 PROCEDURE — 86140 C-REACTIVE PROTEIN: CPT

## 2022-08-20 PROCEDURE — 93005 ELECTROCARDIOGRAM TRACING: CPT

## 2022-08-20 PROCEDURE — 51702 INSERT TEMP BLADDER CATH: CPT

## 2022-08-20 PROCEDURE — 83605 ASSAY OF LACTIC ACID: CPT | Mod: 91

## 2022-08-20 PROCEDURE — 700101 HCHG RX REV CODE 250: Performed by: EMERGENCY MEDICINE

## 2022-08-20 PROCEDURE — 700111 HCHG RX REV CODE 636 W/ 250 OVERRIDE (IP): Performed by: EMERGENCY MEDICINE

## 2022-08-20 PROCEDURE — 96367 TX/PROPH/DG ADDL SEQ IV INF: CPT

## 2022-08-20 PROCEDURE — 700105 HCHG RX REV CODE 258: Performed by: EMERGENCY MEDICINE

## 2022-08-20 PROCEDURE — 96366 THER/PROPH/DIAG IV INF ADDON: CPT

## 2022-08-20 PROCEDURE — 36415 COLL VENOUS BLD VENIPUNCTURE: CPT

## 2022-08-20 PROCEDURE — 700117 HCHG RX CONTRAST REV CODE 255: Performed by: EMERGENCY MEDICINE

## 2022-08-20 PROCEDURE — 87106 FUNGI IDENTIFICATION YEAST: CPT

## 2022-08-20 PROCEDURE — 71275 CT ANGIOGRAPHY CHEST: CPT | Mod: MG

## 2022-08-20 PROCEDURE — 74177 CT ABD & PELVIS W/CONTRAST: CPT | Mod: ME

## 2022-08-20 PROCEDURE — 700105 HCHG RX REV CODE 258: Performed by: INTERNAL MEDICINE

## 2022-08-20 PROCEDURE — 700102 HCHG RX REV CODE 250 W/ 637 OVERRIDE(OP): Performed by: INTERNAL MEDICINE

## 2022-08-20 PROCEDURE — 99291 CRITICAL CARE FIRST HOUR: CPT | Performed by: INTERNAL MEDICINE

## 2022-08-20 PROCEDURE — 0241U HCHG SARS-COV-2 COVID-19 NFCT DS RESP RNA 4 TRGT MIC: CPT

## 2022-08-20 PROCEDURE — 36556 INSERT NON-TUNNEL CV CATH: CPT

## 2022-08-20 PROCEDURE — 700111 HCHG RX REV CODE 636 W/ 250 OVERRIDE (IP): Performed by: INTERNAL MEDICINE

## 2022-08-20 PROCEDURE — C1751 CATH, INF, PER/CENT/MIDLINE: HCPCS

## 2022-08-20 PROCEDURE — 81001 URINALYSIS AUTO W/SCOPE: CPT

## 2022-08-20 PROCEDURE — 80053 COMPREHEN METABOLIC PANEL: CPT

## 2022-08-20 PROCEDURE — 96365 THER/PROPH/DIAG IV INF INIT: CPT

## 2022-08-20 PROCEDURE — 303105 HCHG CATHETER EXTRA

## 2022-08-20 PROCEDURE — 87086 URINE CULTURE/COLONY COUNT: CPT

## 2022-08-20 PROCEDURE — 96368 THER/DIAG CONCURRENT INF: CPT

## 2022-08-20 PROCEDURE — 93005 ELECTROCARDIOGRAM TRACING: CPT | Performed by: EMERGENCY MEDICINE

## 2022-08-20 PROCEDURE — 85652 RBC SED RATE AUTOMATED: CPT

## 2022-08-20 PROCEDURE — 99292 CRITICAL CARE ADDL 30 MIN: CPT | Performed by: INTERNAL MEDICINE

## 2022-08-20 PROCEDURE — 99291 CRITICAL CARE FIRST HOUR: CPT

## 2022-08-20 PROCEDURE — 87040 BLOOD CULTURE FOR BACTERIA: CPT | Mod: 91

## 2022-08-20 PROCEDURE — 700101 HCHG RX REV CODE 250: Performed by: INTERNAL MEDICINE

## 2022-08-20 PROCEDURE — 770022 HCHG ROOM/CARE - ICU (200)

## 2022-08-20 PROCEDURE — 82962 GLUCOSE BLOOD TEST: CPT

## 2022-08-20 PROCEDURE — 85025 COMPLETE CBC W/AUTO DIFF WBC: CPT

## 2022-08-20 PROCEDURE — 73600 X-RAY EXAM OF ANKLE: CPT | Mod: LT

## 2022-08-20 PROCEDURE — 84484 ASSAY OF TROPONIN QUANT: CPT

## 2022-08-20 PROCEDURE — C9803 HOPD COVID-19 SPEC COLLECT: HCPCS | Performed by: EMERGENCY MEDICINE

## 2022-08-20 RX ORDER — TAMSULOSIN HYDROCHLORIDE 0.4 MG/1
0.8 CAPSULE ORAL
COMMUNITY

## 2022-08-20 RX ORDER — DOXYCYCLINE HYCLATE 100 MG
100 TABLET ORAL 2 TIMES DAILY
Status: ON HOLD | COMMUNITY
End: 2022-08-26

## 2022-08-20 RX ORDER — SODIUM CHLORIDE, SODIUM LACTATE, POTASSIUM CHLORIDE, CALCIUM CHLORIDE 600; 310; 30; 20 MG/100ML; MG/100ML; MG/100ML; MG/100ML
30 INJECTION, SOLUTION INTRAVENOUS CONTINUOUS
Status: DISCONTINUED | OUTPATIENT
Start: 2022-08-20 | End: 2022-08-20

## 2022-08-20 RX ORDER — HYDROCODONE BITARTRATE AND ACETAMINOPHEN 5; 325 MG/1; MG/1
1 TABLET ORAL 2 TIMES DAILY
Status: ON HOLD | COMMUNITY
End: 2022-10-27 | Stop reason: SDUPTHER

## 2022-08-20 RX ORDER — NOREPINEPHRINE BITARTRATE 0.03 MG/ML
0-30 INJECTION, SOLUTION INTRAVENOUS CONTINUOUS
Status: DISCONTINUED | OUTPATIENT
Start: 2022-08-20 | End: 2022-08-20

## 2022-08-20 RX ORDER — NOREPINEPHRINE BITARTRATE 0.03 MG/ML
.5-3 INJECTION, SOLUTION INTRAVENOUS CONTINUOUS
Status: DISCONTINUED | OUTPATIENT
Start: 2022-08-20 | End: 2022-08-21

## 2022-08-20 RX ORDER — SODIUM CHLORIDE, SODIUM LACTATE, POTASSIUM CHLORIDE, AND CALCIUM CHLORIDE .6; .31; .03; .02 G/100ML; G/100ML; G/100ML; G/100ML
1000 INJECTION, SOLUTION INTRAVENOUS ONCE
Status: COMPLETED | OUTPATIENT
Start: 2022-08-20 | End: 2022-08-20

## 2022-08-20 RX ORDER — AMLODIPINE BESYLATE 5 MG/1
5 TABLET ORAL DAILY
Status: ON HOLD | COMMUNITY
End: 2022-10-27

## 2022-08-20 RX ORDER — SODIUM CHLORIDE, SODIUM LACTATE, POTASSIUM CHLORIDE, CALCIUM CHLORIDE 600; 310; 30; 20 MG/100ML; MG/100ML; MG/100ML; MG/100ML
INJECTION, SOLUTION INTRAVENOUS CONTINUOUS
Status: DISCONTINUED | OUTPATIENT
Start: 2022-08-20 | End: 2022-08-21

## 2022-08-20 RX ORDER — HYDROCODONE BITARTRATE AND ACETAMINOPHEN 5; 325 MG/1; MG/1
1 TABLET ORAL EVERY 6 HOURS PRN
Status: ON HOLD | COMMUNITY
End: 2022-08-26

## 2022-08-20 RX ADMIN — NOREPINEPHRINE BITARTRATE 5 MCG/MIN: 1 INJECTION INTRAVENOUS at 15:43

## 2022-08-20 RX ADMIN — SODIUM CHLORIDE, POTASSIUM CHLORIDE, SODIUM LACTATE AND CALCIUM CHLORIDE 1000 ML: 600; 310; 30; 20 INJECTION, SOLUTION INTRAVENOUS at 18:06

## 2022-08-20 RX ADMIN — PIPERACILLIN AND TAZOBACTAM 4.5 G: 4; .5 INJECTION, POWDER, LYOPHILIZED, FOR SOLUTION INTRAVENOUS; PARENTERAL at 15:09

## 2022-08-20 RX ADMIN — SODIUM CHLORIDE, POTASSIUM CHLORIDE, SODIUM LACTATE AND CALCIUM CHLORIDE 30 ML/KG/HR: 600; 310; 30; 20 INJECTION, SOLUTION INTRAVENOUS at 15:10

## 2022-08-20 RX ADMIN — IOHEXOL 85 ML: 350 INJECTION, SOLUTION INTRAVENOUS at 16:30

## 2022-08-20 RX ADMIN — INSULIN HUMAN 2 UNITS: 100 INJECTION, SOLUTION PARENTERAL at 20:01

## 2022-08-20 RX ADMIN — SODIUM CHLORIDE, POTASSIUM CHLORIDE, SODIUM LACTATE AND CALCIUM CHLORIDE: 600; 310; 30; 20 INJECTION, SOLUTION INTRAVENOUS at 19:50

## 2022-08-20 RX ADMIN — PIPERACILLIN AND TAZOBACTAM 4.5 G: 4; .5 INJECTION, POWDER, LYOPHILIZED, FOR SOLUTION INTRAVENOUS; PARENTERAL at 20:00

## 2022-08-20 RX ADMIN — VANCOMYCIN HYDROCHLORIDE 2000 MG: 500 INJECTION, POWDER, LYOPHILIZED, FOR SOLUTION INTRAVENOUS at 18:25

## 2022-08-20 RX ADMIN — NOREPINEPHRINE BITARTRATE 5 MCG/MIN: 1 INJECTION, SOLUTION, CONCENTRATE INTRAVENOUS at 18:40

## 2022-08-20 ASSESSMENT — PATIENT HEALTH QUESTIONNAIRE - PHQ9
1. LITTLE INTEREST OR PLEASURE IN DOING THINGS: NOT AT ALL
SUM OF ALL RESPONSES TO PHQ9 QUESTIONS 1 AND 2: 0
2. FEELING DOWN, DEPRESSED, IRRITABLE, OR HOPELESS: NOT AT ALL

## 2022-08-20 ASSESSMENT — FIBROSIS 4 INDEX: FIB4 SCORE: 1.44

## 2022-08-20 NOTE — ED NOTES
Spoke with Lindy [phone], wound care RN who has been seeing pt. Wound care RN states pt has hx of being noncompliant with wound care vac and osteomyelitis and MRSA in his leg wound. RN states wound will not be able to be cultured for approximately 24 hours d/t agent that was placed on the wound recently.

## 2022-08-20 NOTE — ED PROVIDER NOTES
"ED Provider Note    Scribed for Wilbert Nunez M.D. by Alvaro Herman. 8/20/2022,  2:10 PM.    Means of Arrival: EMS  History obtained from: Patient  History limited by: Dementia    CHIEF COMPLAINT  Chief Complaint   Patient presents with    Hypotension     Pt lives at a assisted living facility and per staff pt was noted to be more confused than normal. Initial BP per EMS was 59/47 and saturating 85% on room air. Pt has wound vac located to L lateral ankle.  mg/dL PTA.            HPI  Edharper Garay is a 82 y.o. male who presents to the Emergency Department for evaluation of hypotension onset prior to arrival. Per EMS, patient's blood pressure was noted at 59/47. Patient is confused at this time, and reports no pain. He denies any shortness of breath, and states that \"people are the only thing bothering him\", and nothing in his body is bothering him at this time. Patient has a wound vac to left lateral ankle. History limited by Dementia.     REVIEW OF SYSTEMS  CONSTITUTIONAL:  No fever.  CARDIOVASCULAR:  No chest discomfort.  RESPIRATORY:  No pleuritic chest pain.  GASTROINTESTINAL:  No abdominal pain.  MUSCULOSKELETAL:  No arthralgia.  See HPI for further details.   History limited by Dementia.     PAST MEDICAL HISTORY  Past Medical History:   Diagnosis Date    Diabetes (HCC)     Hyperlipemia     Hypertension     MI (myocardial infarction) (HCC)        FAMILY HISTORY  History reviewed. No pertinent family history.    SOCIAL HISTORY   reports that he quit smoking about 21 years ago. He has never used smokeless tobacco. He reports that he does not drink alcohol and does not use drugs.    SURGICAL HISTORY  Past Surgical History:   Procedure Laterality Date    IRRIGATION & DEBRIDEMENT GENERAL  6/14/2022    Procedure: IRRIGATION AND DEBRIDEMENT LEFT ANKLE;  Surgeon: Panfilo Wagner M.D.;  Location: SURGERY [facility];  Service: Orthopedics    APPENDECTOMY      STENT PLACEMENT      cardiac       CURRENT " MEDICATIONS  Home Medications       Reviewed by Bonifacio Gutierrez (Pharmacy Tech) on 08/20/22 at 1634  Med List Status: Complete     Medication Last Dose Status   amLODIPine (NORVASC) 5 MG Tab 8/20/2022 Active   atorvastatin (LIPITOR) 40 MG Tab 8/19/2022 Active   doxycycline (VIBRAMYCIN) 100 MG Tab 8/19/2022 Active   HYDROcodone-acetaminophen (NORCO) 5-325 MG Tab per tablet 8/20/2022 Active   HYDROcodone-acetaminophen (NORCO) 5-325 MG Tab per tablet 8/20/2022 Active   insulin regular (HUMULIN R) 100 Unit/mL Solution 8/20/2022 Active   lisinopril (PRINIVIL) 40 MG tablet 8/20/2022 Active   magnesium hydroxide (MILK OF MAGNESIA) 400 MG/5ML Suspension 8/10/2022 Active   Melatonin 10 MG Tab 8/19/2022 Active   metformin (GLUCOPHAGE) 1000 MG tablet 8/20/2022 Active   metoprolol tartrate (LOPRESSOR) 25 MG Tab 8/20/2022 Active   mirtazapine (REMERON) 15 MG Tab 8/19/2022 Active   nystatin (MYCOSTATIN) 688644 UNIT/ML Suspension 8/8/2022 Active   Saccharomyces boulardii (FLORASTOR PO) 8/20/2022 Active   sertraline (ZOLOFT) 50 MG Tab 8/19/2022 Active   tamsulosin (FLOMAX) 0.4 MG capsule 8/19/2022 Active                    ALLERGIES  No Known Allergies    PHYSICAL EXAM  VITAL SIGNS: BP (!) 91/55   Pulse 75   Temp 37 °C (98.6 °F) (Temporal)   Resp 18   Ht 1.829 m (6')   Wt 83.9 kg (185 lb)   SpO2 94%   BMI 25.09 kg/m²    Gen: Alert, no acute distress  HEENT: ATNC, Dry mucus membranes.   Eyes: PERRL, EOMI, normal conjunctiva.   Neck: trachea midline  Resp: no respiratory distress, Lungs clear  CV: No JVD regular rate and rhythm, no murmurs.   Abd: non-distended, Soft and non tender  Ext: Left calf tenderness, No deformities noted.  Left wound VAC in place.  No surrounding erythema or tenderness.  No crepitance  Psych: normal mood  Neuro: speech fluent, moves all extremities    DIAGNOSTIC STUDIES / PROCEDURES     Subclavian  Central line  Indication:  Central venous access for medication administration   Informed consent was  obtained verbally, including risk, benefits, alternatives.   A time out was performed identifying the patient by name, date of birth, medical record number, the side of the procedure and the procedure to be performed. The patient was placed in the appropriate position. The patient's LEFT chest was prepped and draped in sterile fashion. 1% Lidocaine without epinephrine was used to anesthetize the surrounding skin area. A needle was introduced into the skin under the clavicle and venous blood was returned. A wire was threated into the vessel, the needle removed, and an 18 gauge angioath was inserted over the wire. Venous placement confirmed by visual transduction. The wire was re-inserted and angiocath removed. A skin nick was made with a #11 scalpel and a dilator was inserted over the wire. A triple lumen catheter was introduced into the  internal jugular vein using the Seldinger technique. The catheter was threaded smoothly over the guide wire and appropriate blood return was obtained. All ports flushed freely.  Each lumen of the catheter was evacuated of air and flushed with sterile saline. The catheter was then sutured in place to the skin and a sterile dressing applied.   A chest xray was ordered and the tip of the catheter was in the appropriate position although slightly in the azygous vein and there was no evidence of pneumothorax.   Estimated blood loss: 5mL    EKG  Results for orders placed or performed during the hospital encounter of 22   EKG   Result Value Ref Range    Report       [facility] Emergency Dept.    Test Date:  2022  Pt Name:    COOKIE VELEZ                Department: ER  MRN:        [id number]                      Room:       11  Gender:     Male                         Technician: [id number]  :        1940                   Requested By:SOWMYA NUNEZ  Order #:    [id number]                    Reading MD: Sowmya Nunez    Measurements  Intervals                                 Axis  Rate:       47                           P:          -4  NH:         226                          QRS:        -26  QRSD:       147                          T:          -8  QT:         432  QTc:        382    Interpretive Statements  MOBITZ I AV BLOCK (WENCKEBACH  Right bundle branch block  Compared to ECG 2022 19:00:30  Atrial premature complex(es) now present  Atrial flutter no longer present  Ventricular premature complex(es) no longer present  Electronically Signed On 2022 19:22:41 PDT by Wilbert Nunez  Electronically Signed On 2022  19:25:24 PDT by Wilbert Nunez     EKG   Result Value Ref Range    Report       [facility] Emergency Dept.    Test Date:  2022  Pt Name:    COOKIE VELEZ                Department: ER  MRN:        [id number]                      Room:       [facility]  Gender:     Male                         Technician: [id number]  :        1940                   Requested By:ANNE BATRES  Order #:    [id number]                    Reading MD: Wilbert Nunez    Measurements  Intervals                                Axis  Rate:       86                           P:          -74  NH:         252                          QRS:        -31  QRSD:       144                          T:          -12  QT:         393  QTc:        470    Interpretive Statements  Second degree AV block, Mobitz I  Right bundle branch block  Compared to ECG 2022 15:08:52  Second-degree AV block, Mobitz type I (Wenckebach) now present  Sinus rhythm no longer present  Atrial premature complex(es) no longer present  Electronically Signed On 2022 19:24:26 PDT by Wilbert Nunez      12 Lead EKG: interpreted by me as above.     LABS  Labs Reviewed   LACTIC ACID - Abnormal; Notable for the following components:       Result Value    Lactic Acid 4.5 (*)     All other components within normal limits   LACTIC ACID - Abnormal; Notable for the following components:    Lactic Acid 4.4 (*)      All other components within normal limits    Narrative:     Repeat if initial lactic acid result is greater than 2   LACTIC ACID - Abnormal; Notable for the following components:    Lactic Acid 3.4 (*)     All other components within normal limits    Narrative:     Repeat if initial lactic acid result is greater than 2   CBC WITH DIFFERENTIAL - Abnormal; Notable for the following components:    WBC 15.4 (*)     RBC 3.94 (*)     Hemoglobin 11.0 (*)     Hematocrit 33.5 (*)     MCHC 32.8 (*)     Neutrophils-Polys 80.80 (*)     Lymphocytes 8.50 (*)     Neutrophils (Absolute) 12.47 (*)     Monos (Absolute) 1.44 (*)     All other components within normal limits   COMP METABOLIC PANEL - Abnormal; Notable for the following components:    Glucose 193 (*)     Bun 37 (*)     Creatinine 1.83 (*)     Calcium 8.4 (*)     AST(SGOT) 9 (*)     Albumin 2.9 (*)     Globulin 3.8 (*)     All other components within normal limits   URINALYSIS - Abnormal; Notable for the following components:    Glucose 250 (*)     Protein 30 (*)     Leukocyte Esterase Moderate (*)     Occult Blood Small (*)     All other components within normal limits    Narrative:     Indication for culture:->Evaluation for sepsis without a  clear source of infection   URINE MICROSCOPIC (W/UA) - Abnormal; Notable for the following components:    WBC  (*)     RBC 10-20 (*)     All other components within normal limits    Narrative:     Indication for culture:->Evaluation for sepsis without a  clear source of infection   TROPONIN - Abnormal; Notable for the following components:    Troponin T 47 (*)     All other components within normal limits   ESTIMATED GFR - Abnormal; Notable for the following components:    GFR (CKD-EPI) 36 (*)     All other components within normal limits   SED RATE - Abnormal; Notable for the following components:    Sed Rate Westergren 66 (*)     All other components within normal limits   CRP QUANTITIVE (NON-CARDIAC) - Abnormal; Notable  "for the following components:    Stat C-Reactive Protein 19.87 (*)     All other components within normal limits    Narrative:     Repeat if initial lactic acid result is greater than 2   POCT GLUCOSE DEVICE RESULTS - Abnormal; Notable for the following components:    POC Glucose, Blood 155 (*)     All other components within normal limits   COV-2, FLU A/B, AND RSV BY PCR (CEPHEID)   LACTIC ACID    Narrative:     If not done within the last 4 hours  Indicate which anticoagulants the patient is on:->UNKNOWN   URINE CULTURE(NEW)    Narrative:     Indication for culture:->Evaluation for sepsis without a  clear source of infection   BLOOD CULTURE    Narrative:     Per Hospital Policy: Only change Specimen Src: to \"Line\" if  specified by physician order.   BLOOD CULTURE    Narrative:     Per Hospital Policy: Only change Specimen Src: to \"Line\" if  specified by physician order.   PROTHROMBIN TIME   MRSA BY PCR (AMP)   LACTIC ACID     All labs reviewed by me.    RADIOLOGY  DX-CHEST-LIMITED (1 VIEW)   Final Result      Left subclavian central venous catheter tip extends slightly into the azygos vein. No pneumothorax.      DX-ANKLE 2- VIEWS LEFT   Final Result      1.  Soft tissue swelling about the ankle particularly laterally where there is a wound VAC.   2.  Previously seen lateral malleolus bone erosion/osteomyelitis is not well evaluated on the current study due to lack of oblique view.   3.  No definite new osseous abnormality is seen.         CT-CTA CHEST PULMONARY ARTERY W/ RECONS   Final Result      1.  No CT evidence of pulmonary embolus.      2.  Groundglass opacifications noted in the right upper pulmonary lobe could be due to pneumonitis pneumonia or other inflammatory process. Covid 19 pneumonia is in the differential diagnosis.      3.  Small area of consolidation is noted posterior edge of left lung apex with similar differential diagnosis.      4.  Opacification posteriorly throughout the lower lobes is " noted which could be due to atelectasis edema or inflammation.      5.  Trace pericardial fluid collection is also identified.            CT-ABDOMEN-PELVIS WITH   Final Result      1.  Urinary bladder wall thickening could indicate cystitis. Prostate gland is enlarged.      2.  Otherwise no acute abnormalities are identified in the abdomen or pelvis.      DX-CHEST-PORTABLE (1 VIEW)   Final Result      1.  Cardiomegaly with pulmonary vascular congestion and interstitial edema.   2.  Small ill-defined opacity in the right upper lobe may represent pneumonitis. Follow-up to radiographic resolution is recommended.   3.  Atherosclerotic plaque.        The radiologist’s interpretation of all radiology studies have been reviewed by me.    COURSE & MEDICAL DECISION MAKING  Pertinent Labs & Imaging studies reviewed. (See chart for details)    2:10 PM - Patient seen and examined at bedside. Ordered for labs and imaging to evaluate.     HYDRATION: Based on the patient's presentation of Dehydration the patient was given IV fluids. IV Hydration was used because oral hydration was not adequate alone. Upon recheck following hydration, the patient was feeling better.     2:42 PM - Patient was reevaluated at bedside. US performed by student and I at this time.     5:12 PM - I discussed the patient's case and the above findings with Dr. Allan (Pulmonary) who would like to put in central line and will then admit patient.      5:29 PM - I informed the patient that he has an infection. I discussed medication use with patient. Patient verbalizes understanding and agreement to this plan of care.      6:01 PM - Central line procedure performed by me at this time as noted above.     CRITICAL CARE  The very real possibilty of a deterioration of this patient's condition required the highest level of my preparedness for sudden, emergent intervention.  I provided critical care services, which included medication orders, frequent reevaluations  of the patient's condition and response to treatment, ordering and reviewing test results, and discussing the case with various consultants.  The critical care time associated with the care of the patient was 40 minutes. Review chart for interventions. This time is exclusive of any other billable procedures.      Medical Decision Making:  Patient presents with low blood pressure, and increased confusion.  He has dementia at baseline.  He has no acute complaints.  He has no abdominal tenderness, no obvious signs of rapidly spreading infection from his wound VAC site.  The patient's hypotension transiently responded to IV fluids.  Rush exam did demonstrate partially collapsible IVC, however otherwise no significant abnormalities.  Trace pericardial effusion.    Patient's urinalysis concerning for urinary tract infection.  He was given empiric broad-spectrum antibiotics.  After 30 cc/kg bolus that includes a liter from EMS and at least 1600 mL from us, the patient remained hypotensive and was started on low-dose peripheral norepinephrine with good effect.  Broad CAT scan was performed to rule out PE given the patient's new mild oxygen demand, which was reassuring, possible pneumonitis.  COVID testing negative.  Subclavian line placed given the patient's body habitus, other sites such as internal jugular appear to be poor choices due to excess tissue and beard.  I discussed antibiotic options given the patient's wound VAC with the pharmacist, who recommended a dose of vancomycin.    DISPOSITION:  Patient will be hospitalized by Dr. Allan in critical condition.     FINAL IMPRESSION  1. Septic shock (HCC)    2. Acute UTI    3. SADIE (acute kidney injury) (HCC)            Alvaro DOBSON (Scribe), am scribing for, and in the presence of, Wilbert Nunez M.D..    Electronically signed by: Alvaro Herman (Sriram), 8/20/2022    Wilbert DOBSON M.D. personally performed the services described in this documentation, as  scribed by Alvaro Herman in my presence, and it is both accurate and complete.    The note accurately reflects work and decisions made by me.  Wilbert Nunez M.D.  8/20/2022  10:36 PM      This dictation was created using voice recognition software. The accuracy of the dictation is limited to the abilities of the software. I expect there may be some errors of grammar and possibly content. The nursing notes were reviewed and certain aspects of this information were incorporated into this note.

## 2022-08-20 NOTE — ED NOTES
Tech from Lab called with critical result of lactic 4.4 at 1632. Critical lab result read back to Tech.   Dr. Nunez notified of critical lab result at 1633.  Critical lab result read back by Dr. Nunez.

## 2022-08-20 NOTE — ED NOTES
Med rec completed per MAR sent with patient from [facility].  Allergies reviewed per MAR. NKDA.  Pharmacy per facility: [facility].    Patient was on a 7 day course of nystatin oral suspension from 8/2/2022 - 8/8/2022.  Patient was on a 7 day course of doxycycline hyclate from 8/13/2022 - 8/19/2022.

## 2022-08-20 NOTE — ED TRIAGE NOTES
Chief Complaint   Patient presents with    Hypotension     Pt lives at a assisted living facility and per staff pt was noted to be more confused than normal. Initial BP per EMS was 59/47 and saturating 85% on room air. Pt has wound vac located to L lateral ankle.  mg/dL PTA.          BP (!) 83/46   Pulse 76   Temp 37 °C (98.6 °F) (Temporal)   Resp 16   Ht 1.829 m (6')   Wt 83.9 kg (185 lb)   BMI 25.09 kg/m²

## 2022-08-21 PROBLEM — F11.20 NARCOTIC DEPENDENCE (HCC): Status: ACTIVE | Noted: 2022-08-21

## 2022-08-21 PROBLEM — Z87.39 HISTORY OF OSTEOMYELITIS: Status: ACTIVE | Noted: 2022-08-21

## 2022-08-21 PROBLEM — G89.29 CHRONIC PAIN: Status: ACTIVE | Noted: 2022-08-21

## 2022-08-21 PROBLEM — N40.0 BENIGN PROSTATIC HYPERPLASIA WITHOUT LOWER URINARY TRACT SYMPTOMS: Status: ACTIVE | Noted: 2022-08-21

## 2022-08-21 PROBLEM — Z79.4 TYPE 2 DIABETES MELLITUS WITH HYPERGLYCEMIA, WITH LONG-TERM CURRENT USE OF INSULIN (HCC): Status: ACTIVE | Noted: 2019-09-22

## 2022-08-21 PROBLEM — Z66 DNR (DO NOT RESUSCITATE): Status: ACTIVE | Noted: 2022-08-21

## 2022-08-21 PROBLEM — N30.00 ACUTE CYSTITIS WITHOUT HEMATURIA: Status: ACTIVE | Noted: 2022-06-10

## 2022-08-21 PROBLEM — R79.89 ELEVATED TROPONIN: Status: ACTIVE | Noted: 2022-08-21

## 2022-08-21 LAB
ALBUMIN SERPL BCP-MCNC: 2.6 G/DL (ref 3.2–4.9)
ALBUMIN/GLOB SERPL: 0.7 G/DL
ALP SERPL-CCNC: 60 U/L (ref 30–99)
ALT SERPL-CCNC: <5 U/L (ref 2–50)
ANION GAP SERPL CALC-SCNC: 12 MMOL/L (ref 7–16)
AST SERPL-CCNC: 11 U/L (ref 12–45)
BASOPHILS # BLD AUTO: 0.4 % (ref 0–1.8)
BASOPHILS # BLD: 0.05 K/UL (ref 0–0.12)
BILIRUB SERPL-MCNC: 0.7 MG/DL (ref 0.1–1.5)
BUN SERPL-MCNC: 31 MG/DL (ref 8–22)
CALCIUM SERPL-MCNC: 8.1 MG/DL (ref 8.5–10.5)
CHLORIDE SERPL-SCNC: 103 MMOL/L (ref 96–112)
CO2 SERPL-SCNC: 22 MMOL/L (ref 20–33)
CREAT SERPL-MCNC: 1.11 MG/DL (ref 0.5–1.4)
EOSINOPHIL # BLD AUTO: 0.28 K/UL (ref 0–0.51)
EOSINOPHIL NFR BLD: 2.1 % (ref 0–6.9)
ERYTHROCYTE [DISTWIDTH] IN BLOOD BY AUTOMATED COUNT: 43.3 FL (ref 35.9–50)
GFR SERPLBLD CREATININE-BSD FMLA CKD-EPI: 66 ML/MIN/1.73 M 2
GLOBULIN SER CALC-MCNC: 3.5 G/DL (ref 1.9–3.5)
GLUCOSE BLD STRIP.AUTO-MCNC: 127 MG/DL (ref 65–99)
GLUCOSE BLD STRIP.AUTO-MCNC: 137 MG/DL (ref 65–99)
GLUCOSE SERPL-MCNC: 137 MG/DL (ref 65–99)
GRAM STN SPEC: NORMAL
HCT VFR BLD AUTO: 31.5 % (ref 42–52)
HGB BLD-MCNC: 10.4 G/DL (ref 14–18)
IMM GRANULOCYTES # BLD AUTO: 0.07 K/UL (ref 0–0.11)
IMM GRANULOCYTES NFR BLD AUTO: 0.5 % (ref 0–0.9)
LACTATE SERPL-SCNC: 1.3 MMOL/L (ref 0.5–2)
LYMPHOCYTES # BLD AUTO: 1.85 K/UL (ref 1–4.8)
LYMPHOCYTES NFR BLD: 14.1 % (ref 22–41)
MCH RBC QN AUTO: 27.6 PG (ref 27–33)
MCHC RBC AUTO-ENTMCNC: 33 G/DL (ref 33.7–35.3)
MCV RBC AUTO: 83.6 FL (ref 81.4–97.8)
MONOCYTES # BLD AUTO: 1.27 K/UL (ref 0–0.85)
MONOCYTES NFR BLD AUTO: 9.7 % (ref 0–13.4)
NEUTROPHILS # BLD AUTO: 9.6 K/UL (ref 1.82–7.42)
NEUTROPHILS NFR BLD: 73.2 % (ref 44–72)
NRBC # BLD AUTO: 0 K/UL
NRBC BLD-RTO: 0 /100 WBC
PLATELET # BLD AUTO: 305 K/UL (ref 164–446)
PMV BLD AUTO: 9.4 FL (ref 9–12.9)
POTASSIUM SERPL-SCNC: 3.7 MMOL/L (ref 3.6–5.5)
PROCALCITONIN SERPL-MCNC: 0.21 NG/ML
PROT SERPL-MCNC: 6.1 G/DL (ref 6–8.2)
RBC # BLD AUTO: 3.77 M/UL (ref 4.7–6.1)
SIGNIFICANT IND 70042: NORMAL
SITE SITE: NORMAL
SODIUM SERPL-SCNC: 137 MMOL/L (ref 135–145)
SOURCE SOURCE: NORMAL
TROPONIN T SERPL-MCNC: 28 NG/L (ref 6–19)
WBC # BLD AUTO: 13.1 K/UL (ref 4.8–10.8)

## 2022-08-21 PROCEDURE — 82962 GLUCOSE BLOOD TEST: CPT | Mod: 91

## 2022-08-21 PROCEDURE — 700101 HCHG RX REV CODE 250: Performed by: STUDENT IN AN ORGANIZED HEALTH CARE EDUCATION/TRAINING PROGRAM

## 2022-08-21 PROCEDURE — 302098 PASTE RING (FLAT): Performed by: INTERNAL MEDICINE

## 2022-08-21 PROCEDURE — 83605 ASSAY OF LACTIC ACID: CPT

## 2022-08-21 PROCEDURE — 36415 COLL VENOUS BLD VENIPUNCTURE: CPT

## 2022-08-21 PROCEDURE — 700102 HCHG RX REV CODE 250 W/ 637 OVERRIDE(OP): Performed by: INTERNAL MEDICINE

## 2022-08-21 PROCEDURE — 306591 TRAY SUTURE REMOVAL DISP: Performed by: INTERNAL MEDICINE

## 2022-08-21 PROCEDURE — 700105 HCHG RX REV CODE 258: Performed by: STUDENT IN AN ORGANIZED HEALTH CARE EDUCATION/TRAINING PROGRAM

## 2022-08-21 PROCEDURE — 97597 DBRDMT OPN WND 1ST 20 CM/<: CPT

## 2022-08-21 PROCEDURE — 99233 SBSQ HOSP IP/OBS HIGH 50: CPT | Performed by: INTERNAL MEDICINE

## 2022-08-21 PROCEDURE — 99223 1ST HOSP IP/OBS HIGH 75: CPT | Performed by: STUDENT IN AN ORGANIZED HEALTH CARE EDUCATION/TRAINING PROGRAM

## 2022-08-21 PROCEDURE — 700111 HCHG RX REV CODE 636 W/ 250 OVERRIDE (IP): Performed by: INTERNAL MEDICINE

## 2022-08-21 PROCEDURE — 87077 CULTURE AEROBIC IDENTIFY: CPT

## 2022-08-21 PROCEDURE — 700105 HCHG RX REV CODE 258: Performed by: INTERNAL MEDICINE

## 2022-08-21 PROCEDURE — 87205 SMEAR GRAM STAIN: CPT

## 2022-08-21 PROCEDURE — 92610 EVALUATE SWALLOWING FUNCTION: CPT

## 2022-08-21 PROCEDURE — 84145 PROCALCITONIN (PCT): CPT

## 2022-08-21 PROCEDURE — 87070 CULTURE OTHR SPECIMN AEROBIC: CPT

## 2022-08-21 PROCEDURE — 700102 HCHG RX REV CODE 250 W/ 637 OVERRIDE(OP)

## 2022-08-21 PROCEDURE — A9270 NON-COVERED ITEM OR SERVICE: HCPCS

## 2022-08-21 PROCEDURE — 770020 HCHG ROOM/CARE - TELE (206)

## 2022-08-21 PROCEDURE — 80053 COMPREHEN METABOLIC PANEL: CPT

## 2022-08-21 PROCEDURE — 84484 ASSAY OF TROPONIN QUANT: CPT

## 2022-08-21 PROCEDURE — 700111 HCHG RX REV CODE 636 W/ 250 OVERRIDE (IP): Performed by: STUDENT IN AN ORGANIZED HEALTH CARE EDUCATION/TRAINING PROGRAM

## 2022-08-21 PROCEDURE — A9270 NON-COVERED ITEM OR SERVICE: HCPCS | Performed by: INTERNAL MEDICINE

## 2022-08-21 PROCEDURE — 85025 COMPLETE CBC W/AUTO DIFF WBC: CPT

## 2022-08-21 RX ORDER — AMOXICILLIN 250 MG
2 CAPSULE ORAL 2 TIMES DAILY
Status: DISCONTINUED | OUTPATIENT
Start: 2022-08-21 | End: 2022-08-26 | Stop reason: HOSPADM

## 2022-08-21 RX ORDER — BISACODYL 10 MG
10 SUPPOSITORY, RECTAL RECTAL
Status: DISCONTINUED | OUTPATIENT
Start: 2022-08-21 | End: 2022-08-26 | Stop reason: HOSPADM

## 2022-08-21 RX ORDER — ATORVASTATIN CALCIUM 40 MG/1
40 TABLET, FILM COATED ORAL EVERY EVENING
Status: DISCONTINUED | OUTPATIENT
Start: 2022-08-21 | End: 2022-08-26 | Stop reason: HOSPADM

## 2022-08-21 RX ORDER — SODIUM HYPOCHLORITE 1.25 MG/ML
SOLUTION TOPICAL 2 TIMES DAILY
Status: DISCONTINUED | OUTPATIENT
Start: 2022-08-21 | End: 2022-08-23

## 2022-08-21 RX ORDER — ENOXAPARIN SODIUM 100 MG/ML
40 INJECTION SUBCUTANEOUS DAILY
Status: DISCONTINUED | OUTPATIENT
Start: 2022-08-21 | End: 2022-08-26 | Stop reason: HOSPADM

## 2022-08-21 RX ORDER — HYDROCODONE BITARTRATE AND ACETAMINOPHEN 5; 325 MG/1; MG/1
1 TABLET ORAL EVERY 6 HOURS PRN
Status: DISCONTINUED | OUTPATIENT
Start: 2022-08-21 | End: 2022-08-23

## 2022-08-21 RX ORDER — MIRTAZAPINE 15 MG/1
15 TABLET, FILM COATED ORAL
Status: DISCONTINUED | OUTPATIENT
Start: 2022-08-21 | End: 2022-08-26 | Stop reason: HOSPADM

## 2022-08-21 RX ORDER — POLYETHYLENE GLYCOL 3350 17 G/17G
1 POWDER, FOR SOLUTION ORAL
Status: DISCONTINUED | OUTPATIENT
Start: 2022-08-21 | End: 2022-08-26 | Stop reason: HOSPADM

## 2022-08-21 RX ORDER — TAMSULOSIN HYDROCHLORIDE 0.4 MG/1
0.8 CAPSULE ORAL
Status: DISCONTINUED | OUTPATIENT
Start: 2022-08-21 | End: 2022-08-26 | Stop reason: HOSPADM

## 2022-08-21 RX ADMIN — PIPERACILLIN AND TAZOBACTAM 3.38 G: 3; .375 INJECTION, POWDER, LYOPHILIZED, FOR SOLUTION INTRAVENOUS; PARENTERAL at 18:12

## 2022-08-21 RX ADMIN — ATORVASTATIN CALCIUM 40 MG: 40 TABLET, FILM COATED ORAL at 18:15

## 2022-08-21 RX ADMIN — SERTRALINE 50 MG: 50 TABLET, FILM COATED ORAL at 18:15

## 2022-08-21 RX ADMIN — SODIUM HYPOCHLORITE 1 ML: 1.25 SOLUTION TOPICAL at 18:00

## 2022-08-21 RX ADMIN — MIRTAZAPINE 15 MG: 15 TABLET, FILM COATED ORAL at 21:51

## 2022-08-21 RX ADMIN — PIPERACILLIN AND TAZOBACTAM 3.38 G: 3; .375 INJECTION, POWDER, LYOPHILIZED, FOR SOLUTION INTRAVENOUS; PARENTERAL at 21:53

## 2022-08-21 RX ADMIN — HYDROCODONE BITARTRATE AND ACETAMINOPHEN 1 TABLET: 5; 325 TABLET ORAL at 22:39

## 2022-08-21 RX ADMIN — INSULIN HUMAN 2 UNITS: 100 INJECTION, SOLUTION PARENTERAL at 23:40

## 2022-08-21 RX ADMIN — TAMSULOSIN HYDROCHLORIDE 0.8 MG: 0.4 CAPSULE ORAL at 21:51

## 2022-08-21 RX ADMIN — DOCUSATE SODIUM 50 MG AND SENNOSIDES 8.6 MG 2 TABLET: 8.6; 5 TABLET, FILM COATED ORAL at 18:15

## 2022-08-21 RX ADMIN — SODIUM CHLORIDE, POTASSIUM CHLORIDE, SODIUM LACTATE AND CALCIUM CHLORIDE: 600; 310; 30; 20 INJECTION, SOLUTION INTRAVENOUS at 02:36

## 2022-08-21 RX ADMIN — PIPERACILLIN AND TAZOBACTAM 4.5 G: 4; .5 INJECTION, POWDER, LYOPHILIZED, FOR SOLUTION INTRAVENOUS; PARENTERAL at 05:04

## 2022-08-21 RX ADMIN — SODIUM CHLORIDE, POTASSIUM CHLORIDE, SODIUM LACTATE AND CALCIUM CHLORIDE: 600; 310; 30; 20 INJECTION, SOLUTION INTRAVENOUS at 09:37

## 2022-08-21 RX ADMIN — PIPERACILLIN AND TAZOBACTAM 4.5 G: 4; .5 INJECTION, POWDER, LYOPHILIZED, FOR SOLUTION INTRAVENOUS; PARENTERAL at 12:54

## 2022-08-21 ASSESSMENT — ENCOUNTER SYMPTOMS
BLURRED VISION: 0
MEMORY LOSS: 1
EYE REDNESS: 0
SPEECH CHANGE: 0
BRUISES/BLEEDS EASILY: 0
ABDOMINAL PAIN: 0
TINGLING: 0
EYE DISCHARGE: 0
CONSTIPATION: 0
NAUSEA: 0
MYALGIAS: 0
COUGH: 0
VOMITING: 0
FOCAL WEAKNESS: 0
BACK PAIN: 0
FLANK PAIN: 0
HEADACHES: 0
DIZZINESS: 0
SHORTNESS OF BREATH: 0
DOUBLE VISION: 0
PALPITATIONS: 0
FEVER: 0
DIARRHEA: 1
WEAKNESS: 0
NECK PAIN: 0
DEPRESSION: 0
CHILLS: 0
DIARRHEA: 0
NERVOUS/ANXIOUS: 0
SORE THROAT: 0

## 2022-08-21 ASSESSMENT — PAIN DESCRIPTION - PAIN TYPE: TYPE: ACUTE PAIN

## 2022-08-21 ASSESSMENT — FIBROSIS 4 INDEX: FIB4 SCORE: 1.38

## 2022-08-21 ASSESSMENT — LIFESTYLE VARIABLES: SUBSTANCE_ABUSE: 0

## 2022-08-21 NOTE — ASSESSMENT & PLAN NOTE
This is Severe Sepsis Present on admission  SIRS criteria identified on my evaluation include: Tachycardia, with heart rate greater than 90 BPM and Leukocytosis, with WBC greater than 12,000  Clinical indicators of end organ dysfunction include Systolic blood pressure decrease of more than 40 mmHg  Source is urinary vs pna, vs wound  Sepsis protocol initiated  Crystalloid Fluid Administration: Fluid resuscitation ordered per standard protocol - 30 mL/kg per current or ideal body weight  IV antibiotics as appropriate for source of sepsis  Reassessment: I have reassessed the patient's hemodynamic status    Follow cultures  Wound care consult for left ankle wound   Lactic acid normalized  Weaned off levophed for MAP goals > 65  Cont empiric antibiotic coverage:  Zosyn

## 2022-08-21 NOTE — PROGRESS NOTES
This RN attempted to assist patient to reposition in bed. Pt refused this RN assistance. Education provided to pt on importance of repositioning self in bed. No learning evidence by pt.

## 2022-08-21 NOTE — ASSESSMENT & PLAN NOTE
Discussed with daughter, patient is not on seroquel and was given Seroquel only at [facility] for agitation 2/2 to not getting pain medications to control chronic knee pain  Daughter also stated that last time, patient did not tolerate Haldol well  Restart home Remeron and Zoloft

## 2022-08-21 NOTE — CONSULTS
Hospital Medicine Consultation    Date of Service  8/21/2022    Referring Physician  Felicia Hartman M.D.    Consulting Physician  Zay Baum M.D.    Reason for Consultation  Septic shock, de-escalation of care and transfer out of ICU     History of Presenting Illness  Andrei Garay is a 82-year-old gentleman who presented on 8/20/2022 with confusion and hypotension.  This is a pleasant [employer] , who resides at the [facility] unit.  He has a history of diabetes mellitus type 2, hypertension, dementia, hypercholesterolemia, urinary tension, dysphagia, myocardial infarction, GERD, osteoarthritis, and osteomyelitis of the left lateral malleolus that was treated with a wound VAC by orthopedic surgery in June 2022.  The patient was brought in by ambulance due to increasing confusion as well as low blood pressures.  The patient was noted to have a systolic blood pressure of 170 in the emergency room.  He was given sepsis bolus and started on norepinephrine due to persistent hypotension.  He did not have any significant complaints at the time of his presentation.  In the emergency room, patient had a white count of 15.4 with a left shift.  Lactic acid was elevated at 4.5.  Patient was afebrile at the time of presentation.  Source of patient's infection was unclear as the patient did not have any obvious signs of skin infection or osteomyelitis.  Urinalysis showed moderate leukocyte esterase with negative nitrites, negative bacteria.    Patient had a POLST stating selective medical therapies were to be continued.  Patient was started on Zosyn and vancomycin and continued on norepinephrine to be admitted to the intensive care unit.    Patient currently does not have any complaints.  Denies any pain.  He denies any dysuria.  He continues to have a Martinez catheter in place.  He denies any fevers or chills.  No shortness of breath or coughing.  He does report intermittent  diarrhea.    Review of Systems  Review of Systems   Constitutional:  Negative for chills and fever.   HENT:  Negative for ear pain and sore throat.    Eyes:  Negative for blurred vision and double vision.   Respiratory:  Negative for cough and shortness of breath.    Cardiovascular:  Negative for chest pain and palpitations.   Gastrointestinal:  Positive for diarrhea. Negative for abdominal pain, constipation, nausea and vomiting.   Genitourinary:  Negative for dysuria and frequency.   Musculoskeletal:  Negative for joint pain and myalgias.   Skin:  Negative for itching and rash.   Neurological:  Negative for dizziness, weakness and headaches.   Endo/Heme/Allergies:  Negative for environmental allergies. Does not bruise/bleed easily.   Psychiatric/Behavioral:  Positive for memory loss. Negative for depression and substance abuse. The patient is not nervous/anxious.      Past Medical History   has a past medical history of Diabetes ([facility]), Hyperlipemia, Hypertension, and MI (myocardial infarction) ([facility]).    Surgical History   has a past surgical history that includes stent placement; appendectomy; and irrigation & debridement general (6/14/2022).    Family History  family history includes Heart Disease in his father; No Known Problems in his mother.    Social History   reports that he quit smoking about 21 years ago. His smoking use included cigarettes. He has never used smokeless tobacco. He reports that he does not drink alcohol and does not use drugs.    Medications  Current Facility-Administered Medications   Medication Dose Route Frequency Provider Last Rate Last Admin    enoxaparin (Lovenox) inj 40 mg  40 mg Subcutaneous DAILY AT 1800 Felicia Hartman M.D.        senna-docusate (PERICOLACE or SENOKOT S) 8.6-50 MG per tablet 2 Tablet  2 Tablet Oral BID Felicia Hartman M.D.        And    polyethylene glycol/lytes (MIRALAX) PACKET 1 Packet  1 Packet Oral QDAY PRN Felicia Hartman M.D.        And    magnesium  hydroxide (MILK OF MAGNESIA) suspension 30 mL  30 mL Oral QDAY PRN Felicia Hartman M.D.        And    bisacodyl (DULCOLAX) suppository 10 mg  10 mg Rectal QDAY PRN Felicia Hartman M.D.        mirtazapine (Remeron) tablet 15 mg  15 mg Oral QHS Ayden Good M.D.        sertraline (Zoloft) tablet 50 mg  50 mg Oral Q EVENING Ayden Good M.D.        tamsulosin (FLOMAX) capsule 0.8 mg  0.8 mg Oral QHS Ayden Good M.D.        atorvastatin (LIPITOR) tablet 40 mg  40 mg Oral Q EVENING Ayden Good M.D.        HYDROcodone-acetaminophen (NORCO) 5-325 MG per tablet 1 Tablet  1 Tablet Oral Q6HRS PRN Ayden Good M.D.        dakins 0.125% (1/4 strength) topical soln   Topical BID Zay Baum M.D.        piperacillin-tazobactam (Zosyn) 3.375 g in  mL IVPB  3.375 g Intravenous Q8HRS Zay Baum M.D.        [START ON 8/22/2022] metoprolol tartrate (LOPRESSOR) tablet 12.5 mg  12.5 mg Oral TWICE DAILY Zay Baum M.D.        insulin regular (HumuLIN R,NovoLIN R) injection  2-9 Units Subcutaneous Q6HRS Thomas Allan M.D.   2 Units at 08/20/22 2001    And    dextrose 10 % BOLUS 25 g  25 g Intravenous Q15 MIN PRN Thomas Allan M.D.           Allergies  No Known Allergies    Physical Exam  Temp:  [36.3 °C (97.3 °F)-38.2 °C (100.8 °F)] 37 °C (98.6 °F)  Pulse:  [] 101  Resp:  [11-35] 18  BP: ()/(45-78) 119/63  SpO2:  [89 %-97 %] 94 %    Physical Exam  Vitals and nursing note reviewed.   Constitutional:       General: He is not in acute distress.     Appearance: Normal appearance. He is well-developed. He is not diaphoretic.   HENT:      Head: Normocephalic and atraumatic.      Right Ear: External ear normal.      Left Ear: External ear normal.      Nose: Nose normal.      Mouth/Throat:      Mouth: Mucous membranes are moist.      Pharynx: Oropharynx is clear. No oropharyngeal exudate or posterior oropharyngeal erythema.   Eyes:      General: No scleral icterus.        Right eye: No discharge.          Left eye: No discharge.      Conjunctiva/sclera: Conjunctivae normal.      Pupils: Pupils are equal, round, and reactive to light.   Neck:      Thyroid: No thyromegaly.      Vascular: No JVD.      Trachea: No tracheal deviation.   Cardiovascular:      Rate and Rhythm: Normal rate and regular rhythm.      Heart sounds: Normal heart sounds. No murmur heard.    No friction rub. No gallop.      Comments: Left subclavian central line  Pulmonary:      Effort: Pulmonary effort is normal. No respiratory distress.      Breath sounds: Normal breath sounds. No stridor. No wheezing or rales.   Abdominal:      General: Bowel sounds are normal. There is no distension.      Palpations: Abdomen is soft. There is no mass.      Tenderness: There is no abdominal tenderness. There is no guarding or rebound.   Genitourinary:     Comments: Martinez catheter is in place  Musculoskeletal:         General: No tenderness or deformity.      Cervical back: Neck supple. No tenderness.      Right lower leg: No edema.      Left lower leg: No edema.   Lymphadenopathy:      Cervical: No cervical adenopathy.   Skin:     General: Skin is warm and dry.      Capillary Refill: Capillary refill takes 2 to 3 seconds.      Coloration: Skin is not pale.      Findings: No erythema or rash.   Neurological:      Mental Status: He is alert and oriented to person, place, and time.      Sensory: No sensory deficit.      Motor: No weakness or abnormal muscle tone.   Psychiatric:         Attention and Perception: Attention normal.         Mood and Affect: Mood normal.         Behavior: Behavior normal. Behavior is cooperative.         Thought Content: Thought content normal.         Cognition and Memory: Cognition is impaired. Memory is impaired. He exhibits impaired recent memory and impaired remote memory.         Judgment: Judgment normal.           Fluids  Date 08/21/22 0700 - 08/22/22 0659   Shift [id number] 0030-0659 24 Hour Total   INTAKE   P.O. 50    50   Shift Total 50   50   OUTPUT   Urine 275   275   Shift Total 275   275   Weight (kg) 83.9 83.9 83.9 83.9       Laboratory  Recent Labs     08/20/22  1355 08/21/22  0155   WBC 15.4* 13.1*   RBC 3.94* 3.77*   HEMOGLOBIN 11.0* 10.4*   HEMATOCRIT 33.5* 31.5*   MCV 85.0 83.6   MCH 27.9 27.6   MCHC 32.8* 33.0*   RDW 44.3 43.3   PLATELETCT 289 305   MPV 9.3 9.4     Recent Labs     08/20/22  1355 08/21/22  0155   SODIUM 136 137   POTASSIUM 4.5 3.7   CHLORIDE 99 103   CO2 22 22   GLUCOSE 193* 137*   BUN 37* 31*   CREATININE 1.83* 1.11   CALCIUM 8.4* 8.1*                     Imaging  DX-CHEST-LIMITED (1 VIEW)   Final Result      Left subclavian central venous catheter tip extends slightly into the azygos vein. No pneumothorax.      DX-ANKLE 2- VIEWS LEFT   Final Result      1.  Soft tissue swelling about the ankle particularly laterally where there is a wound VAC.   2.  Previously seen lateral malleolus bone erosion/osteomyelitis is not well evaluated on the current study due to lack of oblique view.   3.  No definite new osseous abnormality is seen.         CT-CTA CHEST PULMONARY ARTERY W/ RECONS   Final Result      1.  No CT evidence of pulmonary embolus.      2.  Groundglass opacifications noted in the right upper pulmonary lobe could be due to pneumonitis pneumonia or other inflammatory process. Covid 19 pneumonia is in the differential diagnosis.      3.  Small area of consolidation is noted posterior edge of left lung apex with similar differential diagnosis.      4.  Opacification posteriorly throughout the lower lobes is noted which could be due to atelectasis edema or inflammation.      5.  Trace pericardial fluid collection is also identified.            CT-ABDOMEN-PELVIS WITH   Final Result      1.  Urinary bladder wall thickening could indicate cystitis. Prostate gland is enlarged.      2.  Otherwise no acute abnormalities are identified in the abdomen or pelvis.      DX-CHEST-PORTABLE (1 VIEW)   Final Result       1.  Cardiomegaly with pulmonary vascular congestion and interstitial edema.   2.  Small ill-defined opacity in the right upper lobe may represent pneumonitis. Follow-up to radiographic resolution is recommended.   3.  Atherosclerotic plaque.      MR-ANKLE-WITH & W/O LEFT    (Results Pending)       Left ankle x-rays per my read shows mild erosion of the left lateral malleolus.        Chest x-ray per my read shows mild interstitial rinses bilaterally.  Sharp costophrenic angles bilaterally.  Left subclavian central line in place.        EKG per my read shows normal sinus rhythm with heart rate of 86, QTc 470, no significant ST elevation or depression.  Right bundle branch block present.      Assessment/Plan  * Septic shock (HCC)- (present on admission)  Assessment & Plan  This is Septic shock Present on admission  SIRS criteria identified on my evaluation include: Tachypnea, with respirations greater than 20 per minute and Leukocytosis, with WBC greater than 12,000  Indicators of septic shock include: Severe sepsis present, persistent hypotension after 30 ml/kg completed, and initial lactate level result is >= 4 mmol/L.  Acute kidney injury with creatinine of 1.8.  Sources is: Urinary tract versus persistent osteomyelitis of the left lateral malleolus.  Unlikely pulmonary given no pulmonary symptoms.  Sepsis protocol initiated  Crystalloid Fluid Administration: Fluid resuscitation ordered per standard protocol - 30 mL/kg per current or ideal body weight  IV antibiotics as appropriate for source of sepsis  Reassessment: I have reassessed the patient's hemodynamic status  Patient has been off of norepinephrine pressor support since 4 AM 8/21/2022.    Decrease Zosyn to 3.375 mg IV every 8 hours  MRI left ankle to assess for persistent osteomyelitis  Consider infectious disease consultation if signs of infection on MRI    History of osteomyelitis- (present on admission)  Assessment & Plan  Involving the left  malleolus.  Wound VAC placed by orthopedic surgery back on 6/14/2022.    I have ordered MRI of the left ankle with and without contrast to further assess for persistent osteomyelitis that may serve as source of patient's septic shock.    Ankle wound- (present on admission)  Assessment & Plan  As per history.  Involving the lateral malleolus with osteomyelitis.  Patient has had a wound VAC in place.  Underwent debridement by wound care.  Last cultures from June 2022 grew:  Corynebacterium striatum group     I ordered MRI of the left ankle to further assess for continuing infection  Continue wound care  Consider infectious diseases consultation    Acute cystitis without hematuria- (present on admission)  Assessment & Plan  As per urinalysis with moderate leukocyte esterase, nitrite negativity.    I ordered urine cultures  Continue Zosyn  Discontinue Martinez catheter for voiding trial    Benign prostatic hyperplasia without lower urinary tract symptoms- (present on admission)  Assessment & Plan  As per history.  Martinez catheter was placed on admission.    Continue home tamsulosin 0.8 mg daily  Discontinue Martinez catheter for voiding trial    Elevated troponin- (present on admission)  Assessment & Plan  Mildly elevated.  Trending down.  Likely from demand ischemia.    Continue telemetry monitoring    Narcotic dependence (HCC)- (present on admission)  Assessment & Plan  As per history.    Continue home Talco    Chronic pain- (present on admission)  Assessment & Plan  As per history.  Has been taking Norco.    Continue home Norco    SADIE (acute kidney injury) (HCC)- (present on admission)  Assessment & Plan  Creatinine was elevated at 1.8 on admission.  Resolved with IV fluid resuscitation and pressor support.    Continue to monitor  Avoid nephrotoxins    HTN (hypertension)- (present on admission)  Assessment & Plan  As per history.  Blood pressure is currently controlled.  Blood pressure goal less than 150 systolic given his  age group.    Resume low-dose metoprolol tomorrow morning  Continue to hold home lisinopril and amlodipine    Dementia with behavioral disturbance (HCC)- (present on admission)  Assessment & Plan  As per history now residing at a memory care center at the [facility].    Minimize disturbances  Continue home sertraline and mirtazapine    Type 2 diabetes mellitus with hyperglycemia, with long-term current use of insulin (HCC)- (present on admission)  Assessment & Plan  Hemoglobin A1c of 6.32 months ago.  Well-controlled.    Continue regular insulin sliding scale  Carbohydrate consistent diet  Continue to hold home metformin    DNR (do not resuscitate)  Assessment & Plan  Patient is DNR/DNI status as per patient's POLST, which I have reviewed.

## 2022-08-21 NOTE — PROGRESS NOTES
Critical Care Progress Note    Date of admission  8/20/2022    Chief Complaint  82 y.o. male admitted 8/20/2022 with severe sepsis with septic shock    Hospital Course  82 y.o. male with DM2, HTN, Chol, dementia, urinary retention, dysphagia, AMI, GERD, OA, MRSA bacteremia 6/11 and left ankle osteomyelitis who presented 8/20/2022 from [facility] unit for low BP. His SBP was 70 in the ER.  He was given sepsis bolus and started on norepinephrine. Patient is without complaints of chest pain, abdomina or back pain, cough or sputum production. The patient advanced directives that reports that he is a DNR and avoid ICU.  However, he will be admitted to the ICU for ongoing titration of norepinephrine for septic shock.    Interval Problem Update  Reviewed last 24 hour events:   - no events overnight   - SR 80-90s   - SBP 120s   - afebrile   - levo off this am   - NPO   - Martinez with 1700cc UOP overnight   - mobility level 3   - Lactate < 2   - no RT issues   - no imaging today   - lovenox   - day 2 of zosyn   - WBCs 13   - K 3.7   - creat 1.11   - -130s    Review of Systems  Review of Systems   Constitutional:  Positive for malaise/fatigue. Negative for chills and fever.   HENT:  Negative for congestion and sore throat.    Eyes:  Negative for discharge and redness.   Respiratory:  Negative for cough and shortness of breath.    Cardiovascular:  Negative for chest pain and leg swelling.   Gastrointestinal:  Negative for abdominal pain, diarrhea, nausea and vomiting.   Genitourinary:  Negative for flank pain and hematuria.   Musculoskeletal:  Negative for back pain and neck pain.   Skin:  Negative for rash.   Neurological:  Negative for dizziness, speech change, focal weakness and weakness.   Endo/Heme/Allergies:  Does not bruise/bleed easily.   Psychiatric/Behavioral:  Negative for depression. The patient is not nervous/anxious.       Vital Signs for last 24 hours   Temp:  [36.3 °C (97.3  °F)-37 °C (98.6 °F)] 36.5 °C (97.7 °F)  Pulse:  [49-91] 87  Resp:  [11-26] 24  BP: ()/(45-78) 126/62  SpO2:  [89 %-97 %] 95 %    Hemodynamic parameters for last 24 hours       Respiratory Information for the last 24 hours       Physical Exam   Physical Exam  Vitals and nursing note reviewed.   Constitutional:       General: He is not in acute distress.     Appearance: He is ill-appearing. He is not toxic-appearing.   HENT:      Head: Normocephalic and atraumatic.      Right Ear: External ear normal.      Left Ear: External ear normal.      Nose: Nose normal. No rhinorrhea.      Mouth/Throat:      Mouth: Mucous membranes are moist.      Pharynx: Oropharynx is clear. No oropharyngeal exudate.   Eyes:      General: No scleral icterus.     Conjunctiva/sclera: Conjunctivae normal.      Pupils: Pupils are equal, round, and reactive to light.   Cardiovascular:      Rate and Rhythm: Normal rate and regular rhythm.      Pulses: Normal pulses.      Heart sounds: Normal heart sounds. No murmur heard.  Pulmonary:      Breath sounds: No wheezing.   Chest:      Chest wall: No tenderness.   Abdominal:      General: There is no distension.      Palpations: Abdomen is soft.      Tenderness: There is no abdominal tenderness. There is no guarding or rebound.   Musculoskeletal:         General: Normal range of motion.      Cervical back: Normal range of motion and neck supple.      Right lower leg: No edema.      Left lower leg: No edema.   Lymphadenopathy:      Cervical: No cervical adenopathy.   Skin:     General: Skin is warm and dry.      Capillary Refill: Capillary refill takes less than 2 seconds.      Findings: No rash.   Neurological:      Mental Status: He is alert.      Cranial Nerves: No cranial nerve deficit.      Sensory: No sensory deficit.      Motor: No weakness.   Psychiatric:         Mood and Affect: Mood normal.         Behavior: Behavior normal.         Thought Content: Thought content normal.        Medications  Current Facility-Administered Medications   Medication Dose Route Frequency Provider Last Rate Last Admin    lactated ringers infusion   Intravenous Continuous Thomas Allan M.D. 150 mL/hr at 08/21/22 0236 New Bag at 08/21/22 0236    piperacillin-tazobactam (Zosyn) 4.5 g in  mL IVPB  4.5 g Intravenous Q8HRS Thomas Allan M.D. 25 mL/hr at 08/21/22 0504 4.5 g at 08/21/22 0504    MD Alert...Vancomycin per Pharmacy  1 Each Other PHARMACY TO DOSE Thomas Allan M.D.        norepinephrine (Levophed) 8 mg in 250 mL NS infusion (premix)  0.5-30 mcg/min Intravenous Continuous Thomas Allan M.D.   Stopped at 08/21/22 0400    And    vasopressin (VASOSTRICT) 20 Units in  mL Infusion  0.03 Units/min Intravenous Continuous Thomas Allan M.D.   Dose not Required at 08/20/22 1800    insulin regular (HumuLIN R,NovoLIN R) injection  2-9 Units Subcutaneous Q6HRS Thomas Allan M.D.   2 Units at 08/20/22 2001    And    dextrose 10 % BOLUS 25 g  25 g Intravenous Q15 MIN PRN Thomas Allan M.D.           Fluids    Intake/Output Summary (Last 24 hours) at 8/21/2022 0627  Last data filed at 8/21/2022 0600  Gross per 24 hour   Intake 3295.86 ml   Output 1910 ml   Net 1385.86 ml       Laboratory          Recent Labs     08/20/22  1355 08/21/22  0155   SODIUM 136 137   POTASSIUM 4.5 3.7   CHLORIDE 99 103   CO2 22 22   BUN 37* 31*   CREATININE 1.83* 1.11   CALCIUM 8.4* 8.1*     Recent Labs     08/20/22  1355 08/21/22  0155   ALTSGPT 6 <5   ASTSGOT 9* 11*   ALKPHOSPHAT 65 60   TBILIRUBIN 0.6 0.7   GLUCOSE 193* 137*     Recent Labs     08/20/22  1355 08/21/22  0155   WBC 15.4* 13.1*   NEUTSPOLYS 80.80* 73.20*   LYMPHOCYTES 8.50* 14.10*   MONOCYTES 9.30 9.70   EOSINOPHILS 0.50 2.10   BASOPHILS 0.40 0.40   ASTSGOT 9* 11*   ALTSGPT 6 <5   ALKPHOSPHAT 65 60   TBILIRUBIN 0.6 0.7     Recent Labs     08/20/22  1355 08/21/22  0155   RBC 3.94* 3.77*   HEMOGLOBIN 11.0* 10.4*   HEMATOCRIT 33.5* 31.5*    PLATELETCT 289 305       Imaging  Reviewed    Assessment/Plan  * Septic shock (HCC)- (present on admission)  Assessment & Plan  This is Severe Sepsis Present on admission  SIRS criteria identified on my evaluation include: Tachycardia, with heart rate greater than 90 BPM and Leukocytosis, with WBC greater than 12,000  Clinical indicators of end organ dysfunction include Systolic blood pressure decrease of more than 40 mmHg  Source is urinary vs pna, vs wound  Sepsis protocol initiated  Crystalloid Fluid Administration: Fluid resuscitation ordered per standard protocol - 30 mL/kg per current or ideal body weight  IV antibiotics as appropriate for source of sepsis  Reassessment: I have reassessed the patient's hemodynamic status    Follow cultures  Wound care consult for left ankle wound   Lactic acid normalized  Weaned off levophed for MAP goals > 65  Cont empiric antibiotic coverage:  Zosyn    Elevated troponin  Assessment & Plan  Patient denies chest pain, possibly related to sepsis  Troponin: 47 --> 28.    Chronic pain  Assessment & Plan  Likely 2/2 to OA of b/l knees  Restart home [place] to reduce risk of agitation similar to previous admission.    Pneumonitis  Assessment & Plan  Likely aspiration  Covid and viral screening negative  Speech eval performed, okay for soft diet    Ankle wound  Assessment & Plan  XR showed soft tissue swelling and previously seen lateral malleolus bone erosion/osteomyelitis not well evaluated   ESR 66  Wound care consultation   Continue Zosyn  Possible MRI based upon clinical progression.    MRSA bacteremia- (present on admission)  Assessment & Plan  Discussed with Pharmacy, h/o MRSA cellulitis infection  Will hold off on starting empiric coverage unless sepsis worsens    UTI (urinary tract infection)- (present on admission)  Assessment & Plan  Place barbosa catheter  Hx of urinary retention, like Urology outpatient f/u  CT abdomen with no complicated urinary tract disoreders  Follow  up urine cultures  Continue Zosyn.   Restart flomax     Advance care planning- (present on admission)  Assessment & Plan  Per POLST, DNR and DNI. However in comments section of POLST, states trial of artificial breathing for 3 months  Palliative consult.    SADIE (acute kidney injury) (HCC)  Assessment & Plan  Improving renal function this morning  CT negative on admission for obstruction other then chronic bladder thickening  Maintain euvolemia and monitor fluid responsiveness (avoid NaCL and renal congestion)  Titrated off pressors  Monitor urine output and I&O's  Hold lisinopril  If Cr worsens, consider renal U/S, UA, CPK  Martinez catheter    HTN (hypertension)- (present on admission)  Assessment & Plan  Titrated off of Levophed  Restart home BP meds as tolerated    Dysphagia  Assessment & Plan  Hx of with pneumonitis on ct scan  Speech saw patient on 8/21 and cleared for soft and bite sized diet  Aspiration precautions.    Dementia with behavioral disturbance (HCC)- (present on admission)  Assessment & Plan  Discussed with daughter, patient is not on seroquel and was given Seroquel only at [facility] for agitation 2/2 to not getting pain medications to control chronic knee pain  Daughter also stated that last time, patient did not tolerate Haldol well  Restart home Remeron and Zoloft    Diabetes mellitus with hyperglycemia (HCC)- (present on admission)  Assessment & Plan  BG goals 120-180s  Cont medium ISS  Hypoglycemic protocols        VTE:  Lovenox  Ulcer: Not Indicated  Lines: Central Line  Ongoing indication addressed and Martinez Catheter  Ongoing indication addressed Will keep central line today due to poor access    I have performed a physical exam and reviewed and updated ROS and Plan today (8/21/2022). In review of yesterday's note (8/20/2022), there are no changes except as documented above.     Discussed patient condition and risk of morbidity and/or mortality with Family, RN, RT, Pharmacy, Charge nurse / hot  rounds, and Patient    The patient has been successfully fluid resuscitated and weaned off of his Levophed for septic shock from a urinary source.  The patient's lactic acid has normalized as well as his renal function is improving.  We will continue Zosyn however despite a history of MRSA we will hold off on MRSA coverage at this time.  The patient no longer requires ICU care and will be transferred out of the ICU.  Case was discussed with Dr. Osborn who will take over care at this time.  The critical care team will sign off at this point.

## 2022-08-21 NOTE — ASSESSMENT & PLAN NOTE
Involving the left malleolus.  Wound VAC placed by orthopedic surgery back on 6/14/2022.    Orthopedic surgery again intervened for left fibula resection and irrigation and debridement on 8/22/2022.    Clean margins with negative cultures after 4 days, per ID Dr. Holley he can complete a 2 week oral course of Keflex 1000mg TID

## 2022-08-21 NOTE — ASSESSMENT & PLAN NOTE
As per history.  Involving the lateral malleolus with osteomyelitis.  Patient has had a wound VAC in place.  Underwent debridement by wound care.  Last cultures from June 2022 grew:  Corynebacterium striatum group     -Orthopedic surgery performed left distal fibula resection with repeat irrigation and debridement of left lateral malleolus wound the evening of 8/22/2022  -Margin cultures pending to guide further abx management  -Wound cultures growing GBS, ID recommended cefazolin which was started in lieu of Zosyn and vancomycin on 8/24. On 8/26, margin cultures clean after 4 days, ID physician Dr. Holley subsequently recommending with clean margins that he could be switched to 1000mg cephalexin PO TID for 2 weeks of oral therapy.  -Limb preservation service saw patient, as of 8/25/2022 stating Bedside RN can switch tubing connection from in house VAC to home Prevena VAC upon DC

## 2022-08-21 NOTE — ASSESSMENT & PLAN NOTE
Likely 2/2 to OA of b/l knees  Restart home [place] to reduce risk of agitation similar to previous admission.

## 2022-08-21 NOTE — ASSESSMENT & PLAN NOTE
As per history.  Blood pressure is currently controlled.  Blood pressure goal less than 150 systolic given his age group.    Holding metoprolol due to repeated 1.6-1.7s duration sinus pauses on telemetry noted 8/22/2022  Continue to hold home lisinopril and amlodipine

## 2022-08-21 NOTE — CONSULTS
Critical Care Consultation    Date of consult: 8/20/2022    Referring Physician  Wilbert Nunez M.D.    Reason for Consultation  Septic shock on pressors    History of Presenting Illness  82 y.o. male who presented 8/20/2022 with DM2, HTN, Chol, dementia, urinary retention, dysphagia, AMI, GERD, OA, MRSA bacteremia 6/11 and left ankle osteomyelitis. That presents from [facility] unit for low BP. He was SBP 70 was seen in ER no further hx could be obtained. Given sepsis bolus and started on norepinephrine. Patient is without complaints of chest pain, abdomina or back pain, cough or sputum production. He tells me that his wound is not healing on his left lower leg with wound vac in place. He thinks its 2002 and doesn't know the month. Patient with advance directive with DNR and selective rx and try to avoid ICU level care. Labs show elevated wbc, left shift, lactate of 4, gina and dirty urine and ct abdomen with urinary bladder thickening, cta of chest pna and pneumonitis findings on 2l n/c.     Code Status  DNAR/DNI    Review of Systems  Review of Systems   Unable to perform ROS: Mental acuity     Past Medical History   has a past medical history of Diabetes (HCC), Hyperlipemia, Hypertension, and MI (myocardial infarction) ([facility]).    Surgical History   has a past surgical history that includes stent placement; appendectomy; and irrigation & debridement general (6/14/2022).    Family History  family history is not on file.    Social History   reports that he quit smoking about 21 years ago. He has never used smokeless tobacco. He reports that he does not drink alcohol and does not use drugs.    Medications  Home Medications       Reviewed by Bonifacio Gutierrez (Pharmacy Tech) on 08/20/22 at 1634  Med List Status: Complete     Medication Last Dose Status   amLODIPine (NORVASC) 5 MG Tab 8/20/2022 Active   atorvastatin (LIPITOR) 40 MG Tab 8/19/2022 Active   doxycycline (VIBRAMYCIN) 100 MG Tab  8/19/2022 Active   HYDROcodone-acetaminophen (NORCO) 5-325 MG Tab per tablet 8/20/2022 Active   HYDROcodone-acetaminophen (NORCO) 5-325 MG Tab per tablet 8/20/2022 Active   insulin regular (HUMULIN R) 100 Unit/mL Solution 8/20/2022 Active   lisinopril (PRINIVIL) 40 MG tablet 8/20/2022 Active   magnesium hydroxide (MILK OF MAGNESIA) 400 MG/5ML Suspension 8/10/2022 Active   Melatonin 10 MG Tab 8/19/2022 Active   metformin (GLUCOPHAGE) 1000 MG tablet 8/20/2022 Active   metoprolol tartrate (LOPRESSOR) 25 MG Tab 8/20/2022 Active   mirtazapine (REMERON) 15 MG Tab 8/19/2022 Active   nystatin (MYCOSTATIN) 083129 UNIT/ML Suspension 8/8/2022 Active   Saccharomyces boulardii (FLORASTOR PO) 8/20/2022 Active   sertraline (ZOLOFT) 50 MG Tab 8/19/2022 Active   tamsulosin (FLOMAX) 0.4 MG capsule 8/19/2022 Active                  Current Facility-Administered Medications   Medication Dose Route Frequency Provider Last Rate Last Admin    vancomycin (VANCOCIN) 2,000 mg in  mL IVPB  25 mg/kg Intravenous Once Wilbert Nunez M.D.        lactated ringers infusion   Intravenous Continuous Thomas Allan M.D.        piperacillin-tazobactam (Zosyn) 4.5 g in  mL IVPB  4.5 g Intravenous Q8HRS Thomas Allan M.D.        MD Alert...Vancomycin per Pharmacy  1 Each Other PHARMACY TO DOSE Thomas Allan M.D.        norepinephrine (Levophed) 8 mg in 250 mL NS infusion (premix)  0.5-30 mcg/min Intravenous Continuous Thomas Allan M.D.        And    vasopressin (VASOSTRICT) 20 Units in  mL Infusion  0.03 Units/min Intravenous Continuous Thomas Allan M.D.        insulin regular (HumuLIN R,NovoLIN R) injection  2-9 Units Subcutaneous Q6HRS Thomas Allan M.D.        And    dextrose 10 % BOLUS 25 g  25 g Intravenous Q15 MIN PRN Thomas Allan, M.D.         Current Outpatient Medications   Medication Sig Dispense Refill    insulin regular (HUMULIN R) 100 Unit/mL Solution Inject 2-10 Units under the skin 4 Times a  Day,Before Meals and at Bedtime. Inject per sliding scale. For blood glucose:  151 - 200 = 2 units  201 - 250 = 4 units  251 - 300 = 6 units  301 - 350 = 8 units  351 - 400 = 10 units  If >400, notify M.D.      amLODIPine (NORVASC) 5 MG Tab Take 5 mg by mouth every day. Hold for SBP <110, HR <60.      tamsulosin (FLOMAX) 0.4 MG capsule Take 0.8 mg by mouth at bedtime. 2 capsules = 0.8 mg.      doxycycline (VIBRAMYCIN) 100 MG Tab Take 100 mg by mouth 2 times a day. 7 day course (8/13/2022 - 8/19/2022).      HYDROcodone-acetaminophen (NORCO) 5-325 MG Tab per tablet Take 1 Tablet by mouth 2 times a day.      Saccharomyces boulardii (FLORASTOR PO) Take 1 Capsule by mouth 2 times a day.      nystatin (MYCOSTATIN) 741613 UNIT/ML Suspension Take 500,000 Units by mouth 3 times a day. 8/2/2022 - 8/8/2022.      magnesium hydroxide (MILK OF MAGNESIA) 400 MG/5ML Suspension Take 30 mL by mouth every 24 hours as needed (if no bowel movement for 72 hours).      HYDROcodone-acetaminophen (NORCO) 5-325 MG Tab per tablet Take 1 Tablet by mouth every 6 hours as needed (Acute Pain).      atorvastatin (LIPITOR) 40 MG Tab Take 1 Tablet by mouth every evening. 30 Tablet 1    lisinopril (PRINIVIL) 40 MG tablet Take 1 Tablet by mouth every day. 30 Tablet 1    Melatonin 10 MG Tab Take 1 tablet by mouth at bedtime for 30 days. 30 Tablet 0    metoprolol tartrate (LOPRESSOR) 25 MG Tab Take 1 Tablet by mouth 2 times a day. 60 Tablet 1    sertraline (ZOLOFT) 50 MG Tab Take 1 Tablet by mouth every evening. 30 Tablet 1    mirtazapine (REMERON) 15 MG Tab Take 1 Tablet by mouth at bedtime. 30 Tablet 1    metformin (GLUCOPHAGE) 1000 MG tablet Take 1 Tablet by mouth 2 times a day. 60 Tablet 1       Allergies  No Known Allergies    Vital Signs last 24 hours  Temp:  [37 °C (98.6 °F)] 37 °C (98.6 °F)  Pulse:  [49-84] 80  Resp:  [12-18] 13  BP: ()/(45-57) 98/50  SpO2:  [89 %-97 %] 91 %    Physical Exam  Physical Exam  Vitals and nursing note  reviewed.   Constitutional:       General: He is not in acute distress.     Appearance: He is ill-appearing.      Comments: Chronic ill appearing male in no acute distress   HENT:      Head: Normocephalic.      Mouth/Throat:      Mouth: Mucous membranes are dry.   Eyes:      Pupils: Pupils are equal, round, and reactive to light.   Cardiovascular:      Rate and Rhythm: Normal rate.   Pulmonary:      Effort: No respiratory distress.      Breath sounds: No stridor. No wheezing or rhonchi.   Abdominal:      General: There is no distension.      Palpations: There is no mass.      Tenderness: There is no abdominal tenderness. There is no right CVA tenderness, left CVA tenderness, guarding or rebound.      Hernia: No hernia is present.   Musculoskeletal:         General: No swelling or tenderness.      Cervical back: Normal range of motion.      Comments: No mottling left lower leg lateral ankle with wound vac warmth to this area no tracking or creptitus   Skin:     Coloration: Skin is pale. Skin is not jaundiced.   Neurological:      Comments: Patient is alert and answers yes no question and follows commands in all extremities, is confused to specific event thinks its 2002 and not know the month.    Psychiatric:      Comments: Flat mood       Fluids    Intake/Output Summary (Last 24 hours) at 8/20/2022 1809  Last data filed at 8/20/2022 1539  Gross per 24 hour   Intake 100 ml   Output 200 ml   Net -100 ml       Laboratory  Recent Results (from the past 48 hour(s))   Lactic acid (lactate)    Collection Time: 08/20/22  1:55 PM   Result Value Ref Range    Lactic Acid 4.5 (HH) 0.5 - 2.0 mmol/L   CBC WITH DIFFERENTIAL    Collection Time: 08/20/22  1:55 PM   Result Value Ref Range    WBC 15.4 (H) 4.8 - 10.8 K/uL    RBC 3.94 (L) 4.70 - 6.10 M/uL    Hemoglobin 11.0 (L) 14.0 - 18.0 g/dL    Hematocrit 33.5 (L) 42.0 - 52.0 %    MCV 85.0 81.4 - 97.8 fL    MCH 27.9 27.0 - 33.0 pg    MCHC 32.8 (L) 33.7 - 35.3 g/dL    RDW 44.3 35.9 -  50.0 fL    Platelet Count 289 164 - 446 K/uL    MPV 9.3 9.0 - 12.9 fL    Neutrophils-Polys 80.80 (H) 44.00 - 72.00 %    Lymphocytes 8.50 (L) 22.00 - 41.00 %    Monocytes 9.30 0.00 - 13.40 %    Eosinophils 0.50 0.00 - 6.90 %    Basophils 0.40 0.00 - 1.80 %    Immature Granulocytes 0.50 0.00 - 0.90 %    Nucleated RBC 0.00 /100 WBC    Neutrophils (Absolute) 12.47 (H) 1.82 - 7.42 K/uL    Lymphs (Absolute) 1.31 1.00 - 4.80 K/uL    Monos (Absolute) 1.44 (H) 0.00 - 0.85 K/uL    Eos (Absolute) 0.08 0.00 - 0.51 K/uL    Baso (Absolute) 0.06 0.00 - 0.12 K/uL    Immature Granulocytes (abs) 0.08 0.00 - 0.11 K/uL    NRBC (Absolute) 0.00 K/uL   COMP METABOLIC PANEL    Collection Time: 08/20/22  1:55 PM   Result Value Ref Range    Sodium 136 135 - 145 mmol/L    Potassium 4.5 3.6 - 5.5 mmol/L    Chloride 99 96 - 112 mmol/L    Co2 22 20 - 33 mmol/L    Anion Gap 15.0 7.0 - 16.0    Glucose 193 (H) 65 - 99 mg/dL    Bun 37 (H) 8 - 22 mg/dL    Creatinine 1.83 (H) 0.50 - 1.40 mg/dL    Calcium 8.4 (L) 8.5 - 10.5 mg/dL    AST(SGOT) 9 (L) 12 - 45 U/L    ALT(SGPT) 6 2 - 50 U/L    Alkaline Phosphatase 65 30 - 99 U/L    Total Bilirubin 0.6 0.1 - 1.5 mg/dL    Albumin 2.9 (L) 3.2 - 4.9 g/dL    Total Protein 6.7 6.0 - 8.2 g/dL    Globulin 3.8 (H) 1.9 - 3.5 g/dL    A-G Ratio 0.8 g/dL   TROPONIN    Collection Time: 08/20/22  1:55 PM   Result Value Ref Range    Troponin T 47 (H) 6 - 19 ng/L   ESTIMATED GFR    Collection Time: 08/20/22  1:55 PM   Result Value Ref Range    GFR (CKD-EPI) 36 (A) >60 mL/min/1.73 m 2   URINALYSIS    Collection Time: 08/20/22  2:10 PM    Specimen: Urine, Clean Catch   Result Value Ref Range    Color Yellow     Character Clear     Specific Gravity 1.018 <1.035    Ph 5.5 5.0 - 8.0    Glucose 250 (A) Negative mg/dL    Ketones Negative Negative mg/dL    Protein 30 (A) Negative mg/dL    Bilirubin Negative Negative    Urobilinogen, Urine 0.2 Negative    Nitrite Negative Negative    Leukocyte Esterase Moderate (A) Negative     Occult Blood Small (A) Negative    Micro Urine Req Microscopic    URINE MICROSCOPIC (W/UA)    Collection Time: 22  2:10 PM   Result Value Ref Range    WBC  (A) /hpf    RBC 10-20 (A) /hpf    Bacteria Negative None /hpf    Epithelial Cells Negative /hpf    Hyaline Cast 0-2 /lpf   EKG    Collection Time: 22  3:08 PM   Result Value Ref Range    Report       [facility] Emergency Dept.    Test Date:  2022  Pt Name:    COOKIE VELEZ                Department: ER  MRN:        [id number]                      Room:       [facility]  Gender:     Male                         Technician: [id number]  :        [id number]                   Requested By:SOWMYA HANSON  Order #:    [id number]                    Reading MD:    Measurements  Intervals                                Axis  Rate:       47                           P:          -4  FL:         226                          QRS:        -26  QRSD:       147                          T:          -8  QT:         432  QTc:        382    Interpretive Statements  Sinus rhythm  Supraventricular bigeminy  Borderline prolonged FL interval  Right bundle branch block  Compared to ECG 2022 19:00:30  Atrial premature complex(es) now present  Atrial flutter no longer present  Ventricular premature complex(es) no longer present     Lactic acid (lactate): Repeat if initial lactic acid result is greater than 2    Collection Time: 22  3:55 PM   Result Value Ref Range    Lactic Acid 4.4 (HH) 0.5 - 2.0 mmol/L   CoV-2, FLU A/B, and RSV by PCR (2-4 Hours CEPHEID) : Collect NP swab in VTM    Collection Time: 22  5:20 PM    Specimen: Respirate   Result Value Ref Range    SARS-CoV-2 Source NP Swab        Imaging  CTA chest and CT abdomen    Assessment/Plan  * Septic shock (HCC)- (present on admission)  Assessment & Plan  This is Severe Sepsis Present on admission  SIRS criteria identified on my evaluation include: Tachycardia, with heart rate greater  than 90 BPM and Leukocytosis, with WBC greater than 12,000  Clinical indicators of end organ dysfunction include Systolic blood pressure decrease of more than 40 mmHg  Source is urinary vs pna, vs wound  Sepsis protocol initiated  Crystalloid Fluid Administration: Fluid resuscitation ordered per standard protocol - 30 mL/kg per current or ideal body weight  IV antibiotics as appropriate for source of sepsis  Reassessment: I have reassessed the patient's hemodynamic status    MRSA nares, wound care consult for left ankle wound follow up xr ankle, urine cx.     Pneumonitis  Assessment & Plan  Likely aspiration  NPO, aspiration precautions, speech evaluation  Covid and viral screening      Ankle wound  Assessment & Plan  Follow up XR and wound care consultation   Consider MRI and ID consultation   Board spectrum antibiotics with Vanco/Zosyn  ESR and CRP    MRSA bacteremia- (present on admission)  Assessment & Plan  Hx of 6/11   Continue Vanco pending cx    UTI (urinary tract infection)- (present on admission)  Assessment & Plan  Place barbosa catheter  Hx of urinary retention  CT abdomen with no complicated urinary tract disoreders  Follow up urine cx  Restart flomax when able to take orals    SADIE (acute kidney injury) (HCC)  Assessment & Plan  Ct negative on admission for obstruction other then chronic bladder thickening  Maintain euvolemia and monitor fluid responsiveness (avoid NaCL and renal congestion)  MAP > 65 uses pressors or inotropic trial  Monitor urine output and I&O's  Avoid and review nephrotoxin medication  Rule out post obstruction -> bladder  Consider renal U/S if no renal images  U/a and CPK  Barbosa catheter    HTN (hypertension)- (present on admission)  Assessment & Plan  Hx of hold while on levophed gtt  Restart slowly once improved    Dysphagia  Assessment & Plan  Hx of with pneumonitis on ct scan  NPO get speech evaluation  Aspiration precautions    Dementia with behavioral disturbance (HCC)-  (present on admission)  Assessment & Plan  Hx of restart mirtazapine and seroqual when cleared by speech therapy    Diabetes mellitus with hyperglycemia (HCC)- (present on admission)  Assessment & Plan  Sliding scale coverage with hypoglycemic protocols      Discussed patient condition and risk of morbidity and/or mortality with RN, Pharmacy, and Patient.    The patient remains critically ill from septic shock on norepinephrine with active titration.   Critical care time = 90 minutes in directly providing and coordinating critical care and extensive data review.  No time overlap and excludes procedures.

## 2022-08-21 NOTE — CARE PLAN
Problem: Knowledge Deficit - Standard  Goal: Patient and family/care givers will demonstrate understanding of plan of care, disease process/condition, diagnostic tests and medications  Outcome: Progressing     Problem: Hemodynamics  Goal: Patient's hemodynamics, fluid balance and neurologic status will be stable or improve  Outcome: Progressing     Problem: Fluid Volume  Goal: Fluid volume balance will be maintained  Outcome: Progressing     Problem: Urinary - Renal Perfusion  Goal: Ability to achieve and maintain adequate renal perfusion and functioning will improve  Outcome: Progressing

## 2022-08-21 NOTE — THERAPY
"Speech Language Pathology   Clinical Swallow Evaluation     Patient Name: Andrei Garay  AGE:  82 y.o., SEX:  male  Medical Record #: [id number]  Today's Date: 8/21/2022     Precautions  Precautions: (P) Swallow Precautions ( See Comments)    HPI:81 YO male admitted 8/20 2/2 low blood pressure. PMHx: DM2, HTN, chol, dementia, urinary retention, dysphagia, AMI, MI, GERD, OA, MRSA bacteremia and left ankle osteomyelitis. CMHx: septic shock, pneumonitis, ankle wound, mRSA bacteremia, UTI, SADIE, HTN, dysphagia, dementia w/ behavioral disturbance, DM w/ hyperglycemia. CXR 8/20 \"Cardiomegaly with pulmonary vascular congestion and interstitial edema. Small ill-defined opacity in the right upper lobe may represent pneumonitis. Follow-up to radiographic resolution is recommended. Atherosclerotic plaque.\" Pt seen by SLP at [facility] October of 2019 with recs for D3/thins.    Level of Consciousness: Alert, Awake  Affect/Behavior: Appropriate, Cooperative  Follows Directives: Yes - simple commands only  Orientation: Self  Hearing: Functional hearing  Vision: Functional vision    Prior Living Situation & Level of Function:  Pt resides at [facility]. Denies hx of dysphagia.     Oral Mechanism Evaluation  Informal oral mech completed due to poor direction following. Facial symmetry and tongue to midline noted. Pt is edentulous.     Voice  Quality: WFL  Resonance: WFL  Intensity: Appropriate  Cough: WFL  Comments:    Current Method of Nutrition   NPO until cleared by speech pathology    Subjective  Pt alert, followed some basic directions, alternated quickly between appropriate and behavioral.      Assessment  Positioning: Curtis's (60-90 degrees)  Bolus Administration: SLP and Patient  Oxygen Requirements:  2 L Nasal Cannula  Factor(s) Affecting Performance: Impaired command following    Swallowing Trials  Thin Liquid (TN0): WFL  Pureed (PU4): WFL  Regular (RG7): Impaired    Comments:  Timely " "swallow initiation and clear vocal quality appreciated. Pt demonstrated prolonged mastication of regular (~2 minutes). Pt endorses consuming soft foods at baseline and taking pills whole with liquid. No s/sx of aspiration appreciated including consecutive sips of thins via straw.      Clinical Impressions  Clinical signs of oral dysphagia include prolonged/inefficient mastication. Dysphagia is likely chronic related to edentulism. Instrumental swallow study is not indicated to objectively assess swallow function and further direct POC. Diet modification is indicated.     Recommendations  1.  SOFT AND BITE SIZED/ THINS DIET  2.  Instrumentation: None indicated at this time  3.  Swallowing Instructions & Precautions:   Supervision: Distant supervision - check on patient 2-3 times per meal  Positioning: Fully upright and midline during oral intake  Medication: Whole with liquid  Strategies: Small bites/sips, Slow rate of intake  Oral Care: BID    SLP following.     Plan    Recommend Speech Therapy 3 times per week until therapy goals are met for the following treatments:  Dysphagia Training and Patient / Family / Caregiver Education.    Discharge Recommendations: (P) Anticipate that the patient will have no further speech therapy needs after discharge from the hospital     Objective       08/21/22 0923   Charge Group   SLP Oral Pharyngeal Evaluation Oral Pharyngeal Evaluation   Total Treatment Time   SLP Time Spent Yes   SLP Oral Pharyngeal Evaluation (Mins) 10   Initial Contact Note    Initial Contact Note  Order Received and Verified, Speech Therapy Evaluation in Progress with Full Report to Follow.   Precautions   Precautions Swallow Precautions ( See Comments)   Vitals   O2 (LPM) 2   O2 Delivery Device Nasal Cannula   Pain 0 - 10 Group   Therapist Pain Assessment Post Activity Pain Same as Prior to Activity;Nurse Notified  (\"all over\" no rating)   Prior Living Situation   Prior Services Continuous (24 Hour) Care " "Giving Per Service   Lives with - Patient's Self Care Capacity Attendant / Paid Care Giver   Prior Level Of Function   Communication Within Functional Limits   Swallow Impaired   Dentition Edentulous   Dentures None   Hearing Within Functional Limits for Evaluation   Vision Within Functional Limits for Evaluation   Patient's Primary Language English   Occupation (Pre-Hospital Vocational) Retired Due To Age   Dysphagia Rating   Nutritional Liquid Intake Rating Scale Non thickened beverages   Nutritional Food Intake Rating Scale Total oral diet with multiple consistencies but requiring special preparation or compensations   Patient / Family Goals   Patient / Family Goal #1 \"more water\"   Short Term Goals   Short Term Goal # 1 Pt will consume a diet of SB/TN with no s/sx of aspiration and min cues.   Education Group   Education Provided Dysphagia   Dysphagia Patient Response Patient;Acceptance;Explanation;Demonstration;Verbal Demonstration;Reinforcement Needed   Problem List   Problem List Dysphagia   Anticipated Discharge Needs   Discharge Recommendations Anticipate that the patient will have no further speech therapy needs after discharge from the hospital   Therapy Recommendations Upon DC Not Indicated   Interdisciplinary Plan of Care Collaboration   IDT Collaboration with  Nursing   Patient Position at End of Therapy Seated;In Bed;Bed Alarm On;Call Light within Reach;Tray Table within Reach   Collaboration Comments RN aware of results/recs     "

## 2022-08-21 NOTE — ASSESSMENT & PLAN NOTE
Hemoglobin A1c of 6.32 months ago.  Well-controlled.    Continue regular insulin sliding scale  Carbohydrate consistent diet  Continue to hold home metformin

## 2022-08-21 NOTE — ASSESSMENT & PLAN NOTE
Place barbosa catheter  Hx of urinary retention, like Urology outpatient f/u  CT abdomen with no complicated urinary tract disoreders  Follow up urine cultures  Continue Zosyn.   Restart flomax

## 2022-08-21 NOTE — ASSESSMENT & PLAN NOTE
XR showed soft tissue swelling and previously seen lateral malleolus bone erosion/osteomyelitis not well evaluated   ESR 66  Wound care consultation   Continue Zosyn  Possible MRI based upon clinical progression.

## 2022-08-21 NOTE — ASSESSMENT & PLAN NOTE
Improving renal function this morning  CT negative on admission for obstruction other then chronic bladder thickening  Maintain euvolemia and monitor fluid responsiveness (avoid NaCL and renal congestion)  Titrated off pressors  Monitor urine output and I&O's  Hold lisinopril  If Cr worsens, consider renal U/S, UA, CPK  Martinez catheter

## 2022-08-21 NOTE — HOSPITAL COURSE
82 y.o. male with DM2, HTN, Chol, dementia, urinary retention, dysphagia, AMI, GERD, OA, MRSA bacteremia 6/11 and left ankle osteomyelitis who presented 8/20/2022 from [facility] unit for low BP. His SBP was 70 in the ER.  He was given sepsis bolus and started on norepinephrine. Patient is without complaints of chest pain, abdomina or back pain, cough or sputum production. The patient advanced directives that reports that he is a DNR and avoid ICU.  However, he will be admitted to the ICU for ongoing titration of norepinephrine for septic shock.

## 2022-08-21 NOTE — ASSESSMENT & PLAN NOTE
Creatinine was elevated at 1.8 on admission.  Resolved with IV fluid resuscitation and pressor support.    Continue to monitor  Avoid nephrotoxins

## 2022-08-21 NOTE — ASSESSMENT & PLAN NOTE
As per history now residing at a memory care center at the [facility].    Minimize disturbances  Continue home sertraline and mirtazapine

## 2022-08-21 NOTE — ASSESSMENT & PLAN NOTE
Discussed with Pharmacy, h/o MRSA cellulitis infection  Will hold off on starting empiric coverage unless sepsis worsens

## 2022-08-21 NOTE — ASSESSMENT & PLAN NOTE
As per history.  Martinez catheter was placed on admission. Discontinued on 8/21/2022, replaced overnight on 8/22/2022 due to urinary retention. Trial of void successful on 8/25/2022    Continue home tamsulosin 0.8 mg daily

## 2022-08-21 NOTE — PROGRESS NOTES
"Pharmacy Vancomycin Kinetics Note for 8/20/2022     82 y.o. male on Vancomycin day # 1     Vancomycin Indication (Two level/Trough based Dosing): Sepsis (goal trough 15-20)    Provider specified end date: 08/27/22    Active Antibiotics (From admission, onward)      Ordered     Ordering Provider       Sat Aug 20, 2022  5:44 PM    08/20/22 1744  MD Alert...Vancomycin per Pharmacy  (Unknown Source of Infection (Severe Sepsis))  PHARMACY TO DOSE        Question:  Indication(s) for vancomycin?  Answer:  Unknown source of infection    Thomas Allan M.D.    08/20/22 1744  piperacillin-tazobactam (Zosyn) 4.5 g in  mL IVPB  (Unknown Source of Infection (Severe Sepsis))  EVERY 8 HOURS        See [facility] for full Linked Orders Report.    Thomas Allan M.D.            Dosing Weight: 83.9 kg (184 lb 15.5 oz)      Admission History: Admitted on 8/20/2022 for Septic shock ([facility]) [A41.9, R65.21]  Pertinent history: Patient admitted with sepsis of unknown origin. Originally thought possible urosepsis, but with history of MRSA in June, empiric vancomycin also started.    Allergies:     Patient has no known allergies.     Pertinent cultures to date:     Results       Procedure Component Value Units Date/Time    CoV-2, FLU A/B, and RSV by PCR (2-4 Hours CEPHEID) : Collect NP swab in VTM [[id number]] Collected: 08/20/22 1720    Order Status: Completed Specimen: Respirate Updated: 08/20/22 1819     Influenza virus A RNA Negative     Influenza virus B, PCR Negative     RSV, PCR Negative     SARS-CoV-2 by PCR NotDetected     Comment: PATIENTS: Important information regarding your results and instructions can  be found at [email or web address]   \"After your  Covid-19 Test\"    [facility] providers: PLEASE REFER TO DE-ESCALATION AND RETESTING PROTOCOL  on [email or web address]    **The Endosense GeneXpert Xpress SARS-CoV-2 RT-PCR Test has been made  available for use under the Emergency Use Authorization (EUA) " "only.          SARS-CoV-2 Source NP Swab    MRSA By PCR (Amp) [[id number]]     Order Status: Sent Specimen: Respirate from Nares     COV-2, FLU A/B, AND RSV BY PCR (2-4 HOURS CEPHEID): Collect NP swab in VTM [[id number]]     Order Status: Canceled Specimen: Respirate     BLOOD CULTURE [[id number]] Collected: 08/20/22 1419    Order Status: Sent Specimen: Blood from Peripheral Updated: 08/20/22 1445    Narrative:      Per Hospital Policy: Only change Specimen Src: to \"Line\" if  specified by physician order.    URINALYSIS [[id number]]  (Abnormal) Collected: 08/20/22 1410    Order Status: Completed Specimen: Urine, Clean Catch Updated: 08/20/22 1432     Color Yellow     Character Clear     Specific Gravity 1.018     Ph 5.5     Glucose 250 mg/dL      Ketones Negative mg/dL      Protein 30 mg/dL      Bilirubin Negative     Urobilinogen, Urine 0.2     Nitrite Negative     Leukocyte Esterase Moderate     Occult Blood Small     Micro Urine Req Microscopic    Narrative:      Indication for culture:->Evaluation for sepsis without a  clear source of infection    BLOOD CULTURE [[id number]] Collected: 08/20/22 1355    Order Status: Sent Specimen: Blood from Peripheral Updated: 08/20/22 1424    Narrative:      Per Hospital Policy: Only change Specimen Src: to \"Line\" if  specified by physician order.    URINE CULTURE(NEW) [[id number]] Collected: 08/20/22 1410    Order Status: Sent Specimen: Urine, Clean Catch Updated: 08/20/22 1415    Narrative:      Indication for culture:->Evaluation for sepsis without a  clear source of infection            Labs:     Estimated Creatinine Clearance: 34.7 mL/min (A) (by C-G formula based on SCr of 1.83 mg/dL (H)).  Recent Labs     08/20/22  1355   WBC 15.4*   NEUTSPOLYS 80.80*     Recent Labs     08/20/22  1355   BUN 37*   CREATININE 1.83*   ALBUMIN 2.9*       Intake/Output Summary (Last 24 hours) at 8/20/2022 2243  Last data filed at 8/20/2022 2200  Gross per 24 hour   Intake 1957.26 ml   Output 625 ml "   Net 1332.26 ml      /55   Pulse 65   Temp 36.3 °C (97.3 °F) (Axillary)   Resp 16   Ht 1.829 m (6')   Wt 83.9 kg (185 lb)   SpO2 92%  Temp (24hrs), Av.7 °C (98 °F), Min:36.3 °C (97.3 °F), Max:37 °C (98.6 °F)      List concerns for Vancomycin clearance:     Age;Malnutrition/Low albumin;Hypermetabolic State (SIRS);Pressors/Hypotension;SADIE;Receipt of contrast dye    Pharmacokinetics: Trough Based Dosing       Trough kinetics:   No results for input(s): VANCOTROUGH, VANCOPEAK, VANCORANDOM in the last 72 hours.    A/P:     -  Vancomycin dose: pulse dosing 2000 mg load  @1825    -  Next vancomycin level(s): 18h random level  @1200      -  Comments: due to multiple reasons for drug accumulation, pulse dosing initiated. As pt is critically ill, an early, 18h level will be scheduled. Please continue to monitor renal function, urine output and vancomycin levels for dosing adjustments. Please also follow cultures and signs of clinical cure for de-escalation of therapy.       Nasrin Wilhelm, LuisaD

## 2022-08-21 NOTE — ASSESSMENT & PLAN NOTE
Per POLST, DNR and DNI. However in comments section of POLST, states trial of artificial breathing for 3 months  Palliative consult.

## 2022-08-21 NOTE — ASSESSMENT & PLAN NOTE
Hx of with pneumonitis on ct scan  Speech saw patient on 8/21 and cleared for soft and bite sized diet  Aspiration precautions.

## 2022-08-21 NOTE — WOUND TEAM
[facility]  Inpatient Services  Initial Wound and Skin Care Evaluation    Admission Date: 2022     Last order of IP CONSULT TO WOUND CARE was found on 2022 from Hospital Encounter on 2022     HPI, PMH, SH: Reviewed    Past Surgical History:   Procedure Laterality Date    IRRIGATION & DEBRIDEMENT GENERAL  2022    Procedure: IRRIGATION AND DEBRIDEMENT LEFT ANKLE;  Surgeon: Panfilo Wagner M.D.;  Location: SURGERY [facility];  Service: Orthopedics    APPENDECTOMY      STENT PLACEMENT      cardiac     Social History     Tobacco Use    Smoking status: Former     Types: Cigarettes     Quit date: 2000     Years since quittin.9    Smokeless tobacco: Never   Substance Use Topics    Alcohol use: Never     Chief Complaint   Patient presents with    Hypotension     Pt lives at a assisted living facility and per staff pt was noted to be more confused than normal. Initial BP per EMS was 59/47 and saturating 85% on room air. Pt has wound vac located to L lateral ankle.  mg/dL PTA.          Diagnosis: Septic shock (HCC) [A41.9, R65.21]    Unit where seen by Wound Team: T833/02     WOUND CONSULT/FOLLOW UP RELATED TO:  Left lateral ankle     WOUND HISTORY:  [facility] is an [facility]. 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 The wound previously had a vac in place. Wound team was consulted for recommendations and wound management.     WOUND ASSESSMENT/LDA  Wound 22 Full Thickness Wound Ankle Outer Left DM stage 4 chronic POA (Active)   Wound Image     22 1300   Site Assessment Pink;Red;Yellow    Periwound Assessment Maceration    Margins Unattached edges    Closure None    Drainage Amount Moderate    Drainage Description Purulent;Yellow    Treatments Cleansed;Offloading;Site care    Wound Cleansing Dakin's Solution    Periwound Protectant Skin Protectant Wipes to Periwound    Dressing Cleansing/Solutions 1/4 Strength  Dakin's Solution    Dressing Options Dry Roll Gauze;Moist Roll Gauze;Mepilex Heel    Dressing Changed Changed    Dressing Status Clean;Dry;Intact    Dressing Change/Treatment Frequency Every Shift, and As Needed    NEXT Dressing Change/Treatment Date 08/21/22    NEXT Weekly Photo (Inpatient Only) 08/28/22    WOUND NURSE ONLY - Pressure Injury Stage 4    Wound Length (cm) 2.7 cm    Wound Width (cm) 1.2 cm    Wound Depth (cm) 0.9 cm    Wound Surface Area (cm^2) 3.24 cm^2    Wound Volume (cm^3) 2.916 cm^3    Tunneling (cm) 1.4 cm    Tunneling Clock Position of Wound 12    Tunneling - 2nd Location (cm) 3.2 cm    Tunneling Clock Position of Wound - 2nd Location 6    WOUND NURSE ONLY - Time Spent with Patient (mins) 60    Number of days: 1      Vascular:    HONEY:   No results found.    Lab Values:    Lab Results   Component Value Date/Time    WBC 13.1 (H) 08/21/2022 01:55 AM    RBC 3.77 (L) 08/21/2022 01:55 AM    HEMOGLOBIN 10.4 (L) 08/21/2022 01:55 AM    HEMATOCRIT 31.5 (L) 08/21/2022 01:55 AM    CREACTPROT 19.87 (H) 08/20/2022 06:05 PM    SEDRATEWES 66 (H) 08/20/2022 01:55 PM    HBA1C 6.3 (H) 06/11/2022 03:57 PM      Culture Results show:  No results found for this or any previous visit (from the past 720 hour(s)).    Pain Level/Medicated:  Denies pain       INTERVENTIONS BY WOUND TEAM:  Chart and images reviewed. Discussed with bedside RN. All areas of concern (based on picture review, LDA review and discussion with bedside RN) have been thoroughly assessed. Documentation of areas based on significant findings. This RN in to assess patient. Performed standard wound care which includes appropriate positioning, dressing removal and non-selective debridement. Pictures and measurements obtained weekly if/when required.  Preparation for Dressing removal: NA dressing removed without difficulty  Non-selectively Debrided with:  NS and gauze.  Sharp debridement: Yellow stringy slough and non viable tissue debrided away using  forceps and scissors. <20 cm2 debrided. no bleeding noted.  Rosa M wound: Cleansed with NS and gauze, Prepped with no sting  Primary Dressing: NS moistened roll gauze (Dakins ordered)  Secondary (Outer) Dressing: Mepilex    Interdisciplinary consultation: Patient, Bedside RN, Wound RN (Erna)    EVALUATION / RATIONALE FOR TREATMENT:  Most Recent Date:  8/21/22: Dakins applied to chemically debride. Pt has large amount of stringy slough. Would benefit from vac application once infection is under control. Pt may also benefit from Imaging and possible surgical consult.      Goals: Steady decrease in wound area and depth weekly.    WOUND TEAM PLAN OF CARE ([X] for frequency of wound follow up,):   Nursing to follow dressing orders written for wound care. Contact wound team if area fails to progress, deteriorates or with any questions/concerns if something comes up before next scheduled follow up (See below as to whether wound is following and frequency of wound follow up)  Dressing changes by wound team:                   Dressing changes by wound team:                   Follow up 3 times weekly:                NPWT change 3 times weekly:     Follow up 1-2 times weekly:    X  Follow up Bi-Monthly:                   Follow up as needed:     Other (explain):     NURSING PLAN OF CARE ORDERS (X):  Dressing changes: See Dressing Care orders: X  Skin care: See Skin Care orders:   RN Prevention Protocol: X  Rectal tube care: See Rectal Tube Care orders:   Other orders:    RSKIN:   CURRENTLY IN PLACE (X), APPLIED THIS VISIT (A), ORDERED (O):   Q shift Montrell:  X  Q shift pressure point assessments:  X    Surface/Positioning   Pressure redistribution mattress            Low Airloss      X    Bariatric foam      Bariatric AKILA     Waffle cushion        Waffle Overlay          Reposition q 2 hours    X  TAPs Turning system     [place] Pillow     Offloading/Redistribution   Sacral Mepilex (Silicone dressing)     Heel Mepilex (Silicone  dressing)     X    Heel float boots (Prevalon boot)             Float Heels off Bed with Pillows   X        Respiratory Room Air  Silicone O2 tubing         Gray Foam Ear protectors     Cannula fixation Device (Tender )          High flow offloading Clip    Elastic head band offloading device      Anchorfast                                                         Trach with Optifoam split foam             Containment/Moisture Prevention     Rectal tube or BMS    Purwick/Condom Cath        Martinez Catheter    Barrier wipes           Barrier paste       Antifungal tx      Interdry        Mobilization MONAE      Up to chair        Ambulate      PT/OT      Nutrition       Dietician        Diabetes Education      PO   X  TF     TPN     NPO   # days     Other        Anticipated discharge plans:   LTACH:        [facility]      [facility]            Home [facility] Care:           Outpatient Wound Center:            Self/Family Care:        Other:                  Vac Discharge Needs:   Not Applicable Pt not on a wound vac:   X, TBD may need a wound vac    Regular Vac while inpatient, alternative dressing at DC:        Regular Vac in use and continued at DC:            Reg. Vac w/ Skin Sub/Biologic in use. Will need to be changed 2x wkly:      Veraflo Vac while inpatient, ok to transition to Regular Vac on Discharge:           Veraflo Vac while inpatient, will need to remain on Veraflo Vac upon discharge:

## 2022-08-21 NOTE — ASSESSMENT & PLAN NOTE
Mildly elevated.  Trending down.  Likely from demand ischemia from sepsis  Continue telemetry monitoring

## 2022-08-21 NOTE — PROGRESS NOTES
[facility] ICU Progress Note      Admit Date: 8/20/2022    Resident(s): Ayden Good M.D.   Attending:  REGINE MORRIS/ Dr. Hartman    Patient ID:    Name:  Andrei Garay   YOB: 1940  Age:  82 y.o.  male   MRN:  [id number]    Hospital Course (carried forward and updated):  Andrei Gaary is a 82 y.o. male with PMH significant for dementia, MI, left ankle OM, MRSA bacteremia, osteoarthritis who presented to [facility] [place] due to low blood pressure. Patient noted have confusion on admission thus history was limited. Patient admitted to ICU for pressor support.     Consultants:  Critical Care    Interval Events:  Cr and BUN downtrending  WBC downtrending  Lactic acid resolved  Transfer patient to telemetry  -Patient today denied any acute questions or concerns. Patient able to state his name and where he is at. However stated that the year is 2003. Patient able to recollect chronic items such as that he served during [place] War and stationed in [place]. ROS as below.  -Discussed with daughter, patient is not on seroquel and was given that while at [facility] only for agitation due to not getting pain meds for severe osteoarthritis of his b/l knees. Per daughter, patient also had lasting side effects for a few days after receiving haldol.     Vitals Range last 24h:  Temp:  [36.3 °C (97.3 °F)-38.2 °C (100.8 °F)] 38.2 °C (100.8 °F)  Pulse:  [49-91] 86  Resp:  [11-35] 25  BP: ()/(45-78) 115/65  SpO2:  [89 %-97 %] 92 %      Intake/Output Summary (Last 24 hours) at 8/21/2022 1148  Last data filed at 8/21/2022 0800  Gross per 24 hour   Intake 3295.86 ml   Output 2185 ml   Net 1110.86 ml        Review of Systems   Constitutional:  Negative for chills and fever.   HENT:  Negative for hearing loss.    Eyes:  Negative for blurred vision and double vision.   Respiratory:  Negative for cough and shortness of breath.    Cardiovascular:  Negative for chest pain and leg swelling.    Gastrointestinal:  Negative for abdominal pain, constipation, diarrhea, nausea and vomiting.   Genitourinary:  Negative for dysuria and hematuria.   Musculoskeletal:  Negative for back pain.   Skin:  Negative for rash.   Neurological:  Negative for tingling, weakness and headaches.     PHYSICAL EXAM:  Vitals:    08/21/22 0700 08/21/22 0800 08/21/22 0900 08/21/22 1000   BP: 111/58 118/59 123/66 115/65   Pulse: 85 84 90 86   Resp: 19 19 (!) 35 (!) 25   Temp:  (!) 38.2 °C (100.8 °F)     TempSrc:  Bladder     SpO2: 94% 94% 95% 92%   Weight:       Height:        Body mass index is 25.09 kg/m².    O2 therapy: Pulse Oximetry: 92 %, O2 (LPM): 2, O2 Delivery Device: Nasal Cannula    Date 08/21/22 0700 - 08/22/22 0659   Shift 5335-4836 7056-6722 0983-3748 24 Hour Total   INTAKE   Shift Total       OUTPUT   Urine 275   275     Output (mL) (Urethral Catheter Straight-tip 16 Fr.) 275   275   Shift Total 275   275   NET -275   -275        Physical Exam  Vitals and nursing note reviewed.   HENT:      Head: Normocephalic and atraumatic.      Right Ear: External ear normal.      Left Ear: External ear normal.      Nose: No rhinorrhea.      Mouth/Throat:      Mouth: Mucous membranes are moist.   Eyes:      Extraocular Movements: Extraocular movements intact.      Conjunctiva/sclera: Conjunctivae normal.   Cardiovascular:      Rate and Rhythm: Normal rate and regular rhythm.      Heart sounds: No murmur heard.    No gallop.   Pulmonary:      Breath sounds: Rales present. No wheezing or rhonchi.   Abdominal:      General: Bowel sounds are normal. There is no distension.      Tenderness: There is no abdominal tenderness.   Musculoskeletal:         General: No swelling.      Cervical back: Normal range of motion and neck supple.      Right lower leg: No edema.      Left lower leg: No edema.   Skin:     General: Skin is warm and dry.      Capillary Refill: Capillary refill takes less than 2 seconds.      Coloration: Skin is not  jaundiced.   Neurological:      Mental Status: He is alert. He is disoriented.   Psychiatric:         Mood and Affect: Mood normal.         Behavior: Behavior normal.           Recent Labs     08/20/22  1355 08/21/22  0155   SODIUM 136 137   POTASSIUM 4.5 3.7   CHLORIDE 99 103   CO2 22 22   BUN 37* 31*   CREATININE 1.83* 1.11   CALCIUM 8.4* 8.1*     Recent Labs     08/20/22  1355 08/21/22  0155   ALTSGPT 6 <5   ASTSGOT 9* 11*   ALKPHOSPHAT 65 60   TBILIRUBIN 0.6 0.7   GLUCOSE 193* 137*     Recent Labs     08/20/22  1355 08/21/22  0155   RBC 3.94* 3.77*   HEMOGLOBIN 11.0* 10.4*   HEMATOCRIT 33.5* 31.5*   PLATELETCT 289 305     Recent Labs     08/20/22  1355 08/21/22  0155   WBC 15.4* 13.1*   NEUTSPOLYS 80.80* 73.20*   LYMPHOCYTES 8.50* 14.10*   MONOCYTES 9.30 9.70   EOSINOPHILS 0.50 2.10   BASOPHILS 0.40 0.40   ASTSGOT 9* 11*   ALTSGPT 6 <5   ALKPHOSPHAT 65 60   TBILIRUBIN 0.6 0.7       Meds:   enoxaparin (LOVENOX) injection  40 mg      senna-docusate  2 Tablet      And    polyethylene glycol/lytes  1 Packet      And    magnesium hydroxide  30 mL      And    bisacodyl  10 mg      mirtazapine  15 mg      sertraline  50 mg      tamsulosin  0.8 mg      atorvastatin  40 mg      HYDROcodone-acetaminophen  1 Tablet      piperacillin-tazobactam  4.5 g Stopped (08/21/22 0904)    insulin regular  2-9 Units      And    dextrose bolus  25 g          Procedures:  N/a    Imaging:  DX-CHEST-LIMITED (1 VIEW)   Final Result      Left subclavian central venous catheter tip extends slightly into the azygos vein. No pneumothorax.      DX-ANKLE 2- VIEWS LEFT   Final Result      1.  Soft tissue swelling about the ankle particularly laterally where there is a wound VAC.   2.  Previously seen lateral malleolus bone erosion/osteomyelitis is not well evaluated on the current study due to lack of oblique view.   3.  No definite new osseous abnormality is seen.         CT-CTA CHEST PULMONARY ARTERY W/ RECONS   Final Result      1.  No CT  evidence of pulmonary embolus.      2.  Groundglass opacifications noted in the right upper pulmonary lobe could be due to pneumonitis pneumonia or other inflammatory process. Covid 19 pneumonia is in the differential diagnosis.      3.  Small area of consolidation is noted posterior edge of left lung apex with similar differential diagnosis.      4.  Opacification posteriorly throughout the lower lobes is noted which could be due to atelectasis edema or inflammation.      5.  Trace pericardial fluid collection is also identified.            CT-ABDOMEN-PELVIS WITH   Final Result      1.  Urinary bladder wall thickening could indicate cystitis. Prostate gland is enlarged.      2.  Otherwise no acute abnormalities are identified in the abdomen or pelvis.      DX-CHEST-PORTABLE (1 VIEW)   Final Result      1.  Cardiomegaly with pulmonary vascular congestion and interstitial edema.   2.  Small ill-defined opacity in the right upper lobe may represent pneumonitis. Follow-up to radiographic resolution is recommended.   3.  Atherosclerotic plaque.          ASSESSEMENT and PLAN:    * Septic shock (HCC)- (present on admission)  Assessment & Plan  This is Severe Sepsis Present on admission  SIRS criteria identified on my evaluation include: Tachycardia, with heart rate greater than 90 BPM and Leukocytosis, with WBC greater than 12,000  Clinical indicators of end organ dysfunction include Systolic blood pressure decrease of more than 40 mmHg  Source is urinary vs pna, vs wound  Sepsis protocol initiated  Crystalloid Fluid Administration: Fluid resuscitation ordered per standard protocol - 30 mL/kg per current or ideal body weight  IV antibiotics as appropriate for source of sepsis  Reassessment: I have reassessed the patient's hemodynamic status    Urine culture pending  Wound care consult for left ankle wound   Lactic acid normalized    Chronic pain  Assessment & Plan  Likely 2/2 to OA of b/l knees  Restart home [place] to  reduce risk of agitation similar to previous admission    Pneumonitis  Assessment & Plan  Likely aspiration  Covid and viral screening negative  Speech eval performed, okay for soft diet    Ankle wound  Assessment & Plan  XR showed soft tissue swelling and previously seen lateral malleolus bone erosion/osteomyelitis not well evaluated   ESR 66  Wound care consultation   Continue Zosyn  Possible MRI based upon clinical progression    MRSA bacteremia- (present on admission)  Assessment & Plan  Discussed with Pharmacy, h/o MRSA cellulitis infection thus hold off on continuing Linezolid unless if patient worsens    UTI (urinary tract infection)- (present on admission)  Assessment & Plan  Place barbosa catheter  Hx of urinary retention, like Urology outpatient f/u  CT abdomen with no complicated urinary tract disoreders  Follow up urine cx  Continue Zosyn. Restart flomax     SADIE (acute kidney injury) (HCC)  Assessment & Plan  Baseline around 0.7  CT negative on admission for obstruction other then chronic bladder thickening  Maintain euvolemia and monitor fluid responsiveness (avoid NaCL and renal congestion)  Titrated off pressors  Monitor urine output and I&O's  Hold lisinopril  If Cr worsens, consider renal U/S, UA, CPK  Barbosa catheter    HTN (hypertension)- (present on admission)  Assessment & Plan  Titrated off of Levophed  Restart home BP meds as tolerable     Dysphagia  Assessment & Plan  Hx of with pneumonitis on ct scan  Speech saw patient on 8/21 and cleared for soft and bite sized diet  Aspiration precautions    Dementia with behavioral disturbance (HCC)- (present on admission)  Assessment & Plan  Discussed with daughter, patient is not on seroquel and was given Seroquel only at [facility] for agitation 2/2 to not getting pain medications to control chronic knee pain  Daughter also stated that last time, patient did not tolerate Haldol well  Restart home Remeron and Zoloft    Elevated troponin  Assessment &  Plan  Patient denies chest pain, possibly related to sepsis  Troponin: 47 --> 28    Advance care planning- (present on admission)  Assessment & Plan  Per POLST, DNR and DNI. However in comments section of POLST, states trial of artificial breathing for 3 months  Palliative consult    Diabetes mellitus with hyperglycemia (HCC)- (present on admission)  Assessment & Plan  Sliding scale coverage with hypoglycemic protocols      DISPO: Transfer to [facility] status.    CODE STATUS: DNR/DNI    Quality Measures:  Feeding: Soft diet   Analgesia: Home Norco  Sedation: N/a  Thromboprophylaxis: Lovenox  Head of bed: >30 degrees  Ulcer prophylaxis: N/a   Glycemic control: Correctional: ISS / Basal: N/a  Bowel care: bowel regimen  Indwelling lines: Martinez  Deescalation of antibiotics: D/c Linezolid      Ayden Good M.D.

## 2022-08-21 NOTE — PALLIATIVE CARE
Palliative Care follow-up  Consult received and EMR reviewed [facility] Currently no ACP documents on file. [facility] the [facility] for any AD[facility]POLST documents.       Plan: Formal consult to follow.    Thank you for allowing Palliative Care to support this patient and family. Contact [phone] for additional assistance, change in patient status, or with any questions/concerns.

## 2022-08-21 NOTE — ASSESSMENT & PLAN NOTE
As per urinalysis with moderate leukocyte esterase, nitrite negativity.    Urine cultures growing yeast but patient not complaining of urinary symptoms  On empiric Zosyn and vancomycin initially for sepsis coverage, though switched to cefazolin following wound cultures positive for GBS on 824, followed by oral Keflex on 8/26  Martinez catheter discontinued 8/21, replaced on 8/23 due to retention, removed again after successful trial of void on 8/25

## 2022-08-21 NOTE — ASSESSMENT & PLAN NOTE
This is Septic shock Present on admission  SIRS criteria identified on my evaluation include: Tachypnea, with respirations greater than 20 per minute and Leukocytosis, with WBC greater than 12,000  Indicators of septic shock include: Severe sepsis present, persistent hypotension after 30 ml/kg completed, and initial lactate level result is >= 4 mmol/L.  Acute kidney injury with creatinine of 1.8.  Sources is: Urinary tract versus persistent osteomyelitis of the left lateral malleolus.  Unlikely pulmonary given no pulmonary symptoms.  Sepsis protocol initiated  Crystalloid Fluid Administration: Fluid resuscitation ordered per standard protocol - 30 mL/kg per current or ideal body weight  IV antibiotics as appropriate for source of sepsis  Reassessment: I have reassessed the patient's hemodynamic status  Patient has been off of norepinephrine pressor support since 4 AM 8/21/2022.    -As of 8/24/2022, patient started on cefazolin given wound cultures positive for group B strep. Zosyn and vancomycin discontinued.  -MRI with contrast of left ankle on 8/22/2022, surgical debridement of left ankle wound with distal fibula resection of osteomyelitic tissue that evening.  -Margin cultures no growth after 4 days, presumed clean margins with resection of all infected tissue.  -Per ID Dr. Holley, patient can be transitioned thereafter to oral cephalexin 1000mg TID and will complete a 2 week course of the oral agent

## 2022-08-21 NOTE — PROGRESS NOTES
4 Eyes Skin Assessment Completed by FLORES Manning and Ana RN.    Head WDL  Ears Blanching  Nose WDL  Mouth WDL  Neck WDL  Breast/Chest WDL  Shoulder Blades WDL  Spine WDL  (R) Arm/Elbow/Hand WDL  (L) Arm/Elbow/Hand WDL  Abdomen WDL  Groin WDL  Scrotum/Coccyx/Buttocks Redness  (R) Leg WDL  (L) Leg Incision  (R) Heel/Foot/Toe WDL  (L) Heel/Foot/Toe Ulcer(s)          Devices In Places ECG, Blood Pressure Cuff, Pulse Ox, Martinez, and Nasal Cannula      Interventions In Place Gray Ear Foams    Possible Skin Injury Yes    Pictures Uploaded Into Epic Yes  Wound Consult Placed Yes  RN Wound Prevention Protocol Ordered Yes

## 2022-08-22 ENCOUNTER — ANESTHESIA (OUTPATIENT)
Dept: SURGERY | Facility: MEDICAL CENTER | Age: 82
DRG: 853 | End: 2022-08-22
Payer: COMMERCIAL

## 2022-08-22 ENCOUNTER — APPOINTMENT (OUTPATIENT)
Dept: RADIOLOGY | Facility: MEDICAL CENTER | Age: 82
DRG: 853 | End: 2022-08-22
Attending: STUDENT IN AN ORGANIZED HEALTH CARE EDUCATION/TRAINING PROGRAM
Payer: COMMERCIAL

## 2022-08-22 ENCOUNTER — ANESTHESIA EVENT (OUTPATIENT)
Dept: SURGERY | Facility: MEDICAL CENTER | Age: 82
DRG: 853 | End: 2022-08-22
Payer: COMMERCIAL

## 2022-08-22 LAB
ALBUMIN SERPL BCP-MCNC: 2.9 G/DL (ref 3.2–4.9)
ALBUMIN/GLOB SERPL: 0.9 G/DL
ALP SERPL-CCNC: 61 U/L (ref 30–99)
ALT SERPL-CCNC: 18 U/L (ref 2–50)
ANION GAP SERPL CALC-SCNC: 8 MMOL/L (ref 7–16)
AST SERPL-CCNC: 51 U/L (ref 12–45)
BILIRUB SERPL-MCNC: 0.7 MG/DL (ref 0.1–1.5)
BUN SERPL-MCNC: 20 MG/DL (ref 8–22)
CALCIUM SERPL-MCNC: 8.1 MG/DL (ref 8.5–10.5)
CHLORIDE SERPL-SCNC: 101 MMOL/L (ref 96–112)
CO2 SERPL-SCNC: 25 MMOL/L (ref 20–33)
CREAT SERPL-MCNC: 0.89 MG/DL (ref 0.5–1.4)
GFR SERPLBLD CREATININE-BSD FMLA CKD-EPI: 86 ML/MIN/1.73 M 2
GLOBULIN SER CALC-MCNC: 3.3 G/DL (ref 1.9–3.5)
GLUCOSE BLD STRIP.AUTO-MCNC: 140 MG/DL (ref 65–99)
GLUCOSE BLD STRIP.AUTO-MCNC: 143 MG/DL (ref 65–99)
GLUCOSE BLD STRIP.AUTO-MCNC: 145 MG/DL (ref 65–99)
GLUCOSE BLD STRIP.AUTO-MCNC: 148 MG/DL (ref 65–99)
GLUCOSE BLD STRIP.AUTO-MCNC: 160 MG/DL (ref 65–99)
GLUCOSE SERPL-MCNC: 140 MG/DL (ref 65–99)
MAGNESIUM SERPL-MCNC: 1.2 MG/DL (ref 1.5–2.5)
PHOSPHATE SERPL-MCNC: 2.7 MG/DL (ref 2.5–4.5)
POTASSIUM SERPL-SCNC: 3.9 MMOL/L (ref 3.6–5.5)
PROT SERPL-MCNC: 6.2 G/DL (ref 6–8.2)
SCCMEC + MECA PNL NOSE NAA+PROBE: NEGATIVE
SODIUM SERPL-SCNC: 134 MMOL/L (ref 135–145)

## 2022-08-22 PROCEDURE — 700111 HCHG RX REV CODE 636 W/ 250 OVERRIDE (IP): Performed by: HOSPITALIST

## 2022-08-22 PROCEDURE — 0QBK0ZZ EXCISION OF LEFT FIBULA, OPEN APPROACH: ICD-10-PCS | Performed by: ORTHOPAEDIC SURGERY

## 2022-08-22 PROCEDURE — 73723 MRI JOINT LWR EXTR W/O&W/DYE: CPT | Mod: LT

## 2022-08-22 PROCEDURE — 110371 HCHG SHELL REV 272: Performed by: ORTHOPAEDIC SURGERY

## 2022-08-22 PROCEDURE — 160002 HCHG RECOVERY MINUTES (STAT): Performed by: ORTHOPAEDIC SURGERY

## 2022-08-22 PROCEDURE — 700111 HCHG RX REV CODE 636 W/ 250 OVERRIDE (IP): Performed by: ANESTHESIOLOGY

## 2022-08-22 PROCEDURE — A9270 NON-COVERED ITEM OR SERVICE: HCPCS | Performed by: INTERNAL MEDICINE

## 2022-08-22 PROCEDURE — 87086 URINE CULTURE/COLONY COUNT: CPT

## 2022-08-22 PROCEDURE — 99222 1ST HOSP IP/OBS MODERATE 55: CPT | Mod: 57 | Performed by: GENERAL PRACTICE

## 2022-08-22 PROCEDURE — 27625 REMOVE ANKLE JOINT LINING: CPT | Mod: 80ROC,LT | Performed by: GENERAL PRACTICE

## 2022-08-22 PROCEDURE — 87186 SC STD MICRODIL/AGAR DIL: CPT

## 2022-08-22 PROCEDURE — 160009 HCHG ANES TIME/MIN: Performed by: ORTHOPAEDIC SURGERY

## 2022-08-22 PROCEDURE — 700105 HCHG RX REV CODE 258: Performed by: ANESTHESIOLOGY

## 2022-08-22 PROCEDURE — 01480 ANES OPEN PX LOWER L/A/F NOS: CPT | Performed by: ANESTHESIOLOGY

## 2022-08-22 PROCEDURE — 87077 CULTURE AEROBIC IDENTIFY: CPT

## 2022-08-22 PROCEDURE — 83735 ASSAY OF MAGNESIUM: CPT

## 2022-08-22 PROCEDURE — 0SBG0ZZ EXCISION OF LEFT ANKLE JOINT, OPEN APPROACH: ICD-10-PCS | Performed by: ORTHOPAEDIC SURGERY

## 2022-08-22 PROCEDURE — 160027 HCHG SURGERY MINUTES - 1ST 30 MINS LEVEL 2: Performed by: ORTHOPAEDIC SURGERY

## 2022-08-22 PROCEDURE — 51798 US URINE CAPACITY MEASURE: CPT

## 2022-08-22 PROCEDURE — 770020 HCHG ROOM/CARE - TELE (206)

## 2022-08-22 PROCEDURE — 0QBK0ZX EXCISION OF LEFT FIBULA, OPEN APPROACH, DIAGNOSTIC: ICD-10-PCS | Performed by: ORTHOPAEDIC SURGERY

## 2022-08-22 PROCEDURE — 160048 HCHG OR STATISTICAL LEVEL 1-5: Performed by: ORTHOPAEDIC SURGERY

## 2022-08-22 PROCEDURE — 700111 HCHG RX REV CODE 636 W/ 250 OVERRIDE (IP): Performed by: ORTHOPAEDIC SURGERY

## 2022-08-22 PROCEDURE — 28003 TREATMENT OF FOOT INFECTION: CPT | Performed by: ORTHOPAEDIC SURGERY

## 2022-08-22 PROCEDURE — 160035 HCHG PACU - 1ST 60 MINS PHASE I: Performed by: ORTHOPAEDIC SURGERY

## 2022-08-22 PROCEDURE — 84100 ASSAY OF PHOSPHORUS: CPT

## 2022-08-22 PROCEDURE — 700105 HCHG RX REV CODE 258: Performed by: STUDENT IN AN ORGANIZED HEALTH CARE EDUCATION/TRAINING PROGRAM

## 2022-08-22 PROCEDURE — 99100 ANES PT EXTEME AGE<1 YR&>70: CPT | Performed by: ANESTHESIOLOGY

## 2022-08-22 PROCEDURE — 700102 HCHG RX REV CODE 250 W/ 637 OVERRIDE(OP)

## 2022-08-22 PROCEDURE — 80053 COMPREHEN METABOLIC PANEL: CPT

## 2022-08-22 PROCEDURE — 99233 SBSQ HOSP IP/OBS HIGH 50: CPT | Mod: GC | Performed by: HOSPITALIST

## 2022-08-22 PROCEDURE — A9270 NON-COVERED ITEM OR SERVICE: HCPCS

## 2022-08-22 PROCEDURE — 700117 HCHG RX CONTRAST REV CODE 255: Performed by: STUDENT IN AN ORGANIZED HEALTH CARE EDUCATION/TRAINING PROGRAM

## 2022-08-22 PROCEDURE — 700111 HCHG RX REV CODE 636 W/ 250 OVERRIDE (IP): Performed by: STUDENT IN AN ORGANIZED HEALTH CARE EDUCATION/TRAINING PROGRAM

## 2022-08-22 PROCEDURE — 87075 CULTR BACTERIA EXCEPT BLOOD: CPT

## 2022-08-22 PROCEDURE — 87015 SPECIMEN INFECT AGNT CONCNTJ: CPT

## 2022-08-22 PROCEDURE — 87641 MR-STAPH DNA AMP PROBE: CPT

## 2022-08-22 PROCEDURE — 160038 HCHG SURGERY MINUTES - EA ADDL 1 MIN LEVEL 2: Performed by: ORTHOPAEDIC SURGERY

## 2022-08-22 PROCEDURE — 88304 TISSUE EXAM BY PATHOLOGIST: CPT

## 2022-08-22 PROCEDURE — 700102 HCHG RX REV CODE 250 W/ 637 OVERRIDE(OP): Performed by: INTERNAL MEDICINE

## 2022-08-22 PROCEDURE — 27625 REMOVE ANKLE JOINT LINING: CPT | Performed by: ORTHOPAEDIC SURGERY

## 2022-08-22 PROCEDURE — 27641 PARTIAL REMOVAL OF FIBULA: CPT | Mod: LT | Performed by: ORTHOPAEDIC SURGERY

## 2022-08-22 PROCEDURE — 20240 BONE BIOPSY OPEN SUPERFICIAL: CPT | Performed by: ORTHOPAEDIC SURGERY

## 2022-08-22 PROCEDURE — 8968 PR NO CHARGE - PROCEDURE: Mod: 80ROC | Performed by: GENERAL PRACTICE

## 2022-08-22 PROCEDURE — 97605 NEG PRS WND THER DME<=50SQCM: CPT | Performed by: ORTHOPAEDIC SURGERY

## 2022-08-22 PROCEDURE — 0LBP0ZZ EXCISION OF LEFT LOWER LEG TENDON, OPEN APPROACH: ICD-10-PCS | Performed by: ORTHOPAEDIC SURGERY

## 2022-08-22 PROCEDURE — 97605 NEG PRS WND THER DME<=50SQCM: CPT | Mod: 80ROC | Performed by: GENERAL PRACTICE

## 2022-08-22 PROCEDURE — L4398 FOOT DROP SPLINT PRE OTS: HCPCS

## 2022-08-22 PROCEDURE — A9576 INJ PROHANCE MULTIPACK: HCPCS | Performed by: STUDENT IN AN ORGANIZED HEALTH CARE EDUCATION/TRAINING PROGRAM

## 2022-08-22 PROCEDURE — 700105 HCHG RX REV CODE 258: Performed by: HOSPITALIST

## 2022-08-22 PROCEDURE — 87070 CULTURE OTHR SPECIMN AEROBIC: CPT

## 2022-08-22 PROCEDURE — 770001 HCHG ROOM/CARE - MED/SURG/GYN PRIV*

## 2022-08-22 PROCEDURE — 87102 FUNGUS ISOLATION CULTURE: CPT

## 2022-08-22 PROCEDURE — 87205 SMEAR GRAM STAIN: CPT | Mod: 91

## 2022-08-22 PROCEDURE — 160036 HCHG PACU - EA ADDL 30 MINS PHASE I: Performed by: ORTHOPAEDIC SURGERY

## 2022-08-22 PROCEDURE — 82962 GLUCOSE BLOOD TEST: CPT | Mod: 91

## 2022-08-22 PROCEDURE — 700101 HCHG RX REV CODE 250: Performed by: ANESTHESIOLOGY

## 2022-08-22 PROCEDURE — 88311 DECALCIFY TISSUE: CPT

## 2022-08-22 RX ORDER — ONDANSETRON 2 MG/ML
INJECTION INTRAMUSCULAR; INTRAVENOUS PRN
Status: DISCONTINUED | OUTPATIENT
Start: 2022-08-22 | End: 2022-08-22 | Stop reason: SURG

## 2022-08-22 RX ORDER — CEFAZOLIN SODIUM 1 G/3ML
INJECTION, POWDER, FOR SOLUTION INTRAMUSCULAR; INTRAVENOUS PRN
Status: DISCONTINUED | OUTPATIENT
Start: 2022-08-22 | End: 2022-08-22 | Stop reason: SURG

## 2022-08-22 RX ORDER — ALBUTEROL SULFATE 2.5 MG/3ML
2.5 SOLUTION RESPIRATORY (INHALATION)
Status: DISCONTINUED | OUTPATIENT
Start: 2022-08-22 | End: 2022-08-22 | Stop reason: HOSPADM

## 2022-08-22 RX ORDER — HYDROMORPHONE HYDROCHLORIDE 1 MG/ML
0.5 INJECTION, SOLUTION INTRAMUSCULAR; INTRAVENOUS; SUBCUTANEOUS
Status: DISCONTINUED | OUTPATIENT
Start: 2022-08-22 | End: 2022-08-22 | Stop reason: HOSPADM

## 2022-08-22 RX ORDER — DEXAMETHASONE SODIUM PHOSPHATE 4 MG/ML
INJECTION, SOLUTION INTRA-ARTICULAR; INTRALESIONAL; INTRAMUSCULAR; INTRAVENOUS; SOFT TISSUE PRN
Status: DISCONTINUED | OUTPATIENT
Start: 2022-08-22 | End: 2022-08-22 | Stop reason: SURG

## 2022-08-22 RX ORDER — HALOPERIDOL 5 MG/ML
1 INJECTION INTRAMUSCULAR
Status: DISCONTINUED | OUTPATIENT
Start: 2022-08-22 | End: 2022-08-22 | Stop reason: HOSPADM

## 2022-08-22 RX ORDER — VANCOMYCIN HYDROCHLORIDE 500 MG/10ML
INJECTION, POWDER, LYOPHILIZED, FOR SOLUTION INTRAVENOUS
Status: COMPLETED | OUTPATIENT
Start: 2022-08-22 | End: 2022-08-22

## 2022-08-22 RX ORDER — HYDRALAZINE HYDROCHLORIDE 20 MG/ML
5 INJECTION INTRAMUSCULAR; INTRAVENOUS
Status: DISCONTINUED | OUTPATIENT
Start: 2022-08-22 | End: 2022-08-22 | Stop reason: HOSPADM

## 2022-08-22 RX ORDER — SODIUM CHLORIDE, SODIUM LACTATE, POTASSIUM CHLORIDE, CALCIUM CHLORIDE 600; 310; 30; 20 MG/100ML; MG/100ML; MG/100ML; MG/100ML
INJECTION, SOLUTION INTRAVENOUS CONTINUOUS
Status: DISCONTINUED | OUTPATIENT
Start: 2022-08-22 | End: 2022-08-22 | Stop reason: HOSPADM

## 2022-08-22 RX ORDER — OXYCODONE HCL 5 MG/5 ML
10 SOLUTION, ORAL ORAL
Status: DISCONTINUED | OUTPATIENT
Start: 2022-08-22 | End: 2022-08-22 | Stop reason: HOSPADM

## 2022-08-22 RX ORDER — MAGNESIUM SULFATE HEPTAHYDRATE 40 MG/ML
4 INJECTION, SOLUTION INTRAVENOUS ONCE
Status: COMPLETED | OUTPATIENT
Start: 2022-08-22 | End: 2022-08-22

## 2022-08-22 RX ORDER — HYDROMORPHONE HYDROCHLORIDE 1 MG/ML
0.4 INJECTION, SOLUTION INTRAMUSCULAR; INTRAVENOUS; SUBCUTANEOUS
Status: DISCONTINUED | OUTPATIENT
Start: 2022-08-22 | End: 2022-08-22 | Stop reason: HOSPADM

## 2022-08-22 RX ORDER — PHENYLEPHRINE HCL IN 0.9% NACL 0.5 MG/5ML
SYRINGE (ML) INTRAVENOUS PRN
Status: DISCONTINUED | OUTPATIENT
Start: 2022-08-22 | End: 2022-08-22 | Stop reason: SURG

## 2022-08-22 RX ORDER — LIDOCAINE HYDROCHLORIDE 20 MG/ML
INJECTION, SOLUTION EPIDURAL; INFILTRATION; INTRACAUDAL; PERINEURAL PRN
Status: DISCONTINUED | OUTPATIENT
Start: 2022-08-22 | End: 2022-08-22 | Stop reason: SURG

## 2022-08-22 RX ORDER — ONDANSETRON 2 MG/ML
4 INJECTION INTRAMUSCULAR; INTRAVENOUS
Status: DISCONTINUED | OUTPATIENT
Start: 2022-08-22 | End: 2022-08-22 | Stop reason: HOSPADM

## 2022-08-22 RX ORDER — HYDROMORPHONE HYDROCHLORIDE 1 MG/ML
0.2 INJECTION, SOLUTION INTRAMUSCULAR; INTRAVENOUS; SUBCUTANEOUS
Status: DISCONTINUED | OUTPATIENT
Start: 2022-08-22 | End: 2022-08-22 | Stop reason: HOSPADM

## 2022-08-22 RX ORDER — OXYCODONE HCL 5 MG/5 ML
5 SOLUTION, ORAL ORAL
Status: DISCONTINUED | OUTPATIENT
Start: 2022-08-22 | End: 2022-08-22 | Stop reason: HOSPADM

## 2022-08-22 RX ORDER — SODIUM CHLORIDE, SODIUM LACTATE, POTASSIUM CHLORIDE, CALCIUM CHLORIDE 600; 310; 30; 20 MG/100ML; MG/100ML; MG/100ML; MG/100ML
INJECTION, SOLUTION INTRAVENOUS
Status: DISCONTINUED | OUTPATIENT
Start: 2022-08-22 | End: 2022-08-22 | Stop reason: SURG

## 2022-08-22 RX ADMIN — DOCUSATE SODIUM 50 MG AND SENNOSIDES 8.6 MG 2 TABLET: 8.6; 5 TABLET, FILM COATED ORAL at 17:01

## 2022-08-22 RX ADMIN — DEXAMETHASONE SODIUM PHOSPHATE 4 MG: 4 INJECTION, SOLUTION INTRA-ARTICULAR; INTRALESIONAL; INTRAMUSCULAR; INTRAVENOUS; SOFT TISSUE at 21:24

## 2022-08-22 RX ADMIN — Medication 100 MCG: at 21:47

## 2022-08-22 RX ADMIN — VANCOMYCIN HYDROCHLORIDE 2250 MG: 500 INJECTION, POWDER, LYOPHILIZED, FOR SOLUTION INTRAVENOUS at 13:04

## 2022-08-22 RX ADMIN — SODIUM HYPOCHLORITE 1 ML: 1.25 SOLUTION TOPICAL at 17:01

## 2022-08-22 RX ADMIN — EPHEDRINE SULFATE 10 MG: 50 INJECTION, SOLUTION INTRAVENOUS at 21:44

## 2022-08-22 RX ADMIN — CEFAZOLIN 2 G: 330 INJECTION, POWDER, FOR SOLUTION INTRAMUSCULAR; INTRAVENOUS at 21:17

## 2022-08-22 RX ADMIN — ATORVASTATIN CALCIUM 40 MG: 40 TABLET, FILM COATED ORAL at 17:01

## 2022-08-22 RX ADMIN — PIPERACILLIN AND TAZOBACTAM 3.38 G: 3; .375 INJECTION, POWDER, LYOPHILIZED, FOR SOLUTION INTRAVENOUS; PARENTERAL at 12:20

## 2022-08-22 RX ADMIN — GADOTERIDOL 20 ML: 279.3 INJECTION, SOLUTION INTRAVENOUS at 15:28

## 2022-08-22 RX ADMIN — PROPOFOL 100 MG: 10 INJECTION, EMULSION INTRAVENOUS at 21:22

## 2022-08-22 RX ADMIN — FENTANYL CITRATE 50 MCG: 50 INJECTION, SOLUTION INTRAMUSCULAR; INTRAVENOUS at 23:04

## 2022-08-22 RX ADMIN — PIPERACILLIN AND TAZOBACTAM 3.38 G: 3; .375 INJECTION, POWDER, LYOPHILIZED, FOR SOLUTION INTRAVENOUS; PARENTERAL at 20:25

## 2022-08-22 RX ADMIN — SERTRALINE 50 MG: 50 TABLET, FILM COATED ORAL at 17:01

## 2022-08-22 RX ADMIN — PIPERACILLIN AND TAZOBACTAM 3.38 G: 3; .375 INJECTION, POWDER, LYOPHILIZED, FOR SOLUTION INTRAVENOUS; PARENTERAL at 05:06

## 2022-08-22 RX ADMIN — LIDOCAINE HYDROCHLORIDE 100 MG: 20 INJECTION, SOLUTION EPIDURAL; INFILTRATION; INTRACAUDAL at 21:22

## 2022-08-22 RX ADMIN — SODIUM CHLORIDE, POTASSIUM CHLORIDE, SODIUM LACTATE AND CALCIUM CHLORIDE: 600; 310; 30; 20 INJECTION, SOLUTION INTRAVENOUS at 21:17

## 2022-08-22 RX ADMIN — ONDANSETRON 4 MG: 2 INJECTION INTRAMUSCULAR; INTRAVENOUS at 21:59

## 2022-08-22 RX ADMIN — FENTANYL CITRATE 50 MCG: 50 INJECTION, SOLUTION INTRAMUSCULAR; INTRAVENOUS at 22:03

## 2022-08-22 RX ADMIN — FENTANYL CITRATE 50 MCG: 50 INJECTION, SOLUTION INTRAMUSCULAR; INTRAVENOUS at 22:47

## 2022-08-22 RX ADMIN — MAGNESIUM SULFATE HEPTAHYDRATE 4 G: 40 INJECTION, SOLUTION INTRAVENOUS at 08:16

## 2022-08-22 RX ADMIN — HYDROCODONE BITARTRATE AND ACETAMINOPHEN 1 TABLET: 5; 325 TABLET ORAL at 05:01

## 2022-08-22 RX ADMIN — FENTANYL CITRATE 100 MCG: 50 INJECTION, SOLUTION INTRAMUSCULAR; INTRAVENOUS at 21:22

## 2022-08-22 ASSESSMENT — ENCOUNTER SYMPTOMS
ORTHOPNEA: 0
CLAUDICATION: 0
CHILLS: 0
SPEECH CHANGE: 0
HEADACHES: 0
COUGH: 0
EYE PAIN: 0
BLURRED VISION: 0
VOMITING: 0
MEMORY LOSS: 1
ABDOMINAL PAIN: 0
SENSORY CHANGE: 1
DIARRHEA: 0
TREMORS: 0
FOCAL WEAKNESS: 0
SHORTNESS OF BREATH: 0
SPUTUM PRODUCTION: 0
CONSTIPATION: 0
NAUSEA: 0
FEVER: 0
PALPITATIONS: 0

## 2022-08-22 ASSESSMENT — PAIN SCALES - GENERAL: PAIN_LEVEL: 0

## 2022-08-22 ASSESSMENT — PAIN DESCRIPTION - PAIN TYPE
TYPE: ACUTE PAIN
TYPE: ACUTE PAIN

## 2022-08-22 ASSESSMENT — FIBROSIS 4 INDEX: FIB4 SCORE: 3.19

## 2022-08-22 NOTE — HOSPITAL COURSE
81 y/o M [place] , who resides at the [facility] care unit.  He has a history of diabetes mellitus type 2, hypertension, dementia, hypercholesterolemia, urinary tension, dysphagia, myocardial infarction, GERD, osteoarthritis, and osteomyelitis of the left lateral malleolus that was treated with a wound VAC by orthopedic surgery in June 2022. He brought in by ambulance due to increasing confusion as well as low blood pressures. The patient was noted to have a systolic blood pressure of around 80 in the emergency room. He was given sepsis bolus and started on norepinephrine due to persistent hypotension. He did not have any significant complaints at the time of his presentation. In the emergency room, patient had a white count of 15.4 with a left shift. Lactic acid was elevated at 4.5. Patient was afebrile at the time of presentation. Urinalysis showed moderate leukocyte esterase with negative nitrites, negative bacteria. Patient presented with a deep wound over his left lateral malleolus representing probable source of infection.    Patient is DNR/DNI but had a POLST stating selective medical therapies were to be continued. Patient was started on Zosyn and vancomycin in the ED and admitted to the intensive care unit for pressor support with norepinephrine. Continued thereafter only on Zosyn for empiric coverage.    On 8/21/2022 patient's lactic acid normalized and he was weaned off pressor support. Patient also had SADIE which had begun to resolve. Troponin elevation of 47 down-trended to 28, suspected likely related to sepsis as opposed to ACS. By 8/22/2022 patient was deemed stable for step-down to telemetry, where he was noted to have repeated 1.5-1.7s sinus pauses. He was receiving metoprolol which was subsequently put on hold.

## 2022-08-22 NOTE — DISCHARGE PLANNING
Case Management Discharge Planning    Admission Date: 8/20/2022  GMLOS: 3.1  ALOS: 2    6-Clicks ADL Score:  not available  6-Clicks Mobility Score:  not available  PT and/or OT Eval ordered: No  Post-acute Referrals Ordered: Yes  Post-acute Choice Obtained: Yes  Has referral(s) been sent to post-acute provider:  Yes      Anticipated Discharge Dispo: Discharge Disposition: D/T to SNF with Medicare cert in anticipation of skilled care (03)    DME Needed: No    Action(s) Taken: Pt pending medical clearance, pt pending ortho surgery. May possibly need iv antibiotics at SNF. Pt is a long term pt of [facility]. Referral sent to [facility] for resumption.    Escalations Completed: None    Medically Clear: No    Next Steps: f/u with medical team and pts daughter to discuss dc needs and barriers.    Barriers to Discharge: Medical clearance and Pending Procedures

## 2022-08-22 NOTE — ASSESSMENT & PLAN NOTE
As per history.  Blood pressure is currently controlled.  Blood pressure goal less than 150 systolic given his age group.     Low dose metoprolol held for repeated sinus pauses lasting 1.6-1.7s on telemetry monitoring  Continue to hold home lisinopril and amlodipine

## 2022-08-22 NOTE — CONSULTS
[facility]  Inpatient Services  Initial Wound and LPS RN Evaluation    Admission Date: 2022     Last order of IP CONSULT TO WOUND CARE was found on 2022 from Hospital Encounter on 2022     HPI, PMH, SH: Reviewed    Past Surgical History:   Procedure Laterality Date    IRRIGATION & DEBRIDEMENT GENERAL  2022    Procedure: IRRIGATION AND DEBRIDEMENT LEFT ANKLE;  Surgeon: Panfilo Wagner M.D.;  Location: SURGERY [facility];  Service: Orthopedics    APPENDECTOMY      STENT PLACEMENT      cardiac     Social History     Tobacco Use    Smoking status: Former     Types: Cigarettes     Quit date: 2000     Years since quittin.9    Smokeless tobacco: Never   Substance Use Topics    Alcohol use: Never     Chief Complaint   Patient presents with    Hypotension     Pt lives at a assisted living facility and per staff pt was noted to be more confused than normal. Initial BP per EMS was 59/47 and saturating 85% on room air. Pt has wound vac located to L lateral ankle.  mg/dL PTA.          Diagnosis: Septic shock (HCC) [A41.9, R65.21]    Unit where seen by Wound Team: T833/02     WOUND CONSULT/FOLLOW UP RELATED TO:  left lateral ankle     WOUND HISTORY:      Andrei Garay is a 82 y.o.  with a past medical history that includes type 2 diabetes, hyperlipidemia, HTN, MI, dementia. Admitted 2022 for Osteomyelitis (HCC) [M86.9].  LPS has been consulted for left lateral ankle.      Pt known to LPS from prior admissions, 22 through 22 and  6/10/22 through 22. The ulcer started originally from a protuberance on his FWW in the fall .  Patient has been having wound care at [facility] where he resides.  When patient was seen in April wound did not probe to the bone at that time but upon his readmission on 6/10/2022 wound probes to bone and patient had I&D of lateral left ankle on 2022.  Patient was discharged back to  [facility] 6/20/2022.  Per case management notes facility was to order patient's wound VAC and patient was kept on IV daptomycin with end date of 7/23/2022.     Patient denied fevers, chills, nausea, vomiting or pain at this time.     IV antibiotics were started on this admission.  Infectious diseases has not been consulted.  Xray completed, but unsure.  MRI ordered by hospitalist.  Foot and ankle surgeon to consult on Monday.      Diagnosed with diabetes more than 10 years ago years ago, and is currently managing with insulin.  His blood sugars are checked 2 times per day at his facility.  Does have numbness to feet.  Does not check feet routinely. Usually wears OTC shoes. Does not have diabetic shoes and inserts family stated in the past he would not tolerate.       Smoking:     reports that he quit smoking about 21 years ago. He has never used smokeless tobacco.      Alcohol:     reports no history of alcohol use.      Drug:     reports no history of drug use.        WOUND ASSESSMENT/LDA  Wound 06/14/22 Ankle Lateral Left (Active)   Number of days: 68       Wound 08/20/22 Full Thickness Wound Ankle Outer Left DM stage 4 chronic POA (Active)   Wound Image     08/21/22 1300   Site Assessment Pink;Red;Yellow 08/21/22 1300   Periwound Assessment Maceration 08/21/22 1300   Margins Unattached edges 08/21/22 1300   Closure None 08/21/22 1300   Drainage Amount Moderate 08/21/22 1300   Drainage Description Purulent;Yellow 08/21/22 1300   Treatments Cleansed;Offloading;Site care 08/21/22 1300   Wound Cleansing Dakin's Solution 08/21/22 1300   Periwound Protectant Skin Protectant Wipes to Periwound 08/21/22 1300   Dressing Cleansing/Solutions 1/4 Strength Dakin's Solution 08/21/22 1300   Dressing Options Dry Roll Gauze;Moist Roll Gauze;Mepilex Heel 08/21/22 1300   Dressing Changed Changed 08/21/22 1300   Dressing Status Clean;Dry;Intact 08/21/22 1300   Dressing Change/Treatment Frequency Every Shift, and  As Needed 08/21/22 1300   NEXT Dressing Change/Treatment Date 08/21/22 08/21/22 1300   NEXT Weekly Photo (Inpatient Only) 08/28/22 08/21/22 1300   WOUND NURSE ONLY - Pressure Injury Stage 4 08/21/22 1300   Wound Length (cm) 2.7 cm 08/21/22 1300   Wound Width (cm) 1.2 cm 08/21/22 1300   Wound Depth (cm) 0.9 cm 08/21/22 1300   Wound Surface Area (cm^2) 3.24 cm^2 08/21/22 1300   Wound Volume (cm^3) 2.916 cm^3 08/21/22 1300   Tunneling (cm) 1.4 cm 08/21/22 1300   Tunneling Clock Position of Wound 12 08/21/22 1300   Tunneling - 2nd Location (cm) 3.2 cm 08/21/22 1300   Tunneling Clock Position of Wound - 2nd Location 6 08/21/22 1300   WOUND NURSE ONLY - Time Spent with Patient (mins) 60 08/21/22 1300   Number of days: 1        Vascular:    HONEY:   Arterial studies: 6/12/2022                     RIGHT      Waveform            Systolic BPs (mmHg)                              125           Brachial   Triphasic                                Common Femoral   Triphasic                                Popliteal   Triphasic                  200           Posterior Tibial   Triphasic                  179           Dorsalis Pedis                                            Digit                              1.54          HONEY                                            TBI                           LEFT   Waveform        Systolic BPs (mmHg)                              130           Brachial   Triphasic                                Common Femoral   Triphasic                                Popliteal   Triphasic                  187           Posterior Tibial   Triphasic                  180           Dorsalis Pedis                                            Digit                              1.44          HONEY                                            TBI         Findings   Bilateral-   The ankle pressures are not accurately measured due to calcification and    noncompressibility of the tibial vessels.   Doppler waveforms of the  common femoral artery and popliteal artery are of    high amplitude and triphasic.    Doppler waveforms at the ankle are brisk and triphasic.    TBI-   Right: 1.29   Left:    1.09    Pulses: Present via palpation and Present via doppler    Lab Values:    Lab Results   Component Value Date/Time    WBC 13.1 (H) 08/21/2022 01:55 AM    RBC 3.77 (L) 08/21/2022 01:55 AM    HEMOGLOBIN 10.4 (L) 08/21/2022 01:55 AM    HEMATOCRIT 31.5 (L) 08/21/2022 01:55 AM    CREACTPROT 19.87 (H) 08/20/2022 06:05 PM    SEDRATEWES 66 (H) 08/20/2022 01:55 PM    HBA1C 6.3 (H) 06/11/2022 03:57 PM        Culture Results show:  No results found for this or any previous visit (from the past 720 hour(s)).    LPS Labs and Studies  Sedimentation Rate Resulted, See above  C-Reactive Protein Resulted, See above  X-ray foot 3 views Completed and resulted  HONEY N/A  HgA1C Ordered    Pain Level/Medicated:  0/10         Interdisciplinary consultation: Patient, Bedside RN, discussed with Dr. Sarika Renteria, and consulted with Edwin VERDU[facility] LPS    EVALUATION / RATIONALE FOR TREATMENT: LPS consult has been acknowledged, Dressing orders placed for nursing, hospitalist Zay Baum MD, notified/updated  Most Recent Date:  8/21/22: Patient has non-viable tissue in wound bed and with exposed bone.  Discussed with hospitalist Dr. Baum regarding possible surgical intervention tomorrow.  NPO at midnight.  Dr. Baum also ordered MRI for left foot and ankle.     FOOTWEAR:   CURRENTLY IN PLACE (X), APPLIED THIS VISIT (A), ORDERED (O): TBD  Diabetic boot   Off Loading Shoe with hexagon insert  PRAFO    CONSULTS:   Diabetic Education (A1c > 7 OR Newly diabetic OR Missing supplies)               Vascular Consult (Absent pulses or HONEY/TBI insufficient)                             LPS surgical consult (Foot/ankle specialist)      x                                     Ortho consult                                                                                            Goals: Steady decrease in wound area and depth weekly.    LIMB PRESERVATION PLAN OF CARE ([X] for frequency of wound follow up,):   Nursing to follow dressing orders written for wound care. Contact wound team if area fails to progress, deteriorates or with any questions/concerns if something comes up before next scheduled follow up (See below as to whether wound is following and frequency of wound follow up)  Dressing changes by wound team:                   Follow up 3 times weekly:                NPWT change 3 times weekly:     Follow up 1-2 times weekly:    X   Follow up Bi-Monthly:                   Follow up as needed:     Other (explain):     NURSING PLAN OF CARE ORDERS (X):  Dressing changes: See Dressing Care orders: x  Skin care: See Skin Care orders:   RN Prevention Protocol:   Rectal tube care: See Rectal Tube Care orders:   Other orders:    [facility]ticip[facility]  LTACH:        SNF/Rehab:                  Home Health Care:           Outpatient Wound Center:   TYLRO rounds at wound clinic:  Date: N/A           Self/Family Care:        Other:                Vac Discharge Needs: TBD  Not Applicable Pt not on a wound vac:       Regular Vac while inpatient, alternative dressing at DC:        Regular Vac in use and continued at DC:            Reg. Vac w/ Skin Sub/Biologic in use. Will need to be changed 2x wkly:      Veraflo Vac while inpatient, ok to transition to Regular Vac on Discharge:           Veraflo Vac while inpatient, will need to remain on Veraflo Vac upon discharge:

## 2022-08-22 NOTE — PALLIATIVE CARE
Palliative Care follow-up  [facility]. A copy has been scanned into Epic. To view this, please hover over code status. This document names his daughter, Marah Rowland, as DPOA-HC and his son, Frankie Garay, as first alternative -in-fact.    Plan: Full consult to follow    Thank you for allowing Palliative Care to support this patient and family. Contact [phone] for additional assistance, change in patient status, or with any questions/concerns.

## 2022-08-22 NOTE — DISCHARGE PLANNING
Agency/Facility Name: [facility] beds at the [facility]  Outcome: DPA received v-mail regarding Pt being resident at SNF, [facility] requesting callback when medically cleared for SNF.     Received Choice form at 4020  Agency/Facility Name: [facility] beds at the [facility]  Referral sent per Choice form @ [id number]

## 2022-08-22 NOTE — ASSESSMENT & PLAN NOTE
As per history.  Martinez catheter was placed on admission.     Continue home tamsulosin 0.8 mg daily  Discontinue Martinez catheter for voiding trial

## 2022-08-22 NOTE — ASSESSMENT & PLAN NOTE
As per history now residing at a memory care center at the [facility].     Minimize disturbances  Continue home sertraline and mirtazapine

## 2022-08-22 NOTE — PROGRESS NOTES
[facility] Internal Medicine Daily Progress Note    Date of Service  8/22/2022    [facility] Team: [facility] Team   Attending: Jenn Shoemaker M.d.  Senior Resident: Dr. Rachna Marcos  Intern:  Dr. Villela  Contact Number: [phone]    Chief Complaint  Andrei Garay is a 82 y.o. male admitted 8/20/2022 with confusion, hypotension, sepsis    Hospital Course  81 y/o M [place] , who resides at the [facility] unit.  He has a history of diabetes mellitus type 2, hypertension, dementia, hypercholesterolemia, urinary tension, dysphagia, myocardial infarction, GERD, osteoarthritis, and osteomyelitis of the left lateral malleolus that was treated with a wound VAC by orthopedic surgery in June 2022. He brought in by ambulance due to increasing confusion as well as low blood pressures. The patient was noted to have a systolic blood pressure of around 80 in the emergency room. He was given sepsis bolus and started on norepinephrine due to persistent hypotension. He did not have any significant complaints at the time of his presentation. In the emergency room, patient had a white count of 15.4 with a left shift. Lactic acid was elevated at 4.5. Patient was afebrile at the time of presentation. Urinalysis showed moderate leukocyte esterase with negative nitrites, negative bacteria. Patient presented with a deep wound over his left lateral malleolus representing probable source of infection.    Patient is DNR/DNI but had a POLST stating selective medical therapies were to be continued. Patient was started on Zosyn and vancomycin in the ED and admitted to the intensive care unit for pressor support with norepinephrine. Continued thereafter only on Zosyn for empiric coverage.    On 8/21/2022 patient's lactic acid normalized and he was weaned off pressor support. Patient also had SADIE which had begun to resolve. Troponin elevation of 47 down-trended to 28, suspected likely related to sepsis as opposed to  ACS. By 8/22/2022 patient was deemed stable for step-down to telemetry, where he was noted to have repeated 1.5-1.7s sinus pauses. He was receiving metoprolol which was subsequently put on hold.    Interval Problem Update  A/F, patient running borderline hypotensive with intermittent sinus pauses of 1.6-1.7 seconds on telemetry monitoring. Other vitals stable. Patient is somnolent but arousable, appears to be AAOx2. States that he feels well and has no complaints. Denies fever, chills, chest pain, shortness of breath, nausea, vomiting.     I have discussed this patient's plan of care and discharge plan at IDT rounds today with Case Management, Nursing, Nursing leadership, and other members of the IDT team.    Consultants/Specialty  critical care    Code Status  DNAR/DNI    Disposition  Patient is not medically cleared for discharge.   Anticipate discharge to to home with organized home healthcare and close outpatient follow-up.  I have placed the appropriate orders for post-discharge needs.    Review of Systems  Review of Systems   Constitutional:  Negative for chills, fever and malaise/fatigue.   Eyes:  Negative for blurred vision and pain.   Respiratory:  Negative for cough, sputum production and shortness of breath.    Cardiovascular:  Negative for chest pain, palpitations, orthopnea, claudication and leg swelling.   Gastrointestinal:  Negative for abdominal pain, constipation, diarrhea, nausea and vomiting.   Genitourinary:  Negative for dysuria, frequency and urgency.   Skin:  Negative for rash.   Neurological:  Positive for sensory change. Negative for tremors, speech change, focal weakness and headaches.   Psychiatric/Behavioral:  Positive for memory loss.       Physical Exam  Temp:  [36.4 °C (97.6 °F)-37 °C (98.6 °F)] 36.9 °C (98.4 °F)  Pulse:  [] 62  Resp:  [18-25] 18  BP: ()/(53-65) 114/57  SpO2:  [89 %-96 %] 94 %    Physical Exam  Constitutional:       General: He is not in acute distress.      Appearance: He is not toxic-appearing.   HENT:      Head: Normocephalic and atraumatic.      Nose: No congestion.      Mouth/Throat:      Mouth: Mucous membranes are moist.      Pharynx: Oropharynx is clear. No oropharyngeal exudate.   Eyes:      Extraocular Movements: Extraocular movements intact.      Conjunctiva/sclera: Conjunctivae normal.      Pupils: Pupils are equal, round, and reactive to light.   Cardiovascular:      Rate and Rhythm: Normal rate and regular rhythm.      Pulses: Normal pulses.      Heart sounds: No murmur heard.  Pulmonary:      Effort: Pulmonary effort is normal. No respiratory distress.      Breath sounds: Normal breath sounds. No wheezing.   Abdominal:      General: There is no distension.      Palpations: Abdomen is soft.      Tenderness: There is no abdominal tenderness. There is no right CVA tenderness, left CVA tenderness, guarding or rebound.   Musculoskeletal:      Cervical back: Neck supple.      Right lower leg: No edema.      Left lower leg: No edema.   Lymphadenopathy:      Cervical: No cervical adenopathy.   Skin:     General: Skin is warm and dry.      Findings: Lesion present.      Comments: Left lateral malleolus wound with purulent drainage, surrounding erythema with radius of approximately 3-5 cm. Wound is about 2cm in diameter and deep, possibly extending to the bone. Patient with advanced neuropathy about the wound, endorses no pain with vigorous palpation about the wound edges. Left subclavian access noted.   Neurological:      Mental Status: He is alert. He is disoriented.      Sensory: Sensory deficit present.      Motor: No weakness.      Comments: States the year is 2002, knows the president, knows his own name and that he is in the hospital. Able to weight bear and ambulate on his left foot despite wound       Fluids    Intake/Output Summary (Last 24 hours) at 8/22/2022 0923  Last data filed at 8/22/2022 0700  Gross per 24 hour   Intake --   Output 1480 ml   Net  -1480 ml       Laboratory  Recent Labs     08/20/22  1355 08/21/22  0155   WBC 15.4* 13.1*   RBC 3.94* 3.77*   HEMOGLOBIN 11.0* 10.4*   HEMATOCRIT 33.5* 31.5*   MCV 85.0 83.6   MCH 27.9 27.6   MCHC 32.8* 33.0*   RDW 44.3 43.3   PLATELETCT 289 305   MPV 9.3 9.4     Recent Labs     08/20/22  1355 08/21/22  0155 08/22/22  0200   SODIUM 136 137 134*   POTASSIUM 4.5 3.7 3.9   CHLORIDE 99 103 101   CO2 22 22 25   GLUCOSE 193* 137* 140*   BUN 37* 31* 20   CREATININE 1.83* 1.11 0.89   CALCIUM 8.4* 8.1* 8.1*                   Imaging  DX-CHEST-LIMITED (1 VIEW)   Final Result      Left subclavian central venous catheter tip extends slightly into the azygos vein. No pneumothorax.      DX-ANKLE 2- VIEWS LEFT   Final Result      1.  Soft tissue swelling about the ankle particularly laterally where there is a wound VAC.   2.  Previously seen lateral malleolus bone erosion/osteomyelitis is not well evaluated on the current study due to lack of oblique view.   3.  No definite new osseous abnormality is seen.         CT-CTA CHEST PULMONARY ARTERY W/ RECONS   Final Result      1.  No CT evidence of pulmonary embolus.      2.  Groundglass opacifications noted in the right upper pulmonary lobe could be due to pneumonitis pneumonia or other inflammatory process. Covid 19 pneumonia is in the differential diagnosis.      3.  Small area of consolidation is noted posterior edge of left lung apex with similar differential diagnosis.      4.  Opacification posteriorly throughout the lower lobes is noted which could be due to atelectasis edema or inflammation.      5.  Trace pericardial fluid collection is also identified.            CT-ABDOMEN-PELVIS WITH   Final Result      1.  Urinary bladder wall thickening could indicate cystitis. Prostate gland is enlarged.      2.  Otherwise no acute abnormalities are identified in the abdomen or pelvis.      DX-CHEST-PORTABLE (1 VIEW)   Final Result      1.  Cardiomegaly with pulmonary vascular  congestion and interstitial edema.   2.  Small ill-defined opacity in the right upper lobe may represent pneumonitis. Follow-up to radiographic resolution is recommended.   3.  Atherosclerotic plaque.      MR-ANKLE-WITH & W/O LEFT    (Results Pending)        Assessment/Plan  Problem Representation:    * Septic shock (HCC)- (present on admission)  Assessment & Plan  This is Septic shock Present on admission  SIRS criteria identified on my evaluation include: Tachypnea, with respirations greater than 20 per minute and Leukocytosis, with WBC greater than 12,000  Indicators of septic shock include: Severe sepsis present, persistent hypotension after 30 ml/kg completed, and initial lactate level result is >= 4 mmol/L.  Acute kidney injury with creatinine of 1.8.  Sources is: Urinary tract versus persistent osteomyelitis of the left lateral malleolus.  Unlikely pulmonary given no pulmonary symptoms.  Sepsis protocol initiated  Crystalloid Fluid Administration: Fluid resuscitation ordered per standard protocol - 30 mL/kg per current or ideal body weight  IV antibiotics as appropriate for source of sepsis  Reassessment: I have reassessed the patient's hemodynamic status  Patient has been off of norepinephrine pressor support since 4 AM 8/21/2022.    Decrease Zosyn to 3.375 mg IV every 8 hours. Started vancomycin for MRSA coverage on 8/22/2022 as suspected source is left ankle wound.  MRI with contrast of left ankle on 8/22/2022    History of osteomyelitis- (present on admission)  Assessment & Plan  Involving the left malleolus.  Wound VAC placed by orthopedic surgery back on 6/14/2022.    Orthopedic surgery again intervened for left fibula resection and irrigation and debridement on 8/22/2022.    Ankle wound- (present on admission)  Assessment & Plan  As per history.  Involving the lateral malleolus with osteomyelitis.  Patient has had a wound VAC in place.  Underwent debridement by wound care.  Last cultures from June 2022  grew:  Corynebacterium striatum group     -Orthopedic surgery performed left distal fibula resection with repeat irrigation and debridement of left lateral malleolus wound the evening of 8/22/2022  -Margin cultures pending to guide further abx management    Benign prostatic hyperplasia without lower urinary tract symptoms- (present on admission)  Assessment & Plan  As per history.  Martinez catheter was placed on admission. Discontinued on 8/21/2022    Continue home tamsulosin 0.8 mg daily    Narcotic dependence (HCC)- (present on admission)  Assessment & Plan  As per history.    Continue home Norco    DNR (do not resuscitate)  Assessment & Plan  Patient is DNR/DNI status as per patient's POLST, which I have reviewed.    Elevated troponin- (present on admission)  Assessment & Plan  Mildly elevated.  Trending down.  Likely from demand ischemia from sepsis  Continue telemetry monitoring    Chronic pain- (present on admission)  Assessment & Plan  As per history.  Has been taking Norco.    Continue home Norco    Acute cystitis without hematuria- (present on admission)  Assessment & Plan  As per urinalysis with moderate leukocyte esterase, nitrite negativity.    Urine cultures pending  On empiric Zosyn and vancomycin for sepsis coverage  Martinez catheter discontinued 8/21    SADIE (acute kidney injury) (HCC)- (present on admission)  Assessment & Plan  Creatinine was elevated at 1.8 on admission.  Resolved with IV fluid resuscitation and pressor support.    Continue to monitor  Avoid nephrotoxins    HTN (hypertension)- (present on admission)  Assessment & Plan  As per history.  Blood pressure is currently controlled.  Blood pressure goal less than 150 systolic given his age group.    Holding metoprolol due to repeatedly 1.6-1.7s duration sinus pauses on telemetry  Continue to hold home lisinopril and amlodipine    Dementia with behavioral disturbance (HCC)- (present on admission)  Assessment & Plan  As per history now residing  at a memory care center at the [facility].    Minimize disturbances  Continue home sertraline and mirtazapine    Type 2 diabetes mellitus with hyperglycemia, with long-term current use of insulin (HCC)- (present on admission)  Assessment & Plan  Hemoglobin A1c of 6.32 months ago.  Well-controlled.    Continue regular insulin sliding scale  Carbohydrate consistent diet  Continue to hold home metformin       VTE prophylaxis: enoxaparin ppx    I have performed a physical exam and reviewed and updated ROS and Plan today (8/22/2022). In review of yesterday's note (8/21/2022), there are no changes except as documented above.

## 2022-08-22 NOTE — PROGRESS NOTES
"8/22/2022      Reason for consultation: Left fibula diabetic ulcer with left fibula osteomyelitis    Consultation on Andrei Garay for concern of left fibula osteomyelitis and possible septic arthritis.  Patient is a pleasant 82-year-old male with multiple medical comorbidities ANO x2 whose daughter is medical power of .  He states that this ulcer on the lateral side of his ankle has been there for months he has tried wound care and antibiotics and he states it \"does not seem to be getting better \"patient's daughter is medical power of  however he states that he would like to get this surgically fixed if that means he no longer has to deal with it.  However he will leave it up to her discretion.  He otherwise denies any pain with ankle movement any pain with axial loading no other issues..       Past Medical History:   Diagnosis Date    Diabetes (HCC)     Hyperlipemia     Hypertension     MI (myocardial infarction) ([facility])        Past Surgical History:   Procedure Laterality Date    IRRIGATION & DEBRIDEMENT GENERAL  6/14/2022    Procedure: IRRIGATION AND DEBRIDEMENT LEFT ANKLE;  Surgeon: Panfilo Wagner M.D.;  Location: SURGERY [facility];  Service: Orthopedics    APPENDECTOMY      STENT PLACEMENT      cardiac       Medications  No current facility-administered medications on file prior to encounter.     Current Outpatient Medications on File Prior to Encounter   Medication Sig Dispense Refill    insulin regular (HUMULIN R) 100 Unit/mL Solution Inject 2-10 Units under the skin 4 Times a Day,Before Meals and at Bedtime. Inject per sliding scale. For blood glucose:  151 - 200 = 2 units  201 - 250 = 4 units  251 - 300 = 6 units  301 - 350 = 8 units  351 - 400 = 10 units  If >400, notify M.D.      amLODIPine (NORVASC) 5 MG Tab Take 5 mg by mouth every day. Hold for SBP <110, HR <60.      tamsulosin (FLOMAX) 0.4 MG capsule Take 0.8 mg by mouth at bedtime. 2 capsules = 0.8 mg.      doxycycline " (VIBRAMYCIN) 100 MG Tab Take 100 mg by mouth 2 times a day. 7 day course (8/13/2022 - 8/19/2022).      HYDROcodone-acetaminophen (NORCO) 5-325 MG Tab per tablet Take 1 Tablet by mouth 2 times a day.      Saccharomyces boulardii (FLORASTOR PO) Take 1 Capsule by mouth 2 times a day.      nystatin (MYCOSTATIN) 283588 UNIT/ML Suspension Take 500,000 Units by mouth 3 times a day. 8/2/2022 - 8/8/2022.      magnesium hydroxide (MILK OF MAGNESIA) 400 MG/5ML Suspension Take 30 mL by mouth every 24 hours as needed (if no bowel movement for 72 hours).      HYDROcodone-acetaminophen (NORCO) 5-325 MG Tab per tablet Take 1 Tablet by mouth every 6 hours as needed (Acute Pain).      atorvastatin (LIPITOR) 40 MG Tab Take 1 Tablet by mouth every evening. 30 Tablet 1    lisinopril (PRINIVIL) 40 MG tablet Take 1 Tablet by mouth every day. 30 Tablet 1    Melatonin 10 MG Tab Take 1 tablet by mouth at bedtime for 30 days. 30 Tablet 0    metoprolol tartrate (LOPRESSOR) 25 MG Tab Take 1 Tablet by mouth 2 times a day. 60 Tablet 1    sertraline (ZOLOFT) 50 MG Tab Take 1 Tablet by mouth every evening. 30 Tablet 1    mirtazapine (REMERON) 15 MG Tab Take 1 Tablet by mouth at bedtime. 30 Tablet 1    metformin (GLUCOPHAGE) 1000 MG tablet Take 1 Tablet by mouth 2 times a day. 60 Tablet 1       Allergies  Patient has no known allergies.    ROS  General Constitutional: Patient reports no changes in general health lately   Eyes: Patient denies vision abnormalities  ENT: Patient denies vertigo or balance issues   Cardiovascular: Patient currently denies chest pain  Respiratory: Patient currently denies shortness of breath   Gastrointestinal: Patient currently denies nausea/vomiting  Integumentary: no new skin lesions/rashes  Psychiatric: Patient denies h/o psychiatric conditions/mood changes  Hematologic / Lymphatic: Patient denies bleeding or bruising  Allergic / Immunologic: Patient denies recent immunologic problems    Family History   Problem  Relation Age of Onset    No Known Problems Mother     Heart Disease Father        Social History     Socioeconomic History    Marital status: Legally     Highest education level: 10th grade   Tobacco Use    Smoking status: Former     Types: Cigarettes     Quit date: 2000     Years since quittin.9    Smokeless tobacco: Never   Vaping Use    Vaping Use: Never used   Substance and Sexual Activity    Alcohol use: Never    Drug use: Never   Social History Narrative    ** Merged History Encounter **          Social Determinants of Health     Financial Resource Strain: Unknown    Difficulty of Paying Living Expenses: Patient refused   Food Insecurity: Unknown    Worried About Running Out of Food in the Last Year: Patient refused    Ran Out of Food in the Last Year: Patient refused   Transportation Needs: Unknown    Lack of Transportation (Medical): Patient refused    Lack of Transportation (Non-Medical): Patient refused   Physical Activity: Inactive    Days of Exercise per Week: 0 days    Minutes of Exercise per Session: 0 min   Stress: Stress Concern Present    Feeling of Stress : Very much   Social Connections: Socially Isolated    Frequency of Communication with Friends and Family: Twice a week    Frequency of Social Gatherings with Friends and Family: More than three times a week    Attends Jainism Services: Never    Active Member of Clubs or Organizations: No    Attends Club or Organization Meetings: Never    Marital Status:    Housing Stability: Low Risk     Unable to Pay for Housing in the Last Year: No    Number of Places Lived in the Last Year: 1    Unstable Housing in the Last Year: No       Physical Exam  Vitals  /57   Pulse 62   Temp 36.9 °C (98.4 °F) (Temporal)   Resp 18   Ht 1.829 m (6')   Wt 87.2 kg (192 lb 3.9 oz)   SpO2 94%   General: Well Developed, Well Nourished, no acute distress  Psychiatric: Alert and oriented x3, appropriate responses to questions, pleasant  mood and affect.  HEENT: Normocephalic, atraumatic  Eyes: Anicteric, EOMI  Neck: Supple, trachea midline  Chest: Symmetric expansion of the chest wall  Heart: RRR, palpable peripheral pulses  Abdomen: Soft, NT, ND  Skin: Intact, no open wounds  Musculoskeletal: Left lower extremity demonstrates approximately a 3 cm open wound which is right over the lateral malleolus.  Surrounding soft tissue erythema.  The rest of his ankle does not have any surrounding erythema he is able to fully plantar and dorsiflex without pain no pain on axial load he does have a palpable DP and TP pulse.  Able to wiggle all toes.      Radiographs:  DX-CHEST-LIMITED (1 VIEW)   Final Result      Left subclavian central venous catheter tip extends slightly into the azygos vein. No pneumothorax.      DX-ANKLE 2- VIEWS LEFT   Final Result      1.  Soft tissue swelling about the ankle particularly laterally where there is a wound VAC.   2.  Previously seen lateral malleolus bone erosion/osteomyelitis is not well evaluated on the current study due to lack of oblique view.   3.  No definite new osseous abnormality is seen.         CT-CTA CHEST PULMONARY ARTERY W/ RECONS   Final Result      1.  No CT evidence of pulmonary embolus.      2.  Groundglass opacifications noted in the right upper pulmonary lobe could be due to pneumonitis pneumonia or other inflammatory process. Covid 19 pneumonia is in the differential diagnosis.      3.  Small area of consolidation is noted posterior edge of left lung apex with similar differential diagnosis.      4.  Opacification posteriorly throughout the lower lobes is noted which could be due to atelectasis edema or inflammation.      5.  Trace pericardial fluid collection is also identified.            CT-ABDOMEN-PELVIS WITH   Final Result      1.  Urinary bladder wall thickening could indicate cystitis. Prostate gland is enlarged.      2.  Otherwise no acute abnormalities are identified in the abdomen or pelvis.       DX-CHEST-PORTABLE (1 VIEW)   Final Result      1.  Cardiomegaly with pulmonary vascular congestion and interstitial edema.   2.  Small ill-defined opacity in the right upper lobe may represent pneumonitis. Follow-up to radiographic resolution is recommended.   3.  Atherosclerotic plaque.      MR-ANKLE-WITH & W/O LEFT    (Results Pending)       Laboratory Values  Recent Labs     08/20/22  1355 08/21/22  0155   WBC 15.4* 13.1*   RBC 3.94* 3.77*   HEMOGLOBIN 11.0* 10.4*   HEMATOCRIT 33.5* 31.5*   MCV 85.0 83.6   MCH 27.9 27.6   MCHC 32.8* 33.0*   RDW 44.3 43.3   PLATELETCT 289 305   MPV 9.3 9.4     Recent Labs     08/20/22  1355 08/21/22  0155 08/22/22  0200   SODIUM 136 137 134*   POTASSIUM 4.5 3.7 3.9   CHLORIDE 99 103 101   CO2 22 22 25   GLUCOSE 193* 137* 140*   BUN 37* 31* 20             Impression:    #1  Left lateral malleolus chronic diabetic ulcer    #2.  Left distal fibular osteomyelitis     #3.  Reactive arthritis of the left ankle, low concern for septic arthritis at this time.    Plan:    I recommended operative treatment of left distal fibula excision with biopsy.  As well as left ankle irrigation and debridement.  Discussed operative plan with patient's medical power of  his daughter who agrees that this is what she would like for us to do.  Risk benefits and alternatives with both Andrei and his daughter were discussed and she wishes to proceed with the operative interventions.. Risks and benefits of surgery were discussed which include, but are not limited to bleeding, infection, neurovascular damage, malunion, nonunion, instability, limb length discrepancy, symptomatic hardware, DVT, PE, MI, Stroke and death.  Benefits of surgery discussed included improved chance of union in acceptable alignment and eradicating the infection as well as the chronic ulcer.  We also discussed therapeutic alternatives to surgery, including non-operative management, which I did not recommend.    The patient  understands these risks and benefits and wish to proceed.      Please keep NPO pending surgery which will be later this evening. WBAT affected extremity after surgery.    Angie Johnson D.O.

## 2022-08-22 NOTE — CARE PLAN
The patient is Watcher - Medium risk of patient condition declining or worsening    Shift Goals  Clinical Goals: Wound care & Surgery  Patient Goals: Rest  Family Goals: n/a    Progress made toward(s) clinical / shift goals:      Problem: Fall Risk  Goal: Patient will remain free from falls  Outcome: Progressing  Note: All fall precautions in place and patient educated to use the call light before ambulation        Patient is not progressing towards the following goals:      Problem: Knowledge Deficit - Standard  Goal: Patient and family/care givers will demonstrate understanding of plan of care, disease process/condition, diagnostic tests and medications  Outcome: Not Progressing  Note: Patient is A&O X 2 and does not fully understand POC or disease process

## 2022-08-22 NOTE — CARE PLAN
The patient is Watcher - Medium risk of patient condition declining or worsening    Shift Goals  Clinical Goals: place wound vac; safety  Patient Goals: comfort  Family Goals: n/a    Progress made toward(s) clinical / shift goals:      Problem: Fluid Volume  Goal: Fluid volume balance will be maintained  Outcome: Progressing     Problem: Urinary - Renal Perfusion  Goal: Ability to achieve and maintain adequate renal perfusion and functioning will improve  Outcome: Progressing     Problem: Respiratory  Goal: Patient will achieve/maintain optimum respiratory ventilation and gas exchange  Outcome: Progressing     Problem: Skin Integrity  Goal: Skin integrity is maintained or improved  Outcome: Progressing     Problem: Fall Risk  Goal: Patient will remain free from falls  Outcome: Progressing     Problem: Pain - Standard  Goal: Alleviation of pain or a reduction in pain to the patient’s comfort goal  Outcome: Progressing       Patient is not progressing towards the following goals:

## 2022-08-22 NOTE — ASSESSMENT & PLAN NOTE
Involving the left malleolus.  Wound VAC placed by orthopedic surgery back on 6/14/2022.     I have ordered MRI of the left ankle with and without contrast to further assess for persistent osteomyelitis that may serve as source of patient's septic shock.

## 2022-08-22 NOTE — PROGRESS NOTES
MD Villela updated by this RN that the patient had had 4 sinus pauses, 1.6-1.7 seconds in length. Patient is asymptomatic and vitals are stable.

## 2022-08-22 NOTE — ASSESSMENT & PLAN NOTE
This is Septic shock Present on admission  SIRS criteria identified on my evaluation include: Tachypnea, with respirations greater than 20 per minute and Leukocytosis, with WBC greater than 12,000  Indicators of septic shock include: Severe sepsis present, persistent hypotension after 30 ml/kg completed, and initial lactate level result is >= 4 mmol/L.  Acute kidney injury with creatinine of 1.8.  Sources is: Urinary tract versus persistent osteomyelitis of the left lateral malleolus.  Unlikely pulmonary given no pulmonary symptoms.  Sepsis protocol initiated  Crystalloid Fluid Administration: Fluid resuscitation ordered per standard protocol - 30 mL/kg per current or ideal body weight  IV antibiotics as appropriate for source of sepsis  Reassessment: I have reassessed the patient's hemodynamic status  Patient has been off of norepinephrine pressor support since 4 AM 8/21/2022.     Decrease Zosyn to 3.375 mg IV every 8 hours  MRI left ankle to assess for persistent osteomyelitis  Consider infectious disease consultation if signs of infection on MRI

## 2022-08-22 NOTE — PROGRESS NOTES
Pharmacy Vancomycin Kinetics Note for 8/22/2022     82 y.o. male on Vancomycin day # 1     Vancomycin Indication (AUC Dosing): Osteomyelitis    Provider specified end date: 09/05/22    Active Antibiotics (From admission, onward)      Ordered     Ordering Provider       Mon Aug 22, 2022  3:42 PM    08/22/22 1542  vancomycin (VANCOCIN) 1,750 mg in  mL IVPB  (vancomycin (VANCOCIN) IV (LD + Maintenance))  EVERY 24 HOURS        Note to Pharmacy: Per P&T Kinetics Protocol    Jenn Shoemaker M.D.       Mon Aug 22, 2022 12:32 PM    08/22/22 1232  vancomycin (VANCOCIN) 2,250 mg in  mL IVPB  (vancomycin (VANCOCIN) IV (LD + Maintenance))  ONCE        Note to Pharmacy: Per P&T Kinetics Protocol    Jenn Shoemaker M.D.       Mon Aug 22, 2022 12:27 PM    08/22/22 1227  MD Alert...Vancomycin per Pharmacy  PHARMACY TO DOSE        Question:  Indication(s) for vancomycin?  Answer:  Osteomyelitis    Rachnayou Marcos M.D.       Sun Aug 21, 2022  3:47 PM    08/21/22 1547  piperacillin-tazobactam (Zosyn) 3.375 g in  mL IVPB  (Unknown Source of Infection (Severe Sepsis))  EVERY 8 HOURS        [facility] Report.    Zay Baum M.D.          Dosing Weight: 87.2 kg (192 lb 3.9 oz)    Admission History: Admitted on 8/20/2022 for Septic shock ([facility]) [A41.9, R65.21]  Pertinent history: Concern for recurrent osteomyelitis vs infection of unknown origin. Concern for nosocomial infection by primary team. Antimicrobials initiated.    Allergies:   Patient has no known allergies.     Pertinent cultures to date:   Results       Procedure Component Value Units Date/Time    URINE CULTURE(NEW) [[id number]]  (Abnormal) Collected: 08/20/22 1410    Order Status: Completed Specimen: Urine, Clean Catch Updated: 08/22/22 1528     Significant Indicator POS     Source UR     Site URINE, CLEAN CATCH     Culture Result Growth noted after further incubation, see below for  organism identification.        Yeast  10-50,000 cfu/mL       Narrative:      Indication for culture:->Evaluation for sepsis without a  clear source of infection  Indication for culture:->Evaluation for sepsis without a    MRSA By PCR (Amp) [[id number]] Collected: 08/22/22 1335    Order Status: Sent Specimen: Respirate from Nares Updated: 08/22/22 1407    Narrative:      Collected By: [id number] LISA CHAVIS RN to collect MRSA nares.    Culture Wound w/Gram Stain [[id number]]  (Abnormal) Collected: 08/21/22 1350    Order Status: Completed Specimen: Wound from Left Ankle Updated: 08/22/22 1307     Significant Indicator POS     Source WND     Site LEFT ANKLE     Culture Result -     Gram Stain Result Many WBCs.  Rare Gram positive cocci.       Culture Result Streptococcus agalactiae (Group B)  Light growth      Narrative:      Collected By: [id number] JUSTIN HAIR  Collected By: [id number] JUSTIN HAIR    URINE CULTURE(NEW) [[id number]] Collected: 08/22/22 0745    Order Status: Sent Specimen: Urine, Clean Catch Updated: 08/22/22 0823    Narrative:      Indication for culture:->Patient WITHOUT an indwelling Martinez  catheter in place with new onset of Dysuria, Frequency,  Urgency, and/or Suprapubic pain    GRAM STAIN [[id number]] Collected: 08/21/22 1350    Order Status: Completed Specimen: Wound Updated: 08/21/22 1618     Significant Indicator .     Source WND     Site LEFT ANKLE     Gram Stain Result Many WBCs.  Rare Gram positive cocci.      Narrative:      Collected By: [id number] JUSTIN HAIR  Collected By: [id number] JUSTIN HAIR    BLOOD CULTURE [[id number]] Collected: 08/20/22 1419    Order Status: Completed Specimen: Blood from Peripheral Updated: 08/21/22 0731     Significant Indicator NEG     Source BLD     Site PERIPHERAL     Culture Result No Growth  Note: Blood cultures are incubated for 5 days and  are monitored continuously.Positive blood cultures  are called to the RN and reported as soon as  they are identified.      Narrative:      Per Hospital  "Policy: Only change Specimen Src: to \"Line\" if  specified by physician order.  Right Hand    BLOOD CULTURE [[id number]] Collected: 08/20/22 1355    Order Status: Completed Specimen: Blood from Peripheral Updated: 08/21/22 0731     Significant Indicator NEG     Source BLD     Site PERIPHERAL     Culture Result No Growth  Note: Blood cultures are incubated for 5 days and  are monitored continuously.Positive blood cultures  are called to the RN and reported as soon as  they are identified.      Narrative:      Per Hospital Policy: Only change Specimen Src: to \"Line\" if  specified by physician order.  No site indicated    CoV-2, FLU A/B, and RSV by PCR (2-4 Hours CEPHEID) : Collect NP swab in VTM [[id number]] Collected: 08/20/22 1720    Order Status: Completed Specimen: Respirate Updated: 08/20/22 1819     Influenza virus A RNA Negative     Influenza virus B, PCR Negative     RSV, PCR Negative     SARS-CoV-2 by PCR NotDetected     Comment: PATIENTS: Important information regarding your results and instructions can  be found at [email or web address]   \"After your  Covid-19 Test\"    [facility] providers: PLEASE REFER TO DE-ESCALATION AND RETESTING PROTOCOL  on [email or web address]    **The Functional Neuromodulation GeneXpert Xpress SARS-CoV-2 RT-PCR Test has been made  available for use under the Emergency Use Authorization (EUA) only.          SARS-CoV-2 Source NP Swab    MRSA By PCR (Amp) [[id number]]     Order Status: Canceled Specimen: Respirate from Nares     COV-2, FLU A/B, AND RSV BY PCR (2-4 HOURS CEPHEID): Collect NP swab in VTM [[id number]]     Order Status: Canceled Specimen: Respirate     URINALYSIS [[id number]]  (Abnormal) Collected: 08/20/22 1410    Order Status: Completed Specimen: Urine, Clean Catch Updated: 08/20/22 1432     Color Yellow     Character Clear     Specific Gravity 1.018     Ph 5.5     Glucose 250 mg/dL      Ketones Negative mg/dL      Protein 30 mg/dL      Bilirubin Negative     Urobilinogen, " Urine 0.2     Nitrite Negative     Leukocyte Esterase Moderate     Occult Blood Small     Micro Urine Req Microscopic    Narrative:      Indication for culture:->Evaluation for sepsis without a  clear source of infection          Labs:   Estimated Creatinine Clearance: 71.4 mL/min (by C-G formula based on SCr of 0.89 mg/dL).    Recent Labs     22  1355 22  0155   WBC 15.4* 13.1*   NEUTSPOLYS 80.80* 73.20*     Recent Labs     22  1355 22  0155 22  0200   BUN 37* 31* 20   CREATININE 1.83* 1.11 0.89   ALBUMIN 2.9* 2.6* 2.9*     Intake/Output Summary (Last 24 hours) at 2022 1546  Last data filed at 2022 1400  Gross per 24 hour   Intake --   Output 1630 ml   Net -1630 ml      /57   Pulse 69   Temp 36.8 °C (98.2 °F) (Temporal)   Resp 18   Ht 1.829 m (6')   Wt 87.2 kg (192 lb 3.9 oz)   SpO2 90%  Temp (24hrs), Av.8 °C (98.2 °F), Min:36.4 °C (97.6 °F), Max:36.9 °C (98.4 °F)    List concerns for Vancomycin clearance:   Age;BUN/Scr ratio greater than 20:1;Malnutrition/Low albumin    Pharmacokinetics:  AUC kinetics:   Ke (hr ^-1): 0.0637 hr^-1  Half life: 10.88 hr  Clearance: 3.611  Estimated TDD: 1805.5  Estimated Dose: 970  Estimated interval: 12.9    A/P:   -  Vancomycin dose: 1750 mg iv q24h (~21 mg/kg/dose)    -  Next vancomycin level(s):    -TBD    -  Predicted vancomycin AUC from initial AUC test calculator: 485 mg·hr/L    -  Comments: Repeat CBC/CMP with AM labs. Likely to place vancomycin level in 3-5 days pending clinical course. Pharmacy will continue to follow.    Kali Appiah, PharmD

## 2022-08-22 NOTE — ASSESSMENT & PLAN NOTE
As per history.  Involving the lateral malleolus with osteomyelitis.  Patient has had a wound VAC in place.  Underwent debridement by wound care.  Last cultures from June 2022 grew:  Corynebacterium striatum group      I ordered MRI of the left ankle to further assess for continuing infection  Continue wound care  Consider infectious diseases consultation

## 2022-08-22 NOTE — PROGRESS NOTES
Assumed care of patient, received bedside report from NOC RN. Patient is A&O X 2, disoriented to time and situation.. Pain 3/10, left ankle. Vital signs stable overnight, on 2L NC. Tele sitter bedside. On tele monitor, SR 72. POC discussed with patient, he did not verbalize understanding. Call light within reach and fall precautions in place. Bed locked and in lowest position.

## 2022-08-23 PROBLEM — E83.42 HYPOMAGNESEMIA: Status: ACTIVE | Noted: 2022-08-23

## 2022-08-23 LAB
ALBUMIN SERPL BCP-MCNC: 2.6 G/DL (ref 3.2–4.9)
ALBUMIN/GLOB SERPL: 0.7 G/DL
ALP SERPL-CCNC: 63 U/L (ref 30–99)
ALT SERPL-CCNC: 15 U/L (ref 2–50)
ANION GAP SERPL CALC-SCNC: 11 MMOL/L (ref 7–16)
AST SERPL-CCNC: 34 U/L (ref 12–45)
BACTERIA UR CULT: ABNORMAL
BACTERIA UR CULT: ABNORMAL
BACTERIA WND AEROBE CULT: ABNORMAL
BACTERIA WND AEROBE CULT: ABNORMAL
BASOPHILS # BLD AUTO: 0.1 % (ref 0–1.8)
BASOPHILS # BLD: 0.01 K/UL (ref 0–0.12)
BILIRUB SERPL-MCNC: 0.4 MG/DL (ref 0.1–1.5)
BUN SERPL-MCNC: 16 MG/DL (ref 8–22)
CALCIUM SERPL-MCNC: 8 MG/DL (ref 8.5–10.5)
CHLORIDE SERPL-SCNC: 103 MMOL/L (ref 96–112)
CO2 SERPL-SCNC: 23 MMOL/L (ref 20–33)
CREAT SERPL-MCNC: 0.74 MG/DL (ref 0.5–1.4)
EOSINOPHIL # BLD AUTO: 0.01 K/UL (ref 0–0.51)
EOSINOPHIL NFR BLD: 0.1 % (ref 0–6.9)
ERYTHROCYTE [DISTWIDTH] IN BLOOD BY AUTOMATED COUNT: 41.7 FL (ref 35.9–50)
FUNGUS SPEC FUNGUS STN: NORMAL
GFR SERPLBLD CREATININE-BSD FMLA CKD-EPI: 91 ML/MIN/1.73 M 2
GLOBULIN SER CALC-MCNC: 3.8 G/DL (ref 1.9–3.5)
GLUCOSE BLD STRIP.AUTO-MCNC: 163 MG/DL (ref 65–99)
GLUCOSE BLD STRIP.AUTO-MCNC: 169 MG/DL (ref 65–99)
GLUCOSE BLD STRIP.AUTO-MCNC: 187 MG/DL (ref 65–99)
GLUCOSE BLD STRIP.AUTO-MCNC: 191 MG/DL (ref 65–99)
GLUCOSE SERPL-MCNC: 186 MG/DL (ref 65–99)
GRAM STN SPEC: ABNORMAL
GRAM STN SPEC: NORMAL
HCT VFR BLD AUTO: 31.7 % (ref 42–52)
HGB BLD-MCNC: 10.6 G/DL (ref 14–18)
IMM GRANULOCYTES # BLD AUTO: 0.04 K/UL (ref 0–0.11)
IMM GRANULOCYTES NFR BLD AUTO: 0.6 % (ref 0–0.9)
LYMPHOCYTES # BLD AUTO: 0.72 K/UL (ref 1–4.8)
LYMPHOCYTES NFR BLD: 9.9 % (ref 22–41)
MAGNESIUM SERPL-MCNC: 1.7 MG/DL (ref 1.5–2.5)
MCH RBC QN AUTO: 28 PG (ref 27–33)
MCHC RBC AUTO-ENTMCNC: 33.4 G/DL (ref 33.7–35.3)
MCV RBC AUTO: 83.6 FL (ref 81.4–97.8)
MONOCYTES # BLD AUTO: 0.4 K/UL (ref 0–0.85)
MONOCYTES NFR BLD AUTO: 5.5 % (ref 0–13.4)
NEUTROPHILS # BLD AUTO: 6.09 K/UL (ref 1.82–7.42)
NEUTROPHILS NFR BLD: 83.8 % (ref 44–72)
NRBC # BLD AUTO: 0 K/UL
NRBC BLD-RTO: 0 /100 WBC
PATHOLOGY CONSULT NOTE: NORMAL
PLATELET # BLD AUTO: 349 K/UL (ref 164–446)
PMV BLD AUTO: 9.1 FL (ref 9–12.9)
POTASSIUM SERPL-SCNC: 4.4 MMOL/L (ref 3.6–5.5)
PROCALCITONIN SERPL-MCNC: 0.1 NG/ML
PROT SERPL-MCNC: 6.4 G/DL (ref 6–8.2)
RBC # BLD AUTO: 3.79 M/UL (ref 4.7–6.1)
SIGNIFICANT IND 70042: ABNORMAL
SIGNIFICANT IND 70042: ABNORMAL
SIGNIFICANT IND 70042: NORMAL
SIGNIFICANT IND 70042: NORMAL
SITE SITE: ABNORMAL
SITE SITE: ABNORMAL
SITE SITE: NORMAL
SITE SITE: NORMAL
SODIUM SERPL-SCNC: 137 MMOL/L (ref 135–145)
SOURCE SOURCE: ABNORMAL
SOURCE SOURCE: ABNORMAL
SOURCE SOURCE: NORMAL
SOURCE SOURCE: NORMAL
WBC # BLD AUTO: 7.3 K/UL (ref 4.8–10.8)

## 2022-08-23 PROCEDURE — 700102 HCHG RX REV CODE 250 W/ 637 OVERRIDE(OP)

## 2022-08-23 PROCEDURE — 700111 HCHG RX REV CODE 636 W/ 250 OVERRIDE (IP): Performed by: STUDENT IN AN ORGANIZED HEALTH CARE EDUCATION/TRAINING PROGRAM

## 2022-08-23 PROCEDURE — 700111 HCHG RX REV CODE 636 W/ 250 OVERRIDE (IP)

## 2022-08-23 PROCEDURE — 85025 COMPLETE CBC W/AUTO DIFF WBC: CPT

## 2022-08-23 PROCEDURE — 51798 US URINE CAPACITY MEASURE: CPT

## 2022-08-23 PROCEDURE — 92526 ORAL FUNCTION THERAPY: CPT

## 2022-08-23 PROCEDURE — 700105 HCHG RX REV CODE 258: Performed by: HOSPITALIST

## 2022-08-23 PROCEDURE — 700102 HCHG RX REV CODE 250 W/ 637 OVERRIDE(OP): Performed by: INTERNAL MEDICINE

## 2022-08-23 PROCEDURE — 83735 ASSAY OF MAGNESIUM: CPT

## 2022-08-23 PROCEDURE — 36415 COLL VENOUS BLD VENIPUNCTURE: CPT

## 2022-08-23 PROCEDURE — A9270 NON-COVERED ITEM OR SERVICE: HCPCS | Performed by: INTERNAL MEDICINE

## 2022-08-23 PROCEDURE — 84145 PROCALCITONIN (PCT): CPT

## 2022-08-23 PROCEDURE — 82962 GLUCOSE BLOOD TEST: CPT | Mod: 91

## 2022-08-23 PROCEDURE — A9270 NON-COVERED ITEM OR SERVICE: HCPCS

## 2022-08-23 PROCEDURE — 700111 HCHG RX REV CODE 636 W/ 250 OVERRIDE (IP): Performed by: HOSPITALIST

## 2022-08-23 PROCEDURE — 80053 COMPREHEN METABOLIC PANEL: CPT

## 2022-08-23 PROCEDURE — 770020 HCHG ROOM/CARE - TELE (206)

## 2022-08-23 PROCEDURE — 700111 HCHG RX REV CODE 636 W/ 250 OVERRIDE (IP): Performed by: INTERNAL MEDICINE

## 2022-08-23 PROCEDURE — A9270 NON-COVERED ITEM OR SERVICE: HCPCS | Performed by: STUDENT IN AN ORGANIZED HEALTH CARE EDUCATION/TRAINING PROGRAM

## 2022-08-23 PROCEDURE — 700102 HCHG RX REV CODE 250 W/ 637 OVERRIDE(OP): Performed by: STUDENT IN AN ORGANIZED HEALTH CARE EDUCATION/TRAINING PROGRAM

## 2022-08-23 PROCEDURE — 99233 SBSQ HOSP IP/OBS HIGH 50: CPT | Mod: GC | Performed by: HOSPITALIST

## 2022-08-23 PROCEDURE — 302146: Performed by: HOSPITALIST

## 2022-08-23 PROCEDURE — 700105 HCHG RX REV CODE 258: Performed by: STUDENT IN AN ORGANIZED HEALTH CARE EDUCATION/TRAINING PROGRAM

## 2022-08-23 RX ORDER — OXYCODONE HYDROCHLORIDE 5 MG/1
5 TABLET ORAL EVERY 6 HOURS PRN
Status: DISCONTINUED | OUTPATIENT
Start: 2022-08-23 | End: 2022-08-26 | Stop reason: HOSPADM

## 2022-08-23 RX ORDER — MAGNESIUM SULFATE HEPTAHYDRATE 40 MG/ML
4 INJECTION, SOLUTION INTRAVENOUS ONCE
Status: COMPLETED | OUTPATIENT
Start: 2022-08-23 | End: 2022-08-23

## 2022-08-23 RX ORDER — ACETAMINOPHEN 500 MG
1000 TABLET ORAL 3 TIMES DAILY
Status: DISCONTINUED | OUTPATIENT
Start: 2022-08-23 | End: 2022-08-26 | Stop reason: HOSPADM

## 2022-08-23 RX ADMIN — DOCUSATE SODIUM 50 MG AND SENNOSIDES 8.6 MG 2 TABLET: 8.6; 5 TABLET, FILM COATED ORAL at 17:23

## 2022-08-23 RX ADMIN — INSULIN HUMAN 2 UNITS: 100 INJECTION, SOLUTION PARENTERAL at 00:29

## 2022-08-23 RX ADMIN — TAMSULOSIN HYDROCHLORIDE 0.8 MG: 0.4 CAPSULE ORAL at 00:20

## 2022-08-23 RX ADMIN — MAGNESIUM SULFATE HEPTAHYDRATE 4 G: 40 INJECTION, SOLUTION INTRAVENOUS at 18:08

## 2022-08-23 RX ADMIN — ATORVASTATIN CALCIUM 40 MG: 40 TABLET, FILM COATED ORAL at 17:23

## 2022-08-23 RX ADMIN — OXYCODONE 5 MG: 5 TABLET ORAL at 13:28

## 2022-08-23 RX ADMIN — PIPERACILLIN AND TAZOBACTAM 3.38 G: 3; .375 INJECTION, POWDER, LYOPHILIZED, FOR SOLUTION INTRAVENOUS; PARENTERAL at 13:22

## 2022-08-23 RX ADMIN — ACETAMINOPHEN 1000 MG: 500 TABLET ORAL at 15:31

## 2022-08-23 RX ADMIN — ACETAMINOPHEN 1000 MG: 500 TABLET ORAL at 20:51

## 2022-08-23 RX ADMIN — INSULIN HUMAN 2 UNITS: 100 INJECTION, SOLUTION PARENTERAL at 17:27

## 2022-08-23 RX ADMIN — ENOXAPARIN SODIUM 40 MG: 40 INJECTION SUBCUTANEOUS at 17:23

## 2022-08-23 RX ADMIN — PIPERACILLIN AND TAZOBACTAM 3.38 G: 3; .375 INJECTION, POWDER, LYOPHILIZED, FOR SOLUTION INTRAVENOUS; PARENTERAL at 20:51

## 2022-08-23 RX ADMIN — MIRTAZAPINE 15 MG: 15 TABLET, FILM COATED ORAL at 20:52

## 2022-08-23 RX ADMIN — VANCOMYCIN HYDROCHLORIDE 1750 MG: 500 INJECTION, POWDER, LYOPHILIZED, FOR SOLUTION INTRAVENOUS at 13:47

## 2022-08-23 RX ADMIN — INSULIN HUMAN 2 UNITS: 100 INJECTION, SOLUTION PARENTERAL at 04:35

## 2022-08-23 RX ADMIN — MIRTAZAPINE 15 MG: 15 TABLET, FILM COATED ORAL at 00:21

## 2022-08-23 RX ADMIN — PIPERACILLIN AND TAZOBACTAM 3.38 G: 3; .375 INJECTION, POWDER, LYOPHILIZED, FOR SOLUTION INTRAVENOUS; PARENTERAL at 04:25

## 2022-08-23 RX ADMIN — TAMSULOSIN HYDROCHLORIDE 0.8 MG: 0.4 CAPSULE ORAL at 20:51

## 2022-08-23 RX ADMIN — INSULIN HUMAN 2 UNITS: 100 INJECTION, SOLUTION PARENTERAL at 12:15

## 2022-08-23 RX ADMIN — SERTRALINE 50 MG: 50 TABLET, FILM COATED ORAL at 17:22

## 2022-08-23 ASSESSMENT — ENCOUNTER SYMPTOMS
CLAUDICATION: 0
MEMORY LOSS: 1
VOMITING: 0
CONSTIPATION: 0
HEADACHES: 0
COUGH: 0
NAUSEA: 0
ORTHOPNEA: 0
FOCAL WEAKNESS: 0
ABDOMINAL PAIN: 0
SPUTUM PRODUCTION: 0
PALPITATIONS: 0
TREMORS: 0
EYE PAIN: 0
SHORTNESS OF BREATH: 0
DIARRHEA: 0
SENSORY CHANGE: 1
FEVER: 0
BLURRED VISION: 0
CHILLS: 0
SPEECH CHANGE: 0

## 2022-08-23 ASSESSMENT — PAIN DESCRIPTION - PAIN TYPE
TYPE: ACUTE PAIN

## 2022-08-23 ASSESSMENT — FIBROSIS 4 INDEX: FIB4 SCORE: 2.04

## 2022-08-23 NOTE — PROGRESS NOTES
12 hour CC   Patient to elevate leg(s) with the ankle at or above the level of the heart as needed to relieve leg pain and swelling. Patient to participate in exercise as tolerated with focus on the leg(s) including, daily walking, repetitive toe pointing, and calve muscle pumping/stretching as tolerated. Patient was instructed to continue wearing compression stockings (20-30 mmHg) on a daily basis, off every night. Pre-determination for the vein closure procedure will be submitted to the insurance company on your behalf.

## 2022-08-23 NOTE — PROGRESS NOTES
Monitor Summary  Rhythm: SR/ST with first degree block  Rate:   Ectopy: rare PVC, Couplets  .22 / .10 / .36   No monitor strips uploaded.

## 2022-08-23 NOTE — PROGRESS NOTES
4 Eyes Skin Assessment Completed by FLORES Cuellar and FLORES Mcdonald.    Head WDL  Ears Redness and Blanching  Nose WDL  Mouth WDL  Neck WDL  Breast/Chest WDL  Shoulder Blades WDL  Spine WDL  (R) Arm/Elbow/Hand WDL  (L) Arm/Elbow/Hand WDL  Abdomen WDL  Groin WDL  Scrotum/Coccyx/Buttocks Redness and Blanching  (R) Leg WDL  (L) Leg WDL  (R) Heel/Foot/Toe Redness and Boggy  (L) Heel/Foot/Toe Redness and Boggy, pt has known wound on left foot          Devices In Places Tele Box, Blood Pressure Cuff, and Pulse Ox      Interventions In Place Gray Ear Foams, Waffle Overlay, and Pressure Redistribution Mattress, heel mepliex    Possible Skin Injury Yes    Pictures Uploaded Into Epic Yes  Wound Consult Placed Yes  RN Wound Prevention Protocol Ordered Yes

## 2022-08-23 NOTE — THERAPY
"Speech Language Pathology  Daily Treatment     Patient Name: Andrei Garay  Age:  82 y.o., Sex:  male  Medical Record #: [id number]  Today's Date: 8/23/2022     Precautions  Precautions: Fall Risk, Swallow Precautions ( See Comments)    Assessment    Patient was seen for dysphagia tx with breakfast meal of regular solids and thin liquids. Per EMR, pt was upgraded to a regular diet by MD this morning. Patient reports no difficulty chewing or swallowing, stating he loves the food, particularly the butts. Prolonged/inefficient mastication noted with solids with pt chewing for 1-2 minutes with an English muffin and ~5 minutes with butts. However, bolus formation and cohesion was good as pt showed SLP the bolus before swallowing. He utilized thin liquid rinses between bites. No s/sx of aspiration occurred w/ PO intake. Pt instructed to focus on eating softer/easier textures first to minimize fatigue though pt reports he doesn't get tired from eating. SLP will no longer actively follow as pt appears to be tolerating a regular diet. Should pt have any further difficulty chewing due to lack of dentition, consider downgrading back to soft and bite sized solids. SLP will no longer actively follow as pt is likely at his baseline.    Recommendations:  Continue regular/thin diet per MD  Consider downgrading back to soft/bite sized solids if pt has any significant difficulty chewing due to lack of dentition  Pills whole w/ thins  Oral care BID      Plan    Discharge secondary to goals met.    Discharge Recommendations: Anticipate that the patient will have no further speech therapy needs after discharge from the hospital       Objective     08/23/22 1113   Patient / Family Goals   Patient / Family Goal #1 \"more water\"   Goal #1 Outcome Goal met   Short Term Goals   Short Term Goal # 1 Pt will consume a diet of SB/TN with no s/sx of aspiration and min cues.   Goal Outcome # 1 Goal met     "

## 2022-08-23 NOTE — CARE PLAN
The patient is Stable - Low risk of patient condition declining or worsening    Shift Goals  Clinical Goals: control pain, comfort  Patient Goals: rest  Family Goals: n/a    Progress made toward(s) clinical / shift goals:      Problem: Hemodynamics  Goal: Patient's hemodynamics, fluid balance and neurologic status will be stable or improve  Outcome: Progressing  Pt urine output is steady, urine is clearing up from sediment     Problem: Skin Integrity  Goal: Skin integrity is maintained or improved  Outcome: Progressing  Pt understands the need to turn and works with staff to turn     Patient is not progressing towards the following goals:

## 2022-08-23 NOTE — OP REPORT
08/22/22       PREOPERATIVE DIAGNOSES:  Left fibula osteomyelitis  Left chronic wound lateral ankle  Left ankle joint infection     POSTOPERATIVE DIAGNOSES:  Same    PROCEDURE PERFORMED:  Left foot and lateral leg irrigation and debridement including tendon skin down to bone  Left excision distal fibula  Left bone biopsy fibular margin  Left ankle joint irrigation and debridement and synovectomy  Left placement negative pressure incisional dressing     SURGEON:  Sanjiv Vital MD     FIRST ASSISTANT:  Angie Johnson DO     SECOND ASSISTANT:  Lorna Ray CFA     ANESTHESIA:  General endotracheal with local     ESTIMATED BLOOD LOSS:  None.     COMPLICATIONS:  None.    FINDINGS:  See preoperative diagnosis     POSTOPERATIVE PLAN:  Antibiotics per infectious disease  PRAFO boot  Transfer weightbearing     INDICATIONS:  The patient is a pleasant 82 y.o. male who has had   problems with the left ankle with a chronic wound.  Options were discussed   including operative and nonoperative.  The patients elected to undergo operative   intervention.  The above procedure was discussed.  All questions were   answered.  Risks of surgery explained, which included but not limited to   explained wound problems, infection, nerve injury, vascular injury, need for   further surgery.  The patient understands that they could have persistent risk of    pain and need for further surgery.  The patients understands and accepts these risks   and agreed to proceed.  Site was marked by myself prior to receiving   psychotropic medicines.     PROCEDURE IN DETAIL:  The patient was brought to the operating room and    underwent general endotracheal anesthetic without complications.  Left lower   extremity was prepped and draped in standard fashion in supine position with   all appropriate padding.  Positive site verification confirmed the appropriate   extremity as well as above procedure and confirmation that the patient received    preoperative antibiotics.  The leg was elevated and tourniquet was inflated to 250 mmHg.    A 15 blade was used to perform sharp excisional debridement of the lateral wound.  This brought down to the level of bone.  Fibula is then denuded dorsal and plantar.  The peroneal tendon was part of the chronic wound and so sharp excisional debridement was performed on the peroneal tendon.  Any other nonviable tissue was sharply excised.    A microsagittal saw was used to cut the fibula 6 cm above the tip.  This was determined based on preoperatively measuring the fibula on MRI which demonstrated 5 cm should be adequate to get above the level of edema and intraosseous abscess.  The fibula was then removed.    The ankle joint was then exposed.  Synovectomy was performed.  With irrigation was performed of the ankle joint.  All cartilage appeared to be okay.  There was no residual evidence for infection.    The wound appeared to have only viable tissue.  After thorough irrigation was performed.  To be clean.  Microsize saw was then used to take a segment of distal fibula that was remaining and this was sent off as fibular margin for culture and pathology.  The wound is then irrigated out again and closed with 3-0 Vicryl 3 nylon.  Negative pressure incisional dressing was placed with positive seal.    Sterile dressings were applied.  Tourniquet was released.  patient was transferred to the recovery room in good condition.    Utilization of Dr. Johnson was necessary for patient positioning needing holding retracting wound closure and dressing placement.  The assistant was present throughout the entire procedure.

## 2022-08-23 NOTE — PROGRESS NOTES
4 Eyes Skin Assessment Completed by FLORES Urena and FLORES Auguste.    Head WDL  Ears Redness and Blanching  Nose WDL  Mouth WDL  Neck WDL  Breast/Chest WDL  Shoulder Blades WDL  Spine WDL  (R) Arm/Elbow/Hand Redness and Blanching  (L) Arm/Elbow/Hand Redness, Blanching, and Scab  Abdomen WDL  Groin Redness and Blanching  Scrotum/Coccyx/Buttocks Redness and Blanching  (R) Leg Scab  (L) Leg Scab  (R) Heel/Foot/Toe Redness, Blanching, and Boggy  (L) Heel/Foot/Toe MONAE- wound vac and boot in place          Devices In Places Tele Box, Blood Pressure Cuff, Pulse Ox, SCD's, Ortho Boot/Shoe, and Nasal Cannula      Interventions In Place Gray Ear Foams, Waffle Overlay, Pillows, Q2 Turns, Heels Loaded W/Pillows, and Pressure Redistribution Mattress    Possible Skin Injury No    Pictures Uploaded Into Epic N/A  Wound Consult Placed Yes  RN Wound Prevention Protocol Ordered Yes

## 2022-08-23 NOTE — ANESTHESIA POSTPROCEDURE EVALUATION
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 Responsible Provider: Thomas Leal M.D.    Anesthesia Type: general ASA Status: 3          Final Anesthesia Type: general  Last vitals  BP   Blood Pressure : (!) 98/56    Temp   36.7 °C (98 °F)    Pulse   80   Resp   12    SpO2   94 %      Anesthesia Post Evaluation    Patient location during evaluation: PACU  Patient participation: complete - patient participated  Level of consciousness: awake and alert  Pain score: 0    Airway patency: patent  Anesthetic complications: no  Cardiovascular status: hemodynamically stable  Respiratory status: acceptable  Hydration status: euvolemic    PONV: none          No notable events documented.     Nurse Pain Score: 0 (NPRS)

## 2022-08-23 NOTE — ANESTHESIA PREPROCEDURE EVALUATION
Case: [id number] Date/Time: 08/22/22 2132    Procedures:       IRRIGATION AND DEBRIDEMENT, WOUND (Left: Ankle)      BIOPSY, BONE FIBULA CULTURE    Anesthesia type: General    Location: [place] [address] / SURGERY [facility]    Surgeons: Sanjiv Vital M.D.        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 LVEF 55% on echo from this year.    Relevant Problems   PULMONARY   (positive) History of osteomyelitis      NEURO   (positive) History of osteomyelitis      CARDIAC   (positive) HTN (hypertension)   (positive) Hypertension   (positive) PVC (premature ventricular contraction)         (positive) SADIE (acute kidney injury) (HCC)      ENDO   (positive) Type 2 diabetes mellitus (HCC)   (positive) Type 2 diabetes mellitus with hyperglycemia, with long-term current use of insulin (HCC)      Other   (positive) Osteomyelitis (HCC)       Physical Exam    Airway   Mallampati: II  TM distance: >3 FB  Neck ROM: full       Cardiovascular - normal exam  Rhythm: regular  Rate: normal  (-) murmur     Dental - normal exam           Pulmonary - normal exam  Breath sounds clear to auscultation     Abdominal    Neurological - normal exam                 Anesthesia Plan    ASA 3   ASA physical status 3 criteria: MI or angina - history (> 3 months) and CAD/stents (> 3 months)    Plan - general       Airway plan will be LMA          Induction: intravenous    Postoperative Plan: Postoperative administration of opioids is intended.    Pertinent diagnostic labs and testing reviewed    Informed Consent:    Anesthetic plan and risks discussed with healthcare power of .    Use of blood products discussed with: healthcare power of  whom consented to blood products.

## 2022-08-23 NOTE — PROGRESS NOTES
Assumed care of pt. Bedside report received from RN. Pt was updated on plan of care. AOx2. Pt pain level 9/10. See pt MAR for medication administration. Call light, phone and personal belongings in reach. Bed alarm on and working properly, bed in lowest position, and locked.

## 2022-08-23 NOTE — CARE PLAN
The patient is Watcher - Medium risk of patient condition declining or worsening    Shift Goals  Clinical Goals: Wound care & Surgery  Patient Goals: Rest  Family Goals: n/a    Progress made toward(s) clinical / shift goals:    Problem: Knowledge Deficit - Standard  Goal: Patient and family/care givers will demonstrate understanding of plan of care, disease process/condition, diagnostic tests and medications  Outcome: Progressing  Note: White board updated with POC and care team information during bedside report.      Problem: Fall Risk  Goal: Patient will remain free from falls  Outcome: Progressing  Note: Fall precautions in place. Bed in lowest position. Non-skid socks in place. Personal possessions within reach. Mobility sign on door. Bed-alarm on. Call light within reach. Pt educated regarding fall prevention and states understanding.  Telesitter for safety.     Problem: Pain - Standard  Goal: Alleviation of pain or a reduction in pain to the patient’s comfort goal  Outcome: Progressing  Note: Declines need for pain medication preoperatively

## 2022-08-23 NOTE — HOSPITAL COURSE
81 y/o M [place] , who resides at the [facility] unit.  He has a history of diabetes mellitus type 2, hypertension, dementia, hypercholesterolemia, urinary tension, dysphagia, myocardial infarction, GERD, osteoarthritis, and osteomyelitis of the left lateral malleolus that was treated with a wound VAC by orthopedic surgery in June 2022. He brought in by ambulance due to increasing confusion as well as low blood pressures. The patient was noted to have a systolic blood pressure of around 80 in the emergency room. He was given sepsis bolus and started on norepinephrine due to persistent hypotension. He did not have any significant complaints at the time of his presentation. In the emergency room, patient had a white count of 15.4 with a left shift. Lactic acid was elevated at 4.5. Patient was afebrile at the time of presentation. Urinalysis showed moderate leukocyte esterase with negative nitrites, negative bacteria. Patient presented with a deep wound over his left lateral malleolus representing probable source of infection.    Patient is DNR/DNI but had a POLST stating selective medical therapies were to be continued. Patient was started on Zosyn and vancomycin in the ED and admitted to the intensive care unit for pressor support with norepinephrine. Continued thereafter only on Zosyn for empiric coverage.    On 8/21/2022 patient's lactic acid normalized and he was weaned off pressor support. Patient also had SADIE which had begun to resolve. Troponin elevation of 47 down-trended to 28, suspected likely related to sepsis as opposed to ACS. By 8/22/2022 patient was deemed stable for step-down to telemetry, where he was noted to have repeated 1.5-1.7s sinus pauses. He was receiving metoprolol which was subsequently put on hold. Later that evening, orthopedic surgery performed distal fibula excision and irrigation/debridement of left lateral ankle wound, margin cultures sent    Urine  cultures growing yeast, superficial wound cultures growing GBS, per ID this is likely to be extending down to the bone. Started on cefazolin for GBS infection per ID recommendations on 8/24/2022. Margin cultures clean after 4 days, ID physician Dr. Holley subsequently recommending with clean margins that he could be switched to 1000mg cephalexin PO TID for 2 weeks of oral therapy.

## 2022-08-23 NOTE — ANESTHESIA PROCEDURE NOTES
Airway    Date/Time: 8/22/2022 9:22 PM  Performed by: Thomas Leal M.D.  Authorized by: Thomas Leal M.D.     Location:  OR  Urgency:  Elective  Indications for Airway Management:  Anesthesia      Spontaneous Ventilation: absent    Sedation Level:  Deep  Preoxygenated: Yes    Final Airway Type:  Supraglottic airway  Final Supraglottic Airway:  Standard LMA    SGA Size:  4  Number of Attempts at Approach:  1   Atraumatic insertion, good seal

## 2022-08-23 NOTE — PROGRESS NOTES
Monitor Summary:  Rhythm: SR/SB  Rate: 53-71  Ectopy: 1.6-1.7 sec pauses, (R) PVC, (R) Coup, (R) PAC  Measurement: .20/.08/.44

## 2022-08-23 NOTE — CONSULTS
"Reason for PC Consult: Advance Care Planning    Consulted by: Dr. Good    Assessment:  General: Andrei Garay is an 82 year-old male pt with a PMHx of left ankle osteomyelitis, MRSA bacteremia (6/11), DM2, dementia, HTN, dementia, urinary retention, dysphagia, AMI, GERD, OA. He presented to the ED on 8/20 from [facility] with hypotension and was found be in septic shock with SADIE. He was noted to have a UTI as well as pneumonitis on CT, possibly due to aspiration. He as started on norepinephrine and admitted to ICU. Vasopressors have since been weaned and patient has been transferred out of the ICU. He underwent I&D and bone biopsy on 8/22.    Social: Andrei has been living at [facility] for about 3.5 years, prior to this he was living with his daughter (Marah). He has three adult children and remains very close to his ex-wife. He uses his rollater to get around but no longer ambulates much, he sits on the seat and \"scoots\" himself around. Marah states that he is \"kind of like an old cat\" and spends the majority of his day sleeping. He is very neat and tidy, he enjoys organizing his things. 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     Consults: limb preservation, ortho surgery    Dyspnea: No  Last BM: 08/22/21  Pain: No  Depression: Unable to determine  Dementia: Yes;  6, D    Spiritual:  Is Gnosticism or spirituality important for coping with this illness? Unable to determine  Has a  or spiritual provider visit been requested? Unable to determine    Palliative Performance Scale: 50%    Advance Directive: Advance Directive  DPOA: Yes- Marah Rowland (daughter)  POLST: Yes    Code Status: DNR    Outcome:  Consult received and EMR reviewed. Discussed case with physician, updates appreciated. PC RN called patient's daughter, Marah. Introduced self and role of palliative care. Marah is driving back from a family [place] in the [place]. She is agreeable to discussing GOC via telephone today.     Marah updated RN on the social and medical history. Assessed Marah's understanding of current medical status, overall health picture, and options for future care. Demonstrates a good understanding of the current clinical picture.    Discussed dementia and it's progressive and terminal nature, the disease trajectory, expectations with further advancement (including loss of safe swallowing). Marah verbalized understanding. Discussed the sadness of loss experienced by loved ones even prior to death and the difficulties for family in facing the realities of decline; empathetic support provided.    Explored patient's expressed values, beliefs, and preferences in order to identify GOC. Marah states that she and her family have discussed patient's healthcare goals/wishes with pt prior to the development of dementia. She states that if there is something reversible, they would like to treat this. However, they agree with DNAR status.    PC RN clarified POLST on file. This reflects DNR and selective treatment (no intubation). However, under the section for artificial nutrition it appears that somebody wrote in that pt would be agreeable to artificial airway for no longer than three months. Marah inquired what this entails. PC RN discussed intubation and mechanical ventilation, the likelihood of needing wrist restraints and/or sedation, etc. Marah states \"we don't want that for him.\" Clarified that she would like code status to reflect DNR/DNI status. Will complete updated POLST with STEPHANIE (Marah) tomorrow. PC RN briefly introduced the philosophy of hospice as a future care option. Discussed that outpatient palliative care can also be utilized in the interim for continued goals of care discussions and as an extra layer of support.    Active listening, reflection, reminiscing, validation & normalization, and empathic support utilized " throughout this encounter.  All questions answered and contact information provided.     Updated: MD    Plan: POLST with POA. Will continue to monitor clinical picture and support pt/family.     Thank you for allowing Palliative Care to participate in this patient's care. Please feel free to call [phone] with any questions or concerns.

## 2022-08-23 NOTE — ANESTHESIA TIME REPORT
Anesthesia Start and Stop Event Times     Date Time Event    8/22/2022 2036 Ready for Procedure     2117 Anesthesia Start     2223 Anesthesia Stop        Responsible Staff  08/22/22    Name Role Begin End    Thomas Leal M.D. Anesth 2117 2223        Overtime Reason:  no overtime (within assigned shift)    Comments:

## 2022-08-23 NOTE — OR NURSING
Patient and handoff received from OR. Dressing to LLE CDI. Prevena in place. Boot delivered to bedside and applied to LLE. Patient became very restless and agitated, BP elevated, patient medicated per MAR for pain. Patient has history of dementia and makes assessing pain difficult. Patient now resting comfortably, VSS. Report called to Ayanna TANNER. Patient transported back to room on T8 with two RN's on tele monitoring. No belongings with patient in PACU. Patient daughter Marah updated on plan of care.

## 2022-08-24 LAB
BACTERIA UR CULT: ABNORMAL
BACTERIA UR CULT: ABNORMAL
GLUCOSE BLD STRIP.AUTO-MCNC: 187 MG/DL (ref 65–99)
GLUCOSE BLD STRIP.AUTO-MCNC: 217 MG/DL (ref 65–99)
GLUCOSE BLD STRIP.AUTO-MCNC: 221 MG/DL (ref 65–99)
GLUCOSE BLD STRIP.AUTO-MCNC: 237 MG/DL (ref 65–99)
GLUCOSE BLD STRIP.AUTO-MCNC: 257 MG/DL (ref 65–99)
SIGNIFICANT IND 70042: ABNORMAL
SITE SITE: ABNORMAL
SOURCE SOURCE: ABNORMAL

## 2022-08-24 PROCEDURE — 700102 HCHG RX REV CODE 250 W/ 637 OVERRIDE(OP)

## 2022-08-24 PROCEDURE — A9270 NON-COVERED ITEM OR SERVICE: HCPCS

## 2022-08-24 PROCEDURE — 700111 HCHG RX REV CODE 636 W/ 250 OVERRIDE (IP): Performed by: INTERNAL MEDICINE

## 2022-08-24 PROCEDURE — 770020 HCHG ROOM/CARE - TELE (206)

## 2022-08-24 PROCEDURE — 82962 GLUCOSE BLOOD TEST: CPT | Mod: 91

## 2022-08-24 PROCEDURE — 700111 HCHG RX REV CODE 636 W/ 250 OVERRIDE (IP): Performed by: HOSPITALIST

## 2022-08-24 PROCEDURE — 700111 HCHG RX REV CODE 636 W/ 250 OVERRIDE (IP)

## 2022-08-24 PROCEDURE — 700102 HCHG RX REV CODE 250 W/ 637 OVERRIDE(OP): Performed by: INTERNAL MEDICINE

## 2022-08-24 PROCEDURE — A9270 NON-COVERED ITEM OR SERVICE: HCPCS | Performed by: INTERNAL MEDICINE

## 2022-08-24 PROCEDURE — 700102 HCHG RX REV CODE 250 W/ 637 OVERRIDE(OP): Performed by: STUDENT IN AN ORGANIZED HEALTH CARE EDUCATION/TRAINING PROGRAM

## 2022-08-24 PROCEDURE — 700105 HCHG RX REV CODE 258: Performed by: STUDENT IN AN ORGANIZED HEALTH CARE EDUCATION/TRAINING PROGRAM

## 2022-08-24 PROCEDURE — 700111 HCHG RX REV CODE 636 W/ 250 OVERRIDE (IP): Performed by: STUDENT IN AN ORGANIZED HEALTH CARE EDUCATION/TRAINING PROGRAM

## 2022-08-24 PROCEDURE — 700105 HCHG RX REV CODE 258: Performed by: HOSPITALIST

## 2022-08-24 PROCEDURE — 99233 SBSQ HOSP IP/OBS HIGH 50: CPT | Mod: GC | Performed by: HOSPITALIST

## 2022-08-24 PROCEDURE — A9270 NON-COVERED ITEM OR SERVICE: HCPCS | Performed by: STUDENT IN AN ORGANIZED HEALTH CARE EDUCATION/TRAINING PROGRAM

## 2022-08-24 RX ORDER — CEFAZOLIN SODIUM 2 G/100ML
2 INJECTION, SOLUTION INTRAVENOUS EVERY 8 HOURS
Status: DISCONTINUED | OUTPATIENT
Start: 2022-08-24 | End: 2022-08-26

## 2022-08-24 RX ADMIN — ACETAMINOPHEN 1000 MG: 500 TABLET ORAL at 16:30

## 2022-08-24 RX ADMIN — DOCUSATE SODIUM 50 MG AND SENNOSIDES 8.6 MG 2 TABLET: 8.6; 5 TABLET, FILM COATED ORAL at 05:15

## 2022-08-24 RX ADMIN — OXYCODONE 5 MG: 5 TABLET ORAL at 16:29

## 2022-08-24 RX ADMIN — SERTRALINE 50 MG: 50 TABLET, FILM COATED ORAL at 16:30

## 2022-08-24 RX ADMIN — PIPERACILLIN AND TAZOBACTAM 3.38 G: 3; .375 INJECTION, POWDER, LYOPHILIZED, FOR SOLUTION INTRAVENOUS; PARENTERAL at 05:15

## 2022-08-24 RX ADMIN — INSULIN HUMAN 3 UNITS: 100 INJECTION, SOLUTION PARENTERAL at 00:31

## 2022-08-24 RX ADMIN — PIPERACILLIN AND TAZOBACTAM 3.38 G: 3; .375 INJECTION, POWDER, LYOPHILIZED, FOR SOLUTION INTRAVENOUS; PARENTERAL at 14:00

## 2022-08-24 RX ADMIN — ATORVASTATIN CALCIUM 40 MG: 40 TABLET, FILM COATED ORAL at 16:30

## 2022-08-24 RX ADMIN — ACETAMINOPHEN 1000 MG: 500 TABLET ORAL at 09:17

## 2022-08-24 RX ADMIN — TAMSULOSIN HYDROCHLORIDE 0.8 MG: 0.4 CAPSULE ORAL at 20:56

## 2022-08-24 RX ADMIN — ACETAMINOPHEN 1000 MG: 500 TABLET ORAL at 20:56

## 2022-08-24 RX ADMIN — CEFAZOLIN SODIUM 2 G: 2 INJECTION, SOLUTION INTRAVENOUS at 16:40

## 2022-08-24 RX ADMIN — DOCUSATE SODIUM 50 MG AND SENNOSIDES 8.6 MG 2 TABLET: 8.6; 5 TABLET, FILM COATED ORAL at 16:30

## 2022-08-24 RX ADMIN — INSULIN HUMAN 2 UNITS: 100 INJECTION, SOLUTION PARENTERAL at 05:11

## 2022-08-24 RX ADMIN — OXYCODONE 5 MG: 5 TABLET ORAL at 22:12

## 2022-08-24 RX ADMIN — MIRTAZAPINE 15 MG: 15 TABLET, FILM COATED ORAL at 20:56

## 2022-08-24 RX ADMIN — VANCOMYCIN HYDROCHLORIDE 1750 MG: 500 INJECTION, POWDER, LYOPHILIZED, FOR SOLUTION INTRAVENOUS at 14:01

## 2022-08-24 RX ADMIN — OXYCODONE 5 MG: 5 TABLET ORAL at 00:37

## 2022-08-24 RX ADMIN — ENOXAPARIN SODIUM 40 MG: 40 INJECTION SUBCUTANEOUS at 16:40

## 2022-08-24 RX ADMIN — OXYCODONE 5 MG: 5 TABLET ORAL at 09:19

## 2022-08-24 RX ADMIN — CEFAZOLIN SODIUM 2 G: 2 INJECTION, SOLUTION INTRAVENOUS at 22:13

## 2022-08-24 ASSESSMENT — COGNITIVE AND FUNCTIONAL STATUS - GENERAL
TOILETING: A LITTLE
MOVING TO AND FROM BED TO CHAIR: A LITTLE
WALKING IN HOSPITAL ROOM: A LITTLE
MOBILITY SCORE: 17
CLIMB 3 TO 5 STEPS WITH RAILING: A LOT
DRESSING REGULAR LOWER BODY CLOTHING: A LITTLE
MOVING FROM LYING ON BACK TO SITTING ON SIDE OF FLAT BED: A LITTLE
HELP NEEDED FOR BATHING: A LITTLE
SUGGESTED CMS G CODE MODIFIER MOBILITY: CK
TURNING FROM BACK TO SIDE WHILE IN FLAT BAD: A LITTLE
DAILY ACTIVITIY SCORE: 20
DRESSING REGULAR UPPER BODY CLOTHING: A LITTLE
SUGGESTED CMS G CODE MODIFIER DAILY ACTIVITY: CJ
STANDING UP FROM CHAIR USING ARMS: A LITTLE

## 2022-08-24 ASSESSMENT — ENCOUNTER SYMPTOMS
ABDOMINAL PAIN: 0
SPEECH CHANGE: 0
FEVER: 0
MEMORY LOSS: 1
SORE THROAT: 1
COUGH: 0
SHORTNESS OF BREATH: 0
HEADACHES: 0
NAUSEA: 0
TREMORS: 0
SPUTUM PRODUCTION: 0
EYE PAIN: 0
DIARRHEA: 0
FOCAL WEAKNESS: 0
CHILLS: 0
VOMITING: 0
BLURRED VISION: 0
PALPITATIONS: 0
CLAUDICATION: 0
SENSORY CHANGE: 1
CONSTIPATION: 0
ORTHOPNEA: 0

## 2022-08-24 ASSESSMENT — FIBROSIS 4 INDEX: FIB4 SCORE: 2.04

## 2022-08-24 ASSESSMENT — PAIN DESCRIPTION - PAIN TYPE
TYPE: ACUTE PAIN;CHRONIC PAIN
TYPE: CHRONIC PAIN;ACUTE PAIN
TYPE: CHRONIC PAIN;ACUTE PAIN
TYPE: ACUTE PAIN

## 2022-08-24 NOTE — PROGRESS NOTES
[facility] Internal Medicine Daily Progress Note    Date of Service  8/23/2022    [facility] Team: [facility] Team   Attending: Jenn Shoemaker M.d.  Senior Resident: Dr. Rachna Marcos  Intern:  Dr. Villela  Contact Number: [phone]    Chief Complaint  Andrei Garay is a 82 y.o. male admitted 8/20/2022 with confusion, hypotension, sepsis    Hospital Course  81 y/o M [place] , who resides at the [facility] unit.  He has a history of diabetes mellitus type 2, hypertension, dementia, hypercholesterolemia, urinary tension, dysphagia, myocardial infarction, GERD, osteoarthritis, and osteomyelitis of the left lateral malleolus that was treated with a wound VAC by orthopedic surgery in June 2022. He brought in by ambulance due to increasing confusion as well as low blood pressures. The patient was noted to have a systolic blood pressure of around 80 in the emergency room. He was given sepsis bolus and started on norepinephrine due to persistent hypotension. He did not have any significant complaints at the time of his presentation. In the emergency room, patient had a white count of 15.4 with a left shift. Lactic acid was elevated at 4.5. Patient was afebrile at the time of presentation. Urinalysis showed moderate leukocyte esterase with negative nitrites, negative bacteria. Patient presented with a deep wound over his left lateral malleolus representing probable source of infection.    Patient is DNR/DNI but had a POLST stating selective medical therapies were to be continued. Patient was started on Zosyn and vancomycin in the ED and admitted to the intensive care unit for pressor support with norepinephrine. Continued thereafter only on Zosyn for empiric coverage.    On 8/21/2022 patient's lactic acid normalized and he was weaned off pressor support. Patient also had SADIE which had begun to resolve. Troponin elevation of 47 down-trended to 28, suspected likely related to sepsis as opposed to  ACS. By 8/22/2022 patient was deemed stable for step-down to telemetry, where he was noted to have repeated 1.5-1.7s sinus pauses. He was receiving metoprolol which was subsequently put on hold.    Interval Problem Update  A/F, VSS. Patient appears unchanged from previous day, somnolent but arousable, no pain about his surgical site, states he feels fine, though otherwise only nods or shakes head in response to questions with eyes close. Denies fever, chills, chest pain, shortness of breath, nausea, vomiting.     I have discussed this patient's plan of care and discharge plan at IDT rounds today with Case Management, Nursing, Nursing leadership, and other members of the IDT team.    Consultants/Specialty  critical care    Code Status  DNAR/DNI    Disposition  Patient is not medically cleared for discharge.   Anticipate discharge to to home with organized home healthcare and close outpatient follow-up.  I have placed the appropriate orders for post-discharge needs.    Review of Systems  Review of Systems   Constitutional:  Negative for chills, fever and malaise/fatigue.   Eyes:  Negative for blurred vision and pain.   Respiratory:  Negative for cough, sputum production and shortness of breath.    Cardiovascular:  Negative for chest pain, palpitations, orthopnea, claudication and leg swelling.   Gastrointestinal:  Negative for abdominal pain, constipation, diarrhea, nausea and vomiting.   Genitourinary:  Negative for dysuria, frequency and urgency.   Skin:  Negative for rash.   Neurological:  Positive for sensory change. Negative for tremors, speech change, focal weakness and headaches.   Psychiatric/Behavioral:  Positive for memory loss.       Physical Exam  Temp:  [36.7 °C (98 °F)-36.9 °C (98.4 °F)] 36.9 °C (98.4 °F)  Pulse:  [] 70  Resp:  [12-20] 18  BP: ()/(55-86) 115/60  SpO2:  [94 %-98 %] 97 %    Physical Exam  Constitutional:       General: He is not in acute distress.     Appearance: He is not  toxic-appearing.   HENT:      Head: Normocephalic and atraumatic.      Nose: No congestion.      Mouth/Throat:      Mouth: Mucous membranes are moist.      Pharynx: Oropharynx is clear. No oropharyngeal exudate.   Eyes:      Extraocular Movements: Extraocular movements intact.      Conjunctiva/sclera: Conjunctivae normal.      Pupils: Pupils are equal, round, and reactive to light.   Cardiovascular:      Rate and Rhythm: Normal rate and regular rhythm.      Pulses: Normal pulses.      Heart sounds: No murmur heard.  Pulmonary:      Effort: Pulmonary effort is normal. No respiratory distress.      Breath sounds: Normal breath sounds. No wheezing.   Abdominal:      General: There is no distension.      Palpations: Abdomen is soft.      Tenderness: There is no abdominal tenderness. There is no right CVA tenderness, left CVA tenderness, guarding or rebound.   Musculoskeletal:      Cervical back: Neck supple.      Right lower leg: No edema.      Left lower leg: No edema.   Lymphadenopathy:      Cervical: No cervical adenopathy.   Skin:     General: Skin is warm and dry.      Findings: Lesion present.      Comments: Left lower extremity dressing appears clean, dry, intact, and is also placed in an orthosis extending up to the knee. Patient with no pain about the surgical site though the area itself was not undressed and directly visualized today.   Neurological:      Mental Status: He is alert. He is disoriented.      Sensory: Sensory deficit present.      Motor: No weakness.      Comments: States the year is 2002, knows the president, knows his own name and that he is in the hospital. Able to weight bear and ambulate on his left foot despite wound       Fluids    Intake/Output Summary (Last 24 hours) at 8/23/2022 1927  Last data filed at 8/23/2022 1200  Gross per 24 hour   Intake 350 ml   Output 1875 ml   Net -1525 ml         Laboratory  Recent Labs     08/21/22  0155 08/23/22  0758   WBC 13.1* 7.3   RBC 3.77* 3.79*    HEMOGLOBIN 10.4* 10.6*   HEMATOCRIT 31.5* 31.7*   MCV 83.6 83.6   MCH 27.6 28.0   MCHC 33.0* 33.4*   RDW 43.3 41.7   PLATELETCT 305 349   MPV 9.4 9.1       Recent Labs     08/21/22  0155 08/22/22  0200 08/23/22  0758   SODIUM 137 134* 137   POTASSIUM 3.7 3.9 4.4   CHLORIDE 103 101 103   CO2 22 25 23   GLUCOSE 137* 140* 186*   BUN 31* 20 16   CREATININE 1.11 0.89 0.74   CALCIUM 8.1* 8.1* 8.0*                     Imaging  MR-ANKLE-WITH & W/O LEFT   Final Result      1.  Again seen marrow edema and abnormal enhancement of the distal fibula/lateral malleolus consistent with osteomyelitis. There has likely been a small focus of superficial debridement of that area.      2.  Soft tissue ulceration overlying the lateral malleolus with some surrounding cellulitis. No evidence of focal abscess.      3.  Interval improvement in tibiotalar joint effusion with only minimal joint effusion remaining.      4.  Tenosynovitis of the peroneus brevis and longus tendons. There is again seen longitudinal splitting of the peroneus brevis tendon.      5.  Tendinosis of the Achilles tendon.      DX-CHEST-LIMITED (1 VIEW)   Final Result      Left subclavian central venous catheter tip extends slightly into the azygos vein. No pneumothorax.      DX-ANKLE 2- VIEWS LEFT   Final Result      1.  Soft tissue swelling about the ankle particularly laterally where there is a wound VAC.   2.  Previously seen lateral malleolus bone erosion/osteomyelitis is not well evaluated on the current study due to lack of oblique view.   3.  No definite new osseous abnormality is seen.         CT-CTA CHEST PULMONARY ARTERY W/ RECONS   Final Result      1.  No CT evidence of pulmonary embolus.      2.  Groundglass opacifications noted in the right upper pulmonary lobe could be due to pneumonitis pneumonia or other inflammatory process. Covid 19 pneumonia is in the differential diagnosis.      3.  Small area of consolidation is noted posterior edge of left lung  apex with similar differential diagnosis.      4.  Opacification posteriorly throughout the lower lobes is noted which could be due to atelectasis edema or inflammation.      5.  Trace pericardial fluid collection is also identified.            CT-ABDOMEN-PELVIS WITH   Final Result      1.  Urinary bladder wall thickening could indicate cystitis. Prostate gland is enlarged.      2.  Otherwise no acute abnormalities are identified in the abdomen or pelvis.      DX-CHEST-PORTABLE (1 VIEW)   Final Result      1.  Cardiomegaly with pulmonary vascular congestion and interstitial edema.   2.  Small ill-defined opacity in the right upper lobe may represent pneumonitis. Follow-up to radiographic resolution is recommended.   3.  Atherosclerotic plaque.           Assessment/Plan  Problem Representation:    * Septic shock (HCC)- (present on admission)  Assessment & Plan  This is Septic shock Present on admission  SIRS criteria identified on my evaluation include: Tachypnea, with respirations greater than 20 per minute and Leukocytosis, with WBC greater than 12,000  Indicators of septic shock include: Severe sepsis present, persistent hypotension after 30 ml/kg completed, and initial lactate level result is >= 4 mmol/L.  Acute kidney injury with creatinine of 1.8.  Sources is: Urinary tract versus persistent osteomyelitis of the left lateral malleolus.  Unlikely pulmonary given no pulmonary symptoms.  Sepsis protocol initiated  Crystalloid Fluid Administration: Fluid resuscitation ordered per standard protocol - 30 mL/kg per current or ideal body weight  IV antibiotics as appropriate for source of sepsis  Reassessment: I have reassessed the patient's hemodynamic status  Patient has been off of norepinephrine pressor support since 4 AM 8/21/2022.    Decrease Zosyn to 3.375 mg IV every 8 hours. Started vancomycin for MRSA coverage on 8/22/2022 as suspected source is left ankle wound.  MRI with contrast of left ankle on  8/22/2022  Pending margin cultures from surgical debridement to guide further antibiotic management    History of osteomyelitis- (present on admission)  Assessment & Plan  Involving the left malleolus.  Wound VAC placed by orthopedic surgery back on 6/14/2022.    Orthopedic surgery again intervened for left fibula resection and irrigation and debridement on 8/22/2022.    Ankle wound- (present on admission)  Assessment & Plan  As per history.  Involving the lateral malleolus with osteomyelitis.  Patient has had a wound VAC in place.  Underwent debridement by wound care.  Last cultures from June 2022 grew:  Corynebacterium striatum group     -Orthopedic surgery performed left distal fibula resection with repeat irrigation and debridement of left lateral malleolus wound the evening of 8/22/2022  -Margin cultures pending to guide further abx management    Hypomagnesemia  Assessment & Plan  Noted to have Mg 1.7 on 8/23/2022 labs  -Repleted with magnesium sulfate 4mg, then as needed    Benign prostatic hyperplasia without lower urinary tract symptoms- (present on admission)  Assessment & Plan  As per history.  Martinez catheter was placed on admission. Discontinued on 8/21/2022    Continue home tamsulosin 0.8 mg daily    Narcotic dependence (HCC)- (present on admission)  Assessment & Plan  As per history.    Continue home Norco    DNR (do not resuscitate)  Assessment & Plan  Patient is DNR/DNI status as per patient's POLST, which I have reviewed.    Elevated troponin- (present on admission)  Assessment & Plan  Mildly elevated.  Trending down.  Likely from demand ischemia from sepsis  Continue telemetry monitoring    Chronic pain- (present on admission)  Assessment & Plan  As per history.  Has been taking Norco.    Continue home Norco    Acute cystitis without hematuria- (present on admission)  Assessment & Plan  As per urinalysis with moderate leukocyte esterase, nitrite negativity.    Urine cultures pending  On empiric  Zosyn and vancomycin for sepsis coverage  Martinez catheter discontinued 8/21    SADIE (acute kidney injury) (HCC)- (present on admission)  Assessment & Plan  Creatinine was elevated at 1.8 on admission.  Resolved with IV fluid resuscitation and pressor support.    Continue to monitor  Avoid nephrotoxins    HTN (hypertension)- (present on admission)  Assessment & Plan  As per history.  Blood pressure is currently controlled.  Blood pressure goal less than 150 systolic given his age group.    Holding metoprolol due to repeatedly 1.6-1.7s duration sinus pauses on telemetry  Continue to hold home lisinopril and amlodipine    Dementia with behavioral disturbance (HCC)- (present on admission)  Assessment & Plan  As per history now residing at a memory care center at the [facility].    Minimize disturbances  Continue home sertraline and mirtazapine    Type 2 diabetes mellitus with hyperglycemia, with long-term current use of insulin (HCC)- (present on admission)  Assessment & Plan  Hemoglobin A1c of 6.32 months ago.  Well-controlled.    Continue regular insulin sliding scale  Carbohydrate consistent diet  Continue to hold home metformin       VTE prophylaxis: enoxaparin ppx    I have performed a physical exam and reviewed and updated ROS and Plan today (8/23/2022). In review of yesterday's note (8/22/2022), there are no changes except as documented above.

## 2022-08-24 NOTE — CARE PLAN
The patient is Stable - Low risk of patient condition declining or worsening    Shift Goals  Clinical Goals: safety, abx therapy  Patient Goals: discharge  Family Goals: get well enough to leave    Progress made toward(s) clinical / shift goals:    Problem: Hemodynamics  Goal: Patient's hemodynamics, fluid balance and neurologic status will be stable or improve  Outcome: Progressing  Note: Patient's vital signs are stable and he is neurologically at his base line.      Problem: Urinary - Renal Perfusion  Goal: Ability to achieve and maintain adequate renal perfusion and functioning will improve  Outcome: Progressing  Note: Patient is urinating yellow and clear urine.

## 2022-08-24 NOTE — PALLIATIVE CARE
"Palliative Care follow-up  PC RN met with pt, his daughter/DPOA (Marah), and ex-spouse (Desiree). Again introduced self and role of palliative care. Pt is extremely pleasant at this time. Sitting in bed eating his second breakfast of the morning.     Spoke with family regarding POLST. Again discussed intubation/artifical airway and family states that they would not want patient to go through this. We also discussed the issue of artificial nutrition. Family reports \"food is his life right now.\" They state that not allowing patient to eat by mouth would not be an acceptable quality of life for pt. If clinical status deteriorates to the point that patient is unable to eat by mouth, they would want to focus primarily on his remaining quality of life at that time. Again discussed the option of hospice as a future care option and family verbalized understanding.    Old POLST revoked and new POLST completed with POA selecting DNR, selective treatment, and no artificial nutrition. POA included patient in the discussion at this time confirming his wishes. He states \"that's me. I don't want resuscitated and I don't want heroic measures. I just want to lay there and die peacefully.\" PC RN validated these desires and provided reassurance that he will continue to receive medical care as he/family deems appropriate.    POLST signed by MD and returned to bedside. A copy will be scanned into Epic. To view this, please hover over code status. Pt thanked PC RN for her time and states \"have a happy day!\"     Updated: MD    Plan: Will continue to monitor clinical picture and support pt/family.     Thank you for allowing Palliative Care to support this patient and family. Contact [phone] for additional assistance, change in patient status, or with any questions/concerns.    "

## 2022-08-24 NOTE — PROGRESS NOTES
Bedside report received from FLORES Mcdonald. Patient is alert and oriented x  2, 2L O2 via NC in use, SR on the monitor, tele sitter in place. Fall precautions are in place. Call light is in reach.

## 2022-08-24 NOTE — PROGRESS NOTES
[facility] Internal Medicine Daily Progress Note    Date of Service  8/24/2022    [facility] Team: [facility] Team   Attending: Jenn Shoemaker M.d.  Senior Resident: Dr. Rachna Marcos  Intern:  Dr. Villela  Contact Number: [phone]    Chief Complaint  Andrei Garay is a 82 y.o. male admitted 8/20/2022 with confusion, hypotension, sepsis    Hospital Course  81 y/o M [place] , who resides at the [facility] unit.  He has a history of diabetes mellitus type 2, hypertension, dementia, hypercholesterolemia, urinary tension, dysphagia, myocardial infarction, GERD, osteoarthritis, and osteomyelitis of the left lateral malleolus that was treated with a wound VAC by orthopedic surgery in June 2022. He brought in by ambulance due to increasing confusion as well as low blood pressures. The patient was noted to have a systolic blood pressure of around 80 in the emergency room. He was given sepsis bolus and started on norepinephrine due to persistent hypotension. He did not have any significant complaints at the time of his presentation. In the emergency room, patient had a white count of 15.4 with a left shift. Lactic acid was elevated at 4.5. Patient was afebrile at the time of presentation. Urinalysis showed moderate leukocyte esterase with negative nitrites, negative bacteria. Patient presented with a deep wound over his left lateral malleolus representing probable source of infection.    Patient is DNR/DNI but had a POLST stating selective medical therapies were to be continued. Patient was started on Zosyn and vancomycin in the ED and admitted to the intensive care unit for pressor support with norepinephrine. Continued thereafter only on Zosyn for empiric coverage.    On 8/21/2022 patient's lactic acid normalized and he was weaned off pressor support. Patient also had SADIE which had begun to resolve. Troponin elevation of 47 down-trended to 28, suspected likely related to sepsis as opposed to  ACS. By 8/22/2022 patient was deemed stable for step-down to telemetry, where he was noted to have repeated 1.5-1.7s sinus pauses. He was receiving metoprolol which was subsequently put on hold. Later that evening, orthopedic surgery performed distal fibula excision and irrigation/debridement of left lateral ankle wound, margin cultures sent    Urine cultures growing yeast, superficial wound cultures growing GBS, per ID this is likely to be extending down to the bone. Pending return of margin cultures from surgical debridement, started on cefazolin for GBS infection per ID recommendations on 8/24/2022.    Interval Problem Update  A/F, VSS. Patient seen alert, upright in bed, conversant and engaged. AAOx3 today able to state his name, that he is at [facility], and that it is August 2022. States he is feeling great, was seen while eating breakfast. Does endorse onset of some nasal congestion and sore throat. Also endorses pain over his sacrum. Denies fever, chills, chest pain, shortness of breath, nausea, vomiting.     I have discussed this patient's plan of care and discharge plan at IDT rounds today with Case Management, Nursing, Nursing leadership, and other members of the IDT team.    Consultants/Specialty  critical care    Code Status  DNAR/DNI    Disposition  Patient is medically cleared pending margin cultures to guide abx  for discharge.   Anticipate discharge to to home with organized home healthcare and close outpatient follow-up.  I have placed the appropriate orders for post-discharge needs.    Review of Systems  Review of Systems   Constitutional:  Negative for chills, fever and malaise/fatigue.   HENT:  Positive for sore throat.    Eyes:  Negative for blurred vision and pain.   Respiratory:  Negative for cough, sputum production and shortness of breath.    Cardiovascular:  Negative for chest pain, palpitations, orthopnea, claudication and leg swelling.   Gastrointestinal:  Negative for abdominal  pain, constipation, diarrhea, nausea and vomiting.   Genitourinary:  Negative for dysuria, frequency and urgency.   Skin:  Negative for rash.   Neurological:  Positive for sensory change. Negative for tremors, speech change, focal weakness and headaches.   Psychiatric/Behavioral:  Positive for memory loss.       Physical Exam  Temp:  [36.7 °C (98.1 °F)] 36.7 °C (98.1 °F)  Pulse:  [64-85] 70  Resp:  [16-18] 17  BP: (107-117)/(54-62) 114/60  SpO2:  [95 %-98 %] 98 %    Physical Exam  Constitutional:       General: He is not in acute distress.     Appearance: He is not toxic-appearing.   HENT:      Head: Normocephalic and atraumatic.      Nose: No congestion.      Mouth/Throat:      Mouth: Mucous membranes are moist.      Pharynx: Oropharynx is clear. No oropharyngeal exudate.   Eyes:      Extraocular Movements: Extraocular movements intact.      Conjunctiva/sclera: Conjunctivae normal.      Pupils: Pupils are equal, round, and reactive to light.   Cardiovascular:      Rate and Rhythm: Normal rate and regular rhythm.      Pulses: Normal pulses.      Heart sounds: No murmur heard.  Pulmonary:      Effort: Pulmonary effort is normal. No respiratory distress.      Breath sounds: Normal breath sounds. No wheezing.   Abdominal:      General: There is no distension.      Palpations: Abdomen is soft.      Tenderness: There is no abdominal tenderness. There is no right CVA tenderness, left CVA tenderness, guarding or rebound.   Musculoskeletal:      Cervical back: Neck supple.      Right lower leg: No edema.      Left lower leg: No edema.   Lymphadenopathy:      Cervical: No cervical adenopathy.   Skin:     General: Skin is warm and dry.      Findings: Lesion present.      Comments: Left lower extremity dressing appears clean, dry, intact., Dressing removed, revealing wound vac in place with serosanguineous, bright red drainage. No pain or erythema about the wound vac placement. Sacrum was assessed showing intact skin but deep  erythema representing pressure injury, nursing contacted and pressure injury precautions/alleviation procedure initiated   Neurological:      Mental Status: He is alert and oriented to person, place, and time.      Sensory: Sensory deficit present.      Motor: No weakness.      Comments: States the year is 2022, knows the president, knows his own name and that he is in the hospital. Alert and in good spirits   Psychiatric:         Mood and Affect: Mood normal.       Fluids    Intake/Output Summary (Last 24 hours) at 8/24/2022 0702  Last data filed at 8/24/2022 0444  Gross per 24 hour   Intake --   Output 1875 ml   Net -1875 ml         Laboratory  Recent Labs     08/23/22  0758   WBC 7.3   RBC 3.79*   HEMOGLOBIN 10.6*   HEMATOCRIT 31.7*   MCV 83.6   MCH 28.0   MCHC 33.4*   RDW 41.7   PLATELETCT 349   MPV 9.1       Recent Labs     08/22/22  0200 08/23/22  0758   SODIUM 134* 137   POTASSIUM 3.9 4.4   CHLORIDE 101 103   CO2 25 23   GLUCOSE 140* 186*   BUN 20 16   CREATININE 0.89 0.74   CALCIUM 8.1* 8.0*                     Imaging  MR-ANKLE-WITH & W/O LEFT   Final Result      1.  Again seen marrow edema and abnormal enhancement of the distal fibula/lateral malleolus consistent with osteomyelitis. There has likely been a small focus of superficial debridement of that area.      2.  Soft tissue ulceration overlying the lateral malleolus with some surrounding cellulitis. No evidence of focal abscess.      3.  Interval improvement in tibiotalar joint effusion with only minimal joint effusion remaining.      4.  Tenosynovitis of the peroneus brevis and longus tendons. There is again seen longitudinal splitting of the peroneus brevis tendon.      5.  Tendinosis of the Achilles tendon.      DX-CHEST-LIMITED (1 VIEW)   Final Result      Left subclavian central venous catheter tip extends slightly into the azygos vein. No pneumothorax.      DX-ANKLE 2- VIEWS LEFT   Final Result      1.  Soft tissue swelling about the ankle  particularly laterally where there is a wound VAC.   2.  Previously seen lateral malleolus bone erosion/osteomyelitis is not well evaluated on the current study due to lack of oblique view.   3.  No definite new osseous abnormality is seen.         CT-CTA CHEST PULMONARY ARTERY W/ RECONS   Final Result      1.  No CT evidence of pulmonary embolus.      2.  Groundglass opacifications noted in the right upper pulmonary lobe could be due to pneumonitis pneumonia or other inflammatory process. Covid 19 pneumonia is in the differential diagnosis.      3.  Small area of consolidation is noted posterior edge of left lung apex with similar differential diagnosis.      4.  Opacification posteriorly throughout the lower lobes is noted which could be due to atelectasis edema or inflammation.      5.  Trace pericardial fluid collection is also identified.            CT-ABDOMEN-PELVIS WITH   Final Result      1.  Urinary bladder wall thickening could indicate cystitis. Prostate gland is enlarged.      2.  Otherwise no acute abnormalities are identified in the abdomen or pelvis.      DX-CHEST-PORTABLE (1 VIEW)   Final Result      1.  Cardiomegaly with pulmonary vascular congestion and interstitial edema.   2.  Small ill-defined opacity in the right upper lobe may represent pneumonitis. Follow-up to radiographic resolution is recommended.   3.  Atherosclerotic plaque.             Assessment/Plan  Problem Representation:    * Septic shock (HCC)- (present on admission)  Assessment & Plan  This is Septic shock Present on admission  SIRS criteria identified on my evaluation include: Tachypnea, with respirations greater than 20 per minute and Leukocytosis, with WBC greater than 12,000  Indicators of septic shock include: Severe sepsis present, persistent hypotension after 30 ml/kg completed, and initial lactate level result is >= 4 mmol/L.  Acute kidney injury with creatinine of 1.8.  Sources is: Urinary tract versus persistent  osteomyelitis of the left lateral malleolus.  Unlikely pulmonary given no pulmonary symptoms.  Sepsis protocol initiated  Crystalloid Fluid Administration: Fluid resuscitation ordered per standard protocol - 30 mL/kg per current or ideal body weight  IV antibiotics as appropriate for source of sepsis  Reassessment: I have reassessed the patient's hemodynamic status  Patient has been off of norepinephrine pressor support since 4 AM 8/21/2022.    -As of 8/24/2022, patient started on cefazolin given wound cultures positive for group B strep. Zosyn and vancomycin discontinued.  -MRI with contrast of left ankle on 8/22/2022, surgical debridement of left ankle wound with distal fibula resection of osteomyelitic tissue that evening.  -Pending margin cultures from surgical debridement to guide further antibiotic management    History of osteomyelitis- (present on admission)  Assessment & Plan  Involving the left malleolus.  Wound VAC placed by orthopedic surgery back on 6/14/2022.    Orthopedic surgery again intervened for left fibula resection and irrigation and debridement on 8/22/2022.    Ankle wound- (present on admission)  Assessment & Plan  As per history.  Involving the lateral malleolus with osteomyelitis.  Patient has had a wound VAC in place.  Underwent debridement by wound care.  Last cultures from June 2022 grew:  Corynebacterium striatum group     -Orthopedic surgery performed left distal fibula resection with repeat irrigation and debridement of left lateral malleolus wound the evening of 8/22/2022  -Margin cultures pending to guide further abx management  -Wound cultures growing GBS, ID recommending cefazolin which was started in lieu of Zosyn and vancomycin    Hypomagnesemia  Assessment & Plan  Noted to have Mg 1.7 on 8/23/2022 labs  -Repleted with magnesium sulfate 4mg, then as needed    Benign prostatic hyperplasia without lower urinary tract symptoms- (present on admission)  Assessment & Plan  As per  history.  Martinez catheter was placed on admission. Discontinued on 8/21/2022, replaced overnight on 8/22/2022 due to urinary retention. Plan for trial of void on 8/25/2022, if PVR >400ccs will need straight cath x1.    Continue home tamsulosin 0.8 mg daily    Narcotic dependence (HCC)- (present on admission)  Assessment & Plan  As per history.    Continue home Norco    DNR (do not resuscitate)  Assessment & Plan  Patient is DNR/DNI status as per patient's POLST, which has been reviewed.    Elevated troponin- (present on admission)  Assessment & Plan  Mildly elevated.  Trending down.  Likely from demand ischemia from sepsis  Continue telemetry monitoring    Chronic pain- (present on admission)  Assessment & Plan  As per history.  Has been taking Norco.    Continue home Norco    Pneumonitis  Assessment & Plan  Likely aspiration-related from early in the hospital admission or prior  Covid and viral screening negative  Speech eval performed, okay for soft diet    Acute cystitis without hematuria- (present on admission)  Assessment & Plan  As per urinalysis with moderate leukocyte esterase, nitrite negativity.    Urine cultures pending  On empiric Zosyn and vancomycin for sepsis coverage  Martinez catheter discontinued 8/21    SADIE (acute kidney injury) (HCC)- (present on admission)  Assessment & Plan  Creatinine was elevated at 1.8 on admission.  Resolved with IV fluid resuscitation and pressor support.    Continue to monitor  Avoid nephrotoxins    HTN (hypertension)- (present on admission)  Assessment & Plan  As per history.  Blood pressure is currently controlled.  Blood pressure goal less than 150 systolic given his age group.    Holding metoprolol due to repeated 1.6-1.7s duration sinus pauses on telemetry noted 8/22/2022  Continue to hold home lisinopril and amlodipine    Dementia with behavioral disturbance (HCC)- (present on admission)  Assessment & Plan  As per history [facility]    Minimize disturbances  Continue home sertraline and mirtazapine    Type 2 diabetes mellitus with hyperglycemia, with long-term current use of insulin ([facility])- (present on admission)  Assessment & Plan  Hemoglobin A1c of 6.32 months ago.  Well-controlled.    Continue regular insulin sliding scale  Carbohydrate consistent diet  Continue to hold home metformin       VTE prophylaxis: enoxaparin ppx    I have performed a physical exam and reviewed and updated ROS and Plan today (8/24/2022). In review of yesterday's note (8/23/2022), there are no changes except as documented above.

## 2022-08-24 NOTE — CARE PLAN
The patient is Stable - Low risk of patient condition declining or worsening    Shift Goals  Clinical Goals: safety, promote sleep, abx therapy  Patient Goals: rest  Family Goals: n/a    Progress made toward(s) clinical / shift goals:    Problem: Skin Integrity  Goal: Skin integrity is maintained or improved  Outcome: Progressing     Problem: Fall Risk  Goal: Patient will remain free from falls  Outcome: Progressing       Patient is not progressing towards the following goals:

## 2022-08-24 NOTE — PROGRESS NOTES
Patient's sacrum assessed with FLORES Diaz. Sacrum red but blanching. Patient's waffle mattress inflated, mepilex placed on patient, patient turning self side to side.

## 2022-08-24 NOTE — ASSESSMENT & PLAN NOTE
Likely aspiration-related from early in the hospital admission or prior  Covid and viral screening negative  Speech eval performed, okay for soft diet

## 2022-08-24 NOTE — DISCHARGE PLANNING
Agency/Facility Name: [facility]  Spoke To: Chicho  Outcome: DPA confirmed acceptance pending medical clearance, DPA to notify Chicho upon Pt being ready for transfer.

## 2022-08-24 NOTE — PROGRESS NOTES
Assumed care of patient, received bedside report from NOC RN. Patient is A&O X 3. Disoriented to time, he thought it was 2007 or 2008. Pain 6/10, PRN and scheduled pain meds given. Vital signs stable overnight, on RA. On tele monitor, SR 70. POC discussed with patient and family. Patient verbalized understanding and strong desire to leave. All questions answered. Call light within reach and fall precautions in place. Bed locked and in lowest position.

## 2022-08-25 LAB
BACTERIA BLD CULT: NORMAL
BACTERIA BLD CULT: NORMAL
GLUCOSE BLD STRIP.AUTO-MCNC: 185 MG/DL (ref 65–99)
GLUCOSE BLD STRIP.AUTO-MCNC: 195 MG/DL (ref 65–99)
GLUCOSE BLD STRIP.AUTO-MCNC: 205 MG/DL (ref 65–99)
SIGNIFICANT IND 70042: NORMAL
SIGNIFICANT IND 70042: NORMAL
SITE SITE: NORMAL
SITE SITE: NORMAL
SOURCE SOURCE: NORMAL
SOURCE SOURCE: NORMAL

## 2022-08-25 PROCEDURE — 700111 HCHG RX REV CODE 636 W/ 250 OVERRIDE (IP): Performed by: STUDENT IN AN ORGANIZED HEALTH CARE EDUCATION/TRAINING PROGRAM

## 2022-08-25 PROCEDURE — 700102 HCHG RX REV CODE 250 W/ 637 OVERRIDE(OP)

## 2022-08-25 PROCEDURE — 82962 GLUCOSE BLOOD TEST: CPT

## 2022-08-25 PROCEDURE — 770001 HCHG ROOM/CARE - MED/SURG/GYN PRIV*

## 2022-08-25 PROCEDURE — A9270 NON-COVERED ITEM OR SERVICE: HCPCS | Performed by: STUDENT IN AN ORGANIZED HEALTH CARE EDUCATION/TRAINING PROGRAM

## 2022-08-25 PROCEDURE — 700102 HCHG RX REV CODE 250 W/ 637 OVERRIDE(OP): Performed by: INTERNAL MEDICINE

## 2022-08-25 PROCEDURE — A9270 NON-COVERED ITEM OR SERVICE: HCPCS

## 2022-08-25 PROCEDURE — 700111 HCHG RX REV CODE 636 W/ 250 OVERRIDE (IP)

## 2022-08-25 PROCEDURE — 700102 HCHG RX REV CODE 250 W/ 637 OVERRIDE(OP): Performed by: STUDENT IN AN ORGANIZED HEALTH CARE EDUCATION/TRAINING PROGRAM

## 2022-08-25 PROCEDURE — A9270 NON-COVERED ITEM OR SERVICE: HCPCS | Performed by: INTERNAL MEDICINE

## 2022-08-25 PROCEDURE — 99233 SBSQ HOSP IP/OBS HIGH 50: CPT | Mod: GC | Performed by: HOSPITALIST

## 2022-08-25 RX ADMIN — OXYCODONE 5 MG: 5 TABLET ORAL at 17:16

## 2022-08-25 RX ADMIN — CEFAZOLIN SODIUM 2 G: 2 INJECTION, SOLUTION INTRAVENOUS at 21:31

## 2022-08-25 RX ADMIN — ATORVASTATIN CALCIUM 40 MG: 40 TABLET, FILM COATED ORAL at 17:16

## 2022-08-25 RX ADMIN — MIRTAZAPINE 15 MG: 15 TABLET, FILM COATED ORAL at 21:31

## 2022-08-25 RX ADMIN — TAMSULOSIN HYDROCHLORIDE 0.8 MG: 0.4 CAPSULE ORAL at 21:31

## 2022-08-25 RX ADMIN — SERTRALINE 50 MG: 50 TABLET, FILM COATED ORAL at 17:16

## 2022-08-25 RX ADMIN — ASPIRIN 81 MG: 81 TABLET, COATED ORAL at 12:00

## 2022-08-25 RX ADMIN — CEFAZOLIN SODIUM 2 G: 2 INJECTION, SOLUTION INTRAVENOUS at 14:08

## 2022-08-25 RX ADMIN — DOCUSATE SODIUM 50 MG AND SENNOSIDES 8.6 MG 2 TABLET: 8.6; 5 TABLET, FILM COATED ORAL at 17:16

## 2022-08-25 RX ADMIN — ACETAMINOPHEN 1000 MG: 500 TABLET ORAL at 21:31

## 2022-08-25 RX ADMIN — ACETAMINOPHEN 1000 MG: 500 TABLET ORAL at 14:08

## 2022-08-25 RX ADMIN — CEFAZOLIN SODIUM 2 G: 2 INJECTION, SOLUTION INTRAVENOUS at 04:34

## 2022-08-25 RX ADMIN — ACETAMINOPHEN 1000 MG: 500 TABLET ORAL at 09:00

## 2022-08-25 ASSESSMENT — PAIN DESCRIPTION - PAIN TYPE
TYPE: ACUTE PAIN;CHRONIC PAIN
TYPE: ACUTE PAIN;CHRONIC PAIN
TYPE: ACUTE PAIN
TYPE: ACUTE PAIN;CHRONIC PAIN

## 2022-08-25 ASSESSMENT — ENCOUNTER SYMPTOMS
SENSORY CHANGE: 1
TREMORS: 0
FEVER: 0
FOCAL WEAKNESS: 0
CONSTIPATION: 0
SHORTNESS OF BREATH: 0
DIARRHEA: 0
VOMITING: 0
NAUSEA: 0
BLURRED VISION: 0
ABDOMINAL PAIN: 0
CLAUDICATION: 0
PALPITATIONS: 0
COUGH: 0
SPEECH CHANGE: 0
EYE PAIN: 0
CHILLS: 0
HEADACHES: 0
SPUTUM PRODUCTION: 0
SORE THROAT: 0
ORTHOPNEA: 0

## 2022-08-25 NOTE — WOUND TEAM
Wound team received consult for left lateral ankle.  Patient had surgery with Dr. Vital on Monday 8/22/22.  Limb preservation services are following patient.  Wound consult completed at this time.

## 2022-08-25 NOTE — PROGRESS NOTES
Assumed care of patient at bedside report from Tamara TANNER. Pt Aox3, on room air, lying calmly in bed, no signs of distress, and all patient needs addressed during bedside report. Updated on POC. Call light within reach. Patient instructed to press the red call light button when needing assistance. Whiteboard updated. Fall precautions in place. Bed locked and in lowest position. Bed alarm pad under patient and activated. Two side rails up and locked. No complaints of discomfort at this time.

## 2022-08-25 NOTE — DISCHARGE PLANNING
Case Management Discharge Planning    Admission Date: 8/20/2022  GMLOS: 9.6  ALOS: 5    6-Clicks ADL Score: 20  6-Clicks Mobility Score: 17  PT and/or OT Eval ordered: Yes  Post-acute Referrals Ordered: Yes  Post-acute Choice Obtained: Yes  Has referral(s) been sent to post-acute provider:  Yes      Anticipated Discharge Dispo: Discharge Disposition: D/T to SNF with Medicare cert in anticipation of skilled care (03)    DME Needed: No    Action(s) Taken: pt pending medical clearance for discharge back to [facility]. Abx recs pending from Infectious Disease.     Escalations Completed: None    Medically Clear: No    Next Steps: f/u with medical team for clearance.    Barriers to Discharge: Medical clearance

## 2022-08-25 NOTE — PROGRESS NOTES
LIMB PRESERVATION SERVICE   POST SURGICAL PROGRESS NOTE      HPI:  Andrei Garay is a 82 y.o.  with a past medical history that includes type 2 diabetes, hyperlipidemia, HTN, MI, dementia. Admitted 8/20/2022 for Osteomyelitis (HCC) [M86.9].  LPS has been consulted for left lateral ankle.       Pt known to LPS from prior admissions, 4/12/22 through 4/14/22 and  6/10/22 through 6/20/22. The ulcer started originally from a protuberance on his FWW in the fall 2021.  Patient has been having wound care at [facility] where he resides.  When patient was seen in April wound did not probe to the bone at that time but upon his readmission on 6/10/2022 wound probes to bone and patient had I&D of lateral left ankle on 6/14/2022.  Patient was discharged back to [facility] 6/20/2022.  Per case management notes facility was to order patient's wound VAC and patient was kept on IV daptomycin with end date of 7/23/2022.     SURGERY DATE: 8/22/22 Dr Vital   PROCEDURE:   Left foot and lateral leg irrigation and debridement including tendon skin down to bone  Left excision distal fibula  Left bone biopsy fibular margin  Left ankle joint irrigation and debridement and synovectomy  Left placement negative pressure incisional dressing       INTERVAL HISTORY:  8/25/2022: POD #3  Patient denies fevers, chills, nausea, vomiting.  Pain well controlled. Patient up in recliner eating breakfast, Prevena alarming.       PERTINENT LPS RESULTS:   Pathology: SURGICAL PATHOLOGY CONSULTATION       FINAL DIAGNOSIS:     A. Fibula clean cut margin:          Viable bone and surrounding soft tissue with no evidence of           osteomyelitis noted.                                         Diagnosis performed by:                                       EDGAR BRITT MD   OR culture:   OR bone culture of tibia results pending         Fungal:   Significant Indicator NEG    Source BONE    Site Fibula Clean Margin Left     Fungal Smear Results No fungal elements seen.        SURGICAL SITE EXAM:      /77   Pulse 80   Temp 36.5 °C (97.7 °F) (Temporal)   Resp 17   Ht 1.829 m (6')   Wt 90.2 kg (198 lb 13.7 oz)   SpO2 93%   BMI 26.97 kg/m²   Monofilament testing completed on 4/13/2022  Sensation: insensate  Surgical foot warm     Pedal Pulses:   R foot: unable to palpate PT/DP. Likely due to edema. With doppler brisk tones noted to PT/DP  L foot: unable to palpate PT/DP. Likely due to edema. With doppler brisk tones noted to PT, slightly diminished DP      Incision   location: Left lateral ankle   Able to wiggle toes, warm well perfused  Incisional vac in place alarming.  Outer dressing removed, Canister full. Attached to in house  Ulta VAC until DC. Extra canister obtained and old canister to Prevena replaced for DC.     Lateral leg padded with gauze, Roll batting, Ace wrap.         DIABETES MANAGEMENT:    A1c:   Lab Results   Component Value Date/Time    HBA1C 6.3 (H) 06/11/2022 03:57 PM            INFECTION MANAGEMENT:    Results from last 7 days   Lab Units 08/23/22  0758 08/21/22  0155 08/20/22  1355   WBC 1501 K/uL 7.3 13.1* 15.4*   PLATELET COUNT 1518 K/uL 349 305 289     Wound culture results:   Results       Procedure Component Value Units Date/Time    Anaerobic Culture [[id number]] Collected: 08/22/22 2204    Order Status: Completed Specimen: Bone Updated: 08/25/22 0921     Significant Indicator NEG     Source BONE     Site Fibula Clean Margin Left     Culture Result Culture in progress.    Narrative:      Surgery Specimen    Fungal Culture [[id number]] Collected: 08/22/22 2204    Order Status: Completed Specimen: Bone Updated: 08/25/22 0921     Significant Indicator NEG     Source BONE     Site Fibula Clean Margin Left     Culture Result Culture in progress.     Fungal Smear Results No fungal elements seen.    Narrative:      Surgery Specimen    CULTURE TISSUE W/ GRM STAIN [[id number]] Collected: 08/22/22 2204     Order Status: Completed Specimen: Bone Updated: 08/25/22 0921     Significant Indicator NEG     Source BONE     Site Fibula Clean Margin Left     Culture Result -This specimen was received in the Clinical Laboratory  -mislabeled or unlabeled, not meeting minimum patient-  -identification criteria.  It was determined that it was not  -possible to reproduce the circumstances necessitating the  -test or that recollection of the specimen would cause undo  -risk to the patient.  The mislabeled/unlabeled specimen was  -then identified and relabeled by: Liseth Zamora RN  No growth at 48 hours.       Gram Stain Result No organisms seen.    Narrative:      Surgery Specimen    URINE CULTURE(NEW) [[id number]]  (Abnormal) Collected: 08/22/22 0745    Order Status: Completed Specimen: Urine, Clean Catch Updated: 08/24/22 0800     Significant Indicator POS     Source UR     Site URINE, CLEAN CATCH     Culture Result -      Candida albicans  10-50,000 cfu/mL      Narrative:      Indication for culture:->Patient WITHOUT an indwelling Martinez  catheter in place with new onset of Dysuria, Frequency,  Urgency, and/or Suprapubic pain  Indication for culture:->Patient WITHOUT an indwelling Martinez    URINE CULTURE(NEW) [[id number]]  (Abnormal) Collected: 08/20/22 1410    Order Status: Completed Specimen: Urine, Clean Catch Updated: 08/23/22 1201     Significant Indicator POS     Source UR     Site URINE, CLEAN CATCH     Culture Result Growth noted after further incubation, see below for  organism identification.        Candida albicans  10-50,000 cfu/mL      Narrative:      Indication for culture:->Evaluation for sepsis without a  clear source of infection  Indication for culture:->Evaluation for sepsis without a    Culture Wound w/Gram Stain [[id number]]  (Abnormal) Collected: 08/21/22 1350    Order Status: Completed Specimen: Wound from Left Ankle Updated: 08/23/22 1149     Significant Indicator POS     Source WND     Site LEFT ANKLE      "Culture Result -     Gram Stain Result Many WBCs.  Rare Gram positive cocci.       Culture Result Streptococcus agalactiae (Group B)  Light growth      Narrative:      Collected By: [id number] JUSTIN HAIR  Collected By: [id number] JUSTIN HAIR    Fungal Smear [[id number]] Collected: 08/22/22 2204    Order Status: Completed Specimen: Bone Updated: 08/23/22 0447     Significant Indicator NEG     Source BONE     Site Fibula Clean Margin Left     Fungal Smear Results No fungal elements seen.    Narrative:      Surgery Specimen    GRAM STAIN [[id number]] Collected: 08/22/22 2204    Order Status: Completed Specimen: Bone Updated: 08/23/22 0447     Significant Indicator .     Source BONE     Site Fibula Clean Margin Left     Gram Stain Result No organisms seen.    Narrative:      Surgery Specimen    MRSA By PCR (Amp) [[id number]] Collected: 08/22/22 1335    Order Status: Completed Specimen: Respirate from Nares Updated: 08/22/22 1656     MRSA by PCR Negative    Narrative:      Collected By: [id number] LISA CHAVIS RN to collect MRSA nares.    GRAM STAIN [[id number]] Collected: 08/21/22 1350    Order Status: Completed Specimen: Wound Updated: 08/21/22 1618     Significant Indicator .     Source WND     Site LEFT ANKLE     Gram Stain Result Many WBCs.  Rare Gram positive cocci.      Narrative:      Collected By: [id number] JUSTIN HAIR  Collected By: [id number] JUSTIN HAIR    BLOOD CULTURE [[id number]] Collected: 08/20/22 1419    Order Status: Completed Specimen: Blood from Peripheral Updated: 08/21/22 0731     Significant Indicator NEG     Source BLD     Site PERIPHERAL     Culture Result No Growth  Note: Blood cultures are incubated for 5 days and  are monitored continuously.Positive blood cultures  are called to the RN and reported as soon as  they are identified.      Narrative:      Per Hospital Policy: Only change Specimen Src: to \"Line\" if  specified by physician order.  Right Hand    BLOOD CULTURE " "[[id number]] Collected: 08/20/22 1355    Order Status: Completed Specimen: Blood from Peripheral Updated: 08/21/22 0731     Significant Indicator NEG     Source BLD     Site PERIPHERAL     Culture Result No Growth  Note: Blood cultures are incubated for 5 days and  are monitored continuously.Positive blood cultures  are called to the RN and reported as soon as  they are identified.      Narrative:      Per Hospital Policy: Only change Specimen Src: to \"Line\" if  specified by physician order.  No site indicated    CoV-2, FLU A/B, and RSV by PCR (2-4 Hours CEPHEID) : Collect NP swab in VTM [[id number]] Collected: 08/20/22 1720    Order Status: Completed Specimen: Respirate Updated: 08/20/22 1819     Influenza virus A RNA Negative     Influenza virus B, PCR Negative     RSV, PCR Negative     SARS-CoV-2 by PCR NotDetected     Comment: PATIENTS: Important information regarding your results and instructions can  be found at [email or web address]   \"After your  Covid-19 Test\"    [facility] providers: PLEASE REFER TO DE-ESCALATION AND RETESTING PROTOCOL  on [email or web address]    **The Pollen GeneXpert Xpress SARS-CoV-2 RT-PCR Test has been made  available for use under the Emergency Use Authorization (EUA) only.          SARS-CoV-2 Source NP Swab    MRSA By PCR (Amp) [[id number]]     Order Status: Canceled Specimen: Respirate from Nares     COV-2, FLU A/B, AND RSV BY PCR (2-4 HOURS CEPHEID): Collect NP swab in VTM [[id number]]     Order Status: Canceled Specimen: Respirate     URINALYSIS [[id number]]  (Abnormal) Collected: 08/20/22 1410    Order Status: Completed Specimen: Urine, Clean Catch Updated: 08/20/22 1432     Color Yellow     Character Clear     Specific Gravity 1.018     Ph 5.5     Glucose 250 mg/dL      Ketones Negative mg/dL      Protein 30 mg/dL      Bilirubin Negative     Urobilinogen, Urine 0.2     Nitrite Negative     Leukocyte Esterase Moderate     Occult Blood Small     Micro Urine Req " Microscopic    Narrative:      Indication for culture:->Evaluation for sepsis without a  clear source of infection                 ASSESSMENT/PLAN:   POD #3 S/P Left foot and lateral leg irrigation and debridement including tendon skin down to bone. Left excision distal fibula. Left bone biopsy fibular margin. Left ankle joint irrigation and debridement and synovectomy. Left placement negative pressure incisional dressing . By Dr Vital     -Prevena in place, holding suction, canister full. Canister replaced. Prevena attached to in house Ulta VAC until DC  - dorsal edema to left foot. Light compression with Ace wrap applied      Wound care:   -Left lateral ankle: Prevena incisional VAC in place. Bedside RN can switch tubing connection from in house VAC to home Prevena VAC upon DC    Imaging/Labs:  -COVID-19: not detected this admission    Vascular status:   R foot: unable to palpate PT/DP. Likely due to edema. With doppler brisk tones noted to PT/DP  L foot: unable to palpate PT/DP. Likely due to edema. With doppler brisk tones noted to PT, slightly diminished DP    Surgery:   --no further surgeries planned at this time     Antibiotics:   -ID consulted     Weight Bearing Status:   - transfer weight bearing in Prafo boot     Offloading:   -Offloading boot  at all times;   - Offloading device at bedside.    PT/OT:   -not involved.     Diabetes Education:   - not involved.     - Implications of loss of protective sensation (LOPS) discussed with patient- including increased risk for amputation.  Advised to check feet at least daily, moisturize feet, and to always wear protective foot wear.   -avoid trimming own nails. See podiatrist or certified foot and nail RN  -keep blood sugars <150 for improved wound healing            DISCHARGE PLAN:    Disposition:   TBD      Follow-up: Dr. Vital. Sutures to be removed approximately 2 weeks post-op  Does not need wound care clinic or LPS follow up at this time as patient has  an incision without open wound.            Discussed with: pt, RN, Dr. Vital, Dr Marcos, Unit  FLORES Ramirez             Please note that this dictation was created using voice recognition software. I have  worked with technical experts from [facility] to optimize the interface.  I have made every reasonable attempt to correct obvious errors, but there may be errors of grammar and possibly content that I did not discover before finalizing the note.      Jazzy Dvaid, A.P.R.N.    If any questions or concerns, please contact LPS through voalte.     Acitretin Counseling:  I discussed with the patient the risks of acitretin including but not limited to hair loss, dry lips/skin/eyes, liver damage, hyperlipidemia, depression/suicidal ideation, photosensitivity.  Serious rare side effects can include but are not limited to pancreatitis, pseudotumor cerebri, bony changes, clot formation/stroke/heart attack.  Patient understands that alcohol is contraindicated since it can result in liver toxicity and significantly prolong the elimination of the drug by many years.

## 2022-08-25 NOTE — PROGRESS NOTES
Monitor Summary  Rhythm: 63 - 85  Rate: 63 - 85  Ectopy: PVC AC 1degree AV block  .21 / .12 / .46

## 2022-08-25 NOTE — PROGRESS NOTES
Assumed care of patient, received bedside report from NOC RN. Patient is A&O X 4, he was able to tell me the month and the current president but he thought the year was 2099. Pain 5/10, scheduled Tylenol given. Vital signs stable overnight, on RA. On tele monitor, SR 80. POC discussed with patient. Patient verbalized understanding. All questions answered. Call light within reach and fall precautions in place. Bed locked and in lowest position.

## 2022-08-25 NOTE — CARE PLAN
The patient is Stable - Low risk of patient condition declining or worsening    Shift Goals  Clinical Goals: keep patient free from falls  Patient Goals: discharge  Family Goals: n/a    Progress made toward(s) clinical / shift goals:    Problem: Skin Integrity  Goal: Skin integrity is maintained or improved  Outcome: Not Progressing  Note: Patient refuses to allow nursing to check his sacrum or replace the mepilex that it placed there.        Patient is not progressing towards the following goals:      Problem: Skin Integrity  Goal: Skin integrity is maintained or improved  Outcome: Not Progressing  Note: Patient refuses to allow nursing to check his sacrum or replace the mepilex that it placed there.

## 2022-08-25 NOTE — CARE PLAN
The patient is Stable - Low risk of patient condition declining or worsening    Shift Goals  Clinical Goals: safety abx therapy  Patient Goals: discharge  Family Goals: anh    Progress made toward(s) clinical / shift goals:  yes    Patient is not progressing towards the following goals:      Problem: Knowledge Deficit - Standard  Goal: Patient and family/care givers will demonstrate understanding of plan of care, disease process/condition, diagnostic tests and medications  Outcome: Progressing     Problem: Hemodynamics  Goal: Patient's hemodynamics, fluid balance and neurologic status will be stable or improve  Outcome: Progressing     Problem: Fluid Volume  Goal: Fluid volume balance will be maintained  Outcome: Progressing     Problem: Urinary - Renal Perfusion  Goal: Ability to achieve and maintain adequate renal perfusion and functioning will improve  Outcome: Progressing     Problem: Respiratory  Goal: Patient will achieve/maintain optimum respiratory ventilation and gas exchange  Outcome: Progressing     Problem: Mechanical Ventilation  Goal: Safe management of artificial airway and ventilation  Outcome: Progressing  Goal: Successful weaning off mechanical ventilator, spontaneously maintains adequate gas exchange  Outcome: Progressing  Goal: Patient will be able to express needs and understand communication  Outcome: Progressing     Problem: Physical Regulation  Goal: Diagnostic test results will improve  Outcome: Progressing  Goal: Signs and symptoms of infection will decrease  Outcome: Progressing     Problem: Skin Integrity  Goal: Skin integrity is maintained or improved  Outcome: Progressing     Problem: Fall Risk  Goal: Patient will remain free from falls  Outcome: Progressing     Problem: Pain - Standard  Goal: Alleviation of pain or a reduction in pain to the patient’s comfort goal  Outcome: Progressing

## 2022-08-26 VITALS
DIASTOLIC BLOOD PRESSURE: 81 MMHG | OXYGEN SATURATION: 94 % | SYSTOLIC BLOOD PRESSURE: 152 MMHG | BODY MASS INDEX: 26.93 KG/M2 | TEMPERATURE: 98.2 F | HEART RATE: 73 BPM | WEIGHT: 198.85 LBS | RESPIRATION RATE: 16 BRPM | HEIGHT: 72 IN

## 2022-08-26 LAB
GLUCOSE BLD STRIP.AUTO-MCNC: 135 MG/DL (ref 65–99)
GLUCOSE BLD STRIP.AUTO-MCNC: 169 MG/DL (ref 65–99)

## 2022-08-26 PROCEDURE — 700102 HCHG RX REV CODE 250 W/ 637 OVERRIDE(OP): Performed by: INTERNAL MEDICINE

## 2022-08-26 PROCEDURE — A9270 NON-COVERED ITEM OR SERVICE: HCPCS | Performed by: INTERNAL MEDICINE

## 2022-08-26 PROCEDURE — 700102 HCHG RX REV CODE 250 W/ 637 OVERRIDE(OP)

## 2022-08-26 PROCEDURE — 99232 SBSQ HOSP IP/OBS MODERATE 35: CPT | Mod: GC | Performed by: HOSPITALIST

## 2022-08-26 PROCEDURE — A9270 NON-COVERED ITEM OR SERVICE: HCPCS | Performed by: STUDENT IN AN ORGANIZED HEALTH CARE EDUCATION/TRAINING PROGRAM

## 2022-08-26 PROCEDURE — 82962 GLUCOSE BLOOD TEST: CPT | Mod: 91

## 2022-08-26 PROCEDURE — A9270 NON-COVERED ITEM OR SERVICE: HCPCS

## 2022-08-26 PROCEDURE — 700102 HCHG RX REV CODE 250 W/ 637 OVERRIDE(OP): Performed by: STUDENT IN AN ORGANIZED HEALTH CARE EDUCATION/TRAINING PROGRAM

## 2022-08-26 RX ORDER — CEPHALEXIN 500 MG/1
1000 CAPSULE ORAL 3 TIMES DAILY
Status: DISCONTINUED | OUTPATIENT
Start: 2022-08-26 | End: 2022-08-26 | Stop reason: HOSPADM

## 2022-08-26 RX ORDER — CEPHALEXIN 500 MG/1
1000 CAPSULE ORAL 3 TIMES DAILY
Qty: 84 CAPSULE | Refills: 0 | Status: SHIPPED | OUTPATIENT
Start: 2022-08-26 | End: 2022-10-24

## 2022-08-26 RX ADMIN — CEPHALEXIN 1000 MG: 500 CAPSULE ORAL at 15:39

## 2022-08-26 RX ADMIN — ASPIRIN 81 MG: 81 TABLET, COATED ORAL at 05:49

## 2022-08-26 RX ADMIN — DOCUSATE SODIUM 50 MG AND SENNOSIDES 8.6 MG 2 TABLET: 8.6; 5 TABLET, FILM COATED ORAL at 05:49

## 2022-08-26 ASSESSMENT — ENCOUNTER SYMPTOMS
FOCAL WEAKNESS: 0
HEADACHES: 0
CHILLS: 0
SPUTUM PRODUCTION: 0
CLAUDICATION: 0
EYE PAIN: 0
SENSORY CHANGE: 1
ABDOMINAL PAIN: 0
TREMORS: 0
DIARRHEA: 0
BLURRED VISION: 0
ORTHOPNEA: 0
NAUSEA: 0
SPEECH CHANGE: 0
VOMITING: 0
COUGH: 0
SHORTNESS OF BREATH: 0
FEVER: 0
PALPITATIONS: 0
CONSTIPATION: 0
SORE THROAT: 0

## 2022-08-26 NOTE — DISCHARGE PLANNING
DC Transport Scheduled    Received request at: [id number]    Transport Company Scheduled:  [employer]  Spoke with Jessica  at [facility] to schedule transport.      Scheduled Date: 08/26/2022  Scheduled Time: 1715    Destination: [facility]     Notified care team of scheduled transport via Voalte.     If there are any changes needed to the DC transportation scheduled, please contact [facility] at ext. [phone] between the hours of [phone] Mon-Fri. If outside those hours, contact the ED Case Manager at ext. [phone].

## 2022-08-26 NOTE — DISCHARGE PLANNING
Care Transition Team Assessment    Chart review completed to obtain the information used in this assessment. Pt is a long term resident at [facility]. Pt requires assistance with ADLs and IADLs at baseline. Pt will return to [facility] upon DC. No financial, SA, or MH concerns. Pt has ACP documents scanned into his chart.    Information Source  Orientation Level: Oriented to place, Oriented to person, Oriented to situation, Disoriented to time  Information Given By: Other (Comments) (chart review)  Who is responsible for making decisions for patient? : POA  Name(s) of Primary Decision Maker: Marah Rowland    Readmission Evaluation  Is this a readmission?: Yes - unplanned readmission    Elopement Risk  Legal Hold: No  Ambulatory or Self Mobile in Wheelchair: Yes  Disoriented: Time-At Risk for Elopement  Elopement Risk: At Risk for Elopement    Interdisciplinary Discharge Planning  Lives with - Patient's Self Care Capacity: Attendant / Paid Care Giver  Prior Services: Continuous (24 Hour) Care Giving Per Service    Discharge Preparedness  What is your plan after discharge?: Skilled nursing facility  What are your discharge supports?: Other (comment) (SNF)  Prior Functional Level: Needs Assist with Activities of Daily Living, Needs Assist with Medication Management  Difficulity with ADLs: Bathing, Dressing, Toileting, Walking  Difficulity with IADLs: Cooking, Driving, Keeping track of finances, Laundry, Managing medication, Shopping, Using the telephone or computer    Functional Assesment  Prior Functional Level: Needs Assist with Activities of Daily Living, Needs Assist with Medication Management    Finances  Financial Barriers to Discharge: No  Prescription Coverage: Yes    Vision / Hearing Impairment  Hearing Impairment: Both Ears    Advance Directive  Advance Directive?: DPOA for Health Care, POLST  Durable Power of  Name and Contact : Marah Rowland    Domestic Abuse  Have you ever been the victim of abuse or  violence?: No    Psychological Assessment  History of Substance Abuse: None  History of Psychiatric Problems: No  Non-compliant with Treatment: No  Newly Diagnosed Illness: No    Discharge Risks or Barriers  Discharge risks or barriers?: No    Anticipated Discharge Information  Discharge Disposition: D/T to SNF with Medicare cert in anticipation of skilled care (03)

## 2022-08-26 NOTE — DISCHARGE PLANNING
Case Management Discharge Planning    Admission Date: 8/20/2022  GMLOS: 9.6  ALOS: 6    6-Clicks ADL Score: 20  6-Clicks Mobility Score: 17  PT and/or OT Eval ordered: Yes  Post-acute Referrals Ordered: Yes  Post-acute Choice Obtained: Yes  Has referral(s) been sent to post-acute provider:  Yes      Anticipated Discharge Dispo: Discharge Disposition: D/T to SNF with Medicare cert in anticipation of skilled care (03)    DME Needed: No    Action(s) Taken: pt medically cleared for dc back to [facility]. Contacted Toy with  [facility] who can take pt on return today. Transport requested from [facility] for a [facility]. Transport forms faxed. 2nd IMM given, pt verbalized. Transport packet completed and given to FLORES Pollock    Escalations Completed: None    Medically Clear: Yes    Next Steps: transfer to [facility] at 1700 via [facility].    Barriers to Discharge: None

## 2022-08-26 NOTE — PROGRESS NOTES
[facility] Internal Medicine Daily Progress Note    Date of Service  8/25/2022    [facility] Team: [facility] Team   Attending: Jenn Shoemaker M.d.  Senior Resident: Dr. Rachna Marcos  Intern:  Dr. Villela  Contact Number: [phone]    Chief Complaint  Andrei Garay is a 82 y.o. male admitted 8/20/2022 with confusion, hypotension, sepsis    Hospital Course  83 y/o M [place] , who resides at the [facility] unit.  He has a history of diabetes mellitus type 2, hypertension, dementia, hypercholesterolemia, urinary tension, dysphagia, myocardial infarction, GERD, osteoarthritis, and osteomyelitis of the left lateral malleolus that was treated with a wound VAC by orthopedic surgery in June 2022. He brought in by ambulance due to increasing confusion as well as low blood pressures. The patient was noted to have a systolic blood pressure of around 80 in the emergency room. He was given sepsis bolus and started on norepinephrine due to persistent hypotension. He did not have any significant complaints at the time of his presentation. In the emergency room, patient had a white count of 15.4 with a left shift. Lactic acid was elevated at 4.5. Patient was afebrile at the time of presentation. Urinalysis showed moderate leukocyte esterase with negative nitrites, negative bacteria. Patient presented with a deep wound over his left lateral malleolus representing probable source of infection.    Patient is DNR/DNI but had a POLST stating selective medical therapies were to be continued. Patient was started on Zosyn and vancomycin in the ED and admitted to the intensive care unit for pressor support with norepinephrine. Continued thereafter only on Zosyn for empiric coverage.    On 8/21/2022 patient's lactic acid normalized and he was weaned off pressor support. Patient also had SADIE which had begun to resolve. Troponin elevation of 47 down-trended to 28, suspected likely related to sepsis as opposed to  ACS. By 8/22/2022 patient was deemed stable for step-down to telemetry, where he was noted to have repeated 1.5-1.7s sinus pauses. He was receiving metoprolol which was subsequently put on hold. Later that evening, orthopedic surgery performed distal fibula excision and irrigation/debridement of left lateral ankle wound, margin cultures sent    Urine cultures growing yeast, superficial wound cultures growing GBS, per ID this is likely to be extending down to the bone. Pending return of margin cultures from surgical debridement, started on cefazolin for GBS infection per ID recommendations on 8/24/2022.    Interval Problem Update  A/F, VSS. Patient seen alert, upright in bed, conversant and engaged. AAOx3 today able to state his name, that he is at [facility], and that it is August 2022. Patient was seen upright in chair, lounging comfortably, states that he feels great and ready to go home but understands need for continued hospital stay as well. Endorses no complaints    I have discussed this patient's plan of care and discharge plan at IDT rounds today with Case Management, Nursing, Nursing leadership, and other members of the IDT team.    Consultants/Specialty  critical care    Code Status  DNAR/DNI    Disposition  Patient is medically cleared pending margin cultures to guide abx  for discharge.   Anticipate discharge to to home with organized home healthcare and close outpatient follow-up.  I have placed the appropriate orders for post-discharge needs.    Review of Systems  Review of Systems   Constitutional:  Negative for chills, fever and malaise/fatigue.   HENT:  Negative for sore throat.    Eyes:  Negative for blurred vision and pain.   Respiratory:  Negative for cough, sputum production and shortness of breath.    Cardiovascular:  Negative for chest pain, palpitations, orthopnea, claudication and leg swelling.   Gastrointestinal:  Negative for abdominal pain, constipation, diarrhea, nausea and  vomiting.   Genitourinary:  Negative for dysuria, frequency and urgency.   Skin:  Negative for rash.   Neurological:  Positive for sensory change. Negative for tremors, speech change, focal weakness and headaches.      Physical Exam  Temp:  [36.5 °C (97.7 °F)-37 °C (98.6 °F)] 37 °C (98.6 °F)  Pulse:  [72-88] 72  Resp:  [17-18] 17  BP: (106-142)/(64-77) 106/72  SpO2:  [92 %-96 %] 95 %    Physical Exam  Constitutional:       General: He is not in acute distress.     Appearance: He is not toxic-appearing.   HENT:      Head: Normocephalic and atraumatic.      Nose: No congestion.      Mouth/Throat:      Mouth: Mucous membranes are moist.      Pharynx: Oropharynx is clear. No oropharyngeal exudate.   Eyes:      Extraocular Movements: Extraocular movements intact.      Conjunctiva/sclera: Conjunctivae normal.      Pupils: Pupils are equal, round, and reactive to light.   Cardiovascular:      Rate and Rhythm: Normal rate and regular rhythm.      Pulses: Normal pulses.      Heart sounds: No murmur heard.  Pulmonary:      Effort: Pulmonary effort is normal. No respiratory distress.      Breath sounds: Normal breath sounds. No wheezing.   Abdominal:      General: There is no distension.      Palpations: Abdomen is soft.      Tenderness: There is no abdominal tenderness. There is no right CVA tenderness, left CVA tenderness, guarding or rebound.   Musculoskeletal:      Cervical back: Neck supple.      Right lower leg: No edema.      Left lower leg: No edema.   Lymphadenopathy:      Cervical: No cervical adenopathy.   Skin:     General: Skin is warm and dry.      Findings: Lesion present.      Comments: Left lower extremity dressing appears clean, dry, intact., Dressing removed, revealing wound vac in place with serosanguineous, bright red drainage. No pain or erythema about the wound vac placement. Sacrum was assessed showing intact skin but deep erythema representing pressure injury, nursing contacted and pressure injury  precautions/alleviation procedure initiated   Neurological:      Mental Status: He is alert and oriented to person, place, and time.      Sensory: Sensory deficit present.      Motor: No weakness.      Comments: States the year is 2022, knows the president, knows his own name and that he is in the hospital. Alert and in good spirits   Psychiatric:         Mood and Affect: Mood normal.       Fluids    Intake/Output Summary (Last 24 hours) at 8/25/2022 1849  Last data filed at 8/25/2022 1602  Gross per 24 hour   Intake 240 ml   Output 2700 ml   Net -2460 ml         Laboratory  Recent Labs     08/23/22  0758   WBC 7.3   RBC 3.79*   HEMOGLOBIN 10.6*   HEMATOCRIT 31.7*   MCV 83.6   MCH 28.0   MCHC 33.4*   RDW 41.7   PLATELETCT 349   MPV 9.1       Recent Labs     08/23/22  0758   SODIUM 137   POTASSIUM 4.4   CHLORIDE 103   CO2 23   GLUCOSE 186*   BUN 16   CREATININE 0.74   CALCIUM 8.0*                     Imaging  MR-ANKLE-WITH & W/O LEFT   Final Result      1.  Again seen marrow edema and abnormal enhancement of the distal fibula/lateral malleolus consistent with osteomyelitis. There has likely been a small focus of superficial debridement of that area.      2.  Soft tissue ulceration overlying the lateral malleolus with some surrounding cellulitis. No evidence of focal abscess.      3.  Interval improvement in tibiotalar joint effusion with only minimal joint effusion remaining.      4.  Tenosynovitis of the peroneus brevis and longus tendons. There is again seen longitudinal splitting of the peroneus brevis tendon.      5.  Tendinosis of the Achilles tendon.      DX-CHEST-LIMITED (1 VIEW)   Final Result      Left subclavian central venous catheter tip extends slightly into the azygos vein. No pneumothorax.      DX-ANKLE 2- VIEWS LEFT   Final Result      1.  Soft tissue swelling about the ankle particularly laterally where there is a wound VAC.   2.  Previously seen lateral malleolus bone erosion/osteomyelitis is not  well evaluated on the current study due to lack of oblique view.   3.  No definite new osseous abnormality is seen.         CT-CTA CHEST PULMONARY ARTERY W/ RECONS   Final Result      1.  No CT evidence of pulmonary embolus.      2.  Groundglass opacifications noted in the right upper pulmonary lobe could be due to pneumonitis pneumonia or other inflammatory process. Covid 19 pneumonia is in the differential diagnosis.      3.  Small area of consolidation is noted posterior edge of left lung apex with similar differential diagnosis.      4.  Opacification posteriorly throughout the lower lobes is noted which could be due to atelectasis edema or inflammation.      5.  Trace pericardial fluid collection is also identified.            CT-ABDOMEN-PELVIS WITH   Final Result      1.  Urinary bladder wall thickening could indicate cystitis. Prostate gland is enlarged.      2.  Otherwise no acute abnormalities are identified in the abdomen or pelvis.      DX-CHEST-PORTABLE (1 VIEW)   Final Result      1.  Cardiomegaly with pulmonary vascular congestion and interstitial edema.   2.  Small ill-defined opacity in the right upper lobe may represent pneumonitis. Follow-up to radiographic resolution is recommended.   3.  Atherosclerotic plaque.             Assessment/Plan  Problem Representation:    * Septic shock (HCC)- (present on admission)  Assessment & Plan  This is Septic shock Present on admission  SIRS criteria identified on my evaluation include: Tachypnea, with respirations greater than 20 per minute and Leukocytosis, with WBC greater than 12,000  Indicators of septic shock include: Severe sepsis present, persistent hypotension after 30 ml/kg completed, and initial lactate level result is >= 4 mmol/L.  Acute kidney injury with creatinine of 1.8.  Sources is: Urinary tract versus persistent osteomyelitis of the left lateral malleolus.  Unlikely pulmonary given no pulmonary symptoms.  Sepsis protocol  initiated  Crystalloid Fluid Administration: Fluid resuscitation ordered per standard protocol - 30 mL/kg per current or ideal body weight  IV antibiotics as appropriate for source of sepsis  Reassessment: I have reassessed the patient's hemodynamic status  Patient has been off of norepinephrine pressor support since 4 AM 8/21/2022.    -As of 8/24/2022, patient started on cefazolin given wound cultures positive for group B strep. Zosyn and vancomycin discontinued.  -MRI with contrast of left ankle on 8/22/2022, surgical debridement of left ankle wound with distal fibula resection of osteomyelitic tissue that evening.  -Pending margin cultures from surgical debridement to guide further antibiotic management    History of osteomyelitis- (present on admission)  Assessment & Plan  Involving the left malleolus.  Wound VAC placed by orthopedic surgery back on 6/14/2022.    Orthopedic surgery again intervened for left fibula resection and irrigation and debridement on 8/22/2022.    Ankle wound- (present on admission)  Assessment & Plan  As per history.  Involving the lateral malleolus with osteomyelitis.  Patient has had a wound VAC in place.  Underwent debridement by wound care.  Last cultures from June 2022 grew:  Corynebacterium striatum group     -Orthopedic surgery performed left distal fibula resection with repeat irrigation and debridement of left lateral malleolus wound the evening of 8/22/2022  -Margin cultures pending to guide further abx management  -Wound cultures growing GBS, ID recommending cefazolin which was started in lieu of Zosyn and vancomycin, appreciate further recs  -Limb preservation service saw patient, as of 8/25/2022 stating Bedside RN can switch tubing connection from in house VAC to home Prevena VAC upon DC    Hypomagnesemia  Assessment & Plan  Noted to have Mg 1.7 on 8/23/2022 labs  -Repleted with magnesium sulfate 4mg, then as needed    Benign prostatic hyperplasia without lower urinary tract  symptoms- (present on admission)  Assessment & Plan  As per history.  Martinez catheter was placed on admission. Discontinued on 8/21/2022, replaced overnight on 8/22/2022 due to urinary retention. Plan for trial of void on 8/25/2022, if PVR >400ccs will need straight cath x1.    Continue home tamsulosin 0.8 mg daily    Narcotic dependence (HCC)- (present on admission)  Assessment & Plan  As per history.    Continue home Norco    DNR (do not resuscitate)  Assessment & Plan  Patient is DNR/DNI status as per patient's POLST, which has been reviewed.    Elevated troponin- (present on admission)  Assessment & Plan  Mildly elevated.  Trending down.  Likely from demand ischemia from sepsis  Continue telemetry monitoring    Chronic pain- (present on admission)  Assessment & Plan  As per history.  Has been taking Norco.    Continue home Norco    Pneumonitis  Assessment & Plan  Likely aspiration-related from early in the hospital admission or prior  Covid and viral screening negative  Speech eval performed, okay for soft diet    Acute cystitis without hematuria- (present on admission)  Assessment & Plan  As per urinalysis with moderate leukocyte esterase, nitrite negativity.    Urine cultures growing yeast but patient not complaining of urinary symptoms  On empiric Zosyn and vancomycin initially for sepsis coverage, though switched to cefazolin following wound cultures positive for GBS  Martinez catheter discontinued 8/21, replaced on 8/23 due to retention, removed again after successful trial of void on 8/25    SADIE (acute kidney injury) (HCC)- (present on admission)  Assessment & Plan  Creatinine was elevated at 1.8 on admission.  Resolved with IV fluid resuscitation and pressor support.    Continue to monitor  Avoid nephrotoxins    HTN (hypertension)- (present on admission)  Assessment & Plan  As per history.  Blood pressure is currently controlled.  Blood pressure goal less than 150 systolic given his age group.    Holding  metoprolol due to repeated 1.6-1.7s duration sinus pauses on telemetry noted 8/22/2022  Continue to hold home lisinopril and amlodipine    Dementia with behavioral disturbance (HCC)- (present on admission)  Assessment & Plan  As per history now residing at a memory care center at the [facility].    Minimize disturbances  Continue home sertraline and mirtazapine    Type 2 diabetes mellitus with hyperglycemia, with long-term current use of insulin (HCC)- (present on admission)  Assessment & Plan  Hemoglobin A1c of 6.32 months ago.  Well-controlled.    Continue regular insulin sliding scale  Carbohydrate consistent diet  Continue to hold home metformin       VTE prophylaxis: enoxaparin ppx    I have performed a physical exam and reviewed and updated ROS and Plan today (8/25/2022). In review of yesterday's note (8/24/2022), there are no changes except as documented above.

## 2022-08-26 NOTE — CARE PLAN
The patient is Stable - Low risk of patient condition declining or worsening    Shift Goals  Clinical Goals: safety  Patient Goals: discharge  Family Goals: n/a    Progress made toward(s) clinical / shift goals:    Problem: Knowledge Deficit - Standard  Goal: Patient and family/care givers will demonstrate understanding of plan of care, disease process/condition, diagnostic tests and medications  Outcome: Met     Problem: Hemodynamics  Goal: Patient's hemodynamics, fluid balance and neurologic status will be stable or improve  Outcome: Met     Problem: Fluid Volume  Goal: Fluid volume balance will be maintained  Outcome: Met     Problem: Urinary - Renal Perfusion  Goal: Ability to achieve and maintain adequate renal perfusion and functioning will improve  Outcome: Met     Problem: Respiratory  Goal: Patient will achieve/maintain optimum respiratory ventilation and gas exchange  Outcome: Met     Problem: Mechanical Ventilation  Goal: Safe management of artificial airway and ventilation  Outcome: Met  Goal: Successful weaning off mechanical ventilator, spontaneously maintains adequate gas exchange  Outcome: Met  Goal: Patient will be able to express needs and understand communication  Outcome: Met     Problem: Physical Regulation  Goal: Diagnostic test results will improve  Outcome: Met  Goal: Signs and symptoms of infection will decrease  Outcome: Met     Problem: Skin Integrity  Goal: Skin integrity is maintained or improved  Outcome: Met     Problem: Fall Risk  Goal: Patient will remain free from falls  Outcome: Met     Problem: Pain - Standard  Goal: Alleviation of pain or a reduction in pain to the patient’s comfort goal  Outcome: Met       Pt adequate for discharge. Wound vac switched to prevena. Pt did not have an IV to remove. Report called to Eder at [facility]. Pt left via [facility] transport with all belongings.

## 2022-08-26 NOTE — CARE PLAN
Problem: Knowledge Deficit - Standard  Goal: Patient and family/care givers will demonstrate understanding of plan of care, disease process/condition, diagnostic tests and medications  Outcome: Progressing     Problem: Hemodynamics  Goal: Patient's hemodynamics, fluid balance and neurologic status will be stable or improve  Outcome: Progressing     Problem: Fluid Volume  Goal: Fluid volume balance will be maintained  Outcome: Progressing     Problem: Skin Integrity  Goal: Skin integrity is maintained or improved  Outcome: Progressing     Problem: Fall Risk  Goal: Patient will remain free from falls  Outcome: Progressing   The patient is Stable - Low risk of patient condition declining or worsening    Shift Goals  Clinical Goals: safety  Patient Goals: discharge  Family Goals: n/a    Progress made toward(s) clinical / shift goals:  Progressing    Patient is not progressing towards the following goals:

## 2022-08-26 NOTE — PROGRESS NOTES
[facility] Internal Medicine Daily Progress Note    Date of Service  8/26/2022    [facility] Team: [facility] Team   Attending: Jenn Shoemaker M.d.  Senior Resident: Dr. Rachna Marcos  Intern:  Dr. Villela  Contact Number: [phone]    Chief Complaint  Andrei Garay is a 82 y.o. male admitted 8/20/2022 with confusion, hypotension, sepsis    Hospital Course  83 y/o M [place] , who resides at the [facility] unit.  He has a history of diabetes mellitus type 2, hypertension, dementia, hypercholesterolemia, urinary tension, dysphagia, myocardial infarction, GERD, osteoarthritis, and osteomyelitis of the left lateral malleolus that was treated with a wound VAC by orthopedic surgery in June 2022. He brought in by ambulance due to increasing confusion as well as low blood pressures. The patient was noted to have a systolic blood pressure of around 80 in the emergency room. He was given sepsis bolus and started on norepinephrine due to persistent hypotension. He did not have any significant complaints at the time of his presentation. In the emergency room, patient had a white count of 15.4 with a left shift. Lactic acid was elevated at 4.5. Patient was afebrile at the time of presentation. Urinalysis showed moderate leukocyte esterase with negative nitrites, negative bacteria. Patient presented with a deep wound over his left lateral malleolus representing probable source of infection.    Patient is DNR/DNI but had a POLST stating selective medical therapies were to be continued. Patient was started on Zosyn and vancomycin in the ED and admitted to the intensive care unit for pressor support with norepinephrine. Continued thereafter only on Zosyn for empiric coverage.    On 8/21/2022 patient's lactic acid normalized and he was weaned off pressor support. Patient also had SADIE which had begun to resolve. Troponin elevation of 47 down-trended to 28, suspected likely related to sepsis as opposed to  ACS. By 8/22/2022 patient was deemed stable for step-down to telemetry, where he was noted to have repeated 1.5-1.7s sinus pauses. He was receiving metoprolol which was subsequently put on hold. Later that evening, orthopedic surgery performed distal fibula excision and irrigation/debridement of left lateral ankle wound, margin cultures sent    Urine cultures growing yeast, superficial wound cultures growing GBS, per ID this is likely to be extending down to the bone. Started on cefazolin for GBS infection per ID recommendations on 8/24/2022. Margin cultures clean after 4 days, ID physician Dr. Holley subsequently recommending with clean margins that he could be switched to 1000mg cephalexin PO TID for 2 weeks of oral therapy.    Interval Problem Update  A/F, VSS. Patient seen today, no acute distress, but stated he did not want to be bothered. Per nursing patient had pulled out his IV access and was vigorously refusing new access, swatting away nurse when attempting to place.    I have discussed this patient's plan of care and discharge plan at IDT rounds today with Case Management, Nursing, Nursing leadership, and other members of the IDT team.    Consultants/Specialty  critical care    Code Status  DNAR/DNI    Disposition  Patient is medically cleared pending margin cultures to guide abx  for discharge.   Anticipate discharge to to home with organized home healthcare and close outpatient follow-up.  I have placed the appropriate orders for post-discharge needs.    Review of Systems  Review of Systems   Unable to perform ROS: Other (Patient refused interview today. ROS documented from previous day)   Constitutional:  Negative for chills, fever and malaise/fatigue.   HENT:  Negative for sore throat.    Eyes:  Negative for blurred vision and pain.   Respiratory:  Negative for cough, sputum production and shortness of breath.    Cardiovascular:  Negative for chest pain, palpitations, orthopnea, claudication and leg  swelling.   Gastrointestinal:  Negative for abdominal pain, constipation, diarrhea, nausea and vomiting.   Genitourinary:  Negative for dysuria, frequency and urgency.   Skin:  Negative for rash.   Neurological:  Positive for sensory change. Negative for tremors, speech change, focal weakness and headaches.      Physical Exam  Temp:  [36.7 °C (98.1 °F)-37 °C (98.6 °F)] 36.7 °C (98.1 °F)  Pulse:  [70-72] 70  Resp:  [17-18] 18  BP: (106-155)/(67-75) 155/75  SpO2:  [95 %-96 %] 96 %    Physical Exam  Vitals (Patient refusing physical exam on 8/26, findings reported from previous day) reviewed.   Constitutional:       General: He is not in acute distress.     Appearance: He is not toxic-appearing.   HENT:      Head: Normocephalic and atraumatic.      Nose: No congestion.      Mouth/Throat:      Mouth: Mucous membranes are moist.      Pharynx: Oropharynx is clear. No oropharyngeal exudate.   Eyes:      Extraocular Movements: Extraocular movements intact.      Conjunctiva/sclera: Conjunctivae normal.      Pupils: Pupils are equal, round, and reactive to light.   Cardiovascular:      Rate and Rhythm: Normal rate and regular rhythm.      Pulses: Normal pulses.      Heart sounds: No murmur heard.  Pulmonary:      Effort: Pulmonary effort is normal. No respiratory distress.      Breath sounds: Normal breath sounds. No wheezing.   Abdominal:      General: There is no distension.      Palpations: Abdomen is soft.      Tenderness: There is no abdominal tenderness. There is no right CVA tenderness, left CVA tenderness, guarding or rebound.   Musculoskeletal:      Cervical back: Neck supple.      Right lower leg: No edema.      Left lower leg: No edema.   Lymphadenopathy:      Cervical: No cervical adenopathy.   Skin:     General: Skin is warm and dry.      Findings: Lesion present.      Comments: Left lower extremity dressing appears clean, dry, intact., Dressing removed, revealing wound vac in place with serosanguineous, bright  red drainage. No pain or erythema about the wound vac placement. Sacrum was assessed showing intact skin but deep erythema representing pressure injury, nursing contacted and pressure injury precautions/alleviation procedure initiated   Neurological:      Mental Status: He is alert and oriented to person, place, and time.      Sensory: Sensory deficit present.      Motor: No weakness.      Comments: States the year is 2022, knows the president, knows his own name and that he is in the hospital. Alert and in good spirits   Psychiatric:         Mood and Affect: Mood normal.       Fluids    Intake/Output Summary (Last 24 hours) at 8/26/2022 1420  Last data filed at 8/26/2022 0600  Gross per 24 hour   Intake 480 ml   Output 2800 ml   Net -2320 ml         Laboratory                            Imaging  MR-ANKLE-WITH & W/O LEFT   Final Result      1.  Again seen marrow edema and abnormal enhancement of the distal fibula/lateral malleolus consistent with osteomyelitis. There has likely been a small focus of superficial debridement of that area.      2.  Soft tissue ulceration overlying the lateral malleolus with some surrounding cellulitis. No evidence of focal abscess.      3.  Interval improvement in tibiotalar joint effusion with only minimal joint effusion remaining.      4.  Tenosynovitis of the peroneus brevis and longus tendons. There is again seen longitudinal splitting of the peroneus brevis tendon.      5.  Tendinosis of the Achilles tendon.      DX-CHEST-LIMITED (1 VIEW)   Final Result      Left subclavian central venous catheter tip extends slightly into the azygos vein. No pneumothorax.      DX-ANKLE 2- VIEWS LEFT   Final Result      1.  Soft tissue swelling about the ankle particularly laterally where there is a wound VAC.   2.  Previously seen lateral malleolus bone erosion/osteomyelitis is not well evaluated on the current study due to lack of oblique view.   3.  No definite new osseous abnormality is seen.          CT-CTA CHEST PULMONARY ARTERY W/ RECONS   Final Result      1.  No CT evidence of pulmonary embolus.      2.  Groundglass opacifications noted in the right upper pulmonary lobe could be due to pneumonitis pneumonia or other inflammatory process. Covid 19 pneumonia is in the differential diagnosis.      3.  Small area of consolidation is noted posterior edge of left lung apex with similar differential diagnosis.      4.  Opacification posteriorly throughout the lower lobes is noted which could be due to atelectasis edema or inflammation.      5.  Trace pericardial fluid collection is also identified.            CT-ABDOMEN-PELVIS WITH   Final Result      1.  Urinary bladder wall thickening could indicate cystitis. Prostate gland is enlarged.      2.  Otherwise no acute abnormalities are identified in the abdomen or pelvis.      DX-CHEST-PORTABLE (1 VIEW)   Final Result      1.  Cardiomegaly with pulmonary vascular congestion and interstitial edema.   2.  Small ill-defined opacity in the right upper lobe may represent pneumonitis. Follow-up to radiographic resolution is recommended.   3.  Atherosclerotic plaque.             Assessment/Plan  Problem Representation:    * Septic shock (HCC)- (present on admission)  Assessment & Plan  This is Septic shock Present on admission  SIRS criteria identified on my evaluation include: Tachypnea, with respirations greater than 20 per minute and Leukocytosis, with WBC greater than 12,000  Indicators of septic shock include: Severe sepsis present, persistent hypotension after 30 ml/kg completed, and initial lactate level result is >= 4 mmol/L.  Acute kidney injury with creatinine of 1.8.  Sources is: Urinary tract versus persistent osteomyelitis of the left lateral malleolus.  Unlikely pulmonary given no pulmonary symptoms.  Sepsis protocol initiated  Crystalloid Fluid Administration: Fluid resuscitation ordered per standard protocol - 30 mL/kg per current or ideal body  weight  IV antibiotics as appropriate for source of sepsis  Reassessment: I have reassessed the patient's hemodynamic status  Patient has been off of norepinephrine pressor support since 4 AM 8/21/2022.    -As of 8/24/2022, patient started on cefazolin given wound cultures positive for group B strep. Zosyn and vancomycin discontinued.  -MRI with contrast of left ankle on 8/22/2022, surgical debridement of left ankle wound with distal fibula resection of osteomyelitic tissue that evening.  -Margin cultures no growth after 4 days, presumed clean margins with resection of all infected tissue.  -Per ID Dr. Holley, patient can be transitioned thereafter to oral cephalexin 1000mg TID and will complete a 2 week course of the oral agent    History of osteomyelitis- (present on admission)  Assessment & Plan  Involving the left malleolus.  Wound VAC placed by orthopedic surgery back on 6/14/2022.    Orthopedic surgery again intervened for left fibula resection and irrigation and debridement on 8/22/2022.    Clean margins with negative cultures after 4 days, per ID Dr. Holley he can complete a 2 week oral course of Keflex 1000mg TID     Ankle wound- (present on admission)  Assessment & Plan  As per history.  Involving the lateral malleolus with osteomyelitis.  Patient has had a wound VAC in place.  Underwent debridement by wound care.  Last cultures from June 2022 grew:  Corynebacterium striatum group     -Orthopedic surgery performed left distal fibula resection with repeat irrigation and debridement of left lateral malleolus wound the evening of 8/22/2022  -Margin cultures pending to guide further abx management  -Wound cultures growing GBS, ID recommended cefazolin which was started in lieu of Zosyn and vancomycin on 8/24. On 8/26, margin cultures clean after 4 days, ID physician Dr. Holley subsequently recommending with clean margins that he could be switched to 1000mg cephalexin PO TID for 2 weeks of oral  therapy.  -Limb preservation service saw patient, as of 8/25/2022 stating Bedside RN can switch tubing connection from in house VAC to home Prevena VAC upon DC    Hypomagnesemia  Assessment & Plan  Noted to have Mg 1.7 on 8/23/2022 labs  -Repleted with magnesium sulfate 4mg, then as needed    Benign prostatic hyperplasia without lower urinary tract symptoms- (present on admission)  Assessment & Plan  As per history.  Martinez catheter was placed on admission. Discontinued on 8/21/2022, replaced overnight on 8/22/2022 due to urinary retention. Trial of void successful on 8/25/2022    Continue home tamsulosin 0.8 mg daily    Narcotic dependence (HCC)- (present on admission)  Assessment & Plan  As per history.    Continue home Norco    DNR (do not resuscitate)  Assessment & Plan  Patient is DNR/DNI status as per patient's POLST, which has been reviewed.    Elevated troponin- (present on admission)  Assessment & Plan  Mildly elevated.  Trending down.  Likely from demand ischemia from sepsis  Continue telemetry monitoring    Chronic pain- (present on admission)  Assessment & Plan  As per history.  Has been taking Norco.    Continue home Norco    Pneumonitis  Assessment & Plan  Likely aspiration-related from early in the hospital admission or prior  Covid and viral screening negative  Speech eval performed, okay for soft diet    Acute cystitis without hematuria- (present on admission)  Assessment & Plan  As per urinalysis with moderate leukocyte esterase, nitrite negativity.    Urine cultures growing yeast but patient not complaining of urinary symptoms  On empiric Zosyn and vancomycin initially for sepsis coverage, though switched to cefazolin following wound cultures positive for GBS on 824, followed by oral Keflex on 8/26  Martinez catheter discontinued 8/21, replaced on 8/23 due to retention, removed again after successful trial of void on 8/25    SADIE (acute kidney injury) (HCC)- (present on admission)  Assessment &  Plan  Creatinine was elevated at 1.8 on admission.  Resolved with IV fluid resuscitation and pressor support.    Continue to monitor  Avoid nephrotoxins    HTN (hypertension)- (present on admission)  Assessment & Plan  As per history.  Blood pressure is currently controlled.  Blood pressure goal less than 150 systolic given his age group.    Holding metoprolol due to repeated 1.6-1.7s duration sinus pauses on telemetry noted 8/22/2022  Continue to hold home lisinopril and amlodipine    Dementia with behavioral disturbance (HCC)- (present on admission)  Assessment & Plan  As per history now residing at a memory care center at the [facility].    Minimize disturbances  Continue home sertraline and mirtazapine    Type 2 diabetes mellitus with hyperglycemia, with long-term current use of insulin (HCC)- (present on admission)  Assessment & Plan  Hemoglobin A1c of 6.32 months ago.  Well-controlled.    Continue regular insulin sliding scale  Carbohydrate consistent diet  Continue to hold home metformin       VTE prophylaxis: enoxaparin ppx    I have performed a physical exam and reviewed and updated ROS and Plan today (8/26/2022). In review of yesterday's note (8/25/2022), there are no changes except as documented above.

## 2022-08-26 NOTE — DISCHARGE SUMMARY
[facility] Discharge Summary    Attending: Jenn Shoemaker M.d.  Senior Resident: Dr. Rachna Marcos  Intern:  Dr. Mark Villela  Contact Number: [phone]    CHIEF COMPLAINT ON ADMISSION  Chief Complaint   Patient presents with    Hypotension     Pt lives at a assisted living facility and per staff pt was noted to be more confused than normal. Initial BP per EMS was 59/47 and saturating 85% on room air. Pt has wound vac located to L lateral ankle.  mg/dL PTA.            Reason for Admission  Confusion, hypotension, septic shock    Admission Date  8/20/2022    CODE STATUS  DNAR/DNI    HPI & HOSPITAL COURSE  This is a 83 y/o M [place] , who resides at the [facility].  He has a history of diabetes mellitus type 2, hypertension, dementia, hypercholesterolemia, urinary tension, dysphagia, myocardial infarction, GERD, osteoarthritis, and osteomyelitis of the left lateral malleolus that was treated with a wound VAC by orthopedic surgery in June 2022. He brought in by ambulance due to increasing confusion as well as low blood pressures. The patient was noted to have a systolic blood pressure of around 80 in the emergency room. He was given sepsis bolus and started on norepinephrine due to persistent hypotension. He did not have any significant complaints at the time of his presentation. In the emergency room, patient had a white count of 15.4 with a left shift. Lactic acid was elevated at 4.5. Patient was afebrile at the time of presentation. Urinalysis showed moderate leukocyte esterase with negative nitrites, negative bacteria. Patient presented with a deep wound over his left lateral malleolus representing probable source of infection.    Patient is DNR/DNI but had a POLST stating selective medical therapies were to be continued. Patient was started on Zosyn and vancomycin in the ED and admitted to the intensive care unit for pressor support with norepinephrine.  Continued thereafter only on Zosyn for empiric coverage.    On 8/21/2022 patient's lactic acid normalized and he was weaned off pressor support. Patient also had SADIE which had begun to resolve. Troponin elevation of 47 down-trended to 28, suspected likely related to sepsis as opposed to ACS. By 8/22/2022 patient was deemed stable for step-down to telemetry, where he was noted to have repeated 1.5-1.7s sinus pauses. He was receiving metoprolol which was subsequently put on hold. Later that evening, orthopedic surgery performed distal fibula excision and irrigation/debridement of left lateral ankle wound, margin cultures sent.  MRI of the left lower extremity prior to orthopedic intervention was suggestive of osteomyelitis.    Urine cultures growing yeast though asymptomatic and not indicated for treatment. Superficial wound cultures growing GBS, per ID this is likely to be extending down to the bone. Pending return of margin cultures from surgical debridement, started on cefazolin for GBS infection per ID recommendations on 8/24/2022.     Wound care evaluated patient and he was set up with a wound VAC.  Ultimately his intraoperative margin cultures demonstrated no growth after 4 days suggesting clean margins with complete resection of infected tissue. Infectious disease ultimately recommended cephalexin 1000mg PO TID, for a 2 week course      * Septic shock (HCC)- (present on admission)  Assessment & Plan  This is Septic shock Present on admission  SIRS criteria identified on my evaluation include: Tachypnea, with respirations greater than 20 per minute and Leukocytosis, with WBC greater than 12,000  Indicators of septic shock include: Severe sepsis present, persistent hypotension after 30 ml/kg completed, and initial lactate level result is >= 4 mmol/L.  Acute kidney injury with creatinine of 1.8.  Sources is: Urinary tract versus persistent osteomyelitis of the left lateral malleolus.  Unlikely pulmonary given no  pulmonary symptoms.  Sepsis protocol initiated  Crystalloid Fluid Administration: Fluid resuscitation ordered per standard protocol - 30 mL/kg per current or ideal body weight  IV antibiotics as appropriate for source of sepsis  Reassessment: I have reassessed the patient's hemodynamic status  Patient has been off of norepinephrine pressor support since 4 AM 8/21/2022.     -As of 8/24/2022, patient started on cefazolin given wound cultures positive for group B strep. Zosyn and vancomycin discontinued.  -superficial wound cx with group B strep  -MRI with contrast of left ankle on 8/22/2022, surgical debridement of left ankle wound with distal fibula resection of osteomyelitic tissue that evening.  -Pending margin cultures from surgical debridement to guide further antibiotic management, ultimately negative after 4 days  -Final antibiotic recommendations (ID consult, Dr. Holley) include cephalexin 1000mg PO TID, for a 2 week course     History of osteomyelitis- (present on admission)  Assessment & Plan  Involving the left malleolus.  Wound VAC placed by orthopedic surgery back on 6/14/2022.     Orthopedic surgery again intervened for left fibula resection and irrigation and debridement on 8/22/2022.     Ankle wound- (present on admission)  Assessment & Plan  As per history.  Involving the lateral malleolus with osteomyelitis.  Patient has had a wound VAC in place.  Underwent debridement by wound care.  Last cultures from June 2022 grew:  Corynebacterium striatum group     8/21- superficial wound cx grew group B strep     -Orthopedic surgery performed left distal fibula resection with repeat irrigation and debridement of left lateral malleolus wound the evening of 8/22/2022  -Margin cultures pending to guide further abx management  -Wound cultures growing GBS, ID recommending cefazolin which was started in lieu of Zosyn and vancomycin, appreciate further recs  -Limb preservation service saw patient, as of 8/25/2022  stating Bedside RN can switch tubing connection from in house VAC to home Prevena VAC upon DC     Hypomagnesemia  Assessment & Plan  Noted to have Mg 1.7 on 8/23/2022 labs  -Repleted with magnesium sulfate 4mg, then as needed     Benign prostatic hyperplasia without lower urinary tract symptoms- (present on admission)  Assessment & Plan  As per history.  Martinez catheter was placed on admission. Discontinued on 8/21/2022, replaced overnight on 8/22/2022 due to urinary retention. Planned for trial of void on 8/25/2022 which was successful  Continue home tamsulosin 0.8 mg daily     Narcotic dependence (HCC)- (present on admission)  Assessment & Plan  As per history.     Continue home Norco     DNR (do not resuscitate)  Assessment & Plan  Patient is DNR/DNI status as per patient's POLST, which has been reviewed.  -palliative care     Elevated troponin- (present on admission)  Assessment & Plan  Mildly elevated.  Trending down.  Likely from demand ischemia from sepsis  No symptoms. In setting of septic shock     Chronic pain- (present on admission)  Assessment & Plan  As per history.  Has been taking Norco.     Continue home Norco     Pneumonitis  Assessment & Plan  Likely aspiration-related from early in the hospital admission or prior  Covid and viral screening negative  Speech eval performed, okay for soft diet     Acute cystitis without hematuria- (present on admission)  Assessment & Plan  As per urinalysis with moderate leukocyte esterase, nitrite negativity.     Urine cultures growing yeast but patient not complaining of urinary symptoms  On empiric Zosyn and vancomycin initially for sepsis coverage, though switched to cefazolin following wound cultures positive for GBS  Martinez catheter discontinued 8/21, replaced on 8/23 due to retention, removed again after successful trial of void on 8/25     SADIE (acute kidney injury) (HCC)- (present on admission)  Assessment & Plan  Creatinine was elevated at 1.8 on admission.   Resolved with IV fluid resuscitation and pressor support.     Repeat labs as outpatient  Avoid nephrotoxins     HTN (hypertension)- (present on admission)  Assessment & Plan  As per history.  Blood pressure is currently controlled.  Blood pressure goal less than 150 systolic given his age group.     Holding metoprolol due to repeated 1.6-1.7s duration sinus pauses on telemetry noted 8/22/2022, will not resume, patient to follow with primary to discuss  Resumed home antihypertensives on discharge     Dementia with behavioral disturbance (HCC)- (present on admission)  Assessment & Plan  As per history now residing at a memory care center at the [facility].     Minimize disturbances  Continue home sertraline and mirtazapine     Type 2 diabetes mellitus with hyperglycemia, with long-term current use of insulin (HCC)- (present on admission)  Assessment & Plan  Hemoglobin A1c of 6.32 months ago.  Well-controlled.     On SSI as inpatient  Carbohydrate consistent diet  Can resume metformin on discharge      Therefore, he is discharged in good and stable condition [facility].    The patient met 2-midnight criteria for an inpatient stay at the time of discharge.    Discharge Date  8/26/2022    Physical Exam on Day of Discharge  Constitutional:       General: He is not in acute distress.     Appearance: He is not toxic-appearing.   HENT:      Head: Normocephalic and atraumatic.      Nose: No congestion.      Mouth/Throat:      Mouth: Mucous membranes are moist.      Pharynx: Oropharynx is clear. No oropharyngeal exudate.   Eyes:      Extraocular Movements: Extraocular movements intact.      Conjunctiva/sclera: Conjunctivae normal.      Pupils: Pupils are equal, round, and reactive to light.   Cardiovascular:      Rate and Rhythm: Normal rate and regular rhythm.      Pulses: Normal pulses.      Heart sounds: No murmur heard.  Pulmonary:      Effort: Pulmonary effort is normal. No respiratory distress.       Breath sounds: Normal breath sounds. No wheezing.   Abdominal:      General: There is no distension.      Palpations: Abdomen is soft.      Tenderness: There is no abdominal tenderness. There is no right CVA tenderness, left CVA tenderness, guarding or rebound.   Musculoskeletal:      Cervical back: Neck supple.      Right lower leg: No edema.      Left lower leg: No edema.   Lymphadenopathy:      Cervical: No cervical adenopathy.   Skin:     General: Skin is warm and dry.      Findings: Lesion present.      Comments: Left lower extremity dressing appears clean, dry, intact., Dressing removed, revealing wound vac in place with serosanguineous, bright red drainage. No pain or erythema about the wound vac placement. Sacrum was assessed showing intact skin but deep erythema representing pressure injury, nursing contacted and pressure injury precautions/alleviation procedure initiated   Neurological:      Mental Status: He is alert and oriented to person, place, and time.      Sensory: Sensory deficit present.      Motor: No weakness.      Comments: States the year is 2022, knows the president, knows his own name and that he is in the hospital. Alert and in good spirits   Psychiatric:         Mood and Affect: Mood normal.     FOLLOW UP ITEMS POST DISCHARGE  -Wound VAC and wound care management per limb preservation service recommendations  -Follow-up with orthopedic surgery, Dr. Vital, in 2 weeks for suture removal  -Antibiotics- cephalexin 1000mg TID oral for 2 weeks    DISCHARGE DIAGNOSES  Principal Problem:    Septic shock (HCC) POA: Yes  Active Problems:    Type 2 diabetes mellitus with hyperglycemia, with long-term current use of insulin ([facility]) POA: Yes    Dementia with behavioral disturbance ([facility]) POA: Yes    Dysphagia POA: Unknown    HTN (hypertension) POA: Yes    SADIE (acute kidney injury) ([facility]) POA: Yes    Advance care planning POA: Yes    Acute cystitis without hematuria POA: Yes    MRSA bacteremia POA: Yes     Ankle wound POA: Yes    Pneumonitis POA: Unknown    Chronic pain POA: Yes    Elevated troponin POA: Yes    History of osteomyelitis POA: Yes    DNR (do not resuscitate) POA: Unknown    Narcotic dependence (HCC) POA: Yes    Benign prostatic hyperplasia without lower urinary tract symptoms POA: Yes    Hypomagnesemia POA: Unknown  Resolved Problems:    * No resolved hospital problems. *      FOLLOW UP  No future appointments.  Sanjiv Vital M.D.  [address] [place] [id number] [phone]    Schedule an appointment as soon as possible for a visit in 2 week(s)  Follow up visit      MEDICATIONS ON DISCHARGE     Medication List        ASK your doctor about these medications        Instructions   amLODIPine 5 MG Tabs  Commonly known as: NORVASC  Ask about: Which instructions should I use?   Take 5 mg by mouth every day. Hold for SBP <110, HR <60.  Dose: 5 mg     atorvastatin 40 MG Tabs  Commonly known as: LIPITOR   Take 1 Tablet by mouth every evening.  Dose: 40 mg     doxycycline 100 MG Tabs  Commonly known as: VIBRAMYCIN   Take 100 mg by mouth 2 times a day. 7 day course (8/13/2022 - 8/19/2022).  Dose: 100 mg     FLORASTOR PO   Take 1 Capsule by mouth 2 times a day.  Dose: 1 Capsule     * HYDROcodone-acetaminophen 5-325 MG Tabs per tablet  Commonly known as: NORCO   Take 1 Tablet by mouth 2 times a day.  Dose: 1 Tablet     * HYDROcodone-acetaminophen 5-325 MG Tabs per tablet  Commonly known as: NORCO   Take 1 Tablet by mouth every 6 hours as needed (Acute Pain).  Dose: 1 Tablet     insulin regular 100 Unit/mL Soln  Commonly known as: HumuLIN R  Ask about: Which instructions should I use?   Inject 2-10 Units under the skin 4 Times a Day,Before Meals and at Bedtime. Inject per sliding scale. For blood glucose:  151 - 200 = 2 units  201 - 250 = 4 units  251 - 300 = 6 units  301 - 350 = 8 units  351 - 400 = 10 units  If >400, jayda SALGUERO.  Dose: 2-10 Units     lisinopril 40 MG tablet  Commonly known as:  PRINIVIL   Take 1 Tablet by mouth every day.  Dose: 40 mg     magnesium hydroxide 400 MG/5ML Susp  Commonly known as: MILK OF MAGNESIA   Take 30 mL by mouth every 24 hours as needed (if no bowel movement for 72 hours).  Dose: 30 mL     Melatonin 10 MG Tabs  Ask about: Should I take this medication?   Take 1 tablet by mouth at bedtime for 30 days.  Dose: 10 mg     metformin 1000 MG tablet  Commonly known as: GLUCOPHAGE   Take 1 Tablet by mouth 2 times a day.  Dose: 1,000 mg     metoprolol tartrate 25 MG Tabs  Commonly known as: LOPRESSOR   Take 1 Tablet by mouth 2 times a day.  Dose: 25 mg     mirtazapine 15 MG Tabs  Commonly known as: Remeron   Take 1 Tablet by mouth at bedtime.  Dose: 15 mg     nystatin 770799 UNIT/ML Susp  Commonly known as: MYCOSTATIN   Take 500,000 Units by mouth 3 times a day. 8/2/2022 - 8/8/2022.  Dose: 500,000 Units     sertraline 50 MG Tabs  Commonly known as: Zoloft   Take 1 Tablet by mouth every evening.  Dose: 50 mg     tamsulosin 0.4 MG capsule  Commonly known as: FLOMAX  Ask about: Which instructions should I use?   Take 0.8 mg by mouth at bedtime. 2 capsules = 0.8 mg.  Dose: 0.8 mg           * This list has 2 medication(s) that are the same as other medications prescribed for you. Read the directions carefully, and ask your doctor or other care provider to review them with you.                  Allergies  No Known Allergies    DIET  Orders Placed This Encounter   Procedures    Diet Order Diet: Consistent CHO (Diabetic)     Standing Status:   Standing     Number of Occurrences:   1     Order Specific Question:   Diet:     Answer:   Consistent CHO (Diabetic) [4]       ACTIVITY  As tolerated.  Weight bearing as tolerated    CONSULTATIONS  Critical care-- Dr. Allan  Palliative care  Infectious disease-- Dr. Holley  Limb preservation services  Orthopedic surgery--Dr. Vital    PROCEDURES  Left foot and lateral leg irrigation and debridement, left excision of distal fibula, left bone  biopsy at fibular margin, left ankle joint irrigation and debridement and synovectomy--- 8/22/2022    LABORATORY  Lab Results   Component Value Date    SODIUM 137 08/23/2022    POTASSIUM 4.4 08/23/2022    CHLORIDE 103 08/23/2022    CO2 23 08/23/2022    GLUCOSE 186 (H) 08/23/2022    BUN 16 08/23/2022    CREATININE 0.74 08/23/2022        Lab Results   Component Value Date    WBC 7.3 08/23/2022    HEMOGLOBIN 10.6 (L) 08/23/2022    HEMATOCRIT 31.7 (L) 08/23/2022    PLATELETCT 349 08/23/2022        Total time of the discharge process exceeds 30 minutes.

## 2022-08-27 LAB
BACTERIA TISS AEROBE CULT: ABNORMAL
BACTERIA TISS AEROBE CULT: ABNORMAL
GRAM STN SPEC: ABNORMAL
SIGNIFICANT IND 70042: ABNORMAL
SITE SITE: ABNORMAL
SOURCE SOURCE: ABNORMAL

## 2022-08-28 LAB
BACTERIA SPEC ANAEROBE CULT: NORMAL
SIGNIFICANT IND 70042: NORMAL
SITE SITE: NORMAL
SOURCE SOURCE: NORMAL

## 2022-09-16 ENCOUNTER — OFFICE VISIT (OUTPATIENT)
Dept: WOUND CARE | Facility: MEDICAL CENTER | Age: 82
End: 2022-09-16
Attending: NURSE PRACTITIONER
Payer: COMMERCIAL

## 2022-09-16 ENCOUNTER — HOSPITAL ENCOUNTER (OUTPATIENT)
Facility: MEDICAL CENTER | Age: 82
End: 2022-09-16
Attending: NURSE PRACTITIONER
Payer: COMMERCIAL

## 2022-09-16 VITALS
RESPIRATION RATE: 18 BRPM | TEMPERATURE: 97.1 F | HEART RATE: 70 BPM | SYSTOLIC BLOOD PRESSURE: 97 MMHG | OXYGEN SATURATION: 92 % | DIASTOLIC BLOOD PRESSURE: 46 MMHG

## 2022-09-16 DIAGNOSIS — F03.91 DEMENTIA WITH BEHAVIORAL DISTURBANCE, UNSPECIFIED DEMENTIA TYPE: ICD-10-CM

## 2022-09-16 DIAGNOSIS — E11.9 DIABETES MELLITUS TYPE 2 IN NONOBESE (HCC): ICD-10-CM

## 2022-09-16 DIAGNOSIS — T81.31XD DEHISCENCE OF OPERATIVE WOUND, SUBSEQUENT ENCOUNTER: Primary | ICD-10-CM

## 2022-09-16 DIAGNOSIS — B99.9 INFECTION: ICD-10-CM

## 2022-09-16 LAB
GRAM STN SPEC: NORMAL
SIGNIFICANT IND 70042: NORMAL
SITE SITE: NORMAL
SOURCE SOURCE: NORMAL

## 2022-09-16 PROCEDURE — 87186 SC STD MICRODIL/AGAR DIL: CPT

## 2022-09-16 PROCEDURE — 99214 OFFICE O/P EST MOD 30 MIN: CPT

## 2022-09-16 PROCEDURE — 87077 CULTURE AEROBIC IDENTIFY: CPT

## 2022-09-16 PROCEDURE — 87205 SMEAR GRAM STAIN: CPT

## 2022-09-16 PROCEDURE — 11043 DBRDMT MUSC&/FSCA 1ST 20/<: CPT

## 2022-09-16 PROCEDURE — 87070 CULTURE OTHR SPECIMN AEROBIC: CPT

## 2022-09-16 NOTE — PROCEDURES
Provider Encounter- Diabetic Ulcer      HISTORY OF PRESENT ILLNESS  Wound History:    START OF CARE IN CLINIC: 9/16/2022    REFERRING PROVIDER: Ailyn Aguero      WOUND- Diabetic ulcer   LOCATION: Left lateral ankle   HISTORY: 82-year-old with PMH that includes dementia,diabetes mellitus type 2,diabetic foot infection and osteomyelitis.  He has been referred to [facility] for evaluation treatment of a dehisced surgical wound to his left lateral ankle.  Patient has a history of injuring his ankle in the fall 2021, after he bumped it on his walker.  He was receiving wound care at the skilled nursing facility where he resides, [facility].  He was then hospitalized from 4/12 until 4/14/2022, and again from 6/10 until 6/20/2022 for wound infection.  During the second admission, it was noted that the wound probed to bone.  He was taken to surgery for an I&D of his ankle and VAC placement on 6/14/2022 by Dr. Wagner.  He was discharged back to the SNF, with a wound VAC, and on IV daptomycin which was to be concluded on 7/23.   Patient was readmitted to the hospital on 6/29 due to leg swelling and new confusion and weakness.  LPS was consulted and performed a bedside debridement, and applied new dressings.  During this admission he completed his IV antibiotics and PICC line was discontinued.  He was discharged back to the SNF on 7/25, with wound VAC in place.   Patient was sent to the ED from his skilled nursing facility on 8/20/2022, noted to be more confused than usual, and with room air saturations of 85%.  He was admitted, and treated for sepsis.  He was taken to surgery with Dr. Vital on 8/22/2022, for a I&D of his left foot and lateral leg, excision of distal fibula, bone biopsy of fibular margin, left ankle joint irrigation and debridement and synovectomy, and placement of incisional VAC.  Patient was discharged back to his skilled nursing facility on 8/26.   [facility]  were unable to keep incisional VAC in place due to heavy drainage, and because patient kept removing the dressing despite one-on-one observation.  Patient's wound has continued to deteriorate, with large area of dehiscence developing along the incision. Lindy attempted to get patient into [facility] to follow-up with his surgeon, however no appointments  were available for at least 3 weeks.  Arrangements were made for patient to be seen in LPS rounds on 9/16.    Pertinent Medical History: Dementia, diabetes mellitus type 2, osteomyelitis of ankle, encephalopathy, chronic pain, sepsis           TOBACCO USE:   Former smoker, quit in 2000.  He has never used smokeless tobacco.    Patient's problem list, allergies, and current medications reviewed and updated in Epic    Interval History:  9/16/2022: Patient seen during LPS rounds for reassessment, and also for initial wound clinic evaluation.  He is accompanied today by his daughter, who provides most of his health information.  Patient suffers from severe dementia.  Per daughter, patient has not been wearing offloading boot as recommended, he removes it soon after application, chooses instead to wear his regular shoes.  He does use a walker to assist with ambulation.   Sutures were removed, and wound debrided by Dr. Trujillo, please refer to his note.  Dr. Trujillo's recommendations were to resume VAC, have patient seen and wound clinic weekly, discontinue Keflex and start p.o. doxycycline and Augmentin, wound culture (obtained in clinic today), three-view x-ray of ankle, and to ask orthotist from [facility] to see patient at his skilled nursing facility for offloading recommendations-possibly night splint or AFO.  Patient also to be seen in LPS rounds again in 2 weeks.   I spoke with wound nurse, Lindy, at [facility] via telephone after LPS rounds to discuss the above.  Patient stated that VAC not a possibility, as patient frequently remove the VAC when tried  previously, despite one-on-one supervision.  We agreed to try a SNAP, with a larger canister, and with lower extremity wrap to try and prevent patient from removing.  Lindy stated she would write orders for antibiotics and x-ray under Dr. Diaz ([facility] physician).      REVIEW OF SYSTEMS:   Review of Systems   Unable to perform ROS: Dementia     PHYSICAL EXAMINATION:   BP (!) 97/46   Pulse 70   Temp 36.2 °C (97.1 °F)   Resp 18   SpO2 92%     Physical Exam  HENT:      Head: Normocephalic.   Cardiovascular:      Rate and Rhythm: Normal rate.      Comments: Left PT pulse palpable  Unable to palpate left DP pulse  Pulmonary:      Effort: Pulmonary effort is normal.   Musculoskeletal:         General: Swelling present.      Comments: Localized swelling around left lateral lower leg wound  Nonpitting edema bilateral lower extremities   Skin:     Comments: Full-thickness wound along left lateral lower leg incision, tract radiating distally, probes to bone.  Moderate amount of serosanguineous drainage, no wound odor.  Periwound erythema radiating 8 to 10 cm around wound   Neurological:      Mental Status: He is alert. He is disoriented.      Comments: Patient suffers from severe dementia  Health history provided by his daughter   Psychiatric:      Comments: Flat affect.  Patient defers to his daughter to answer questions and provide information.           WOUND ASSESSMENT  Wound 08/20/22 Full Thickness Wound Ankle Lateral;Lower Left complicated open surgical (Active)   Wound Image    09/16/22 0830   Site Assessment Pink;Red;Bleeding;Non-healing 09/16/22 0830   Periwound Assessment Blanchable erythema;Warm;Maceration 09/16/22 0830   Margins Unattached edges 09/16/22 0830   Closure Secondary intention 09/16/22 0830   Drainage Amount Large 09/16/22 0830   Drainage Description Serosanguineous 09/16/22 0830   Treatments Cleansed;Topical Lidocaine;Provider debridement;Site care;Other (Comment) 09/16/22 0830   Wound Cleansing  Normal Saline Irrigation 09/16/22 0830   Periwound Protectant Skin Protectant Wipes to Periwound;Barrier Paste 09/16/22 0830   Dressing Options Hydrofiber Silver Strip;Hydrofiber Silver;Silicone Adhesive Foam;Hypafix Tape 09/16/22 0830   Dressing Changed New 09/16/22 0830   Dressing Status Clean;Dry;Intact 09/16/22 0830   Dressing Change/Treatment Frequency Every 72 hrs, and As Needed 09/16/22 0830   Non-staged Wound Description Full thickness 09/16/22 0830   Wound Length (cm) 1.5 cm 09/16/22 0830   Wound Width (cm) 0.7 cm 09/16/22 0830   Wound Depth (cm) 0.7 cm 09/16/22 0830   Wound Surface Area (cm^2) 1.05 cm^2 09/16/22 0830   Wound Volume (cm^3) 0.735 cm^3 09/16/22 0830   Post-Procedure Length (cm) 2.3 cm 09/16/22 0830   Post-Procedure Width (cm) 1 cm 09/16/22 0830   Post-Procedure Depth (cm) 1 cm 09/16/22 0830   Post-Procedure Surface Area (cm^2) 2.3 cm^2 09/16/22 0830   Post-Procedure Volume (cm^3) 2.3 cm^3 09/16/22 0830   Wound Healing % 75 09/16/22 0830   Tunneling (cm) 1.7 cm 09/16/22 0830   Tunneling Clock Position of Wound 3 09/16/22 0830   Undermining (cm) 0 cm 09/16/22 0830   Wound Odor None 09/16/22 0830   Exposed Structures Muscle;Connective tissue;Adipose 09/16/22 0830   Number of days: 27         PROCEDURE:   -2% viscous lidocaine applied topically to wound bed for approximately 5 minutes prior to debridement  -Sutures removed and wound debrided by Dr. Trujillo, refer to his procedure note      Pertinent Labs and Diagnostics:    Labs:     A1c:   Lab Results   Component Value Date/Time    HBA1C 6.3 (H) 06/11/2022 03:57 PM          IMAGING: No results found.    VASCULAR STUDIES: No results found.    LAST  WOUND CULTURE:  DATE :        Lab Results   Component Value Date/Time    CULTRSULT (A) 08/22/2022 10:04 PM     -This specimen was received in the Clinical Laboratory  -mislabeled or unlabeled, not meeting minimum patient-  -identification criteria.  It was determined that it was not  -possible to  reproduce the circumstances necessitating the  -test or that recollection of the specimen would cause undo  -risk to the patient.  The mislabeled/unlabeled specimen was  -then identified and relabeled by: Liseth Zamora RN      CULTRSULT (A) 08/22/2022 10:04 PM     Staphylococcus epidermidis  Isolated from enrichment broth only, please correlate with  clinical condition.  This isolate is presumed to be clindamycin resistant based on  detection of inducible resistance.      CULTRSULT No Anaerobes isolated. 08/22/2022 10:04 PM    CULTRSULT No fungal growth to date. 08/22/2022 10:04 PM           ASSESSMENT AND PLAN:     1. 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  Patient has undergone surgery twice to this site, most recently in August of this year-I&D with excision of distal fibula.  Surgical site dehisced shortly after surgery.  Healing has been complicated by patient's ability to participate in aftercare due to his dementia.  He has been unable to comply with offloading and VAC.    9/16/2022: Patient seen in clinic today for initial evaluation, and during LPS rounds for evaluation by orthopedic surgeon.  -Sutures removed and wound debrided by Dr. Trujillo  -LPS recommendations:   Restart VAC (per conversation with wound RN at [facility], will try SNAP)  Wound culture (collected in clinic today)  Discontinue Keflex, start doxycycline and Augmentin  Three-view x-ray of ankle  Patient to return to clinic weekly for assessment and debridement  Patient to return to clinic during LPS rounds in 2 weeks for follow-up  Will [facility] to see patient at his facility for offloading recommendations.  Possibly night splint or AFO.  Coordinate with wound RN    Discussed the above with wound RN at [facility], Lindy Davis, who stated she would order SNAP, antibiotics under facility MD.      2.  Infection    9/16/2022: Patient is currently on Keflex.  Periwound erythema radiating 8 to 10 cm around wound.  -Culture collected in clinic today  -Patient to discontinue Keflex, start on doxycycline and Augmentin (see above)  -We will follow culture results    3. Dementia with behavioral disturbance, unspecified dementia type ([facility])  Comments: Complicating factor.  Patient is unable to participate in plan of care due to his severe dementia.  Per wound RN at SNF, patient frequently removed VAC in the past, and refuses to wear offloading boot.    4. Diabetes mellitus type 2 in nonobese ([facility])  Comments: Patient's diabetes appears to be well controlled at this time, with an A1c of 6.3.            PATIENT EDUCATION  - Importance of tight glucose control for wound healing   - Implications of loss of protective sensation (LOPS) discussed with patient- including increased risk for amputation.  - Advised to check feet at least daily, moisturize feet, and to always wear protective foot wear.   -  Importance of offloading foot to assist with wound healing  - Advised pt not to trim nails or calluses, seek foot/nail care from podiatrist or certified foot/nail nurse  - Importance of adequate nutrition for wound healing    My total time spent caring for the patient on the day of the encounter was 30 minutes, reviewing PMH, interviewing patient family, collaborating with surgeon and RN, coordinating care with SNF facility and orthotist.   This does not include time spent on separately billable procedures/tests.       Please note that this note may have been created using voice recognition software. I have worked with technical experts from [employer] to optimize the interface.  I have made every reasonable attempt to correct obvious errors, but there may be errors of grammar and possibly content that I did not discover before finalizing the note.    N

## 2022-09-16 NOTE — PROCEDURES
Limb Preservation Service  Orthopedic Procedure Note    Patient seen 9/16/2022 during Orthopedic Rounds at [facility].    Wound Etiology: post surgical    Wound Measurements:  Length: 4 cm  Width: 2 cm  Depth: 1 cm    Wound Description:   Drainage: scant  Color: red/granulation tissue  Periwound: erythema    Description of procedure: Debridement using scalpel and forceps to remove 10 cm squared of non-viable tissue slough exposing down to fascia

## 2022-09-16 NOTE — PATIENT INSTRUCTIONS
-Keep your wound dressing clean, dry, and intact.    -Change your dressing if it becomes soiled, soaked, or falls off.    -Should you experience any significant changes in your wound(s), such as infection (redness, swelling, localized heat, increased pain, fever > 101 F, chills) or have any questions regarding your home care instructions, please contact the wound center at [phone]. If after hours, contact your primary care physician or go to the hospital emergency room.

## 2022-09-16 NOTE — WOUND TEAM
Pt seen during ortho rounds with LPS team for left lateral ankle wounds.  Measurements(cm): see flow heet below. Following physician assessment and removal of sutures and debridement of wound to muscle/fascia layer, RN evaluated and dressed patient's wound(s). Wound culture obtained and sent to lab via tube system. See flow sheet for wound care details.  Gretel VERDUGO to discuss POC with Lindy Davis RN at the [facility] where the patient resides. Abx[facility]. [facility] will return once weekly to the [facility] for wound care, possible biologic and vac placement pending.    Wound 08/20/22 Full Thickness Wound Ankle Lateral;Lower Left complicated open surgical (Active)   Wound Image       Site Assessment Pink;Red;Bleeding;Non-healing    Periwound Assessment Blanchable erythema;Warm;Maceration    Margins Unattached edges    Closure Secondary intention    Drainage Amount Large    Drainage Description Serosanguineous    Treatments Cleansed;Topical Lidocaine;Provider debridement;Site care; wound culture obtained    Wound Cleansing Normal Saline    Periwound Protectant Skin Protectant Wipes to Periwound;Barrier Paste    Dressing Options Hydrofiber Silver Strip;Hydrofiber Silver;Silicone Adhesive Foam;Hypafix Tape    Dressing Changed New    Dressing Status Clean;Dry;Intact    Dressing Change/Treatment Frequency Every 72 hrs, and As Needed    Non-staged Wound Description Full thickness    Wound Length (cm) 1.5 cm    Wound Width (cm) 0.7 cm    Wound Depth (cm) 0.7 cm    Wound Surface Area (cm^2) 1.05 cm^2    Wound Volume (cm^3) 0.735 cm^3    Post-Procedure Length (cm) 2.3 cm    Post-Procedure Width (cm) 1 cm    Post-Procedure Depth (cm) 1 cm    Post-Procedure Surface Area (cm^2) 2.3 cm^2    Post-Procedure Volume (cm^3) 2.3 cm^3    Wound Healing % 75    Tunneling (cm) 1.7 cm    Tunneling Clock Position of Wound 3    Undermining (cm) 0 cm    Wound Odor None    Exposed Structures  Muscle;Connective tissue;Adipose

## 2022-09-16 NOTE — PROGRESS NOTES
I spoke with Michael at [facility] regarding request to see patient for advice on offloading device.  Explained patient's dementia, and difficulty getting him to comply with offloading boot, and Dr. Trujillo's recommendation to try night splint or AFO.  Michael stated he would see patient at the skilled nursing facility, and would collaborate with wound RN, Lindy.

## 2022-09-20 LAB
FUNGUS SPEC CULT: NORMAL
FUNGUS SPEC FUNGUS STN: NORMAL
SIGNIFICANT IND 70042: NORMAL
SITE SITE: NORMAL
SOURCE SOURCE: NORMAL

## 2022-09-22 ENCOUNTER — TELEPHONE (OUTPATIENT)
Dept: WOUND CARE | Facility: MEDICAL CENTER | Age: 82
End: 2022-09-22
Payer: COMMERCIAL

## 2022-09-22 NOTE — TELEPHONE ENCOUNTER
Received message from wound nurse, Lindy, from patient's skilled nursing facility.  Informed that they received culture results, and patient was switched from Doxy to Bactrim by the facilities MD.

## 2022-09-30 ENCOUNTER — OFFICE VISIT (OUTPATIENT)
Dept: WOUND CARE | Facility: MEDICAL CENTER | Age: 82
End: 2022-09-30
Attending: NURSE PRACTITIONER
Payer: COMMERCIAL

## 2022-09-30 DIAGNOSIS — E11.9 DIABETES MELLITUS TYPE 2 IN NONOBESE (HCC): ICD-10-CM

## 2022-09-30 DIAGNOSIS — T81.31XD DEHISCENCE OF OPERATIVE WOUND, SUBSEQUENT ENCOUNTER: ICD-10-CM

## 2022-09-30 PROCEDURE — 99213 OFFICE O/P EST LOW 20 MIN: CPT

## 2022-09-30 PROCEDURE — 11043 DBRDMT MUSC&/FSCA 1ST 20/<: CPT

## 2022-09-30 NOTE — WOUND TEAM
Pt seen during ortho rounds with LPS team for left lateral ankle wound.  Measurements(cm): see flow sheet below. Following physician assessment and debridement of wound to muscle/fascia layer, subcutaneous <20cm2, RN evaluated and dressed patient's wound(s). See flow sheet for wound care details.  Patient had veraflo vac and offloading boot in place upon arrival to clinic, NPWT dressing removed, all foam removed, no retained foam noted. No veraflo kits available in clinic, unable to replace vac, alternate dressing placed. Patient to be pre-authorized for biologic and be seen once weekly in clinic for biologic placement with vac. Lindy Davis wound RN at [facility] updated on POC by APRN, please see her note.    Wound 08/20/22 Full Thickness Wound Ankle Lateral;Lower Left complicated open surgical (Active)   Wound Image       Site Assessment Pink;Red;Early/partial granulation    Periwound Assessment Blanchable erythema;Maceration    Margins Unattached edges    Closure Secondary intention    Drainage Amount Moderate    Drainage Description Serosanguineous    Treatments Cleansed;Topical Lidocaine;Provider debridement;Site care    Wound Cleansing Normal Saline Irrigation    Periwound Protectant Skin Protectant Wipes to Periwound;Barrier Paste    Dressing Cleansing/Solutions Not Applicable    Dressing Options Hydrofiber Silver;Silicone Adhesive Foam;Hypafix Tape    Dressing Changed New    Dressing Status Clean;Dry;Intact    Dressing Change/Treatment Frequency Every 72 hrs, and As Needed    Non-staged Wound Description Full thickness    Wound Length (cm) 3.5 cm    Wound Width (cm) 1.6 cm    Wound Depth (cm) 0.8 cm    Wound Surface Area (cm^2) 5.6 cm^2    Wound Volume (cm^3) 4.48 cm^3    Post-Procedure Length (cm) 3.6 cm    Post-Procedure Width (cm) 2 cm    Post-Procedure Depth (cm) 1 cm    Post-Procedure Surface Area (cm^2) 7.2 cm^2    Post-Procedure Volume (cm^3) 7.2 cm^3    Wound Healing % -54    Tunneling (cm) 0 cm     Tunneling Clock Position of Wound 3    Undermining (cm) 0.7 cm    Undermining of Wound, 1st Location From 3 o'clock;To 6 o'clock    Wound Odor None    Exposed Structures Fascia;Tendon

## 2022-09-30 NOTE — PROGRESS NOTES
LIMB PRESERVATION SERVICE ROUNDS    Patient seen in collaboration with interdisciplinary team during LPS rounds in wound clinic for left lateral ankle wound.  Patient has a history of dementia and lives at [facility].  There is difficulty with compliance with offloading and use of VAC to promote healing due to his dementia.  Patient had left lateral leg I&D with excision of distal fibula, ankle I&D, synovectomy on 8/22/2022 by Dr. Vital.  Patient seen LPS rounds 9/16/2022 and had sharp debridement of left lateral ankle wound.  Patient had VF VAC initiated at [facility] and presents today to LPS rounds with offloading boot in place.    Patient's diabetes is managed by staff at [facility].  Denies fevers, chills, nausea, vomiting, and pain.        RESULTS:    Lab Results   Component Value Date/Time    HBA1C 6.3 (H) 06/11/2022 03:57 PM            OBJECTIVE FINDINGS:     Pulses: palpable pedal pulses    Wounds: Left lateral ankle: Early granulation tissue over tendon, small amount of slough along anterior portion of wound bed, slightly macerated edges to wound bed      PROCEDURE: Sharp debridement of wound VAC completed by Dr. Hand today  Wound care completed by wound care RN. See note and flowsheet.     Surgical and non surgical options discussed by Dr. Hand.  No surgical options recommended at this time. Non surgical options to include to continue with wound VAC therapy and apply for prior authorization for biologic placement to wound bed.  No surgery recommended at this time.    PLAN: Lindy Davis RN at [facility] updated on POC  Wound Care: Continue at [facility] once biologic has been authorized  Imaging/Labs: no further imaging/labs at this time  Offloading: Established with /Ellen, boot in place today at visit  Antibiotics: Bactrim  Surgery: None  Referral: None      LPS Follow up: no further LPS appts at this time.     20 minutes  was spent on preparation for visit, examination, counseling and coordination of care regarding the above.      Please note that this dictation was created using voice recognition software. I have  worked with technical experts from [facility] to optimize the interface.  I have made every reasonable attempt to correct obvious errors, but there may be errors of grammar and possibly content that I did not discover before finalizing the note.

## 2022-09-30 NOTE — PATIENT INSTRUCTIONS
-Keep your wound dressing clean, dry, and intact.    -Should you experience any significant changes in your wound(s), such as infection (redness, swelling, localized heat, increased pain, fever > 101 F, chills) or have any questions regarding your home care instructions, please contact the wound center at [phone]. If after hours, contact your primary care physician or go to the hospital emergency room.

## 2022-10-03 ENCOUNTER — TELEPHONE (OUTPATIENT)
Dept: WOUND CARE | Facility: MEDICAL CENTER | Age: 82
End: 2022-10-03
Payer: COMMERCIAL

## 2022-10-14 ENCOUNTER — OFFICE VISIT (OUTPATIENT)
Dept: WOUND CARE | Facility: MEDICAL CENTER | Age: 82
End: 2022-10-14
Attending: NURSE PRACTITIONER
Payer: COMMERCIAL

## 2022-10-14 VITALS
RESPIRATION RATE: 20 BRPM | SYSTOLIC BLOOD PRESSURE: 118 MMHG | OXYGEN SATURATION: 92 % | DIASTOLIC BLOOD PRESSURE: 62 MMHG | TEMPERATURE: 97.4 F | HEART RATE: 66 BPM

## 2022-10-14 DIAGNOSIS — T81.31XD DEHISCENCE OF OPERATIVE WOUND, SUBSEQUENT ENCOUNTER: Primary | ICD-10-CM

## 2022-10-14 DIAGNOSIS — F03.918 DEMENTIA WITH BEHAVIORAL DISTURBANCE (HCC): ICD-10-CM

## 2022-10-14 DIAGNOSIS — E11.65 TYPE 2 DIABETES MELLITUS WITH HYPERGLYCEMIA, WITH LONG-TERM CURRENT USE OF INSULIN (HCC): ICD-10-CM

## 2022-10-14 DIAGNOSIS — B99.9 INFECTION: ICD-10-CM

## 2022-10-14 DIAGNOSIS — Z79.4 TYPE 2 DIABETES MELLITUS WITH HYPERGLYCEMIA, WITH LONG-TERM CURRENT USE OF INSULIN (HCC): ICD-10-CM

## 2022-10-14 PROCEDURE — 15271 SKIN SUB GRAFT TRNK/ARM/LEG: CPT

## 2022-10-14 PROCEDURE — 15271 SKIN SUB GRAFT TRNK/ARM/LEG: CPT | Performed by: STUDENT IN AN ORGANIZED HEALTH CARE EDUCATION/TRAINING PROGRAM

## 2022-10-14 NOTE — PROGRESS NOTES
Provider Encounter- Diabetic Foot Ulcer      HISTORY OF PRESENT ILLNESS  Wound History:    START OF CARE IN CLINIC: 9/16/2022    REFERRING PROVIDER:        WOUND- Diabetic foot ulcer   LOCATION: Left Lateral Ankle   HISTORY:  82-year-old with PMH that includes dementia,diabetes mellitus type 2,diabetic foot infection and osteomyelitis.  He has been referred to [facility] for evaluation treatment of a dehisced surgical wound to his left lateral ankle.  Patient has a history of injuring his ankle in the fall 2021, after he bumped it on his walker.  He was receiving wound care at the skilled nursing facility where he resides, [facility].  He was then hospitalized from 4/12 until 4/14/2022, and again from 6/10 until 6/20/2022 for wound infection.  During the second admission, it was noted that the wound probed to bone.  He was taken to surgery for an I&D of his ankle and VAC placement on 6/14/2022 by Dr. Wagner.  He was discharged back to the SNF, with a wound VAC, and on IV daptomycin which was to be concluded on 7/23.              Patient was readmitted to the hospital on 6/29 due to leg swelling and new confusion and weakness.  LPS was consulted and performed a bedside debridement, and applied new dressings.  During this admission he completed his IV antibiotics and PICC line was discontinued.  He was discharged back to the SNF on 7/25, with wound VAC in place.              Patient was sent to the ED from his skilled nursing facility on 8/20/2022, noted to be more confused than usual, and with room air saturations of 85%.  He was admitted, and treated for sepsis.  He was taken to surgery with Dr. Vital on 8/22/2022, for a I&D of his left foot and lateral leg, excision of distal fibula, bone biopsy of fibular margin, left ankle joint irrigation and debridement and synovectomy, and placement of incisional VAC.  Patient was discharged back to his skilled nursing facility on 8/26.              Per wound FLORES  at [facility], Lindy, they were unable to keep incisional VAC in place due to heavy drainage, and because patient kept removing the dressing despite one-on-one observation.  Patient's wound has continued to deteriorate, with large area of dehiscence developing along the incision. Lindy attempted to get patient into [facility] to follow-up with his surgeon, however no appointments  were available for at least 3 weeks.  Arrangements were made for patient to be seen in LPS rounds on 9/16.      Pertinent Medical History: Dementia, diabetes mellitus type 2, osteomyelitis of ankle, encephalopathy, chronic pain, sepsis      TOBACCO USE:  Former smoker, quit in 2000. Never used smokeless tobacco.    Patient's problem list, allergies, and current medications reviewed and updated in Epic    Interval History:  9/16/2022: Patient seen during LPS rounds for reassessment, and also for initial wound clinic evaluation.  He is accompanied today by his daughter, who provides most of his health information.  Patient suffers from severe dementia.  Per daughter, patient has not been wearing offloading boot as recommended, he removes it soon after application, chooses instead to wear his regular shoes.  He does use a walker to assist with ambulation.              Sutures were removed, and wound debrided by Dr. Trujillo, please refer to his note.  Dr. Trujillo's recommendations were to resume VAC, have patient seen and wound clinic weekly, discontinue Keflex and start p.o. doxycycline and Augmentin, wound culture (obtained in clinic today), three-view x-ray of ankle, and to ask orthotist from [facility] to see patient at his skilled nursing facility for offloading recommendations-possibly night splint or AFO.  Patient also to be seen in LPS rounds again in 2 weeks.              I spoke with wound nurse, Lindy, at [facility] via telephone after LPS rounds to discuss the above.  Patient stated that VAC not a possibility, as patient  frequently remove the VAC when tried previously, despite one-on-one supervision.  We agreed to try a SNAP, with a larger canister, and with lower extremity wrap to try and prevent patient from removing.  Lindy stated she would write orders for antibiotics and x-ray under Dr. Diaz ([facility] physician).    10/14/2022: Clinic visit with Juanjo Stafford MD. Patient presents to clinic fatigued. He slept through most of appointment and was unable to provide any history. He apparently did not sleep last night. Wound appears to be improving, less undermining. Presented with wound VAC drape in place without device. He was authorized for biologic, applied today. Nursing will reach out to wound care nurse at [facility] regarding care for biologic and reapplying wound VAC. As he does not have device with him today, unable to place wound VAC.      REVIEW OF SYSTEMS:   Review of Systems   Unable to perform ROS: Dementia     PHYSICAL EXAMINATION:   /62 (BP Location: Left arm, Patient Position: Sitting)   Pulse 66   Temp 36.3 °C (97.4 °F) (Temporal)   Resp 20   SpO2 92%     Physical Exam  Constitutional:       General: He is not in acute distress.     Appearance: He is obese.   Cardiovascular:      Rate and Rhythm: Normal rate.      Comments: Palpable left DP pulse, unable to palpate PT  Musculoskeletal:         General: Swelling present.   Skin:     Comments: Full thickness wound left lateral ankle - Undermining improving and has increased granulation tissue. Does not probe to bone. Small area of exposed tendon.    See flow sheet for details   Neurological:      Mental Status: He is disoriented.      Comments: Severe dementia. Unable to participate in examination       WOUND ASSESSMENT  Wound 08/20/22 Full Thickness Wound Ankle Lateral;Lower Left complicated open surgical (Active)   Wound Image    10/14/22 0900   Site Assessment Red;Yellow 10/14/22 0900   Periwound Assessment Blanchable erythema;Maceration 10/14/22 0900   Margins  Unattached edges 10/14/22 0900   Closure Secondary intention 09/30/22 0800   Drainage Amount Moderate 10/14/22 0900   Drainage Description Serosanguineous 10/14/22 0900   Treatments Cleansed;Topical Lidocaine;Provider debridement 10/14/22 0900   Wound Cleansing Normal Saline Irrigation 10/14/22 0900   Periwound Protectant Skin Protectant Wipes to Periwound 10/14/22 0900   Dressing Cleansing/Solutions Normal Saline 10/14/22 0900   Dressing Options Biologic (Skin Substitute);Adaptic;Steri-Strip;Hydrofiber;Silicone Adhesive Foam;Tubigrip 10/14/22 0900   Dressing Changed New 09/30/22 0800   Dressing Status Clean;Dry;Intact 09/30/22 0800   Dressing Change/Treatment Frequency Every 72 hrs, and As Needed 09/30/22 0800   Non-staged Wound Description Full thickness 10/14/22 0900   Wound Length (cm) 3.2 cm 10/14/22 0900   Wound Width (cm) 1.5 cm 10/14/22 0900   Wound Depth (cm) 0.4 cm 10/14/22 0900   Wound Surface Area (cm^2) 4.8 cm^2 10/14/22 0900   Wound Volume (cm^3) 1.92 cm^3 10/14/22 0900   Post-Procedure Length (cm) 3.4 cm 10/14/22 0900   Post-Procedure Width (cm) 1.7 cm 10/14/22 0900   Post-Procedure Depth (cm) 0.4 cm 10/14/22 0900   Post-Procedure Surface Area (cm^2) 5.78 cm^2 10/14/22 0900   Post-Procedure Volume (cm^3) 2.312 cm^3 10/14/22 0900   Wound Healing % 34 10/14/22 0900   Tunneling (cm) 0 cm 10/14/22 0900   Tunneling Clock Position of Wound 3 09/16/22 0830   Undermining (cm) 0.2 cm 10/14/22 0900   Undermining of Wound, 1st Location From 7 o'clock;To 9 o'clock 10/14/22 0900   Wound Odor None 10/14/22 0900   Exposed Structures None 10/14/22 0900   Number of days: 55         PROCEDURE: Debridement of left lateral ankle wound and application of biologic  -2% viscous lidocaine applied topically to wound bed for approximately 5 minutes prior to debridement  -Curette used to debride wound bed and periwound callus.  Excisional debridement was performed to remove devitalized tissue until healthy, bleeding tissue was  visualized.   Entire surface of wound, 5.78 cm2 debrided.  Tissue debrided into the subcutaneous layer.   -Bleeding controlled with manual pressure.  - Wound thoroughly irrigated  - Epifix was trimmed to size of wound and rehydrated on wound bed  -Wound care completed by wound RN, refer to flowsheet  -Patient tolerated the procedure well, without c/o pain or discomfort.       Biologic Log  1st Application 10/14/2022  PRODUCT: Epi fix Mesh 2.0x3.0 cm sheet. PRODUCT # [id number]; SN# [id number]; Expires 2027-06-01        Pertinent Labs and Diagnostics:    Labs:     A1c:   Lab Results   Component Value Date/Time    HBA1C 6.3 (H) 06/11/2022 03:57 PM          IMAGING: MRI Ankle Left with and w/o  1.  Again seen marrow edema and abnormal enhancement of the distal fibula/lateral malleolus consistent with osteomyelitis. There has likely been a small focus of superficial debridement of that area.   2.  Soft tissue ulceration overlying the lateral malleolus with some surrounding cellulitis. No evidence of focal abscess.   3.  Interval improvement in tibiotalar joint effusion with only minimal joint effusion remaining.   4.  Tenosynovitis of the peroneus brevis and longus tendons. There is again seen longitudinal splitting of the peroneus brevis tendon.   5.  Tendinosis of the Achilles tendon.    VASCULAR STUDIES: No results found.    LAST  WOUND CULTURE:  DATE :        Lab Results   Component Value Date/Time    CULTRSULT Rare growth usual skin ho. (A) 09/16/2022 08:45 AM    CULTRSULT (A) 09/16/2022 08:45 AM     Methicillin Resistant Staphylococcus aureus  Light growth  This isolate is presumed to be clindamycin resistant based on  detection of inducible resistance.             ASSESSMENT AND PLAN:     1. 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  Patient has undergone surgery twice to this site, most recently in  August of this year-I&D with excision of distal fibula.  Surgical site dehisced shortly after surgery.  Healing has been complicated by patient's ability to participate in aftercare due to his dementia.  He has been unable to comply with offloading and VAC.     10/14/2022: Patient's wound appears to be filling in with granulation tissue with less undermining. No longer probes to bone.  - Excisional debridement was performed in clinic, medically necessary to promote wound healing.  - Application of Biologic, EpiFix mesh sheet. 1st application in clinic  - Unfortunately unable to apply wound VAC as he was transported without device. Ok for wound VAC to be reapplied at facility but cannot use veraflow wound vac due to use of biologic  - Wound care team at [facility] ok to change outer dressing, to not disrupt Adaptic covering biologic  - Continue to wear offloading boot  - Nursing to reach out to [facility] wound care team  - Return to clinic weekly for assessment, debridement, application of biologic as indicated   Wound Care: Biologic (EpiFix Mesh), Adaptic, steristrip, hydrofiber, silicone adhesive foam, Tubigrip    2. Infection  10/14/2022:   - Abx have been managed by [facility]  - No evidence of acute infection today    3. Type 2 diabetes mellitus with hyperglycemia, with long-term current use of insulin ([facility])  Comments: Patient's diabetes appears to be well controlled at this time, with an A1c of 6.3.    4. Dementia with behavioral disturbance  Comments: Complicating factor.  Patient is unable to participate in plan of care due to his severe dementia.  Per wound RN at [facility], patient frequently removed VAC in the past, and refuses to wear offloading boot.    PATIENT EDUCATION  - Importance of tight glucose control for wound healing   - Implications of loss of protective sensation (LOPS) discussed with patient- including increased risk for amputation.  - Advised to check feet at least daily, moisturize feet, and to always wear  protective foot wear.   -  Importance of offloading foot to assist with wound healing  - Advised pt not to trim nails or calluses, seek foot/nail care from podiatrist or certified foot/nail nurse  - Importance of adequate nutrition for wound healing    Please note that this note may have been created using voice recognition software. I have worked with technical experts from [facility], [facility] to optimize the interface.  I have made every reasonable attempt to correct obvious errors, but there may be errors of grammar and possibly content that I did not discover before finalizing the note.    N

## 2022-10-14 NOTE — PATIENT INSTRUCTIONS
-Keep dressings clean and dry. Change dressings once between wound clinic visits, and if they become over saturated, soiled or fall off.     -Avoid prolonged standing or sitting without elevating your legs.    -Remove your compression garments if you have severe pain, severe swelling, numbness, color change, or temperature change in your toes. If you need to remove your compression garments, do so by unrolling them. Do not cut the compression garments off, this is to prevent cutting yourself on accident.    -Should you experience any significant changes in your wound(s), such as signs of infection (increasing redness, swelling, localized heat, increased pain, fever > 101 F, chills) or have any questions regarding your home care instructions, please contact the wound center at [phone]. If after hours, contact your primary care physician or go to the hospital emergency room.     -If you are 5 or more minutes late for an appointment, we reserve the right to cancel and reschedule that appointment. Additionally, if you are habitually late or not showing (3 late cancellations and/or no shows), we reserve the right to cancel your remaining appointments and it will be your responsibility to obtain a new referral if services are still needed.

## 2022-10-14 NOTE — PROGRESS NOTES
Telephone call to wound RN Lindy at [facility] at phone number [phone]. Lindy RN given verbal orders for wound care, states she prefers verbal over faxed orders.   Orders given:  -Carefully remove old dressing. Ensure that Adaptic and steri strips are left undisturbed.  -Cleanse charlette wound with normal saline or wound cleanser. Pat dry with clean gauze.   -Apply skin prep to periwound area.   -Apply skin prep to periwound area and any skin that will be covered with vac drape.  -Apply vac drape to periwound area and any skin that will be covered with wound vac foam.  -Lightly fill wound with black wound vac foam.  -Apply an approximately 5cm diameter black wound vac foam button over wound vac foam.  -Cover all black wound vac foam with additional vac drape.  -Cut a quarter sized hole into vac drape over black wound vac foam button.  -Adhere trac pad to hole cut in vac drape.  -Connect trac pad tubing to vac canister tubing.  -Connect vac canister to wound vac machine.  -Resume negative pressure wound therapy at 125mmHg continuous.  -Apply tensogrip size D to LLE from base of toes to no more than one inch below the knee.  -Cut a hole in tensogrip over track pad.  -Thread wound vac tubing through to outside of tensogrip.  -Connect trac pad tubing to vac cannister tubing.     -[facility]. Home Health to see patient two times per week for dressing changes.    Informed Lindy TANNER of patient next appointment date 10/21/22 at 1300.

## 2022-10-21 ENCOUNTER — OFFICE VISIT (OUTPATIENT)
Dept: WOUND CARE | Facility: MEDICAL CENTER | Age: 82
End: 2022-10-21
Attending: NURSE PRACTITIONER
Payer: COMMERCIAL

## 2022-10-21 VITALS
RESPIRATION RATE: 18 BRPM | OXYGEN SATURATION: 98 % | DIASTOLIC BLOOD PRESSURE: 60 MMHG | HEART RATE: 99 BPM | TEMPERATURE: 96.9 F | SYSTOLIC BLOOD PRESSURE: 132 MMHG

## 2022-10-21 DIAGNOSIS — F03.918 DEMENTIA WITH BEHAVIORAL DISTURBANCE (HCC): ICD-10-CM

## 2022-10-21 DIAGNOSIS — E11.65 TYPE 2 DIABETES MELLITUS WITH HYPERGLYCEMIA, WITH LONG-TERM CURRENT USE OF INSULIN (HCC): ICD-10-CM

## 2022-10-21 DIAGNOSIS — Z79.4 TYPE 2 DIABETES MELLITUS WITH HYPERGLYCEMIA, WITH LONG-TERM CURRENT USE OF INSULIN (HCC): ICD-10-CM

## 2022-10-21 DIAGNOSIS — B99.9 INFECTION: ICD-10-CM

## 2022-10-21 DIAGNOSIS — T81.31XD DEHISCENCE OF OPERATIVE WOUND, SUBSEQUENT ENCOUNTER: ICD-10-CM

## 2022-10-21 PROCEDURE — 15271 SKIN SUB GRAFT TRNK/ARM/LEG: CPT | Performed by: STUDENT IN AN ORGANIZED HEALTH CARE EDUCATION/TRAINING PROGRAM

## 2022-10-21 PROCEDURE — 15271 SKIN SUB GRAFT TRNK/ARM/LEG: CPT

## 2022-10-21 NOTE — PROGRESS NOTES
Provider Encounter- Diabetic Foot Ulcer      HISTORY OF PRESENT ILLNESS  Wound History:    START OF CARE IN CLINIC: 9/16/2022    REFERRING PROVIDER:        WOUND- Diabetic foot ulcer   LOCATION: Left Lateral Ankle   HISTORY:  82-year-old with PMH that includes dementia,diabetes mellitus type 2,diabetic foot infection and osteomyelitis.  He has been referred to [facility] for evaluation treatment of a dehisced surgical wound to his left lateral ankle.  Patient has a history of injuring his ankle in the fall 2021, after he bumped it on his walker.  He was receiving wound care at the skilled nursing facility where he resides, [facility].  He was then hospitalized from 4/12 until 4/14/2022, and again from 6/10 until 6/20/2022 for wound infection.  During the second admission, it was noted that the wound probed to bone.  He was taken to surgery for an I&D of his ankle and VAC placement on 6/14/2022 by Dr. Wagner.  He was discharged back to the SNF, with a wound VAC, and on IV daptomycin which was to be concluded on 7/23.              Patient was readmitted to the hospital on 6/29 due to leg swelling and new confusion and weakness.  LPS was consulted and performed a bedside debridement, and applied new dressings.  During this admission he completed his IV antibiotics and PICC line was discontinued.  He was discharged back to the SNF on 7/25, with wound VAC in place.              Patient was sent to the ED from his skilled nursing facility on 8/20/2022, noted to be more confused than usual, and with room air saturations of 85%.  He was admitted, and treated for sepsis.  He was taken to surgery with Dr. Vital on 8/22/2022, for a I&D of his left foot and lateral leg, excision of distal fibula, bone biopsy of fibular margin, left ankle joint irrigation and debridement and synovectomy, and placement of incisional VAC.  Patient was discharged back to his skilled nursing facility on 8/26.              Per wound FLORES  at [facility], Lindy, they were unable to keep incisional VAC in place due to heavy drainage, and because patient kept removing the dressing despite one-on-one observation.  Patient's wound has continued to deteriorate, with large area of dehiscence developing along the incision. Lindy attempted to get patient into [facility] to follow-up with his surgeon, however no appointments  were available for at least 3 weeks.  Arrangements were made for patient to be seen in LPS rounds on 9/16.      Pertinent Medical History: Dementia, diabetes mellitus type 2, osteomyelitis of ankle, encephalopathy, chronic pain, sepsis      TOBACCO USE:  Former smoker, quit in 2000. Never used smokeless tobacco.    Patient's problem list, allergies, and current medications reviewed and updated in Epic    Interval History:  9/16/2022: Patient seen during LPS rounds for reassessment, and also for initial wound clinic evaluation.  He is accompanied today by his daughter, who provides most of his health information.  Patient suffers from severe dementia.  Per daughter, patient has not been wearing offloading boot as recommended, he removes it soon after application, chooses instead to wear his regular shoes.  He does use a walker to assist with ambulation.              Sutures were removed, and wound debrided by Dr. Trujillo, please refer to his note.  Dr. Trujillo's recommendations were to resume VAC, have patient seen and wound clinic weekly, discontinue Keflex and start p.o. doxycycline and Augmentin, wound culture (obtained in clinic today), three-view x-ray of ankle, and to ask orthotist from [facility] to see patient at his skilled nursing facility for offloading recommendations-possibly night splint or AFO.  Patient also to be seen in LPS rounds again in 2 weeks.              I spoke with wound nurse, Lindy, at [facility] via telephone after LPS rounds to discuss the above.  Patient stated that VAC not a possibility, as patient  frequently remove the VAC when tried previously, despite one-on-one supervision.  We agreed to try a SNAP, with a larger canister, and with lower extremity wrap to try and prevent patient from removing.  Lindy stated she would write orders for antibiotics and x-ray under Dr. Diaz ([facility] physician).    10/14/2022: Clinic visit with Juanjo Stafford MD. Patient presents to clinic fatigued. He slept through most of appointment and was unable to provide any history. He apparently did not sleep last night. Wound appears to be improving, less undermining. Presented with wound VAC drape in place without device. He was authorized for biologic, applied today. Nursing will reach out to wound care nurse at [facility] regarding care for biologic and reapplying wound VAC. As he does not have device with him today, unable to place wound VAC.    10/21/2022: Clinic visit with Juanjo Stafford MD. Patient more alert today. He denies any complaints. Denies any pain or symptoms of infection., though he is unreliable. Wound bed appears to be increasing granulation tissue. He presented with wound VAC. Periwound with some erythema, appears to be yeast dermatitis without breakdown of skin. We applied biologic, crusted periwound with stoma powder and nystatin. I discussed findings with his wound care nurse at facility. She will keep close eye on periwound and can get Rx for nystatin by in house doctor, she does not require an order from myself. She is aware that if periwound erythema worsening or skin breaking down, patient should have VAC break.      REVIEW OF SYSTEMS:   Review of Systems   Unable to perform ROS: Dementia     PHYSICAL EXAMINATION:   /60 (BP Location: Left arm, Patient Position: Sitting)   Pulse 99   Temp 36.1 °C (96.9 °F) (Temporal)   Resp 18   SpO2 98%     Physical Exam  Constitutional:       General: He is not in acute distress.     Appearance: He is obese.   Cardiovascular:      Rate and Rhythm: Normal rate.       Comments: Palpable left DP pulse, unable to palpate PT  Musculoskeletal:         General: Swelling present.   Skin:     Comments: Full thickness wound left lateral ankle - Wound appears to be improving with less undermining and increased granulation tissue. Does not probe to bone. Do not appreciate tendon today. Periwound with erythema, appears to be yeast dermatitis.    See flow sheet for details   Neurological:      Mental Status: He is disoriented.      Comments: Severe dementia, pleasant but unable to give reliable history.       WOUND ASSESSMENT  Wound 08/20/22 Full Thickness Wound Ankle Lateral;Lower Left complicated open surgical (Active)   Wound Image   10/21/22 1200   Site Assessment Red;Yellow 10/21/22 1200   Periwound Assessment Blanchable erythema;Maceration 10/21/22 1200   Margins Unattached edges 10/21/22 1200   Closure Secondary intention 09/30/22 0800   Drainage Amount Moderate 10/21/22 1200   Drainage Description Serosanguineous 10/21/22 1200   Treatments Cleansed 10/21/22 1200   Wound Cleansing Normal Saline Irrigation 10/21/22 1200   Periwound Protectant Antifungal Therapy;Benzoin;Skin Protectant Wipes to Periwound 10/21/22 1200   Dressing Cleansing/Solutions Normal Saline 10/21/22 1200   Dressing Options Biologic (Skin Substitute);Wound Vac;Adaptic;Steri-Strip 10/21/22 1200   Dressing Changed New 09/30/22 0800   Dressing Status Clean;Dry;Intact 09/30/22 0800   Dressing Change/Treatment Frequency Every 72 hrs, and As Needed 09/30/22 0800   Non-staged Wound Description Full thickness 10/21/22 1200   Wound Length (cm) 3.9 cm 10/21/22 1200   Wound Width (cm) 1.6 cm 10/21/22 1200   Wound Depth (cm) 0.3 cm 10/21/22 1200   Wound Surface Area (cm^2) 6.24 cm^2 10/21/22 1200   Wound Volume (cm^3) 1.872 cm^3 10/21/22 1200   Post-Procedure Length (cm) 4 cm 10/21/22 1200   Post-Procedure Width (cm) 1.6 cm 10/21/22 1200   Post-Procedure Depth (cm) 0.3 cm 10/21/22 1200   Post-Procedure Surface Area (cm^2) 6.4  cm^2 10/21/22 1200   Post-Procedure Volume (cm^3) 1.92 cm^3 10/21/22 1200   Wound Healing % 36 10/21/22 1200   Tunneling (cm) 0 cm 10/21/22 1200   Tunneling Clock Position of Wound 3 09/16/22 0830   Undermining (cm) 0.2 cm 10/14/22 0900   Undermining of Wound, 1st Location From 7 o'clock;To 9 o'clock 10/14/22 0900   Wound Odor None 10/14/22 0900   Exposed Structures None 10/14/22 0900   Number of days: 62         PROCEDURE: Debridement of left lateral ankle wound and application of biologic  -2% viscous lidocaine applied topically to wound bed for approximately 5 minutes prior to debridement  -Curette used to debride wound bed and periwound callus.  Excisional debridement was performed to remove devitalized tissue until healthy, bleeding tissue was visualized.   Entire surface of wound, 6.4 cm2 debrided.  Tissue debrided into the subcutaneous layer.   -Bleeding controlled with manual pressure.  - Wound thoroughly irrigated  - Epifix was trimmed to size of wound and rehydrated on wound bed  -Wound care completed by wound RN, refer to flowsheet  -Patient tolerated the procedure well, without c/o pain or discomfort.       Biologic Log  1st Application 10/14/2022  PRODUCT: Epi fix Mesh 2.0x3.0 cm sheet. PRODUCT # ES-2300; SN# [id number]; Expires 2027-06-01  2nd Application 10/21/2022  PRODUCT: EpiCord EX 2x3cm expandable sheet. PRODUCT # EX-5230; SN# [id number]; Expires 2027-02-01          Pertinent Labs and Diagnostics:    Labs:     A1c:   Lab Results   Component Value Date/Time    HBA1C 6.3 (H) 06/11/2022 03:57 PM            IMAGING: MRI Ankle Left with and w/o  1.  Again seen marrow edema and abnormal enhancement of the distal fibula/lateral malleolus consistent with osteomyelitis. There has likely been a small focus of superficial debridement of that area.   2.  Soft tissue ulceration overlying the lateral malleolus with some surrounding cellulitis. No evidence of focal abscess.   3.  Interval  improvement in tibiotalar joint effusion with only minimal joint effusion remaining.   4.  Tenosynovitis of the peroneus brevis and longus tendons. There is again seen longitudinal splitting of the peroneus brevis tendon.   5.  Tendinosis of the Achilles tendon.    VASCULAR STUDIES: No results found.    LAST  WOUND CULTURE:  DATE :        Lab Results   Component Value Date/Time    CULTRSULT Rare growth usual skin ho. (A) 09/16/2022 08:45 AM    CULTRSULT (A) 09/16/2022 08:45 AM     Methicillin Resistant Staphylococcus aureus  Light growth  This isolate is presumed to be clindamycin resistant based on  detection of inducible resistance.             ASSESSMENT AND PLAN:     1. 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  Patient has undergone surgery twice to this site, most recently in August of this year-I&D with excision of distal fibula.  Surgical site dehisced shortly after surgery.  Healing has been complicated by patient's ability to participate in aftercare due to his dementia.  He has been unable to comply with offloading and VAC.     10/21/2022: Wound appears to be slowly improving, though measures slightly larger following debridement. Undermining improved, no longer visible structures. Increased periwound erythema without warmth or pain. Suspect moisture / yeast dermatitis.  - Excisional debridement was performed in clinic, medically necessary to promote wound healing.  - Application of Biologic, Epi cord Expandable. 2nd application in clinic.  - Periwound with erythema without pain or warmth, does not appear to be cellulitis, but rather yeast dermatitis. Used stoma powder and nystain powder to crust periwound. I discussed case with his facility wound nurse and if needed will obtain nystatin powder from their in house doc. She declined to have me order.  - Wound care team at [facility] ok to change outer  dressing, to not disrupt Adaptic covering biologic  - Continue to wear offloading boot  - Return to clinic weekly for assessment, debridement, application of biologic as indicated   Wound Care: Biologic (EpiCord Expandable mesh), Adaptic, steristrip, NPWT -125mmHg with black foam.    2. Infection  10/21/2022:   - Abx have been managed by SNF  - Appears to have some periwound skin irritation likely yeast dermatitis. Used nystatin powder. Discussed with wound care nurse at facility, they will provide own nystatin if he requires.    3. Type 2 diabetes mellitus with hyperglycemia, with long-term current use of insulin (HCC)  Comments: Patient's diabetes appears to be well controlled at this time, with an A1c of 6.3.    4. Dementia with behavioral disturbance  Comments: Complicating factor.  Patient is unable to participate in plan of care due to his severe dementia.  Per wound RN at [facility], patient frequently removed VAC in the past, and refuses to wear offloading boot.    PATIENT EDUCATION  - Importance of tight glucose control for wound healing   - Implications of loss of protective sensation (LOPS) discussed with patient- including increased risk for amputation.  - Advised to check feet at least daily, moisturize feet, and to always wear protective foot wear.   -  Importance of offloading foot to assist with wound healing  - Advised pt not to trim nails or calluses, seek foot/nail care from podiatrist or certified foot/nail nurse  - Importance of adequate nutrition for wound healing    Please note that this note may have been created using voice recognition software. I have worked with technical experts from [employer] to optimize the interface.  I have made every reasonable attempt to correct obvious errors, but there may be errors of grammar and possibly content that I did not discover before finalizing the note.    N

## 2022-10-21 NOTE — PATIENT INSTRUCTIONS
After Visit Summary Wound Care Instructions    Nutrition - Patient instructed increased protein diet unless contraindicated in renal failure (meat, eggs, fish, yogurt, cottage cheese, beans), use of multivitamin with minerals and Arginaid supplementation (check if ok with Primary Care Provider first).    Infection -  instructed signs and symptoms of infection, increased redness and swelling, localized heat over wound and surrounding area/fever/chills/nausea and vomiting, when to call doctor or go to Emergency Room.     Dressing Changes - VAC changes 3 x week. Pt to come to wound clinic on Fridays, change at facility on Monday and Wednesday.    Instructed patient about fall prevention.      Diabetic Instruction - Patient instructed keep tight control over Blood Sugar, <140 for optimal wound healing; Implications of loss of protective sensation (LOPS) discussed with patient, including increased risk for amputation.  Advised to check feet at least daily, moisturize feet, and to always wear protective foot wear, arrange meticulous regular foot care by podiatrist or Certified Foot Care Nurse (CFCN). Patient with good understanding.       Compression Wraps - Avoid prolonged standing or sitting without elevating your legs.  Apply tubigrip to your legs ending 2 fingers below back of knee without wrinkles.     Wound VAC may not have any drainage in tube or canister & it will still be working.   Change canister if it does become full by pressing tab on side of machine to remove canister and snap on new one. Full canister can be thrown in the trash. If canister fills with bright red blood, turn machine off and go to Emergency Room immediately. Dressing will be changed every Monday, Wednesday and Friday at the wound clinic.  If you are having issues with your wound VAC, please consider patching leaks, changing the canister, or calling [phone] for troubleshooting. If the wound VAC has been off or un-operational for over  2 hours, call wound care center to inform them and remove all dressings including black foam and replace with normal saline damp gauze, then dry gauze over that, secure with tape.       Questions - should you have any questions regarding your home care instructions, please contact the wound center at [phone]. If after hours, contact your primary care physician or go to the hospital emergency room.

## 2022-10-24 ENCOUNTER — HOSPITAL ENCOUNTER (INPATIENT)
Facility: MEDICAL CENTER | Age: 82
LOS: 4 days | DRG: 949 | End: 2022-10-28
Attending: EMERGENCY MEDICINE | Admitting: INTERNAL MEDICINE
Payer: COMMERCIAL

## 2022-10-24 ENCOUNTER — APPOINTMENT (OUTPATIENT)
Dept: RADIOLOGY | Facility: MEDICAL CENTER | Age: 82
DRG: 949 | End: 2022-10-24
Attending: EMERGENCY MEDICINE
Payer: COMMERCIAL

## 2022-10-24 DIAGNOSIS — N39.0 URINARY TRACT INFECTION ASSOCIATED WITH INDWELLING URETHRAL CATHETER, SUBSEQUENT ENCOUNTER: ICD-10-CM

## 2022-10-24 DIAGNOSIS — F11.20 NARCOTIC DEPENDENCE (HCC): ICD-10-CM

## 2022-10-24 DIAGNOSIS — G93.41 SEPSIS WITH ENCEPHALOPATHY WITHOUT SEPTIC SHOCK, DUE TO UNSPECIFIED ORGANISM (HCC): ICD-10-CM

## 2022-10-24 DIAGNOSIS — T83.511D URINARY TRACT INFECTION ASSOCIATED WITH INDWELLING URETHRAL CATHETER, SUBSEQUENT ENCOUNTER: ICD-10-CM

## 2022-10-24 DIAGNOSIS — N12 PYELONEPHRITIS: ICD-10-CM

## 2022-10-24 DIAGNOSIS — R65.20 SEPSIS WITH ENCEPHALOPATHY WITHOUT SEPTIC SHOCK, DUE TO UNSPECIFIED ORGANISM (HCC): ICD-10-CM

## 2022-10-24 DIAGNOSIS — A41.9 SEPSIS WITH ENCEPHALOPATHY WITHOUT SEPTIC SHOCK, DUE TO UNSPECIFIED ORGANISM (HCC): ICD-10-CM

## 2022-10-24 PROBLEM — E87.20 LACTIC ACIDOSIS: Status: ACTIVE | Noted: 2022-10-24

## 2022-10-24 LAB
ALBUMIN SERPL BCP-MCNC: 3.9 G/DL (ref 3.2–4.9)
ALBUMIN/GLOB SERPL: 0.9 G/DL
ALP SERPL-CCNC: 86 U/L (ref 30–99)
ALT SERPL-CCNC: 6 U/L (ref 2–50)
ANION GAP SERPL CALC-SCNC: 13 MMOL/L (ref 7–16)
APPEARANCE UR: ABNORMAL
AST SERPL-CCNC: 15 U/L (ref 12–45)
BACTERIA #/AREA URNS HPF: ABNORMAL /HPF
BASOPHILS # BLD AUTO: 0.4 % (ref 0–1.8)
BASOPHILS # BLD: 0.05 K/UL (ref 0–0.12)
BILIRUB SERPL-MCNC: 0.7 MG/DL (ref 0.1–1.5)
BILIRUB UR QL STRIP.AUTO: NEGATIVE
BUN SERPL-MCNC: 38 MG/DL (ref 8–22)
CALCIUM SERPL-MCNC: 9.7 MG/DL (ref 8.5–10.5)
CHLORIDE SERPL-SCNC: 100 MMOL/L (ref 96–112)
CO2 SERPL-SCNC: 23 MMOL/L (ref 20–33)
COLOR UR: YELLOW
CREAT SERPL-MCNC: 1.45 MG/DL (ref 0.5–1.4)
EOSINOPHIL # BLD AUTO: 0.27 K/UL (ref 0–0.51)
EOSINOPHIL NFR BLD: 1.9 % (ref 0–6.9)
EPI CELLS #/AREA URNS HPF: NEGATIVE /HPF
ERYTHROCYTE [DISTWIDTH] IN BLOOD BY AUTOMATED COUNT: 50.1 FL (ref 35.9–50)
GFR SERPLBLD CREATININE-BSD FMLA CKD-EPI: 48 ML/MIN/1.73 M 2
GLOBULIN SER CALC-MCNC: 4.5 G/DL (ref 1.9–3.5)
GLUCOSE SERPL-MCNC: 167 MG/DL (ref 65–99)
GLUCOSE UR STRIP.AUTO-MCNC: NEGATIVE MG/DL
HCT VFR BLD AUTO: 38 % (ref 42–52)
HGB BLD-MCNC: 12.4 G/DL (ref 14–18)
HYALINE CASTS #/AREA URNS LPF: ABNORMAL /LPF
IMM GRANULOCYTES # BLD AUTO: 0.13 K/UL (ref 0–0.11)
IMM GRANULOCYTES NFR BLD AUTO: 0.9 % (ref 0–0.9)
INR PPP: 1.2 (ref 0.87–1.13)
KETONES UR STRIP.AUTO-MCNC: NEGATIVE MG/DL
LACTATE SERPL-SCNC: 1.9 MMOL/L (ref 0.5–2)
LACTATE SERPL-SCNC: 1.9 MMOL/L (ref 0.5–2)
LACTATE SERPL-SCNC: 2.4 MMOL/L (ref 0.5–2)
LEUKOCYTE ESTERASE UR QL STRIP.AUTO: ABNORMAL
LYMPHOCYTES # BLD AUTO: 1.93 K/UL (ref 1–4.8)
LYMPHOCYTES NFR BLD: 13.7 % (ref 22–41)
MCH RBC QN AUTO: 27.9 PG (ref 27–33)
MCHC RBC AUTO-ENTMCNC: 32.6 G/DL (ref 33.7–35.3)
MCV RBC AUTO: 85.4 FL (ref 81.4–97.8)
MICRO URNS: ABNORMAL
MONOCYTES # BLD AUTO: 1.12 K/UL (ref 0–0.85)
MONOCYTES NFR BLD AUTO: 8 % (ref 0–13.4)
NEUTROPHILS # BLD AUTO: 10.56 K/UL (ref 1.82–7.42)
NEUTROPHILS NFR BLD: 75.1 % (ref 44–72)
NITRITE UR QL STRIP.AUTO: NEGATIVE
NRBC # BLD AUTO: 0 K/UL
NRBC BLD-RTO: 0 /100 WBC
PH UR STRIP.AUTO: 5.5 [PH] (ref 5–8)
PLATELET # BLD AUTO: 376 K/UL (ref 164–446)
PMV BLD AUTO: 9 FL (ref 9–12.9)
POTASSIUM SERPL-SCNC: 4.6 MMOL/L (ref 3.6–5.5)
PROCALCITONIN SERPL-MCNC: 0.16 NG/ML
PROT SERPL-MCNC: 8.4 G/DL (ref 6–8.2)
PROT UR QL STRIP: 100 MG/DL
PROTHROMBIN TIME: 15 SEC (ref 12–14.6)
RBC # BLD AUTO: 4.45 M/UL (ref 4.7–6.1)
RBC # URNS HPF: ABNORMAL /HPF
RBC UR QL AUTO: ABNORMAL
SODIUM SERPL-SCNC: 136 MMOL/L (ref 135–145)
SP GR UR STRIP.AUTO: 1.02
UROBILINOGEN UR STRIP.AUTO-MCNC: 0.2 MG/DL
WBC # BLD AUTO: 14.1 K/UL (ref 4.8–10.8)
WBC #/AREA URNS HPF: ABNORMAL /HPF
YEAST BUDDING URNS QL: PRESENT /HPF
YEAST HYPHAE #/AREA URNS HPF: PRESENT /HPF

## 2022-10-24 PROCEDURE — 99223 1ST HOSP IP/OBS HIGH 75: CPT | Mod: AI,25 | Performed by: INTERNAL MEDICINE

## 2022-10-24 PROCEDURE — 51702 INSERT TEMP BLADDER CATH: CPT

## 2022-10-24 PROCEDURE — 87106 FUNGI IDENTIFICATION YEAST: CPT

## 2022-10-24 PROCEDURE — 83605 ASSAY OF LACTIC ACID: CPT | Mod: 91

## 2022-10-24 PROCEDURE — 71045 X-RAY EXAM CHEST 1 VIEW: CPT

## 2022-10-24 PROCEDURE — A9270 NON-COVERED ITEM OR SERVICE: HCPCS | Performed by: INTERNAL MEDICINE

## 2022-10-24 PROCEDURE — 85610 PROTHROMBIN TIME: CPT

## 2022-10-24 PROCEDURE — 84145 PROCALCITONIN (PCT): CPT

## 2022-10-24 PROCEDURE — 96375 TX/PRO/DX INJ NEW DRUG ADDON: CPT

## 2022-10-24 PROCEDURE — 700105 HCHG RX REV CODE 258: Performed by: INTERNAL MEDICINE

## 2022-10-24 PROCEDURE — 81001 URINALYSIS AUTO W/SCOPE: CPT

## 2022-10-24 PROCEDURE — 99285 EMERGENCY DEPT VISIT HI MDM: CPT

## 2022-10-24 PROCEDURE — 36415 COLL VENOUS BLD VENIPUNCTURE: CPT

## 2022-10-24 PROCEDURE — 303105 HCHG CATHETER EXTRA

## 2022-10-24 PROCEDURE — 96374 THER/PROPH/DIAG INJ IV PUSH: CPT

## 2022-10-24 PROCEDURE — 700102 HCHG RX REV CODE 250 W/ 637 OVERRIDE(OP): Performed by: INTERNAL MEDICINE

## 2022-10-24 PROCEDURE — 80053 COMPREHEN METABOLIC PANEL: CPT

## 2022-10-24 PROCEDURE — 700111 HCHG RX REV CODE 636 W/ 250 OVERRIDE (IP): Performed by: INTERNAL MEDICINE

## 2022-10-24 PROCEDURE — 87040 BLOOD CULTURE FOR BACTERIA: CPT | Mod: 91

## 2022-10-24 PROCEDURE — 700111 HCHG RX REV CODE 636 W/ 250 OVERRIDE (IP): Performed by: EMERGENCY MEDICINE

## 2022-10-24 PROCEDURE — 770006 HCHG ROOM/CARE - MED/SURG/GYN SEMI*

## 2022-10-24 PROCEDURE — 85025 COMPLETE CBC W/AUTO DIFF WBC: CPT

## 2022-10-24 PROCEDURE — 87086 URINE CULTURE/COLONY COUNT: CPT

## 2022-10-24 PROCEDURE — 700105 HCHG RX REV CODE 258: Performed by: EMERGENCY MEDICINE

## 2022-10-24 PROCEDURE — 99497 ADVNCD CARE PLAN 30 MIN: CPT | Mod: 25 | Performed by: INTERNAL MEDICINE

## 2022-10-24 RX ORDER — HYDROCODONE BITARTRATE AND ACETAMINOPHEN 5; 325 MG/1; MG/1
1 TABLET ORAL EVERY 6 HOURS PRN
Status: ON HOLD | COMMUNITY
End: 2022-10-27 | Stop reason: SDUPTHER

## 2022-10-24 RX ORDER — HYDROCODONE BITARTRATE AND ACETAMINOPHEN 5; 325 MG/1; MG/1
1 TABLET ORAL 2 TIMES DAILY
Status: DISCONTINUED | OUTPATIENT
Start: 2022-10-24 | End: 2022-10-28 | Stop reason: HOSPADM

## 2022-10-24 RX ORDER — MIRTAZAPINE 15 MG/1
15 TABLET, FILM COATED ORAL
Status: DISCONTINUED | OUTPATIENT
Start: 2022-10-24 | End: 2022-10-28 | Stop reason: HOSPADM

## 2022-10-24 RX ORDER — CHOLECALCIFEROL (VITAMIN D3) 125 MCG
10 CAPSULE ORAL
Status: DISCONTINUED | OUTPATIENT
Start: 2022-10-24 | End: 2022-10-28 | Stop reason: HOSPADM

## 2022-10-24 RX ORDER — BISACODYL 10 MG
10 SUPPOSITORY, RECTAL RECTAL
Status: DISCONTINUED | OUTPATIENT
Start: 2022-10-24 | End: 2022-10-28 | Stop reason: HOSPADM

## 2022-10-24 RX ORDER — PHENOL 1.4 %
10 AEROSOL, SPRAY (ML) MUCOUS MEMBRANE
COMMUNITY

## 2022-10-24 RX ORDER — TAMSULOSIN HYDROCHLORIDE 0.4 MG/1
0.8 CAPSULE ORAL
Status: DISCONTINUED | OUTPATIENT
Start: 2022-10-24 | End: 2022-10-28 | Stop reason: HOSPADM

## 2022-10-24 RX ORDER — HEPARIN SODIUM 5000 [USP'U]/ML
5000 INJECTION, SOLUTION INTRAVENOUS; SUBCUTANEOUS EVERY 8 HOURS
Status: DISCONTINUED | OUTPATIENT
Start: 2022-10-24 | End: 2022-10-27

## 2022-10-24 RX ORDER — SODIUM CHLORIDE 9 MG/ML
INJECTION, SOLUTION INTRAVENOUS CONTINUOUS
Status: DISCONTINUED | OUTPATIENT
Start: 2022-10-24 | End: 2022-10-26

## 2022-10-24 RX ORDER — ATORVASTATIN CALCIUM 40 MG/1
40 TABLET, FILM COATED ORAL EVERY EVENING
Status: DISCONTINUED | OUTPATIENT
Start: 2022-10-24 | End: 2022-10-28 | Stop reason: HOSPADM

## 2022-10-24 RX ORDER — CEFTRIAXONE 2 G/1
2 INJECTION, POWDER, FOR SOLUTION INTRAMUSCULAR; INTRAVENOUS ONCE
Status: COMPLETED | OUTPATIENT
Start: 2022-10-24 | End: 2022-10-24

## 2022-10-24 RX ORDER — SODIUM CHLORIDE, SODIUM LACTATE, POTASSIUM CHLORIDE, AND CALCIUM CHLORIDE .6; .31; .03; .02 G/100ML; G/100ML; G/100ML; G/100ML
30 INJECTION, SOLUTION INTRAVENOUS ONCE
Status: COMPLETED | OUTPATIENT
Start: 2022-10-24 | End: 2022-10-24

## 2022-10-24 RX ORDER — HYDROXYZINE 50 MG/1
50 TABLET, FILM COATED ORAL EVERY 12 HOURS PRN
COMMUNITY

## 2022-10-24 RX ORDER — AMOXICILLIN 250 MG
2 CAPSULE ORAL 2 TIMES DAILY
Status: DISCONTINUED | OUTPATIENT
Start: 2022-10-24 | End: 2022-10-28 | Stop reason: HOSPADM

## 2022-10-24 RX ORDER — POLYETHYLENE GLYCOL 3350 17 G/17G
1 POWDER, FOR SOLUTION ORAL
Status: DISCONTINUED | OUTPATIENT
Start: 2022-10-24 | End: 2022-10-28 | Stop reason: HOSPADM

## 2022-10-24 RX ORDER — SULFAMETHOXAZOLE AND TRIMETHOPRIM 800; 160 MG/1; MG/1
1 TABLET ORAL 2 TIMES DAILY
Status: ON HOLD | COMMUNITY
End: 2022-10-27

## 2022-10-24 RX ORDER — ACETAMINOPHEN 325 MG/1
650 TABLET ORAL EVERY 6 HOURS PRN
Status: DISCONTINUED | OUTPATIENT
Start: 2022-10-24 | End: 2022-10-28 | Stop reason: HOSPADM

## 2022-10-24 RX ORDER — ONDANSETRON 2 MG/ML
4 INJECTION INTRAMUSCULAR; INTRAVENOUS ONCE
Status: COMPLETED | OUTPATIENT
Start: 2022-10-24 | End: 2022-10-24

## 2022-10-24 RX ADMIN — SODIUM CHLORIDE: 9 INJECTION, SOLUTION INTRAVENOUS at 23:00

## 2022-10-24 RX ADMIN — HEPARIN SODIUM 5000 UNITS: 5000 INJECTION, SOLUTION INTRAVENOUS; SUBCUTANEOUS at 22:55

## 2022-10-24 RX ADMIN — Medication 10 MG: at 22:55

## 2022-10-24 RX ADMIN — ONDANSETRON 4 MG: 2 INJECTION INTRAMUSCULAR; INTRAVENOUS at 18:59

## 2022-10-24 RX ADMIN — CEFTRIAXONE SODIUM 2 G: 2 INJECTION, POWDER, FOR SOLUTION INTRAMUSCULAR; INTRAVENOUS at 17:23

## 2022-10-24 RX ADMIN — TAMSULOSIN HYDROCHLORIDE 0.8 MG: 0.4 CAPSULE ORAL at 22:54

## 2022-10-24 RX ADMIN — HYDROCODONE BITARTRATE AND ACETAMINOPHEN 1 TABLET: 5; 325 TABLET ORAL at 18:13

## 2022-10-24 RX ADMIN — ATORVASTATIN CALCIUM 40 MG: 40 TABLET, FILM COATED ORAL at 18:12

## 2022-10-24 RX ADMIN — SODIUM CHLORIDE, POTASSIUM CHLORIDE, SODIUM LACTATE AND CALCIUM CHLORIDE 2586 ML: 600; 310; 30; 20 INJECTION, SOLUTION INTRAVENOUS at 17:27

## 2022-10-24 ASSESSMENT — FIBROSIS 4 INDEX: FIB4 SCORE: 2.04

## 2022-10-24 NOTE — ED TRIAGE NOTES
"Chief Complaint   Patient presents with    Other     Pt has hx of dementia. [facility] Pt refusing medsx 1 week. Pt refusing for catheter to be changed out. [facility] due to drop in BP. Unknown what pts baseline BP is       [facility] for [facility]. Pts daughter who is POA states pt is at baseline mentation. Pt denies any pain. Pt sent here for BP concern and because pt has become \"septic before\".       /55   Pulse 79   Temp 36.7 °C (98.1 °F) (Temporal)   Resp 16   Ht 1.829 m (6')   Wt 86.2 kg (190 lb)   SpO2 94%   BMI 25.77 kg/m²     "

## 2022-10-24 NOTE — ED PROVIDER NOTES
ED Provider  Scribed for Jordan Capellan D.O. by Emilee Barrios. 10/24/2022  3:20 PM    Means of arrival: Ambulance  History obtained from: Patient's Daughter  History limited by: Patient is confused      CHIEF COMPLAINT  Chief Complaint   Patient presents with    Other     Pt has hx of dementia. [facility] Pt refusing medsx 1 week. Pt refusing for catheter to be changed out. [facility] due to [facility] in BP. Unknown what pts baseline BP is       HPI  Edharper Garay is a 82 y.o. male with history of dementia who presents via ambulance from [facility] for evaluation of hypotension onset prior to arrival. Patient denies any symptomatic complaints at this time. Patient is aware of his location, but is unable to specify the month or year. According to triage note, patient has refused medications for the past week, and refused catheter change. Patient's daughter reports he has self-removed 6 catheters, and patient has been more erratic lately. Patient's daughter reports he has only been periodically taking his medications, but typically refuses and becomes aggressive with staff.  Patient's daughter reports he has chronic UTI secondary to the catheter placement, and a history of yeast and MRSA infection, and sepsis twice. No alleviating factors were reported. Pt's daughter reports that Patient has established advanced directives and DNR in place.    History limited by: Patient is confused    REVIEW OF SYSTEMS  See HPI for further details.    ROS limited by: Patient is confused    PAST MEDICAL HISTORY   has a past medical history of Diabetes (HCC), Hyperlipemia, Hypertension, and MI (myocardial infarction) ([facility]).    SURGICAL HISTORY   has a past surgical history that includes stent placement; appendectomy; irrigation & debridement general (6/14/2022); irrigation & debridement general (Left, 8/22/2022); and bone biopsy (Left, 8/22/2022).    SOCIAL HISTORY  Social History     Tobacco Use    Smoking  status: Former     Types: Cigarettes     Quit date: 2000     Years since quittin.1    Smokeless tobacco: Never   Vaping Use    Vaping Use: Never used   Substance and Sexual Activity    Alcohol use: Never    Drug use: Never     FAMILY HISTORY  Family History   Problem Relation Age of Onset    No Known Problems Mother     Heart Disease Father      CURRENT MEDICATIONS  Home Medications       Reviewed by Erna Almazan R.N. (Registered Nurse) on 10/24/22 at 1502  Med List Status: Partial     Medication Last Dose Status   amLODIPine (NORVASC) 5 MG Tab  Active   atorvastatin (LIPITOR) 40 MG Tab  Active   cephALEXin (KEFLEX) 500 MG Cap  Active   HYDROcodone-acetaminophen (NORCO) 5-325 MG Tab per tablet  Active   insulin regular (HUMULIN R) 100 Unit/mL Solution  Active   lisinopril (PRINIVIL) 40 MG tablet  Active   magnesium hydroxide (MILK OF MAGNESIA) 400 MG/5ML Suspension  Active   metformin (GLUCOPHAGE) 1000 MG tablet  Active   mirtazapine (REMERON) 15 MG Tab  Active   sertraline (ZOLOFT) 50 MG Tab  Active   tamsulosin (FLOMAX) 0.4 MG capsule  Active             ALLERGIES  No Known Allergies    PHYSICAL EXAM  VITAL SIGNS: /55   Pulse 79   Temp 36.7 °C (98.1 °F) (Temporal)   Resp 16   Ht 1.829 m (6')   Wt 86.2 kg (190 lb)   SpO2 94%   BMI 25.77 kg/m²   Constitutional: Alert in no apparent distress.  HENT: No signs of trauma, mucous membranes are moist  Eyes: Conjunctiva normal, Non-icteric.   Neck: Normal range of motion, No tenderness, Supple.  Lymphatic: No lymphadenopathy noted.   Cardiovascular: Regular rate and rhythm, no murmurs.   Thorax & Lungs: Normal breath sounds, No respiratory distress, No wheezing, No chest tenderness.   Abdomen: Bowel sounds normal, Soft, No tenderness, No masses, No pulsatile masses. No peritoneal signs.  Skin: Warm, Dry, normal color.   Back: No bony tenderness, No CVA tenderness.   : Urinary catheter in place. Urine is cloudy.  Extremities: No edema, No  tenderness, No cyanosis  Musculoskeletal: Good range of motion in all major joints. No tenderness to palpation or major deformities noted.   Neurologic:   Alert and oriented to person and time. Disoriented to date and recent events, Normal motor function, Normal sensory function, No focal deficits noted.   Psychiatric: Calm, Cooperative.    DIAGNOSTIC STUDIES / PROCEDURES    EKG  12 Lead EKG interpreted by me shown below.      LABS  Results for orders placed or performed during the hospital encounter of 10/24/22   LACTIC ACID   Result Value Ref Range    Lactic Acid 2.4 (H) 0.5 - 2.0 mmol/L   CBC WITH DIFFERENTIAL   Result Value Ref Range    WBC 14.1 (H) 4.8 - 10.8 K/uL    RBC 4.45 (L) 4.70 - 6.10 M/uL    Hemoglobin 12.4 (L) 14.0 - 18.0 g/dL    Hematocrit 38.0 (L) 42.0 - 52.0 %    MCV 85.4 81.4 - 97.8 fL    MCH 27.9 27.0 - 33.0 pg    MCHC 32.6 (L) 33.7 - 35.3 g/dL    RDW 50.1 (H) 35.9 - 50.0 fL    Platelet Count 376 164 - 446 K/uL    MPV 9.0 9.0 - 12.9 fL    Neutrophils-Polys 75.10 (H) 44.00 - 72.00 %    Lymphocytes 13.70 (L) 22.00 - 41.00 %    Monocytes 8.00 0.00 - 13.40 %    Eosinophils 1.90 0.00 - 6.90 %    Basophils 0.40 0.00 - 1.80 %    Immature Granulocytes 0.90 0.00 - 0.90 %    Nucleated RBC 0.00 /100 WBC    Neutrophils (Absolute) 10.56 (H) 1.82 - 7.42 K/uL    Lymphs (Absolute) 1.93 1.00 - 4.80 K/uL    Monos (Absolute) 1.12 (H) 0.00 - 0.85 K/uL    Eos (Absolute) 0.27 0.00 - 0.51 K/uL    Baso (Absolute) 0.05 0.00 - 0.12 K/uL    Immature Granulocytes (abs) 0.13 (H) 0.00 - 0.11 K/uL    NRBC (Absolute) 0.00 K/uL   COMP METABOLIC PANEL   Result Value Ref Range    Sodium 136 135 - 145 mmol/L    Potassium 4.6 3.6 - 5.5 mmol/L    Chloride 100 96 - 112 mmol/L    Co2 23 20 - 33 mmol/L    Anion Gap 13.0 7.0 - 16.0    Glucose 167 (H) 65 - 99 mg/dL    Bun 38 (H) 8 - 22 mg/dL    Creatinine 1.45 (H) 0.50 - 1.40 mg/dL    Calcium 9.7 8.5 - 10.5 mg/dL    AST(SGOT) 15 12 - 45 U/L    ALT(SGPT) 6 2 - 50 U/L    Alkaline Phosphatase  86 30 - 99 U/L    Total Bilirubin 0.7 0.1 - 1.5 mg/dL    Albumin 3.9 3.2 - 4.9 g/dL    Total Protein 8.4 (H) 6.0 - 8.2 g/dL    Globulin 4.5 (H) 1.9 - 3.5 g/dL    A-G Ratio 0.9 g/dL   URINALYSIS    Specimen: Urine   Result Value Ref Range    Color Yellow     Character Turbid (A)     Specific Gravity 1.020 <1.035    Ph 5.5 5.0 - 8.0    Glucose Negative Negative mg/dL    Ketones Negative Negative mg/dL    Protein 100 (A) Negative mg/dL    Bilirubin Negative Negative    Urobilinogen, Urine 0.2 Negative    Nitrite Negative Negative    Leukocyte Esterase Large (A) Negative    Occult Blood Moderate (A) Negative    Micro Urine Req Microscopic    URINE MICROSCOPIC (W/UA)   Result Value Ref Range    WBC Packed (A) /hpf    RBC 20-50 (A) /hpf    Bacteria Few (A) None /hpf    Epithelial Cells Negative /hpf    Hyaline Cast 3-5 (A) /lpf    Budding Yeast Present (A) Absent /hpf    Hyphae Yeast Present (A) Absent /hpf   ESTIMATED GFR   Result Value Ref Range    GFR (CKD-EPI) 48 (A) >60 mL/min/1.73 m 2   Prothrombin time (INR)   Result Value Ref Range    PT 15.0 (H) 12.0 - 14.6 sec    INR 1.20 (H) 0.87 - 1.13   PROCALCITONIN   Result Value Ref Range    Procalcitonin 0.16 <0.25 ng/mL       All labs reviewed by me.    RADIOLOGY  DX-CHEST-PORTABLE (1 VIEW)   Final Result      No acute cardiac or pulmonary abnormalities are identified.        The radiologist's interpretations of all radiological studies have been reviewed by me.    Films have been independently by me      COURSE  Pertinent Labs & Imaging studies reviewed. (See chart for details)      3:20 PM - Patient seen and examined at bedside. Discussed plan of care. Ordered for DX-Chest-Portable, Lactic Acid now and every 2 hours, CBC w/ Diff, CMP, UA, and blood culture x2 to evaluate his symptoms.     5:11 PM - Patient to be treated with Rocephin 2 g Injection, and LR bolus.    5:22 PM - Paged Hospitalist.    5:32 PM - I discussed the patient's case and the above findings with   Deyvi (Hospitalist) who will assess the patient for hospitalization.     CRITICAL CARE  The very real possibilty of a deterioration of this patient's condition required the highest level of my preparedness for sudden, emergent intervention.  I provided critical care services, which included medication orders, frequent reevaluations of the patient's condition and response to treatment, ordering and reviewing test results, and discussing the case with various consultants.  The critical care time associated with the care of the patient was 35 minutes. Review chart for interventions. This time is exclusive of any other billable procedures.     HYDRATION: Based on the patient's presentation of Sepsis the patient was given IV fluids. IV Hydration was used because oral hydration was not adequate alone. Upon recheck following hydration, the patient was improved.      MEDICAL DECISION MAKING  This is a 82 y.o. male who presents with a history of dementia, he has not been cooperative with his medications he is more agitated.  He does have an indwelling Martinez catheter has had problems with sepsis before.    He is awake alert he speaks well he is calm but he does have confusion recent events, and time.    Evaluation does show a significant UTI with indwelling catheter, and his lactic acid level and white blood cell count is elevated.  He is not in septic shock but he is having encephalopathy with concern for sepsis.  IV fluids and IV antibiotics were initiated.  I spoke with the daughter to update her on the plan for admission as well as the hospitalist for admission the patient will be admitted for further evaluation and treatment.    DISPOSITION:  Patient will be hospitalized by Dr. Carnes in guarded condition.    FINAL IMPRESSION  1. Urinary tract infection associated with indwelling urethral catheter, subsequent encounter    2. Sepsis with encephalopathy without septic shock, due to unspecified organism (HCC)    3.       The critical care time associated with the care of the patient was 35 minute     Emilee DOBSON (Scribe), am scribing for, and in the presence of, Jordan Capellan D.O..    Electronically signed by: Emilee Barrios (Scribe), 10/24/2022    Jordan DOBSON D.O. personally performed the services described in this documentation, as scribed by Emilee Barrios in my presence, and it is both accurate and complete.    The note accurately reflects work and decisions made by me.  Jordan Capellan D.O.  10/24/2022  7:24 PM

## 2022-10-25 LAB
ANION GAP SERPL CALC-SCNC: 12 MMOL/L (ref 7–16)
BASOPHILS # BLD AUTO: 0.3 % (ref 0–1.8)
BASOPHILS # BLD: 0.04 K/UL (ref 0–0.12)
BUN SERPL-MCNC: 28 MG/DL (ref 8–22)
CALCIUM SERPL-MCNC: 8.6 MG/DL (ref 8.5–10.5)
CHLORIDE SERPL-SCNC: 101 MMOL/L (ref 96–112)
CO2 SERPL-SCNC: 21 MMOL/L (ref 20–33)
CREAT SERPL-MCNC: 0.92 MG/DL (ref 0.5–1.4)
CRP SERPL HS-MCNC: 14.9 MG/DL (ref 0–0.75)
EOSINOPHIL # BLD AUTO: 0.1 K/UL (ref 0–0.51)
EOSINOPHIL NFR BLD: 0.7 % (ref 0–6.9)
ERYTHROCYTE [DISTWIDTH] IN BLOOD BY AUTOMATED COUNT: 48 FL (ref 35.9–50)
GFR SERPLBLD CREATININE-BSD FMLA CKD-EPI: 83 ML/MIN/1.73 M 2
GLUCOSE BLD STRIP.AUTO-MCNC: 144 MG/DL (ref 65–99)
GLUCOSE BLD STRIP.AUTO-MCNC: 145 MG/DL (ref 65–99)
GLUCOSE BLD STRIP.AUTO-MCNC: 167 MG/DL (ref 65–99)
GLUCOSE BLD STRIP.AUTO-MCNC: 168 MG/DL (ref 65–99)
GLUCOSE SERPL-MCNC: 180 MG/DL (ref 65–99)
HCT VFR BLD AUTO: 32.6 % (ref 42–52)
HGB BLD-MCNC: 10.6 G/DL (ref 14–18)
IMM GRANULOCYTES # BLD AUTO: 0.08 K/UL (ref 0–0.11)
IMM GRANULOCYTES NFR BLD AUTO: 0.6 % (ref 0–0.9)
LACTATE SERPL-SCNC: 1.2 MMOL/L (ref 0.5–2)
LACTATE SERPL-SCNC: 1.5 MMOL/L (ref 0.5–2)
LYMPHOCYTES # BLD AUTO: 1.33 K/UL (ref 1–4.8)
LYMPHOCYTES NFR BLD: 9.4 % (ref 22–41)
MAGNESIUM SERPL-MCNC: 1.3 MG/DL (ref 1.5–2.5)
MCH RBC QN AUTO: 27.4 PG (ref 27–33)
MCHC RBC AUTO-ENTMCNC: 32.5 G/DL (ref 33.7–35.3)
MCV RBC AUTO: 84.2 FL (ref 81.4–97.8)
MONOCYTES # BLD AUTO: 1.23 K/UL (ref 0–0.85)
MONOCYTES NFR BLD AUTO: 8.7 % (ref 0–13.4)
NEUTROPHILS # BLD AUTO: 11.43 K/UL (ref 1.82–7.42)
NEUTROPHILS NFR BLD: 80.3 % (ref 44–72)
NRBC # BLD AUTO: 0 K/UL
NRBC BLD-RTO: 0 /100 WBC
PLATELET # BLD AUTO: 330 K/UL (ref 164–446)
PMV BLD AUTO: 8.7 FL (ref 9–12.9)
POTASSIUM SERPL-SCNC: 4.6 MMOL/L (ref 3.6–5.5)
PROCALCITONIN SERPL-MCNC: 0.09 NG/ML
RBC # BLD AUTO: 3.87 M/UL (ref 4.7–6.1)
SODIUM SERPL-SCNC: 134 MMOL/L (ref 135–145)
WBC # BLD AUTO: 14.2 K/UL (ref 4.8–10.8)

## 2022-10-25 PROCEDURE — 85025 COMPLETE CBC W/AUTO DIFF WBC: CPT

## 2022-10-25 PROCEDURE — 700101 HCHG RX REV CODE 250: Performed by: HOSPITALIST

## 2022-10-25 PROCEDURE — 99233 SBSQ HOSP IP/OBS HIGH 50: CPT | Performed by: HOSPITALIST

## 2022-10-25 PROCEDURE — 83735 ASSAY OF MAGNESIUM: CPT

## 2022-10-25 PROCEDURE — 700102 HCHG RX REV CODE 250 W/ 637 OVERRIDE(OP): Performed by: INTERNAL MEDICINE

## 2022-10-25 PROCEDURE — 700111 HCHG RX REV CODE 636 W/ 250 OVERRIDE (IP): Performed by: HOSPITALIST

## 2022-10-25 PROCEDURE — 700111 HCHG RX REV CODE 636 W/ 250 OVERRIDE (IP)

## 2022-10-25 PROCEDURE — 97162 PT EVAL MOD COMPLEX 30 MIN: CPT

## 2022-10-25 PROCEDURE — 36415 COLL VENOUS BLD VENIPUNCTURE: CPT

## 2022-10-25 PROCEDURE — 86140 C-REACTIVE PROTEIN: CPT

## 2022-10-25 PROCEDURE — 84145 PROCALCITONIN (PCT): CPT

## 2022-10-25 PROCEDURE — A9270 NON-COVERED ITEM OR SERVICE: HCPCS | Performed by: HOSPITALIST

## 2022-10-25 PROCEDURE — 80048 BASIC METABOLIC PNL TOTAL CA: CPT

## 2022-10-25 PROCEDURE — 700105 HCHG RX REV CODE 258: Performed by: INTERNAL MEDICINE

## 2022-10-25 PROCEDURE — 700102 HCHG RX REV CODE 250 W/ 637 OVERRIDE(OP): Performed by: HOSPITALIST

## 2022-10-25 PROCEDURE — 82962 GLUCOSE BLOOD TEST: CPT | Mod: 91

## 2022-10-25 PROCEDURE — 700111 HCHG RX REV CODE 636 W/ 250 OVERRIDE (IP): Performed by: INTERNAL MEDICINE

## 2022-10-25 PROCEDURE — 770006 HCHG ROOM/CARE - MED/SURG/GYN SEMI*

## 2022-10-25 PROCEDURE — 83605 ASSAY OF LACTIC ACID: CPT | Mod: 91

## 2022-10-25 PROCEDURE — 97602 WOUND(S) CARE NON-SELECTIVE: CPT

## 2022-10-25 PROCEDURE — 97165 OT EVAL LOW COMPLEX 30 MIN: CPT

## 2022-10-25 PROCEDURE — 92610 EVALUATE SWALLOWING FUNCTION: CPT

## 2022-10-25 PROCEDURE — A9270 NON-COVERED ITEM OR SERVICE: HCPCS | Performed by: INTERNAL MEDICINE

## 2022-10-25 RX ORDER — SODIUM CHLORIDE, SODIUM LACTATE, POTASSIUM CHLORIDE, AND CALCIUM CHLORIDE .6; .31; .03; .02 G/100ML; G/100ML; G/100ML; G/100ML
500 INJECTION, SOLUTION INTRAVENOUS
Status: DISCONTINUED | OUTPATIENT
Start: 2022-10-25 | End: 2022-10-28 | Stop reason: HOSPADM

## 2022-10-25 RX ORDER — SODIUM CHLORIDE, SODIUM LACTATE, POTASSIUM CHLORIDE, AND CALCIUM CHLORIDE .6; .31; .03; .02 G/100ML; G/100ML; G/100ML; G/100ML
30 INJECTION, SOLUTION INTRAVENOUS
Status: DISCONTINUED | OUTPATIENT
Start: 2022-10-25 | End: 2022-10-28 | Stop reason: HOSPADM

## 2022-10-25 RX ORDER — SODIUM HYPOCHLORITE 1.25 MG/ML
SOLUTION TOPICAL 2 TIMES DAILY
Status: DISCONTINUED | OUTPATIENT
Start: 2022-10-25 | End: 2022-10-28 | Stop reason: HOSPADM

## 2022-10-25 RX ORDER — NYSTATIN 100000 [USP'U]/G
POWDER TOPICAL PRN
Status: DISCONTINUED | OUTPATIENT
Start: 2022-10-25 | End: 2022-10-28 | Stop reason: HOSPADM

## 2022-10-25 RX ORDER — MAGNESIUM SULFATE HEPTAHYDRATE 40 MG/ML
2 INJECTION, SOLUTION INTRAVENOUS ONCE
Status: COMPLETED | OUTPATIENT
Start: 2022-10-25 | End: 2022-10-25

## 2022-10-25 RX ADMIN — HYDROCODONE BITARTRATE AND ACETAMINOPHEN 1 TABLET: 5; 325 TABLET ORAL at 06:07

## 2022-10-25 RX ADMIN — CEFTRIAXONE SODIUM 2000 MG: 10 INJECTION, POWDER, FOR SOLUTION INTRAVENOUS at 11:16

## 2022-10-25 RX ADMIN — INSULIN HUMAN 15 UNITS: 100 INJECTION, SUSPENSION SUBCUTANEOUS at 10:42

## 2022-10-25 RX ADMIN — NYSTATIN: 100000 POWDER TOPICAL at 12:05

## 2022-10-25 RX ADMIN — ATORVASTATIN CALCIUM 40 MG: 40 TABLET, FILM COATED ORAL at 17:30

## 2022-10-25 RX ADMIN — TAMSULOSIN HYDROCHLORIDE 0.8 MG: 0.4 CAPSULE ORAL at 20:28

## 2022-10-25 RX ADMIN — HEPARIN SODIUM 5000 UNITS: 5000 INJECTION, SOLUTION INTRAVENOUS; SUBCUTANEOUS at 13:24

## 2022-10-25 RX ADMIN — MAGNESIUM SULFATE HEPTAHYDRATE 2 G: 40 INJECTION, SOLUTION INTRAVENOUS at 06:07

## 2022-10-25 RX ADMIN — HYDROCODONE BITARTRATE AND ACETAMINOPHEN 1 TABLET: 5; 325 TABLET ORAL at 17:30

## 2022-10-25 RX ADMIN — MIRTAZAPINE 15 MG: 15 TABLET, FILM COATED ORAL at 20:28

## 2022-10-25 RX ADMIN — HEPARIN SODIUM 5000 UNITS: 5000 INJECTION, SOLUTION INTRAVENOUS; SUBCUTANEOUS at 22:01

## 2022-10-25 RX ADMIN — SODIUM CHLORIDE: 9 INJECTION, SOLUTION INTRAVENOUS at 13:24

## 2022-10-25 RX ADMIN — Medication 10 MG: at 20:28

## 2022-10-25 RX ADMIN — HEPARIN SODIUM 5000 UNITS: 5000 INJECTION, SOLUTION INTRAVENOUS; SUBCUTANEOUS at 06:06

## 2022-10-25 RX ADMIN — SENNOSIDES AND DOCUSATE SODIUM 2 TABLET: 50; 8.6 TABLET ORAL at 17:30

## 2022-10-25 RX ADMIN — INSULIN HUMAN 15 UNITS: 100 INJECTION, SUSPENSION SUBCUTANEOUS at 18:32

## 2022-10-25 RX ADMIN — SENNOSIDES AND DOCUSATE SODIUM 2 TABLET: 50; 8.6 TABLET ORAL at 06:06

## 2022-10-25 RX ADMIN — SERTRALINE 50 MG: 50 TABLET, FILM COATED ORAL at 17:30

## 2022-10-25 RX ADMIN — SERTRALINE 50 MG: 50 TABLET, FILM COATED ORAL at 00:21

## 2022-10-25 RX ADMIN — MIRTAZAPINE 15 MG: 15 TABLET, FILM COATED ORAL at 00:21

## 2022-10-25 ASSESSMENT — COGNITIVE AND FUNCTIONAL STATUS - GENERAL
SUGGESTED CMS G CODE MODIFIER MOBILITY: CK
MOVING FROM LYING ON BACK TO SITTING ON SIDE OF FLAT BED: A LOT
DRESSING REGULAR UPPER BODY CLOTHING: A LITTLE
TOILETING: A LOT
MOVING TO AND FROM BED TO CHAIR: A LOT
HELP NEEDED FOR BATHING: A LOT
DRESSING REGULAR LOWER BODY CLOTHING: A LOT
PERSONAL GROOMING: A LOT
MOBILITY SCORE: 13
TOILETING: A LOT
PERSONAL GROOMING: A LITTLE
MOBILITY SCORE: 18
CLIMB 3 TO 5 STEPS WITH RAILING: A LOT
HELP NEEDED FOR BATHING: A LOT
STANDING UP FROM CHAIR USING ARMS: A LOT
WALKING IN HOSPITAL ROOM: A LITTLE
DAILY ACTIVITIY SCORE: 15
TURNING FROM BACK TO SIDE WHILE IN FLAT BAD: A LITTLE
TURNING FROM BACK TO SIDE WHILE IN FLAT BAD: A LITTLE
DRESSING REGULAR UPPER BODY CLOTHING: A LOT
EATING MEALS: A LITTLE
SUGGESTED CMS G CODE MODIFIER DAILY ACTIVITY: CL
SUGGESTED CMS G CODE MODIFIER DAILY ACTIVITY: CK
CLIMB 3 TO 5 STEPS WITH RAILING: A LITTLE
EATING MEALS: A LITTLE
DAILY ACTIVITIY SCORE: 13
DRESSING REGULAR LOWER BODY CLOTHING: A LOT
MOVING TO AND FROM BED TO CHAIR: A LOT
SUGGESTED CMS G CODE MODIFIER MOBILITY: CL
WALKING IN HOSPITAL ROOM: A LOT
STANDING UP FROM CHAIR USING ARMS: A LITTLE

## 2022-10-25 ASSESSMENT — ENCOUNTER SYMPTOMS
FEVER: 0
NAUSEA: 0
CHILLS: 0
SHORTNESS OF BREATH: 0
COUGH: 0
HEADACHES: 0
PALPITATIONS: 0
VOMITING: 0
DIZZINESS: 0
ABDOMINAL PAIN: 0

## 2022-10-25 ASSESSMENT — PATIENT HEALTH QUESTIONNAIRE - PHQ9
2. FEELING DOWN, DEPRESSED, IRRITABLE, OR HOPELESS: NOT AT ALL
1. LITTLE INTEREST OR PLEASURE IN DOING THINGS: NOT AT ALL
SUM OF ALL RESPONSES TO PHQ9 QUESTIONS 1 AND 2: 0
2. FEELING DOWN, DEPRESSED, IRRITABLE, OR HOPELESS: NOT AT ALL
SUM OF ALL RESPONSES TO PHQ9 QUESTIONS 1 AND 2: 0
1. LITTLE INTEREST OR PLEASURE IN DOING THINGS: NOT AT ALL

## 2022-10-25 ASSESSMENT — LIFESTYLE VARIABLES
DOES PATIENT WANT TO STOP DRINKING: CANNOT ASSESS
HAVE PEOPLE ANNOYED YOU BY CRITICIZING YOUR DRINKING: NO
EVER FELT BAD OR GUILTY ABOUT YOUR DRINKING: NO
TOTAL SCORE: 0
TOTAL SCORE: 0
AVERAGE NUMBER OF DAYS PER WEEK YOU HAVE A DRINK CONTAINING ALCOHOL: 0
ALCOHOL_USE: NO
ON A TYPICAL DAY WHEN YOU DRINK ALCOHOL HOW MANY DRINKS DO YOU HAVE: 0
HAVE YOU EVER FELT YOU SHOULD CUT DOWN ON YOUR DRINKING: NO
REASON UNABLE TO ASSESS: NOT ORIENTED.
EVER HAD A DRINK FIRST THING IN THE MORNING TO STEADY YOUR NERVES TO GET RID OF A HANGOVER: NO
CONSUMPTION TOTAL: NEGATIVE
TOTAL SCORE: 0
HOW MANY TIMES IN THE PAST YEAR HAVE YOU HAD 5 OR MORE DRINKS IN A DAY: 0

## 2022-10-25 ASSESSMENT — GAIT ASSESSMENTS
DISTANCE (FEET): 40
GAIT LEVEL OF ASSIST: STANDBY ASSIST
ASSISTIVE DEVICE: FRONT WHEEL WALKER
DEVIATION: OTHER (COMMENT)

## 2022-10-25 ASSESSMENT — PAIN DESCRIPTION - PAIN TYPE
TYPE: ACUTE PAIN
TYPE: ACUTE PAIN

## 2022-10-25 ASSESSMENT — PAIN SCALES - WONG BAKER
WONGBAKER_NUMERICALRESPONSE: HURTS JUST A LITTLE BIT
WONGBAKER_NUMERICALRESPONSE: HURTS JUST A LITTLE BIT

## 2022-10-25 ASSESSMENT — FIBROSIS 4 INDEX: FIB4 SCORE: 1.5

## 2022-10-25 ASSESSMENT — ACTIVITIES OF DAILY LIVING (ADL): TOILETING: REQUIRES ASSIST

## 2022-10-25 NOTE — ASSESSMENT & PLAN NOTE
Resolved      Monitor BMP and assess response  Avoid IV contrast/nephrotoxins/NSAIDs  Dose adjust meds for decreased GFR

## 2022-10-25 NOTE — PROGRESS NOTES
Recieved patient from night RN at 0700. Patient is A&O x self, resting comfortably in bed, soft bilateral wrist restraints remain in place, assessed and no signs of injury. Stated pain level is 0/10. Bed in lowest position with bed rails up. Call light within reach. Patient belongings near patient. Patient expresses no needs at this time. Hourly rounding in place.

## 2022-10-25 NOTE — ASSESSMENT & PLAN NOTE
Holding blood pressure medication at this time  Close monitoring and resume his medication if needed

## 2022-10-25 NOTE — PROGRESS NOTES
4 Eyes Skin Assessment Completed by FLORES Mast and FLORES Edwards.    Head WDL  Ears WDL  Nose WDL  Mouth WDL  Neck WDL  Breast/Chest WDL  Shoulder Blades WDL  Spine WDL  (R) Arm/Elbow/Hand WDL  (L) Arm/Elbow/Hand WDL  Abdomen WDL  Groin WDL  Scrotum/Coccyx/Buttocks WDL  (R) Leg WDL  (L) Leg WDL  (R) Heel/Foot/Toe Scab (Toe)  (L) Heel/Foot/Toe Ulcer(s), wound care orders in place          Devices In Places Pulse Ox, nasal canula, barbosa      Interventions In Place NC W/Ear Foams and Pillows    Possible Skin Injury Yes    Pictures Uploaded Into Epic Yes  Wound Consult Placed Yes  RN Wound Prevention Protocol Ordered Yes

## 2022-10-25 NOTE — THERAPY
"Speech Language Pathology   Clinical Swallow Evaluation     Patient Name: Andrei Garay  AGE:  82 y.o., SEX:  male  Medical Record #: [id number]  Today's Date: 10/25/2022     Precautions  Precautions: (P) Fall Risk  Comments: WBAT LLE in CAM boot from I&D L foot 08/2022    HPI:81 YO male admitted 10/24 2/2 fever. PMHx: advanced dementia, diabetes, HTN, CAD. CMHx: pyelonephritis, lactic acidosis, BPH, ankle wound, SADIE, HTN, dementia, w/ behavioral disturbance, DM2 w/ hyperglycemia. CXR 10/24 \"   No acute cardiac or pulmonary abnormalities are identified.\" Seen by SLP 8/21/22 with recs for SB/TN then upgraded to reg.     Level of Consciousness: Alert, Awake  Affect/Behavior: Agitated, Cooperative- intermittent  Follows Directives: Inconsistent  Orientation: Self  Hearing: Functional hearing  Vision: Functional vision    Prior Living Situation & Level of Function:  Pt's ex-wife present, reports he lives and [facility], tolerates a regular/thin liquid diet and takes big bites.     Oral Mechanism Evaluation  Facial Symmetry: Equal  Facial Sensation: Equal  Labial Observations: WFL  Lingual Observations: Midline  Dentition: Edentulous  Comments:    Voice  Quality: WFL  Resonance: WFL  Intensity: Appropriate  Cough: WFL  Comments:    Current Method of Nutrition   Oral diet (reg/thins)    Subjective  Pt alert, followed some directions, became slightly agitated with progression of instructions.      Assessment  Positioning: Curtis's (60-90 degrees)  Bolus Administration: Patient  Oxygen Requirements: Room Air  Factor(s) Affecting Performance: Impaired command following    Swallowing Trials  Thin Liquid (TN0): WFL  Soft & Bite-Sized (SB6): WFL  Regular (RG7): WFL    Comments:  Adequate bolus acceptance and containment noted. Timely swallow initiation and clear vocal quality appreciated. Pt demonstrated prolonged but functional mastication with no oral residue appreciated. Pt took large bites and required max " repetition to reduce bite size. No s/sx of aspiration appreciated with any consistencies consumed.       Clinical Impressions  Clinical signs of oral dysphagia include prolonged/inefficient mastication. Dysphagia is likely chronic related to dx of dementia and edentulism. Instrumental swallow study is not indicated to objectively assess swallow function and further direct POC. Diet modification is not indicated.     Recommendations  REGULAR/ THINS DIET, small bites slow rate  Instrumentation: None indicated at this time  Swallowing Instructions & Precautions:   Supervision: Monitor due to impulsive behavior  Positioning: Fully upright and midline during oral intake  Medication: Whole with liquid  Strategies: Small bites/sips, Slow rate of intake  Oral Care: BID      Plan    Recommend Speech Therapy for Evaluation only for the following treatments:  Dysphagia Training.    Discharge Recommendations: (P) Anticipate that the patient will have no further speech therapy needs after discharge from the hospital       Objective       10/25/22 1028   Charge Group   SLP Oral Pharyngeal Evaluation Oral Pharyngeal Evaluation   Total Treatment Time   SLP Time Spent Yes   SLP Oral Pharyngeal Evaluation (Mins) 12   Initial Contact Note    Initial Contact Note  Order Received and Verified, Speech Therapy Evaluation in Progress with Full Report to Follow.   Precautions   Precautions Fall Risk   Vitals   O2 (LPM) 0   O2 Delivery Device None - Room Air   Pain 0 - 10 Group   Therapist Pain Assessment Post Activity Pain Same as Prior to Activity;Nurse Notified;0   Prior Living Situation   Prior Services Other (Comments)  ([facility])   Housing / Facility Skilled Nursing Facility   Equipment Owned Front-Wheel Walker   Lives with - Patient's Self Care Capacity Attendant / Paid Care Giver   Comments Per family, [facility] meals, medications, dressing, and showering ADLs; however, pt able to assist/participate with ADLs    Prior Level Of Function   Communication Within Functional Limits   Swallow Within Functional Limits   Dentition Edentulous   Dentures None   Hearing Within Functional Limits for Evaluation   Vision Within Functional Limits for Evaluation   Patient's Primary Language English   Dysphagia Rating   Nutritional Liquid Intake Rating Scale Non thickened beverages   Nutritional Food Intake Rating Scale Total oral diet with no restrictions   Education Group   Education Provided Dysphagia   Dysphagia Patient Response Patient;Significant Other;Acceptance;Explanation;Demonstration;Verbal Demonstration;Action Demonstration;Reinforcement Needed;No Learning Evidence   Additional Comments Ex-wife verbalized good understanding, no learning evidence from patient   Problem List   Problem List Dysphagia   Anticipated Discharge Needs   Discharge Recommendations Anticipate that the patient will have no further speech therapy needs after discharge from the hospital   Therapy Recommendations Upon DC Not Indicated   Interdisciplinary Plan of Care Collaboration   IDT Collaboration with  Nursing;Family / Caregiver   Patient Position at End of Therapy Seated;In Bed;Bed Alarm On;Call Light within Reach;Tray Table within Reach;Family / Friend in Room;Wrist Restraints Applied   Collaboration Comments RN aware of results/recs

## 2022-10-25 NOTE — ED NOTES
Med rec complete per pt's MAR from [facility] [place]

## 2022-10-25 NOTE — ASSESSMENT & PLAN NOTE
With fever, hypotension and leukocytosis  IV fluid   IV ROCEPHIN changed to cefdinir  UCx noted  Symptoms now resolved  Martinez catheter removed on 10/26- no longer retaining

## 2022-10-25 NOTE — CARE PLAN
The patient is Stable - Low risk of patient condition declining or worsening    Shift Goals  Clinical Goals: improve mentation  Patient Goals: eat all meals    Progress made toward(s) clinical / shift goals:      Problem: Fluid Volume  Goal: Fluid volume balance will be maintained  Outcome: Progressing     Problem: Urinary - Renal Perfusion  Goal: Ability to achieve and maintain adequate renal perfusion and functioning will improve  Outcome: Progressing     Problem: Fall Risk  Goal: Patient will remain free from falls  Outcome: Progressing     Problem: Safety - Medical Restraint  Goal: Remains free of injury from restraints (Restraint for Interference with Medical Device)  Outcome: Progressing  Goal: Free from restraint(s) (Restraint for Interference with Medical Device)  Outcome: Progressing       Patient is not progressing towards the following goals:    Problem: Knowledge Deficit - Standard  Goal: Patient and family/care givers will demonstrate understanding of plan of care, disease process/condition, diagnostic tests and medications  Outcome: Not Progressing  Pt remains confused.

## 2022-10-25 NOTE — HOSPITAL COURSE
82-year-old male residing at [facility] with past medical history of advanced dementia, diabetes, hypertension, CAD, recent left foot surgery presenting with fever.  He was admitted for sepsis secondary to urinary tract infection with SADIE.  During patient's for surgery he had Martinez catheter inserted and patient had been refusing catheter care and changing out catheter.

## 2022-10-25 NOTE — PROGRESS NOTES
Hospital Medicine Daily Progress Note    Date of Service  10/25/2022    Chief Complaint  Andrei Garay is a 82 y.o. male admitted 10/24/2022 with fever    Hospital Course  82[facility]year-[facility] with past medical history of advanced dementia, diabetes, hypertension, CAD, recent left foot surgery presenting with fever.  He was admitted for sepsis secondary to urinary tract infection with SADIE.  During patient's for surgery he had Martinez catheter inserted and patient had been refusing catheter care and changing out catheter.    Interval Problem Update  No further fevers since admission  Creatinine improving  He is feeling better  Still in restraints because he is pulling at Martinez catheter  Patient states nothing is bothering him other than the restraints    I have discussed this patient's plan of care and discharge plan at IDT rounds today with Case Management, Nursing, Nursing leadership, and other members of the IDT team.    Consultants/Specialty  None    Code Status  DNAR/DNI    Disposition  Patient is not medically cleared for discharge.   Anticipate discharge to to home with close outpatient follow-up.  I have placed the appropriate orders for post-discharge needs.    Review of Systems  Review of Systems   Constitutional:  Negative for chills and fever.   Respiratory:  Negative for cough and shortness of breath.    Cardiovascular:  Negative for chest pain and palpitations.   Gastrointestinal:  Negative for abdominal pain, nausea and vomiting.   Genitourinary:  Negative for dysuria and urgency.   Neurological:  Negative for dizziness and headaches.   All other systems reviewed and are negative.     Physical Exam  Temp:  [36.7 °C (98.1 °F)-37.2 °C (98.9 °F)] 36.7 °C (98.1 °F)  Pulse:  [] 82  Resp:  [16-20] 18  BP: (100-146)/(48-84) 104/48  SpO2:  [91 %-99 %] 99 %    Physical Exam  Vitals and nursing note reviewed.   Constitutional:       Appearance: Normal appearance.   Cardiovascular:       Rate and Rhythm: Normal rate and regular rhythm.      Heart sounds: No murmur heard.  Pulmonary:      Effort: Pulmonary effort is normal. No respiratory distress.      Breath sounds: Normal breath sounds.   Abdominal:      General: Abdomen is flat.      Tenderness: There is abdominal tenderness (Right and left flank). There is guarding.   Neurological:      General: No focal deficit present.      Mental Status: He is alert and oriented to person, place, and time.       Fluids    Intake/Output Summary (Last 24 hours) at 10/25/2022 1331  Last data filed at 10/25/2022 1300  Gross per 24 hour   Intake 327 ml   Output 1750 ml   Net -1423 ml       Laboratory  Recent Labs     10/24/22  1540 10/25/22  0127   WBC 14.1* 14.2*   RBC 4.45* 3.87*   HEMOGLOBIN 12.4* 10.6*   HEMATOCRIT 38.0* 32.6*   MCV 85.4 84.2   MCH 27.9 27.4   MCHC 32.6* 32.5*   RDW 50.1* 48.0   PLATELETCT 376 330   MPV 9.0 8.7*     Recent Labs     10/24/22  1540 10/25/22  0127   SODIUM 136 134*   POTASSIUM 4.6 4.6   CHLORIDE 100 101   CO2 23 21   GLUCOSE 167* 180*   BUN 38* 28*   CREATININE 1.45* 0.92   CALCIUM 9.7 8.6     Recent Labs     10/24/22  1540   INR 1.20*               Imaging  DX-CHEST-PORTABLE (1 VIEW)   Final Result      No acute cardiac or pulmonary abnormalities are identified.           Assessment/Plan  * Pyelonephritis- (present on admission)  Assessment & Plan  With fever, hypotension and leukocytosis  IV fluid IV antibiotic ceftriaxone  Urine and blood culture  Change of Barbosa  Will attempt voiding trial    Lactic acidosis  Assessment & Plan  resolved    Benign prostatic hyperplasia without lower urinary tract symptoms- (present on admission)  Assessment & Plan  Patient had Barbosa since surgery on his wound for months  Patient is on Flomax 0.8 mg daily  Will trial barbosa clamping trial  Consider adding finasteride if needed    Ankle wound- (present on admission)  Assessment & Plan  Patient has been followed by LPS and wound care  Patient  received surgery  No signs of infection    Advance care planning- (present on admission)  Assessment & Plan  Patient DNR/DNI      SADIE (acute kidney injury) (HCC)- (present on admission)  Assessment & Plan  Creatinine improving  Monitor BMP and assess response  Avoid IV contrast/nephrotoxins/NSAIDs  Dose adjust meds for decreased GFR      HTN (hypertension)- (present on admission)  Assessment & Plan  Holding blood pressure medication at this time  Close monitoring and resume his medication if needed    Dementia with behavioral disturbance- (present on admission)  Assessment & Plan  Moderate to advanced dementia  Patient needs help on all daily activities, he is alert and oriented to himself only, with agitation  Around his baseline according to family  Continue nonpharmacological treatment to avoid worsening agitation  Avoid benzo and antihistamine  [facility]  Discussed the plan of care with the family, they think still elevated for comfort care    Type 2 diabetes mellitus with hyperglycemia, with long-term current use of insulin (HCC)- (present on admission)  Assessment & Plan  Complicated with a chronic wound infection and osteomyelitis  Continue home medication insulin 70/30 50 units 2 times a day  Holding metformin  Sliding scale       VTE prophylaxis: heparin ppx    I have performed a physical exam and reviewed and updated ROS and Plan today (10/25/2022). In review of yesterday's note (10/24/2022), there are no changes except as documented above.

## 2022-10-25 NOTE — THERAPY
Occupational Therapy   Initial Evaluation     Patient Name: Andrei Garay  Age:  82 y.o., Sex:  male  Medical Record #: [id number]  Today's Date: 10/25/2022     Precautions  Precautions: Fall Risk  Comments: WBAT LLE in CAM boot from I&D L foot 08/2022    Assessment    Patient is 82 y.o. male with history of advanced dementia, diabetes, hypertension and coronary artery disease who presented 10/24 with fever.  [facility], with fever and more agitation. Pt normally requires assistance with ADLs and functional mobility per family in room. Pt initially hesitant to participate and did not want to participate with max encouragement pt willing to get out of bed. Pt required CGA for bed mobility, supervision for mobility, maxA for lower body ADLs. No further OT needs at this time, will complete order.     Plan    Recommend Occupational Therapy for Evaluation only.    DC Equipment Recommendations: (P) None  Discharge Recommendations: (P) Anticipate that the patient will have no further occupational therapy needs after discharge from the hospital     Objective       10/25/22 1006   Prior Living Situation   Prior Services Other (Comments)   Housing / Facility Skilled Nursing Facility   Bathroom Set up Walk In Shower;Shower Chair;Grab Bars   Equipment Owned Front-Wheel Walker   Lives with - Patient's Self Care Capacity Attendant / Paid Care Giver   Prior Level of ADL Function   Self Feeding Independent   Grooming / Hygiene Independent   Bathing Requires Assist   Dressing Requires Assist   Toileting Requires Assist   Precautions   Precautions Fall Risk   Cognition    Cognition / Consciousness X   Level of Consciousness Alert   Comments Extremely disgruntled and reluctant to interaction   Strength Upper Body   Upper Body Strength  WDL   Balance Assessment   Sitting Balance (Static) Fair   Sitting Balance (Dynamic) Fair   Standing Balance (Static) Fair   Standing Balance  (Dynamic) Fair   Weight Shift Sitting Fair   Weight Shift Standing Fair   Comments w/ FWW   Bed Mobility    Supine to Sit Minimal Assist   Sit to Supine Standby Assist   ADL Assessment   Grooming Supervision   Upper Body Dressing Supervision   Lower Body Dressing Maximal Assist   How much help from another person does the patient currently need...   Putting on and taking off regular lower body clothing? 2   Bathing (including washing, rinsing, and drying)? 2   Toileting, which includes using a toilet, bedpan, or urinal? 2   Putting on and taking off regular upper body clothing? 3   Taking care of personal grooming such as brushing teeth? 3   Eating meals? 3   6 Clicks Daily Activity Score 15   Functional Mobility   Sit to Stand Supervised   Bed, Chair, Wheelchair Transfer Supervised   Transfer Method Stand Step   Mobility bed mobility, hallway mobility, back to bed   Comments w/ FWW   Activity Tolerance   Sitting Edge of Bed 5 min   Standing 8 min   Education Group   Education Provided Role of Occupational Therapist   Role of Occupational Therapist Patient Response Patient;Acceptance;Explanation   Problem List   Problem List None   Anticipated Discharge Equipment and Recommendations   DC Equipment Recommendations None   Discharge Recommendations Anticipate that the patient will have no further occupational therapy needs after discharge from the hospital   Interdisciplinary Plan of Care Collaboration   IDT Collaboration with  Nursing;Physical Therapist   Patient Position at End of Therapy Seated;Bed Alarm On;Call Light within Reach;Tray Table within Reach;Phone within Reach;Family / Friend in Room   Collaboration Comments RN updated

## 2022-10-25 NOTE — ASSESSMENT & PLAN NOTE
Patient had Barbosa since surgery on his wound for months  Patient is on Flomax 0.8 mg daily  Will trial off barbosa and bladder scan- if >400cc will replace barbosa  Consider adding finasteride if needed

## 2022-10-25 NOTE — ASSESSMENT & PLAN NOTE
Complicated with a chronic wound infection and osteomyelitis  Continue home medication insulin 70/30 50 units 2 times a day  Holding metformin  Sliding scale

## 2022-10-25 NOTE — PROGRESS NOTES
Assumed care 1930. Pt alert to self.   Pulse ox as ordered. Martinez intact. Pt puling at lines, \NV soft wrist restraints per order. 2RN Skin check completed. Meds given in applesauce. Frequent rounding. Safety maintained.

## 2022-10-25 NOTE — ASSESSMENT & PLAN NOTE
Moderate to advanced dementia  Patient needs help on all daily activities, he is alert and oriented to himself only, with agitation  Around his baseline according to family  Continue nonpharmacological treatment to avoid worsening agitation  Avoid benzo and antihistamine  [facility]  Discussed the plan of care with the family, they think still elevated for comfort care

## 2022-10-25 NOTE — WOUND TEAM
[facility]  Inpatient Services  Initial Wound and Skin Care Evaluation    Admission Date: 10/24/2022     Last order of IP CONSULT TO WOUND CARE was found on 10/24/2022 from Hospital Encounter on 10/24/2022     HPI, PMH, SH: Reviewed    Past Surgical History:   Procedure Laterality Date    IRRIGATION & DEBRIDEMENT GENERAL Left 2022    Procedure: IRRIGATION AND DEBRIDEMENT, WOUND;  Surgeon: Sanjiv Vital M.D.;  Location: SURGERY [facility];  Service: Orthopedics    BONE BIOPSY Left 2022    Procedure: BIOPSY, BONE FIBULA CULTURE;  Surgeon: Sanjiv Vital M.D.;  Location: SURGERY [facility];  Service: Orthopedics    IRRIGATION & DEBRIDEMENT GENERAL  2022    Procedure: IRRIGATION AND DEBRIDEMENT LEFT ANKLE;  Surgeon: Panfilo Wagner M.D.;  Location: SURGERY [facility];  Service: Orthopedics    APPENDECTOMY      STENT PLACEMENT      cardiac     Social History     Tobacco Use    Smoking status: Former     Types: Cigarettes     Quit date: 2000     Years since quittin.1    Smokeless tobacco: Never   Substance Use Topics    Alcohol use: Never     Chief Complaint   Patient presents with    Other     Pt has hx of dementia. [facility]. Pt refusing medsx 1 week. Pt refusing for catheter to be changed out. Pt [facility] due to drop in BP. Unknown what pts baseline BP is     Diagnosis: Pyelonephritis [N12]    Unit where seen by Wound Team: S533/02     WOUND CONSULT/FOLLOW UP RELATED TO:  L. Lateral ankle chronic     WOUND HISTORY:  82-year-old male with history of advanced dementia, diabetes, hypertension and coronary artery disease who presented 10/24 with fever.  [facility] where [facility] noticed hypotension, with fever and more agitation.  Patient has advanced dementia and he is alert to himself and place only.  The history was taken from family.  Recently patient has surgery on his left foot and Martinez was inserted since that time.  Patient has  been refusing treatment or changing his catheter.  On admission labs showed leukocytosis 14, creatinine 1.4 and his baseline 0.7, patient was dehydrated and had lactic acidosis 2.4.  IV fluids ceftriaxone were started, Martinez was changed.                HISTORY:  82-year-old with PMH that includes dementia,diabetes mellitus type 2,diabetic foot infection and osteomyelitis.  He has been referred to [facility] for evaluation treatment of a dehisced surgical wound to his left lateral ankle.  Patient has a history of injuring his ankle in the fall 2021, after he bumped it on his walker.  He was receiving wound care at the skilled nursing facility where he resides, [facility].  He was then hospitalized from 4/12 until 4/14/2022, and again from 6/10 until 6/20/2022 for wound infection.  During the second admission, it was noted that the wound probed to bone.  He was taken to surgery for an I&D of his ankle and VAC placement on 6/14/2022 by Dr. Wagner.  He was discharged back to the SNF, with a wound VAC, and on IV daptomycin which was to be concluded on 7/23.              Patient was readmitted to the hospital on 6/29 due to leg swelling and new confusion and weakness.  LPS was consulted and performed a bedside debridement, and applied new dressings.  During this admission he completed his IV antibiotics and PICC line was discontinued.  He was discharged back to the SNF on 7/25, with wound VAC in place.              Patient was sent to the ED from his skilled nursing facility on 8/20/2022, noted to be more confused than usual, and with room air saturations of 85%.  He was admitted, and treated for sepsis.  He was taken to surgery with Dr. Vital on 8/22/2022, for a I&D of his left foot and lateral leg, excision of distal fibula, bone biopsy of fibular margin, left ankle joint irrigation and debridement and synovectomy, and placement of incisional VAC.  Patient was discharged back to his skilled nursing  facility on 8/26.              Per wound RN at [facility], Lindy, they were unable to keep incisional VAC in place due to heavy drainage, and because patient kept removing the dressing despite one-on-one observation.  Patient's wound has continued to deteriorate, with large area of dehiscence developing along the incision. Lindy attempted to get patient into [facility] to follow-up with his surgeon, however no appointments  were available for at least 3 weeks.  Arrangements were made for patient to be seen in LPS rounds on 9/16.       WOUND ASSESSMENT/LDA  Wound 10/25/22 Full Thickness Wound Ankle Lateral Left (Active)   Wound Image    10/25/22 1300   Site Assessment Yellow 10/25/22 1300   Periwound Assessment Red;Maceration;Rash;Painful 10/25/22 1300   Margins Defined edges;Unattached edges 10/25/22 1300   Closure Adhesive bandage 10/25/22 1300   Drainage Amount Scant 10/25/22 1300   Drainage Description Yellow 10/25/22 1300   Treatments Cleansed;Site care;Offloading;Chemical Debridement 10/25/22 1300   Wound Cleansing Approved Wound Cleanser 10/25/22 1300   Periwound Protectant Antifungal Therapy;Skin Protectant Wipes to Periwound 10/25/22 1300   Dressing Cleansing/Solutions 1/4 Strength Dakin's Solution 10/25/22 1300   Dressing Options Moist Roll Gauze;Mepilex Heel 10/25/22 1300   Dressing Changed New 10/25/22 1300   Dressing Status Clean;Dry;Intact 10/25/22 1300   Dressing Change/Treatment Frequency Every Shift, and As Needed 10/25/22 1300   NEXT Dressing Change/Treatment Date 10/25/22 10/25/22 1300   NEXT Weekly Photo (Inpatient Only) 11/01/22 10/25/22 1300   Non-staged Wound Description Full thickness 10/25/22 1300   Wound Length (cm) 4.5 cm 10/25/22 1300   Wound Width (cm) 2 cm 10/25/22 1300   Wound Depth (cm) 0.3 cm 10/25/22 1300   Wound Surface Area (cm^2) 9 cm^2 10/25/22 1300   Wound Volume (cm^3) 2.7 cm^3 10/25/22 1300   Shape [place] 10/25/22 1300   Wound Odor Mild 10/25/22 1300   Pulses Left;1+;DP;PT 10/25/22 1300    Exposed Structures None 10/25/22 1300   WOUND NURSE ONLY - Time Spent with Patient (mins) 60 10/25/22 1300   Number of days: 0        Vascular:    HONEY:   No results found.    Lab Values:    Lab Results   Component Value Date/Time    WBC 14.2 (H) 10/25/2022 01:27 AM    RBC 3.87 (L) 10/25/2022 01:27 AM    HEMOGLOBIN 10.6 (L) 10/25/2022 01:27 AM    HEMATOCRIT 32.6 (L) 10/25/2022 01:27 AM    CREACTPROT 14.90 (H) 10/25/2022 01:27 AM    SEDRATEWES 66 (H) 08/20/2022 01:55 PM    HBA1C 6.3 (H) 06/11/2022 03:57 PM        Culture Results show:  No results found for this or any previous visit (from the past 720 hour(s)).    Pain Level/Medicated:  patient tolerated       INTERVENTIONS BY WOUND TEAM:  Chart and images reviewed. Discussed with bedside RN. All areas of concern (based on picture review, LDA review and discussion with bedside RN) have been thoroughly assessed. Documentation of areas based on significant findings. This RN in to assess patient. Performed standard wound care which includes appropriate positioning, dressing removal and non-selective debridement. Pictures and measurements obtained weekly if/when required.  Preparation for Dressing removal: Dressing soaked with n/a  Non-selectively Debrided with:  wound cleanser and gauze.  Sharp debridement: n/a  Rosa M wound: Cleansed with wound cleanser, Prepped with nystatin powder and no sting  Primary Dressing: dakins moistened gaize  Secondary (Outer) Dressing: mepilex    Interdisciplinary consultation: Patient, Bedside RN , Updated Edwin SNIDER APRN    EVALUATION / RATIONALE FOR TREATMENT:  Most Recent Date:  10/25/22: Wound bed is yellow and periwound has what appears to be yeast like rash.  Dakins applied to chemically debride nonviable tissue, decrease bioburden and odor.     Goals: Steady decrease in wound area and depth weekly.    WOUND TEAM PLAN OF CARE ([X] for frequency of wound follow up,): X  Nursing to follow dressing orders written for wound care. Contact  wound team if area fails to progress, deteriorates or with any questions/concerns if something comes up before next scheduled follow up (See below as to whether wound is following and frequency of wound follow up)  Dressing changes by wound team:                   Follow up 3 times weekly:                NPWT change 3 times weekly:     Follow up 1-2 times weekly:    X weekly  Follow up Bi-Monthly:           Follow up Monthly (High Risk):                        Follow up as needed:     Other (explain):     NURSING PLAN OF CARE ORDERS (X):  Dressing changes: See Dressing Care orders: X  Skin care: See Skin Care orders: X  RN Prevention Protocol: X  Rectal tube care: See Rectal Tube Care orders:   Other orders:    RSKIN:   CURRENTLY IN PLACE (X), APPLIED THIS VISIT (A), ORDERED (O):   Q shift [place]:  X  Q shift pressure point assessments:  X    Surface/Positioning X  Pressure redistribution mattress      X      Low Airloss    O      ICU Low Airloss   Bariatric AKILA     Waffle cushion        Waffle Overlay          Reposition q 2 hours    O  TAPs Turning system     [place] Pillow     Offloading/Redistribution A  Sacral Mepilex (Silicone dressing)     Heel Mepilex (Silicone dressing)   A      Heel float boots (Prevalon boot)    A         Float Heels off Bed with Pillows           Respiratory room air  Silicone O2 tubing         Gray Foam Ear protectors     Cannula fixation Device (Tender )          High flow offloading Clip    Elastic head band offloading device      Anchorfast                                                         Trach with Optifoam split foam             Containment/Moisture Prevention x    Rectal tube or BMS    Purwick/Condom Cath        Martinez Catheter  X  Barrier wipes           Barrier paste       Antifungal tx      Interdry        Mobilization MONAE      Up to chair        Ambulate      PT/OT      Nutrition PO      Dietician        Diabetes Education      PO     TF     TPN     NPO   # days      Other        [facility]  LTACH:        SNF/Rehab:                  Home Health Care:           Outpatient Wound Center:            Self/Family Care:        Other:                  Vac Discharge Needs:   Not Applicable Pt not on a wound vac:   X    Regular Vac while inpatient, alternative dressing at DC:        Regular Vac in use and continued at DC:            Reg. Vac w/ Skin Sub/Biologic in use. Will need to be changed 2x wkly:      Veraflo Vac while inpatient, ok to transition to Regular Vac on Discharge:           Veraflo Vac while inpatient, will need to remain on Veraflo Vac upon discharge:

## 2022-10-25 NOTE — H&P
Hospital Medicine History & Physical Note    Date of Service  10/24/2022    Primary Care Physician  Setphanie Diaz M.D.    Consultants  None     Code Status  DNAR/DNI    Chief Complaint  Chief Complaint   Patient presents with    Other     Pt has hx of dementia. [facility] Pt refusing medsx 1 week. Pt refusing for catheter to be changed out. [facility] due to drop in BP. Unknown what pts baseline BP is       History of Presenting Illness    82-year-old male with history of advanced dementia, diabetes, hypertension and coronary artery disease who presented 10/24 with fever.  [facility], with fever and more agitation.  Patient has advanced dementia and he is alert to himself and place only.  The history was taken from family.  Recently patient has surgery on his left foot and Martinez was inserted since that time.  Patient has been refusing treatment or changing his catheter.  On admission labs showed leukocytosis 14, creatinine 1.4 and his baseline 0.7, patient was dehydrated and had lactic acidosis 2.4.  IV fluids ceftriaxone were started, Martinez was changed.    The case was discussed in detail with the patient's daughter, answered all her question, discussed the CODE STATUS, patient is DNR/DNI according to his POLST.    I discussed the plan of care with patient, family, and bedside RN.    Review of Systems  Review of Systems   Unable to perform ROS: Dementia     Past Medical History   has a past medical history of Diabetes ([facility]), Hyperlipemia, Hypertension, and MI (myocardial infarction) ([facility]).    Surgical History   has a past surgical history that includes stent placement; appendectomy; irrigation & debridement general (6/14/2022); irrigation & debridement general (Left, 8/22/2022); and bone biopsy (Left, 8/22/2022).     Family History  family history includes Heart Disease in his father; No Known Problems in his mother.   Family history  reviewed with patient. There is no family history that is pertinent to the chief complaint.     Social History   reports that he quit smoking about 22 years ago. His smoking use included cigarettes. He has never used smokeless tobacco. He reports that he does not drink alcohol and does not use drugs.    Allergies  No Known Allergies    Medications  Prior to Admission Medications   Prescriptions Last Dose Informant Patient Reported? Taking?   HYDROcodone-acetaminophen (NORCO) 5-325 MG Tab per tablet 10/24/2022 at am MAR from Other Facility Yes No   Sig: Take 1 Tablet by mouth 2 times a day.   HYDROcodone-acetaminophen (NORCO) 5-325 MG Tab per tablet 10/24/2022 at 0049  Yes Yes   Sig: Take 1 Tablet by mouth every 6 hours as needed.   Melatonin 10 MG Tab 10/23/2022 at hs  Yes Yes   Sig: Take 10 mg by mouth at bedtime.   amLODIPine (NORVASC) 5 MG Tab 10/24/2022 at am MAR from Other Facility Yes No   Sig: Take 5 mg by mouth every day. Hold for SBP <110, HR <60.   atorvastatin (LIPITOR) 40 MG Tab 10/23/2022 at pm MAR from Other Facility No No   Sig: Take 1 Tablet by mouth every evening.   diphenhydrAMINE-lidocaine-Maalox (MBX) oral susp cup 10/24/2022 at am  Yes Yes   Sig: Take  by mouth 2 times a day. **MAGIC CUP**   hydrOXYzine HCl (ATARAX) 50 MG Tab 10/21/2022 at unknown  Yes Yes   Sig: Take 50 mg by mouth every 12 hours as needed (for aggression and combativeness).   insulin 70/30 (HUMULIN 70/30/NOVOLIN 70/30) (70-30) 100 UNIT/ML Suspension 10/24/2022 at am  Yes Yes   Sig: Inject 15 Units under the skin 2 times a day.   insulin regular (NOVOLIN R) 100 Unit/mL Solution 10/24/2022 at am  Yes Yes   Sig: Inject 2-10 Units under the skin 3 times a day before meals. Inject per sliding scale. For blood glucose:  151 - 200 = 2 units  201 - 250 = 4 units  251 - 300 = 6 units  301 - 350 = 8 units  351 - 400 = 10 units  If >400, notify M.D.   lisinopril (PRINIVIL) 40 MG tablet 10/24/2022 at am MAR from Other Facility No No    Sig: Take 1 Tablet by mouth every day.   magnesium hydroxide (MILK OF MAGNESIA) 400 MG/5ML Suspension 10/8/2022 at unknown MAR from Other Facility Yes No   Sig: Take 30 mL by mouth every 24 hours as needed (if no bowel movement for 72 hours).   metformin (GLUCOPHAGE) 1000 MG tablet 10/24/2022 at am MAR from Other Facility No No   Sig: Take 1 Tablet by mouth 2 times a day.   metoprolol tartrate (LOPRESSOR) 25 MG Tab 10/24/2022 at am  Yes Yes   Sig: Take 25 mg by mouth 2 times a day.   mirtazapine (REMERON) 15 MG Tab 10/23/2022 at hs MAR from Other Facility No No   Sig: Take 1 Tablet by mouth at bedtime.   sertraline (ZOLOFT) 50 MG Tab 10/23/2022 at pm MAR from Other Facility No No   Sig: Take 1 Tablet by mouth every evening.   sulfamethoxazole-trimethoprim (BACTRIM DS) 800-160 MG tablet Complete at 10/6/22  Yes Yes   Sig: Take 1 Tablet by mouth 2 times a day. Completed 14 days on 10/5/22   tamsulosin (FLOMAX) 0.4 MG capsule 10/23/2022 at hs MAR from Other Facility Yes No   Sig: Take 0.8 mg by mouth at bedtime. 2 capsules = 0.8 mg.      Facility-Administered Medications: None       Physical Exam  Temp:  [36.7 °C (98.1 °F)] 36.7 °C (98.1 °F)  Pulse:  [79-99] 99  Resp:  [16-20] 20  BP: (100-111)/(55-69) 104/60  SpO2:  [93 %-94 %] 94 %  Blood Pressure : 100/69   Temperature: 36.7 °C (98.1 °F)   Pulse: 91   Respiration: 16   Pulse Oximetry: 93 %       Physical Exam  Constitutional:       Appearance: He is not ill-appearing.   Eyes:      General: No scleral icterus.  Cardiovascular:      Rate and Rhythm: Normal rate.      Heart sounds: No murmur heard.  Pulmonary:      Effort: No respiratory distress.      Breath sounds: No wheezing.   Abdominal:      General: There is no distension.      Tenderness: There is no abdominal tenderness. There is no right CVA tenderness, left CVA tenderness or guarding.   Musculoskeletal:      Right lower leg: No edema.      Left lower leg: No edema.   Lymphadenopathy:      Cervical: No  cervical adenopathy.   Skin:     Coloration: Skin is not jaundiced.      Findings: No bruising, lesion or rash.   Neurological:      General: No focal deficit present.      Mental Status: He is alert. Mental status is at baseline. He is disoriented.      Cranial Nerves: No cranial nerve deficit.      Motor: No weakness.      Gait: Gait normal.      Comments: Alert to himself and place only with agitation       Laboratory:  Recent Labs     10/24/22  1540   WBC 14.1*   RBC 4.45*   HEMOGLOBIN 12.4*   HEMATOCRIT 38.0*   MCV 85.4   MCH 27.9   MCHC 32.6*   RDW 50.1*   PLATELETCT 376   MPV 9.0     Recent Labs     10/24/22  1540   SODIUM 136   POTASSIUM 4.6   CHLORIDE 100   CO2 23   GLUCOSE 167*   BUN 38*   CREATININE 1.45*   CALCIUM 9.7     Recent Labs     10/24/22  1540   ALTSGPT 6   ASTSGOT 15   ALKPHOSPHAT 86   TBILIRUBIN 0.7   GLUCOSE 167*     Recent Labs     10/24/22  1540   INR 1.20*     No results for input(s): NTPROBNP in the last 72 hours.      No results for input(s): TROPONINT in the last 72 hours.    Imaging:  DX-CHEST-PORTABLE (1 VIEW)   Final Result      No acute cardiac or pulmonary abnormalities are identified.          X-Ray:  I have personally reviewed the images and compared with prior images.  EKG:  I have personally reviewed the images and compared with prior images.    Assessment/Plan:  Justification for Admission Status  I anticipate this patient will require at least two midnights for appropriate medical management, necessitating inpatient admission because pyelonephritis and delirium patient needs more than 2 nights IV antibiotics    Patient will need a Med/Surg bed on MEDICAL service .  The need is secondary to pyelonephritis.    * Pyelonephritis- (present on admission)  Assessment & Plan  With fever, hypotension and leukocytosis  IV fluid IV antibiotic ceftriaxone  Urine and blood culture  Change of Martinez  Trying to get voiding trial tomorrow and removing the Martinez if possible    Lactic  acidosis  Assessment & Plan  2.4 and dehydration  IV fluid and follow-up with labs    Benign prostatic hyperplasia without lower urinary tract symptoms- (present on admission)  Assessment & Plan  Patient had Martinez since surgery on his wound for months  Patient is on Flomax 0.8 mg daily  Will consider voiding trial tomorrow morning  Consider adding finasteride if needed    Ankle wound- (present on admission)  Assessment & Plan  Patient has been followed by LPS and wound care  Patient received surgery  No signs of infection    SADIE (acute kidney injury) (HCC)- (present on admission)  Assessment & Plan  Came with creatinine 1.4 and his baseline 0.7  Likely due to dehydration  Change of Martinez and start IV fluid fluid  Labs daily and avoid nephrotoxic medication    HTN (hypertension)- (present on admission)  Assessment & Plan  Holding blood pressure medication at this time  Close monitoring and resume his medication if needed    Dementia with behavioral disturbance- (present on admission)  Assessment & Plan  Moderate to advanced dementia  Patient needs help on all daily activities, he is alert and oriented to himself only, with agitation  Around his baseline according to family  Continue nonpharmacological treatment to avoid worsening agitation  Avoid benzo and antihistamine  [facility]  Discussed the plan of care with the family, they think still elevated for comfort care    Advance care planning- (present on admission)  Assessment & Plan  I had long discussion with the patient family, his daughter, discussed the poor prognosis, discussed the plan of care and answered all her questions, patient is DNR/DNI, at this time holding hospice care.  Time 30 minutes      Type 2 diabetes mellitus with hyperglycemia, with long-term current use of insulin ([facility])- (present on admission)  Assessment & Plan  Complicated with a chronic wound infection and osteomyelitis  Continue home medication insulin 70/30 50 units 2  times a day  Holding metformin  Sliding scale        VTE prophylaxis: SCDs/TEDs and heparin ppx      Interventions to be considered in all patients in order to minimize the risk of delirium.   -do not disturb patient (vitals or lab draws) between the hours of 10 PM and 6 AM.  -ideally the patient should not sleep during the day and we should avoid day time naps.   -up in chair for meals  -ambulate at least three times daily, as able  -watch for constipation  -timed voiding - ask patient is she would like to go to the bathroom q 2-3 hours, except during the do not disturb hours.   -remove all necessary lines (central lines, peripheral IVs, feeding tubes, barbosa catheters)  -unless patient has shown harm to self or others I would recommend against use of restraints - either chemical or physical (antipsychotics)   -minimize polypharmacy, do not dose medication during sleep hours

## 2022-10-25 NOTE — THERAPY
"Physical Therapy   Initial Evaluation     Patient Name: Andrei Garay  Age:  82 y.o., Sex:  male  Medical Record #: [id number]  Today's Date: 10/25/2022     Precautions  Precautions: Fall Risk  Comments: WBAT LLE in CAM boot from I&D L foot 08/2022    Assessment  82-year-old male with history of advanced dementia, diabetes, hypertension and coronary artery disease who presented 10/24 with fever.  [facility], with fever and more agitation.  Patient has advanced dementia and he is alert to himself and place only.  Currently admitted for management of pyelonephritis.     Pt currently demonstrates significant cognitive deficits, has restricted participation due to disgruntled state, likely limited potential for learning, and appears at recent baseline level of function. Additional PT not required in the hospital setting, and do not anticipate pt would benefit from additional PT upon D/C. [facility] medically appropriate.     Plan    Recommend Physical Therapy for Evaluation only. Patient will not be actively followed for physical therapy services at this time, however may be seen if requested by physician for 1 more visit within 30 days to address any discharge or equipment needs.     DC Equipment Recommendations: None  Discharge Recommendations: Anticipate that the patient will have no further physical therapy needs after discharge from the hospital       Subjective  Pt kept repeating \"go away.\"     Objective       10/25/22 1003   Charge Group   PT Evaluation PT Evaluation Mod   Total Time Spent   PT Total Time Yes   PT Evaluation Time Spent (Mins) 22   PT Total Time Spent (Calculated) 22   Initial Contact Note    Initial Contact Note Order Received and Verified. Physical Therapy Evaluation NOT Completed Because Patient Does Not Require Acute Physical Therapy at this Time.   Precautions   Precautions Fall Risk   Comments WBAT LLE in " CAM boot from I&D L foot 08/2022   Vitals   Pulse 82   Pulse Oximetry 99 %   O2 Delivery Device None - Room Air   Pain   Pain Scales 0 to 10 Scale    Pain 0 - 10 Group   Pain Rating Scale (NPRS) 0   Prior Living Situation   Prior Services Other (Comments)  ([facility])   Equipment Owned Front-Wheel Walker   Lives with - Patient's Self Care Capacity Attendant / Paid Care Giver   Comments Per family, [facility] with meals, medications, dressing, and showering ADLs; however, pt able to assist/participate with ADLs   Prior Level of Functional Mobility   Bed Mobility Required Assist   Transfer Status Required Assist   Ambulation Required Assist   Distance Ambulation (Feet) 15   Assistive Devices Used Front-Wheel Walker   Comments Per family, pt required SBA for ambulatory activities, and would only walk short distances with a walker. Pt was still wearing CAM boot from prior I&D of L foot in August 2022   Cognition    Level of Consciousness Alert   Comments Extremely disgruntled and reluctant to interactions   Active ROM Lower Body    Active ROM Lower Body  WDL   Strength Lower Body   Lower Body Strength  X   Comments Unable to accurately assess due to impaired cognition and participation. No overt focal, functional deficits observed with tasks   Sensation Lower Body   Lower Extremity Sensation   WDL   Strength Upper Body   Upper Body Strength  WDL   Balance Assessment   Sitting Balance (Static) Fair   Standing Balance (Static) Fair   Gait Analysis   Gait Level Of Assist Standby Assist   Assistive Device Front Wheel Walker   Distance (Feet) 40   # of Times Distance was Traveled 1   Deviation Other (Comment)  (Decreased felipa, slight reduction in L stance phase, but no LOB)   Bed Mobility    Supine to Sit Minimal Assist  (Extremely reluctant/resistant to transfer EOB, required max coaxing from therapists and family. Assisted provided to initiate tasks and pt eventually performed with HOB  elevated, use of rail, and begrudgingly.)   Sit to Supine Standby Assist   Functional Mobility   Sit to Stand Standby Assist  (FWW)   How much difficulty does the patient currently have...   Turning over in bed (including adjusting bedclothes, sheets and blankets)? 3   Sitting down on and standing up from a chair with arms (e.g., wheelchair, bedside commode, etc.) 4   Moving from lying on back to sitting on the side of the bed? 2   How much help from another person does the patient currently need...   Moving to and from a bed to a chair (including a wheelchair)? 3   Need to walk in a hospital room? 3   Climbing 3-5 steps with a railing? 3   6 clicks Mobility Score 18   Education Group   Education Provided Role of Physical Therapist   Role of Physical Therapist Patient Response No Learning Evidence   Anticipated Discharge Equipment and Recommendations   DC Equipment Recommendations None   Discharge Recommendations Anticipate that the patient will have no further physical therapy needs after discharge from the hospital   Interdisciplinary Plan of Care Collaboration   IDT Collaboration with  Nursing;Occupational Therapist   Patient Position at End of Therapy Seated;Bed Alarm On;Call Light within Reach;Tray Table within Reach;Family / Friend in Room;Wrist Restraints Applied   Session Information   Date / Session Number  10/25- eval only

## 2022-10-26 LAB
ANION GAP SERPL CALC-SCNC: 11 MMOL/L (ref 7–16)
BACTERIA UR CULT: ABNORMAL
BACTERIA UR CULT: ABNORMAL
BUN SERPL-MCNC: 19 MG/DL (ref 8–22)
CALCIUM SERPL-MCNC: 8.4 MG/DL (ref 8.5–10.5)
CHLORIDE SERPL-SCNC: 103 MMOL/L (ref 96–112)
CO2 SERPL-SCNC: 20 MMOL/L (ref 20–33)
CREAT SERPL-MCNC: 0.68 MG/DL (ref 0.5–1.4)
ERYTHROCYTE [DISTWIDTH] IN BLOOD BY AUTOMATED COUNT: 48.4 FL (ref 35.9–50)
EST. AVERAGE GLUCOSE BLD GHB EST-MCNC: 146 MG/DL
GFR SERPLBLD CREATININE-BSD FMLA CKD-EPI: 93 ML/MIN/1.73 M 2
GLUCOSE BLD STRIP.AUTO-MCNC: 116 MG/DL (ref 65–99)
GLUCOSE BLD STRIP.AUTO-MCNC: 116 MG/DL (ref 65–99)
GLUCOSE BLD STRIP.AUTO-MCNC: 172 MG/DL (ref 65–99)
GLUCOSE SERPL-MCNC: 253 MG/DL (ref 65–99)
HBA1C MFR BLD: 6.7 % (ref 4–5.6)
HCT VFR BLD AUTO: 31.7 % (ref 42–52)
HGB BLD-MCNC: 10.4 G/DL (ref 14–18)
MCH RBC QN AUTO: 28.1 PG (ref 27–33)
MCHC RBC AUTO-ENTMCNC: 32.8 G/DL (ref 33.7–35.3)
MCV RBC AUTO: 85.7 FL (ref 81.4–97.8)
PLATELET # BLD AUTO: 301 K/UL (ref 164–446)
PMV BLD AUTO: 9 FL (ref 9–12.9)
POTASSIUM SERPL-SCNC: 4.2 MMOL/L (ref 3.6–5.5)
RBC # BLD AUTO: 3.7 M/UL (ref 4.7–6.1)
SIGNIFICANT IND 70042: ABNORMAL
SITE SITE: ABNORMAL
SODIUM SERPL-SCNC: 134 MMOL/L (ref 135–145)
SOURCE SOURCE: ABNORMAL
WBC # BLD AUTO: 9 K/UL (ref 4.8–10.8)

## 2022-10-26 PROCEDURE — 700111 HCHG RX REV CODE 636 W/ 250 OVERRIDE (IP): Performed by: INTERNAL MEDICINE

## 2022-10-26 PROCEDURE — 700102 HCHG RX REV CODE 250 W/ 637 OVERRIDE(OP): Performed by: HOSPITALIST

## 2022-10-26 PROCEDURE — 770006 HCHG ROOM/CARE - MED/SURG/GYN SEMI*

## 2022-10-26 PROCEDURE — 83036 HEMOGLOBIN GLYCOSYLATED A1C: CPT

## 2022-10-26 PROCEDURE — 85027 COMPLETE CBC AUTOMATED: CPT

## 2022-10-26 PROCEDURE — 80048 BASIC METABOLIC PNL TOTAL CA: CPT

## 2022-10-26 PROCEDURE — 36415 COLL VENOUS BLD VENIPUNCTURE: CPT

## 2022-10-26 PROCEDURE — 51798 US URINE CAPACITY MEASURE: CPT

## 2022-10-26 PROCEDURE — 99232 SBSQ HOSP IP/OBS MODERATE 35: CPT | Performed by: HOSPITALIST

## 2022-10-26 PROCEDURE — 700102 HCHG RX REV CODE 250 W/ 637 OVERRIDE(OP): Performed by: INTERNAL MEDICINE

## 2022-10-26 PROCEDURE — 700111 HCHG RX REV CODE 636 W/ 250 OVERRIDE (IP): Performed by: HOSPITALIST

## 2022-10-26 PROCEDURE — 700101 HCHG RX REV CODE 250: Performed by: HOSPITALIST

## 2022-10-26 PROCEDURE — 82962 GLUCOSE BLOOD TEST: CPT | Mod: 91

## 2022-10-26 PROCEDURE — A9270 NON-COVERED ITEM OR SERVICE: HCPCS | Performed by: INTERNAL MEDICINE

## 2022-10-26 RX ORDER — HYDROCODONE BITARTRATE AND ACETAMINOPHEN 10; 325 MG/1; MG/1
1 TABLET ORAL EVERY 6 HOURS PRN
Status: DISCONTINUED | OUTPATIENT
Start: 2022-10-26 | End: 2022-10-28 | Stop reason: HOSPADM

## 2022-10-26 RX ORDER — DEXTROSE MONOHYDRATE 25 G/50ML
25 INJECTION, SOLUTION INTRAVENOUS
Status: DISCONTINUED | OUTPATIENT
Start: 2022-10-26 | End: 2022-10-28 | Stop reason: HOSPADM

## 2022-10-26 RX ADMIN — INSULIN HUMAN 15 UNITS: 100 INJECTION, SUSPENSION SUBCUTANEOUS at 09:32

## 2022-10-26 RX ADMIN — INSULIN HUMAN 15 UNITS: 100 INJECTION, SUSPENSION SUBCUTANEOUS at 18:09

## 2022-10-26 RX ADMIN — SENNOSIDES AND DOCUSATE SODIUM 2 TABLET: 50; 8.6 TABLET ORAL at 05:36

## 2022-10-26 RX ADMIN — CEFTRIAXONE SODIUM 2000 MG: 10 INJECTION, POWDER, FOR SOLUTION INTRAVENOUS at 14:46

## 2022-10-26 RX ADMIN — SERTRALINE 50 MG: 50 TABLET, FILM COATED ORAL at 18:05

## 2022-10-26 RX ADMIN — HEPARIN SODIUM 5000 UNITS: 5000 INJECTION, SOLUTION INTRAVENOUS; SUBCUTANEOUS at 14:46

## 2022-10-26 RX ADMIN — MIRTAZAPINE 15 MG: 15 TABLET, FILM COATED ORAL at 20:13

## 2022-10-26 RX ADMIN — TAMSULOSIN HYDROCHLORIDE 0.8 MG: 0.4 CAPSULE ORAL at 20:12

## 2022-10-26 RX ADMIN — ACETAMINOPHEN 650 MG: 325 TABLET, FILM COATED ORAL at 12:05

## 2022-10-26 RX ADMIN — HEPARIN SODIUM 5000 UNITS: 5000 INJECTION, SOLUTION INTRAVENOUS; SUBCUTANEOUS at 22:51

## 2022-10-26 RX ADMIN — SODIUM HYPOCHLORITE 1 ML: 1.25 SOLUTION TOPICAL at 18:06

## 2022-10-26 RX ADMIN — SODIUM HYPOCHLORITE 1 ML: 1.25 SOLUTION TOPICAL at 05:37

## 2022-10-26 RX ADMIN — Medication 10 MG: at 20:13

## 2022-10-26 RX ADMIN — HYDROCODONE BITARTRATE AND ACETAMINOPHEN 1 TABLET: 5; 325 TABLET ORAL at 05:36

## 2022-10-26 RX ADMIN — SENNOSIDES AND DOCUSATE SODIUM 2 TABLET: 50; 8.6 TABLET ORAL at 18:04

## 2022-10-26 RX ADMIN — ATORVASTATIN CALCIUM 40 MG: 40 TABLET, FILM COATED ORAL at 18:05

## 2022-10-26 ASSESSMENT — ENCOUNTER SYMPTOMS
NAUSEA: 0
ABDOMINAL PAIN: 0
HEADACHES: 0
SHORTNESS OF BREATH: 0
COUGH: 0
PALPITATIONS: 0
FEVER: 0
VOMITING: 0
DIZZINESS: 0
CHILLS: 0

## 2022-10-26 ASSESSMENT — PAIN DESCRIPTION - PAIN TYPE
TYPE: ACUTE PAIN

## 2022-10-26 ASSESSMENT — PAIN SCALES - WONG BAKER
WONGBAKER_NUMERICALRESPONSE: HURTS JUST A LITTLE BIT
WONGBAKER_NUMERICALRESPONSE: DOESN'T HURT AT ALL

## 2022-10-26 NOTE — PROGRESS NOTES
Hospital Medicine Daily Progress Note    Date of Service  10/26/2022    Chief Complaint  Andrei Garay is a 82 y.o. male admitted 10/24/2022 with fever    Hospital Course  82-year-old [facility] with past medical history of advanced dementia, diabetes, hypertension, CAD, recent left foot surgery presenting with fever.  He was admitted for sepsis secondary to urinary tract infection with SADIE.  During patient's for surgery he had Barbosa catheter inserted and patient had been refusing catheter care and changing out catheter.    Interval Problem Update  No acute events overnight  Remains in restraints  His creatinine is back to baseline  Will attempt voiding trial today and dc barbosa catheter with follow up bladder scan    I have discussed this patient's plan of care and discharge plan at IDT rounds today with Case Management, Nursing, Nursing leadership, and other members of the IDT team.    Consultants/Specialty  None    Code Status  DNAR/DNI    Disposition  Patient is not medically cleared for discharge.   Anticipate discharge to to home with close outpatient follow-up.-No needs per PT/OT on 10/25  I have placed the appropriate orders for post-discharge needs.    Review of Systems  Review of Systems   Constitutional:  Negative for chills and fever.   Respiratory:  Negative for cough and shortness of breath.    Cardiovascular:  Negative for chest pain and palpitations.   Gastrointestinal:  Negative for abdominal pain, nausea and vomiting.   Genitourinary:  Negative for dysuria and urgency.   Neurological:  Negative for dizziness and headaches.   All other systems reviewed and are negative.     Physical Exam  Temp:  [36.6 °C (97.8 °F)-37.4 °C (99.4 °F)] 36.6 °C (97.8 °F)  Pulse:  [68-93] 85  Resp:  [17-19] 17  BP: (121)/(62-66) 121/62  SpO2:  [92 %-98 %] 98 %    Physical Exam  Vitals and nursing note reviewed.   Constitutional:       Appearance: Normal appearance.   Cardiovascular:      Rate and  Rhythm: Normal rate and regular rhythm.      Heart sounds: No murmur heard.  Pulmonary:      Effort: Pulmonary effort is normal. No respiratory distress.      Breath sounds: Normal breath sounds.   Abdominal:      General: Abdomen is flat.      Tenderness: There is abdominal tenderness (Right and left flank). There is guarding.   Neurological:      General: No focal deficit present.      Mental Status: He is alert and oriented to person, place, and time.       Fluids    Intake/Output Summary (Last 24 hours) at 10/26/2022 1421  Last data filed at 10/26/2022 0430  Gross per 24 hour   Intake 240 ml   Output 500 ml   Net -260 ml       Laboratory  Recent Labs     10/24/22  1540 10/25/22  0127 10/26/22  1036   WBC 14.1* 14.2* 9.0   RBC 4.45* 3.87* 3.70*   HEMOGLOBIN 12.4* 10.6* 10.4*   HEMATOCRIT 38.0* 32.6* 31.7*   MCV 85.4 84.2 85.7   MCH 27.9 27.4 28.1   MCHC 32.6* 32.5* 32.8*   RDW 50.1* 48.0 48.4   PLATELETCT 376 330 301   MPV 9.0 8.7* 9.0     Recent Labs     10/24/22  1540 10/25/22  0127 10/26/22  1036   SODIUM 136 134* 134*   POTASSIUM 4.6 4.6 4.2   CHLORIDE 100 101 103   CO2 23 21 20   GLUCOSE 167* 180* 253*   BUN 38* 28* 19   CREATININE 1.45* 0.92 0.68   CALCIUM 9.7 8.6 8.4*     Recent Labs     10/24/22  1540   INR 1.20*               Imaging  DX-CHEST-PORTABLE (1 VIEW)   Final Result      No acute cardiac or pulmonary abnormalities are identified.           Assessment/Plan  * Pyelonephritis- (present on admission)  Assessment & Plan  With fever, hypotension and leukocytosis  IV fluid IV antibiotic ceftriaxone  Urine and blood culture  Change of Barbosa  Will attempt voiding trial    Lactic acidosis  Assessment & Plan  resolved    Benign prostatic hyperplasia without lower urinary tract symptoms- (present on admission)  Assessment & Plan  Patient had Barbosa since surgery on his wound for months  Patient is on Flomax 0.8 mg daily  Will trial off barbosa and bladder scan- if >400cc will replace barbosa  Consider adding  finasteride if needed    Ankle wound- (present on admission)  Assessment & Plan  Patient has been followed by LPS and wound care  Patient received surgery  No signs of infection    Advance care planning- (present on admission)  Assessment & Plan  Patient DNR/DNI      SADIE (acute kidney injury) (HCC)- (present on admission)  Assessment & Plan  Creatinine improving  Monitor BMP and assess response  Avoid IV contrast/nephrotoxins/NSAIDs  Dose adjust meds for decreased GFR      HTN (hypertension)- (present on admission)  Assessment & Plan  Holding blood pressure medication at this time  Close monitoring and resume his medication if needed    Dementia with behavioral disturbance- (present on admission)  Assessment & Plan  Moderate to advanced dementia  Patient needs help on all daily activities, he is alert and oriented to himself only, with agitation  Around his baseline according to family  Continue nonpharmacological treatment to avoid worsening agitation  Avoid benzo and antihistamine  [facility]  Discussed the plan of care with the family, they think still elevated for comfort care    Type 2 diabetes mellitus with hyperglycemia, with long-term current use of insulin (HCC)- (present on admission)  Assessment & Plan  Complicated with a chronic wound infection and osteomyelitis  Continue home medication insulin 70/30 50 units 2 times a day  Holding metformin  Sliding scale       VTE prophylaxis: heparin ppx    I have performed a physical exam and reviewed and updated ROS and Plan today (10/26/2022). In review of yesterday's note (10/25/2022), there are no changes except as documented above.

## 2022-10-26 NOTE — CARE PLAN
The patient is Stable - Low risk of patient condition declining or worsening    Shift Goals  Clinical Goals: wound care  Patient Goals: comfort  Family Goals: n/a    Progress made toward(s) clinical / shift goals:    Problem: Knowledge Deficit - Standard  Goal: Patient and family/care givers will demonstrate understanding of plan of care, disease process/condition, diagnostic tests and medications  Outcome: Progressing     Problem: Hemodynamics  Goal: Patient's hemodynamics, fluid balance and neurologic status will be stable or improve  Outcome: Progressing     Problem: Fluid Volume  Goal: Fluid volume balance will be maintained  Outcome: Progressing       Patient is not progressing towards the following goals:

## 2022-10-26 NOTE — CARE PLAN
The patient is Stable - Low risk of patient condition declining or worsening    Shift Goals  Clinical Goals: improve mentation  Patient Goals: eat all meals    Progress made toward(s) clinical / shift goals:    A&Ox1-2. Confused at times. All interventions completed as ordered. Soft wrist restraints as ordered for pulling at lines. Documented per policy. IVF. Frequent rounding.Dressing to LLE as ordered, CDI. Bed alarmed safety maintained.

## 2022-10-26 NOTE — PROGRESS NOTES
Assumed pt care at 0700 . Pt is A&Ox 1, oriented to self only . Pt denies pain.Pt in bilateral wrist restraints, active ROM was performed  Pt's bed is in the lowest position and locked.

## 2022-10-27 PROBLEM — E87.20 LACTIC ACIDOSIS: Status: RESOLVED | Noted: 2022-10-24 | Resolved: 2022-10-27

## 2022-10-27 LAB
ANION GAP SERPL CALC-SCNC: 10 MMOL/L (ref 7–16)
BUN SERPL-MCNC: 17 MG/DL (ref 8–22)
CALCIUM SERPL-MCNC: 8.9 MG/DL (ref 8.5–10.5)
CHLORIDE SERPL-SCNC: 100 MMOL/L (ref 96–112)
CO2 SERPL-SCNC: 22 MMOL/L (ref 20–33)
CREAT SERPL-MCNC: 0.72 MG/DL (ref 0.5–1.4)
ERYTHROCYTE [DISTWIDTH] IN BLOOD BY AUTOMATED COUNT: 46.2 FL (ref 35.9–50)
FLUAV RNA SPEC QL NAA+PROBE: NEGATIVE
FLUBV RNA SPEC QL NAA+PROBE: NEGATIVE
GFR SERPLBLD CREATININE-BSD FMLA CKD-EPI: 91 ML/MIN/1.73 M 2
GLUCOSE BLD STRIP.AUTO-MCNC: 117 MG/DL (ref 65–99)
GLUCOSE BLD STRIP.AUTO-MCNC: 188 MG/DL (ref 65–99)
GLUCOSE BLD STRIP.AUTO-MCNC: 224 MG/DL (ref 65–99)
GLUCOSE BLD STRIP.AUTO-MCNC: 249 MG/DL (ref 65–99)
GLUCOSE SERPL-MCNC: 114 MG/DL (ref 65–99)
HCT VFR BLD AUTO: 32.8 % (ref 42–52)
HGB BLD-MCNC: 11 G/DL (ref 14–18)
MCH RBC QN AUTO: 28 PG (ref 27–33)
MCHC RBC AUTO-ENTMCNC: 33.5 G/DL (ref 33.7–35.3)
MCV RBC AUTO: 83.5 FL (ref 81.4–97.8)
PLATELET # BLD AUTO: 373 K/UL (ref 164–446)
PMV BLD AUTO: 8.6 FL (ref 9–12.9)
POTASSIUM SERPL-SCNC: 4.1 MMOL/L (ref 3.6–5.5)
RBC # BLD AUTO: 3.93 M/UL (ref 4.7–6.1)
RSV RNA SPEC QL NAA+PROBE: NEGATIVE
SARS-COV-2 RNA RESP QL NAA+PROBE: NOTDETECTED
SODIUM SERPL-SCNC: 132 MMOL/L (ref 135–145)
SPECIMEN SOURCE: NORMAL
WBC # BLD AUTO: 9.4 K/UL (ref 4.8–10.8)

## 2022-10-27 PROCEDURE — 36415 COLL VENOUS BLD VENIPUNCTURE: CPT

## 2022-10-27 PROCEDURE — 80048 BASIC METABOLIC PNL TOTAL CA: CPT

## 2022-10-27 PROCEDURE — 700102 HCHG RX REV CODE 250 W/ 637 OVERRIDE(OP): Performed by: HOSPITALIST

## 2022-10-27 PROCEDURE — 700111 HCHG RX REV CODE 636 W/ 250 OVERRIDE (IP): Performed by: HOSPITALIST

## 2022-10-27 PROCEDURE — 85027 COMPLETE CBC AUTOMATED: CPT

## 2022-10-27 PROCEDURE — A9270 NON-COVERED ITEM OR SERVICE: HCPCS | Performed by: HOSPITALIST

## 2022-10-27 PROCEDURE — 51798 US URINE CAPACITY MEASURE: CPT

## 2022-10-27 PROCEDURE — 700102 HCHG RX REV CODE 250 W/ 637 OVERRIDE(OP): Performed by: INTERNAL MEDICINE

## 2022-10-27 PROCEDURE — 82962 GLUCOSE BLOOD TEST: CPT | Mod: 91

## 2022-10-27 PROCEDURE — 99233 SBSQ HOSP IP/OBS HIGH 50: CPT | Performed by: HOSPITALIST

## 2022-10-27 PROCEDURE — 0241U HCHG SARS-COV-2 COVID-19 NFCT DS RESP RNA 4 TRGT MIC: CPT

## 2022-10-27 PROCEDURE — 770006 HCHG ROOM/CARE - MED/SURG/GYN SEMI*

## 2022-10-27 PROCEDURE — C9803 HOPD COVID-19 SPEC COLLECT: HCPCS | Performed by: HOSPITALIST

## 2022-10-27 PROCEDURE — A9270 NON-COVERED ITEM OR SERVICE: HCPCS | Performed by: INTERNAL MEDICINE

## 2022-10-27 PROCEDURE — 700111 HCHG RX REV CODE 636 W/ 250 OVERRIDE (IP): Performed by: INTERNAL MEDICINE

## 2022-10-27 RX ORDER — NYSTATIN 100000 [USP'U]/G
1 POWDER TOPICAL PRN
Qty: 15 G | Refills: 0 | Status: SHIPPED | OUTPATIENT
Start: 2022-10-27

## 2022-10-27 RX ORDER — CEFDINIR 300 MG/1
300 CAPSULE ORAL EVERY 12 HOURS
Qty: 8 CAPSULE | Refills: 0 | Status: SHIPPED | OUTPATIENT
Start: 2022-10-27 | End: 2022-10-31

## 2022-10-27 RX ORDER — HYDROCODONE BITARTRATE AND ACETAMINOPHEN 5; 325 MG/1; MG/1
1 TABLET ORAL 2 TIMES DAILY
Qty: 6 TABLET | Refills: 0 | Status: SHIPPED | OUTPATIENT
Start: 2022-10-27 | End: 2022-10-30

## 2022-10-27 RX ORDER — CEFDINIR 300 MG/1
300 CAPSULE ORAL EVERY 12 HOURS
Status: DISCONTINUED | OUTPATIENT
Start: 2022-10-27 | End: 2022-10-28 | Stop reason: HOSPADM

## 2022-10-27 RX ORDER — HYDROCODONE BITARTRATE AND ACETAMINOPHEN 5; 325 MG/1; MG/1
1 TABLET ORAL EVERY 6 HOURS PRN
Qty: 12 TABLET | Refills: 0 | Status: SHIPPED | OUTPATIENT
Start: 2022-10-27 | End: 2022-10-30

## 2022-10-27 RX ADMIN — CEFDINIR 300 MG: 300 CAPSULE ORAL at 17:56

## 2022-10-27 RX ADMIN — SERTRALINE 50 MG: 50 TABLET, FILM COATED ORAL at 17:47

## 2022-10-27 RX ADMIN — ATORVASTATIN CALCIUM 40 MG: 40 TABLET, FILM COATED ORAL at 17:47

## 2022-10-27 RX ADMIN — RIVAROXABAN 10 MG: 10 TABLET, FILM COATED ORAL at 17:47

## 2022-10-27 RX ADMIN — HYDROCODONE BITARTRATE AND ACETAMINOPHEN 1 TABLET: 5; 325 TABLET ORAL at 17:47

## 2022-10-27 RX ADMIN — SENNOSIDES AND DOCUSATE SODIUM 2 TABLET: 50; 8.6 TABLET ORAL at 17:47

## 2022-10-27 RX ADMIN — SODIUM HYPOCHLORITE 1 ML: 1.25 SOLUTION TOPICAL at 17:48

## 2022-10-27 RX ADMIN — TAMSULOSIN HYDROCHLORIDE 0.8 MG: 0.4 CAPSULE ORAL at 20:02

## 2022-10-27 RX ADMIN — HEPARIN SODIUM 5000 UNITS: 5000 INJECTION, SOLUTION INTRAVENOUS; SUBCUTANEOUS at 05:50

## 2022-10-27 RX ADMIN — SENNOSIDES AND DOCUSATE SODIUM 2 TABLET: 50; 8.6 TABLET ORAL at 04:10

## 2022-10-27 RX ADMIN — MIRTAZAPINE 15 MG: 15 TABLET, FILM COATED ORAL at 20:03

## 2022-10-27 RX ADMIN — SODIUM HYPOCHLORITE 437 ML: 1.25 SOLUTION TOPICAL at 04:13

## 2022-10-27 RX ADMIN — HYDROCODONE BITARTRATE AND ACETAMINOPHEN 1 TABLET: 5; 325 TABLET ORAL at 04:10

## 2022-10-27 RX ADMIN — Medication 10 MG: at 20:02

## 2022-10-27 ASSESSMENT — ENCOUNTER SYMPTOMS
DIZZINESS: 0
CHILLS: 0
FEVER: 0
ABDOMINAL PAIN: 0
PALPITATIONS: 0
SHORTNESS OF BREATH: 0
HEADACHES: 0
COUGH: 0
VOMITING: 0
NAUSEA: 0

## 2022-10-27 ASSESSMENT — PAIN DESCRIPTION - PAIN TYPE: TYPE: CHRONIC PAIN

## 2022-10-27 NOTE — CARE PLAN
The patient is Stable - Low risk of patient condition declining or worsening    Shift Goals  Clinical Goals: maintain good UO  Patient Goals: get off restraints  Family Goals: n/a    Progress made toward(s) clinical / shift goals:  Patient continues to void, bladder scan showing less than 400 mL.    Patient is not progressing towards the following goals: Patient continues to pull at lines and equipment, wrist restraints still in use.       Problem: Safety - Medical Restraint  Goal: Free from restraint(s) (Restraint for Interference with Medical Device)  Outcome: Not Progressing

## 2022-10-27 NOTE — PROGRESS NOTES
Assumed care of patient at 1900. Patient is A&Ox3, disoriented to situation. VSS on RA. Patient condom catheter replaced, 300 mL emptied from bag. Patient bladder scan showing 301 mL. Per order, Martinez is to be replaced if scan shows >400 mL. Patient given evening Flomax and bladder scan recheck to be done at 0000. Patient has no complaints at this time. Bed alarm on, bed locked and in lowest position. Soft wrist restraints continued. Call light within reach.

## 2022-10-27 NOTE — DISCHARGE PLANNING
Case Management Discharge Planning    Admission Date: 10/24/2022  GMLOS: 2.8  ALOS: 3    6-Clicks ADL Score: 15  6-Clicks Mobility Score: 18  PT and/or OT Eval ordered: Yes  Post-acute Referrals Ordered: No  Post-acute Choice Obtained: No  Has referral(s) been sent to post-acute provider:  No      Anticipated Discharge Dispo:  d/c back to LT bed at [facility]    DME Needed: No    Action(s) Taken: Updated Provider/Nurse on Discharge Plan    Escalations Completed: Provider    Medically Clear: No    Next Steps: Received call from [facility] admissions, Chicho. He indicates they will accept pt back once he is medically clear.    Karen sent text to medical team asking if pt is medically cleared.    RN updated, pt is not medically cleared as he is still in restraints. When he d/tony he will need a gurney transport.       Later, Karen received a VM from admissions at [facility], Chicho. He advised he was a little early w/ stating they can take pt back. He is aware pt is in restraints and will be unable to go back until that is not in place. Also, they are questioning how pt is doing after barbosa d/dash yesterday.    Karen called Chicho to provide bedside RN's contact number [[phone]] for any medical updates. Johnw left VM.    Barriers to Discharge: Medical clearance    Is the patient up for discharge tomorrow: No    Addendum:  Per AM rounds, pt is able to d/c. He has been off restraints since this AM. He is doing well after having removal of barbosa. He can d/c today if Chicho at [facility] is willing to accept pt after less than 24 hours w/o restraints.    Karen and DPA f/u accordingly. VM left w/ Chicho at [facility].    Karen received text from MD. If pt cannot go today, he can go tomorrow.    Karen completed ITF for gurney transport at noon on 10.28.22, Friday.    Karen sent update to MD as above asking for covid rapid and d/c summary for transport tomorrow at noon.

## 2022-10-27 NOTE — PROGRESS NOTES
Bedside report received from FLORES Bill. Assumed care of pt. Pt able to answer orientation questions however having confusion. Denies pain or discomfort at this time. Bilateral soft wrist restraints in place due to pt pulling lines and tubes. Restraints removed and ROM performed. No s/s of injury. Fluids and bathroom offered. Restraints replaced after assessment. Call light within reach. Bed in low position with wheels locked. Bed alarm on.

## 2022-10-27 NOTE — PROGRESS NOTES
Hospital Medicine Daily Progress Note    Date of Service  10/27/2022    Chief Complaint  Andrei Garay is a 82 y.o. male admitted 10/24/2022 with fever    Hospital Course  82-year-old [facility] with past medical history of advanced dementia, diabetes, hypertension, CAD, recent left foot surgery presenting with fever.  He was admitted for sepsis secondary to urinary tract infection with SADIE.  During patient's for surgery he had Barbosa catheter inserted and patient had been refusing catheter care and changing out catheter.    Interval Problem Update  No acute events overnight  Off of restraints  Urinating well, barbosa removed  Medically cleared    I have discussed this patient's plan of care and discharge plan at IDT rounds today with Case Management, Nursing, Nursing leadership, and other members of the IDT team.    Consultants/Specialty  None    Code Status  DNAR/DNI    Disposition  Patient is medically cleared for discharge.   Anticipate discharge to to home with close outpatient follow-up.-No needs per PT/OT on 10/25  I have placed the appropriate orders for post-discharge needs.    Review of Systems  Review of Systems   Constitutional:  Negative for chills and fever.   Respiratory:  Negative for cough and shortness of breath.    Cardiovascular:  Negative for chest pain and palpitations.   Gastrointestinal:  Negative for abdominal pain, nausea and vomiting.   Genitourinary:  Negative for dysuria and urgency.   Neurological:  Negative for dizziness and headaches.   All other systems reviewed and are negative.     Physical Exam  Temp:  [36.4 °C (97.5 °F)-37 °C (98.6 °F)] 36.4 °C (97.5 °F)  Pulse:  [60-79] 60  Resp:  [18-19] 18  BP: (128-137)/(61-76) 137/76  SpO2:  [94 %] 94 %    Physical Exam  Vitals and nursing note reviewed.   Constitutional:       Appearance: Normal appearance.   Cardiovascular:      Rate and Rhythm: Normal rate and regular rhythm.      Heart sounds: No murmur  heard.  Pulmonary:      Effort: Pulmonary effort is normal. No respiratory distress.      Breath sounds: Normal breath sounds.   Abdominal:      General: Abdomen is flat.      Tenderness: There is no abdominal tenderness. There is no guarding.   Neurological:      General: No focal deficit present.      Mental Status: He is alert and oriented to person, place, and time.       Fluids    Intake/Output Summary (Last 24 hours) at 10/27/2022 1423  Last data filed at 10/27/2022 1000  Gross per 24 hour   Intake 445 ml   Output 300 ml   Net 145 ml       Laboratory  Recent Labs     10/25/22  0127 10/26/22  1036 10/27/22  0240   WBC 14.2* 9.0 9.4   RBC 3.87* 3.70* 3.93*   HEMOGLOBIN 10.6* 10.4* 11.0*   HEMATOCRIT 32.6* 31.7* 32.8*   MCV 84.2 85.7 83.5   MCH 27.4 28.1 28.0   MCHC 32.5* 32.8* 33.5*   RDW 48.0 48.4 46.2   PLATELETCT 330 301 373   MPV 8.7* 9.0 8.6*     Recent Labs     10/25/22  0127 10/26/22  1036 10/27/22  0240   SODIUM 134* 134* 132*   POTASSIUM 4.6 4.2 4.1   CHLORIDE 101 103 100   CO2 21 20 22   GLUCOSE 180* 253* 114*   BUN 28* 19 17   CREATININE 0.92 0.68 0.72   CALCIUM 8.6 8.4* 8.9     Recent Labs     10/24/22  1540   INR 1.20*               Imaging  DX-CHEST-PORTABLE (1 VIEW)   Final Result      No acute cardiac or pulmonary abnormalities are identified.           Assessment/Plan  * Pyelonephritis- (present on admission)  Assessment & Plan  With fever, hypotension and leukocytosis  IV fluid   IV ROCEPHIN changed to cefdinir  UCx noted  Symptoms now resolved  Barbosa catheter removed on 10/26- no longer retaining    Benign prostatic hyperplasia without lower urinary tract symptoms- (present on admission)  Assessment & Plan  Patient had Barbosa since surgery on his wound for months  Patient is on Flomax 0.8 mg daily  Will trial off barbosa and bladder scan- if >400cc will replace barbosa  Consider adding finasteride if needed    Ankle wound- (present on admission)  Assessment & Plan  Patient has been followed by LPS  and wound care  Patient received surgery  No signs of infection    Advance care planning- (present on admission)  Assessment & Plan  Patient DNR/DNI      SADIE (acute kidney injury) (HCC)- (present on admission)  Assessment & Plan  Resolved      Monitor BMP and assess response  Avoid IV contrast/nephrotoxins/NSAIDs  Dose adjust meds for decreased GFR      HTN (hypertension)- (present on admission)  Assessment & Plan  Holding blood pressure medication at this time  Close monitoring and resume his medication if needed    Dementia with behavioral disturbance- (present on admission)  Assessment & Plan  Moderate to advanced dementia  Patient needs help on all daily activities, he is alert and oriented to himself only, with agitation  Around his baseline according to family  Continue nonpharmacological treatment to avoid worsening agitation  Avoid benzo and antihistamine  [facility]  Discussed the plan of care with the family, they think still elevated for comfort care    Type 2 diabetes mellitus with hyperglycemia, with long-term current use of insulin (HCC)- (present on admission)  Assessment & Plan  Complicated with a chronic wound infection and osteomyelitis  Continue home medication insulin 70/30 50 units 2 times a day  Holding metformin  Sliding scale       VTE prophylaxis: heparin ppx    I have performed a physical exam and reviewed and updated ROS and Plan today (10/27/2022). In review of yesterday's note (10/26/2022), there are no changes except as documented above.

## 2022-10-27 NOTE — DISCHARGE PLANNING
Agency/Facility Name: [facility]  Spoke To: Cathy  Outcome: Transport for Friday 10/28/22 at 1200 going to [facility].

## 2022-10-27 NOTE — DISCHARGE PLANNING
Spoke To: Chicho  [facility]  Plan or Request: accepted back when ready to d/c. Please set up transport.    Barbara saenz    5715- OHO left vm for Chicho at [facility], asked for a return call.

## 2022-10-28 ENCOUNTER — APPOINTMENT (OUTPATIENT)
Dept: WOUND CARE | Facility: MEDICAL CENTER | Age: 82
End: 2022-10-28
Attending: NURSE PRACTITIONER
Payer: COMMERCIAL

## 2022-10-28 VITALS
DIASTOLIC BLOOD PRESSURE: 64 MMHG | TEMPERATURE: 96.5 F | OXYGEN SATURATION: 95 % | SYSTOLIC BLOOD PRESSURE: 115 MMHG | WEIGHT: 190 LBS | HEIGHT: 72 IN | RESPIRATION RATE: 16 BRPM | HEART RATE: 59 BPM | BODY MASS INDEX: 25.73 KG/M2

## 2022-10-28 LAB — GLUCOSE BLD STRIP.AUTO-MCNC: 176 MG/DL (ref 65–99)

## 2022-10-28 PROCEDURE — A9270 NON-COVERED ITEM OR SERVICE: HCPCS | Performed by: HOSPITALIST

## 2022-10-28 PROCEDURE — 82962 GLUCOSE BLOOD TEST: CPT

## 2022-10-28 PROCEDURE — 700102 HCHG RX REV CODE 250 W/ 637 OVERRIDE(OP): Performed by: HOSPITALIST

## 2022-10-28 PROCEDURE — A9270 NON-COVERED ITEM OR SERVICE: HCPCS | Performed by: INTERNAL MEDICINE

## 2022-10-28 PROCEDURE — 700102 HCHG RX REV CODE 250 W/ 637 OVERRIDE(OP): Performed by: INTERNAL MEDICINE

## 2022-10-28 PROCEDURE — 99239 HOSP IP/OBS DSCHRG MGMT >30: CPT | Performed by: HOSPITALIST

## 2022-10-28 RX ADMIN — HYDROCODONE BITARTRATE AND ACETAMINOPHEN 1 TABLET: 5; 325 TABLET ORAL at 04:15

## 2022-10-28 RX ADMIN — CEFDINIR 300 MG: 300 CAPSULE ORAL at 04:13

## 2022-10-28 RX ADMIN — SODIUM HYPOCHLORITE 5 ML: 1.25 SOLUTION TOPICAL at 04:14

## 2022-10-28 RX ADMIN — SENNOSIDES AND DOCUSATE SODIUM 2 TABLET: 50; 8.6 TABLET ORAL at 04:13

## 2022-10-28 ASSESSMENT — PAIN DESCRIPTION - PAIN TYPE: TYPE: CHRONIC PAIN

## 2022-10-28 NOTE — CARE PLAN
The patient is Stable - Low risk of patient condition declining or worsening    Shift Goals  Clinical Goals: Monitor UOP, pain control, safety  Patient Goals: DC restraints  Family Goals: n/a    Progress made toward(s) clinical / shift goals:    Problem: Knowledge Deficit - Standard  Goal: Patient and family/care givers will demonstrate understanding of plan of care, disease process/condition, diagnostic tests and medications  Outcome: Progressing     Problem: Hemodynamics  Goal: Patient's hemodynamics, fluid balance and neurologic status will be stable or improve  Outcome: Progressing     Problem: Fluid Volume  Goal: Fluid volume balance will be maintained  Outcome: Progressing     Problem: Urinary - Renal Perfusion  Goal: Ability to achieve and maintain adequate renal perfusion and functioning will improve  Outcome: Progressing     Problem: Respiratory  Goal: Patient will achieve/maintain optimum respiratory ventilation and gas exchange  Outcome: Progressing      Problem: Physical Regulation  Goal: Diagnostic test results will improve  Outcome: Progressing  Goal: Signs and symptoms of infection will decrease  Outcome: Progressing     Problem: Fall Risk  Goal: Patient will remain free from falls  Outcome: Progressing     Problem: Skin Integrity  Goal: Skin integrity is maintained or improved  Outcome: Progressing     Problem: Safety - Medical Restraint  Goal: Remains free of injury from restraints (Restraint for Interference with Medical Device)  Outcome: Progressing  Goal: Free from restraint(s) (Restraint for Interference with Medical Device)  Outcome: Progressing     Problem: Pain - Standard  Goal: Alleviation of pain or a reduction in pain to the patient’s comfort goal  Outcome: Progressing       Patient is not progressing towards the following goals:

## 2022-10-28 NOTE — DISCHARGE PLANNING
Agency/Facility Name: [facility]  Plan or Request: DPA left vm  for Chicho confirming the [facility] time of 1200 today.    1010- Spoke To: Chicho  Agency/Facility Name: [facility]  Plan or Request: [facility] states the [facility] nurse has a concern with the low sodium in the lab report.   FLORES Ralph aware    1115- Per request, DPA called Chicho at [facility], left a vm regarding pt still coming at 1200 with [facility]. Message left that [facility] provider can call Dr. Hitchcock, ph num given (about sodium ).  1133-- DPA called [facility] front line number, gave info as  above to FLORES Stratton.

## 2022-10-28 NOTE — PROGRESS NOTES
Reviewed discharge instructions, education, and medications with pt. Understanding verbalized. Pt transported via gurney by [facility]. All personal belongings, COBRA, prescriptions, and discharge paperwork given to [facility].

## 2022-10-28 NOTE — CARE PLAN
The patient is Stable - Low risk of patient condition declining or worsening    Shift Goals  Clinical Goals: Maintain UOP, safety, d/c  Patient Goals: d/c  Family Goals: n/a    Progress made toward(s) clinical / shift goals:  Patient resting, no complaints at this time.     Patient is not progressing towards the following goals: N/A

## 2022-10-28 NOTE — PROGRESS NOTES
Assumed care of patient at 1900. Patient is A&Ox3, disoriented to situation but agreeable to care. VSS on RA. Patient refusing insulin, asymptomatic for hyperglycemia. Patient has no complaints at this time. Bed alarm on, bed locked and in lowest position. Call light and personal belongings within reach.

## 2022-10-28 NOTE — DISCHARGE SUMMARY
Discharge Summary    CHIEF COMPLAINT ON ADMISSION  Chief Complaint   Patient presents with    Other     Pt has hx of dementia. [facility]. Pt refusing medsx 1 week. Pt refusing for catheter to be changed out. Pt [facility] from [facility] due to drop in BP. Unknown what pts baseline BP is       Reason for Admission  ems     Admission Date  10/24/2022    CODE STATUS  DNAR/DNI    HPI & HOSPITAL COURSE  82-year-old male residing [facility] with past medical history of advanced dementia, diabetes, hypertension, CAD, recent left foot surgery presenting with fever.  He was admitted for sepsis secondary to urinary tract infection with SADIE.  During patient's surgery he had Martinez catheter inserted and patient had been refusing catheter care and changing out catheter.  Patient was started on empiric antibiotics and IV fluids.  His sepsis and SADIE have resolved.  His urine culture grew Candida albicans however symptoms have resolved with Rocephin.  Patient was briefly in restraints as he was primarily pulling on his Martinez catheter.  Patient passed voiding trial and has now been urinating on his own without any difficulty.  Once Martinez catheter was removed patient's restraints were also discontinued.    Therefore, he is discharged in good and stable condition to home with close outpatient follow-up.    The patient met 2-midnight criteria for an inpatient stay at the time of discharge.    Discharge Date  10/28/22    FOLLOW UP ITEMS POST DISCHARGE  F/u with outpt wound care  Dressing changes as recommended by wound team    DISCHARGE DIAGNOSES  Principal Problem:    Pyelonephritis POA: Yes  Active Problems:    Type 2 diabetes mellitus with hyperglycemia, with long-term current use of insulin (HCC) POA: Yes    Dementia with behavioral disturbance POA: Yes    HTN (hypertension) POA: Yes    SADIE (acute kidney injury) (HCC) POA: Yes    Advance care planning POA: Yes    Ankle wound POA: Yes    Benign prostatic hyperplasia without  lower urinary tract symptoms POA: Yes  Resolved Problems:    Lactic acidosis POA: Unknown      FOLLOW UP  No future appointments.  [facility]  [address]  [facility] [address] [phone]          MEDICATIONS ON DISCHARGE     Medication List        START taking these medications        Instructions   cefdinir 300 MG Caps  Commonly known as: OMNICEF   Take 1 Capsule by mouth every 12 hours for 4 days.  Dose: 300 mg     nystatin powder  Commonly known as: MYCOSTATIN   Apply 1 g topically as needed (for wound vac crusting).  Dose: 1 Application            CHANGE how you take these medications        Instructions   * HYDROcodone-acetaminophen 5-325 MG Tabs per tablet  What changed: Another medication with the same name was changed. Make sure you understand how and when to take each.  Commonly known as: NORCO   Take 1 Tablet by mouth 2 times a day for 3 days.  Dose: 1 Tablet     * HYDROcodone-acetaminophen 5-325 MG Tabs per tablet  What changed: reasons to take this  Commonly known as: NORCO   Take 1 Tablet by mouth every 6 hours as needed (severe pain) for up to 3 days.  Dose: 1 Tablet           * This list has 2 medication(s) that are the same as other medications prescribed for you. Read the directions carefully, and ask your doctor or other care provider to review them with you.                CONTINUE taking these medications        Instructions   atorvastatin 40 MG Tabs  Commonly known as: LIPITOR   Take 1 Tablet by mouth every evening.  Dose: 40 mg     diphenhydrAMINE-lidocaine-Maalox (MBX)   Take  by mouth 2 times a day. **MAGIC CUP**     hydrOXYzine HCl 50 MG Tabs  Commonly known as: ATARAX   Take 50 mg by mouth every 12 hours as needed (for aggression and combativeness).  Dose: 50 mg     insulin 70/30 (70-30) 100 UNIT/ML Susp  Commonly known as: HumuLIN 70/30/NovoLIN 70/30   Inject 15 Units under the skin 2 times a day.  Dose: 15 Units     magnesium hydroxide 400 MG/5ML  Susp  Commonly known as: MILK OF MAGNESIA   Take 30 mL by mouth every 24 hours as needed (if no bowel movement for 72 hours).  Dose: 30 mL     Melatonin 10 MG Tabs   Take 10 mg by mouth at bedtime.  Dose: 10 mg     metformin 1000 MG tablet  Commonly known as: GLUCOPHAGE   Take 1 Tablet by mouth 2 times a day.  Dose: 1,000 mg     metoprolol tartrate 25 MG Tabs  Commonly known as: LOPRESSOR   Take 25 mg by mouth 2 times a day.  Dose: 25 mg     mirtazapine 15 MG Tabs  Commonly known as: Remeron   Take 1 Tablet by mouth at bedtime.  Dose: 15 mg     NovoLIN R 100 Unit/mL Soln  Generic drug: insulin regular   Inject 2-10 Units under the skin 3 times a day before meals. Inject per sliding scale. For blood glucose:  151 - 200 = 2 units  201 - 250 = 4 units  251 - 300 = 6 units  301 - 350 = 8 units  351 - 400 = 10 units  If >400, notify M.D.  Dose: 2-10 Units     sertraline 50 MG Tabs  Commonly known as: Zoloft   Take 1 Tablet by mouth every evening.  Dose: 50 mg     tamsulosin 0.4 MG capsule  Commonly known as: FLOMAX   Take 0.8 mg by mouth at bedtime. 2 capsules = 0.8 mg.  Dose: 0.8 mg            STOP taking these medications      amLODIPine 5 MG Tabs  Commonly known as: NORVASC     lisinopril 40 MG tablet  Commonly known as: PRINIVIL     sulfamethoxazole-trimethoprim 800-160 MG tablet  Commonly known as: BACTRIM DS              Allergies  No Known Allergies    DIET  Orders Placed This Encounter   Procedures    Diet Order Diet: Regular     Standing Status:   Standing     Number of Occurrences:   1     Order Specific Question:   Diet:     Answer:   Regular [1]       ACTIVITY  As tolerated.  Weight bearing as tolerated    CONSULTATIONS  none    PROCEDURES  none    LABORATORY  Lab Results   Component Value Date    SODIUM 132 (L) 10/27/2022    POTASSIUM 4.1 10/27/2022    CHLORIDE 100 10/27/2022    CO2 22 10/27/2022    GLUCOSE 114 (H) 10/27/2022    BUN 17 10/27/2022    CREATININE 0.72 10/27/2022        Lab Results   Component  Value Date    WBC 9.4 10/27/2022    HEMOGLOBIN 11.0 (L) 10/27/2022    HEMATOCRIT 32.8 (L) 10/27/2022    PLATELETCT 373 10/27/2022        Total time of the discharge process exceeds 33 minutes.

## 2022-10-28 NOTE — PROGRESS NOTES
Bedside report received from FLORES Bill. Assumed care of pt. Pt resting quietly with eyes closed. No s/s of distress or nonverbal cues of pain noted at this time. Call light and personal belongings within reach. Bed in low position with wheels locked. Bed alarm on.

## 2022-11-02 ENCOUNTER — PATIENT MESSAGE (OUTPATIENT)
Dept: HEALTH INFORMATION MANAGEMENT | Facility: OTHER | Age: 82
End: 2022-11-02

## 2023-01-18 NOTE — PROGRESS NOTES
[facility] Medicine Daily Progress Note    Date of Service  7/14/2022    Chief Complaint  Andrei Garay is a 82 y.o. male admitted 6/29/2022 with left leg swelling and altered mental status    Hospital Course  Andrei Garay is a 82 y.o. male who presented 6/29/2022 with a history of type 2 diabetes, hyperlipidemia, hypertension, osteomyelitis who presents with right leg swelling and altered mental status.     According to the patient's daughter who is visiting him she states that he began becoming more confused and slurring his words yesterday.  He also reports that he had episode of incontinence which is not his normal baseline.  Has been receiving IV daptomycin for left lower leg osteomyelitis which was diagnosed 2 weeks ago, MRSA culture positive.    Emergency department ultrasound of the right lower extremity negative for DVT. Limb preservation services consulted and following.  Completed a bedside debridement and applied new dressings.  Sure of wound management [facility]  Wound does not appear to be improved from prior hospitalization.  We will consult infectious disease for recommendations and continue to work with LPS for management.      Interval Problem Update  SHREYA overnight  Still not having urge to urinate, barbosa in place, bladder training ongoing.        Consultants/Specialty  infectious disease  Limb preservation services    Code Status  DNAR/DNI    Disposition  Patient is not medically cleared for discharge.   Anticipate discharge to to skilled nursing facility.  I have placed the appropriate orders for post-discharge needs.    Review of Systems  Review of Systems   Unable to perform ROS: Mental acuity        Physical Exam  Temp:  [36.5 °C (97.7 °F)-36.9 °C (98.5 °F)] 36.6 °C (97.8 °F)  Pulse:  [71-84] 74  Resp:  [16-18] 16  BP: (127-142)/(54-82) 129/54  SpO2:  [90 %-91 %] 91 %    Physical Exam  Vitals and nursing note reviewed.   Constitutional:        General: He is not in acute distress.     Appearance: He is obese. He is not ill-appearing.   Cardiovascular:      Rate and Rhythm: Normal rate and regular rhythm.   Pulmonary:      Effort: Pulmonary effort is normal. No respiratory distress.   Abdominal:      General: Abdomen is protuberant.   Skin:     Findings: Erythema and signs of injury present.      Comments: Left leg wound vac in place   Neurological:      General: No focal deficit present.      Mental Status: He is disoriented.      Motor: Motor function is intact.   Psychiatric:         Mood and Affect: Affect is flat.         Behavior: Behavior is hyperactive.         Cognition and Memory: Cognition is impaired. Memory is impaired.         Judgment: Judgment is impulsive.         Fluids    Intake/Output Summary (Last 24 hours) at 7/14/2022 1633  Last data filed at 7/14/2022 0700  Gross per 24 hour   Intake 200 ml   Output 700 ml   Net -500 ml       Laboratory                        Imaging  US-EXTREMITY VENOUS LOWER UNILAT RIGHT   Final Result      CT-HEAD W/O   Final Result         No acute intracranial abnormality.            DX-ANKLE 3+ VIEWS LEFT   Final Result         Bony destruction in the lateral aspect of the distal fibula is consistent with osteomyelitis described on recent MRI. Overlying soft tissue edema and a small amount of soft tissue gas      DX-CHEST-PORTABLE (1 VIEW)   Final Result      1.  Increased perihilar interstitial markings are suggestive of pulmonary edema.      2.  Stable mild cardiomegaly           Assessment/Plan  * Encephalopathy  Assessment & Plan  Likely secondary to worsening cellulitis of the right leg.    Patient has been receiving IV daptomycin for osteomyelitis of the left leg, will continue    Avoid sedating medications  Improving    Urinary retention  Assessment & Plan  Cont flomax  Failed barbosa trial out  Will replace barbosa and start bladder training      Osteomyelitis (HCC)- (present on admission)  Assessment &  Plan  Patient with osteomyelitis currently on daptomycin.   CRP mildly elevated  LPS    Type 2 diabetes mellitus (HCC)- (present on admission)  Assessment & Plan  Last A1c was 6.33 weeks ago.  Hold metformin  Insulin sliding scale  Diabetic diet    Cellulitis- (present on admission)  Assessment & Plan  Daughter reports that the patient's right leg is more red and swollen than normal.  Doppler ultrasound does not show DVT  ID consulted, patient reportedly had removed his wound VAC while at SNF  Continue the daptomycin (until 7/23) and Unasyn (until 7/6)    Dementia with behavioral disturbance (HCC)- (present on admission)  Assessment & Plan  History of dementia.  Oriented x 2, unsure of baseline  seroqeul 12.5mg to facilitate off restraints    Hypertension- (present on admission)  Assessment & Plan  Continue home medication lisinopril and metoprolol      Hyperlipidemia- (present on admission)  Assessment & Plan  Continue home med atorvastatin       VTE prophylaxis: enoxaparin ppx    I have performed a physical exam and reviewed and updated ROS and Plan today (7/14/2022). In review of yesterday's note (7/13/2022), there are no changes except as documented above.       Methotrexate Counseling:  Patient counseled regarding adverse effects of methotrexate including but not limited to nausea, vomiting, abnormalities in liver function tests. Patients may develop mouth sores, rash, diarrhea, and abnormalities in blood counts. The patient understands that monitoring is required including LFT's and blood counts.  There is a rare possibility of scarring of the liver and lung problems that can occur when taking methotrexate. Persistent nausea, loss of appetite, pale stools, dark urine, cough, and shortness of breath should be reported immediately. Patient advised to discontinue methotrexate treatment at least three months before attempting to become pregnant.  I discussed the need for folate supplements while taking methotrexate.  These supplements can decrease side effects during methotrexate treatment. The patient verbalized understanding of the proper use and possible adverse effects of methotrexate.  All of the patient's questions and concerns were addressed.

## 2023-08-09 ENCOUNTER — APPOINTMENT (OUTPATIENT)
Dept: RADIOLOGY | Facility: MEDICAL CENTER | Age: 83
End: 2023-08-09
Attending: EMERGENCY MEDICINE
Payer: MEDICARE

## 2023-08-09 ENCOUNTER — HOSPITAL ENCOUNTER (EMERGENCY)
Facility: MEDICAL CENTER | Age: 83
End: 2023-08-09
Attending: EMERGENCY MEDICINE
Payer: MEDICARE

## 2023-08-09 VITALS
RESPIRATION RATE: 19 BRPM | SYSTOLIC BLOOD PRESSURE: 157 MMHG | OXYGEN SATURATION: 94 % | HEART RATE: 61 BPM | DIASTOLIC BLOOD PRESSURE: 77 MMHG | TEMPERATURE: 98 F | WEIGHT: 190 LBS | HEIGHT: 72 IN | BODY MASS INDEX: 25.73 KG/M2

## 2023-08-09 DIAGNOSIS — S09.90XA CLOSED HEAD INJURY, INITIAL ENCOUNTER: ICD-10-CM
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
## 2023-08-09 DIAGNOSIS — S46.911A SHOULDER STRAIN, RIGHT, INITIAL ENCOUNTER: ICD-10-CM

## 2023-08-09 PROCEDURE — 70450 CT HEAD/BRAIN W/O DYE: CPT

## 2023-08-09 PROCEDURE — 700102 HCHG RX REV CODE 250 W/ 637 OVERRIDE(OP): Mod: UD | Performed by: EMERGENCY MEDICINE

## 2023-08-09 PROCEDURE — 73030 X-RAY EXAM OF SHOULDER: CPT | Mod: RT

## 2023-08-09 PROCEDURE — A9270 NON-COVERED ITEM OR SERVICE: HCPCS | Mod: UD | Performed by: EMERGENCY MEDICINE

## 2023-08-09 PROCEDURE — 99284 EMERGENCY DEPT VISIT MOD MDM: CPT

## 2023-08-09 RX ORDER — ACETAMINOPHEN 325 MG/1
650 TABLET ORAL ONCE
Status: COMPLETED | OUTPATIENT
Start: 2023-08-09 | End: 2023-08-09

## 2023-08-09 RX ADMIN — ACETAMINOPHEN 650 MG: 325 TABLET, FILM COATED ORAL at 09:37

## 2023-08-09 ASSESSMENT — FIBROSIS 4 INDEX: FIB4 SCORE: 1.35

## 2023-08-09 NOTE — ED PROVIDER NOTES
ED Provider Note    CHIEF COMPLAINT  Chief Complaint   Patient presents with    Fall     Pt bib ems from [facility] for glf, pt got up this am slipped and fell hit head on the dresser. -loc. -thinners. Has hx of Dementia. C/o head ache.       EXTERNAL RECORDS REVIEWED  Inpatient Notes the patient was admitted to the hospital 10/24/2022 to 10/28/2022 for pyelonephritis.  He has a history of dementia [facility]  He refused medications for 1 week and refused change of his Martinez catheter he became hypotensive and was sent here for evaluation and found to have pyelonephritis with acute kidney injury    HPI/ROS  LIMITATION TO HISTORY   Select: : None  OUTSIDE HISTORIAN(S):  none    Andrei Garay is a 83 y.o. male who presents by ambulance after suffering a mechanical fall prior to arrival.  The patient tripped and fell on his right side hitting his right shoulder and the right side of his head.  He did not lose consciousness.  He is complaining of some pain in his head and shoulder.  He denies any unilateral weakness or numbness he denies any neck pain or low back pain.  He has no pain in the chest or shortness of breath.  He denies feeling dizzy weak or lightheaded prior to the fall and states that he just tripped.  He denies taking any blood thinning medications.    PAST MEDICAL HISTORY   has a past medical history of Diabetes ([facility]), Hyperlipemia, Hypertension, and MI (myocardial infarction) ([facility]).    SURGICAL HISTORY   has a past surgical history that includes stent placement; appendectomy; irrigation & debridement general (6/14/2022); irrigation & debridement general (Left, 8/22/2022); and bone biopsy (Left, 8/22/2022).    FAMILY HISTORY  Family History   Problem Relation Age of Onset    No Known Problems Mother     Heart Disease Father        SOCIAL HISTORY  Social History     Tobacco Use    Smoking status: Former     Types: Cigarettes     Quit date: 9/22/2000     Years  since quittin.8    Smokeless tobacco: Never   Vaping Use    Vaping Use: Never used   Substance and Sexual Activity    Alcohol use: Never    Drug use: Never    Sexual activity: Not on file       CURRENT MEDICATIONS  Home Medications    **Home medications have not yet been reviewed for this encounter**         ALLERGIES  No Known Allergies    PHYSICAL EXAM  VITAL SIGNS: BP (!) 165/70   Pulse 65   Temp 36.7 °C (98 °F) (Temporal)   Resp (!) 23   Ht 1.829 m (6')   Wt 86.2 kg (190 lb)   SpO2 93%   BMI 25.77 kg/m²      Constitutional: Well developed, Well nourished, No acute distress, Non-toxic appearance.   HEENT: Normocephalic, Atraumatic,  external ears normal, pharynx pink,  Mucous  Membranes moist, No rhinorrhea or mucosal edema  Eyes: PERRL, EOMI, Conjunctiva normal, No discharge.   Neck: Normal range of motion, No tenderness, Supple, No stridor.   Lymphatic: No lymphadenopathy    Cardiovascular: Regular Rate and Rhythm, No murmurs,  rubs, or gallops.   Thorax & Lungs: Lungs clear to auscultation bilaterally, No respiratory distress, No wheezes, rhales or rhonchi, No chest wall tenderness.   Abdomen: Bowel sounds normal, Soft, non tender, non distended,  No pulsatile masses., no rebound guarding or peritoneal signs.   Skin: Warm, Dry, No erythema, No rash,   Back:  No CVA tenderness,  No spinal tenderness, bony crepitance step offs or instability.   Extremities:  No cyanosis, clubbing or edema, positive for right shoulder tenderness without anterior fullness or ADC drop-off he has normal strength distal to his right shoulder no tenderness to the right elbow or wrist the rest of his extremities are nontender and atraumatic  Musculoskeletal: Good range of motion in all major joints. No tenderness to palpation or major deformities noted.   Neurologic: Alert & oriented x 3, Cranial nerves II-XII intact, Equal strength and sensation upper and lower extremities bilaterally,  No focal deficits noted.  NIH is 0  is a slight droop to the left side of his face which is chronic for him  Psychiatric: Affect normal, Judgment normal, Mood normal.   DIAGNOSTIC STUDIES / PROCEDURES  EKG  I have independently interpreted this EKG  None    LABS  None    RADIOLOGY  I have independently interpreted the diagnostic imaging associated with this visit and am waiting the final reading from the radiologist.   My preliminary interpretation is as follows: CT head no skull fracture or intracranial hemorrhage  Radiologist interpretation:   DX-SHOULDER 2+ RIGHT   Final Result      No acute osseous abnormality.      CT-HEAD W/O   Final Result      1. No CT evidence of acute infarct, hemorrhage or mass.   2. Moderate global parenchymal atrophy. Chronic small vessel ischemic changes.            COURSE & MEDICAL DECISION MAKING    ED Observation Status? No; Patient does not meet criteria for ED Observation.     INITIAL ASSESSMENT, COURSE AND PLAN  Care Narrative: Andrei Garay is a 83 y.o. male who presents by ambulance after suffering a mechanical fall prior to arrival.  The patient tripped and fell on his right side hitting his right shoulder and the right side of his head.  He did not lose consciousness.  He is complaining of some pain in his head and shoulder.  He denies any unilateral weakness or numbness he denies any neck pain or low back pain.  He has no pain in the chest or shortness of breath.  He denies feeling dizzy weak or lightheaded prior to the fall and states that he just tripped.  He denies taking any blood thinning medications.  On physical exam he has pain to the right posterior occipital scalp without any lacerations or contusions he has no C-spine tenderness he has right shoulder tenderness but does have good range of motion and no anterior fullness or AC drop off he has normal  strength bilaterally normal strength in the lower extremities and no focal neurologic deficits.     Differential diagnosis: Closed head  injury, right shoulder fracture versus right shoulder sprain, intracranial hemorrhage  ADDITIONAL PROBLEM LIST  Pyelonephritis last year  Dementia with behavioral disturbance  Type 2 diabetes  Hypertension  Acute kidney injury  BPH  DISPOSITION AND DISCUSSIONS    The patient's CT head does not show any intracranial hemorrhage or skull fracture.  X-ray of the right shoulder does not show any fracture or dislocation.  Will discharge him home with Tylenol and he is to rest and apply ice to sore areas.  He should follow-up with his primary physician for recheck later this week.  I have discussed management of the patient with the following physicians and MALLORY's:  none    Discussion of management with other QHP or appropriate source(s): None     Escalation of care considered, and ultimately not performed:blood analysis but the patient did not have any weakness or dizziness or chest pain prior to the fall    Barriers to care at this time, including but not limited to: none.     Decision tools and prescription drugs considered including, but not limited to: Pain Medications tylenol and NIH Stroke Scale 0 .  The patient will return for new or worsening symptoms and is stable at the time of discharge.    The patient is referred to a primary physician for blood pressure management, diabetic screening, and for all other preventative health concerns.      DISPOSITION:  Patient will be discharged home in stable condition.    FOLLOW UP:  Stephanie Diaz M.D.  [address] [facility] [id number] [phone]    Call in 1 day  for recheck      OUTPATIENT MEDICATIONS:  New Prescriptions    No medications on file   Otc tylenol    FINAL DIAGNOSIS  1. Fall, initial encounter    2. Closed head injury, initial encounter    3. Shoulder strain, right, initial encounter           Electronically signed by: Margarita Todd M.D., 8/9/2023 8:53 AM

## 2023-08-09 NOTE — ED NOTES
Patient given tylenol for generalized pain. Daughter at the bedside. She reports her Dad gets around with a walker. Updated daughter on discharge plan. She agrees with wheelchair transport home.

## 2023-08-09 NOTE — ED TRIAGE NOTES
Chief Complaint   Patient presents with    Fall     Pt bib ems from [facility] for glf, pt got up this am slipped and fell hit head on the dresser. -loc. -thinners. Has hx of Dementia. C/o head ache.     Aaox3 (disoriented to time)

## 2023-08-09 NOTE — DISCHARGE PLANNING
JARVIS asked to assist with transportation.   [facility]. He will come pick Pt up at 10:15. He will be in the ED parking area if Pt could be there waiting for him.     JARVIS updated RN.

## 2023-08-09 NOTE — ED NOTES
Report called to RN at [facility]. Helped patient dress for discharge. Patient assisted into wheelchair. Discharge paperwork reviewed with daughter. Daughter agrees with discharge. Social work set up ride for patient,  to be waiting for patient in parking lot in 15 minutes. Patient and daughter assisted to lobby. [facility]

## 2023-12-01 NOTE — PROGRESS NOTES
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    Note from previous visit with :    Patient may benefit from robotic assisted Total Laparoscopic Hysterectomy bilateral salpingectomy  Patient may choose to have Bilateral Salpingo-oophorectomy  ---  Prior US on 9/29/23 showed 6mm EMS

## 2023-12-10 NOTE — WOUND TEAM
[facility]  Inpatient Services  Initial Wound and Skin Care Evaluation    Admission Date: 4/12/2022     Last order of IP CONSULT TO WOUND CARE was found on 4/13/2022 from Hospital Encounter on 4/12/2022     HPI, PMH, SH: Reviewed    No past surgical history on file.  Social History     Tobacco Use   • Smoking status: Former Smoker   • Smokeless tobacco: Not on file   Substance Use Topics   • Alcohol use: Not on file     Chief Complaint   Patient presents with   • Wound Check     Left ankle   • Sent by MD     For elevated wbc=16     Diagnosis: Cellulitis [L03.90]    Unit where seen by Wound Team: S139/00     WOUND CONSULT/FOLLOW UP RELATED TO:  L. Lateral ankle     WOUND HISTORY:  Andrei Garay is a 82 y.o. male with history of DM2, HTN, depression, who presented 4/12/2022 with swelling and redness of L lateral ankle wound. The redness has been extending to the L foot and mild calf region. Onset a few weeks ago. Associated with chills and drainage. Patient lives at [facility] home and was noted elevated wbc of 18 and sent patient for further evaluation.   Here labs remarkable for wbc 18.9, Na 133, Cr 1.48, lactic acid 3.4 and .   L ankle x ray shows Lateral ankle soft tissue defect with associated soft tissue swelling. No definite soft tissue gas or radiographic evidence of osteomyelitis.   Patient was started with IVF and iv abx and was admitted for further evaluation.   Of note, patient has POLST indicating DNR. Per daughter it was made a few weeks ago, patient would like to be full code today. Will keep code.     WOUND ASSESSMENT/LDA  Wound 04/12/22 Ankle Lateral Left (Active)   Wound Image    04/13/22 1200   Site Assessment Red;Yellow;[place] 04/13/22 1200   Periwound Assessment Maceration 04/13/22 1200   Margins Defined edges;Unattached edges 04/13/22 1200   Closure Secondary intention 04/13/22 1200   Drainage Amount Moderate 04/13/22 1200   Drainage Description Serous 04/13/22  1200   Treatments Cleansed;Site care;Offloading;Compression 04/13/22 1200   Wound Cleansing Normal Saline Irrigation 04/13/22 1200   Periwound Protectant Skin Protectant Wipes to Periwound 04/13/22 1200   Dressing Cleansing/Solutions Not Applicable 04/13/22 1200   Dressing Options Hydrofera Blue Ready;Hypafix Tape;Mepilex Heel;Tubigrip 04/13/22 1200   Dressing Changed New 04/13/22 1200   Dressing Status Clean;Dry;Intact 04/13/22 1200   Dressing Change/Treatment Frequency Every 48 hrs, and As Needed 04/13/22 1200   NEXT Dressing Change/Treatment Date 04/15/22 04/13/22 1200   NEXT Weekly Photo (Inpatient Only) 04/20/22 04/13/22 1200   Non-staged Wound Description Full thickness 04/13/22 1200   Wound Length (cm) 0.5 cm 04/13/22 1200   Wound Width (cm) 0.5 cm 04/13/22 1200   Wound Depth (cm) 0.3 cm 04/13/22 1200   Wound Surface Area (cm^2) 0.25 cm^2 04/13/22 1200   Wound Volume (cm^3) 0.075 cm^3 04/13/22 1200   Shape [place] 04/13/22 1200   Wound Odor None 04/13/22 1200   Pulses 1+;Left;DP;PT 04/13/22 1200   Exposed Structures MONAE 04/13/22 1200   WOUND NURSE ONLY - Time Spent with Patient (mins) 60 04/13/22 1200   Number of days: 1        Vascular:    HONEY:   No results found.    Lab Values:    Lab Results   Component Value Date/Time    WBC 13.1 (H) 04/13/2022 02:19 AM    RBC 4.40 (L) 04/13/2022 02:19 AM    HEMOGLOBIN 13.1 (L) 04/13/2022 02:19 AM    HEMATOCRIT 38.4 (L) 04/13/2022 02:19 AM    CREACTPROT 22.27 (H) 04/12/2022 03:03 PM    SEDRATEWES 40 (H) 04/13/2022 02:19 AM    HBA1C 6.7 (H) 04/12/2022 05:59 PM        Culture Results show:  No results found for this or any previous visit (from the past 720 hour(s)).    Pain Level/Medicated:  Patient denied pain       INTERVENTIONS BY WOUND TEAM:  Chart and images reviewed. Discussed with bedside RN. All areas of concern (based on picture review, LDA review and discussion with bedside RN) have been thoroughly assessed. Documentation of areas based on significant findings.  This RN in to assess patient. Performed standard wound care which includes appropriate positioning, dressing removal and non-selective debridement. Pictures and measurements obtained weekly if/when required.  Preparation for Dressing removal: Dressing soaked with n/a  Non-selectively Debrided with:  n/s and gauze.  Sharp debridement: n/a  Rosa M wound: Cleansed with n/s, Prepped with no sting  Primary Dressing: Hydrofera blue ready  Secondary (Outer) Dressing: hypafix tape mepilex and tubi     Interdisciplinary consultation: Patient, Bedside RN , Wound RN Ela    EVALUATION / RATIONALE FOR TREATMENT:  Most Recent Date:  4/13/22: L. Lateral ankle draining a large amount of yellow serous fluid most likely from edema in that area.  Applied hydrofera blue ready to absorb drainage and whisk it away from the skin, periwound is very macerated.  Also applied tubi  bilaterally to assist with the edema.  Palpable pulses bilaterally.       Goals: Steady decrease in wound area and depth weekly.    WOUND TEAM PLAN OF CARE ([X] for frequency of wound follow up,): X  Nursing to follow orders written for wound care. Contact wound team if area fails to progress, deteriorates or with any questions/concerns  Dressing changes by wound team:                   Follow up 3 times weekly:                NPWT change 3 times weekly:  Following   Follow up 1-2 times weekly:      Follow up Bi-Monthly:                   Follow up as needed:     Other (explain):     NURSING PLAN OF CARE ORDERS (X):  Dressing changes: See Dressing Care orders: X  Skin care: See Skin Care orders: X  RN Prevention Protocol: X  Rectal tube care: See Rectal Tube Care orders:   Other orders:    RSKIN:   CURRENTLY IN PLACE (X), APPLIED THIS VISIT (A), ORDERED (O):   Q shift [place]rell:  X  Q shift pressure point assessments:  X    Surface/Positioning X  Pressure redistribution mattress        X    Low Airloss          Bariatric foam      Bariatric AKILA     Waffle  cushion        Waffle Overlay          Reposition q 2 hours      TAPs Turning system     [place] Pillow     Offloading/Redistribution X  Sacral Mepilex (Silicone dressing)     Heel Mepilex (Silicone dressing)       X  Heel float boots (Prevalon boot)             Float Heels off Bed with Pillows           Respiratory Room air  Silicone O2 tubing         Gray Foam Ear protectors     Cannula fixation Device (Tender )          High flow offloading Clip    Elastic head band offloading device      Anchorfast                                                         Trach with Optifoam split foam             Containment/Moisture Prevention MONAE    Rectal tube or BMS    Purwick/Condom Cath        Martinez Catheter    Barrier wipes           Barrier paste       Antifungal tx      Interdry        Mobilization X      Up to chair    X    Ambulate      PT/OT      Nutrition PO      Dietician        Diabetes Education      PO     TF     TPN     NPO   # days     Other        Anticipated discharge plans: MONAE  LTACH:        SNF/Rehab:                  Home Health Care:           Outpatient Wound Center:            Self/Family Care:        Other:                  Vac Discharge Needs:   Not Applicable Pt not on a wound vac:   X    Regular Vac while inpatient, alternative dressing at DC:        Regular Vac in use and continued at DC:            Reg. Vac w/ Skin Sub/Biologic in use. Will need to be changed 2x wkly:      Veraflo Vac while inpatient, ok to transition to Regular Vac on Discharge:           Veraflo Vac while inpatient, will need to remain on Veraflo Vac upon discharge:                            None

## 2024-06-04 ENCOUNTER — APPOINTMENT (OUTPATIENT)
Dept: RADIOLOGY | Facility: MEDICAL CENTER | Age: 84
End: 2024-06-04
Attending: EMERGENCY MEDICINE
Payer: MEDICARE

## 2024-06-04 ENCOUNTER — HOSPITAL ENCOUNTER (EMERGENCY)
Facility: MEDICAL CENTER | Age: 84
End: 2024-06-04
Attending: EMERGENCY MEDICINE
Payer: MEDICARE

## 2024-06-04 VITALS
WEIGHT: 190 LBS | DIASTOLIC BLOOD PRESSURE: 86 MMHG | BODY MASS INDEX: 25.73 KG/M2 | TEMPERATURE: 98 F | SYSTOLIC BLOOD PRESSURE: 180 MMHG | HEART RATE: 68 BPM | HEIGHT: 72 IN | RESPIRATION RATE: 18 BRPM | OXYGEN SATURATION: 96 %

## 2024-06-04 DIAGNOSIS — S09.90XA CLOSED HEAD INJURY, INITIAL ENCOUNTER: ICD-10-CM

## 2024-06-04 DIAGNOSIS — T14.8XXA ABRASION: ICD-10-CM

## 2024-06-04 LAB — GLUCOSE BLD STRIP.AUTO-MCNC: 166 MG/DL (ref 65–99)

## 2024-06-04 PROCEDURE — 99284 EMERGENCY DEPT VISIT MOD MDM: CPT

## 2024-06-04 PROCEDURE — 700111 HCHG RX REV CODE 636 W/ 250 OVERRIDE (IP): Performed by: EMERGENCY MEDICINE

## 2024-06-04 PROCEDURE — 72125 CT NECK SPINE W/O DYE: CPT

## 2024-06-04 PROCEDURE — 90471 IMMUNIZATION ADMIN: CPT

## 2024-06-04 PROCEDURE — 82962 GLUCOSE BLOOD TEST: CPT

## 2024-06-04 PROCEDURE — 90715 TDAP VACCINE 7 YRS/> IM: CPT | Performed by: EMERGENCY MEDICINE

## 2024-06-04 PROCEDURE — 70450 CT HEAD/BRAIN W/O DYE: CPT

## 2024-06-04 RX ADMIN — CLOSTRIDIUM TETANI TOXOID ANTIGEN (FORMALDEHYDE INACTIVATED), CORYNEBACTERIUM DIPHTHERIAE TOXOID ANTIGEN (FORMALDEHYDE INACTIVATED), BORDETELLA PERTUSSIS TOXOID ANTIGEN (GLUTARALDEHYDE INACTIVATED), BORDETELLA PERTUSSIS FILAMENTOUS HEMAGGLUTININ ANTIGEN (FORMALDEHYDE INACTIVATED), BORDETELLA PERTUSSIS PERTACTIN ANTIGEN, AND BORDETELLA PERTUSSIS FIMBRIAE 2/3 ANTIGEN 0.5 ML: 5; 2; 2.5; 5; 3; 5 INJECTION, SUSPENSION INTRAMUSCULAR at 09:41

## 2024-06-04 ASSESSMENT — FIBROSIS 4 INDEX: FIB4 SCORE: 1.36

## 2024-06-04 NOTE — ED TRIAGE NOTES
Chief Complaint   Patient presents with    T-5000 Head Injury     PT resides at [facility] where he had unwitnessed GLF. Abrasion noted to forehead + neck tenderness. PT Aox1 at baseline.        PT activated as TBI. Assessed at charge desk by ERP and then to imaging.     BP (!) 173/79   Pulse 62   Temp 36.6 °C (97.8 °F) (Temporal)   Resp 18   Ht 1.829 m (6')   Wt 86.2 kg (190 lb)   SpO2 95%   BMI 25.77 kg/m²

## 2024-06-04 NOTE — ED PROVIDER NOTES
ED Provider Note    CHIEF COMPLAINT  Chief Complaint   Patient presents with    T-5000 Head Injury     PT resides at [facility] where he had unwitnessed GLF. Abrasion noted to forehead + neck tenderness. PT Aox1 at baseline.        HPI/ROS  LIMITATION TO HISTORY   Select: Altered mental status / Confusion  OUTSIDE HISTORIAN(S):  EMS complaining of a ground-level fall resulting in a head strike.  The patient has been at his baseline since that time per staff which he is normally ANO x 1 or 2.  Has been complaining of chronic pain and has been extremely agitated and feisty when they try to evaluate him.  The patient's daughter, care provider, wanted the patient be seen here in the emergency department for evaluation ration of his head injury.    Andrei Garay is a 84 y.o. male who presents via EMS for ground-level fall.  The patient underwent his ground-level fall and hit his head.  Per the patient, he is sitting his chair and fell backwards hitting his head.  He has chronic pain and is complaining of pain through his entire body.  Per EMS, the patient is a DNR/DNI, baseline ANO x 1 or 2.  They did call the patient's care provider and asked to be sent here for evaluation.  The patient was extremely agitated and route and EMS needed to restrain the patient secondary to his agitation unable to obtain a blood sugar further evaluation.  Does not take any blood thinners.    PAST MEDICAL HISTORY   has a past medical history of Diabetes ([facility]), Hyperlipemia, Hypertension, and MI (myocardial infarction) ([facility]).    SURGICAL HISTORY   has a past surgical history that includes stent placement; appendectomy; irrigation & debridement general (6/14/2022); irrigation & debridement general (Left, 8/22/2022); and bone biopsy (Left, 8/22/2022).    FAMILY HISTORY  Family History   Problem Relation Age of Onset    No Known Problems Mother     Heart Disease Father        SOCIAL HISTORY  Social History      Tobacco Use    Smoking status: Former     Current packs/day: 0.00     Types: Cigarettes     Quit date: 2000     Years since quittin.7    Smokeless tobacco: Never   Vaping Use    Vaping status: Never Used   Substance and Sexual Activity    Alcohol use: Never    Drug use: Never    Sexual activity: Not on file       CURRENT MEDICATIONS  Home Medications    **Home medications have not yet been reviewed for this encounter**       Audit from Redirected Encounters    **Home medications have not yet been reviewed for this encounter**         ALLERGIES  No Known Allergies    PHYSICAL EXAM  VITAL SIGNS: BP (!) 173/79   Pulse 62   Temp 36.6 °C (97.8 °F) (Temporal)   Resp 18   Ht 1.829 m (6')   Wt 86.2 kg (190 lb)   SpO2 95%   BMI 25.77 kg/m²      Nursing notes and vitals reviewed.  Constitutional: Well developed, Well nourished, Non-toxic appearance.   Eyes: PERRLA, EOMI, Conjunctiva normal, No discharge.   HENT: Abrasion to the anterior scalp no facial bony deformity or tenderness, no hemotympanum, no dental trauma, normal oropharynx.    Neck: Slight enteral cervical spine tenderness, no step off deformity, patient is in a c-spine immobilization collar.   Cardiovascular: Normal heart rate, Normal rhythm, No murmurs, No rubs, No gallops, Normal heart tones.   Thorax & Lungs: No respiratory distress, Breath sounds equal bilaterally with equal chest expansion upon inspiration, No subcutaneous air, No flail segment, No rales, No rhonchi, No wheezing, No chest tenderness.   Abdomen: Bowel sounds normal, Soft, No tenderness, No guarding, No rebound, No pulsatile masses.   Skin: Abrasion to scalp.   Musculoskeletal: Intact distal pulses, No edema, No cyanosis, No clubbing. Good range of motion in all major joints. No tenderness to palpation or major deformities noted, no midline thoracic or lumbar spinous process tenderness, no midline back tenderness.   Extremities: No long bone tenderness or deformity, distal  pulses are brisk, pelvis is stable.   Neurologic: Alert & oriented x, and the patient's name, date of birth, cannot tell me where he is, moves upper lower extremities without difficulty, answering questions  Psychiatric: Affect normal for clinical presentation.      EKG/LABS      I have independently interpreted this EKG    RADIOLOGY/PROCEDURES   I have independently interpreted the diagnostic imaging associated with this visit and am waiting the final reading from the radiologist.   My preliminary interpretation is as follows: Head CT is negative for acute intracranial hemorrhage.    Radiologist interpretation:  CT-CSPINE WITHOUT PLUS RECONS   Final Result      Degenerative changes without definite acute fracture or dislocation of the cervical spine      CT-HEAD W/O   Final Result      1.  Generalized atrophy.   2.  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   3.  No acute intracranial abnormality.             COURSE & MEDICAL DECISION MAKING    ASSESSMENT, COURSE AND PLAN  Care Narrative: This is a pleasant 84 [place] presents with a ground-level fall.  Had a conversation with the patient's daughter states that he falls frequently.  He was sitting in his chair and fell backwards and hit his head.  Patient has no evidence of intracranial hemorrhage or cervical spine fracture on CT scan.  Had a long conversation with patient's daughter concerning patient disposition and she agrees the patient go back to the facility and has a walker there.  The patient is able to stand here without difficulty.  Tetanus booster is updated.  He has no evidence of intracranial hemorrhage, acute neurological changes suspicious for CVA, TIA therefore CTA was not completed nor MRI.  Patient has no evidence of trauma besides in the forehead and mechanism supports this.  The patient was discharged with strict return precautions.      ADDITIONAL PROBLEMS MANAGED  Altered mental status that is  chronic.    DISPOSITION AND DISCUSSIONS      Decision tools and prescription drugs considered including, but not limited to: Considered CTA of the head neck for possible CVA but the patient has no persistent neurological deficits per the daughter and patient is DNR like the patient to go back to the facility.    FINAL DIAGNOSIS  1. Closed head injury, initial encounter Active   2. Abrasion Active       DISPOSITION:  Patient will be discharged home in stable condition.    FOLLOW UP:  [facility], Emergency Dept  [address] [id number] [phone]    If symptoms worsen    Stephanie Diaz M.D.  [address] [place]  [facility] [address]  [phone]    Schedule an appointment as soon as possible for a visit in 1 week  As needed           Electronically signed by: Terry Christensen D.O., 6/4/2024 8:30 AM

## 2024-06-26 ENCOUNTER — APPOINTMENT (OUTPATIENT)
Dept: RADIOLOGY | Facility: MEDICAL CENTER | Age: 84
End: 2024-06-26
Attending: EMERGENCY MEDICINE
Payer: COMMERCIAL

## 2024-06-26 ENCOUNTER — HOSPITAL ENCOUNTER (EMERGENCY)
Facility: MEDICAL CENTER | Age: 84
End: 2024-06-26
Attending: EMERGENCY MEDICINE
Payer: COMMERCIAL

## 2024-06-26 VITALS
WEIGHT: 219 LBS | DIASTOLIC BLOOD PRESSURE: 74 MMHG | HEART RATE: 70 BPM | RESPIRATION RATE: 16 BRPM | BODY MASS INDEX: 29.7 KG/M2 | OXYGEN SATURATION: 93 % | TEMPERATURE: 97.5 F | SYSTOLIC BLOOD PRESSURE: 135 MMHG

## 2024-06-26 DIAGNOSIS — M79.652 ACUTE PAIN OF LEFT THIGH: ICD-10-CM

## 2024-06-26 PROCEDURE — 99284 EMERGENCY DEPT VISIT MOD MDM: CPT

## 2024-06-26 PROCEDURE — 700102 HCHG RX REV CODE 250 W/ 637 OVERRIDE(OP): Mod: UD | Performed by: EMERGENCY MEDICINE

## 2024-06-26 PROCEDURE — A9270 NON-COVERED ITEM OR SERVICE: HCPCS | Mod: UD | Performed by: EMERGENCY MEDICINE

## 2024-06-26 PROCEDURE — 93971 EXTREMITY STUDY: CPT | Mod: LT

## 2024-06-26 PROCEDURE — 73501 X-RAY EXAM HIP UNI 1 VIEW: CPT | Mod: LT

## 2024-06-26 RX ORDER — ACETAMINOPHEN 325 MG/1
650 TABLET ORAL ONCE
Status: COMPLETED | OUTPATIENT
Start: 2024-06-26 | End: 2024-06-26

## 2024-06-26 RX ADMIN — ACETAMINOPHEN 650 MG: 325 TABLET, FILM COATED ORAL at 14:58

## 2024-06-26 ASSESSMENT — PAIN DESCRIPTION - PAIN TYPE
TYPE: ACUTE PAIN;CHRONIC PAIN
TYPE: ACUTE PAIN;CHRONIC PAIN

## 2024-06-26 ASSESSMENT — FIBROSIS 4 INDEX: FIB4 SCORE: 1.36

## 2024-06-26 NOTE — ED NOTES
Patient revitaled. Discharge orders received. Discharge instructions provided by ERP. AVS provided and reviewed with patient and daughter. Patient AO and ambulatory to baseline per daughter. Patient discharged assisted to vehicle and discharged to family without incident.

## 2024-06-26 NOTE — ED TRIAGE NOTES
Andrei Garay  84 y.o. male  Chief Complaint   Patient presents with    Knee Pain     Patient reports left knee pain x 20 years, but the pain has been worse over the last 4 days. Patient states he's been using a wheelchair due to pain. Patient lives at the [facility].      Patient A/Ox3     Vitals:    06/26/24 1243   BP: 137/70   Pulse: 82   Resp: 16   Temp: 36.9 °C (98.5 °F)   SpO2: 92%       Triage process explained to patient, apologized for wait time, and returned to lobby.  Pt informed to notify staff of any change in condition.

## 2024-06-26 NOTE — ED NOTES
Patient wheeled to [place] [address] and transferred to [facility] without incident. Primary assessment complete. Patient placed on monitor. Chart up for ERP.

## 2024-06-26 NOTE — DISCHARGE INSTRUCTIONS
Today we had x-rays done of the hip that showed osteoarthritis but no acute fracture or malalignment, an ultrasound of the leg was also completed that did not show any signs of a clot.  At this time the cause of the pain is not entirely clear, and may be from osteoarthritis or some bone spurs.  I would recommend stretching, range of motion exercises, the physician at the nursing facility may wish to prescribe stronger pain medications.

## 2024-06-26 NOTE — ED PROVIDER NOTES
ED Provider Note    Primary care provider: Stephanie Diaz M.D.    CHIEF COMPLAINT  Chief Complaint   Patient presents with    Knee Pain     Patient reports left knee pain x 20 years, but the pain has been worse over the last 4 days. Patient states he's been using a wheelchair due to pain. [facility]        Additional history obtained from: Family at bedside, they state that he occasionally cries out in pain, is massaging the leg, they are not entirely sure how they should treat his pain.  She states that he is still ambulatory with a walker, unlike his history, he was taken away from  Limitation to History:  Dementia    HPI  Andrei Garay is a 84 y.o. male who presents to the Emergency Department left lower extremity pain.  The patient normally ambulates with a walker, he [facility] 2 days ago as he has had frequent falls and placed him in a wheelchair.  He reports having pain of his bilateral knees for 20 years but over the past few weeks he has noticed more pain on the medial aspect of the left thigh down towards the left lower extremity and foot.  Pain seems to be improved with massaging the medial aspect of his thigh.  He denies any antecedent trauma.  Denies any fevers, chills, numbness or weakness.  It has been documented that he has severe dementia, therefore his history may be unreliable.      External Record Review: Reviewed the prior history, the patient was hospitalized at our facility 4 days in October 2022, history of advanced dementia, diabetes, hypertension he was admitted for sepsis secondary to UTI with SADIE.  He has not been hospitalized in our facility since that time.  He has been seen twice in the past 2 years since that time for falls.  Imaging has been negative.  Listed as DNR/DNI, alert and oriented x 2 at best.  Does have chronic pain.    REVIEW OF SYSTEMS  See HPI.     PAST MEDICAL HISTORY   has a past medical  history of Diabetes ([facility]), Hyperlipemia, Hypertension, and MI (myocardial infarction) ([facility]).    SURGICAL HISTORY   has a past surgical history that includes stent placement; appendectomy; irrigation & debridement general (2022); irrigation & debridement general (Left, 2022); and bone biopsy (Left, 2022).    SOCIAL HISTORY  Social History     Tobacco Use    Smoking status: Former     Current packs/day: 0.00     Types: Cigarettes     Quit date: 2000     Years since quittin.7    Smokeless tobacco: Never   Vaping Use    Vaping status: Never Used   Substance Use Topics    Alcohol use: Never    Drug use: Never      Social History     Substance and Sexual Activity   Drug Use Never       FAMILY HISTORY  Family History   Problem Relation Age of Onset    No Known Problems Mother     Heart Disease Father        CURRENT MEDICATIONS  Reviewed.  See Encounter Summary.     ALLERGIES  No Known Allergies    PHYSICAL EXAM  VITAL SIGNS: /74   Pulse 70   Temp 36.4 °C (97.5 °F) (Temporal)   Resp 16   Wt 99.3 kg (219 lb)   SpO2 93%   BMI 29.70 kg/m²   Constitutional: Awake, alert in no apparent distress.  HENT: Normocephalic, Bilateral external ears normal. Nose normal.   Eyes: Conjunctiva normal, non-icteric, EOMI.    Thorax & Lungs: Easy unlabored respirations, Clear to ascultation bilaterally.  Cardiovascular: Regular rate, Regular rhythm, No murmurs, rubs or gallops. Bilateral pulses symmetrical.   Abdomen:  Soft, nontender, nondistended, normal active bowel sounds.   :    Skin: Visualized skin is  Dry, No erythema, No rash.   Musculoskeletal:   No cyanosis, clubbing or edema. No leg asymmetry.  Left lower extremity: Skin is warm and dry, no signs of infection, the left hip and knee have full range of motion.  No crepitus, no knee effusion.  No palpable cords.  No reproducible pain, squeezing the leg, does not seem to cause any discomfort, nor does ranging the knee or hip.  Neurologic: Alert,  Grossly non-focal.   Psychiatric: Normal affect, Normal mood  Lymphatic:        RADIOLOGY  I have independently interpreted the diagnostic imaging associated with this visit and am waiting the final reading from the radiologist.   My preliminary interpretation is as follows: Degenerative osteoarthritis without fracture    Radiologist interpretation:   US-EXTREMITY VENOUS LOWER UNILAT LEFT   Final Result      DX-HIP-UNILATERAL-WITH PELVIS-1 VIEW LEFT   Final Result      Osteoarthritic degenerative changes of the hip without acute fracture or malalignment.          COURSE & MEDICAL DECISION MAKING  Pertinent Labs & Imaging studies reviewed. (See chart for details)    COURSE & MEDICAL DECISION MAKING  Pertinent Labs & Imaging studies reviewed. (See chart for details)    Differential diagnoses include but are not limited to: Somewhat vague left lower extremity pain, occult fracture would be very unlikely given that he has full range of motion, though this could cause a poorly localized radiating pain down the leg, therefore we will x-ray the left hip, suspicion is fairly low in this regards.  Will also ultrasound the left lower extremity as this can cause poorly localized medial thigh pain.    1:11 PM - Nursing notes reviewed, patient seen and examined at bedside.    Escalation of care considered, and ultimately not performed: Laboratory analysis.    Barriers to care at this time, including but not limited to: Dementia.       Decision Making:  This is a pleasant 84 y.o. year old male who presents with left lower extremity pain.  The patient has poorly localized left lower extremity pain, I considered the possibility of referred pain from a hip injury, the patient underwent x-rays today that does show some DJD but no evidence of acute fracture.  He is fully weightbearing, has full range of motion, do not suspect occult hip fracture.  I also performed an ultrasound that does not show any evidence of thrombus.  He is  neurovascularly intact.  He has great range of motion of the hip and knee, no concerns for septic joint.    At this point I do not have an explanation for the patient's intermittent poorly localized left thigh/hip pain.  Does not appear to be referred pain from the back.  I suspect that maybe he is getting some pain from bone spurs or osteoarthritis and does not have a good idea of what position is more comfortable.  I offered opioid prescriptions, the family would like to leave it up to his primary care physician at the nursing facility which I think is also appropriate.       The patient was discharged home (see d/c instructions) was told to return immediately for any signs or symptoms listed, or any worsening at all.  The patient verbally agreed to the discharge precautions and follow-up plan which is documented in EPIC.    Discharge Medications:  Discharge Medication List as of 6/26/2024  3:49 PM          FINAL IMPRESSION  1. Acute pain of left thigh

## 2025-06-21 ENCOUNTER — HOSPITAL ENCOUNTER (EMERGENCY)
Facility: MEDICAL CENTER | Age: 85
End: 2025-06-21
Attending: EMERGENCY MEDICINE
Payer: MEDICARE

## 2025-06-21 ENCOUNTER — APPOINTMENT (OUTPATIENT)
Dept: RADIOLOGY | Facility: MEDICAL CENTER | Age: 85
End: 2025-06-21
Attending: EMERGENCY MEDICINE
Payer: MEDICARE

## 2025-06-21 VITALS
HEIGHT: 72 IN | BODY MASS INDEX: 29.66 KG/M2 | SYSTOLIC BLOOD PRESSURE: 176 MMHG | RESPIRATION RATE: 18 BRPM | HEART RATE: 75 BPM | OXYGEN SATURATION: 92 % | TEMPERATURE: 97.6 F | WEIGHT: 219 LBS | DIASTOLIC BLOOD PRESSURE: 143 MMHG

## 2025-06-21 DIAGNOSIS — E86.0 DEHYDRATION: ICD-10-CM

## 2025-06-21 DIAGNOSIS — S09.90XA CLOSED HEAD INJURY, INITIAL ENCOUNTER: Primary | ICD-10-CM

## 2025-06-21 DIAGNOSIS — I10 HYPERTENSION, UNSPECIFIED TYPE: ICD-10-CM

## 2025-06-21 LAB
ANION GAP SERPL CALC-SCNC: 12 MMOL/L (ref 7–16)
BASOPHILS # BLD AUTO: 0.5 % (ref 0–1.8)
BASOPHILS # BLD: 0.05 K/UL (ref 0–0.12)
BUN SERPL-MCNC: 25 MG/DL (ref 8–22)
CALCIUM SERPL-MCNC: 9.5 MG/DL (ref 8.5–10.5)
CHLORIDE SERPL-SCNC: 102 MMOL/L (ref 96–112)
CO2 SERPL-SCNC: 22 MMOL/L (ref 20–33)
CREAT SERPL-MCNC: 1.2 MG/DL (ref 0.5–1.4)
EOSINOPHIL # BLD AUTO: 0.33 K/UL (ref 0–0.51)
EOSINOPHIL NFR BLD: 3.6 % (ref 0–6.9)
ERYTHROCYTE [DISTWIDTH] IN BLOOD BY AUTOMATED COUNT: 40.7 FL (ref 35.9–50)
GFR SERPLBLD CREATININE-BSD FMLA CKD-EPI: 59 ML/MIN/1.73 M 2
GLUCOSE SERPL-MCNC: 177 MG/DL (ref 65–99)
HCT VFR BLD AUTO: 43 % (ref 42–52)
HGB BLD-MCNC: 15 G/DL (ref 14–18)
IMM GRANULOCYTES # BLD AUTO: 0.06 K/UL (ref 0–0.11)
IMM GRANULOCYTES NFR BLD AUTO: 0.7 % (ref 0–0.9)
LYMPHOCYTES # BLD AUTO: 1.82 K/UL (ref 1–4.8)
LYMPHOCYTES NFR BLD: 19.8 % (ref 22–41)
MCH RBC QN AUTO: 29.1 PG (ref 27–33)
MCHC RBC AUTO-ENTMCNC: 34.9 G/DL (ref 32.3–36.5)
MCV RBC AUTO: 83.5 FL (ref 81.4–97.8)
MONOCYTES # BLD AUTO: 0.73 K/UL (ref 0–0.85)
MONOCYTES NFR BLD AUTO: 8 % (ref 0–13.4)
NEUTROPHILS # BLD AUTO: 6.18 K/UL (ref 1.82–7.42)
NEUTROPHILS NFR BLD: 67.4 % (ref 44–72)
NRBC # BLD AUTO: 0 K/UL
NRBC BLD-RTO: 0 /100 WBC (ref 0–0.2)
PLATELET # BLD AUTO: 255 K/UL (ref 164–446)
PMV BLD AUTO: 9.3 FL (ref 9–12.9)
POTASSIUM SERPL-SCNC: 4.3 MMOL/L (ref 3.6–5.5)
RBC # BLD AUTO: 5.15 M/UL (ref 4.7–6.1)
SODIUM SERPL-SCNC: 136 MMOL/L (ref 135–145)
WBC # BLD AUTO: 9.2 K/UL (ref 4.8–10.8)

## 2025-06-21 PROCEDURE — 99284 EMERGENCY DEPT VISIT MOD MDM: CPT

## 2025-06-21 PROCEDURE — 700105 HCHG RX REV CODE 258: Mod: UD | Performed by: EMERGENCY MEDICINE

## 2025-06-21 PROCEDURE — 70450 CT HEAD/BRAIN W/O DYE: CPT

## 2025-06-21 PROCEDURE — 80048 BASIC METABOLIC PNL TOTAL CA: CPT

## 2025-06-21 PROCEDURE — 36415 COLL VENOUS BLD VENIPUNCTURE: CPT

## 2025-06-21 PROCEDURE — 72125 CT NECK SPINE W/O DYE: CPT

## 2025-06-21 PROCEDURE — 85025 COMPLETE CBC W/AUTO DIFF WBC: CPT

## 2025-06-21 RX ORDER — SODIUM CHLORIDE 9 MG/ML
100 INJECTION, SOLUTION INTRAVENOUS ONCE
Status: COMPLETED | OUTPATIENT
Start: 2025-06-21 | End: 2025-06-21

## 2025-06-21 RX ADMIN — SODIUM CHLORIDE 100 ML: 9 INJECTION, SOLUTION INTRAVENOUS at 03:41

## 2025-06-21 ASSESSMENT — PAIN DESCRIPTION - PAIN TYPE: TYPE: ACUTE PAIN

## 2025-06-21 NOTE — DISCHARGE PLANNING
JARVIS set up [employer] transportation for the patient to get back to the [facility]: [address], [place]. Transport time is set for 0500.

## 2025-06-21 NOTE — ED TRIAGE NOTES
Chief Complaint   Patient presents with    T-5000 Head Injury     Pt arrives via EMS from [facility] with complaint of GLF. Pt was getting out of bed to use restroom when he fell and hit his head. Pt reports +LOC, + generalized pain, + HS. No blood thinner use. Pt aox2-3 at baseline, pt currently aox2. Pt airway patent, skin warm and dry, rr even and unlabored. Moves all extremities.     BP (!) 186/90   Pulse 70   Temp 36.4 °C (97.6 °F) (Temporal)   Resp 18   Ht 1.829 m (6')   Wt 99.3 kg (219 lb)   SpO2 94%   BMI 29.70 kg/m²

## 2025-06-21 NOTE — ED PROVIDER NOTES
ER Provider Note    Scribed for Erick Moore Ii, M.d. by Shivani Morrell. 6/21/2025  2:25 AM    Primary Care Provider: Stephanie Diaz M.D.    CHIEF COMPLAINT   Chief Complaint   Patient presents with    T-5000 Head Injury       HPI/ROS  LIMITATION TO HISTORY   Select: : None  OUTSIDE HISTORIAN(S):  Ems was present at charge desk to provide patient history.     Andrei Garay is a 85 y.o. male who presents to the ED from [facility] for ground level fall onset prior to arrival. Patient explains he was getting out of bed to use the restroom when he fell and hit his head. He confirms having a loss of consciousness. Denies use of blood thinners.     PAST MEDICAL HISTORY  Past Medical History[1]    SURGICAL HISTORY  Past Surgical History[2]    FAMILY HISTORY  Family History   Problem Relation Age of Onset    No Known Problems Mother     Heart Disease Father        SOCIAL HISTORY   reports that he quit smoking about 24 years ago. His smoking use included cigarettes. He has never used smokeless tobacco. He reports that he does not drink alcohol and does not use drugs.    CURRENT MEDICATIONS  Discharge Medication List as of 6/21/2025  4:57 AM        CONTINUE these medications which have NOT CHANGED    Details   nystatin (MYCOSTATIN) powder Apply 1 g topically as needed (for wound vac crusting)., Disp-15 g, R-0, Normal      insulin 70/30 (HUMULIN 70/30/NOVOLIN 70/30) (70-30) 100 UNIT/ML Suspension Inject 15 Units under the skin 2 times a day., Historical Med      insulin regular (NOVOLIN R) 100 Unit/mL Solution Inject 2-10 Units under the skin 3 times a day before meals. Inject per sliding scale. For blood glucose:  151 - 200 = 2 units  201 - 250 = 4 units  251 - 300 = 6 units  301 - 350 = 8 units  351 - 400 = 10 units  If >400, jayda SILVA, Historical Med      Melatonin 10 MG Tab Take 10 mg by mouth at bedtime., Historical Med      diphenhydrAMINE-lidocaine-Maalox (MBX) oral susp cup Take   by mouth 2 times a day. **MAGIC CUP**, Historical Med      metoprolol tartrate (LOPRESSOR) 25 MG Tab Take 25 mg by mouth 2 times a day., Historical Med      hydrOXYzine HCl (ATARAX) 50 MG Tab Take 50 mg by mouth every 12 hours as needed (for aggression and combativeness)., Historical Med      tamsulosin (FLOMAX) 0.4 MG capsule Take 0.8 mg by mouth at bedtime. 2 capsules = 0.8 mg., Historical Med      magnesium hydroxide (MILK OF MAGNESIA) 400 MG/5ML Suspension Take 30 mL by mouth every 24 hours as needed (if no bowel movement for 72 hours)., Historical Med      atorvastatin (LIPITOR) 40 MG Tab Take 1 Tablet by mouth every evening., Disp-30 Tablet, R-1, Normal      sertraline (ZOLOFT) 50 MG Tab Take 1 Tablet by mouth every evening., Disp-30 Tablet, R-1, Normal      mirtazapine (REMERON) 15 MG Tab Take 1 Tablet by mouth at bedtime., Disp-30 Tablet, R-1, Normal      metformin (GLUCOPHAGE) 1000 MG tablet Take 1 Tablet by mouth 2 times a day., Disp-60 Tablet, R-1, Normal             ALLERGIES  Patient has no known allergies.    PHYSICAL EXAM  BP (!) 170/90   Pulse 65   Temp 36.4 °C (97.6 °F) (Temporal)   Resp 17   Ht 1.829 m (6')   Wt 99.3 kg (219 lb)   SpO2 93%   BMI 29.70 kg/m²   Physical Exam  Vitals and nursing note reviewed.   Constitutional:       General: He is not in acute distress.     Appearance: He is well-developed.   HENT:      Head: Normocephalic and atraumatic.   Eyes:      Extraocular Movements: Extraocular movements intact.      Pupils: Pupils are equal, round, and reactive to light.   Cardiovascular:      Rate and Rhythm: Normal rate and regular rhythm.      Heart sounds: No murmur heard.  Pulmonary:      Effort: Pulmonary effort is normal.      Breath sounds: Normal breath sounds.      Comments: Lungs are clear  Abdominal:      Palpations: Abdomen is soft.      Tenderness: There is no abdominal tenderness.   Musculoskeletal:         General: Normal range of motion.      Right lower leg: No  edema.      Left lower leg: No edema.      Comments: Saline has normal range of motion, not endorsing C-Spine tenderness, no extremity tenderness or deformity   Skin:     General: Skin is warm.   Neurological:      General: No focal deficit present.      Mental Status: He is alert.      Comments: Orientated to person, place and remembers falling but does have some short term memory issues at baseline. No extremity weakness       DIAGNOSTIC STUDIES    LABS  Results for orders placed or performed during the hospital encounter of 06/21/25   CBC WITH DIFFERENTIAL    Collection Time: 06/21/25  2:46 AM   Result Value Ref Range    WBC 9.2 4.8 - 10.8 K/uL    RBC 5.15 4.70 - 6.10 M/uL    Hemoglobin 15.0 14.0 - 18.0 g/dL    Hematocrit 43.0 42.0 - 52.0 %    MCV 83.5 81.4 - 97.8 fL    MCH 29.1 27.0 - 33.0 pg    MCHC 34.9 32.3 - 36.5 g/dL    RDW 40.7 35.9 - 50.0 fL    Platelet Count 255 164 - 446 K/uL    MPV 9.3 9.0 - 12.9 fL    Neutrophils-Polys 67.40 44.00 - 72.00 %    Lymphocytes 19.80 (L) 22.00 - 41.00 %    Monocytes 8.00 0.00 - 13.40 %    Eosinophils 3.60 0.00 - 6.90 %    Basophils 0.50 0.00 - 1.80 %    Immature Granulocytes 0.70 0.00 - 0.90 %    Nucleated RBC 0.00 0.00 - 0.20 /100 WBC    Neutrophils (Absolute) 6.18 1.82 - 7.42 K/uL    Lymphs (Absolute) 1.82 1.00 - 4.80 K/uL    Monos (Absolute) 0.73 0.00 - 0.85 K/uL    Eos (Absolute) 0.33 0.00 - 0.51 K/uL    Baso (Absolute) 0.05 0.00 - 0.12 K/uL    Immature Granulocytes (abs) 0.06 0.00 - 0.11 K/uL    NRBC (Absolute) 0.00 K/uL   BASIC METABOLIC PANEL    Collection Time: 06/21/25  2:46 AM   Result Value Ref Range    Sodium 136 135 - 145 mmol/L    Potassium 4.3 3.6 - 5.5 mmol/L    Chloride 102 96 - 112 mmol/L    Co2 22 20 - 33 mmol/L    Glucose 177 (H) 65 - 99 mg/dL    Bun 25 (H) 8 - 22 mg/dL    Creatinine 1.20 0.50 - 1.40 mg/dL    Calcium 9.5 8.5 - 10.5 mg/dL    Anion Gap 12.0 7.0 - 16.0   ESTIMATED GFR    Collection Time: 06/21/25  2:46 AM   Result Value Ref Range    GFR  (CKD-EPI) 59 (A) >60 mL/min/1.73 m 2     RADIOLOGY/PROCEDURES   The attending emergency physician has independently interpreted the diagnostic imaging associated with this visit and am waiting the final reading from the radiologist.   My preliminary interpretation is a follows: No intracranial injury.     Radiologist interpretation:  CT-CSPINE WITHOUT PLUS RECONS   Final Result         1.  Multilevel degenerative changes of the cervical spine limit diagnostic sensitivity of this examination, otherwise no acute traumatic bony injury of the cervical spine is apparent.   2.  Right thyroid nodule, recommend follow-up thyroid sonography due to nodule size   3.  Atherosclerosis      CT-HEAD W/O   Final Result         1.  No acute intracranial abnormality is identified, there are nonspecific white matter changes, commonly associated with small vessel ischemic disease.  Associated mild cerebral atrophy is noted.   2.  Atherosclerosis.      Note: Diagnostic sensitivity of this examination is mildly limited due to streak artifacts anteriorly, multiple acquisition attempts were made.             COURSE & MEDICAL DECISION MAKING     ASSESSMENT, COURSE AND PLAN  Care Narrative: This is a 85 year old male presenting to the Emergency Department after a ground level fall when getting out of his bed. Confirms head strike and loss of consciousness. He does not take blood thinners. Patient is hypertensive at charge desk. I will order for eGFR, Cbc with diff, basic Metabolic Panel, CT-head W/o, and CT-Cspine w/o plus recons to evaluate. Differential diagnoses include but not limited to: Intercranial hemorrhage, C-Spine injury, will do blood work screening for acute kidney injury, electrolyte abnormalities. POLST present and recommends selective care.     3:41 AM - There is no intercranial injury, no C-spine injury. His BUN and creatinine increased from baseline. Will receive bolus of IV fluids and [facility].    Hydration: Based on the patient's presentation of Dehydration the patient was given IV fluids. IV Hydration was used because oral hydration was not adequate alone. Upon recheck following hydration, the patient vitals remained unremarkable.      PROBLEM LIST  #closed head injury  #Dehydration  #hypertension    DISPOSITION AND DISCUSSIONS  I have discussed management of the patient with the following physicians and MALLORY's:  None    Discussion of management with other QHP or appropriate source(s): None   Barriers to care at this time, including but not limited to: None.     Decision tools and prescription drugs considered including, but not limited to: NEXUS criteriafor head and cspine.    The patient will return for new or worsening symptoms and is stable at the time of discharge.    The patient is referred to a primary physician for blood pressure management, diabetic screening, and for all other preventative health concerns.    DISPOSITION:  Patient will be discharged home in stable condition.    FOLLOW UP:  Stephanie Diaz M.D.  [address] [place] [id number] [phone]      As needed    [facility], Emergency Dept  [address] [place] [id number] [phone]    If symptoms worsen    OUTPATIENT MEDICATIONS:  Discharge Medication List as of 6/21/2025  4:57 AM         FINAL DIANGOSIS  1. Closed head injury, initial encounter Active   2. Dehydration Active   3. Hypertension, unspecified type Chronic      IShivani (Sriram), alison scribing for, and in the presence of, DAPHNIE John II.    Electronically signed by: Shivani Solano), 6/21/2025    IErick II, M* personally performed the services described in this documentation, as scribed by Shivani Morrell in my presence, and it is both accurate and complete.     The note accurately reflects work and decisions made by me.  Erick Moore II, M.D.  6/21/2025  7:47 AM         [1]    Past Medical History:  Diagnosis Date    Diabetes (HCC)     Hyperlipemia     Hypertension     MI (myocardial infarction) (HCC)    [2]   Past Surgical History:  Procedure Laterality Date    WOUND IRRIGATION & DEBRIDEMENT Left 8/22/2022    Procedure: IRRIGATION AND DEBRIDEMENT, WOUND;  Surgeon: Sanjiv Vital M.D.;  Location: [facility];  Service: Orthopedics    BONE BIOPSY Left 8/22/2022    Procedure: BIOPSY, BONE FIBULA CULTURE;  Surgeon: Sanjiv Vital M.D.;  Location: [facility];  Service: Orthopedics    WOUND IRRIGATION & DEBRIDEMENT  6/14/2022    Procedure: IRRIGATION AND DEBRIDEMENT LEFT ANKLE;  Surgeon: Panfilo Wagner M.D.;  Location: [facility];  Service: Orthopedics    APPENDECTOMY      STENT PLACEMENT      cardiac

## 2025-06-21 NOTE — ED NOTES
Bedside report from FLORES Kelly. Pt resting in gurney comfortably, on monitor, call light in reach.  Pt is on RA, A&O x 3  Necessary fall precautions in place.

## 2025-06-21 NOTE — ED NOTES
Report from Carissa TANNER. Pt to [facility]. PIV initiated, blood collected and sent to the lab. Pt restless in [facility].

## 2025-06-21 NOTE — ED NOTES
DC home with written and verbal instructions regarding f/u, activity and RX.  Verbalized understanding, wheeled out with EMS.

## 2025-07-11 ENCOUNTER — HOSPITAL ENCOUNTER (EMERGENCY)
Facility: MEDICAL CENTER | Age: 85
End: 2025-07-12
Attending: EMERGENCY MEDICINE
Payer: MEDICARE

## 2025-07-11 ENCOUNTER — APPOINTMENT (OUTPATIENT)
Dept: RADIOLOGY | Facility: MEDICAL CENTER | Age: 85
End: 2025-07-11
Attending: EMERGENCY MEDICINE
Payer: MEDICARE

## 2025-07-11 DIAGNOSIS — S50.312A ABRASION OF LEFT ELBOW, INITIAL ENCOUNTER: ICD-10-CM

## 2025-07-11 DIAGNOSIS — S02.2XXA CLOSED FRACTURE OF NASAL BONE, INITIAL ENCOUNTER: ICD-10-CM

## 2025-07-11 DIAGNOSIS — S01.21XA LACERATION OF NOSE, INITIAL ENCOUNTER: ICD-10-CM

## 2025-07-11 DIAGNOSIS — S09.90XA CLOSED HEAD INJURY, INITIAL ENCOUNTER: Primary | ICD-10-CM

## 2025-07-11 LAB
ALBUMIN SERPL BCP-MCNC: 4.1 G/DL (ref 3.2–4.9)
ALBUMIN/GLOB SERPL: 1.4 G/DL
ALP SERPL-CCNC: 88 U/L (ref 30–99)
ALT SERPL-CCNC: <5 U/L (ref 2–50)
ANION GAP SERPL CALC-SCNC: 13 MMOL/L (ref 7–16)
AST SERPL-CCNC: 20 U/L (ref 12–45)
BASOPHILS # BLD AUTO: 0.5 % (ref 0–1.8)
BASOPHILS # BLD: 0.04 K/UL (ref 0–0.12)
BILIRUB SERPL-MCNC: 0.3 MG/DL (ref 0.1–1.5)
BUN SERPL-MCNC: 21 MG/DL (ref 8–22)
CALCIUM ALBUM COR SERPL-MCNC: 9.5 MG/DL (ref 8.5–10.5)
CALCIUM SERPL-MCNC: 9.6 MG/DL (ref 8.5–10.5)
CHLORIDE SERPL-SCNC: 100 MMOL/L (ref 96–112)
CO2 SERPL-SCNC: 24 MMOL/L (ref 20–33)
CREAT SERPL-MCNC: 1.38 MG/DL (ref 0.5–1.4)
EKG IMPRESSION: NORMAL
EOSINOPHIL # BLD AUTO: 0.29 K/UL (ref 0–0.51)
EOSINOPHIL NFR BLD: 3.5 % (ref 0–6.9)
ERYTHROCYTE [DISTWIDTH] IN BLOOD BY AUTOMATED COUNT: 41.5 FL (ref 35.9–50)
GFR SERPLBLD CREATININE-BSD FMLA CKD-EPI: 50 ML/MIN/1.73 M 2
GLOBULIN SER CALC-MCNC: 2.9 G/DL (ref 1.9–3.5)
GLUCOSE SERPL-MCNC: 189 MG/DL (ref 65–99)
HCT VFR BLD AUTO: 41.6 % (ref 42–52)
HGB BLD-MCNC: 13.9 G/DL (ref 14–18)
IMM GRANULOCYTES # BLD AUTO: 0.06 K/UL (ref 0–0.11)
IMM GRANULOCYTES NFR BLD AUTO: 0.7 % (ref 0–0.9)
LYMPHOCYTES # BLD AUTO: 1.8 K/UL (ref 1–4.8)
LYMPHOCYTES NFR BLD: 21.6 % (ref 22–41)
MCH RBC QN AUTO: 28.7 PG (ref 27–33)
MCHC RBC AUTO-ENTMCNC: 33.4 G/DL (ref 32.3–36.5)
MCV RBC AUTO: 86 FL (ref 81.4–97.8)
MONOCYTES # BLD AUTO: 0.59 K/UL (ref 0–0.85)
MONOCYTES NFR BLD AUTO: 7.1 % (ref 0–13.4)
NEUTROPHILS # BLD AUTO: 5.55 K/UL (ref 1.82–7.42)
NEUTROPHILS NFR BLD: 66.6 % (ref 44–72)
NRBC # BLD AUTO: 0 K/UL
NRBC BLD-RTO: 0 /100 WBC (ref 0–0.2)
PLATELET # BLD AUTO: 237 K/UL (ref 164–446)
PMV BLD AUTO: 9 FL (ref 9–12.9)
POTASSIUM SERPL-SCNC: 4.4 MMOL/L (ref 3.6–5.5)
PROT SERPL-MCNC: 7 G/DL (ref 6–8.2)
RBC # BLD AUTO: 4.84 M/UL (ref 4.7–6.1)
SODIUM SERPL-SCNC: 137 MMOL/L (ref 135–145)
TROPONIN T SERPL-MCNC: 19 NG/L (ref 6–19)
WBC # BLD AUTO: 8.3 K/UL (ref 4.8–10.8)

## 2025-07-11 PROCEDURE — 99285 EMERGENCY DEPT VISIT HI MDM: CPT

## 2025-07-11 PROCEDURE — 85025 COMPLETE CBC W/AUTO DIFF WBC: CPT

## 2025-07-11 PROCEDURE — 700101 HCHG RX REV CODE 250: Performed by: EMERGENCY MEDICINE

## 2025-07-11 PROCEDURE — 93005 ELECTROCARDIOGRAM TRACING: CPT | Mod: TC | Performed by: EMERGENCY MEDICINE

## 2025-07-11 PROCEDURE — 304999 HCHG REPAIR-SIMPLE/INTERMED LEVEL 1

## 2025-07-11 PROCEDURE — 36415 COLL VENOUS BLD VENIPUNCTURE: CPT

## 2025-07-11 PROCEDURE — 71045 X-RAY EXAM CHEST 1 VIEW: CPT

## 2025-07-11 PROCEDURE — 80053 COMPREHEN METABOLIC PANEL: CPT

## 2025-07-11 PROCEDURE — 70450 CT HEAD/BRAIN W/O DYE: CPT

## 2025-07-11 PROCEDURE — 72125 CT NECK SPINE W/O DYE: CPT

## 2025-07-11 PROCEDURE — 70486 CT MAXILLOFACIAL W/O DYE: CPT

## 2025-07-11 PROCEDURE — 94760 N-INVAS EAR/PLS OXIMETRY 1: CPT

## 2025-07-11 PROCEDURE — 84484 ASSAY OF TROPONIN QUANT: CPT

## 2025-07-11 RX ADMIN — Medication 3 ML: at 23:04

## 2025-07-11 ASSESSMENT — PAIN DESCRIPTION - PAIN TYPE: TYPE: ACUTE PAIN

## 2025-07-12 VITALS
RESPIRATION RATE: 17 BRPM | DIASTOLIC BLOOD PRESSURE: 63 MMHG | HEART RATE: 72 BPM | OXYGEN SATURATION: 93 % | BODY MASS INDEX: 31.15 KG/M2 | WEIGHT: 230 LBS | SYSTOLIC BLOOD PRESSURE: 130 MMHG | TEMPERATURE: 97.9 F | HEIGHT: 72 IN

## 2025-07-12 LAB — TROPONIN T SERPL-MCNC: 17 NG/L (ref 6–19)

## 2025-07-12 PROCEDURE — 36415 COLL VENOUS BLD VENIPUNCTURE: CPT

## 2025-07-12 PROCEDURE — 304999 HCHG REPAIR-SIMPLE/INTERMED LEVEL 1

## 2025-07-12 PROCEDURE — 304217 HCHG IRRIGATION SYSTEM

## 2025-07-12 PROCEDURE — 84484 ASSAY OF TROPONIN QUANT: CPT

## 2025-07-12 PROCEDURE — 303353 HCHG DERMABOND SKIN ADHESIVE

## 2025-07-12 NOTE — ED PROVIDER NOTES
ED Provider Note    CHIEF COMPLAINT  Chief Complaint   Patient presents with    T-5000 Head Injury     [facility] HS. Pt baseline orientation per staff is A&Ox2-3. Unknown LOC, -thinners. Pt arrives with laceration to left side basal bridge with mild bleeding. Activated as TBI per protocol.         HPI    Primary care provider: Stephanie Diaz M.D.   History obtained from: Patient and EMS  History limited by: Select: : None    Anrdei Garay is a 85 y.o. male who presents to the ED by EMS from his skilled nursing facility for head injury due to unwitnessed fall.  Per EMS, patient does have some mild dementia.  Patient was found on the ground by staff with noted head injury.  He is not on any reported blood thinners.  Patient denies any pain including headache, neck pain, chest pain, abdominal pain, back pain or pain in his extremities.  He does report chronic knee pain and states that he is scheduled to have knee surgery.  He denies shortness of breath/difficulty breathing/nausea/vomiting/weakness or sensory change.  Patient unsure of his last tetanus shot but record review shows that he is up-to-date.    REVIEW OF SYSTEMS  Please see HPI for pertinent positives/negatives.  All other systems reviewed and are negative.     PAST MEDICAL HISTORY  Past Medical History:   Diagnosis Date    Diabetes (HCC)     Hyperlipemia     Hypertension     MI (myocardial infarction) (HCC)         SURGICAL HISTORY  Past Surgical History[1]     SOCIAL HISTORY  Social History     Tobacco Use    Smoking status: Former     Current packs/day: 0.00     Types: Cigarettes     Quit date: 2000     Years since quittin.8    Smokeless tobacco: Never   Vaping Use    Vaping status: Never Used   Substance and Sexual Activity    Alcohol use: Never    Drug use: Never    Sexual activity: Not on file        FAMILY HISTORY  Family History   Problem  Relation Age of Onset    No Known Problems Mother     Heart Disease Father         CURRENT MEDICATIONS  Home Medications       Reviewed by Loree Green R.N. (Registered Nurse) on 07/11/25 at 2250  Med List Status: Not Addressed     Medication Last Dose Status   atorvastatin (LIPITOR) 40 MG Tab  Active   diphenhydrAMINE-lidocaine-Maalox (MBX) oral susp cup  Active   hydrOXYzine HCl (ATARAX) 50 MG Tab  Active   insulin 70/30 (HUMULIN 70/30/NOVOLIN 70/30) (70-30) 100 UNIT/ML Suspension  Active   insulin regular (NOVOLIN R) 100 Unit/mL Solution  Active   magnesium hydroxide (MILK OF MAGNESIA) 400 MG/5ML Suspension  Active   Melatonin 10 MG Tab  Active   metformin (GLUCOPHAGE) 1000 MG tablet  Active   metoprolol tartrate (LOPRESSOR) 25 MG Tab  Active   mirtazapine (REMERON) 15 MG Tab  Active   nystatin (MYCOSTATIN) powder  Active   sertraline (ZOLOFT) 50 MG Tab  Active   tamsulosin (FLOMAX) 0.4 MG capsule  Active                     ALLERGIES  Allergies[2]     PHYSICAL EXAM  VITAL SIGNS: /63   Pulse 72   Temp 36.6 °C (97.9 °F) (Temporal)   Resp 17   Ht 1.829 m (6')   Wt 104 kg (230 lb)   SpO2 93%   BMI 31.19 kg/m²  @TA[[id number]::@     Pulse ox interpretation: 96% I interpret this pulse ox as normal     Cardiac monitor interpretation: Sinus rhythm with heart rate in the 70s as interpreted by me.  The patient presented with unwitnessed fall and cardiac monitor was ordered to monitor for dysrhythmia.    Constitutional: Well developed, well nourished, alert in no apparent distress, nontoxic appearance    HENT: Abrasion/superficial laceration to bridge of his nose with swelling and mild bruising and tenderness to palpation, mild swelling with trace bruising to left forehead without apparent tenderness to palpation, normocephalic, bilateral external ears normal, no hemotympanum bilaterally, oropharynx moist and clear, airway patent, nose with no hematoma/bleeding/drainage, midface stable, no malocclusion, no  periorbital swelling/bruising, no mastoid swelling/bruising    Eyes: PERRL, EOMI without apparent restrictions of movement, conjunctiva without erythema, no discharge, no icterus    Neck: Soft and supple, trachea midline, no stridor, no swelling/bruising, no midline C-spine tenderness, no stepoffs, patient moving his neck without apparent discomfort  Cardiovascular: Regular rate and rhythm, no murmurs/rubs/gallops, strong distal pulses and good perfusion    Thorax & Lungs: No respiratory distress, CTAB with equal BS bilaterally, no chest tenderness/deformity/swelling/crepitus/bruising    Abdomen: Soft, nontender, nondistended, no G/R, no bruising, normal BS, pelvis stable    Back: Normal inspection, no swelling/bruising, no midline TTP, no stepoffs, no CVAT    Extremities: No cyanosis, small abrasion to left elbow posteriorly, no edema, no gross deformity, no tenderness, intact distal pulses with brisk cap refill    Skin: Warm, dry, no pallor/cyanosis, no rash noted except as above  Neuro: A/O to self and knows that he is seen [facility] but confused regarding month and year, GCS15, no focal deficits noted, sensation intact to touch, equal strength bilateral UE/LE    Psychiatric: Cooperative      DIAGNOSTIC STUDIES / PROCEDURES    Verbal consent was obtained for laceration repair.  The wound was locally infiltrated with LET then irrigated with NS and prepped/draped.  Wound was explored without evidence of FB.  The laceration was closed using Dermabond.  Pt tolerated procedure well without complications.  Pt instructed on S/S of infection.    Total repaired wound length: 1 cm.      Tetanus UTD        EKG  12 Lead EKG obtained at 2244 and interpreted by me:   Rate: 70   Rhythm: Sinus rhythm   Ectopy: None  Intervals: First-degree block  Axis: LAD  QRS: RBBB    Clinical Impression: Sinus rhythm with first-degree block and RBBB with associated repolarization abnormalities  Compared to June 29, 2022 without  significant change      LABS  All labs reviewed by me.     Results for orders placed or performed during the hospital encounter of 25   EKG (NOW)    Collection Time: 25 10:44 PM   Result Value Ref Range    Report       [facility]    Test Date:  2025  Pt Name:    COOKIE VELEZ                Department: ER  MRN:        [id number]                      Room:       [facility]  Gender:     Male                         Technician: [id number]  :        1940                   Requested By:MARIA ELENA ARMENDARIZ  Order #:    [id number]                    Reading MD:    Measurements  Intervals                                Axis  Rate:       70                           P:          -3  IA:         367                          QRS:        -53  QRSD:       143                          T:          20  QT:         409  QTc:        442    Interpretive Statements  Sinus rhythm  Prolonged IA interval  Right bundle branch block  Inferior infarct, old  Compared to ECG 2022 18:44:46  First degree AV block now present  Myocardial infarct finding now present  Second-degree AV block, Mobitz type I ([place]) no longer present     CBC WITH DIFFERENTIAL    Collection Time: 25 10:51 PM   Result Value Ref Range    WBC 8.3 4.8 - 10.8 K/uL    RBC 4.84 4.70 - 6.10 M/uL    Hemoglobin 13.9 (L) 14.0 - 18.0 g/dL    Hematocrit 41.6 (L) 42.0 - 52.0 %    MCV 86.0 81.4 - 97.8 fL    MCH 28.7 27.0 - 33.0 pg    MCHC 33.4 32.3 - 36.5 g/dL    RDW 41.5 35.9 - 50.0 fL    Platelet Count 237 164 - 446 K/uL    MPV 9.0 9.0 - 12.9 fL    Neutrophils-Polys 66.60 44.00 - 72.00 %    Lymphocytes 21.60 (L) 22.00 - 41.00 %    Monocytes 7.10 0.00 - 13.40 %    Eosinophils 3.50 0.00 - 6.90 %    Basophils 0.50 0.00 - 1.80 %    Immature Granulocytes 0.70 0.00 - 0.90 %    Nucleated RBC 0.00 0.00 - 0.20 /100 WBC    Neutrophils (Absolute) 5.55 1.82 - 7.42 K/uL    Lymphs (Absolute) 1.80 1.00 - 4.80 K/uL    Monos (Absolute) 0.59  0.00 - 0.85 K/uL    Eos (Absolute) 0.29 0.00 - 0.51 K/uL    Baso (Absolute) 0.04 0.00 - 0.12 K/uL    Immature Granulocytes (abs) 0.06 0.00 - 0.11 K/uL    NRBC (Absolute) 0.00 K/uL   COMP METABOLIC PANEL    Collection Time: 07/11/25 10:51 PM   Result Value Ref Range    Sodium 137 135 - 145 mmol/L    Potassium 4.4 3.6 - 5.5 mmol/L    Chloride 100 96 - 112 mmol/L    Co2 24 20 - 33 mmol/L    Anion Gap 13.0 7.0 - 16.0    Glucose 189 (H) 65 - 99 mg/dL    Bun 21 8 - 22 mg/dL    Creatinine 1.38 0.50 - 1.40 mg/dL    Calcium 9.6 8.5 - 10.5 mg/dL    Correct Calcium 9.5 8.5 - 10.5 mg/dL    AST(SGOT) 20 12 - 45 U/L    ALT(SGPT) <5 2 - 50 U/L    Alkaline Phosphatase 88 30 - 99 U/L    Total Bilirubin 0.3 0.1 - 1.5 mg/dL    Albumin 4.1 3.2 - 4.9 g/dL    Total Protein 7.0 6.0 - 8.2 g/dL    Globulin 2.9 1.9 - 3.5 g/dL    A-G Ratio 1.4 g/dL   TROPONIN    Collection Time: 07/11/25 10:51 PM   Result Value Ref Range    Troponin T 19 6 - 19 ng/L   ESTIMATED GFR    Collection Time: 07/11/25 10:51 PM   Result Value Ref Range    GFR (CKD-EPI) 50 (A) >60 mL/min/1.73 m 2   TROPONIN    Collection Time: 07/12/25 12:43 AM   Result Value Ref Range    Troponin T 17 6 - 19 ng/L        RADIOLOGY  I have independently interpreted the diagnostic imaging associated with this visit and am waiting the final reading from the radiologist.   My preliminary interpretation is as follows: No acute findings on portable chest x-ray compared to prior.    CT-MAXILLOFACIAL W/O PLUS RECONS   Final Result      Comminuted nasal bone fracture.      CT-CSPINE WITHOUT PLUS RECONS   Final Result      Degenerative changes of the cervical spine without acute fracture or malalignment.      CT-HEAD W/O   Final Result      1.  No evidence of acute territorial infarct, intracranial hemorrhage or mass lesion.   2.  Right nasal bone fracture.   3.  Moderate diffuse cerebral substance loss.   4.  Mild microangiopathic ischemic change versus demyelination or gliosis.                   DX-CHEST-PORTABLE (1 VIEW)   Final Result      Left basilar atelectasis.             COURSE & MEDICAL DECISION MAKING  Nursing notes, VS, PMSFHx reviewed in chart.     Review of past medical records shows the patient was last seen in this ED June 21, 2025 for evaluation after a fall with discharge diagnosis of closed head injury, dehydration, hypotension.  Patient last admitted to this hospital October 24, 2022 and discharged on October 28, 2022 with the following summary:    82-year-[facility] with past medical history of advanced dementia, diabetes, hypertension, CAD, recent left foot surgery presenting with fever.  He was admitted for sepsis secondary to urinary tract infection with SADIE.  During patient's surgery he had Martinez catheter inserted and patient had been refusing catheter care and changing out catheter.  Patient was started on empiric antibiotics and IV fluids.  His sepsis and SADIE have resolved.  His urine culture grew Candida albicans however symptoms have resolved with Rocephin.  Patient was briefly in restraints as he was primarily pulling on his Martinez catheter.  Patient passed voiding trial and has now been urinating on his own without any difficulty.  Once Martinez catheter was removed patient's restraints were also discontinued.     Therefore, he is discharged in good and stable condition to home with close outpatient follow-up.      Differential diagnoses considered include but are not limited to: Contusion, concussion/post-concussion syndrome, Fx, intracranial hemorrhage, abrasion/laceration, syncope, near syncope, hypoglycemia, Sz, vasovagal episode, dysrhythmia, CVA/TIA, dehydration, electrolyte abnormality, anemia       ED Observation Status? No; Patient does not meet criteria for ED Observation.       Discussion of management with other [facility] or appropriate source(s): None     Escalation of care considered, and ultimately not performed: acute inpatient care management, however  at this time, the patient is most appropriate for outpatient management.     Decision tools and prescription drugs considered including, but not limited to: Antibiotics  .        History and physical exam as above.  This is a 85-year-old male patient with medical history including diabetes, CAD, hypertension, hyperlipidemia, dementia brought in by EMS and was seen on arrival as TBI activation.  Given patient's age with noted head trauma, imaging studies were obtained with findings as above.  Since this was an unwitnessed event, laboratory testing was also obtained.  Patient with mild anemia without evidence for active hemorrhage except from his facial laceration.  He is noted to have hyperglycemia without evidence for DKA.  No electrolyte derangement.  Troponin x 2 without elevation and EKG without significant change compared to prior.  I doubt that this is ACS.  He was monitored in the ED and remained clinically stable.  No evidence for dysrhythmia or hemodynamic instability.  His superficial nasal laceration was closed using Dermabond as above.  Record review shows patient is up-to-date with tetanus.  I discussed the findings with patient.  He continues to remain alert and jovial without apparent focal neurological findings.  Patient will be given outpatient follow-up with the on-call surgeon for facial fracture.  Patient advised on good wound care and given return to ED precautions.  He verbalized understanding and agreed with plan of care with no further questions or concerns.      The patient is referred to a primary physician for blood pressure management, diabetic screening, and for all other preventative health concerns.       FINAL IMPRESSION  1. Closed head injury, initial encounter Acute   2. Closed fracture of nasal bone, initial encounter Acute   3. Laceration of nose, initial encounter Acute   4. Abrasion of left elbow, initial encounter Acute          DISPOSITION  Patient will be discharged home in  stable condition.       FOLLOW UP  Stephanie Diaz M.D.  [address]  [place] [id number] [phone]    Call in 2 days      Stoney Mensah Jr., M.D.  [address]  [place] [address]  [phone]    Call in 2 days      [facility], Emergency Dept  [address] [address] [phone]    If symptoms worsen         OUTPATIENT MEDICATIONS  Discharge Medication List as of 7/12/2025  2:53 AM             Electronically signed by: Jimbo Sylvester D.O., 7/11/2025 10:34 PM      Portions of this record were made with voice recognition software.  Despite my review, errors may remain.  Please interpret this chart in the appropriate context.         [1]   Past Surgical History:  Procedure Laterality Date    WOUND IRRIGATION & DEBRIDEMENT Left 8/22/2022    Procedure: IRRIGATION AND DEBRIDEMENT, WOUND;  Surgeon: Sanjiv Vital M.D.;  Location: SURGERY [facility];  Service: Orthopedics    BONE BIOPSY Left 8/22/2022    Procedure: BIOPSY, BONE FIBULA CULTURE;  Surgeon: Sanjiv Vital M.D.;  Location: SURGERY [facility];  Service: Orthopedics    WOUND IRRIGATION & DEBRIDEMENT  6/14/2022    Procedure: IRRIGATION AND DEBRIDEMENT LEFT ANKLE;  Surgeon: Panfilo Wagner M.D.;  Location: SURGERY [facility];  Service: Orthopedics    APPENDECTOMY      STENT PLACEMENT      cardiac   [2] No Known Allergies

## 2025-07-12 NOTE — ED TRIAGE NOTES
Chief Complaint   Patient presents with    T-5000 Head Injury     [facility] w[facility] HS. Pt baseline orientation per staff is A&Ox2-3. Unknown LOC, -thinners. Pt arrives with laceration to left side basal bridge with mild bleeding. Activated as TBI per protocol.      .BP (!) 167/74   Pulse 74   Temp 36.8 °C (98.2 °F) (Temporal)   Resp 15   Ht 1.829 m (6')   Wt 104 kg (230 lb)   SpO2 96%   BMI 31.19 kg/m²

## 2025-07-12 NOTE — DISCHARGE PLANNING
JARVIS received a call from the RN requesting JARVIS assistance with [facility] transportation back to the [facility]: [address]. JARVIS faxed a PCS form to [facility] and set up transportation with Mamie for 0305. Transfer packet completed and given to the RN.

## (undated) DEVICE — SLEEVE, VASO, THIGH, MED

## (undated) DEVICE — SET LEADWIRE 5 LEAD BEDSIDE DISPOSABLE ECG (1SET OF 5/EA)

## (undated) DEVICE — SUTURE 3-0 ETHILON FS-1 - (36/BX) 30 INCH

## (undated) DEVICE — PACK LOWER EXTREMITY - (2/CA)

## (undated) DEVICE — SWAB CULTURE AMIES ESWAB (50EA/PK)

## (undated) DEVICE — PADDING CAST 6 IN STERILE - 6 X 4 YDS (24/CA)

## (undated) DEVICE — DRAPE U ORTHOPEDIC - (10/BX)

## (undated) DEVICE — BANDAGE ELASTIC 6 HONEYCOMB - 6X5YD LF (20/CA)"

## (undated) DEVICE — PADDING CAST 4 IN STERILE - 4 X 4 YDS (24/CA)

## (undated) DEVICE — LACTATED RINGERS INJ 1000 ML - (14EA/CA 60CA/PF)

## (undated) DEVICE — GLOVE BIOGEL ECLIPSE PF LATEX SIZE 8.0  (50PR/BX)

## (undated) DEVICE — SUTURE GENERAL

## (undated) DEVICE — CUP DENTURE W/ LID - (200/CA)

## (undated) DEVICE — SET EXTENSION WITH 2 PORTS (48EA/CA) ***PART #2C8610 IS A SUBSTITUTE*****

## (undated) DEVICE — ELECTRODE 850 FOAM ADHESIVE - HYDROGEL RADIOTRNSPRNT (50/PK)

## (undated) DEVICE — STAPLER SKIN DISP - (6/BX 10BX/CA) VISISTAT

## (undated) DEVICE — SUTURE 3-0 VICRYL PLUS SH - 8X 18 INCH (12/BX)

## (undated) DEVICE — TOWEL STOP TIMEOUT SAFETY FLAG (40EA/CA)

## (undated) DEVICE — DRAPE SURGICAL U 77X120 - (10/CA)

## (undated) DEVICE — SUTURE 0 VICRYL PLUS CT-1 - 8 X 18 INCH (12/BX)

## (undated) DEVICE — SUCTION INSTRUMENT YANKAUER BULBOUS TIP W/O VENT (50EA/CA)

## (undated) DEVICE — DRESSING KIT V.A.C. SENSA T.R.A.C. SMALL (10EA/CA)

## (undated) DEVICE — PADDING CAST 4 IN X 4 YDS - SOF-ROLL (12RL/BG 6BG/CT)

## (undated) DEVICE — DRESSING 3X8 ADAPTIC GAUZE - NON-ADHERING (36/PK 6PK/BX)

## (undated) DEVICE — TOWELS CLOTH SURGICAL - (4/PK 20PK/CA)

## (undated) DEVICE — GLOVE BIOGEL INDICATOR SZ 8 SURGICAL PF LTX - (50/BX 4BX/CA)

## (undated) DEVICE — SUTURE ETHILON 2-0 FSLX 30 (36PK/BX)"

## (undated) DEVICE — CONTAINER, SPECIMEN, STERILE

## (undated) DEVICE — PROTECTOR ULNA NERVE - (36PR/CA)

## (undated) DEVICE — CANISTER SUCTION 3000ML MECHANICAL FILTER AUTO SHUTOFF MEDI-VAC NONSTERILE LF DISP  (40EA/CA)

## (undated) DEVICE — SUTURE 3-0 ETHILON PS-1 (36PK/BX)

## (undated) DEVICE — MASK ANESTHESIA ADULT  - (100/CA)

## (undated) DEVICE — DRAPE LARGE 3 QUARTER - (20/CA)

## (undated) DEVICE — SCRUB SOLUTION EXIDINE 4% 40Z - 48/CS CHLORAHEXADINE GLUCONATE

## (undated) DEVICE — SOD. CHL. INJ. 0.9% 1000 ML - (14EA/CA 60CA/PF)

## (undated) DEVICE — TUBING CLEARLINK DUO-VENT - C-FLO (48EA/CA)

## (undated) DEVICE — SODIUM CHL IRRIGATION 0.9% 1000ML (12EA/CA)

## (undated) DEVICE — SWAB ANAEROBIC SPEC.COLLECTOR - (25/PK 4PK/CA 100EA/CA)

## (undated) DEVICE — GLOVE BIOGEL PI INDICATOR SZ 8.0 SURGICAL PF LF -(50/BX 4BX/CA)

## (undated) DEVICE — GOWN WARMING STANDARD FLEX - (30/CA)

## (undated) DEVICE — ELECTRODE DUAL RETURN W/ CORD - (50/PK)

## (undated) DEVICE — PAD LAP STERILE 18 X 18 - (5/PK 40PK/CA)

## (undated) DEVICE — BANDAGE ELASTIC 4 HONEYCOMB - 4"X5YD LF (20/CA)"

## (undated) DEVICE — WRAP COBAN SELF-ADHERENT 6 IN X  5YDS STERILE TAN (12/CA)

## (undated) DEVICE — CHLORAPREP 26 ML APPLICATOR - ORANGE TINT(25/CA)

## (undated) DEVICE — SENSOR OXIMETER ADULT SPO2 RD SET (20EA/BX)

## (undated) DEVICE — STOCKINET BIAS 6 IN STERILE - (20/CA)

## (undated) DEVICE — VAC CANISTER W/GEL 500ML DUP

## (undated) DEVICE — PACK UPPER EXTREMITY (2EA/CA)

## (undated) DEVICE — SUTURE 3-0 ETHILON FSLX 30 (36PK/BX)"

## (undated) DEVICE — KIT EVACUATER 3 SPRING PVC LF 1/8 DRAIN SIZE (10EA/CA)"

## (undated) DEVICE — DRAPE IOBAN II INCISE 23X17 - (10EA/BX 4BX/CA)

## (undated) DEVICE — GLOVE BIOGEL SZ 8 SURGICAL PF LTX - (50PR/BX 4BX/CA)

## (undated) DEVICE — SUTURE 2-0 VICRYL PLUS CT-1 - 8 X 18 INCH(12/BX)

## (undated) DEVICE — WRAP CO-FLEX 4IN X 5YD STERIL - SELF-ADHERENT (18/CA)

## (undated) DEVICE — SLEEVE VASO CALF MED - (10PR/CA)

## (undated) DEVICE — KIT ANESTHESIA W/CIRCUIT & 3/LT BAG W/FILTER (20EA/CA)

## (undated) DEVICE — GOWN SURGEONS X-LARGE - DISP. (30/CA)

## (undated) DEVICE — HEAD HOLDER JUNIOR/ADULT

## (undated) DEVICE — NEPTUNE 4 PORT MANIFOLD - (20/PK)